# Patient Record
Sex: MALE | Race: WHITE | NOT HISPANIC OR LATINO | Employment: OTHER | URBAN - METROPOLITAN AREA
[De-identification: names, ages, dates, MRNs, and addresses within clinical notes are randomized per-mention and may not be internally consistent; named-entity substitution may affect disease eponyms.]

---

## 2018-02-15 ENCOUNTER — OFFICE VISIT CONVERTED (OUTPATIENT)
Dept: INTERNAL MEDICINE | Facility: CLINIC | Age: 73
End: 2018-02-15
Attending: NURSE PRACTITIONER

## 2018-03-15 ENCOUNTER — OFFICE VISIT CONVERTED (OUTPATIENT)
Dept: INTERNAL MEDICINE | Facility: CLINIC | Age: 73
End: 2018-03-15
Attending: NURSE PRACTITIONER

## 2018-03-29 ENCOUNTER — OFFICE VISIT CONVERTED (OUTPATIENT)
Dept: INTERNAL MEDICINE | Facility: CLINIC | Age: 73
End: 2018-03-29
Attending: INTERNAL MEDICINE

## 2018-05-01 ENCOUNTER — OFFICE VISIT CONVERTED (OUTPATIENT)
Dept: INTERNAL MEDICINE | Facility: CLINIC | Age: 73
End: 2018-05-01
Attending: INTERNAL MEDICINE

## 2018-05-16 ENCOUNTER — OFFICE VISIT CONVERTED (OUTPATIENT)
Dept: INTERNAL MEDICINE | Facility: CLINIC | Age: 73
End: 2018-05-16
Attending: PHYSICIAN ASSISTANT

## 2018-05-16 ENCOUNTER — CONVERSION ENCOUNTER (OUTPATIENT)
Dept: INTERNAL MEDICINE | Facility: CLINIC | Age: 73
End: 2018-05-16

## 2018-05-25 ENCOUNTER — OFFICE VISIT CONVERTED (OUTPATIENT)
Dept: INTERNAL MEDICINE | Facility: CLINIC | Age: 73
End: 2018-05-25
Attending: NURSE PRACTITIONER

## 2018-05-25 ENCOUNTER — CONVERSION ENCOUNTER (OUTPATIENT)
Dept: INTERNAL MEDICINE | Facility: CLINIC | Age: 73
End: 2018-05-25

## 2018-07-02 ENCOUNTER — OFFICE VISIT CONVERTED (OUTPATIENT)
Dept: NEUROSURGERY | Facility: CLINIC | Age: 73
End: 2018-07-02
Attending: PHYSICIAN ASSISTANT

## 2018-07-02 ENCOUNTER — CONVERSION ENCOUNTER (OUTPATIENT)
Dept: NEUROLOGY | Facility: CLINIC | Age: 73
End: 2018-07-02

## 2018-07-31 ENCOUNTER — OFFICE VISIT CONVERTED (OUTPATIENT)
Dept: INTERNAL MEDICINE | Facility: CLINIC | Age: 73
End: 2018-07-31
Attending: INTERNAL MEDICINE

## 2018-08-03 ENCOUNTER — OFFICE VISIT CONVERTED (OUTPATIENT)
Dept: INTERNAL MEDICINE | Facility: CLINIC | Age: 73
End: 2018-08-03
Attending: INTERNAL MEDICINE

## 2018-08-29 ENCOUNTER — OFFICE VISIT CONVERTED (OUTPATIENT)
Dept: NEUROSURGERY | Facility: CLINIC | Age: 73
End: 2018-08-29
Attending: PHYSICIAN ASSISTANT

## 2018-09-04 ENCOUNTER — OFFICE VISIT CONVERTED (OUTPATIENT)
Dept: INTERNAL MEDICINE | Facility: CLINIC | Age: 73
End: 2018-09-04
Attending: INTERNAL MEDICINE

## 2018-10-01 ENCOUNTER — CONVERSION ENCOUNTER (OUTPATIENT)
Dept: INTERNAL MEDICINE | Facility: CLINIC | Age: 73
End: 2018-10-01

## 2018-10-01 ENCOUNTER — OFFICE VISIT CONVERTED (OUTPATIENT)
Dept: INTERNAL MEDICINE | Facility: CLINIC | Age: 73
End: 2018-10-01
Attending: INTERNAL MEDICINE

## 2018-10-08 ENCOUNTER — OFFICE VISIT CONVERTED (OUTPATIENT)
Dept: INTERNAL MEDICINE | Facility: CLINIC | Age: 73
End: 2018-10-08
Attending: INTERNAL MEDICINE

## 2018-10-08 ENCOUNTER — CONVERSION ENCOUNTER (OUTPATIENT)
Dept: INTERNAL MEDICINE | Facility: CLINIC | Age: 73
End: 2018-10-08

## 2018-11-12 ENCOUNTER — OFFICE VISIT CONVERTED (OUTPATIENT)
Dept: INTERNAL MEDICINE | Facility: CLINIC | Age: 73
End: 2018-11-12
Attending: INTERNAL MEDICINE

## 2019-01-16 ENCOUNTER — OFFICE VISIT CONVERTED (OUTPATIENT)
Dept: INTERNAL MEDICINE | Facility: CLINIC | Age: 74
End: 2019-01-16
Attending: INTERNAL MEDICINE

## 2019-02-12 ENCOUNTER — OFFICE VISIT CONVERTED (OUTPATIENT)
Dept: INTERNAL MEDICINE | Facility: CLINIC | Age: 74
End: 2019-02-12
Attending: PHYSICIAN ASSISTANT

## 2019-02-12 ENCOUNTER — HOSPITAL ENCOUNTER (OUTPATIENT)
Dept: OTHER | Facility: HOSPITAL | Age: 74
Discharge: HOME | End: 2019-02-12
Attending: PHYSICIAN ASSISTANT

## 2019-02-12 ENCOUNTER — CONVERSION ENCOUNTER (OUTPATIENT)
Dept: INTERNAL MEDICINE | Facility: CLINIC | Age: 74
End: 2019-02-12

## 2019-02-14 LAB — BACTERIA UR CULT: NORMAL

## 2019-02-18 ENCOUNTER — HOSPITAL ENCOUNTER (OUTPATIENT)
Dept: OTHER | Facility: HOSPITAL | Age: 74
Discharge: HOME | End: 2019-02-18
Attending: INTERNAL MEDICINE

## 2019-02-18 ENCOUNTER — OFFICE VISIT CONVERTED (OUTPATIENT)
Dept: INTERNAL MEDICINE | Facility: CLINIC | Age: 74
End: 2019-02-18
Attending: INTERNAL MEDICINE

## 2019-02-18 LAB
ALBUMIN SERPL-MCNC: 4.3 G/DL (ref 3.5–5)
ALBUMIN/GLOB SERPL: 1.3 {RATIO} (ref 1.4–2.6)
ALP SERPL-CCNC: 63 U/L (ref 56–155)
ALT SERPL-CCNC: 36 U/L (ref 10–40)
ANION GAP SERPL CALC-SCNC: 22 MMOL/L (ref 8–19)
AST SERPL-CCNC: 39 U/L (ref 15–50)
BASOPHILS # BLD AUTO: 0.07 10*3/UL (ref 0–0.2)
BASOPHILS NFR BLD AUTO: 0.9 % (ref 0–3)
BILIRUB SERPL-MCNC: 0.66 MG/DL (ref 0.2–1.3)
BUN SERPL-MCNC: 13 MG/DL (ref 5–25)
BUN/CREAT SERPL: 10 {RATIO} (ref 6–20)
CALCIUM SERPL-MCNC: 9.8 MG/DL (ref 8.7–10.4)
CHLORIDE SERPL-SCNC: 102 MMOL/L (ref 99–111)
CHOLEST SERPL-MCNC: 207 MG/DL (ref 107–200)
CHOLEST/HDLC SERPL: 5.2 {RATIO} (ref 3–6)
CONV ABS IMM GRAN: 0.07 10*3/UL (ref 0–0.2)
CONV CO2: 20 MMOL/L (ref 22–32)
CONV IMMATURE GRAN: 0.9 % (ref 0–1.8)
CONV TOTAL PROTEIN: 7.7 G/DL (ref 6.3–8.2)
CREAT UR-MCNC: 1.26 MG/DL (ref 0.7–1.2)
DEPRECATED RDW RBC AUTO: 47.8 FL (ref 35.1–43.9)
EOSINOPHIL # BLD AUTO: 0.29 10*3/UL (ref 0–0.7)
EOSINOPHIL # BLD AUTO: 3.6 % (ref 0–7)
ERYTHROCYTE [DISTWIDTH] IN BLOOD BY AUTOMATED COUNT: 14.6 % (ref 11.6–14.4)
EST. AVERAGE GLUCOSE BLD GHB EST-MCNC: 137 MG/DL
GFR SERPLBLD BASED ON 1.73 SQ M-ARVRAT: 55 ML/MIN/{1.73_M2}
GLOBULIN UR ELPH-MCNC: 3.4 G/DL (ref 2–3.5)
GLUCOSE SERPL-MCNC: 68 MG/DL (ref 70–99)
HBA1C MFR BLD: 17.6 G/DL (ref 14–18)
HBA1C MFR BLD: 6.4 % (ref 3.5–5.7)
HCT VFR BLD AUTO: 51.4 % (ref 42–52)
HDLC SERPL-MCNC: 40 MG/DL (ref 40–60)
LDLC SERPL CALC-MCNC: 123 MG/DL (ref 70–100)
LYMPHOCYTES # BLD AUTO: 2.77 10*3/UL (ref 1–5)
MCH RBC QN AUTO: 30.8 PG (ref 27–31)
MCHC RBC AUTO-ENTMCNC: 34.2 G/DL (ref 33–37)
MCV RBC AUTO: 89.9 FL (ref 80–96)
MONOCYTES # BLD AUTO: 0.77 10*3/UL (ref 0.2–1.2)
MONOCYTES NFR BLD AUTO: 9.5 % (ref 3–10)
NEUTROPHILS # BLD AUTO: 4.12 10*3/UL (ref 2–8)
NEUTROPHILS NFR BLD AUTO: 50.9 % (ref 30–85)
NRBC CBCN: 0 % (ref 0–0.7)
OSMOLALITY SERPL CALC.SUM OF ELEC: 286 MOSM/KG (ref 273–304)
PLATELET # BLD AUTO: 217 10*3/UL (ref 130–400)
PMV BLD AUTO: 11.7 FL (ref 9.4–12.4)
POTASSIUM SERPL-SCNC: 5.4 MMOL/L (ref 3.5–5.3)
RBC # BLD AUTO: 5.72 10*6/UL (ref 4.7–6.1)
SODIUM SERPL-SCNC: 139 MMOL/L (ref 135–147)
TRIGL SERPL-MCNC: 453 MG/DL (ref 40–150)
VARIANT LYMPHS NFR BLD MANUAL: 34.2 % (ref 20–45)
WBC # BLD AUTO: 8.09 10*3/UL (ref 4.8–10.8)

## 2019-02-21 ENCOUNTER — OFFICE VISIT CONVERTED (OUTPATIENT)
Dept: SURGERY | Facility: CLINIC | Age: 74
End: 2019-02-21
Attending: PHYSICIAN ASSISTANT

## 2019-02-21 ENCOUNTER — HOSPITAL ENCOUNTER (OUTPATIENT)
Dept: SURGERY | Facility: CLINIC | Age: 74
Discharge: HOME | End: 2019-02-21
Attending: PHYSICIAN ASSISTANT

## 2019-02-24 LAB
AMOXICILLIN+CLAV SUSC ISLT: 4
AMPICILLIN SUSC ISLT: >=32
AMPICILLIN+SULBAC SUSC ISLT: 16
BACTERIA UR CULT: ABNORMAL
CEFAZOLIN SUSC ISLT: <=4
CEFEPIME SUSC ISLT: <=1
CEFTAZIDIME SUSC ISLT: <=1
CEFTRIAXONE SUSC ISLT: <=1
CEFUROXIME ORAL SUSC ISLT: 4
CEFUROXIME PARENTER SUSC ISLT: 4
CIPROFLOXACIN SUSC ISLT: >=4
ERTAPENEM SUSC ISLT: <=0.5
GENTAMICIN SUSC ISLT: <=1
LEVOFLOXACIN SUSC ISLT: >=8
NITROFURANTOIN SUSC ISLT: <=16
TETRACYCLINE SUSC ISLT: 2
TMP SMX SUSC ISLT: >=320
TOBRAMYCIN SUSC ISLT: <=1

## 2019-03-06 ENCOUNTER — OFFICE VISIT CONVERTED (OUTPATIENT)
Dept: NEUROSURGERY | Facility: CLINIC | Age: 74
End: 2019-03-06
Attending: PHYSICIAN ASSISTANT

## 2019-03-15 ENCOUNTER — HOSPITAL ENCOUNTER (OUTPATIENT)
Dept: MRI IMAGING | Facility: HOSPITAL | Age: 74
Discharge: HOME | End: 2019-03-15
Attending: PHYSICIAN ASSISTANT

## 2019-03-18 ENCOUNTER — CONVERSION ENCOUNTER (OUTPATIENT)
Dept: SURGERY | Facility: CLINIC | Age: 74
End: 2019-03-18

## 2019-03-18 ENCOUNTER — HOSPITAL ENCOUNTER (OUTPATIENT)
Dept: SURGERY | Facility: CLINIC | Age: 74
Discharge: HOME | End: 2019-03-18
Attending: PHYSICIAN ASSISTANT

## 2019-03-18 ENCOUNTER — OFFICE VISIT CONVERTED (OUTPATIENT)
Dept: SURGERY | Facility: CLINIC | Age: 74
End: 2019-03-18
Attending: PHYSICIAN ASSISTANT

## 2019-03-19 ENCOUNTER — HOSPITAL ENCOUNTER (OUTPATIENT)
Dept: PERIOP | Facility: HOSPITAL | Age: 74
Setting detail: HOSPITAL OUTPATIENT SURGERY
Discharge: HOME | End: 2019-03-19
Attending: UROLOGY

## 2019-03-19 LAB
GLUCOSE BLD-MCNC: 108 MG/DL (ref 70–99)
GLUCOSE BLD-MCNC: 128 MG/DL (ref 70–99)

## 2019-03-20 LAB
AMPICILLIN SUSC ISLT: <=2
BACTERIA UR CULT: ABNORMAL
CIPROFLOXACIN SUSC ISLT: 1
CONV GENTAMICIN HIGH LEVEL SYNERGY: ABNORMAL
CONV STREPTOMYCIN HIGH LEVEL SYNERGY: ABNORMAL
DAPTOMYCIN SUSC ISLT: 4
ERYTHROMYCIN SUSC ISLT: 0.5
LEVOFLOXACIN SUSC ISLT: 2
LINEZOLID SUSC ISLT: 2
NITROFURANTOIN SUSC ISLT: <=16
TETRACYCLINE SUSC ISLT: <=1
TIGECYCLINE SUSC ISLT: <=0.12
VANCOMYCIN SUSC ISLT: 1

## 2019-03-25 ENCOUNTER — CONVERSION ENCOUNTER (OUTPATIENT)
Dept: NEUROLOGY | Facility: CLINIC | Age: 74
End: 2019-03-25

## 2019-03-25 ENCOUNTER — OFFICE VISIT CONVERTED (OUTPATIENT)
Dept: NEUROSURGERY | Facility: CLINIC | Age: 74
End: 2019-03-25
Attending: PHYSICIAN ASSISTANT

## 2019-03-26 LAB
COLOR STONE: NORMAL
COMPN STONE: NORMAL
CONV CA OXALATE DIHYDRATE: 10 %
CONV CA OXALATE MONOHYDRATE: 88 %
CONV CALCIUM PHOSPHATE: 2 %
CONV CALCULI COMMENT: NORMAL
CONV CALCULI DISCLAIMER: NORMAL
CONV CALCULI NOTE: NORMAL
NIDUS STONE QL: NORMAL
SIZE STONE: NORMAL MM
SURFACE CRYSTALS: NORMAL
WT STONE: 8 MG

## 2019-03-27 ENCOUNTER — HOSPITAL ENCOUNTER (OUTPATIENT)
Dept: OTHER | Facility: HOSPITAL | Age: 74
Discharge: HOME | End: 2019-03-27
Attending: PHYSICIAN ASSISTANT

## 2019-03-28 ENCOUNTER — OFFICE VISIT CONVERTED (OUTPATIENT)
Dept: SURGERY | Facility: CLINIC | Age: 74
End: 2019-03-28
Attending: PHYSICIAN ASSISTANT

## 2019-05-06 ENCOUNTER — OFFICE VISIT CONVERTED (OUTPATIENT)
Dept: NEUROSURGERY | Facility: CLINIC | Age: 74
End: 2019-05-06
Attending: PHYSICIAN ASSISTANT

## 2019-05-06 ENCOUNTER — CONVERSION ENCOUNTER (OUTPATIENT)
Dept: NEUROLOGY | Facility: CLINIC | Age: 74
End: 2019-05-06

## 2019-05-22 ENCOUNTER — OFFICE VISIT CONVERTED (OUTPATIENT)
Dept: INTERNAL MEDICINE | Facility: CLINIC | Age: 74
End: 2019-05-22
Attending: INTERNAL MEDICINE

## 2019-05-22 ENCOUNTER — HOSPITAL ENCOUNTER (OUTPATIENT)
Dept: OTHER | Facility: HOSPITAL | Age: 74
Discharge: HOME | End: 2019-05-22
Attending: INTERNAL MEDICINE

## 2019-05-22 LAB
ALBUMIN SERPL-MCNC: 4.3 G/DL (ref 3.5–5)
ALBUMIN/GLOB SERPL: 1.4 {RATIO} (ref 1.4–2.6)
ALP SERPL-CCNC: 74 U/L (ref 56–155)
ALT SERPL-CCNC: 23 U/L (ref 10–40)
ANION GAP SERPL CALC-SCNC: 18 MMOL/L (ref 8–19)
AST SERPL-CCNC: 29 U/L (ref 15–50)
BILIRUB SERPL-MCNC: 0.53 MG/DL (ref 0.2–1.3)
BUN SERPL-MCNC: 15 MG/DL (ref 5–25)
BUN/CREAT SERPL: 15 {RATIO} (ref 6–20)
CALCIUM SERPL-MCNC: 10 MG/DL (ref 8.7–10.4)
CHLORIDE SERPL-SCNC: 101 MMOL/L (ref 99–111)
CHOLEST SERPL-MCNC: 196 MG/DL (ref 107–200)
CHOLEST/HDLC SERPL: 4.8 {RATIO} (ref 3–6)
CONV CO2: 25 MMOL/L (ref 22–32)
CONV TOTAL PROTEIN: 7.3 G/DL (ref 6.3–8.2)
CREAT UR-MCNC: 0.98 MG/DL (ref 0.7–1.2)
EST. AVERAGE GLUCOSE BLD GHB EST-MCNC: 163 MG/DL
GFR SERPLBLD BASED ON 1.73 SQ M-ARVRAT: >60 ML/MIN/{1.73_M2}
GLOBULIN UR ELPH-MCNC: 3 G/DL (ref 2–3.5)
GLUCOSE SERPL-MCNC: 243 MG/DL (ref 70–99)
HBA1C MFR BLD: 7.3 % (ref 3.5–5.7)
HDLC SERPL-MCNC: 41 MG/DL (ref 40–60)
LDLC SERPL CALC-MCNC: 97 MG/DL (ref 70–100)
OSMOLALITY SERPL CALC.SUM OF ELEC: 297 MOSM/KG (ref 273–304)
POTASSIUM SERPL-SCNC: 4.8 MMOL/L (ref 3.5–5.3)
SODIUM SERPL-SCNC: 139 MMOL/L (ref 135–147)
TRIGL SERPL-MCNC: 454 MG/DL (ref 40–150)

## 2019-07-03 ENCOUNTER — OFFICE VISIT CONVERTED (OUTPATIENT)
Dept: INTERNAL MEDICINE | Facility: CLINIC | Age: 74
End: 2019-07-03
Attending: PHYSICIAN ASSISTANT

## 2019-07-03 ENCOUNTER — HOSPITAL ENCOUNTER (OUTPATIENT)
Dept: OTHER | Facility: HOSPITAL | Age: 74
Discharge: HOME | End: 2019-07-03
Attending: PHYSICIAN ASSISTANT

## 2019-07-03 LAB
APPEARANCE UR: ABNORMAL
BILIRUB UR QL: ABNORMAL
COLOR UR: ABNORMAL
CONV BACTERIA: ABNORMAL
CONV COLLECTION SOURCE (UA): ABNORMAL
CONV HYALINE CASTS IN URINE MICRO: ABNORMAL /[LPF]
CONV UROBILINOGEN IN URINE BY AUTOMATED TEST STRIP: 0.2 {EHRLICHU}/DL (ref 0.1–1)
CONV YEAST, UA: PRESENT
GLUCOSE UR QL: >=1000 MG/DL
HGB UR QL STRIP: NEGATIVE
KETONES UR QL STRIP: ABNORMAL MG/DL
LEUKOCYTE ESTERASE UR QL STRIP: ABNORMAL
NITRITE UR QL STRIP: POSITIVE
PH UR STRIP.AUTO: 5 [PH] (ref 5–8)
PROT UR QL: ABNORMAL MG/DL
RBC #/AREA URNS HPF: ABNORMAL /[HPF]
SP GR UR: 1.03 (ref 1–1.03)
SQUAMOUS SPT QL MICRO: ABNORMAL /[HPF]
WBC #/AREA URNS HPF: ABNORMAL /[HPF]

## 2019-07-05 LAB
AMOXICILLIN+CLAV SUSC ISLT: 4
AMPICILLIN SUSC ISLT: >=32
AMPICILLIN+SULBAC SUSC ISLT: 16
BACTERIA UR CULT: ABNORMAL
CEFAZOLIN SUSC ISLT: <=4
CEFEPIME SUSC ISLT: <=1
CEFTAZIDIME SUSC ISLT: <=1
CEFTRIAXONE SUSC ISLT: <=1
CEFUROXIME ORAL SUSC ISLT: 16
CEFUROXIME PARENTER SUSC ISLT: 16
CIPROFLOXACIN SUSC ISLT: >=4
ERTAPENEM SUSC ISLT: <=0.5
GENTAMICIN SUSC ISLT: <=1
LEVOFLOXACIN SUSC ISLT: >=8
NITROFURANTOIN SUSC ISLT: <=16
TETRACYCLINE SUSC ISLT: 4
TMP SMX SUSC ISLT: >=320
TOBRAMYCIN SUSC ISLT: <=1

## 2019-07-09 ENCOUNTER — CONVERSION ENCOUNTER (OUTPATIENT)
Dept: INTERNAL MEDICINE | Facility: CLINIC | Age: 74
End: 2019-07-09

## 2019-07-09 ENCOUNTER — OFFICE VISIT CONVERTED (OUTPATIENT)
Dept: INTERNAL MEDICINE | Facility: CLINIC | Age: 74
End: 2019-07-09
Attending: PHYSICIAN ASSISTANT

## 2019-07-09 ENCOUNTER — HOSPITAL ENCOUNTER (OUTPATIENT)
Dept: OTHER | Facility: HOSPITAL | Age: 74
Discharge: HOME | End: 2019-07-09
Attending: PHYSICIAN ASSISTANT

## 2019-07-09 LAB
APPEARANCE UR: ABNORMAL
BILIRUB UR QL: ABNORMAL
COLOR UR: ABNORMAL
CONV BACTERIA: NEGATIVE
CONV COLLECTION SOURCE (UA): ABNORMAL
CONV CRYSTALS: ABNORMAL /[HPF]
CONV HYALINE CASTS IN URINE MICRO: ABNORMAL /[LPF]
CONV UROBILINOGEN IN URINE BY AUTOMATED TEST STRIP: 1 {EHRLICHU}/DL (ref 0.1–1)
GLUCOSE UR QL: 250 MG/DL
HGB UR QL STRIP: ABNORMAL
KETONES UR QL STRIP: 15 MG/DL
LEUKOCYTE ESTERASE UR QL STRIP: ABNORMAL
NITRITE UR QL STRIP: NEGATIVE
PH UR STRIP.AUTO: 5 [PH] (ref 5–8)
PROT UR QL: 30 MG/DL
RBC #/AREA URNS HPF: ABNORMAL /[HPF]
SP GR UR: 1.04 (ref 1–1.03)
SQUAMOUS SPT QL MICRO: ABNORMAL /[HPF]
WBC #/AREA URNS HPF: ABNORMAL /[HPF]

## 2019-07-11 LAB — BACTERIA UR CULT: NORMAL

## 2019-07-19 ENCOUNTER — HOSPITAL ENCOUNTER (OUTPATIENT)
Dept: OTHER | Facility: HOSPITAL | Age: 74
Discharge: HOME | End: 2019-07-19
Attending: INTERNAL MEDICINE

## 2019-07-19 ENCOUNTER — OFFICE VISIT CONVERTED (OUTPATIENT)
Dept: INTERNAL MEDICINE | Facility: CLINIC | Age: 74
End: 2019-07-19
Attending: INTERNAL MEDICINE

## 2019-07-19 LAB
APPEARANCE UR: ABNORMAL
BILIRUB UR QL: ABNORMAL
COLOR UR: ABNORMAL
CONV BACTERIA: ABNORMAL
CONV COLLECTION SOURCE (UA): ABNORMAL
CONV CRYSTALS: ABNORMAL /[HPF]
CONV UROBILINOGEN IN URINE BY AUTOMATED TEST STRIP: 0.2 {EHRLICHU}/DL (ref 0.1–1)
GLUCOSE UR QL: 500 MG/DL
HGB UR QL STRIP: ABNORMAL
KETONES UR QL STRIP: NEGATIVE MG/DL
LEUKOCYTE ESTERASE UR QL STRIP: NEGATIVE
NITRITE UR QL STRIP: NEGATIVE
PH UR STRIP.AUTO: 5 [PH] (ref 5–8)
PROT UR QL: NEGATIVE MG/DL
RBC #/AREA URNS HPF: ABNORMAL /[HPF]
SP GR UR: 1.02 (ref 1–1.03)
WBC #/AREA URNS HPF: ABNORMAL /[HPF]

## 2019-07-22 LAB
AMOXICILLIN+CLAV SUSC ISLT: <=2
AMOXICILLIN+CLAV SUSC ISLT: <=2
AMPICILLIN SUSC ISLT: <=2
AMPICILLIN SUSC ISLT: >=32
AMPICILLIN+SULBAC SUSC ISLT: 8
AMPICILLIN+SULBAC SUSC ISLT: <=2
BACTERIA UR CULT: ABNORMAL
CEFAZOLIN SUSC ISLT: <=4
CEFAZOLIN SUSC ISLT: <=4
CEFEPIME SUSC ISLT: <=1
CEFEPIME SUSC ISLT: <=1
CEFTAZIDIME SUSC ISLT: <=1
CEFTAZIDIME SUSC ISLT: <=1
CEFTRIAXONE SUSC ISLT: <=1
CEFTRIAXONE SUSC ISLT: <=1
CEFUROXIME ORAL SUSC ISLT: 4
CEFUROXIME ORAL SUSC ISLT: <=1
CEFUROXIME PARENTER SUSC ISLT: 4
CEFUROXIME PARENTER SUSC ISLT: <=1
CIPROFLOXACIN SUSC ISLT: <=0.25
CIPROFLOXACIN SUSC ISLT: <=0.25
ERTAPENEM SUSC ISLT: <=0.5
ERTAPENEM SUSC ISLT: <=0.5
GENTAMICIN SUSC ISLT: <=1
GENTAMICIN SUSC ISLT: <=1
LEVOFLOXACIN SUSC ISLT: <=0.12
LEVOFLOXACIN SUSC ISLT: <=0.12
NITROFURANTOIN SUSC ISLT: 64
NITROFURANTOIN SUSC ISLT: <=16
TETRACYCLINE SUSC ISLT: <=1
TETRACYCLINE SUSC ISLT: <=1
TMP SMX SUSC ISLT: <=20
TMP SMX SUSC ISLT: <=20
TOBRAMYCIN SUSC ISLT: <=1
TOBRAMYCIN SUSC ISLT: <=1

## 2019-08-27 ENCOUNTER — HOSPITAL ENCOUNTER (OUTPATIENT)
Dept: OTHER | Facility: HOSPITAL | Age: 74
Discharge: HOME | End: 2019-08-27
Attending: INTERNAL MEDICINE

## 2019-08-27 ENCOUNTER — OFFICE VISIT CONVERTED (OUTPATIENT)
Dept: INTERNAL MEDICINE | Facility: CLINIC | Age: 74
End: 2019-08-27
Attending: INTERNAL MEDICINE

## 2019-08-27 LAB
ALBUMIN SERPL-MCNC: 4.4 G/DL (ref 3.5–5)
ALBUMIN/GLOB SERPL: 1.5 {RATIO} (ref 1.4–2.6)
ALP SERPL-CCNC: 78 U/L (ref 56–155)
ALT SERPL-CCNC: 31 U/L (ref 10–40)
ANION GAP SERPL CALC-SCNC: 20 MMOL/L (ref 8–19)
AST SERPL-CCNC: 41 U/L (ref 15–50)
BILIRUB SERPL-MCNC: 0.88 MG/DL (ref 0.2–1.3)
BUN SERPL-MCNC: 14 MG/DL (ref 5–25)
BUN/CREAT SERPL: 13 {RATIO} (ref 6–20)
CALCIUM SERPL-MCNC: 9.8 MG/DL (ref 8.7–10.4)
CHLORIDE SERPL-SCNC: 99 MMOL/L (ref 99–111)
CHOLEST SERPL-MCNC: 184 MG/DL (ref 107–200)
CHOLEST/HDLC SERPL: 4.1 {RATIO} (ref 3–6)
CONV CO2: 23 MMOL/L (ref 22–32)
CONV TOTAL PROTEIN: 7.3 G/DL (ref 6.3–8.2)
CREAT UR-MCNC: 1.06 MG/DL (ref 0.7–1.2)
EST. AVERAGE GLUCOSE BLD GHB EST-MCNC: 197 MG/DL
GFR SERPLBLD BASED ON 1.73 SQ M-ARVRAT: >60 ML/MIN/{1.73_M2}
GLOBULIN UR ELPH-MCNC: 2.9 G/DL (ref 2–3.5)
GLUCOSE SERPL-MCNC: 324 MG/DL (ref 70–99)
HBA1C MFR BLD: 8.5 % (ref 3.5–5.7)
HDLC SERPL-MCNC: 45 MG/DL (ref 40–60)
LDLC SERPL CALC-MCNC: 87 MG/DL (ref 70–100)
OSMOLALITY SERPL CALC.SUM OF ELEC: 297 MOSM/KG (ref 273–304)
POTASSIUM SERPL-SCNC: 4.6 MMOL/L (ref 3.5–5.3)
SODIUM SERPL-SCNC: 137 MMOL/L (ref 135–147)
TRIGL SERPL-MCNC: 262 MG/DL (ref 40–150)
VLDLC SERPL-MCNC: 52 MG/DL (ref 5–37)

## 2019-09-03 ENCOUNTER — OFFICE VISIT CONVERTED (OUTPATIENT)
Dept: SURGERY | Facility: CLINIC | Age: 74
End: 2019-09-03
Attending: PHYSICIAN ASSISTANT

## 2019-09-03 ENCOUNTER — HOSPITAL ENCOUNTER (OUTPATIENT)
Dept: SURGERY | Facility: CLINIC | Age: 74
Discharge: HOME | End: 2019-09-03
Attending: PHYSICIAN ASSISTANT

## 2019-09-06 LAB
AMOXICILLIN+CLAV SUSC ISLT: 8
AMPICILLIN SUSC ISLT: >=32
AMPICILLIN+SULBAC SUSC ISLT: 16
BACTERIA UR CULT: ABNORMAL
CEFAZOLIN SUSC ISLT: <=4
CEFEPIME SUSC ISLT: <=1
CEFTAZIDIME SUSC ISLT: <=1
CEFTRIAXONE SUSC ISLT: <=1
CEFUROXIME ORAL SUSC ISLT: 16
CEFUROXIME PARENTER SUSC ISLT: 16
CIPROFLOXACIN SUSC ISLT: >=4
ERTAPENEM SUSC ISLT: <=0.5
GENTAMICIN SUSC ISLT: <=1
LEVOFLOXACIN SUSC ISLT: >=8
NITROFURANTOIN SUSC ISLT: <=16
TETRACYCLINE SUSC ISLT: >=16
TMP SMX SUSC ISLT: >=320
TOBRAMYCIN SUSC ISLT: <=1

## 2019-09-19 ENCOUNTER — HOSPITAL ENCOUNTER (OUTPATIENT)
Dept: GENERAL RADIOLOGY | Facility: HOSPITAL | Age: 74
Discharge: HOME | End: 2019-09-19
Attending: PHYSICIAN ASSISTANT

## 2019-09-20 ENCOUNTER — OFFICE VISIT CONVERTED (OUTPATIENT)
Dept: SURGERY | Facility: CLINIC | Age: 74
End: 2019-09-20
Attending: PHYSICIAN ASSISTANT

## 2019-09-24 ENCOUNTER — HOSPITAL ENCOUNTER (OUTPATIENT)
Dept: SURGERY | Facility: CLINIC | Age: 74
Discharge: HOME | End: 2019-09-24
Attending: PHYSICIAN ASSISTANT

## 2019-09-26 LAB — BACTERIA UR CULT: NORMAL

## 2019-09-30 ENCOUNTER — HOSPITAL ENCOUNTER (OUTPATIENT)
Dept: SURGERY | Facility: CLINIC | Age: 74
Discharge: HOME | End: 2019-09-30
Attending: PHYSICIAN ASSISTANT

## 2019-10-02 LAB — BACTERIA UR CULT: NORMAL

## 2019-12-02 ENCOUNTER — HOSPITAL ENCOUNTER (OUTPATIENT)
Dept: OTHER | Facility: HOSPITAL | Age: 74
Discharge: HOME | End: 2019-12-02
Attending: INTERNAL MEDICINE

## 2019-12-02 ENCOUNTER — OFFICE VISIT CONVERTED (OUTPATIENT)
Dept: INTERNAL MEDICINE | Facility: CLINIC | Age: 74
End: 2019-12-02
Attending: INTERNAL MEDICINE

## 2019-12-02 ENCOUNTER — CONVERSION ENCOUNTER (OUTPATIENT)
Dept: INTERNAL MEDICINE | Facility: CLINIC | Age: 74
End: 2019-12-02

## 2019-12-02 LAB
ALBUMIN SERPL-MCNC: 4.6 G/DL (ref 3.5–5)
ALBUMIN/GLOB SERPL: 1.5 {RATIO} (ref 1.4–2.6)
ALP SERPL-CCNC: 74 U/L (ref 56–155)
ALT SERPL-CCNC: 33 U/L (ref 10–40)
ANION GAP SERPL CALC-SCNC: 25 MMOL/L (ref 8–19)
AST SERPL-CCNC: 40 U/L (ref 15–50)
BILIRUB SERPL-MCNC: 0.81 MG/DL (ref 0.2–1.3)
BUN SERPL-MCNC: 17 MG/DL (ref 5–25)
BUN/CREAT SERPL: 17 {RATIO} (ref 6–20)
CALCIUM SERPL-MCNC: 10.1 MG/DL (ref 8.7–10.4)
CHLORIDE SERPL-SCNC: 99 MMOL/L (ref 99–111)
CHOLEST SERPL-MCNC: 234 MG/DL (ref 107–200)
CHOLEST/HDLC SERPL: 5.6 {RATIO} (ref 3–6)
CONV CO2: 20 MMOL/L (ref 22–32)
CONV TOTAL PROTEIN: 7.7 G/DL (ref 6.3–8.2)
CREAT UR-MCNC: 1 MG/DL (ref 0.7–1.2)
EST. AVERAGE GLUCOSE BLD GHB EST-MCNC: 220 MG/DL
GFR SERPLBLD BASED ON 1.73 SQ M-ARVRAT: >60 ML/MIN/{1.73_M2}
GLOBULIN UR ELPH-MCNC: 3.1 G/DL (ref 2–3.5)
GLUCOSE SERPL-MCNC: 294 MG/DL (ref 70–99)
HBA1C MFR BLD: 9.3 % (ref 3.5–5.7)
HDLC SERPL-MCNC: 42 MG/DL (ref 40–60)
LDLC SERPL CALC-MCNC: 143 MG/DL (ref 70–100)
OSMOLALITY SERPL CALC.SUM OF ELEC: 300 MOSM/KG (ref 273–304)
POTASSIUM SERPL-SCNC: 4.5 MMOL/L (ref 3.5–5.3)
SODIUM SERPL-SCNC: 139 MMOL/L (ref 135–147)
TRIGL SERPL-MCNC: 405 MG/DL (ref 40–150)

## 2020-03-25 ENCOUNTER — TELEMEDICINE CONVERTED (OUTPATIENT)
Dept: INTERNAL MEDICINE | Facility: CLINIC | Age: 75
End: 2020-03-25
Attending: INTERNAL MEDICINE

## 2020-05-08 ENCOUNTER — HOSPITAL ENCOUNTER (OUTPATIENT)
Dept: OTHER | Facility: HOSPITAL | Age: 75
Discharge: HOME | End: 2020-05-08

## 2020-05-08 LAB
25(OH)D3 SERPL-MCNC: 15.1 NG/ML (ref 30–100)
ALBUMIN SERPL-MCNC: 3.6 G/DL (ref 3.5–5)
ALBUMIN/GLOB SERPL: 1.2 {RATIO} (ref 1.4–2.6)
ALP SERPL-CCNC: 70 U/L (ref 56–155)
ALT SERPL-CCNC: 13 U/L (ref 10–40)
ANION GAP SERPL CALC-SCNC: 18 MMOL/L (ref 8–19)
AST SERPL-CCNC: 18 U/L (ref 15–50)
BASOPHILS # BLD AUTO: 0.04 10*3/UL (ref 0–0.2)
BASOPHILS NFR BLD AUTO: 0.4 % (ref 0–3)
BILIRUB SERPL-MCNC: 0.61 MG/DL (ref 0.2–1.3)
BUN SERPL-MCNC: 21 MG/DL (ref 5–25)
BUN/CREAT SERPL: 24 {RATIO} (ref 6–20)
CALCIUM SERPL-MCNC: 9.7 MG/DL (ref 8.7–10.4)
CHLORIDE SERPL-SCNC: 100 MMOL/L (ref 99–111)
CONV ABS IMM GRAN: 0.11 10*3/UL (ref 0–0.2)
CONV CO2: 23 MMOL/L (ref 22–32)
CONV IMMATURE GRAN: 1.2 % (ref 0–1.8)
CONV TOTAL PROTEIN: 6.6 G/DL (ref 6.3–8.2)
CREAT UR-MCNC: 0.87 MG/DL (ref 0.7–1.2)
DEPRECATED RDW RBC AUTO: 42 FL (ref 35.1–43.9)
EOSINOPHIL # BLD AUTO: 0.13 10*3/UL (ref 0–0.7)
EOSINOPHIL # BLD AUTO: 1.4 % (ref 0–7)
ERYTHROCYTE [DISTWIDTH] IN BLOOD BY AUTOMATED COUNT: 13 % (ref 11.6–14.4)
GFR SERPLBLD BASED ON 1.73 SQ M-ARVRAT: >60 ML/MIN/{1.73_M2}
GLOBULIN UR ELPH-MCNC: 3 G/DL (ref 2–3.5)
GLUCOSE SERPL-MCNC: 240 MG/DL (ref 70–99)
HCT VFR BLD AUTO: 47.6 % (ref 42–52)
HGB BLD-MCNC: 16.3 G/DL (ref 14–18)
LYMPHOCYTES # BLD AUTO: 2.32 10*3/UL (ref 1–5)
LYMPHOCYTES NFR BLD AUTO: 25.7 % (ref 20–45)
MCH RBC QN AUTO: 30.2 PG (ref 27–31)
MCHC RBC AUTO-ENTMCNC: 34.2 G/DL (ref 33–37)
MCV RBC AUTO: 88.3 FL (ref 80–96)
MONOCYTES # BLD AUTO: 0.88 10*3/UL (ref 0.2–1.2)
MONOCYTES NFR BLD AUTO: 9.7 % (ref 3–10)
NEUTROPHILS # BLD AUTO: 5.56 10*3/UL (ref 2–8)
NEUTROPHILS NFR BLD AUTO: 61.6 % (ref 30–85)
NRBC CBCN: 0 % (ref 0–0.7)
OSMOLALITY SERPL CALC.SUM OF ELEC: 295 MOSM/KG (ref 273–304)
PLATELET # BLD AUTO: 223 10*3/UL (ref 130–400)
PMV BLD AUTO: 12.2 FL (ref 9.4–12.4)
POTASSIUM SERPL-SCNC: 4.3 MMOL/L (ref 3.5–5.3)
RBC # BLD AUTO: 5.39 10*6/UL (ref 4.7–6.1)
SODIUM SERPL-SCNC: 137 MMOL/L (ref 135–147)
WBC # BLD AUTO: 9.04 10*3/UL (ref 4.8–10.8)

## 2020-05-26 ENCOUNTER — OFFICE VISIT CONVERTED (OUTPATIENT)
Dept: INTERNAL MEDICINE | Facility: CLINIC | Age: 75
End: 2020-05-26
Attending: INTERNAL MEDICINE

## 2020-06-04 ENCOUNTER — OFFICE VISIT CONVERTED (OUTPATIENT)
Dept: NEUROLOGY | Facility: CLINIC | Age: 75
End: 2020-06-04
Attending: NURSE PRACTITIONER

## 2020-06-04 ENCOUNTER — CONVERSION ENCOUNTER (OUTPATIENT)
Dept: NEUROLOGY | Facility: CLINIC | Age: 75
End: 2020-06-04

## 2020-06-12 ENCOUNTER — OFFICE VISIT CONVERTED (OUTPATIENT)
Dept: INTERNAL MEDICINE | Facility: CLINIC | Age: 75
End: 2020-06-12
Attending: INTERNAL MEDICINE

## 2020-06-15 ENCOUNTER — HOSPITAL ENCOUNTER (OUTPATIENT)
Dept: GENERAL RADIOLOGY | Facility: HOSPITAL | Age: 75
Discharge: HOME | End: 2020-06-15
Attending: INTERNAL MEDICINE

## 2020-06-18 ENCOUNTER — CONVERSION ENCOUNTER (OUTPATIENT)
Dept: ORTHOPEDIC SURGERY | Facility: CLINIC | Age: 75
End: 2020-06-18

## 2020-06-18 ENCOUNTER — OFFICE VISIT CONVERTED (OUTPATIENT)
Dept: ORTHOPEDIC SURGERY | Facility: CLINIC | Age: 75
End: 2020-06-18
Attending: ORTHOPAEDIC SURGERY

## 2020-06-26 ENCOUNTER — HOSPITAL ENCOUNTER (OUTPATIENT)
Dept: MRI IMAGING | Facility: HOSPITAL | Age: 75
Discharge: HOME | End: 2020-06-26
Attending: NURSE PRACTITIONER

## 2020-06-26 ENCOUNTER — CONVERSION ENCOUNTER (OUTPATIENT)
Dept: INTERNAL MEDICINE | Facility: CLINIC | Age: 75
End: 2020-06-26

## 2020-06-26 ENCOUNTER — OFFICE VISIT CONVERTED (OUTPATIENT)
Dept: INTERNAL MEDICINE | Facility: CLINIC | Age: 75
End: 2020-06-26
Attending: INTERNAL MEDICINE

## 2020-07-16 ENCOUNTER — HOSPITAL ENCOUNTER (OUTPATIENT)
Dept: PERIOP | Facility: HOSPITAL | Age: 75
Setting detail: HOSPITAL OUTPATIENT SURGERY
Discharge: HOME | End: 2020-07-17
Attending: SURGERY

## 2020-07-16 LAB
ALBUMIN SERPL-MCNC: 4.2 G/DL (ref 3.5–5)
ALBUMIN/GLOB SERPL: 1.5 {RATIO} (ref 1.4–2.6)
ALP SERPL-CCNC: 71 U/L (ref 56–155)
ALT SERPL-CCNC: 15 U/L (ref 10–40)
ANION GAP SERPL CALC-SCNC: 16 MMOL/L (ref 8–19)
APPEARANCE UR: CLEAR
AST SERPL-CCNC: 15 U/L (ref 15–50)
BASOPHILS # BLD AUTO: 0.07 10*3/UL (ref 0–0.2)
BASOPHILS NFR BLD AUTO: 0.8 % (ref 0–3)
BILIRUB SERPL-MCNC: 1.15 MG/DL (ref 0.2–1.3)
BILIRUB UR QL: NEGATIVE
BUN SERPL-MCNC: 13 MG/DL (ref 5–25)
BUN/CREAT SERPL: 15 {RATIO} (ref 6–20)
CALCIUM SERPL-MCNC: 9.7 MG/DL (ref 8.7–10.4)
CHLORIDE SERPL-SCNC: 105 MMOL/L (ref 99–111)
COLOR UR: YELLOW
CONV ABS IMM GRAN: 0.07 10*3/UL (ref 0–0.2)
CONV BACTERIA: NEGATIVE
CONV CO2: 25 MMOL/L (ref 22–32)
CONV COLLECTION SOURCE (UA): ABNORMAL
CONV IMMATURE GRAN: 0.8 % (ref 0–1.8)
CONV TOTAL PROTEIN: 7 G/DL (ref 6.3–8.2)
CONV UROBILINOGEN IN URINE BY AUTOMATED TEST STRIP: 1 {EHRLICHU}/DL (ref 0.1–1)
CREAT UR-MCNC: 0.88 MG/DL (ref 0.7–1.2)
DEPRECATED RDW RBC AUTO: 51.1 FL (ref 35.1–43.9)
EOSINOPHIL # BLD AUTO: 0.13 10*3/UL (ref 0–0.7)
EOSINOPHIL # BLD AUTO: 1.5 % (ref 0–7)
ERYTHROCYTE [DISTWIDTH] IN BLOOD BY AUTOMATED COUNT: 15.1 % (ref 11.6–14.4)
GFR SERPLBLD BASED ON 1.73 SQ M-ARVRAT: >60 ML/MIN/{1.73_M2}
GLOBULIN UR ELPH-MCNC: 2.8 G/DL (ref 2–3.5)
GLUCOSE BLD-MCNC: 127 MG/DL (ref 70–99)
GLUCOSE SERPL-MCNC: 117 MG/DL (ref 70–99)
GLUCOSE UR QL: 500 MG/DL
HCT VFR BLD AUTO: 49.3 % (ref 42–52)
HGB BLD-MCNC: 16.5 G/DL (ref 14–18)
HGB UR QL STRIP: NEGATIVE
KETONES UR QL STRIP: NEGATIVE MG/DL
LEUKOCYTE ESTERASE UR QL STRIP: ABNORMAL
LIPASE SERPL-CCNC: 43 U/L (ref 5–51)
LYMPHOCYTES # BLD AUTO: 2.55 10*3/UL (ref 1–5)
LYMPHOCYTES NFR BLD AUTO: 30.2 % (ref 20–45)
MCH RBC QN AUTO: 30.8 PG (ref 27–31)
MCHC RBC AUTO-ENTMCNC: 33.5 G/DL (ref 33–37)
MCV RBC AUTO: 92.1 FL (ref 80–96)
MONOCYTES # BLD AUTO: 0.9 10*3/UL (ref 0.2–1.2)
MONOCYTES NFR BLD AUTO: 10.7 % (ref 3–10)
NEUTROPHILS # BLD AUTO: 4.71 10*3/UL (ref 2–8)
NEUTROPHILS NFR BLD AUTO: 56 % (ref 30–85)
NITRITE UR QL STRIP: NEGATIVE
NRBC CBCN: 0 % (ref 0–0.7)
OSMOLALITY SERPL CALC.SUM OF ELEC: 293 MOSM/KG (ref 273–304)
PH UR STRIP.AUTO: >=9 [PH] (ref 5–8)
PLATELET # BLD AUTO: 229 10*3/UL (ref 130–400)
PMV BLD AUTO: 10.1 FL (ref 9.4–12.4)
POTASSIUM SERPL-SCNC: 4.6 MMOL/L (ref 3.5–5.3)
PROT UR QL: NEGATIVE MG/DL
RBC # BLD AUTO: 5.35 10*6/UL (ref 4.7–6.1)
RBC #/AREA URNS HPF: ABNORMAL /[HPF]
SARS-COV-2 RNA SPEC QL NAA+PROBE: NOT DETECTED
SODIUM SERPL-SCNC: 141 MMOL/L (ref 135–147)
SP GR UR: 1.03 (ref 1–1.03)
SQUAMOUS SPT QL MICRO: ABNORMAL /[HPF]
WBC # BLD AUTO: 8.43 10*3/UL (ref 4.8–10.8)
WBC #/AREA URNS HPF: ABNORMAL /[HPF]

## 2020-07-18 LAB — BACTERIA UR CULT: NORMAL

## 2020-07-21 ENCOUNTER — OFFICE VISIT CONVERTED (OUTPATIENT)
Dept: INTERNAL MEDICINE | Facility: CLINIC | Age: 75
End: 2020-07-21
Attending: INTERNAL MEDICINE

## 2020-07-28 ENCOUNTER — OFFICE VISIT CONVERTED (OUTPATIENT)
Dept: SURGERY | Facility: CLINIC | Age: 75
End: 2020-07-28
Attending: SURGERY

## 2020-09-04 ENCOUNTER — OFFICE VISIT CONVERTED (OUTPATIENT)
Dept: INTERNAL MEDICINE | Facility: CLINIC | Age: 75
End: 2020-09-04
Attending: INTERNAL MEDICINE

## 2020-09-04 ENCOUNTER — HOSPITAL ENCOUNTER (OUTPATIENT)
Dept: OTHER | Facility: HOSPITAL | Age: 75
Discharge: HOME | End: 2020-09-04
Attending: INTERNAL MEDICINE

## 2020-09-04 LAB
BASOPHILS # BLD AUTO: 0.05 10*3/UL (ref 0–0.2)
BASOPHILS NFR BLD AUTO: 0.6 % (ref 0–3)
CONV ABS IMM GRAN: 0.04 10*3/UL (ref 0–0.2)
CONV IMMATURE GRAN: 0.5 % (ref 0–1.8)
DEPRECATED RDW RBC AUTO: 49.8 FL (ref 35.1–43.9)
EOSINOPHIL # BLD AUTO: 0.2 10*3/UL (ref 0–0.7)
EOSINOPHIL # BLD AUTO: 2.6 % (ref 0–7)
ERYTHROCYTE [DISTWIDTH] IN BLOOD BY AUTOMATED COUNT: 14.7 % (ref 11.6–14.4)
HCT VFR BLD AUTO: 48.7 % (ref 42–52)
HGB BLD-MCNC: 16 G/DL (ref 14–18)
LYMPHOCYTES # BLD AUTO: 2.72 10*3/UL (ref 1–5)
LYMPHOCYTES NFR BLD AUTO: 35.3 % (ref 20–45)
MCH RBC QN AUTO: 30.1 PG (ref 27–31)
MCHC RBC AUTO-ENTMCNC: 32.9 G/DL (ref 33–37)
MCV RBC AUTO: 91.5 FL (ref 80–96)
MONOCYTES # BLD AUTO: 0.86 10*3/UL (ref 0.2–1.2)
MONOCYTES NFR BLD AUTO: 11.2 % (ref 3–10)
NEUTROPHILS # BLD AUTO: 3.84 10*3/UL (ref 2–8)
NEUTROPHILS NFR BLD AUTO: 49.8 % (ref 30–85)
NRBC CBCN: 0 % (ref 0–0.7)
PLATELET # BLD AUTO: 204 10*3/UL (ref 130–400)
PMV BLD AUTO: 10.8 FL (ref 9.4–12.4)
RBC # BLD AUTO: 5.32 10*6/UL (ref 4.7–6.1)
WBC # BLD AUTO: 7.71 10*3/UL (ref 4.8–10.8)

## 2020-09-05 LAB
ALBUMIN SERPL-MCNC: 4.3 G/DL (ref 3.5–5)
ALBUMIN/GLOB SERPL: 1.7 {RATIO} (ref 1.4–2.6)
ALP SERPL-CCNC: 64 U/L (ref 56–155)
ALT SERPL-CCNC: 23 U/L (ref 10–40)
ANION GAP SERPL CALC-SCNC: 18 MMOL/L (ref 8–19)
AST SERPL-CCNC: 20 U/L (ref 15–50)
BILIRUB SERPL-MCNC: 0.59 MG/DL (ref 0.2–1.3)
BUN SERPL-MCNC: 17 MG/DL (ref 5–25)
BUN/CREAT SERPL: 19 {RATIO} (ref 6–20)
CALCIUM SERPL-MCNC: 10.2 MG/DL (ref 8.7–10.4)
CHLORIDE SERPL-SCNC: 106 MMOL/L (ref 99–111)
CHOLEST SERPL-MCNC: 110 MG/DL (ref 107–200)
CHOLEST/HDLC SERPL: 2.6 {RATIO} (ref 3–6)
CONV CO2: 21 MMOL/L (ref 22–32)
CONV TOTAL PROTEIN: 6.8 G/DL (ref 6.3–8.2)
CREAT UR-MCNC: 0.9 MG/DL (ref 0.7–1.2)
EST. AVERAGE GLUCOSE BLD GHB EST-MCNC: 117 MG/DL
GFR SERPLBLD BASED ON 1.73 SQ M-ARVRAT: >60 ML/MIN/{1.73_M2}
GLOBULIN UR ELPH-MCNC: 2.5 G/DL (ref 2–3.5)
GLUCOSE SERPL-MCNC: 82 MG/DL (ref 70–99)
HBA1C MFR BLD: 5.7 % (ref 3.5–5.7)
HDLC SERPL-MCNC: 43 MG/DL (ref 40–60)
LDLC SERPL CALC-MCNC: 44 MG/DL (ref 70–100)
OSMOLALITY SERPL CALC.SUM OF ELEC: 293 MOSM/KG (ref 273–304)
POTASSIUM SERPL-SCNC: 4 MMOL/L (ref 3.5–5.3)
SODIUM SERPL-SCNC: 141 MMOL/L (ref 135–147)
TRIGL SERPL-MCNC: 113 MG/DL (ref 40–150)
VLDLC SERPL-MCNC: 23 MG/DL (ref 5–37)

## 2020-09-08 ENCOUNTER — OFFICE VISIT CONVERTED (OUTPATIENT)
Dept: SURGERY | Facility: CLINIC | Age: 75
End: 2020-09-08
Attending: SURGERY

## 2020-12-08 ENCOUNTER — TELEPHONE CONVERTED (OUTPATIENT)
Dept: INTERNAL MEDICINE | Facility: CLINIC | Age: 75
End: 2020-12-08
Attending: INTERNAL MEDICINE

## 2020-12-10 ENCOUNTER — HOSPITAL ENCOUNTER (OUTPATIENT)
Dept: OTHER | Facility: HOSPITAL | Age: 75
Discharge: HOME | End: 2020-12-10
Attending: INTERNAL MEDICINE

## 2020-12-10 LAB
BASOPHILS # BLD AUTO: 0.07 10*3/UL (ref 0–0.2)
BASOPHILS NFR BLD AUTO: 0.9 % (ref 0–3)
CONV ABS IMM GRAN: 0.12 10*3/UL (ref 0–0.2)
CONV IMMATURE GRAN: 1.6 % (ref 0–1.8)
DEPRECATED RDW RBC AUTO: 48.9 FL (ref 35.1–43.9)
EOSINOPHIL # BLD AUTO: 0.16 10*3/UL (ref 0–0.7)
EOSINOPHIL # BLD AUTO: 2.1 % (ref 0–7)
ERYTHROCYTE [DISTWIDTH] IN BLOOD BY AUTOMATED COUNT: 14.6 % (ref 11.6–14.4)
HCT VFR BLD AUTO: 50.2 % (ref 42–52)
HGB BLD-MCNC: 16.6 G/DL (ref 14–18)
LYMPHOCYTES # BLD AUTO: 2.16 10*3/UL (ref 1–5)
LYMPHOCYTES NFR BLD AUTO: 29 % (ref 20–45)
MCH RBC QN AUTO: 30.2 PG (ref 27–31)
MCHC RBC AUTO-ENTMCNC: 33.1 G/DL (ref 33–37)
MCV RBC AUTO: 91.3 FL (ref 80–96)
MONOCYTES # BLD AUTO: 0.63 10*3/UL (ref 0.2–1.2)
MONOCYTES NFR BLD AUTO: 8.4 % (ref 3–10)
NEUTROPHILS # BLD AUTO: 4.32 10*3/UL (ref 2–8)
NEUTROPHILS NFR BLD AUTO: 58 % (ref 30–85)
NRBC CBCN: 0 % (ref 0–0.7)
PLATELET # BLD AUTO: 204 10*3/UL (ref 130–400)
PMV BLD AUTO: 11.1 FL (ref 9.4–12.4)
RBC # BLD AUTO: 5.5 10*6/UL (ref 4.7–6.1)
WBC # BLD AUTO: 7.46 10*3/UL (ref 4.8–10.8)

## 2020-12-11 LAB
ALBUMIN SERPL-MCNC: 4.2 G/DL (ref 3.5–5)
ALBUMIN/GLOB SERPL: 1.8 {RATIO} (ref 1.4–2.6)
ALP SERPL-CCNC: 67 U/L (ref 56–155)
ALT SERPL-CCNC: 29 U/L (ref 10–40)
ANION GAP SERPL CALC-SCNC: 16 MMOL/L (ref 8–19)
AST SERPL-CCNC: 27 U/L (ref 15–50)
BILIRUB SERPL-MCNC: 0.57 MG/DL (ref 0.2–1.3)
BUN SERPL-MCNC: 18 MG/DL (ref 5–25)
BUN/CREAT SERPL: 16 {RATIO} (ref 6–20)
CALCIUM SERPL-MCNC: 9.5 MG/DL (ref 8.7–10.4)
CHLORIDE SERPL-SCNC: 106 MMOL/L (ref 99–111)
CHOLEST SERPL-MCNC: 108 MG/DL (ref 107–200)
CHOLEST/HDLC SERPL: 2.3 {RATIO} (ref 3–6)
CONV CO2: 24 MMOL/L (ref 22–32)
CONV CREATININE URINE, RANDOM: 122.1 MG/DL (ref 10–300)
CONV MICROALBUM.,U,RANDOM: <12 MG/L (ref 0–20)
CONV TOTAL PROTEIN: 6.6 G/DL (ref 6.3–8.2)
CREAT UR-MCNC: 1.14 MG/DL (ref 0.7–1.2)
EST. AVERAGE GLUCOSE BLD GHB EST-MCNC: 114 MG/DL
GFR SERPLBLD BASED ON 1.73 SQ M-ARVRAT: >60 ML/MIN/{1.73_M2}
GLOBULIN UR ELPH-MCNC: 2.4 G/DL (ref 2–3.5)
GLUCOSE SERPL-MCNC: 133 MG/DL (ref 70–99)
HBA1C MFR BLD: 5.6 % (ref 3.5–5.7)
HDLC SERPL-MCNC: 47 MG/DL (ref 40–60)
LDLC SERPL CALC-MCNC: 43 MG/DL (ref 70–100)
MICROALBUMIN/CREAT UR: 9.8 MG/G{CRE} (ref 0–25)
OSMOLALITY SERPL CALC.SUM OF ELEC: 296 MOSM/KG (ref 273–304)
POTASSIUM SERPL-SCNC: 4.7 MMOL/L (ref 3.5–5.3)
SODIUM SERPL-SCNC: 141 MMOL/L (ref 135–147)
TRIGL SERPL-MCNC: 91 MG/DL (ref 40–150)
VLDLC SERPL-MCNC: 18 MG/DL (ref 5–37)

## 2021-02-08 ENCOUNTER — CONVERSION ENCOUNTER (OUTPATIENT)
Dept: INTERNAL MEDICINE | Facility: CLINIC | Age: 76
End: 2021-02-08

## 2021-02-08 ENCOUNTER — HOSPITAL ENCOUNTER (OUTPATIENT)
Dept: OTHER | Facility: HOSPITAL | Age: 76
Discharge: HOME | End: 2021-02-08
Attending: NURSE PRACTITIONER

## 2021-02-08 LAB
B-HEM STREP SPEC QL CULT: NEGATIVE
FLUAV RNA SPEC QL NAA+PROBE: NEGATIVE
FLUBV RNA SPEC QL NAA+PROBE: NEGATIVE

## 2021-02-10 LAB
BACTERIA SPEC AEROBE CULT: NORMAL
SARS-COV-2 RNA SPEC QL NAA+PROBE: NOT DETECTED

## 2021-02-12 ENCOUNTER — CONVERSION ENCOUNTER (OUTPATIENT)
Dept: INTERNAL MEDICINE | Facility: CLINIC | Age: 76
End: 2021-02-12

## 2021-02-12 ENCOUNTER — OFFICE VISIT CONVERTED (OUTPATIENT)
Dept: INTERNAL MEDICINE | Facility: CLINIC | Age: 76
End: 2021-02-12
Attending: PHYSICIAN ASSISTANT

## 2021-02-12 ENCOUNTER — HOSPITAL ENCOUNTER (OUTPATIENT)
Dept: OTHER | Facility: HOSPITAL | Age: 76
Discharge: HOME | End: 2021-02-12
Attending: PHYSICIAN ASSISTANT

## 2021-02-12 LAB
BILIRUB UR QL STRIP: NEGATIVE
COLOR UR: YELLOW
CONV CLARITY OF URINE: CLEAR
CONV PROTEIN IN URINE BY AUTOMATED TEST STRIP: NEGATIVE
CONV UROBILINOGEN IN URINE BY AUTOMATED TEST STRIP: NORMAL
GLUCOSE UR QL: NEGATIVE
HGB UR QL STRIP: NEGATIVE
KETONES UR QL STRIP: NEGATIVE
LEUKOCYTE ESTERASE UR QL STRIP: NEGATIVE
NITRITE UR QL STRIP: NEGATIVE
PH UR STRIP.AUTO: 6 [PH]
SP GR UR: 1.02

## 2021-02-14 LAB — BACTERIA UR CULT: ABNORMAL

## 2021-02-23 ENCOUNTER — HOSPITAL ENCOUNTER (OUTPATIENT)
Dept: OTHER | Facility: HOSPITAL | Age: 76
Discharge: HOME | End: 2021-02-23
Attending: INTERNAL MEDICINE

## 2021-02-23 LAB
APPEARANCE UR: CLEAR
BILIRUB UR QL: NEGATIVE
COLOR UR: YELLOW
CONV COLLECTION SOURCE (UA): ABNORMAL
CONV UROBILINOGEN IN URINE BY AUTOMATED TEST STRIP: 0.2 {EHRLICHU}/DL (ref 0.1–1)
GLUCOSE UR QL: >=1000 MG/DL
HGB UR QL STRIP: NEGATIVE
KETONES UR QL STRIP: NEGATIVE MG/DL
LEUKOCYTE ESTERASE UR QL STRIP: NEGATIVE
NITRITE UR QL STRIP: NEGATIVE
PH UR STRIP.AUTO: 5 [PH] (ref 5–8)
PROT UR QL: NEGATIVE MG/DL
SP GR UR: 1.04 (ref 1–1.03)

## 2021-03-03 ENCOUNTER — HOSPITAL ENCOUNTER (OUTPATIENT)
Dept: INTERNAL MEDICINE | Facility: CLINIC | Age: 76
Discharge: HOME | End: 2021-03-03
Attending: STUDENT IN AN ORGANIZED HEALTH CARE EDUCATION/TRAINING PROGRAM

## 2021-03-03 ENCOUNTER — OFFICE VISIT CONVERTED (OUTPATIENT)
Dept: INTERNAL MEDICINE | Facility: CLINIC | Age: 76
End: 2021-03-03
Attending: STUDENT IN AN ORGANIZED HEALTH CARE EDUCATION/TRAINING PROGRAM

## 2021-03-03 ENCOUNTER — CONVERSION ENCOUNTER (OUTPATIENT)
Dept: INTERNAL MEDICINE | Facility: CLINIC | Age: 76
End: 2021-03-03

## 2021-03-03 LAB
ALBUMIN SERPL-MCNC: 4.4 G/DL (ref 3.5–5)
ALBUMIN/GLOB SERPL: 1.4 {RATIO} (ref 1.4–2.6)
ALP SERPL-CCNC: 72 U/L (ref 56–155)
ALT SERPL-CCNC: 25 U/L (ref 10–40)
ANION GAP SERPL CALC-SCNC: 23 MMOL/L (ref 8–19)
APPEARANCE UR: CLEAR
AST SERPL-CCNC: 25 U/L (ref 15–50)
BASOPHILS # BLD AUTO: 0.06 10*3/UL (ref 0–0.2)
BASOPHILS NFR BLD AUTO: 0.7 % (ref 0–3)
BILIRUB SERPL-MCNC: 0.55 MG/DL (ref 0.2–1.3)
BILIRUB UR QL: NEGATIVE
BUN SERPL-MCNC: 19 MG/DL (ref 5–25)
BUN/CREAT SERPL: 15 {RATIO} (ref 6–20)
CALCIUM SERPL-MCNC: 10.4 MG/DL (ref 8.7–10.4)
CHLORIDE SERPL-SCNC: 104 MMOL/L (ref 99–111)
CHOLEST SERPL-MCNC: 126 MG/DL (ref 107–200)
CHOLEST/HDLC SERPL: 2.9 {RATIO} (ref 3–6)
COLOR UR: YELLOW
CONV ABS IMM GRAN: 0.03 10*3/UL (ref 0–0.2)
CONV CO2: 21 MMOL/L (ref 22–32)
CONV COLLECTION SOURCE (UA): ABNORMAL
CONV HIV-1/ HIV-2: NONREACTIVE
CONV IMMATURE GRAN: 0.4 % (ref 0–1.8)
CONV TOTAL PROTEIN: 7.6 G/DL (ref 6.3–8.2)
CONV UROBILINOGEN IN URINE BY AUTOMATED TEST STRIP: 0.2 {EHRLICHU}/DL (ref 0.1–1)
CREAT UR-MCNC: 1.3 MG/DL (ref 0.7–1.2)
DEPRECATED RDW RBC AUTO: 47.8 FL (ref 35.1–43.9)
EOSINOPHIL # BLD AUTO: 0.19 10*3/UL (ref 0–0.7)
EOSINOPHIL # BLD AUTO: 2.3 % (ref 0–7)
ERYTHROCYTE [DISTWIDTH] IN BLOOD BY AUTOMATED COUNT: 14.4 % (ref 11.6–14.4)
EST. AVERAGE GLUCOSE BLD GHB EST-MCNC: 140 MG/DL
GFR SERPLBLD BASED ON 1.73 SQ M-ARVRAT: 52 ML/MIN/{1.73_M2}
GLOBULIN UR ELPH-MCNC: 3.2 G/DL (ref 2–3.5)
GLUCOSE SERPL-MCNC: 132 MG/DL (ref 70–99)
GLUCOSE UR QL: >=1000 MG/DL
HBA1C MFR BLD: 6.5 % (ref 3.5–5.7)
HCT VFR BLD AUTO: 53.4 % (ref 42–52)
HDLC SERPL-MCNC: 44 MG/DL (ref 40–60)
HGB BLD-MCNC: 17.8 G/DL (ref 14–18)
HGB UR QL STRIP: NEGATIVE
KETONES UR QL STRIP: NEGATIVE MG/DL
LDLC SERPL CALC-MCNC: 51 MG/DL (ref 70–100)
LEUKOCYTE ESTERASE UR QL STRIP: NEGATIVE
LYMPHOCYTES # BLD AUTO: 3.09 10*3/UL (ref 1–5)
LYMPHOCYTES NFR BLD AUTO: 38.1 % (ref 20–45)
MCH RBC QN AUTO: 30.3 PG (ref 27–31)
MCHC RBC AUTO-ENTMCNC: 33.3 G/DL (ref 33–37)
MCV RBC AUTO: 90.8 FL (ref 80–96)
MONOCYTES # BLD AUTO: 0.77 10*3/UL (ref 0.2–1.2)
MONOCYTES NFR BLD AUTO: 9.5 % (ref 3–10)
NEUTROPHILS # BLD AUTO: 3.97 10*3/UL (ref 2–8)
NEUTROPHILS NFR BLD AUTO: 49 % (ref 30–85)
NITRITE UR QL STRIP: NEGATIVE
NRBC CBCN: 0 % (ref 0–0.7)
OSMOLALITY SERPL CALC.SUM OF ELEC: 300 MOSM/KG (ref 273–304)
PH UR STRIP.AUTO: 5 [PH] (ref 5–8)
PLATELET # BLD AUTO: 219 10*3/UL (ref 130–400)
PMV BLD AUTO: 10.9 FL (ref 9.4–12.4)
POTASSIUM SERPL-SCNC: 4.8 MMOL/L (ref 3.5–5.3)
PROT UR QL: NEGATIVE MG/DL
PSA SERPL-MCNC: 2.88 NG/ML (ref 0–4)
RBC # BLD AUTO: 5.88 10*6/UL (ref 4.7–6.1)
SODIUM SERPL-SCNC: 143 MMOL/L (ref 135–147)
SP GR UR: 1.04 (ref 1–1.03)
TRIGL SERPL-MCNC: 155 MG/DL (ref 40–150)
TSH SERPL-ACNC: 1.23 M[IU]/L (ref 0.27–4.2)
VLDLC SERPL-MCNC: 31 MG/DL (ref 5–37)
WBC # BLD AUTO: 8.11 10*3/UL (ref 4.8–10.8)

## 2021-04-05 ENCOUNTER — HOSPITAL ENCOUNTER (OUTPATIENT)
Dept: INTERNAL MEDICINE | Facility: CLINIC | Age: 76
Discharge: HOME | End: 2021-04-05
Attending: STUDENT IN AN ORGANIZED HEALTH CARE EDUCATION/TRAINING PROGRAM

## 2021-04-05 ENCOUNTER — CONVERSION ENCOUNTER (OUTPATIENT)
Dept: INTERNAL MEDICINE | Facility: CLINIC | Age: 76
End: 2021-04-05

## 2021-04-05 ENCOUNTER — OFFICE VISIT CONVERTED (OUTPATIENT)
Dept: INTERNAL MEDICINE | Facility: CLINIC | Age: 76
End: 2021-04-05
Attending: STUDENT IN AN ORGANIZED HEALTH CARE EDUCATION/TRAINING PROGRAM

## 2021-04-05 LAB
APPEARANCE UR: CLEAR
BILIRUB UR QL: NEGATIVE
COLOR UR: YELLOW
CONV COLLECTION SOURCE (UA): ABNORMAL
CONV CREATININE URINE, RANDOM: 67 MG/DL (ref 10–300)
CONV MICROALBUM.,U,RANDOM: <12 MG/L (ref 0–20)
CONV UROBILINOGEN IN URINE BY AUTOMATED TEST STRIP: 0.2 {EHRLICHU}/DL (ref 0.1–1)
GLUCOSE UR QL: >=1000 MG/DL
HGB UR QL STRIP: NEGATIVE
KETONES UR QL STRIP: NEGATIVE MG/DL
LEUKOCYTE ESTERASE UR QL STRIP: NEGATIVE
MICROALBUMIN/CREAT UR: 17.9 MG/G{CRE} (ref 0–25)
NITRITE UR QL STRIP: NEGATIVE
PH UR STRIP.AUTO: 5 [PH] (ref 5–8)
PROT UR QL: NEGATIVE MG/DL
SP GR UR: 1.03 (ref 1–1.03)

## 2021-05-10 NOTE — H&P
"   History and Physical      Patient Name: Nikhil Baldwin   Patient ID: [id number]   Sex: Male   YOB: 1945    Primary Care Provider: Betsy Marquez PA-C   Referring Provider: Felix Navarro MD    Visit Date: June 4, 2020    Provider: BASIL Ingram   Location: [facility]   Location Address: [address] [place] [address]  [id number]   Location Phone: [phone]          Chief Complaint     follow up       History Of Present Illness  Nikhil Baldwin is a 74 year old /White male who presents today to [facility] today referred from Felix Navarro MD. Was admitted to the hospital following syncopal episode and fall. MRI brain showed age indeterminant microhemorrhage in right cerebellum. Dr. Moses consulted in the hospital. Thorough stroke workup ordered. No pertinent findings noted. Was reporting right shoulder pain. C-Spine MRI obtained, no myelopathic findings noted. Continuing to experience dizzy spells. Continuing to report right shoulder pain. Seeing ortho on June 18. States he will go to bend over to pick something up and gets dizzy and feels \"wobbly\". States if he closes his eyes he will fall backwards. Has longstanding history of uncontrolled diabetes. Is trying to manage DM with PCP now. Experiencing right leg weakness and foot drop. Ambulating with walker.      Record Review from Inpatient Admission May 2020:     C-Spine MRI:   1. New left paracentral disc protrusion at C6-C7 causes mild to moderate left neural foraminal   narrowing and narrowing of the left side of the spinal canal.  2. Additional multilevel degenerative changes have not significantly progressed compared to 2019,   worst at C3-C4 where there is spinal canal and neural foraminal narrowing, as detailed above.  3. Stable slight retrolisthesis of C3 on C4.    MRI Brain:  1. No acute infarction  2. Age indeterminate micro hemorrhage in the inferior right cerebellar hemisphere, new " since   4/13/2019  3. Moderate small vessel ischemic changes in the white matter     CTA Head/Neck:   1. No emergent large vessel occlusion is seen.  2. No hemodynamically significant stenoses.  3. No NASCET criteria stenosis is seen in the proximal internal carotid arteries bilaterally.  4. Please see above comments for further detail.    Lab Review:   Vitamin D: 15.1  Trigs: 213  Total Cholesterol: 166  LDL: 78  HDL: 45  B12: 238  HgbA1C: 12.8           Past Medical History  Aortic aneurysm; Arthritis; Bladder Disorder; Bone Spur(s); BPH (benign prostatic hyperplasia); Cervical radiculopathy; Cervicalgia; DDD (degenerative disc disease), lumbar; Diabetes mellitus, type 2; Facet arthropathy; Fatty metamorphosis of liver; Foraminal stenosis of lumbar region; GERD (gastroesophageal reflux disease); Hematuria; Hiatal hernia; Hypercalcemia; Hyperlipemia; Hypertension; Kidney Disease; Lumbago/low back pain; MI (myocardial infarction); Mid back pain; Nocturia; Parathyroid abnormality; Prostate Disorder; Renal calculi; Sciatica; Thoracic disc degeneration; Ureterolithiasis; Urinary Incontinence; Urine stream spraying; Vitamin D deficiency; Voiding difficulty         Past Surgical History  Cardiac Catherization; Cholecystectomies, laparoscopic; Gallbladder; Kidney Stone Surgery, Unspecified; Thyroid surgery; TURP         Medication List  atorvastatin 80 mg oral tablet; Januvia 100 mg oral tablet; Jardiance 25 mg oral tablet; Levemir U-100 Insulin 100 unit/mL subcutaneous solution; lisinopril 20 mg oral tablet; melatonin 5 mg oral capsule; metformin 1,000 mg oral tablet; Miralax 17 gram/dose oral powder; nystatin 100,000 unit/gram topical powder; tamsulosin 0.4 mg oral capsule; vitamin B12-folic acid 500-400 mcg oral tablet; Vitamin D3 25 mcg (1,000 unit) oral capsule         Allergy List  Celebrex; Coumadin; ibuprofen; Motrin; Phenergan         Family Medical History  Stroke; Cancer, Unspecified; Diabetes mellitus, type II  "        Social History  Alcohol Use; Recreational Drug Use (Never); Tobacco (Never)         Review of Systems  · Constitutional  o Admits  o : weight loss  o Denies  o : chills, excessive sweating, fatigue, fever, sycope/passing out, weight gain  · Eyes  o Denies  o : changes in vision, blurry vision, double vision  · HENT  o Denies  o : loss of hearing, ringing in the ears, ear aches, sore throat, nasal congestion, sinus pain, nose bleeds, seasonal allergies  · Cardiovascular  o Denies  o : blood clots, swollen legs, anemia, easy burising or bleeding, transfusions  · Respiratory  o Denies  o : shortness of breath, dry cough, productive cough, pneumonia, COPD  · Gastrointestinal  o Denies  o : difficulty swallowing, reflux  · Genitourinary  o Denies  o : incontinence  · Neurologic  o Admits  o : headache, stroke, loss of balance, falls, dizziness/vertigo  o Denies  o : seizure, tremor, difficulty with sleep, numbness/tingling/paresthesia , difficulty with coordination, difficulty with dexterity, weakness  · Musculoskeletal  o Admits  o : weakness, pain radiating in arm  o Denies  o : neck stiffness/pain, swollen lymph nodes, muscle aches, joint pain, spasms, sciatica, pain radiating in leg, low back pain  · Endocrine  o Denies  o : diabetes, thyroid disorder  · Psychiatric  o Denies  o : anxiety, depression      Vitals  Date Time BP Position Site L\R Cuff Size HR RR TEMP (F) WT  HT  BMI kg/m2 BSA m2 O2 Sat        06/04/2020 08:26 /77 Sitting    88 - R  97.9 169lbs 8oz 5'  9\" 25.03 1.93           Physical Examination  · Constitutional  o Appearance  o : well-nourished, well groomed, in no apparent distress  · Eyes  o Pupils and Irises  o : Pupils equal, round, and reactive to light and accommodation bilaterally  · Respiratory  o Auscultation of Lungs  o : Lungs were clear to ascultation bilaterally. No wheezes, rhonchi or rales were appreciated.  · Cardiovascular  o Heart  o :   § Auscultation of Heart  § : " Regular rate and rhythm, no murmurs, gallops or rubs were appreciated.  o Peripheral Vascular System  o :   § Extremities  § : No peripheral edema was appreciated  · Musculoskeletal  o General  o : Normal bulk and normal tone throughout.   · Neurologic  o Mental Status Examination  o :   § Orientation  § : Alert and oriented to person, place, and time,  § Speech/Language  § : Intact naming, comprehension, and repetition. No dysarthria.  § Memory  § : Immediate recall is 3/3. Recall at 5 minutes is 3/3.   § Attention  § : Attention span is intact to serial 7 examination and finger tapping.   § Fund of Knowledge  § : Adequate fund of knowledge  o Cranial Nerves  o : Pupils are equal, round and reactive to light. Extraocular movements are intact. Visual fields are full. Fundoscopic examination reveals sharp disc bilaterally. Sensation in the V1-V3 distribution is intact and symmetric. Muscles of mastication are strong and symmetric. Muscles of facial expression are strong and symmetric. Hearing is intact. Palatal raise is intact and symmetric. Uvula is midline. Shoulder shrug is strong. Tongue protrudes in the midline.  o Motor Examination  o :   § RUE Strength  § : strength normal  § LUE Strength  § : strength normal  § RLE Strength  § : strength 4/5   § LLE Strength  § : strength normal  o Reflexes  o : 2+ reflexes throughout and symmetric. Negative Ch. Negative Babinski.   o Sensation  o : Absent PP to BLE.   o Gait and Station  o :   § Gait Screening  § : Antalgic gait, ambulates with walker  o Coordination  o : Intact finger to nose and heel to shin. Rapid alternating movements are intact in the upper and lower extremities.     +Romberg           Assessment  · Right leg weakness     729.89/R29.898  Will order L-Spine MRI for further evaluation of right leg weakness and foot drop. Could be secondary to severe diabetic polyneuropathy.   · Right foot  drop     736.79/M21.371  · Falls     E888.9/W19.XXXA  · Hemoglobin A1C greater than 9%, indicating poor diabetic control     790.29/R73.09  A1c extremely elevated. Could be contributing to a significant diabetic polyneuropathy and sensory ataxia.   · Sensory ataxia     781.3/R27.8  · Vitamin D deficiency     268.9/E55.9  Continue Vit D supplementation.   · Vitamin B12 deficiency     266.2/E53.8  Continue B12 supplementation.   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 This is not causing right leg weakness. Will look for other causes of right leg weakness. Discussed the need for tight management of BP and glucoses.     Problems Reconciled  Plan  · Orders  o MRI lumbar spine w/o contrast (55963) - 729.89/R29.898, 736.79/M21.371, E888.9/W19.XXXA - 06/04/2020  · Medications  o mecobalamin (vitamin B12) 1,000 mcg sublingual tablet,disintegrating   SIG: take 1 tablet,disintegrating by sublingual route daily for 30 days   DISP: (30) Tablet,disintegratings with 5 refills  Prescribed on 06/04/2020     o Medications have been Reconciled  o Transition of Care or Provider Policy  · Instructions  o Encouraged to follow-up with Primary Care Provider for preventative care.  o Call or Return if symptoms worsen or persist.   o Follow Up in 6 weeks.             Electronically Signed by: BASIL Ingram -Author on June 5, 2020 09:02:26 AM

## 2021-05-13 ENCOUNTER — CONVERSION ENCOUNTER (OUTPATIENT)
Dept: INTERNAL MEDICINE | Facility: CLINIC | Age: 76
End: 2021-05-13

## 2021-05-13 ENCOUNTER — OFFICE VISIT CONVERTED (OUTPATIENT)
Dept: INTERNAL MEDICINE | Facility: CLINIC | Age: 76
End: 2021-05-13
Attending: INTERNAL MEDICINE

## 2021-05-13 ENCOUNTER — HOSPITAL ENCOUNTER (OUTPATIENT)
Dept: INTERNAL MEDICINE | Facility: CLINIC | Age: 76
Discharge: HOME | End: 2021-05-13
Attending: INTERNAL MEDICINE

## 2021-05-13 LAB
APPEARANCE UR: CLEAR
BILIRUB UR QL STRIP: NORMAL
BILIRUB UR QL: NEGATIVE
COLOR UR: YELLOW
COLOR UR: YELLOW
CONV CLARITY OF URINE: CLEAR
CONV COLLECTION SOURCE (UA): ABNORMAL
CONV PROTEIN IN URINE BY AUTOMATED TEST STRIP: NEGATIVE
CONV UROBILINOGEN IN URINE BY AUTOMATED TEST STRIP: 0.2 {EHRLICHU}/DL (ref 0.1–1)
CONV UROBILINOGEN IN URINE BY AUTOMATED TEST STRIP: NORMAL
GLUCOSE UR QL: >=1000 MG/DL
GLUCOSE UR QL: NORMAL
HGB UR QL STRIP: NEGATIVE
HGB UR QL STRIP: NEGATIVE
KETONES UR QL STRIP: NEGATIVE
KETONES UR QL STRIP: NEGATIVE MG/DL
LEUKOCYTE ESTERASE UR QL STRIP: NEGATIVE
LEUKOCYTE ESTERASE UR QL STRIP: NEGATIVE
NITRITE UR QL STRIP: NEGATIVE
NITRITE UR QL STRIP: NEGATIVE
PH UR STRIP.AUTO: 5 [PH] (ref 5–8)
PH UR STRIP.AUTO: 5.5 [PH]
PROT UR QL: NEGATIVE MG/DL
SP GR UR: 1.02
SP GR UR: 1.03 (ref 1–1.03)

## 2021-05-13 NOTE — PROGRESS NOTES
"   Progress Note      Patient Name: Nikhil Baldwin   Patient ID: [id number]   Sex: Male   YOB: 1945    Primary Care Provider: Betsy Marquez PA-C   Referring Provider: Felix Navarro MD    Visit Date: September 4, 2020    Provider: Felix Navarro MD   Location: [facility]   Location Address: [address] [place] [address], [address]  [place], [place]  [id number]   Location Phone: [phone]          Chief Complaint  · Follow Up  · \" any cream for back and shoulders pain \"  · \" wants to know if he should be taking 2 of the tamsulosin \"      History Of Present Illness  Nikhil Baldwin is a 75 year old /White male who presents for evaluation and treatment of:      Patient reports back and right shoulder pain that has been cramping for two years. He states [facility] and got some shots in his shoulder, which helped, but states he does not want to go get another one unless he has to. He states it wakes him up at night and makes it hard for him to sleep. He states he takes OTC Tylenol Extra Strength when it's bothering him, which does seem to help. He states his right shoulder is cold to touch. He states he puts a heating pad on it, which seems to help. He states he has had x-rays in the past and the doctor recommended surgery aside from shots. He states he has weakness in his right arm, but has a history of CVA. Denies recent falls. Denies headaches and dizziness.     Patient said he has been taking one/day of Flomax, but the prescription most recently sent in said for him to take 2/day of the Flomax. They were not sure which was right, so he's been taking one/day. He states he has urinary urgency. He states when he goes he has to go right then. He states if he attempts to hold his urine he experiences some discomfort. He states he experiences dribbling occasionally. Denies incontinence, dysuria, and hematuria.    Patient has a history of DM II. His BG has been running " 102-128 at home. Patient has no complaints or concerns at this time. Denies changes in vision, numbness and tingling. Denies changes in BM.       Past Medical History  Disease Name Date Onset Notes   Aortic aneurysm --  --    Arthritis --  --    Bladder Disorder --  --    Bone Spur(s) --  --    BPH (benign prostatic hyperplasia) 02/21/2019 --    Cervical radiculopathy 03/25/2019 --    Cervicalgia 03/25/2019 --    DDD (degenerative disc disease), lumbar --  --    Diabetes --  --    Diabetes mellitus, type 2 --  --    Facet arthropathy 03/25/2019 T10, T11, T12 on left.   Fatty metamorphosis of liver --  --    Foraminal stenosis of lumbar region --  --    GERD (gastroesophageal reflux disease) --  --    Hematuria 03/18/2019 --    Hiatal hernia --  --    Hypercalcemia --  --    Hyperlipemia --  --    Hypertension --  --    Kidney Disease --  --    Lumbago/low back pain 03/25/2019 --    MI (myocardial infarction) --  --    Mid back pain 03/25/2019 --    Nocturia 02/21/2019 --    Parathyroid abnormality --  --    Prostate Disorder --  --    Renal calculi --  --    Sciatica 03/25/2019 --    Stroke --  --    Thoracic disc degeneration --  --    Thyroid disorder --  --    Ureterolithiasis 03/18/2019 --    Urinary Incontinence 03/18/2019 --    Urine stream spraying 02/21/2019 --    Vitamin D deficiency --  --    Voiding difficulty 02/21/2019 --          Past Surgical History  Procedure Name Date Notes   Cardiac Catherization --  --    Cholecystectomies, laparoscopic --  --    Gallbladder --  --    Kidney --  --    Kidney Stone Surgery, Unspecified --  --    Thyroid surgery 2017 --    TURP --  --          Medication List  Name Date Started Instructions   atorvastatin 80 mg oral tablet 12/02/2019 take 1 tablet (80 mg) by oral route once daily for 90 days   Januvia 100 mg oral tablet 05/26/2020 take 1 tablet (100 mg) by oral route once daily for 90 days   Jardiance 25 mg oral tablet 05/26/2020 take 1 tablet (25 mg) by oral route  once daily in the morning for 90 days   lisinopril 20 mg oral tablet 05/26/2020 take 1 tablet (20 mg) by oral route once daily for 90 days   mecobalamin (vitamin B12) 1,000 mcg sublingual tablet,disintegrating 06/04/2020 take 1 tablet,disintegrating by sublingual route daily for 30 days   melatonin 5 mg oral capsule  take 1 capsule by oral route daily   metformin 1,000 mg oral tablet 12/02/2019 take 1 tablet (1,000 mg) by oral route 2 times per day with morning and evening meals for 90 days   tamsulosin 0.4 mg oral capsule 06/18/2020 take 2 capsules (0.8 mg) by oral route once daily 1/2 hour following the same meal each day for 90 days   Vitamin D3 25 mcg (1,000 unit) oral capsule  take 1 capsule by oral route daily         Allergy List  Allergen Name Date Reaction Notes   Celebrex --  --  --    Coumadin --  --  --    ibuprofen --  --  --    Motrin --  --  --    Phenergan --  --  --        Allergies Reconciled  Family Medical History  Disease Name Relative/Age Notes   Stroke Father/  Mother/   Mother; Father  Mother   Cancer, Unspecified Brother/  Sister/   Sister; Brother  Father; Sister; Brother   Diabetes, unspecified type Brother/  Mother/   Mother; Brother   Diabetes Mellitus, Type II Brother/  Father/  Mother/  Sister/  Son/   --    Family history of Arthritis Father/   Father         Social History  Finding Status Start/Stop Quantity Notes   Alcohol Use Never --/-- --  does not drink   . --  --/-- --  --    lives alone --  --/-- --  --    lives with children --  --/-- --  --    Recreational Drug Use Never --/-- --  no   Retired. --  --/-- --  --    Tobacco Never --/-- --  never smoker         Review of Systems  · Constitutional  o Denies  o : fever, fatigue, weight loss, weight gain  · Eyes  o Denies  o : blurred vision, changes in vision  · HENT  o Denies  o : headaches, nasal congestion, sore throat  · Cardiovascular  o Denies  o : chest pain, palpitations  · Respiratory  o Denies  o : shortness  "of breath, frequent cough  · Gastrointestinal  o Denies  o : nausea, vomiting, diarrhea, constipation, abdominal pain  · Genitourinary  o Admits  o : urinary urgency  o Denies  o : urinary frequency, dysuria, hematuria  · Neurologic  o Denies  o : tingling or numbness, slurred speech , dizziness  · Musculoskeletal  o Admits  o : joint pain, limited range of motion, muscle weakness, back pain, shoulder pain      Vitals  Date Time BP Position Site L\R Cuff Size HR RR TEMP (F) WT  HT  BMI kg/m2 BSA m2 O2 Sat HC       07/21/2020 11:48 /76 Sitting    84 - R 16 98.3 163lbs 6oz 5'  9\" 24.13 1.9 97 %    07/28/2020 01:42 PM       16  166lbs 16oz 5'  9\" 24.66 1.92     09/04/2020 01:44 /70 Sitting    70 - R  97.2 172lbs 0oz 5'  9\" 25.4 1.95 97 %          Physical Examination  · Constitutional  o Appearance  o : no acute distress, well-nourished  · Head and Face  o Head  o :   § Inspection  § : atraumatic, normocephalic  · Eyes  o Eyes  o : extraocular movements intact, no scleral icterus, no conjunctival injection  · Ears, Nose, Mouth and Throat  o Ears  o :   § External Ears  § : normal  o Nose  o :   § Intranasal Exam  § : nares patent  o Oral Cavity  o :   § Oral Mucosa  § : moist mucous membranes  · Respiratory  o Respiratory Effort  o : breathing comfortably, symmetric chest rise  o Auscultation of Lungs  o : clear to asculatation bilaterally, no wheezes, rales, or rhonchii  · Cardiovascular  o Heart  o :   § Auscultation of Heart  § : regular rate and rhythm, no murmurs, rubs, or gallops  o Peripheral Vascular System  o :   § Extremities  § : no edema  · Neurologic  o Mental Status Examination  o :   § Orientation  § : grossly oriented to person, place and time  o Gait and Station  o :   § Gait Screening  § : normal gait  · Psychiatric  o General  o : normal mood and affect     MSK: pain with motion in right shoulder, tenderness to palpation over right scapula behind shoulder, limited ROM in right shoulder, " weakness in right arm           Assessment  · BPH (benign prostatic hyperplasia)     600.00/N40.0  Discussed symptoms with patient. Encouraged patient to take Flomax 2 tabs qhs. Updated labs drawn in clinic today. Pt will follow-up in 3-4mos. Pt understood and agreed with plan.  · Hyperlipemia     272.4/E78.5  Updated labs drawn in clinic today. Will determine next steps in treatment after reviewing labs. Continue current medication regimen at this time. Pt will follow-up in 3-4mos.  · Hypertension     401.9/I10  Continue current medication regimen. Will continue monitoring BP. Pt will follow-up in 3-4mos.   · Diabetes mellitus, type 2     250.00/E11.9  Discussed symptoms with patient. Continue monitoring BG at home. Continue current medication regimen. Pt will follow-up in 3-4mos for updated labs. Pt understood and agreed with plan.  · Right shoulder pain     719.41/M25.511  Discussed symptoms with patient. Discussed treatment options for his discomfort. Encouraged patient to use Voltaren gel as needed over the affected area. Patient will call if symptoms worsen. Pt understood and agreed with plan.    Problems Reconciled  Plan  · Orders  o Diabetes 2 Panel (Urine Microalbumin, CMP, Lipid, A1c, ) [facility] (07754, 48175, 75404, [address]95) - 250.00/E11.9 - 09/04/2020  o CBC with Auto Diff [facility] (79944) - 250.00/E11.9 - 09/04/2020  o Diabetic Dilated Eye Exam Consult with Ophthalmology/Optometry (DMEYE) - 250.00/E11.9 - 09/04/2020  o ACO-41: Dilated Diabetic eye exam completed this year and results in chart/reviewed (2022F) - 250.00/E11.9 - 09/04/2020  o ACO-39: Current medications updated and reviewed () - - 09/04/2020  o ACO-15: Pneumococcal Vaccine Administered or Previously Received (4040F) - - 09/04/2020  o ACO-19: Colorectal cancer screening results documented and reviewed (3017F) - - 09/04/2020  · Medications  o Voltaren 1 % topical gel   SIG: apply 2 grams to the affected area(s) by topical route 4 times per day    DISP: (1) 100 gm tube with 1 refills  Prescribed on 09/04/2020     o Medications have been Reconciled  o Transition of Care or Provider Policy  · Instructions  o Take all medications as prescribed/directed.  o Patient was educated/instructed on their diagnosis, treatment and medications prior to discharge from the clinic today.  · Disposition  o Call or Return if symptoms worsen or persist.  o f/u in 3 months  o labs done in clinic            Electronically Signed by: Felix Navarro MD -Author on September 4, 2020 03:50:48 PM

## 2021-05-13 NOTE — PROGRESS NOTES
Progress Note      Patient Name: Nikhil Baldwin   Patient ID: [id number]   Sex: Male   YOB: 1945    Primary Care Provider: Betsy Marquez PA-C   Referring Provider: Felix Navarro MD    Visit Date: June 26, 2020    Provider: Felix Navarro MD   Location: [facility]   Location Address: [address] [place] [address], [address]  [place], [place]  [id number]   Location Phone: [phone]          Chief Complaint  · HTN  · DM2      History Of Present Illness  Nikhil Baldwin is a 75 year old /White male who presents for evaluation and treatment of:      HTN- pt denies HAs. pt does report some intermittent dizziness with raising up too quickly. pt reports taking Lisinopril in AM.   DM2- home sugars  consistently. pt is monitoring carbs in his diet.       Past Medical History  Disease Name Date Onset Notes   Aortic aneurysm --  --    Arthritis --  --    Bladder Disorder --  --    Bone Spur(s) --  --    BPH (benign prostatic hyperplasia) 02/21/2019 --    Cervical radiculopathy 03/25/2019 --    Cervicalgia 03/25/2019 --    DDD (degenerative disc disease), lumbar --  --    Diabetes --  --    Diabetes mellitus, type 2 --  --    Facet arthropathy 03/25/2019 T10, T11, T12 on left.   Fatty metamorphosis of liver --  --    Foraminal stenosis of lumbar region --  --    GERD (gastroesophageal reflux disease) --  --    Hematuria 03/18/2019 --    Hiatal hernia --  --    Hypercalcemia --  --    Hyperlipemia --  --    Hypertension --  --    Kidney Disease --  --    Lumbago/low back pain 03/25/2019 --    MI (myocardial infarction) --  --    Mid back pain 03/25/2019 --    Nocturia 02/21/2019 --    Parathyroid abnormality --  --    Prostate Disorder --  --    Renal calculi --  --    Sciatica 03/25/2019 --    Stroke --  --    Thoracic disc degeneration --  --    Thyroid disorder --  --    Ureterolithiasis 03/18/2019 --    Urinary Incontinence 03/18/2019 --    Urine stream spraying 02/21/2019 --     Vitamin D deficiency --  --    Voiding difficulty 02/21/2019 --          Past Surgical History  Procedure Name Date Notes   Cardiac Catherization --  --    Cholecystectomies, laparoscopic --  --    Gallbladder --  --    Kidney --  --    Kidney Stone Surgery, Unspecified --  --    Thyroid surgery 2017 --    TURP --  --          Medication List  Name Date Started Instructions   atorvastatin 80 mg oral tablet 12/02/2019 take 1 tablet (80 mg) by oral route once daily for 90 days   cyclobenzaprine 5 mg oral tablet 06/12/2020 take 1 tablet by oral route 3 times a day prn pain   Januvia 100 mg oral tablet 05/26/2020 take 1 tablet (100 mg) by oral route once daily for 90 days   Jardiance 25 mg oral tablet 05/26/2020 take 1 tablet (25 mg) by oral route once daily in the morning for 90 days   lisinopril 20 mg oral tablet 05/26/2020 take 1 tablet (20 mg) by oral route once daily for 90 days   mecobalamin (vitamin B12) 1,000 mcg sublingual tablet,disintegrating 06/04/2020 take 1 tablet,disintegrating by sublingual route daily for 30 days   melatonin 5 mg oral capsule  take 1 capsule by oral route daily   metformin 1,000 mg oral tablet 12/02/2019 take 1 tablet (1,000 mg) by oral route 2 times per day with morning and evening meals for 90 days   Miralax 17 gram/dose oral powder 03/15/2018 1 cap po BID. mix with 8oz water, juice.   nystatin 100,000 unit/gram topical powder 06/04/2020 apply to the affected area(s) by topical route 2 times per day for 14 days   tamsulosin 0.4 mg oral capsule 06/18/2020 take 2 capsules (0.8 mg) by oral route once daily 1/2 hour following the same meal each day for 90 days   Vitamin D3 25 mcg (1,000 unit) oral capsule  take 1 capsule by oral route daily         Allergy List  Allergen Name Date Reaction Notes   Celebrex --  --  --    Coumadin --  --  --    ibuprofen --  --  --    Motrin --  --  --    Phenergan --  --  --        Allergies Reconciled  Family Medical History  Disease Name Relative/Age  "Notes   Stroke Father/  Mother/   Mother; Father  Mother   Cancer, Unspecified Brother/  Sister/   Sister; Brother  Father; Sister; Brother   Diabetes, unspecified type Brother/  Mother/   Mother; Brother   Diabetes Mellitus, Type II Brother/  Father/  Mother/  Sister/  Son/   --    Family history of Arthritis Father/   Father         Social History  Finding Status Start/Stop Quantity Notes   Alcohol Use Never --/-- --  does not drink   . --  --/-- --  --    lives alone --  --/-- --  --    lives with children --  --/-- --  --    Recreational Drug Use Never --/-- --  no   Retired. --  --/-- --  --    Tobacco Never --/-- --  never smoker         Review of Systems  · Constitutional  o Denies  o : fever, fatigue, weight loss, weight gain  · Cardiovascular  o Denies  o : lower extremity edema, chest pressure, palpitations  · Respiratory  o Denies  o : shortness of breath, wheezing, frequent cough, dyspnea on exertion  · Gastrointestinal  o Denies  o : nausea, vomiting, diarrhea, constipation, abdominal pain      Vitals  Date Time BP Position Site L\R Cuff Size HR RR TEMP (F) WT  HT  BMI kg/m2 BSA m2 O2 Sat HC       06/12/2020 10:35 /70 Sitting    78 - R  98 167lbs 2oz 5'  9\" 24.68 1.92 98 %    06/18/2020 11:17 AM      89 - R   166lbs 16oz 5'  9\" 24.66 1.92 97 %    06/26/2020 02:05 /80 Sitting    85 - R  98.5 172lbs 0oz 5'  9\" 25.4 1.95 97 %          Physical Examination  · Constitutional  o Appearance  o : no acute distress, well-nourished  · Head and Face  o Head  o :   § Inspection  § : atraumatic, normocephalic  · Eyes  o Eyes  o : extraocular movements intact, no scleral icterus, no conjunctival injection  · Ears, Nose, Mouth and Throat  o Ears  o :   § External Ears  § : normal  o Nose  o :   § Intranasal Exam  § : nares patent  o Oral Cavity  o :   § Oral Mucosa  § : moist mucous membranes  · Respiratory  o Respiratory Effort  o : breathing comfortably, symmetric chest " rise  · Cardiovascular  o Heart  o :   § Auscultation of Heart  § : regular rate  o Peripheral Vascular System  o :   § Extremities  § : no edema  · Neurologic  o Mental Status Examination  o :   § Orientation  § : grossly oriented to person, place and time  o Gait and Station  o :   § Gait Screening  § : normal gait  · Psychiatric  o General  o : normal mood and affect          Assessment  · Hypertension     401.9/I10  well controlled based on home readings. cont current regimen.   · Diabetes mellitus, type 2     250.00/E11.9  well controlled based on home BG. cont regimen off insulin.     Problems Reconciled  Plan  · Orders  o ACO-39: Current medications updated and reviewed () - - 06/26/2020  · Medications  o Medications have been Reconciled  o Transition of Care or Provider Policy  · Instructions  o Patient was educated/instructed on their diagnosis, treatment and medications prior to discharge from the clinic today.  · Disposition  o f/u in 3 months            Electronically Signed by: Felix Navarro MD -Author on June 26, 2020 05:39:27 PM

## 2021-05-13 NOTE — PROGRESS NOTES
Progress Note      Patient Name: Nikhil Baldwin   Patient ID: [id number]   Sex: Male   YOB: 1945    Primary Care Provider: Betsy Marquez PA-C   Referring Provider: Felix Navarro MD    Visit Date: June 18, 2020    Provider: Garcia Reynaga MD   Location: [facility]   Location Address: [address] [place] [address]  [id number]   Location Phone: [phone]          Chief Complaint  · right shoulder pain      History Of Present Illness  Nikhil Baldwin is a 75 year old /White male who presents today to [facility].      The patient presents today for follow up with right shoulder pain. He had a sugar spike and then fell on his right shoulder. He received an injection while in the ER.                 Past Medical History  Aortic aneurysm; Arthritis; Bladder Disorder; Bone Spur(s); BPH (benign prostatic hyperplasia); Cervical radiculopathy; Cervicalgia; DDD (degenerative disc disease), lumbar; Diabetes; Diabetes mellitus, type 2; Facet arthropathy; Fatty metamorphosis of liver; Foraminal stenosis of lumbar region; GERD (gastroesophageal reflux disease); Hematuria; Hiatal hernia; Hypercalcemia; Hyperlipemia; Hypertension; Kidney Disease; Lumbago/low back pain; MI (myocardial infarction); Mid back pain; Nocturia; Parathyroid abnormality; Prostate Disorder; Renal calculi; Sciatica; Stroke; Thoracic disc degeneration; Thyroid disorder; Ureterolithiasis; Urinary incontinence; Urine stream spraying; Vitamin D deficiency; Voiding difficulty         Past Surgical History  Cardiac Catherization; Cholecystectomies, laparoscopic; Gallbladder; Kidney; Kidney Stone Surgery, Unspecified; Thyroid surgery; TURP         Medication List  atorvastatin 80 mg oral tablet; cyclobenzaprine 5 mg oral tablet; Januvia 100 mg oral tablet; Jardiance 25 mg oral tablet; lisinopril 20 mg oral tablet; mecobalamin (vitamin B12) 1,000 mcg sublingual tablet,disintegrating; melatonin 5 mg oral capsule;  "metformin 1,000 mg oral tablet; Miralax 17 gram/dose oral powder; nystatin 100,000 unit/gram topical powder; tamsulosin 0.4 mg oral capsule; vitamin B12-folic acid 500-400 mcg oral tablet; Vitamin D3 25 mcg (1,000 unit) oral capsule         Allergy List  Celebrex; Coumadin; ibuprofen; Motrin; Phenergan         Family Medical History  Stroke; Cancer, Unspecified; Diabetes, unspecified type; Diabetes Mellitus, Type II; Family history of Arthritis         Social History  Alcohol Use (Never); .; lives alone; lives with children; Recreational Drug Use (Never); Retired.; Tobacco (Never)         Review of Systems  · Constitutional  o Denies  o : fever, chills, weight loss  · Cardiovascular  o Denies  o : chest pain, shortness of breath  · Gastrointestinal  o Denies  o : liver disease, heartburn, nausea, blood in stools  · Genitourinary  o Denies  o : painful urination, blood in urine  · Integument  o Denies  o : rash, itching  · Neurologic  o Denies  o : headache, weakness, loss of consciousness  · Musculoskeletal  o Denies  o : painful, swollen joints  · Psychiatric  o Denies  o : drug/alcohol addiction, anxiety, depression      Vitals  Date Time BP Position Site L\R Cuff Size HR RR TEMP (F) WT  HT  BMI kg/m2 BSA m2 O2 Sat        06/18/2020 11:17 AM      89 - R   166lbs 16oz 5'  9\" 24.66 1.92 97 %          Physical Examination  · Constitutional  o Appearance  o : well developed, well-nourished, no obvious deformities present  · Head and Face  o Head  o :   § Inspection  § : normocephalic  o Face  o :   § Inspection  § : no facial lesions  · Eyes  o Conjunctivae  o : conjunctivae normal  o Sclerae  o : sclerae white  · Ears, Nose, Mouth and Throat  o Ears  o :   § External Ears  § : appearance within normal limits  § Hearing  § : intact  o Nose  o :   § External Nose  § : appearance normal  · Neck  o Inspection/Palpation  o : normal appearance  o Range of Motion  o : full range of " motion  · Respiratory  o Respiratory Effort  o : breathing unlabored  o Inspection of Chest  o : normal appearance  o Auscultation of Lungs  o : no audible wheezing or rales  · Cardiovascular  o Heart  o : regular rate  · Gastrointestinal  o Abdominal Examination  o : soft and non-tender  · Skin and Subcutaneous Tissue  o General Inspection  o : intact, no rashes  · Psychiatric  o General  o : Alert and oriented x3  o Judgement and Insight  o : judgment and insight intact  o Mood and Affect  o : mood normal, affect appropriate  · Right Shoulder  o Inspection  o : He presents today with a walker. PE: 160 AE: 20 AB: 90 ER: 45 IR: to the side Strength is 3+. 4- infraspinatus 5 out of 5 subscap.           Assessment  · Right shoulder pain, unspecified chronicity     719.41/M25.511  · Rotator cuff arthropathy     716.81/M12.819      Plan  · Medications  o Medications have been Reconciled  o Transition of Care or Provider Policy  · Instructions  o Dr. Reynaga saw and examined the patient and agrees with plan.   o Reviewed the patient's Past Medical, Social, and Family history as well as the ROS at today's visit, no changes.  o Call or return if worsening symptoms.  o Follow up as needed.  o The above service was scribed by Mckinley Sylvester on my behalf and I attest to the accuracy of the note. patricia  o We discussed treatment options and the plan with the patent. We discussed that he only way to fix the rotator cuff tear is with a shoulder replacement, but with his elaina problems this would be too risky. The patient has decided to call us when he is needing another injection. Follow up as needed. Patient is not a good surgical candidate.             Electronically Signed by: Benedicto Sylvester - , Other -Author on June 22, 2020 10:03:19 AM  Electronically Co-signed by: Garcia Reynaga MD -Reviewer on June 23, 2020 06:46:24 AM

## 2021-05-13 NOTE — PROGRESS NOTES
Progress Note      Patient Name: Nikhil Baldwin   Patient ID: [id number]   Sex: Male   YOB: 1945    Primary Care Provider: Betsy Marquez PA-C   Referring Provider: Felix Navarro MD    Visit Date: July 21, 2020    Provider: Feilx Navarro MD   Location: [facility]   Location Address: [address] [place] [address]  [id number]   Location Phone: [phone]          Chief Complaint  · Follow up office visit within 7 calendar days of discharge from inpatient status (high complexity).      History Of Present Illness  FOLLOW UP OFFICE VISIT WITHIN 7 CALENDAR DAYS OF INPATIENT STATUS (SEVERE COMPLEXITY)  Nikhil Balwdin presents to office for follow up post discharge from inpatient status within 7 calendar days. Patient was contacted within 2 business days via phone conversation. Documentation of that phone contact is present in the patient's electronic chart. Patient was admitted to an inpatient faciliity on 07/16/2020 and discharged on 07/17/2020 due to: Inguinal hernia repair   Admitting MD: Cm Flanagan Md   His discharge summary has been reviewed and placed in the patient's electronic chart.   Patient's problem list is: Arthritis, Bladder Disorder, BPH (benign prostatic hyperplasia), Cervical radiculopathy, Cervicalgia, DDD (degenerative disc disease), lumbar, Diabetes mellitus, type 2, Facet arthropathy, Foraminal stenosis of lumbar region, GERD (gastroesophageal reflux disease), Hematuria, Hyperlipemia, Hypertension, Lumbago/low back pain, Mid back pain, Nocturia, Sciatica, Thoracic disc degeneration, Ureterolithiasis, Urinary Incontinence, Urine stream spraying, Vitamin D deficiency, and Voiding difficulty   Patient's outpatient medication list has been reconciled with the medication list from the discharge summary and has been reviewed with the patient. Current medication list is: atorvastatin 80 mg oral tablet, cyclobenzaprine 5 mg oral tablet, Januvia 100 mg  oral tablet, Jardiance 25 mg oral tablet, lisinopril 20 mg oral tablet, mecobalamin (vitamin B12) 1,000 mcg sublingual tablet,disintegrating, melatonin 5 mg oral capsule, metformin 1,000 mg oral tablet, Miralax 17 gram/dose oral powder, nystatin 100,000 unit/gram topical powder, tamsulosin 0.4 mg oral capsule, and Vitamin D3 25 mcg (1,000 unit) oral capsule   Nikhil Baldwin is a 75 year old /White male who presents for evaluation and treatment of:      s/p surgical repair oh hernia. pt denies erythema and exudate. pt reports appears to be healing well. pt does report some pain. pt has not taken any Norco as Rx'd  DM@- AM BG typically range 110-120. pt reports highest reading approx. 170. pt without hypoglycemic events.       Past Medical History  Disease Name Date Onset Notes   Aortic aneurysm --  --    Arthritis --  --    Bladder Disorder --  --    Bone Spur(s) --  --    BPH (benign prostatic hyperplasia) 02/21/2019 --    Cervical radiculopathy 03/25/2019 --    Cervicalgia 03/25/2019 --    DDD (degenerative disc disease), lumbar --  --    Diabetes --  --    Diabetes mellitus, type 2 --  --    Facet arthropathy 03/25/2019 T10, T11, T12 on left.   Fatty metamorphosis of liver --  --    Foraminal stenosis of lumbar region --  --    GERD (gastroesophageal reflux disease) --  --    Hematuria 03/18/2019 --    Hiatal hernia --  --    Hypercalcemia --  --    Hyperlipemia --  --    Hypertension --  --    Kidney Disease --  --    Lumbago/low back pain 03/25/2019 --    MI (myocardial infarction) --  --    Mid back pain 03/25/2019 --    Nocturia 02/21/2019 --    Parathyroid abnormality --  --    Prostate Disorder --  --    Renal calculi --  --    Sciatica 03/25/2019 --    Stroke --  --    Thoracic disc degeneration --  --    Thyroid disorder --  --    Ureterolithiasis 03/18/2019 --    Urinary Incontinence 03/18/2019 --    Urine stream spraying 02/21/2019 --    Vitamin D deficiency --  --    Voiding difficulty  02/21/2019 --          Past Surgical History  Procedure Name Date Notes   Cardiac Catherization --  --    Cholecystectomies, laparoscopic --  --    Gallbladder --  --    Kidney --  --    Kidney Stone Surgery, Unspecified --  --    Thyroid surgery 2017 --    TURP --  --          Medication List  Name Date Started Instructions   atorvastatin 80 mg oral tablet 12/02/2019 take 1 tablet (80 mg) by oral route once daily for 90 days   cyclobenzaprine 5 mg oral tablet 06/12/2020 take 1 tablet by oral route 3 times a day prn pain   Januvia 100 mg oral tablet 05/26/2020 take 1 tablet (100 mg) by oral route once daily for 90 days   Jardiance 25 mg oral tablet 05/26/2020 take 1 tablet (25 mg) by oral route once daily in the morning for 90 days   lisinopril 20 mg oral tablet 05/26/2020 take 1 tablet (20 mg) by oral route once daily for 90 days   mecobalamin (vitamin B12) 1,000 mcg sublingual tablet,disintegrating 06/04/2020 take 1 tablet,disintegrating by sublingual route daily for 30 days   melatonin 5 mg oral capsule  take 1 capsule by oral route daily   metformin 1,000 mg oral tablet 12/02/2019 take 1 tablet (1,000 mg) by oral route 2 times per day with morning and evening meals for 90 days   Miralax 17 gram/dose oral powder 03/15/2018 1 cap po BID. mix with 8oz water, juice.   nystatin 100,000 unit/gram topical powder 06/04/2020 apply to the affected area(s) by topical route 2 times per day for 14 days   tamsulosin 0.4 mg oral capsule 06/18/2020 take 2 capsules (0.8 mg) by oral route once daily 1/2 hour following the same meal each day for 90 days   Vitamin D3 25 mcg (1,000 unit) oral capsule  take 1 capsule by oral route daily         Allergy List  Allergen Name Date Reaction Notes   Celebrex --  --  --    Coumadin --  --  --    ibuprofen --  --  --    Motrin --  --  --    Phenergan --  --  --        Allergies Reconciled  Family Medical History  Disease Name Relative/Age Notes   Stroke Father/  Mother/   Mother; Father   "Mother   Cancer, Unspecified Brother/  Sister/   Sister; Brother  Father; Sister; Brother   Diabetes, unspecified type Brother/  Mother/   Mother; Brother   Diabetes Mellitus, Type II Brother/  Father/  Mother/  Sister/  Son/   --    Family history of Arthritis Father/   Father         Social History  Finding Status Start/Stop Quantity Notes   Alcohol Use Never --/-- --  does not drink   . --  --/-- --  --    lives alone --  --/-- --  --    lives with children --  --/-- --  --    Recreational Drug Use Never --/-- --  no   Retired. --  --/-- --  --    Tobacco Never --/-- --  never smoker         Review of Systems  · Constitutional  o Denies  o : fever, weight gain, weight loss, fatigue  · Cardiovascular  o Denies  o : palpitation, chest pain, lower extremity edema  · Respiratory  o Denies  o : shortness of breath, wheezing, dry cough, productive cough  · Gastrointestinal  o Denies  o : nausea, vomiting, diarrhea, constipation, abdominal pain  · Neurologic  o Denies  o : unsteady gait, weakness, dizziness, H/A      Vitals  Date Time BP Position Site L\R Cuff Size HR RR TEMP (F) WT  HT  BMI kg/m2 BSA m2 O2 Sat HC       06/18/2020 11:17 AM      89 - R   166lbs 16oz 5'  9\" 24.66 1.92 97 %    06/26/2020 02:05 /80 Sitting    85 - R  98.5 172lbs 0oz 5'  9\" 25.4 1.95 97 %    07/21/2020 11:48 /76 Sitting    84 - R 16 98.3 163lbs 6oz 5'  9\" 24.13 1.9 97 %          Physical Examination  · Constitutional  o Appearance  o : no acute distress, well-nourished  · Head and Face  o Head  o :   § Inspection  § : atraumatic, normocephalic  · Eyes  o Eyes  o : extraocular movements intact, no scleral icterus, no conjunctival injection  · Ears, Nose, Mouth and Throat  o Ears  o :   § External Ears  § : normal  o Nose  o :   § Intranasal Exam  § : nares patent  o Oral Cavity  o :   § Oral Mucosa  § : moist mucous membranes  · Respiratory  o Respiratory Effort  o : breathing comfortably, symmetric chest " rise  o Auscultation of Lungs  o : clear to asculatation bilaterally, no wheezes, rales, or rhonchii  · Cardiovascular  o Heart  o :   § Auscultation of Heart  § : regular rate and rhythm, no murmurs, rubs, or gallops  o Peripheral Vascular System  o :   § Extremities  § : no edema  · Neurologic  o Mental Status Examination  o :   § Orientation  § : grossly oriented to person, place and time  · Psychiatric  o General  o : normal mood and affect          Assessment  · Diabetes mellitus, type 2     250.00/E11.9  well controlled based on home BG measurements. f/u in approx. 2 months for repeat eval  · Hernia     553.9/K46.9  doing well post-op. encouraged use of medication to help control pain. f/u with gen surg.     Problems Reconciled  Plan  · Orders  o Discharge medications reconciled with the current medication list (1111F) - - 07/21/2020  o ACO-39: Current medications updated and reviewed () - - 07/21/2020  · Medications  o Medications have been Reconciled  o Transition of Care or Provider Policy  · Instructions  o Patient discharge summary has been reviewed and placed in patient's electronic medical record.  o Patient received a phone call from my office within 2 business days of discharge from inpatient status, and documented within the patient's chart.  o Also patient was seen (face to face) for follow up evaluation within 7 calendar days of discharge from inpatient status for a high complexity issue.  o Patient was educated on their diagnosis, treatment and any medication changes while being evaluated today.  o Patient was educated/instructed on their diagnosis, treatment and medications prior to discharge from the clinic today.  · Disposition  o f/u in 3 months            Electronically Signed by: Felix Navarro MD -Author on July 21, 2020 12:55:43 PM

## 2021-05-13 NOTE — PROGRESS NOTES
"   Progress Note      Patient Name: Nikhil Baldwin   Patient ID: [id number]   Sex: Male   YOB: 1945    Primary Care Provider: Betsy Marquez PA-C   Referring Provider: Felix Navarro MD    Visit Date: December 8, 2020    Provider: Felix Navarro MD   Location: [facility]   Location Address: [address] [place] [address], [address]  [place], [place]  [id number]   Location Phone: [phone]          Chief Complaint  · Follow Up DM2  · \" wants to talk about reducing metformin \"      History Of Present Illness  TELEHEALTH TELEPHONE VISIT  Nikhil Baldwin is a 75 year old /White male who is presenting for evaluation via telehealth telephone visit. Verbal consent obtained before beginning visit.   Provider spent 13 minutes with patient during telehealth visit.   The following staff were present during this visit: Dr. Navarro, pt, and pt's son   Past Medical History/Overview of Patient Symptoms     DM2- pt reports sugars have been . pt denies hypoglycemic events. pt has been skipping metformin some days.  HTN- pt denies dizziness, HAs, CP.  HLD- doing well on statin  lumbago- pt reports continued to bother him. pt takes Tylenol intermittently. pt reports using lidocaine patches previously.    pt has had flu and pna vaccines       Past Medical History  Disease Name Date Onset Notes   Aortic aneurysm --  --    Arthritis --  --    Bladder Disorder --  --    Bone Spur(s) --  --    BPH (benign prostatic hyperplasia) 02/21/2019 --    Cervical radiculopathy 03/25/2019 --    Cervicalgia 03/25/2019 --    DDD (degenerative disc disease), lumbar --  --    Diabetes --  --    Diabetes mellitus, type 2 --  --    Facet arthropathy 03/25/2019 T10, T11, T12 on left.   Fatty metamorphosis of liver --  --    Foraminal stenosis of lumbar region --  --    GERD (gastroesophageal reflux disease) --  --    Hematuria 03/18/2019 --    Hiatal hernia --  --    Hypercalcemia --  --    Hyperlipemia --  --  "   Hypertension --  --    Kidney Disease --  --    Lumbago/low back pain 03/25/2019 --    MI (myocardial infarction) --  --    Mid back pain 03/25/2019 --    Nocturia 02/21/2019 --    Parathyroid abnormality --  --    Prostate Disorder --  --    Renal calculi --  --    Sciatica 03/25/2019 --    Stroke --  --    Thoracic disc degeneration --  --    Thyroid disorder --  --    Ureterolithiasis 03/18/2019 --    Urinary Incontinence 03/18/2019 --    Urine stream spraying 02/21/2019 --    Vitamin D deficiency --  --    Voiding difficulty 02/21/2019 --          Past Surgical History  Procedure Name Date Notes   Cardiac Catherization --  --    Cholecystectomies, laparoscopic --  --    Gallbladder --  --    Kidney --  --    Kidney Stone Surgery, Unspecified --  --    Thyroid surgery 2017 --    TURP --  --          Medication List  Name Date Started Instructions   atorvastatin 80 mg oral tablet 12/02/2019 take 1 tablet (80 mg) by oral route once daily for 90 days   Januvia 100 mg oral tablet 05/26/2020 take 1 tablet (100 mg) by oral route once daily for 90 days   Jardiance 25 mg oral tablet 05/26/2020 take 1 tablet (25 mg) by oral route once daily in the morning for 90 days   lisinopril 20 mg oral tablet 05/26/2020 take 1 tablet (20 mg) by oral route once daily for 90 days   mecobalamin (vitamin B12) 1,000 mcg sublingual tablet,disintegrating 06/04/2020 take 1 tablet,disintegrating by sublingual route daily for 30 days   melatonin 5 mg oral capsule  take 1 capsule by oral route daily   metformin 500 mg oral tablet 10/01/2020 take 1 tablet (500 mg) by oral route 2 times per day with morning and evening meals for 90 days   tamsulosin 0.4 mg oral capsule 06/18/2020 take 2 capsules (0.8 mg) by oral route once daily 1/2 hour following the same meal each day for 90 days   Vitamin D3 25 mcg (1,000 unit) oral capsule  take 1 capsule by oral route daily   Voltaren 1 % topical gel 09/04/2020 apply 2 grams to the affected area(s) by  topical route 4 times per day         Allergy List  Allergen Name Date Reaction Notes   Celebrex --  --  --    Coumadin --  --  --    ibuprofen --  --  --    Motrin --  --  --    Phenergan --  --  --          Family Medical History  Disease Name Relative/Age Notes   Stroke Father/  Mother/   Mother; Father  Mother   Cancer, Unspecified Brother/  Sister/   Sister; Brother  Father; Sister; Brother   Diabetes, unspecified type Brother/  Mother/   Mother; Brother   Diabetes Mellitus, Type II Brother/  Father/  Mother/  Sister/  Son/   --    Family history of Arthritis Father/   Father         Social History  Finding Status Start/Stop Quantity Notes   Alcohol Use --  --/-- --  09/08/2020 - does not drink   . --  --/-- --  --    lives alone --  --/-- --  --    lives with children --  --/-- --  --    Recreational Drug Use Never --/-- --  no   Retired. --  --/-- --  --    Tobacco Never --/-- --  never smoker         Physical Examination                Assessment  · Lumbago/low back pain     724.3/M54.40  will Rx lidocaine patches to help. cont Tylenol as needed.   · Hyperlipemia     272.4/E78.5  cont statin. check labs  · Hypertension     401.9/I10  well controlled on current regimen including ACEi with concurrent DM  · Diabetes mellitus, type 2     250.00/E11.9  well controlled. may DC Januvia and metformin. cont jardiance for now. check labs.     Problems Reconciled  Plan  · Orders  o Diabetes 2 Panel (Urine Microalbumin, CMP, Lipid, A1c, ) [facility] (76726, 93830, 05430, 17868) - 250.00/E11.9 - 12/08/2020  o CBC with Auto Diff [facility] (56020) - 250.00/E11.9 - 12/08/2020  o Diabetic Dilated Eye Exam Consult with Ophthalmology/Optometry (DMEYE) - 250.00/E11.9 - 12/08/2020  o ACO-41: Dilated Diabetic eye exam completed this year and results in chart/reviewed (2022F) - 250.00/E11.9 - 12/08/2020  o ACO-39: Current medications updated and reviewed (, 4019F) - - 12/08/2020  o ACO-15: Pneumococcal Vaccine Administered or  Previously Received [facility] (32060) - - 12/08/2020  o ACO-14: Influenza immunization administered or previously received [facility] (55030) - - 12/08/2020  o ACO-19: Colorectal cancer screening results documented and reviewed (3017F) - - 12/08/2020  o Physician Telephone Evaluation, 11-20 minutes (04262) - - 12/08/2020  · Medications  o lidocaine 5 % topical adhesive patch,medicated   SIG: apply 1 patch by transdermal route once daily (May wear up to 12hours.)   DISP: (60) Patch with 1 refills  Prescribed on 12/08/2020     o Medications have been Reconciled  o Transition of Care or Provider Policy  · Disposition  o f/u in 3 months            Electronically Signed by: Felix Navarro MD -Author on December 8, 2020 01:36:54 PM

## 2021-05-13 NOTE — PROGRESS NOTES
Progress Note      Patient Name: Nikhil Baldwin   Patient ID: [id number]   Sex: Male   YOB: 1945    Primary Care Provider: Betsy Marquez PA-C   Referring Provider: Felix Navarro MD    Visit Date: July 28, 2020    Provider: Cm Flanagan MD   Location: [facility]   Location Address: [address] [place] [address]  [id number]   Location Phone: [phone]          Chief Complaint  · Status Post Surgery      History Of Present Illness     Mr. Baldwin is seen in follow-up status post left inguinal hernia repair.       Past Medical History  Aortic aneurysm; Arthritis; Bladder Disorder; Bone Spur(s); BPH (benign prostatic hyperplasia); Cervical radiculopathy; Cervicalgia; DDD (degenerative disc disease), lumbar; Diabetes; Diabetes mellitus, type 2; Facet arthropathy; Fatty metamorphosis of liver; Foraminal stenosis of lumbar region; GERD (gastroesophageal reflux disease); Hematuria; Hiatal hernia; Hypercalcemia; Hyperlipemia; Hypertension; Kidney Disease; Lumbago/low back pain; MI (myocardial infarction); Mid back pain; Nocturia; Parathyroid abnormality; Prostate Disorder; Renal calculi; Sciatica; Stroke; Thoracic disc degeneration; Thyroid disorder; Ureterolithiasis; Urinary Incontinence; Urine stream spraying; Vitamin D deficiency; Voiding difficulty         Past Surgical History  Cardiac Catherization; Cholecystectomies, laparoscopic; Gallbladder; Kidney; Kidney Stone Surgery, Unspecified; Thyroid surgery; TURP         Medication List  atorvastatin 80 mg oral tablet; Januvia 100 mg oral tablet; Jardiance 25 mg oral tablet; lisinopril 20 mg oral tablet; mecobalamin (vitamin B12) 1,000 mcg sublingual tablet,disintegrating; melatonin 5 mg oral capsule; metformin 1,000 mg oral tablet; nystatin 100,000 unit/gram topical powder; tamsulosin 0.4 mg oral capsule; Vitamin D3 25 mcg (1,000 unit) oral capsule         Allergy List  Celebrex; Coumadin; ibuprofen; Motrin; Phenergan       Allergies  "Reconciled  Family Medical History  Stroke; Cancer, Unspecified; Diabetes, unspecified type; Diabetes Mellitus, Type II; Family history of Arthritis         Social History  Alcohol Use (Never); .; lives alone; lives with children; Recreational Drug Use (Never); Retired.; Tobacco (Never)         Review of Systems  · Cardiovascular  o Denies  o : chest pain on exertion, shortness of breath, lower extremity swelling  · Respiratory  o Denies  o : wheezing, chronic cough, coughing up blood  · Gastrointestinal  o Denies  o : diarrhea, chronic abdominal pain, reflux symptoms      Vitals  Date Time BP Position Site L\R Cuff Size HR RR TEMP (F) WT  HT  BMI kg/m2 BSA m2 O2 Sat HC       07/28/2020 01:42 PM       16  166lbs 16oz 5'  9\" 24.66 1.92           Physical Examination     His incision is healing. His clips were removed. There was postoperative change noted. No evidence of recurrence is noted.           Assessment  · Encounter for examination following surgery     V67.00/Z09    Problems Reconciled  Plan  · Medications  o Medications have been Reconciled     He was told not to lift anything over 5-10 pounds. He will follow-up in one month.             Electronically Signed by: Shannan Sevilla-, -Author on July 29, 2020 01:53:08 PM  Electronically Co-signed by: Cm Flanagan MD -Reviewer on August 3, 2020 04:15:41 PM  "

## 2021-05-13 NOTE — PROGRESS NOTES
Progress Note      Patient Name: Nikhil Baldwin   Patient ID: [id number]   Sex: Male   YOB: 1945    Primary Care Provider: Betsy Marquez PA-C   Referring Provider: Felix Navarro MD    Visit Date: June 12, 2020    Provider: Felix Navarro MD   Location: [facility]   Location Address: [address] [place] [address], [address]  [place], [place]  [id number]   Location Phone: [phone]          Chief Complaint  · Neck pain since yesterday mornign  · Hernia left side of groin      History Of Present Illness  Nikhil Baldwin is a 75 year old /White male who presents for evaluation and treatment of:      pt here with his son.   CVA- neuro recommended cardiology consult. pt is following with PT and OT to help with strengthening and balance.   HTN- home readings 120s/70s. pt denies HAs, CP.   DM2- pt home BG . pt on januvia, jardiance, and metformin.   HLD- doing well on statin.   pt reports dizziness has improved, but cont to have some imbalance.  pt does report some neck stiffness and htinks he slept on it wrong. pt denies numbness/tingling, speech difficulty, AMS       Past Medical History  Disease Name Date Onset Notes   Aortic aneurysm --  --    Arthritis --  --    Bladder Disorder --  --    Bone Spur(s) --  --    BPH (benign prostatic hyperplasia) 02/21/2019 --    Cervical radiculopathy 03/25/2019 --    Cervicalgia 03/25/2019 --    DDD (degenerative disc disease), lumbar --  --    Diabetes mellitus, type 2 --  --    Facet arthropathy 03/25/2019 T10, T11, T12 on left.   Fatty metamorphosis of liver --  --    Foraminal stenosis of lumbar region --  --    GERD (gastroesophageal reflux disease) --  --    Hematuria 03/18/2019 --    Hiatal hernia --  --    Hypercalcemia --  --    Hyperlipemia --  --    Hypertension --  --    Kidney Disease --  --    Lumbago/low back pain 03/25/2019 --    MI (myocardial infarction) --  --    Mid back pain 03/25/2019 --    Nocturia 02/21/2019 --     Parathyroid abnormality --  --    Prostate Disorder --  --    Renal calculi --  --    Sciatica 03/25/2019 --    Thoracic disc degeneration --  --    Ureterolithiasis 03/18/2019 --    Urinary incontinence 03/18/2019 --    Urine stream spraying 02/21/2019 --    Vitamin D deficiency --  --    Voiding difficulty 02/21/2019 --          Past Surgical History  Procedure Name Date Notes   Cardiac Catherization --  --    Cholecystectomies, laparoscopic --  --    Gallbladder --  --    Kidney Stone Surgery, Unspecified --  --    Thyroid surgery 2017 --    TURP --  --          Medication List  Name Date Started Instructions   atorvastatin 80 mg oral tablet 12/02/2019 take 1 tablet (80 mg) by oral route once daily for 90 days   Januvia 100 mg oral tablet 05/26/2020 take 1 tablet (100 mg) by oral route once daily for 90 days   Jardiance 25 mg oral tablet 05/26/2020 take 1 tablet (25 mg) by oral route once daily in the morning for 90 days   lisinopril 20 mg oral tablet 05/26/2020 take 1 tablet (20 mg) by oral route once daily for 90 days   mecobalamin (vitamin B12) 1,000 mcg sublingual tablet,disintegrating 06/04/2020 take 1 tablet,disintegrating by sublingual route daily for 30 days   melatonin 5 mg oral capsule  take 1 capsule by oral route daily   metformin 1,000 mg oral tablet 12/02/2019 take 1 tablet (1,000 mg) by oral route 2 times per day with morning and evening meals for 90 days   Miralax 17 gram/dose oral powder 03/15/2018 1 cap po BID. mix with 8oz water, juice.   nystatin 100,000 unit/gram topical powder 06/04/2020 apply to the affected area(s) by topical route 2 times per day for 14 days   tamsulosin 0.4 mg oral capsule 02/21/2019 take 2 capsules (0.8 mg) by oral route once daily 1/2 hour following the same meal each day for 90 days   vitamin B12-folic acid 500-400 mcg oral tablet  take 1 tablet by oral route daily   Vitamin D3 25 mcg (1,000 unit) oral capsule  take 1 capsule by oral route daily         Allergy  "List  Allergen Name Date Reaction Notes   Celebrex --  --  --    Coumadin --  --  --    ibuprofen --  --  --    Motrin --  --  --    Phenergan --  --  --          Family Medical History  Disease Name Relative/Age Notes   Stroke Mother/   Mother   Cancer, Unspecified Brother/  Father/  Sister/   Father; Sister; Brother   Diabetes mellitus, type II Brother/  Father/  Mother/  Sister/  Son/   --          Social History  Finding Status Start/Stop Quantity Notes   Alcohol Use --  --/-- --  03/25/2019 - rarely drinks   Recreational Drug Use Never --/-- --  no   Tobacco Never --/-- --  never smoker         Review of Systems  · Constitutional  o Denies  o : fever, fatigue, weight loss, weight gain  · Cardiovascular  o Denies  o : lower extremity edema, chest pressure, palpitations  · Respiratory  o Denies  o : shortness of breath, wheezing, cough, dyspnea on exertion  · Gastrointestinal  o Denies  o : nausea, vomiting, diarrhea, constipation, abdominal pain      Vitals  Date Time BP Position Site L\R Cuff Size HR RR TEMP (F) WT  HT  BMI kg/m2 BSA m2 O2 Sat HC       05/26/2020 08:47 /78 Sitting    54 - R  98.7 170lbs 2oz 5'  9\" 25.12 1.94 95 %    06/04/2020 08:26 /77 Sitting    88 - R  97.9 169lbs 8oz 5'  9\" 25.03 1.93     06/12/2020 10:35 /70 Sitting    78 - R  98 167lbs 2oz 5'  9\" 24.68 1.92 98 %          Physical Examination  · Constitutional  o Appearance  o : no acute distress, well-nourished  · Head and Face  o Head  o :   § Inspection  § : atraumatic, normocephalic  · Eyes  o Eyes  o : extraocular movements intact, no scleral icterus, no conjunctival injection  · Ears, Nose, Mouth and Throat  o Ears  o :   § External Ears  § : normal  o Nose  o :   § Intranasal Exam  § : nares patent  o Oral Cavity  o :   § Oral Mucosa  § : moist mucous membranes  · Respiratory  o Respiratory Effort  o : breathing comfortably, symmetric chest rise  o Auscultation of Lungs  o : clear to asculatation bilaterally, no " wheezes, rales, or rhonchii  · Cardiovascular  o Heart  o :   § Auscultation of Heart  § : regular rate and rhythm, no murmurs, rubs, or gallops  o Peripheral Vascular System  o :   § Extremities  § : no edema  · Neurologic  o Mental Status Examination  o :   § Orientation  § : grossly oriented to person, place and time  o Gait and Station  o :   § Gait Screening  § : normal gait  · Psychiatric  o General  o : normal mood and affect          Assessment  · Hypertension     401.9/I10  well controlled based on home readings. cont current regimen.   · Diabetes mellitus, type 2     250.00/E11.9  well controlled based on home BG readings. may DC insulin. cont remainder of regimen. If additional control needed, may switch to GLP-1.   · Hyperlipidemia     272.4/E78.5  cont statin  · Femoral hernia     553.00/K41.90  will obtain CT Ab/Pelvis and refer to gen surg to discuss options.   · CVA (cerebral vascular accident)     434.91/I63.9  cont to f/u with neuro and will refer to cardiology per request from neuro. cont statin.   · Neck stiffness     723.5/M43.6  neuro exam at baseline and no concerning symptoms for CVA progression at this time. will Rx flexeril and monitor for effectiveness.     Problems Reconciled  Plan  · Orders  o ACO-39: Current medications updated and reviewed () - - 06/12/2020  o CT Abdomen without IV Contrast [facility]; suggest Oral Prep (27484) - 553.00/K41.90 - 06/12/2020   Decision Support Number: [id number] NPI: [id number] Ordering Provider Name: Ghanshyam Patient Age: 75 Patient Gender: Male Selected Service: CT ABDOMEN-PELVIS WO CONTRAST Selected Indication(s): Abdominal pain, hernia suspected Appropriateness Score: 6 Consultation Results: Adheres HCPCS Code:  HCPCS Modifier: ME  o GENERAL SURGERY (GNSUR) - 553.00/K41.90 - 06/12/2020  · Medications  o cyclobenzaprine 5 mg oral tablet   SIG: take 1 tablet by oral route 3 times a day prn pain   DISP: (60) tablets with 0 refills  Prescribed on  06/12/2020     o Levemir U-100 Insulin 100 unit/mL subcutaneous solution   SIG: inject 10 units by subcutaneous route daily   DISP: (1) 10 ml vial with 0 refills  Discontinued on 06/12/2020     o Medications have been Reconciled  o Transition of Care or Provider Policy  · Instructions  o Patient was educated/instructed on their diagnosis, treatment and medications prior to discharge from the clinic today.  · Disposition  o f/u in 1 month  o Care Transition  o MEGA Sent            Electronically Signed by: Felix Navarro MD -Author on June 12, 2020 12:15:22 PM

## 2021-05-13 NOTE — PROGRESS NOTES
Progress Note      Patient Name: Nikhil Baldwin   Patient ID: [id number]   Sex: Male   YOB: 1945    Primary Care Provider: Betsy Marquez PA-C   Referring Provider: Felix Navarro MD    Visit Date: July 21, 2020    Provider: Felix Navarro MD   Location: [facility]   Location Address: [address] [place] [address]  [id number]   Location Phone: [phone]          Chief Complaint  · Annual Wellness Exam      History Of Present Illness  The patient is a 75 year old /White male who has come to this office for his Annual Wellness Visit.   His Primary Care Provider is Felix Navarro MD. His comprehensive Care Team list, including suppliers, has been updated on the Facesheet. His medical/family history, height, weight, BMI, and blood pressure have been reviewed and are in the chart. The Health Risk Assessment has been completed and scanned in the chart.   Medications are listed in the medication list.   The active problem list includes: Arthritis, Bladder Disorder, BPH (benign prostatic hyperplasia), Cervical radiculopathy, Cervicalgia, DDD (degenerative disc disease), lumbar, Diabetes mellitus, type 2, Facet arthropathy, Foraminal stenosis of lumbar region, GERD (gastroesophageal reflux disease), Hematuria, Hyperlipemia, Hypertension, Lumbago/low back pain, Mid back pain, Nocturia, Sciatica, Thoracic disc degeneration, Ureterolithiasis, Urinary Incontinence, Urine stream spraying, Vitamin D deficiency, and Voiding difficulty   The patient does not have a history of substance use.   Patient reports his diet is adequate.   The Mini-Cog has been administered and is scanned in chart. The results are positive. His cognitive function is limited, including forgetfulness, with the severity being mild.   A hearing loss screen was completed today and the result is negative.   Patient does not have any risk factors for depression. Patient completed the PHQ-9 today  and it has been scanned in the chart. The total score is 1-4.   The Timed Up and Go screen was administered today and the result is positive: Difficulty transferring from sitting to standing and/or standing to sitting.   The Ignacio Index of [place] in ADLs indicated full function (score of 6).   A Falls Risk Assessment has been completed, including a review of home fall hazards and medication review.   Overall, the patient's functional ability is noted by this provider to be within normal limits. His level of safety is noted to be within normal limits. His balance/gait is within normal limits. There have been two or more falls in the past year. Patient-specific home safety recommendations have been reviewed and a copy has been given to patient.   He denies issues with leaking urine.   There are no additional risk factors identified.   Living Will/Advanced Directive has not previously been completed.   Personalized health advice was given to the patient and a written health screening schedule was established; see Plan for details.       Past Medical History  Disease Name Date Onset Notes   Aortic aneurysm --  --    Arthritis --  --    Bladder Disorder --  --    Bone Spur(s) --  --    BPH (benign prostatic hyperplasia) 02/21/2019 --    Cervical radiculopathy 03/25/2019 --    Cervicalgia 03/25/2019 --    DDD (degenerative disc disease), lumbar --  --    Diabetes --  --    Diabetes mellitus, type 2 --  --    Facet arthropathy 03/25/2019 T10, T11, T12 on left.   Fatty metamorphosis of liver --  --    Foraminal stenosis of lumbar region --  --    GERD (gastroesophageal reflux disease) --  --    Hematuria 03/18/2019 --    Hiatal hernia --  --    Hypercalcemia --  --    Hyperlipemia --  --    Hypertension --  --    Kidney Disease --  --    Lumbago/low back pain 03/25/2019 --    MI (myocardial infarction) --  --    Mid back pain 03/25/2019 --    Nocturia 02/21/2019 --    Parathyroid abnormality --  --    Prostate Disorder  --  --    Renal calculi --  --    Sciatica 03/25/2019 --    Stroke --  --    Thoracic disc degeneration --  --    Thyroid disorder --  --    Ureterolithiasis 03/18/2019 --    Urinary Incontinence 03/18/2019 --    Urine stream spraying 02/21/2019 --    Vitamin D deficiency --  --    Voiding difficulty 02/21/2019 --          Past Surgical History  Procedure Name Date Notes   Cardiac Catherization --  --    Cholecystectomies, laparoscopic --  --    Gallbladder --  --    Kidney --  --    Kidney Stone Surgery, Unspecified --  --    Thyroid surgery 2017 --    TURP --  --          Medication List  Name Date Started Instructions   atorvastatin 80 mg oral tablet 12/02/2019 take 1 tablet (80 mg) by oral route once daily for 90 days   cyclobenzaprine 5 mg oral tablet 06/12/2020 take 1 tablet by oral route 3 times a day prn pain   Januvia 100 mg oral tablet 05/26/2020 take 1 tablet (100 mg) by oral route once daily for 90 days   Jardiance 25 mg oral tablet 05/26/2020 take 1 tablet (25 mg) by oral route once daily in the morning for 90 days   lisinopril 20 mg oral tablet 05/26/2020 take 1 tablet (20 mg) by oral route once daily for 90 days   mecobalamin (vitamin B12) 1,000 mcg sublingual tablet,disintegrating 06/04/2020 take 1 tablet,disintegrating by sublingual route daily for 30 days   melatonin 5 mg oral capsule  take 1 capsule by oral route daily   metformin 1,000 mg oral tablet 12/02/2019 take 1 tablet (1,000 mg) by oral route 2 times per day with morning and evening meals for 90 days   Miralax 17 gram/dose oral powder 03/15/2018 1 cap po BID. mix with 8oz water, juice.   nystatin 100,000 unit/gram topical powder 06/04/2020 apply to the affected area(s) by topical route 2 times per day for 14 days   tamsulosin 0.4 mg oral capsule 06/18/2020 take 2 capsules (0.8 mg) by oral route once daily 1/2 hour following the same meal each day for 90 days   Vitamin D3 25 mcg (1,000 unit) oral capsule  take 1 capsule by oral route daily  "        Allergy List  Allergen Name Date Reaction Notes   Celebrex --  --  --    Coumadin --  --  --    ibuprofen --  --  --    Motrin --  --  --    Phenergan --  --  --          Family Medical History  Disease Name Relative/Age Notes   Stroke Father/  Mother/   Mother; Father  Mother   Cancer, Unspecified Brother/  Sister/   Sister; Brother  Father; Sister; Brother   Diabetes, unspecified type Brother/  Mother/   Mother; Brother   Diabetes Mellitus, Type II Brother/  Father/  Mother/  Sister/  Son/   --    Family history of Arthritis Father/   Father         Social History  Finding Status Start/Stop Quantity Notes   Alcohol Use Never --/-- --  does not drink   . --  --/-- --  --    lives alone --  --/-- --  --    lives with children --  --/-- --  --    Recreational Drug Use Never --/-- --  no   Retired. --  --/-- --  --    Tobacco Never --/-- --  never smoker         Vitals  Date Time BP Position Site L\R Cuff Size HR RR TEMP (F) WT  HT  BMI kg/m2 BSA m2 O2 Sat HC       07/21/2020 11:48 /76 Sitting    84 - R 16 98.3 163lbs 6oz 5'  9\" 24.13 1.9 97 %              Assessment  · BPH (benign prostatic hyperplasia)     600.00/N40.0  · Cervical radiculopathy     723.4/M54.12  · Cervicalgia     723.1/M54.2  · Facet arthropathy     721.90/M46.90  T10, T11, T12 on left.  · Hematuria     599.70/R31.9  · Lumbago/low back pain     724.3/M54.40  · Mid back pain     724.5/M54.9  · Nocturia     788.43/R35.1  · Sciatica     724.3/M54.30  · Ureterolithiasis     592.1/N20.1  · Urinary Incontinence     788.30/R32  · Urine stream spraying     788.69/R39.198  · Voiding difficulty     788.99/R39.198  · Arthritis     V13.4/V13.4  · Hyperlipemia     272.4/E78.5  · Hypertension     401.9/I10  · Diabetes mellitus, type 2     250.00/E11.9  · GERD (gastroesophageal reflux disease)     530.81/K21.9  · Vitamin D deficiency     268.9/E55.9  · Bladder Disorder     596.9/N32.9  · Foraminal stenosis of lumbar " region     724.02/M99.83  · DDD (degenerative disc disease), lumbar     722.52/M51.36  · Thoracic disc degeneration     722.51  · Encounter for Medicare annual wellness exam     V70.0/Z00.00  · Screening for depression     V79.0/Z13.89  · Screening for alcoholism     V79.1/Z13.39  · Advanced care planning/counseling discussion     V65.49/Z71.89    Problems Reconciled  Plan  · Orders  o Falls Risk Assessment Completed (3288F) - V70.0/Z00.00 - 07/21/2020  o Brief hearing screening (written) [facility] () - V70.0/Z00.00 - 07/21/2020  o Annual Wellness Visit-includes a Personalized Prevention Plan of Service (PPS), SUBSEQUENT VISIT (Medicare) () - V70.0/Z00.00 - 07/21/2020  o ACO-13: Fall Risk Screening with 2 or more falls in past year or any fall with injury in the past year (1100F) - V70.0/Z00.00 - 07/21/2020  o Presence or absence of urinary incontinence assessed (ROMAN) (1090F) - V70.0/Z00.00 - 07/21/2020  o ACO-18: Negative screen for clinical depression using a standardized tool () - V79.0/Z13.89 - 07/21/2020  o Negative alcohol screening () - V79.1/Z13.39 - 07/21/2020  o ACO - Pt declines to or was not able to provide an Advance Care Plan or name a Surrogate Decision Maker (1124F) - V65.49/Z71.89 - 07/24/2020  o ACO-39: Current medications updated and reviewed () - - 07/21/2020  o ACO-19: Colorectal cancer screening results documented and reviewed (3017F) - - 07/21/2020  o Influenza immunization was not ordered or administered for reasons documented by clinician () - - 07/21/2020  o ACO-17: Current tobacco non-user (1036F) - - 07/21/2020  o ACO-16: BMI within normal range and no follow-up plan is required () - - 07/21/2020  · Medications  o Medications have been Reconciled  o Transition of Care or Provider Policy  · Instructions  o Health Risk Assessment has been reviewed with the patient.  o Written health screening schedule for next 5-10 years was established with patient; information  scanned in chart and given/mailed to patient.  o Fall prevention methods discussed and a copy of recommendations given/mailed to patient.  o Today's PHQ-9 score is: _3__            Electronically Signed by: Felix Navarro MD -Author on July 24, 2020 01:48:25 PM

## 2021-05-13 NOTE — PROGRESS NOTES
Progress Note      Patient Name: Nikhil Baldwin   Patient ID: [id number]   Sex: Male   YOB: 1945    Primary Care Provider: Betsy Marquez PA-C   Referring Provider: Felix Navarro MD    Visit Date: September 8, 2020    Provider: Cm Flanagan MD   Location: [facility]   Location Address: [address] [place] [address]  [id number]   Location Phone: [phone]          Chief Complaint  · Follow Up Office Visit      History Of Present Illness     Mr. Baldwin is seen in follow-up status post left inguinal hernia.       Past Medical History  Aortic aneurysm; Arthritis; Bladder Disorder; Bone Spur(s); BPH (benign prostatic hyperplasia); Cervical radiculopathy; Cervicalgia; DDD (degenerative disc disease), lumbar; Diabetes; Diabetes mellitus, type 2; Facet arthropathy; Fatty metamorphosis of liver; Foraminal stenosis of lumbar region; GERD (gastroesophageal reflux disease); Hematuria; Hiatal hernia; Hypercalcemia; Hyperlipemia; Hypertension; Kidney Disease; Lumbago/low back pain; MI (myocardial infarction); Mid back pain; Nocturia; Parathyroid abnormality; Prostate Disorder; Renal calculi; Sciatica; Stroke; Thoracic disc degeneration; Thyroid disorder; Ureterolithiasis; Urinary Incontinence; Urine stream spraying; Vitamin D deficiency; Voiding difficulty         Past Surgical History  Cardiac Catherization; Cholecystectomies, laparoscopic; Gallbladder; Kidney; Kidney Stone Surgery, Unspecified; Thyroid surgery; TURP         Medication List  atorvastatin 80 mg oral tablet; Januvia 100 mg oral tablet; Jardiance 25 mg oral tablet; lisinopril 20 mg oral tablet; mecobalamin (vitamin B12) 1,000 mcg sublingual tablet,disintegrating; melatonin 5 mg oral capsule; metformin 1,000 mg oral tablet; tamsulosin 0.4 mg oral capsule; Vitamin D3 25 mcg (1,000 unit) oral capsule; Voltaren 1 % topical gel         Allergy List  Celebrex; Coumadin; ibuprofen; Motrin; Phenergan         [facility]  "History  Stroke; Cancer, Unspecified; Diabetes, unspecified type; Diabetes Mellitus, Type II; Family history of Arthritis         Social History  Alcohol Use; .; lives alone; lives with children; Recreational Drug Use (Never); Retired.; Tobacco (Never)         Review of Systems  · Cardiovascular  o Denies  o : chest pain on exertion, shortness of breath, lower extremity swelling  · Respiratory  o Denies  o : wheezing, chronic cough, coughing up blood  · Gastrointestinal  o Denies  o : diarrhea, chronic abdominal pain, reflux symptoms      Vitals  Date Time BP Position Site L\R Cuff Size HR RR TEMP (F) WT  HT  BMI kg/m2 BSA m2 O2 Sat HC       09/08/2020 11:09 AM         170lbs 2oz 5'  9\" 25.12 1.94           Physical Examination     His incision is well healed. No recurrence is noted.           Assessment  · Encounter for examination following surgery     V67.00/Z09      Plan  · Medications  o Medications have been Reconciled  o Transition of Care or Provider Policy     He is released to return to full activity. He will follow-up on a PRN basis.             Electronically Signed by: Shannan Sevilla-, -Author on September 8, 2020 03:07:48 PM  Electronically Co-signed by: Cm Flanagan MD -Reviewer on September 10, 2020 08:22:52 AM  "

## 2021-05-13 NOTE — PROGRESS NOTES
Progress Note      Patient Name: Nikhil Baldwin   Patient ID: [id number]   Sex: Male   YOB: 1945    Primary Care Provider: Betsy Marquez PA-C   Referring Provider: Felix Navarro MD    Visit Date: May 26, 2020    Provider: Felix Navarro MD   Location: [facility]   Location Address: [address] [place] [address], [address]  [place], [place]  [id number]   Location Phone: [phone]          Chief Complaint  · PF-Discharged 5/7/2020      History Of Present Illness  Nikhil Baldwin is a 74 year old /White male who presents for evaluation and treatment of:      pt's son Quinten present for appointment.   DM2- pt is on levemir 10U BID. pt reports home BG typically . pt's son reports better med compliance.   HTN- pt's son does report he feels lightheaded when standing. pt denies HAs, CP.    CVA- currently in PT. eats without coughing. pt cont to have balance issues. f/u with neuro in 2 weeks.   HLD- pt doing well on statin.       Past Medical History  Disease Name Date Onset Notes   Aortic aneurysm --  --    Arthritis --  --    Bladder Disorder --  --    Bone Spur(s) --  --    BPH (benign prostatic hyperplasia) 02/21/2019 --    Cervical radiculopathy 03/25/2019 --    Cervicalgia 03/25/2019 --    DDD (degenerative disc disease), lumbar --  --    Diabetes mellitus, type 2 --  --    Facet arthropathy 03/25/2019 T10, T11, T12 on left.   Fatty metamorphosis of liver --  --    Foraminal stenosis of lumbar region --  --    GERD (gastroesophageal reflux disease) --  --    Hematuria 03/18/2019 --    Hiatal hernia --  --    Hypercalcemia --  --    Hyperlipemia --  --    Hypertension --  --    Kidney Disease --  --    Lumbago/low back pain 03/25/2019 --    MI (myocardial infarction) --  --    Mid back pain 03/25/2019 --    Nocturia 02/21/2019 --    Parathyroid abnormality --  --    Prostate Disorder --  --    Renal calculi --  --    Sciatica 03/25/2019 --    Thoracic disc degeneration --  --     Ureterolithiasis 03/18/2019 --    Urinary Incontinence 03/18/2019 --    Urine stream spraying 02/21/2019 --    Vitamin D deficiency --  --    Voiding difficulty 02/21/2019 --          Past Surgical History  Procedure Name Date Notes   Cardiac Catherization --  --    Cholecystectomies, laparoscopic --  --    Gallbladder --  --    Kidney Stone Surgery, Unspecified --  --    Thyroid surgery 2017 --    TURP --  --          Medication List  Name Date Started Instructions   atorvastatin 80 mg oral tablet 12/02/2019 take 1 tablet (80 mg) by oral route once daily for 90 days   Levemir U-100 Insulin 100 unit/mL subcutaneous solution 05/26/2020 inject 10 units by subcutaneous route daily   melatonin 5 mg oral capsule  take 1 capsule by oral route daily   metformin 1,000 mg oral tablet 12/02/2019 take 1 tablet (1,000 mg) by oral route 2 times per day with morning and evening meals for 90 days   Miralax 17 gram/dose oral powder 03/15/2018 1 cap po BID. mix with 8oz water, juice.   tamsulosin 0.4 mg oral capsule 02/21/2019 take 2 capsules (0.8 mg) by oral route once daily 1/2 hour following the same meal each day for 90 days   vitamin B12-folic acid 500-400 mcg oral tablet  take 1 tablet by oral route daily   Vitamin D3 25 mcg (1,000 unit) oral capsule  take 1 capsule by oral route daily         Allergy List  Allergen Name Date Reaction Notes   Celebrex --  --  --    Coumadin --  --  --    ibuprofen --  --  --    Motrin --  --  --    Phenergan --  --  --        Allergies Reconciled  Family Medical History  Disease Name Relative/Age Notes   Stroke Mother/   Mother   Cancer, Unspecified Brother/  Father/  Sister/   Father; Sister; Brother   Diabetes mellitus, type II Brother/  Father/  Mother/  Sister/  Son/   --          Social History  Finding Status Start/Stop Quantity Notes   Alcohol Use --  --/-- --  03/25/2019 - rarely drinks   Recreational Drug Use Never --/-- --  no   Tobacco Never --/-- --  never smoker         Review  "of Systems  · Constitutional  o Denies  o : fever, fatigue, weight loss, weight gain  · HENT  o Admits  o : lightheadedness  o Denies  o : headaches  · Cardiovascular  o Denies  o : lower extremity edema, chest pressure, palpitations  · Respiratory  o Denies  o : shortness of breath, wheezing, frequent cough, dyspnea on exertion  · Gastrointestinal  o Denies  o : nausea, vomiting, diarrhea, constipation, abdominal pain      Vitals  Date Time BP Position Site L\R Cuff Size HR RR TEMP (F) WT  HT  BMI kg/m2 BSA m2 O2 Sat HC       09/20/2019 01:18 PM       15  188lbs 2oz 5'  9\" 27.78 2.04     12/02/2019 01:20 /108 Sitting    78 - R  99.1 189lbs 2oz    95 %    05/26/2020 08:47 /78 Sitting    54 - R  98.7 170lbs 2oz 5'  9\" 25.12 1.94 95 %          Physical Examination  · Constitutional  o Appearance  o : no acute distress, well-nourished  · Head and Face  o Head  o :   § Inspection  § : atraumatic, normocephalic  · Eyes  o Eyes  o : extraocular movements intact, no scleral icterus, no conjunctival injection  · Ears, Nose, Mouth and Throat  o Ears  o :   § External Ears  § : normal  o Nose  o :   § Intranasal Exam  § : nares patent  o Oral Cavity  o :   § Oral Mucosa  § : moist mucous membranes  · Respiratory  o Respiratory Effort  o : breathing comfortably, symmetric chest rise  · Cardiovascular  o Heart  o :   § Auscultation of Heart  § : regular rate  o Peripheral Vascular System  o :   § Extremities  § : no edema  · Neurologic  o Mental Status Examination  o :   § Orientation  § : grossly oriented to person, place and time  o Gait and Station  o :   § Gait Screening  § : normal gait  · Psychiatric  o General  o : normal mood and affect              Assessment  · Diabetes mellitus, type 2     250.00/E11.9  given low dose of insulin, hope to wean from injections. pt to start back Januvia and jardiance. cont metformin. cut back levemir to 10U daily. f/u in 2 weeks for repeat eval. "   · Hyperlipidemia     272.4/E78.5  cont statin.   · Hypertension     401.9/I10  given dizziness, will monitor BPs closely while switching from metoprolol to lisinopril. switching given no h/o CAD or CHF and ACEi provides renal protection in DM.   · CVA (cerebral vascular accident)     434.91/I63.9  hemorrhagic. cont statin and PT. cont to f/u with neruo. deferring antiplatelet agents at this time.     Problems Reconciled  Plan  · Orders  o ACO-39: Current medications updated and reviewed () - - 05/26/2020  · Medications  o lisinopril 20 mg oral tablet   SIG: take 1 tablet (20 mg) by oral route once daily for 90 days   DISP: (90) tablets with 3 refills  Prescribed on 05/26/2020     o Jardiance 25 mg oral tablet   SIG: take 1 tablet (25 mg) by oral route once daily in the morning for 90 days   DISP: (90) tablets with 3 refills  Prescribed on 05/26/2020     o Januvia 100 mg oral tablet   SIG: take 1 tablet (100 mg) by oral route once daily for 90 days   DISP: (90) tablets with 3 refills  Prescribed on 05/26/2020     o Levemir U-100 Insulin 100 unit/mL subcutaneous solution   SIG: inject 10 units by subcutaneous route daily   DISP: (1) 10 ml vial with 0 refills  Adjusted on 05/26/2020     o metoprolol tartrate 50 mg oral tablet   SIG: take 1 tablet (50 mg) by oral route 2 times per day with meals for 90 days   DISP: (180) tablets with 3 refills  Discontinued on 05/26/2020     o Medications have been Reconciled  o Transition of Care or Provider Policy  · Instructions  o Patient was educated/instructed on their diagnosis, treatment and medications prior to discharge from the clinic today.  · Disposition  o f/u 2 weeks            Electronically Signed by: Felix Navarro MD -Author on May 26, 2020 01:26:52 PM

## 2021-05-14 VITALS — HEIGHT: 69 IN | WEIGHT: 170.12 LBS | BODY MASS INDEX: 25.2 KG/M2

## 2021-05-14 VITALS
WEIGHT: 172 LBS | SYSTOLIC BLOOD PRESSURE: 119 MMHG | HEIGHT: 69 IN | HEART RATE: 70 BPM | DIASTOLIC BLOOD PRESSURE: 70 MMHG | TEMPERATURE: 97.2 F | BODY MASS INDEX: 25.48 KG/M2 | OXYGEN SATURATION: 97 %

## 2021-05-14 VITALS
TEMPERATURE: 97.7 F | SYSTOLIC BLOOD PRESSURE: 134 MMHG | BODY MASS INDEX: 27.55 KG/M2 | OXYGEN SATURATION: 94 % | DIASTOLIC BLOOD PRESSURE: 83 MMHG | HEIGHT: 69 IN | WEIGHT: 186 LBS | HEART RATE: 61 BPM

## 2021-05-14 VITALS
HEART RATE: 56 BPM | TEMPERATURE: 97.6 F | SYSTOLIC BLOOD PRESSURE: 112 MMHG | RESPIRATION RATE: 16 BRPM | BODY MASS INDEX: 25.48 KG/M2 | DIASTOLIC BLOOD PRESSURE: 80 MMHG | OXYGEN SATURATION: 96 % | WEIGHT: 172 LBS | HEIGHT: 69 IN

## 2021-05-14 VITALS
TEMPERATURE: 98.7 F | HEIGHT: 69 IN | OXYGEN SATURATION: 95 % | HEART RATE: 88 BPM | BODY MASS INDEX: 26.51 KG/M2 | DIASTOLIC BLOOD PRESSURE: 82 MMHG | WEIGHT: 179 LBS | RESPIRATION RATE: 16 BRPM | SYSTOLIC BLOOD PRESSURE: 133 MMHG
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
## 2021-05-15 VITALS — WEIGHT: 194 LBS | RESPIRATION RATE: 14 BRPM | BODY MASS INDEX: 28.73 KG/M2 | HEIGHT: 69 IN

## 2021-05-15 VITALS
BODY MASS INDEX: 24.2 KG/M2 | SYSTOLIC BLOOD PRESSURE: 124 MMHG | HEART RATE: 84 BPM | TEMPERATURE: 98.3 F | HEIGHT: 69 IN | DIASTOLIC BLOOD PRESSURE: 76 MMHG | WEIGHT: 163.37 LBS | RESPIRATION RATE: 16 BRPM | OXYGEN SATURATION: 97 %

## 2021-05-15 VITALS
TEMPERATURE: 99.1 F | WEIGHT: 189.12 LBS | HEART RATE: 78 BPM | DIASTOLIC BLOOD PRESSURE: 108 MMHG | SYSTOLIC BLOOD PRESSURE: 150 MMHG | OXYGEN SATURATION: 95 %

## 2021-05-15 VITALS
WEIGHT: 183 LBS | HEIGHT: 69 IN | RESPIRATION RATE: 14 BRPM | SYSTOLIC BLOOD PRESSURE: 128 MMHG | OXYGEN SATURATION: 94 % | DIASTOLIC BLOOD PRESSURE: 80 MMHG | HEART RATE: 76 BPM | TEMPERATURE: 98.5 F | BODY MASS INDEX: 27.11 KG/M2

## 2021-05-15 VITALS
HEART RATE: 78 BPM | SYSTOLIC BLOOD PRESSURE: 144 MMHG | WEIGHT: 189 LBS | TEMPERATURE: 97.8 F | OXYGEN SATURATION: 95 % | DIASTOLIC BLOOD PRESSURE: 88 MMHG

## 2021-05-15 VITALS — HEIGHT: 68 IN | BODY MASS INDEX: 28.36 KG/M2 | WEIGHT: 187.12 LBS | RESPIRATION RATE: 16 BRPM

## 2021-05-15 VITALS — RESPIRATION RATE: 16 BRPM | BODY MASS INDEX: 24.73 KG/M2 | WEIGHT: 167 LBS | HEIGHT: 69 IN

## 2021-05-15 VITALS
SYSTOLIC BLOOD PRESSURE: 122 MMHG | HEART RATE: 78 BPM | WEIGHT: 167.12 LBS | OXYGEN SATURATION: 98 % | DIASTOLIC BLOOD PRESSURE: 70 MMHG | HEIGHT: 69 IN | TEMPERATURE: 98 F | BODY MASS INDEX: 24.75 KG/M2

## 2021-05-15 VITALS
DIASTOLIC BLOOD PRESSURE: 80 MMHG | HEIGHT: 69 IN | TEMPERATURE: 98.5 F | HEART RATE: 85 BPM | BODY MASS INDEX: 25.48 KG/M2 | OXYGEN SATURATION: 97 % | SYSTOLIC BLOOD PRESSURE: 115 MMHG | WEIGHT: 172 LBS

## 2021-05-15 VITALS
DIASTOLIC BLOOD PRESSURE: 103 MMHG | HEART RATE: 80 BPM | WEIGHT: 193.44 LBS | BODY MASS INDEX: 28.65 KG/M2 | HEIGHT: 69 IN | SYSTOLIC BLOOD PRESSURE: 143 MMHG

## 2021-05-15 VITALS
DIASTOLIC BLOOD PRESSURE: 77 MMHG | WEIGHT: 169.5 LBS | SYSTOLIC BLOOD PRESSURE: 120 MMHG | BODY MASS INDEX: 25.1 KG/M2 | HEIGHT: 69 IN | HEART RATE: 88 BPM | TEMPERATURE: 97.9 F

## 2021-05-15 VITALS
HEIGHT: 69 IN | HEART RATE: 95 BPM | SYSTOLIC BLOOD PRESSURE: 146 MMHG | TEMPERATURE: 98.5 F | BODY MASS INDEX: 28.44 KG/M2 | OXYGEN SATURATION: 93 % | DIASTOLIC BLOOD PRESSURE: 88 MMHG | WEIGHT: 192 LBS

## 2021-05-15 VITALS — RESPIRATION RATE: 15 BRPM | BODY MASS INDEX: 27.86 KG/M2 | HEIGHT: 69 IN | WEIGHT: 188.12 LBS

## 2021-05-15 VITALS
WEIGHT: 170.12 LBS | HEART RATE: 54 BPM | TEMPERATURE: 98.7 F | BODY MASS INDEX: 25.2 KG/M2 | SYSTOLIC BLOOD PRESSURE: 122 MMHG | OXYGEN SATURATION: 95 % | DIASTOLIC BLOOD PRESSURE: 78 MMHG | HEIGHT: 69 IN

## 2021-05-15 VITALS
HEART RATE: 76 BPM | SYSTOLIC BLOOD PRESSURE: 168 MMHG | BODY MASS INDEX: 27.44 KG/M2 | TEMPERATURE: 98.2 F | DIASTOLIC BLOOD PRESSURE: 82 MMHG | WEIGHT: 185.25 LBS | HEIGHT: 69 IN | OXYGEN SATURATION: 96 %

## 2021-05-15 VITALS — OXYGEN SATURATION: 97 % | WEIGHT: 167 LBS | HEART RATE: 89 BPM | HEIGHT: 69 IN | BODY MASS INDEX: 24.73 KG/M2

## 2021-05-15 VITALS — BODY MASS INDEX: 28.44 KG/M2 | HEIGHT: 69 IN | WEIGHT: 192 LBS | HEART RATE: 77 BPM

## 2021-05-15 VITALS
TEMPERATURE: 98.2 F | HEIGHT: 69 IN | OXYGEN SATURATION: 95 % | BODY MASS INDEX: 27.85 KG/M2 | WEIGHT: 188 LBS | HEART RATE: 90 BPM

## 2021-05-15 LAB — BACTERIA UR CULT: NORMAL

## 2021-05-16 VITALS
BODY MASS INDEX: 29.27 KG/M2 | DIASTOLIC BLOOD PRESSURE: 80 MMHG | TEMPERATURE: 97.6 F | OXYGEN SATURATION: 97 % | HEART RATE: 80 BPM | WEIGHT: 193.12 LBS | HEIGHT: 68 IN | RESPIRATION RATE: 16 BRPM | SYSTOLIC BLOOD PRESSURE: 124 MMHG

## 2021-05-16 VITALS
DIASTOLIC BLOOD PRESSURE: 96 MMHG | HEIGHT: 68 IN | RESPIRATION RATE: 15 BRPM | WEIGHT: 192 LBS | SYSTOLIC BLOOD PRESSURE: 144 MMHG | TEMPERATURE: 98.6 F | BODY MASS INDEX: 29.1 KG/M2 | OXYGEN SATURATION: 96 % | HEART RATE: 66 BPM

## 2021-05-16 VITALS
TEMPERATURE: 99 F | DIASTOLIC BLOOD PRESSURE: 90 MMHG | HEART RATE: 87 BPM | OXYGEN SATURATION: 96 % | WEIGHT: 195 LBS | SYSTOLIC BLOOD PRESSURE: 146 MMHG | BODY MASS INDEX: 29.55 KG/M2 | HEIGHT: 68 IN

## 2021-05-16 VITALS
DIASTOLIC BLOOD PRESSURE: 88 MMHG | HEART RATE: 70 BPM | SYSTOLIC BLOOD PRESSURE: 126 MMHG | TEMPERATURE: 98.3 F | HEIGHT: 68 IN | OXYGEN SATURATION: 95 % | WEIGHT: 186.5 LBS | BODY MASS INDEX: 28.26 KG/M2

## 2021-05-16 VITALS
SYSTOLIC BLOOD PRESSURE: 134 MMHG | HEIGHT: 68 IN | WEIGHT: 195.5 LBS | BODY MASS INDEX: 29.63 KG/M2 | HEART RATE: 68 BPM | TEMPERATURE: 98 F | DIASTOLIC BLOOD PRESSURE: 82 MMHG | OXYGEN SATURATION: 95 %

## 2021-05-16 VITALS
SYSTOLIC BLOOD PRESSURE: 143 MMHG | BODY MASS INDEX: 28.51 KG/M2 | RESPIRATION RATE: 16 BRPM | DIASTOLIC BLOOD PRESSURE: 73 MMHG | HEIGHT: 68 IN | HEART RATE: 71 BPM | TEMPERATURE: 98 F | WEIGHT: 188.12 LBS | OXYGEN SATURATION: 97 %

## 2021-05-16 VITALS
BODY MASS INDEX: 28.49 KG/M2 | OXYGEN SATURATION: 95 % | SYSTOLIC BLOOD PRESSURE: 134 MMHG | WEIGHT: 188 LBS | HEART RATE: 84 BPM | DIASTOLIC BLOOD PRESSURE: 84 MMHG | HEIGHT: 68 IN | TEMPERATURE: 97.9 F | RESPIRATION RATE: 18 BRPM

## 2021-05-16 VITALS
HEART RATE: 81 BPM | SYSTOLIC BLOOD PRESSURE: 160 MMHG | WEIGHT: 195.25 LBS | HEIGHT: 68 IN | OXYGEN SATURATION: 95 % | DIASTOLIC BLOOD PRESSURE: 100 MMHG | TEMPERATURE: 96.3 F | RESPIRATION RATE: 16 BRPM | BODY MASS INDEX: 29.59 KG/M2

## 2021-05-16 VITALS
HEART RATE: 82 BPM | DIASTOLIC BLOOD PRESSURE: 84 MMHG | HEIGHT: 68 IN | SYSTOLIC BLOOD PRESSURE: 158 MMHG | TEMPERATURE: 97.8 F | OXYGEN SATURATION: 95 % | WEIGHT: 193.12 LBS | BODY MASS INDEX: 29.27 KG/M2

## 2021-05-16 VITALS
SYSTOLIC BLOOD PRESSURE: 130 MMHG | TEMPERATURE: 97.3 F | OXYGEN SATURATION: 95 % | BODY MASS INDEX: 28.64 KG/M2 | HEART RATE: 60 BPM | DIASTOLIC BLOOD PRESSURE: 88 MMHG | WEIGHT: 189 LBS | HEIGHT: 68 IN

## 2021-05-16 VITALS
OXYGEN SATURATION: 98 % | WEIGHT: 188 LBS | TEMPERATURE: 97.6 F | DIASTOLIC BLOOD PRESSURE: 88 MMHG | HEIGHT: 68 IN | SYSTOLIC BLOOD PRESSURE: 136 MMHG | BODY MASS INDEX: 28.49 KG/M2 | RESPIRATION RATE: 12 BRPM | HEART RATE: 66 BPM

## 2021-05-16 VITALS — WEIGHT: 197.5 LBS | RESPIRATION RATE: 14 BRPM | HEIGHT: 69 IN | BODY MASS INDEX: 29.25 KG/M2

## 2021-05-16 VITALS
BODY MASS INDEX: 29.76 KG/M2 | DIASTOLIC BLOOD PRESSURE: 88 MMHG | HEIGHT: 68 IN | HEART RATE: 76 BPM | SYSTOLIC BLOOD PRESSURE: 150 MMHG | WEIGHT: 196.37 LBS | TEMPERATURE: 98.1 F | OXYGEN SATURATION: 95 %

## 2021-05-16 VITALS
HEART RATE: 90 BPM | WEIGHT: 186 LBS | RESPIRATION RATE: 12 BRPM | BODY MASS INDEX: 28.19 KG/M2 | HEIGHT: 68 IN | SYSTOLIC BLOOD PRESSURE: 142 MMHG | DIASTOLIC BLOOD PRESSURE: 90 MMHG | TEMPERATURE: 97.6 F | OXYGEN SATURATION: 97 %

## 2021-05-16 VITALS
HEIGHT: 68 IN | WEIGHT: 186 LBS | OXYGEN SATURATION: 97 % | RESPIRATION RATE: 15 BRPM | SYSTOLIC BLOOD PRESSURE: 136 MMHG | HEART RATE: 88 BPM | DIASTOLIC BLOOD PRESSURE: 90 MMHG | BODY MASS INDEX: 28.19 KG/M2 | TEMPERATURE: 97.9 F

## 2021-05-16 VITALS
HEART RATE: 85 BPM | WEIGHT: 194 LBS | OXYGEN SATURATION: 96 % | DIASTOLIC BLOOD PRESSURE: 82 MMHG | SYSTOLIC BLOOD PRESSURE: 132 MMHG | TEMPERATURE: 98.4 F | HEIGHT: 68 IN | RESPIRATION RATE: 16 BRPM | BODY MASS INDEX: 29.4 KG/M2

## 2021-05-16 VITALS — BODY MASS INDEX: 28.34 KG/M2 | WEIGHT: 187 LBS | HEIGHT: 68 IN | HEART RATE: 78 BPM

## 2021-05-16 VITALS — HEART RATE: 60 BPM | HEIGHT: 68 IN | BODY MASS INDEX: 28.49 KG/M2 | WEIGHT: 188 LBS

## 2021-05-16 VITALS — WEIGHT: 193.56 LBS | BODY MASS INDEX: 28.67 KG/M2 | RESPIRATION RATE: 14 BRPM | HEIGHT: 69 IN

## 2021-05-16 VITALS — BODY MASS INDEX: 29.33 KG/M2 | HEART RATE: 78 BPM | HEIGHT: 69 IN | WEIGHT: 198 LBS

## 2021-06-05 NOTE — PROGRESS NOTES
"   Progress Note      Patient Name: Nikhil Baldwin   Patient ID: [id number]   Sex: Male   YOB: 1945    Primary Care Provider: Felix Navarro MD   Referring Provider: Felix Navarro MD    Visit Date: May 13, 2021    Provider: Felix Navarro MD   Location: [facility] [place]   Location Address: [address] [place] [address]; [address] [place] [address]  [address]   Location Phone: [phone]          Chief Complaint  · diarrhea, chills, stomachache      History Of Present Illness  Nikhil Baldwin is a 75 year old /White male who presents for evaluation and treatment of:      pt reports having abdominal discomfort, nonbloody, watery diarrhea. pt reports only having 1 episode of diarrhea. pt denies vomiting. pt reports lack of appetite. no known sick contacts. pt reports he has had this issue intermittently for several years. pt does not think he has an acute illness.       Vitals  Date Time BP Position Site L\R Cuff Size HR RR TEMP (F) WT  HT  BMI kg/m2 BSA m2 O2 Sat FR L/min FiO2 HC       03/03/2021 02:05 /82 Sitting    88 - R 16 98.7 179lbs 0oz 5'  9\" 26.43 1.99 95 %  21%    04/05/2021 01:14 /83 Sitting    61 - R  97.7 185lbs 16oz 5'  9\" 27.47 2.03 94 %  21%    05/13/2021 02:59 /80 Sitting    94 - R 14 98.7 183lbs 2oz 5'  9\" 27.04 2.01 96 %  21%          Physical Examination  · Constitutional  o Appearance  o : no acute distress, well-nourished  · Head and Face  o Head  o :   § Inspection  § : atraumatic, normocephalic  · Eyes  o Eyes  o : extraocular movements intact, no scleral icterus, no conjunctival injection  · Ears, Nose, Mouth and Throat  o Ears  o :   § External Ears  § : normal  o Nose  o :   § Intranasal Exam  § : nares patent  o Oral Cavity  o :   § Oral Mucosa  § : moist mucous membranes  · Respiratory  o Respiratory Effort  o : breathing comfortably, symmetric chest rise  o Auscultation of Lungs  o : clear to asculatation bilaterally, " no wheezes, rales, or rhonchii  · Cardiovascular  o Heart  o :   § Auscultation of Heart  § : regular rate and rhythm, no murmurs, rubs, or gallops  o Peripheral Vascular System  o :   § Extremities  § : no edema  · Gastrointestinal  o Abdominal Examination  o :   § Abdomen  § : bowel sounds present, non-distended, non-tender  · Neurologic  o Mental Status Examination  o :   § Orientation  § : grossly oriented to person, place and time  o Gait and Station  o :   § Gait Screening  § : normal gait  · Psychiatric  o General  o : normal mood and affect          Results  · In-Office Procedures  o Lab procedure  § IOP - Urinalysis without Microscopy (Clinitek) [facility] (00122)   § Color Ur: Yellow   § Clarity Ur: Clear   § Glucose Ur Ql Strip: >=1000 mg/dL   § Bilirub Ur Ql Strip: 1+   § Ketones Ur Ql Strip: Negative   § Sp Gr Ur Qn: 1.025   § Hgb Ur Ql Strip: Negative   § pH Ur-LsCnc: 5.5   § Prot Ur Ql Strip: Negative   § Urobilinogen Ur Strip-mCnc: 0.2 E.U./dL   § Nitrite Ur Ql Strip: Negative   § WBC Est Ur Ql Strip: Negative       Assessment  · Diarrhea     787.91/R19.7  seems to have resolved at this time. no further workup as of now. call or RTC with any concerns.   · Lumbago     724.2/M54.5  urine dip in clinic neg. may be related to MSK or inflammation from diarrhea episode? will send formal UA and urine Cx.       Plan  · Orders  o Urinalysis with Reflex Microscopy ([facility]) (54442) - 724.2/M54.5 - 05/13/2021  o Urine culture (87513, 65173) - 724.2/M54.5 - 05/13/2021  o ACO-39: Current medications updated and reviewed (1159F, ) - - 05/13/2021  · Medications  o Medications have been Reconciled  o Transition of Care or Provider Policy  · Instructions  o Patient was educated/instructed on their diagnosis, treatment and medications prior to discharge from the clinic today.  · Disposition  o Call or Return if symptoms worsen or persist.            Electronically Signed by: Felix Navarro MD -Author on May 17, 2021  09:23:17 AM

## 2021-06-10 NOTE — TELEPHONE ENCOUNTER
Caller: JHONATAN BENY    Relationship:     Best call back number: [phone]    Medication needed: TEST STRIPS FOR FREESTYLE LITE  GARDIANCE 25 MG ONCE DAILY PILL  Requested Prescriptions      No prescriptions requested or ordered in this encounter       When do you need the refill by: TODAY      Does the patient have less than a 3 day supply:  [x] Yes  [] No    What is the patient's preferred pharmacy: [facility] [address] [place] [address] [phone] [facility] [phone]

## 2021-06-14 ENCOUNTER — TELEPHONE (OUTPATIENT)
Dept: INTERNAL MEDICINE | Facility: CLINIC | Age: 76
End: 2021-06-14

## 2021-06-14 RX ORDER — EMPAGLIFLOZIN 25 MG/1
TABLET, FILM COATED ORAL
COMMUNITY
Start: 2021-06-11 | End: 2021-06-14 | Stop reason: SDUPTHER

## 2021-06-14 RX ORDER — EMPAGLIFLOZIN 25 MG/1
25 TABLET, FILM COATED ORAL DAILY
Qty: 90 TABLET | Refills: 2 | Status: SHIPPED | OUTPATIENT
Start: 2021-06-14 | End: 2021-07-20 | Stop reason: SDUPTHER

## 2021-06-14 NOTE — TELEPHONE ENCOUNTER
PATIENT'S SON HAS CALLED, PATIENT WAS ABLE TO  JARDIANCE BUT THERE WERE NO TEST STRIPS AVAILABLE.     PATIENT'S SON'S CALL BACK: [phone]

## 2021-06-21 ENCOUNTER — TELEPHONE (OUTPATIENT)
Dept: INTERNAL MEDICINE | Facility: CLINIC | Age: 76
End: 2021-06-21

## 2021-06-21 DIAGNOSIS — G31.09 FRONTAL LOBE DEMENTIA (HCC): Primary | ICD-10-CM

## 2021-06-21 DIAGNOSIS — F02.80 FRONTAL LOBE DEMENTIA (HCC): Primary | ICD-10-CM

## 2021-06-21 NOTE — TELEPHONE ENCOUNTER
Caller: JHONATAN SWAN    Relationship: Child    Best call back number: [phone]    What is the best time to reach you: ANYTIME    Who are you requesting to speak with (clinical staff, provider,  specific staff member): HI SALEH    Do you know the name of the person who called: JHONATAN SWAN    What was the call regarding: PATIENT HAS FRONTAL LOBE DEMENTIA AND NEEDS AN ASSESSMENT, BECAUSE HE IS GETTING AGGRESSIVE, IMPULSIVE ACTIONS AND WANTING TO DRIVE WITHOUT HAVING CLEAR VISION. PATIENT HAS MOOD SWINGS AND SON STATES THAT HE NEED HELPS, HE'S SYMPTOMS ARE PROGRESSIVE BUT IN IRRATIONAL ACTIONS.     Do you require a callback: YES

## 2021-06-22 RX ORDER — CITALOPRAM 10 MG/1
10 TABLET ORAL DAILY
Qty: 90 TABLET | Refills: 0 | Status: SHIPPED | OUTPATIENT
Start: 2021-06-22 | End: 2021-09-21
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
## 2021-07-15 VITALS
WEIGHT: 183.12 LBS | SYSTOLIC BLOOD PRESSURE: 119 MMHG | DIASTOLIC BLOOD PRESSURE: 80 MMHG | BODY MASS INDEX: 27.12 KG/M2 | OXYGEN SATURATION: 96 % | RESPIRATION RATE: 14 BRPM | HEIGHT: 69 IN | HEART RATE: 94 BPM | TEMPERATURE: 98.7 F

## 2021-07-20 RX ORDER — EMPAGLIFLOZIN 25 MG/1
25 TABLET, FILM COATED ORAL DAILY
Qty: 90 TABLET | Refills: 2 | Status: SHIPPED | OUTPATIENT
Start: 2021-07-20 | End: 2022-04-16

## 2021-09-20 ENCOUNTER — TELEPHONE (OUTPATIENT)
Dept: INTERNAL MEDICINE | Facility: CLINIC | Age: 76
End: 2021-09-20

## 2021-09-20 DIAGNOSIS — F02.80 FRONTAL LOBE DEMENTIA (HCC): ICD-10-CM

## 2021-09-20 DIAGNOSIS — G31.09 FRONTAL LOBE DEMENTIA (HCC): ICD-10-CM

## 2021-09-20 NOTE — TELEPHONE ENCOUNTER
Caller: JHONATAN    Relationship: Child    Best call back number: [phone]    Medication needed:   Requested Prescriptions     Pending Prescriptions Disp Refills   • citalopram (CeleXA) 10 MG tablet 90 tablet 0     Sig: Take 1 tablet by mouth Daily.       When do you need the refill by: ASAP    What additional details did the patient provide when requesting the medication: PATIENT IS OUT OF MEDICATION    Does the patient have less than a 3 day supply:  [x] Yes  [] No    What is the patient's preferred pharmacy: [address] [phone] [facility] [phone]

## 2021-09-21 DIAGNOSIS — G31.09 FRONTAL LOBE DEMENTIA (HCC): ICD-10-CM

## 2021-09-21 DIAGNOSIS — F02.80 FRONTAL LOBE DEMENTIA (HCC): ICD-10-CM

## 2021-09-21 RX ORDER — CITALOPRAM 10 MG/1
TABLET ORAL
Qty: 90 TABLET | Refills: 3 | Status: SHIPPED | OUTPATIENT
Start: 2021-09-21 | End: 2021-09-21

## 2021-09-21 RX ORDER — CITALOPRAM 10 MG/1
10 TABLET ORAL DAILY
Qty: 90 TABLET | Refills: 0 | Status: SHIPPED | OUTPATIENT
Start: 2021-09-21 | End: 2021-10-26 | Stop reason: SDUPTHER

## 2021-10-06 ENCOUNTER — APPOINTMENT (OUTPATIENT)
Dept: CT IMAGING | Facility: HOSPITAL | Age: 76
End: 2021-10-06

## 2021-10-06 ENCOUNTER — APPOINTMENT (OUTPATIENT)
Dept: GENERAL RADIOLOGY | Facility: HOSPITAL | Age: 76
End: 2021-10-06

## 2021-10-06 ENCOUNTER — HOSPITAL ENCOUNTER (EMERGENCY)
Facility: HOSPITAL | Age: 76
Discharge: HOME | End: 2021-10-07
Attending: EMERGENCY MEDICINE | Admitting: EMERGENCY MEDICINE

## 2021-10-06 DIAGNOSIS — S23.9XXA THORACIC BACK SPRAIN, INITIAL ENCOUNTER: ICD-10-CM
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
## 2021-10-06 PROCEDURE — 96375 TX/PRO/DX INJ NEW DRUG ADDON: CPT

## 2021-10-06 PROCEDURE — 74176 CT ABD & PELVIS W/O CONTRAST: CPT

## 2021-10-06 PROCEDURE — 72125 CT NECK SPINE W/O DYE: CPT

## 2021-10-06 PROCEDURE — 96374 THER/PROPH/DIAG INJ IV PUSH: CPT

## 2021-10-06 PROCEDURE — 25010000002 FENTANYL CITRATE (PF) 50 MCG/ML SOLUTION: Performed by: EMERGENCY MEDICINE

## 2021-10-06 PROCEDURE — 71250 CT THORAX DX C-: CPT

## 2021-10-06 PROCEDURE — 25010000002 ONDANSETRON PER 1 MG: Performed by: EMERGENCY MEDICINE

## 2021-10-06 PROCEDURE — 99283 EMERGENCY DEPT VISIT LOW MDM: CPT

## 2021-10-06 RX ORDER — CHOLECALCIFEROL (VITAMIN D3) 125 MCG
5 CAPSULE ORAL NIGHTLY
COMMUNITY

## 2021-10-06 RX ORDER — TAMSULOSIN HYDROCHLORIDE 0.4 MG/1
0.4 CAPSULE ORAL NIGHTLY
COMMUNITY
End: 2021-12-13

## 2021-10-06 RX ORDER — ATORVASTATIN CALCIUM 80 MG/1
80 TABLET, FILM COATED ORAL DAILY
COMMUNITY
Start: 2021-08-03 | End: 2022-05-12 | Stop reason: SDUPTHER

## 2021-10-06 RX ORDER — ONDANSETRON 2 MG/ML
4 INJECTION INTRAMUSCULAR; INTRAVENOUS ONCE
Status: COMPLETED | OUTPATIENT
Start: 2021-10-06 | End: 2021-10-06

## 2021-10-06 RX ORDER — LISINOPRIL 20 MG/1
20 TABLET ORAL DAILY
COMMUNITY
Start: 2021-08-20 | End: 2022-06-01 | Stop reason: SDUPTHER

## 2021-10-06 RX ORDER — FENTANYL CITRATE 50 UG/ML
50 INJECTION, SOLUTION INTRAMUSCULAR; INTRAVENOUS ONCE
Status: COMPLETED | OUTPATIENT
Start: 2021-10-06 | End: 2021-10-06

## 2021-10-06 RX ADMIN — FENTANYL CITRATE 50 MCG: 50 INJECTION, SOLUTION INTRAMUSCULAR; INTRAVENOUS at 21:56

## 2021-10-06 RX ADMIN — ONDANSETRON 4 MG: 2 INJECTION INTRAMUSCULAR; INTRAVENOUS at 21:54

## 2021-10-07 ENCOUNTER — APPOINTMENT (OUTPATIENT)
Dept: CT IMAGING | Facility: HOSPITAL | Age: 76
End: 2021-10-07

## 2021-10-07 VITALS
DIASTOLIC BLOOD PRESSURE: 85 MMHG | RESPIRATION RATE: 11 BRPM | SYSTOLIC BLOOD PRESSURE: 131 MMHG | TEMPERATURE: 98.6 F | HEART RATE: 51 BPM | OXYGEN SATURATION: 99 % | HEIGHT: 68 IN | BODY MASS INDEX: 27.84 KG/M2

## 2021-10-07 PROCEDURE — 70450 CT HEAD/BRAIN W/O DYE: CPT

## 2021-10-07 RX ORDER — CYCLOBENZAPRINE HCL 5 MG
5 TABLET ORAL 3 TIMES DAILY PRN
Qty: 10 TABLET | Refills: 0 | Status: SHIPPED | OUTPATIENT
Start: 2021-10-07 | End: 2021-10-14 | Stop reason: SINTOL

## 2021-10-07 RX ADMIN — SODIUM CHLORIDE 1000 ML: 9 INJECTION, SOLUTION INTRAVENOUS at 01:18
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
## 2021-10-11 ENCOUNTER — TELEPHONE (OUTPATIENT)
Dept: INTERNAL MEDICINE | Facility: CLINIC | Age: 76
End: 2021-10-11
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
## 2021-10-14 ENCOUNTER — OFFICE VISIT (OUTPATIENT)
Dept: INTERNAL MEDICINE | Facility: CLINIC | Age: 76
End: 2021-10-14

## 2021-10-14 VITALS
TEMPERATURE: 97.6 F | HEIGHT: 69 IN | DIASTOLIC BLOOD PRESSURE: 89 MMHG | BODY MASS INDEX: 27.61 KG/M2 | SYSTOLIC BLOOD PRESSURE: 138 MMHG | OXYGEN SATURATION: 96 % | HEART RATE: 82 BPM | WEIGHT: 186.4 LBS

## 2021-10-14 DIAGNOSIS — E78.5 HYPERLIPIDEMIA, UNSPECIFIED HYPERLIPIDEMIA TYPE: ICD-10-CM

## 2021-10-14 DIAGNOSIS — F60.3 LABILE PERSONALITY (HCC): ICD-10-CM

## 2021-10-14 DIAGNOSIS — I10 PRIMARY HYPERTENSION: ICD-10-CM

## 2021-10-14 DIAGNOSIS — I71.20 THORACIC AORTIC ANEURYSM WITHOUT RUPTURE (HCC): ICD-10-CM

## 2021-10-14 DIAGNOSIS — M54.50 CHRONIC BILATERAL LOW BACK PAIN WITHOUT SCIATICA: ICD-10-CM
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
## 2021-10-14 DIAGNOSIS — G89.29 CHRONIC BILATERAL LOW BACK PAIN WITHOUT SCIATICA: ICD-10-CM

## 2021-10-14 DIAGNOSIS — E11.65 TYPE 2 DIABETES MELLITUS WITH HYPERGLYCEMIA, WITHOUT LONG-TERM CURRENT USE OF INSULIN (HCC): ICD-10-CM

## 2021-10-14 PROBLEM — N20.0 RENAL CALCULI: Status: ACTIVE | Noted: 2021-10-14

## 2021-10-14 PROBLEM — M51.36 DDD (DEGENERATIVE DISC DISEASE), LUMBAR: Status: ACTIVE | Noted: 2021-10-14

## 2021-10-14 PROBLEM — R39.198 VOIDING DIFFICULTY: Status: ACTIVE | Noted: 2019-02-21

## 2021-10-14 PROBLEM — E83.52 HYPERCALCEMIA: Status: ACTIVE | Noted: 2021-10-14

## 2021-10-14 PROBLEM — M54.30 SCIATICA: Status: ACTIVE | Noted: 2019-03-25

## 2021-10-14 PROBLEM — M54.2 NECK PAIN: Status: ACTIVE | Noted: 2019-03-25

## 2021-10-14 PROBLEM — I21.9 MI (MYOCARDIAL INFARCTION): Status: ACTIVE | Noted: 2021-10-14

## 2021-10-14 PROBLEM — E21.5 PARATHYROID ABNORMALITY: Status: ACTIVE | Noted: 2021-10-14

## 2021-10-14 PROBLEM — K21.9 GERD (GASTROESOPHAGEAL REFLUX DISEASE): Status: ACTIVE | Noted: 2021-10-14

## 2021-10-14 PROBLEM — K76.0 FATTY METAMORPHOSIS OF LIVER: Status: ACTIVE | Noted: 2021-10-14

## 2021-10-14 PROBLEM — R39.198 URINE STREAM SPRAYING: Status: ACTIVE | Noted: 2019-02-21

## 2021-10-14 PROBLEM — E11.9 DIABETES: Status: ACTIVE | Noted: 2021-10-14

## 2021-10-14 PROBLEM — K44.9 HIATAL HERNIA: Status: ACTIVE | Noted: 2021-10-14

## 2021-10-14 PROBLEM — E55.9 VITAMIN D DEFICIENCY: Status: ACTIVE | Noted: 2021-10-14

## 2021-10-14 PROBLEM — N20.1 URETEROLITHIASIS: Status: ACTIVE | Noted: 2019-03-18

## 2021-10-14 PROBLEM — M54.12 CERVICAL RADICULOPATHY: Status: ACTIVE | Noted: 2019-03-25

## 2021-10-14 PROBLEM — R31.9 HEMATURIA: Status: ACTIVE | Noted: 2019-03-18

## 2021-10-14 PROBLEM — E07.9 THYROID DISORDER: Status: ACTIVE | Noted: 2021-10-14

## 2021-10-14 PROBLEM — M51.369 DDD (DEGENERATIVE DISC DISEASE), LUMBAR: Status: ACTIVE | Noted: 2021-10-14

## 2021-10-14 PROBLEM — R35.1 NOCTURIA: Status: ACTIVE | Noted: 2019-02-21

## 2021-10-14 PROBLEM — R32 URINARY INCONTINENCE: Status: ACTIVE | Noted: 2019-03-18

## 2021-10-14 PROBLEM — N28.9 KIDNEY DISEASE: Status: ACTIVE | Noted: 2021-10-14

## 2021-10-14 PROBLEM — I63.9 STROKE: Status: ACTIVE | Noted: 2021-10-14

## 2021-10-14 PROBLEM — M79.18 MYOFASCIAL PAIN: Status: ACTIVE | Noted: 2021-10-14

## 2021-10-14 PROBLEM — M48.061 SPINAL STENOSIS OF LUMBAR REGION: Status: ACTIVE | Noted: 2021-10-14

## 2021-10-14 PROBLEM — IMO0002 DEGENERATION OF THORACIC OR THORACOLUMBAR INTERVERTEBRAL DISC: Status: ACTIVE | Noted: 2021-10-14

## 2021-10-14 PROBLEM — N40.0 BPH (BENIGN PROSTATIC HYPERPLASIA): Status: ACTIVE | Noted: 2019-02-21

## 2021-10-14 PROBLEM — M47.819 FACET ARTHROPATHY: Status: ACTIVE | Noted: 2019-03-25

## 2021-10-14 PROBLEM — N42.9 PROSTATE DISORDER: Status: ACTIVE | Noted: 2021-10-14

## 2021-10-14 PROBLEM — I71.9 AORTIC ANEURYSM: Status: ACTIVE | Noted: 2021-10-14

## 2021-10-14 PROBLEM — M54.6 THORACIC BACK PAIN: Status: ACTIVE | Noted: 2019-03-25

## 2021-10-14 PROBLEM — M19.90 ARTHRITIS: Status: ACTIVE | Noted: 2021-10-14

## 2021-10-14 PROBLEM — N32.9 BLADDER DISORDER: Status: ACTIVE | Noted: 2021-10-14

## 2021-10-14 LAB
ALBUMIN SERPL-MCNC: 4.4 G/DL (ref 3.5–5.2)
ALBUMIN UR-MCNC: 1.3 MG/DL
ALBUMIN/GLOB SERPL: 1.7 G/DL
ALP SERPL-CCNC: 62 U/L (ref 39–117)
ALT SERPL W P-5'-P-CCNC: 13 U/L (ref 1–41)
ANION GAP SERPL CALCULATED.3IONS-SCNC: 11.6 MMOL/L (ref 5–15)
AST SERPL-CCNC: 14 U/L (ref 1–40)
BASOPHILS # BLD AUTO: 0.07 10*3/MM3 (ref 0–0.2)
BASOPHILS NFR BLD AUTO: 0.9 % (ref 0–1.5)
BILIRUB SERPL-MCNC: 0.8 MG/DL (ref 0–1.2)
BUN SERPL-MCNC: 17 MG/DL (ref 8–23)
BUN/CREAT SERPL: 16.7 (ref 7–25)
CALCIUM SPEC-SCNC: 9.6 MG/DL (ref 8.6–10.5)
CHLORIDE SERPL-SCNC: 105 MMOL/L (ref 98–107)
CHOLEST SERPL-MCNC: 173 MG/DL (ref 0–200)
CO2 SERPL-SCNC: 22.4 MMOL/L (ref 22–29)
CREAT SERPL-MCNC: 1.02 MG/DL (ref 0.76–1.27)
DEPRECATED RDW RBC AUTO: 46.3 FL (ref 37–54)
EOSINOPHIL # BLD AUTO: 0.14 10*3/MM3 (ref 0–0.4)
EOSINOPHIL NFR BLD AUTO: 1.9 % (ref 0.3–6.2)
ERYTHROCYTE [DISTWIDTH] IN BLOOD BY AUTOMATED COUNT: 14 % (ref 12.3–15.4)
GFR SERPL CREATININE-BSD FRML MDRD: 71 ML/MIN/1.73
GLOBULIN UR ELPH-MCNC: 2.6 GM/DL
GLUCOSE SERPL-MCNC: 179 MG/DL (ref 65–99)
HBA1C MFR BLD: 6.48 % (ref 4.8–5.6)
HCT VFR BLD AUTO: 48.8 % (ref 37.5–51)
HDLC SERPL-MCNC: 40 MG/DL (ref 40–60)
HGB BLD-MCNC: 16.6 G/DL (ref 13–17.7)
IMM GRANULOCYTES # BLD AUTO: 0.05 10*3/MM3 (ref 0–0.05)
IMM GRANULOCYTES NFR BLD AUTO: 0.7 % (ref 0–0.5)
LDLC SERPL CALC-MCNC: 86 MG/DL (ref 0–100)
LDLC/HDLC SERPL: 1.91 {RATIO}
LYMPHOCYTES # BLD AUTO: 2.39 10*3/MM3 (ref 0.7–3.1)
LYMPHOCYTES NFR BLD AUTO: 31.6 % (ref 19.6–45.3)
MCH RBC QN AUTO: 31 PG (ref 26.6–33)
MCHC RBC AUTO-ENTMCNC: 34 G/DL (ref 31.5–35.7)
MCV RBC AUTO: 91 FL (ref 79–97)
MONOCYTES # BLD AUTO: 0.72 10*3/MM3 (ref 0.1–0.9)
MONOCYTES NFR BLD AUTO: 9.5 % (ref 5–12)
NEUTROPHILS NFR BLD AUTO: 4.19 10*3/MM3 (ref 1.7–7)
NEUTROPHILS NFR BLD AUTO: 55.4 % (ref 42.7–76)
NRBC BLD AUTO-RTO: 0 /100 WBC (ref 0–0.2)
PLATELET # BLD AUTO: 193 10*3/MM3 (ref 140–450)
PMV BLD AUTO: 11.2 FL (ref 6–12)
POTASSIUM SERPL-SCNC: 4 MMOL/L (ref 3.5–5.2)
PROT SERPL-MCNC: 7 G/DL (ref 6–8.5)
RBC # BLD AUTO: 5.36 10*6/MM3 (ref 4.14–5.8)
SODIUM SERPL-SCNC: 139 MMOL/L (ref 136–145)
TRIGL SERPL-MCNC: 284 MG/DL (ref 0–150)
VLDLC SERPL-MCNC: 47 MG/DL (ref 5–40)
WBC # BLD AUTO: 7.56 10*3/MM3 (ref 3.4–10.8)

## 2021-10-14 PROCEDURE — 96372 THER/PROPH/DIAG INJ SC/IM: CPT | Performed by: INTERNAL MEDICINE

## 2021-10-14 PROCEDURE — 80061 LIPID PANEL: CPT | Performed by: INTERNAL MEDICINE

## 2021-10-14 PROCEDURE — 82043 UR ALBUMIN QUANTITATIVE: CPT | Performed by: INTERNAL MEDICINE

## 2021-10-14 PROCEDURE — 85025 COMPLETE CBC W/AUTO DIFF WBC: CPT | Performed by: INTERNAL MEDICINE

## 2021-10-14 PROCEDURE — 83036 HEMOGLOBIN GLYCOSYLATED A1C: CPT | Performed by: INTERNAL MEDICINE

## 2021-10-14 PROCEDURE — 99214 OFFICE O/P EST MOD 30 MIN: CPT | Performed by: INTERNAL MEDICINE

## 2021-10-14 PROCEDURE — 80053 COMPREHEN METABOLIC PANEL: CPT | Performed by: INTERNAL MEDICINE

## 2021-10-14 RX ORDER — AMLODIPINE BESYLATE 5 MG/1
TABLET ORAL
Status: ON HOLD | COMMUNITY
End: 2022-12-02

## 2021-10-14 RX ORDER — AMLODIPINE BESYLATE 10 MG/1
TABLET ORAL
COMMUNITY
End: 2022-09-16

## 2021-10-14 RX ORDER — SULFAMETHOXAZOLE AND TRIMETHOPRIM 800; 160 MG/1; MG/1
TABLET ORAL
COMMUNITY
End: 2021-10-14

## 2021-10-14 RX ORDER — CIPROFLOXACIN 500 MG/1
TABLET, FILM COATED ORAL
COMMUNITY
End: 2021-10-26

## 2021-10-14 RX ORDER — MECLIZINE HYDROCHLORIDE 25 MG/1
TABLET ORAL
Status: ON HOLD | COMMUNITY
End: 2022-12-02

## 2021-10-14 RX ORDER — CARVEDILOL 12.5 MG/1
TABLET ORAL
Status: ON HOLD | COMMUNITY
End: 2022-12-02

## 2021-10-14 RX ORDER — DIAZEPAM 2 MG/1
TABLET ORAL
COMMUNITY
End: 2021-10-26

## 2021-10-14 RX ORDER — KETOROLAC TROMETHAMINE 30 MG/ML
60 INJECTION, SOLUTION INTRAMUSCULAR; INTRAVENOUS ONCE
Status: COMPLETED | OUTPATIENT
Start: 2021-10-14 | End: 2021-10-14

## 2021-10-14 RX ORDER — LIDOCAINE 4 G/G
PATCH TOPICAL
Status: ON HOLD | COMMUNITY
Start: 2020-12-10 | End: 2022-12-02

## 2021-10-14 RX ORDER — ONDANSETRON 4 MG/1
TABLET, FILM COATED ORAL
COMMUNITY
End: 2021-10-14

## 2021-10-14 RX ORDER — OMEPRAZOLE 40 MG/1
CAPSULE, DELAYED RELEASE ORAL
Status: ON HOLD | COMMUNITY
End: 2022-12-02

## 2021-10-14 RX ORDER — HYDROCODONE BITARTRATE AND ACETAMINOPHEN 5; 325 MG/1; MG/1
TABLET ORAL
COMMUNITY
End: 2021-10-26

## 2021-10-14 RX ORDER — GABAPENTIN 300 MG/1
CAPSULE ORAL
COMMUNITY
End: 2021-10-26

## 2021-10-14 RX ORDER — SERTRALINE HYDROCHLORIDE 25 MG/1
TABLET, FILM COATED ORAL
COMMUNITY
Start: 2021-05-13 | End: 2021-10-26 | Stop reason: ALTCHOICE

## 2021-10-14 RX ORDER — INSULIN ASPART 100 [IU]/ML
INJECTION, SUSPENSION SUBCUTANEOUS
COMMUNITY
End: 2021-10-14

## 2021-10-14 RX ORDER — OXYCODONE HYDROCHLORIDE AND ACETAMINOPHEN 5; 325 MG/1; MG/1
TABLET ORAL
COMMUNITY
End: 2021-10-14

## 2021-10-14 RX ORDER — NAPROXEN 500 MG/1
500 TABLET ORAL 2 TIMES DAILY WITH MEALS
Qty: 90 TABLET | Refills: 1 | Status: SHIPPED | OUTPATIENT
Start: 2021-10-14 | End: 2022-01-31

## 2021-10-14 RX ORDER — ONDANSETRON 4 MG/1
TABLET, ORALLY DISINTEGRATING ORAL
COMMUNITY
End: 2021-10-14

## 2021-10-14 RX ORDER — DOCUSATE SODIUM 100 MG/1
CAPSULE, LIQUID FILLED ORAL
Status: ON HOLD | COMMUNITY
End: 2022-12-02

## 2021-10-14 RX ADMIN — KETOROLAC TROMETHAMINE 60 MG: 30 INJECTION, SOLUTION INTRAMUSCULAR; INTRAVENOUS at 14:16

## 2021-10-18 ENCOUNTER — TELEPHONE (OUTPATIENT)
Dept: INTERNAL MEDICINE | Facility: CLINIC | Age: 76
End: 2021-10-18

## 2021-10-18 DIAGNOSIS — F02.80 FRONTAL LOBE DEMENTIA (HCC): ICD-10-CM

## 2021-10-18 DIAGNOSIS — G31.09 FRONTAL LOBE DEMENTIA (HCC): ICD-10-CM

## 2021-10-18 NOTE — TELEPHONE ENCOUNTER
----- Message from Felix Navarro Jr., MD sent at 10/15/2021  9:51 PM EDT -----  Elevated triglycerides, which are the storage form of sugar - recommend lower carbohydrate/sugar intake.     Good control of DM.
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
## 2021-10-26 RX ORDER — CITALOPRAM 20 MG/1
20 TABLET ORAL DAILY
Qty: 90 TABLET | Refills: 1 | Status: ON HOLD | OUTPATIENT
Start: 2021-10-26 | End: 2022-12-07 | Stop reason: SDUPTHER
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
## 2021-11-01 NOTE — TELEPHONE ENCOUNTER
Caller: JHONATAN SWAN    Relationship: Emergency Contact    Requested Prescriptions:   Requested Prescriptions     Pending Prescriptions Disp Refills   • metFORMIN (GLUCOPHAGE) 500 MG tablet       Sig: Take 1 tablet by mouth.        Pharmacy where request should be sent: [facility] [address] [place] [address] [address] [facility] [phone] [facility] [phone]      Best call back number: [phone]    Does the patient have less than a 3 day supply:  [x] Yes  [] No    Ricardo Akins Rep   11/01/21 13:59 EDT

## 2021-11-12 ENCOUNTER — OFFICE VISIT (OUTPATIENT)
Dept: INTERNAL MEDICINE | Facility: CLINIC | Age: 76
End: 2021-11-12

## 2021-11-12 VITALS
TEMPERATURE: 98.8 F | SYSTOLIC BLOOD PRESSURE: 139 MMHG | DIASTOLIC BLOOD PRESSURE: 90 MMHG | WEIGHT: 180.9 LBS | HEIGHT: 69 IN | BODY MASS INDEX: 26.79 KG/M2 | OXYGEN SATURATION: 95 % | HEART RATE: 89 BPM

## 2021-11-12 DIAGNOSIS — N20.0 RENAL CALCULI: ICD-10-CM

## 2021-11-12 DIAGNOSIS — R31.9 HEMATURIA, UNSPECIFIED TYPE: ICD-10-CM

## 2021-11-12 DIAGNOSIS — Z87.442 HISTORY OF RENAL CALCULI: Primary | ICD-10-CM

## 2021-11-12 DIAGNOSIS — R81 GLUCOSURIA: ICD-10-CM

## 2021-11-12 DIAGNOSIS — R10.9 LEFT FLANK PAIN: ICD-10-CM

## 2021-11-12 LAB
BILIRUB BLD-MCNC: ABNORMAL MG/DL
CLARITY, POC: ABNORMAL
COLOR UR: ABNORMAL
GLUCOSE UR STRIP-MCNC: ABNORMAL MG/DL
KETONES UR QL: NEGATIVE
LEUKOCYTE EST, POC: NEGATIVE
NITRITE UR-MCNC: NEGATIVE MG/ML
PH UR: 5 [PH] (ref 5–8)
PROT UR STRIP-MCNC: ABNORMAL MG/DL
RBC # UR STRIP: NEGATIVE /UL
SP GR UR: 1.02 (ref 1–1.03)
UROBILINOGEN UR QL: NORMAL

## 2021-11-12 PROCEDURE — 87086 URINE CULTURE/COLONY COUNT: CPT | Performed by: NURSE PRACTITIONER

## 2021-11-12 PROCEDURE — 81002 URINALYSIS NONAUTO W/O SCOPE: CPT | Performed by: NURSE PRACTITIONER

## 2021-11-12 PROCEDURE — 99213 OFFICE O/P EST LOW 20 MIN: CPT | Performed by: NURSE PRACTITIONER

## 2021-11-12 NOTE — PATIENT INSTRUCTIONS
Renal Colic    Renal colic is pain that is caused by a kidney stone. The pain can be sharp and very bad. It may be felt in the back, belly, side (flank), or groin. It can cause nausea. Renal colic can come and go.  Follow these instructions at home:  Medicines  · Take over-the-counter and prescription medicines only as told by your doctor.  · Do not drive or use heavy machinery while taking prescription pain medicine.  Eating and drinking    · Drink enough fluid to keep your pee (urine) pale yellow. You may be told to drink at least 8-10 glasses of water each day. Follow instructions from your doctor.  · If told, change your diet. This may include eating:  ? Less salt (sodium). Eat less than 2 grams (2,000 mg) of salt per day.  ? Less meat, poultry, fish, and eggs.  ? More fruits and vegetables.  ? Try not to eat spinach, rhubarb, sweet potatoes, or nuts.  · Follow instructions from your doctor about what foods and drinks to avoid.    General instructions  · Keep all follow-up visits as told by your doctor. This is important.  · Collect pee samples as told by your doctor.  · Strain your pee every time you pee, as told by your doctor. Use the strainer that your doctor recommends.  · Do not throw out the kidney stone after passing it. Keep the stone so it can be tested by your doctor.  Contact a doctor if:  · You have a fever or chills.  · Your pee smells bad or looks cloudy.  · You have pain or burning when you pee.  Get help right away if:  · The pain in your side (flank) or your groin suddenly gets worse.  · You get confused.  · You pass out.  Summary  · Renal colic is pain that is caused by a kidney stone.  · Take over-the-counter and prescription medicines only as told by your doctor.  · Drink enough fluid to keep your pee pale yellow. You may be told to drink at least 8-10 glasses of water each day. Follow instructions from your doctor.  · Strain your pee every time you pee, as told by your doctor. Use the  strainer that your doctor recommends.  · Do not throw out the kidney stone after passing it. Keep the stone so it can be tested by your doctor.  This information is not intended to replace advice given to you by your health care provider. Make sure you discuss any questions you have with your health care provider.  Document Revised: 01/15/2019 Document Reviewed: 01/15/2019  [employer] Patient Education © 2021 [employer]

## 2021-11-12 NOTE — PROGRESS NOTES
"Chief Complaint  Left Flank Pain     Subjective          Nikhil Baldwin presents to [facility]  Urinary Tract Infection   Associated symptoms include flank pain and hematuria. Pertinent negatives include no chills, discharge, frequency, hesitancy, nausea, sweats, urgency or vomiting. His past medical history is significant for kidney stones.       76-year-old male patient presents to the clinic today for left flank pain and hematuria x3 days.   Patient reports history of renal colic.   Denies abdominal pain, fever, nausea, vomiting.     Will order renal ultrasound today as well as check UA.     CT scan from a few weeks ago in the ED revealed, \"There are nonobstructing renal calyceal stones   bilaterally, which measure about 8 mm in greatest diameter\".    Objective   Vital Signs:   /90 (BP Location: Left arm, Patient Position: Sitting, Cuff Size: Adult)   Pulse 89   Temp 98.8 °F (37.1 °C) (Temporal)   Ht 175.3 cm (69\")   Wt 82.1 kg (180 lb 14.4 oz)   SpO2 95%   BMI 26.71 kg/m²     Wt Readings from Last 3 Encounters:   11/12/21 82.1 kg (180 lb 14.4 oz)   10/14/21 84.6 kg (186 lb 6.4 oz)   05/13/21 83.1 kg (183 lb 2 oz)     BP Readings from Last 3 Encounters:   11/12/21 139/90   10/14/21 138/89   10/07/21 131/85     Physical Exam  Vitals and nursing note reviewed.   Constitutional:       General: He is not in acute distress.     Appearance: Normal appearance. He is not ill-appearing, toxic-appearing or diaphoretic.   HENT:      Head: Normocephalic.      Nose: Nose normal. No congestion or rhinorrhea.      Mouth/Throat:      Pharynx: Oropharynx is clear. No oropharyngeal exudate or posterior oropharyngeal erythema.   Eyes:      Conjunctiva/sclera: Conjunctivae normal.   Cardiovascular:      Rate and Rhythm: Normal rate.      Pulses: Normal pulses.   Pulmonary:      Effort: Pulmonary effort is normal. No respiratory distress.      Breath sounds: No stridor. No " wheezing, rhonchi or rales.   Abdominal:      General: Bowel sounds are normal. There is no distension.      Palpations: Abdomen is soft. There is no mass.      Tenderness: There is no abdominal tenderness. There is left CVA tenderness.   Skin:     General: Skin is warm and dry.      Capillary Refill: Capillary refill takes less than 2 seconds.   Neurological:      Mental Status: He is alert and oriented to person, place, and time.   Psychiatric:         Mood and Affect: Mood normal.         Behavior: Behavior normal.         Thought Content: Thought content normal.         Judgment: Judgment normal.          Result Review :   The following data was reviewed by: BASIL Ramsey on 11/12/2021:  Common labs    Common Labsle 3/3/21 3/3/21 5/18/21 5/18/21 10/14/21 10/14/21 10/14/21 10/14/21 10/14/21    1519 1519 1117 1117 1410 1410 1410 1410 1410   Glucose  132 (A)  110 (A)    179 (A)    BUN  19  17    17    Creatinine  1.30 (A)  1.11    1.02    eGFR Non African Am        71    Sodium  143  140    139    Potassium  4.8  4.4    4.0    Chloride  104  107    105    Calcium  10.4  9.2    9.6    Albumin  4.4  4.2    4.40    Total Bilirubin  0.55  0.68    0.8    Alkaline Phosphatase  72  56    62    AST (SGOT)  25  18    14    ALT (SGPT)  25  21    13    WBC  8.11 7.95  7.56       Hemoglobin  17.8 16.5  16.6       Hematocrit  53.4 (A) 50.5  48.8       Platelets  219 177  193       Total Cholesterol       173     Total Cholesterol  126          Triglycerides  155 (A)     284 (A)     HDL Cholesterol  44     40     LDL Cholesterol   51 (A)     86     Hemoglobin A1C 6.5 (A)     6.48 (A)      Microalbumin, Urine         1.3   PSA  2.88          (A) Abnormal value       Comments are available for some flowsheets but are not being displayed.               Lab Results   Component Value Date    COVID19 NOT DETECTED 02/08/2021    INR 0.91 (L) 05/03/2020       Procedures        Assessment and Plan    Diagnoses and all orders  for this visit:    1. History of renal calculi (Primary)  -     Cancel: US Renal Bilateral; Future    2. Hematuria, unspecified type  -     Cancel: US Renal Bilateral; Future  -     Ambulatory Referral to Urology  -     POCT urinalysis dipstick, manual  -     Urine Culture - Urine, Urine, Clean Catch; Future  -     Urine Culture - Urine, Urine, Clean Catch    3. Left flank pain  -     Cancel: US Renal Bilateral; Future  -     Ambulatory Referral to Urology    4. Renal calculi  Comments:  8mm stone a few weeks ago on CT scan - requested referral to [facility] urology.   Orders:  -     Ambulatory Referral to Urology    5. Glucosuria  Comments:  Medication related               Follow Up   Return if symptoms worsen or fail to improve.  Patient was given instructions and counseling regarding his condition or for health maintenance advice. Please see specific information pulled into the AVS if appropriate.

## 2021-11-13 LAB — BACTERIA SPEC AEROBE CULT: NORMAL

## 2021-11-16 ENCOUNTER — TELEPHONE (OUTPATIENT)
Dept: INTERNAL MEDICINE | Facility: CLINIC | Age: 76
End: 2021-11-16

## 2021-11-16 NOTE — TELEPHONE ENCOUNTER
----- Message from BASIL Ramsey sent at 11/16/2021  8:30 AM EST -----  Urine culture shows contamination, which means we are unable to determine which bacteria is causing your symptoms.     If you were prescribed an antibiotic, please complete this in full. If symptoms improve, there is no need to repeat urine test.   If symptoms do not resolve after completing antibiotic, please follow-up for a new urine test.    If you were not prescribed an antibiotic, and you are still experiencing symptoms, it is recommended that you have a repeat urine test.

## 2021-11-16 NOTE — TELEPHONE ENCOUNTER
PT VERIFIED     CONTACTED PT PER PROVIDER'S INSTRUCTIONS    PT SON STATES PT WAS TAKEN BY HIS SISTER TO [place] FOR SURGERY CONSULT ON A KIDNEY STONE REMOVAL    PT SON STATES PT HAD A 6MM AND 8MM STONE

## 2021-11-17 PROBLEM — R39.198 URINE STREAM SPRAYING: Status: RESOLVED | Noted: 2019-02-21 | Resolved: 2021-11-17

## 2021-11-17 PROBLEM — R39.198 VOIDING DIFFICULTY: Status: RESOLVED | Noted: 2019-02-21 | Resolved: 2021-11-17

## 2021-11-17 PROBLEM — M54.30 SCIATICA: Status: RESOLVED | Noted: 2019-03-25 | Resolved: 2021-11-17

## 2021-11-17 PROBLEM — N32.9 BLADDER DISORDER: Status: RESOLVED | Noted: 2021-10-14 | Resolved: 2021-11-17

## 2021-11-17 PROBLEM — N42.9 PROSTATE DISORDER: Status: RESOLVED | Noted: 2021-10-14 | Resolved: 2021-11-17

## 2021-11-17 PROBLEM — F60.3 LABILE PERSONALITY (HCC): Status: ACTIVE | Noted: 2021-11-17

## 2021-11-18 ENCOUNTER — OFFICE VISIT (OUTPATIENT)
Dept: INTERNAL MEDICINE | Facility: CLINIC | Age: 76
End: 2021-11-18

## 2021-11-18 VITALS
BODY MASS INDEX: 27.52 KG/M2 | DIASTOLIC BLOOD PRESSURE: 94 MMHG | TEMPERATURE: 98 F | WEIGHT: 185.8 LBS | OXYGEN SATURATION: 93 % | SYSTOLIC BLOOD PRESSURE: 143 MMHG | HEART RATE: 89 BPM | HEIGHT: 69 IN

## 2021-11-18 DIAGNOSIS — Z23 ENCOUNTER FOR IMMUNIZATION: ICD-10-CM

## 2021-11-18 DIAGNOSIS — R10.9 LEFT FLANK PAIN: Primary | ICD-10-CM

## 2021-11-18 PROCEDURE — 90662 IIV NO PRSV INCREASED AG IM: CPT | Performed by: NURSE PRACTITIONER

## 2021-11-18 PROCEDURE — 99213 OFFICE O/P EST LOW 20 MIN: CPT | Performed by: NURSE PRACTITIONER

## 2021-11-18 PROCEDURE — 87086 URINE CULTURE/COLONY COUNT: CPT | Performed by: NURSE PRACTITIONER

## 2021-11-18 PROCEDURE — G0008 ADMIN INFLUENZA VIRUS VAC: HCPCS | Performed by: NURSE PRACTITIONER

## 2021-11-18 NOTE — PROGRESS NOTES
"Chief Complaint  Follow-up, Flank Pain (kindey stone ), and Immunizations (flu shot )    Subjective          Nikhil Baldwin presents to [facility]  History of Present Illness    76-year-old male patient presents to the clinic with his daughter.   Patient was seen last week for left flank pain and it was noted on imaging that he had completed in the ED that he had renal colic.   Patient was referred to [facility] urology by preference and had his follow-up appointment earlier this week. Patient reports that his left flank pain has subsided.   Denies abdominal pain, dysuria, fever, nausea, vomiting.   Patient was contacted by MA because his urine culture was contaminated from last week.   Patient presents to provide another UA as well as receive his annual flu vaccine.     Objective   Vital Signs:   /94 (BP Location: Left arm, Patient Position: Sitting, Cuff Size: Adult)   Pulse 89   Temp 98 °F (36.7 °C) (Temporal)   Ht 175.3 cm (69\")   Wt 84.3 kg (185 lb 12.8 oz)   SpO2 93%   BMI 27.44 kg/m²     Wt Readings from Last 3 Encounters:   11/18/21 84.3 kg (185 lb 12.8 oz)   11/12/21 82.1 kg (180 lb 14.4 oz)   10/14/21 84.6 kg (186 lb 6.4 oz)     BP Readings from Last 3 Encounters:   11/18/21 143/94   11/12/21 139/90   10/14/21 138/89     Physical Exam   Appearance: No acute distress, well-nourished  Head: normocephalic, atraumatic  Eyes: extraocular movements intact, no scleral icterus, no conjunctival injection  Ears, Nose, and Throat: external ears normal, nares patent, moist mucous membranes  Cardiovascular: regular rate and rhythm. no murmurs, rales, or rhonchi. no edema  Respiratory: breathing comfortably, symmetric chest rise, clear to auscultation bilaterally. No wheezes, rales, or rhonchi.  Neuro: alert and oriented to time, place, and person. Normal gait  Psych: normal mood and affect     Result Review :   The following data was reviewed by: Lissett Ugalde, " APRN on 11/18/2021:  Common labs    Common Labsle 3/3/21 3/3/21 5/18/21 5/18/21 10/14/21 10/14/21 10/14/21 10/14/21 10/14/21    1519 1519 1117 1117 1410 1410 1410 1410 1410   Glucose  132 (A)  110 (A)    179 (A)    BUN  19  17    17    Creatinine  1.30 (A)  1.11    1.02    eGFR Non African Am        71    Sodium  143  140    139    Potassium  4.8  4.4    4.0    Chloride  104  107    105    Calcium  10.4  9.2    9.6    Albumin  4.4  4.2    4.40    Total Bilirubin  0.55  0.68    0.8    Alkaline Phosphatase  72  56    62    AST (SGOT)  25  18    14    ALT (SGPT)  25  21    13    WBC  8.11 7.95  7.56       Hemoglobin  17.8 16.5  16.6       Hematocrit  53.4 (A) 50.5  48.8       Platelets  219 177  193       Total Cholesterol       173     Total Cholesterol  126          Triglycerides  155 (A)     284 (A)     HDL Cholesterol  44     40     LDL Cholesterol   51 (A)     86     Hemoglobin A1C 6.5 (A)     6.48 (A)      Microalbumin, Urine         1.3   PSA  2.88          (A) Abnormal value       Comments are available for some flowsheets but are not being displayed.             Data reviewed: Last visit - urine culture   Lab Results   Component Value Date    COVID19 NOT DETECTED 02/08/2021    INR 0.91 (L) 05/03/2020       Procedures        Assessment and Plan    Diagnoses and all orders for this visit:    1. Left flank pain (Primary)  Comments:  Resolved; will repeat urine culture today;   Orders:  -     Urine Culture - Urine, Urine, Clean Catch    2. Encounter for immunization  -     Cancel: FluLaval/Fluarix/Fluzone >6 Months ([id number])  -     Fluzone High-Dose 65+yrs ([id number])            Follow Up   Return if symptoms worsen or fail to improve.  Patient was given instructions and counseling regarding his condition or for health maintenance advice. Please see specific information pulled into the AVS if appropriate.

## 2021-11-19 LAB — BACTERIA SPEC AEROBE CULT: NO GROWTH

## 2021-11-22 ENCOUNTER — TELEPHONE (OUTPATIENT)
Dept: INTERNAL MEDICINE | Facility: CLINIC | Age: 76
End: 2021-11-22

## 2021-11-23 ENCOUNTER — IMMUNIZATION (OUTPATIENT)
Dept: INTERNAL MEDICINE | Facility: CLINIC | Age: 76
End: 2021-11-23

## 2021-11-23 DIAGNOSIS — Z23 NEED FOR COVID-19 VACCINE: Primary | ICD-10-CM

## 2021-11-23 PROCEDURE — 91300 COVID-19 (PFIZER): CPT | Performed by: INTERNAL MEDICINE

## 2021-11-23 PROCEDURE — 0001A COVID-19 (PFIZER): CPT | Performed by: INTERNAL MEDICINE

## 2021-12-01 ENCOUNTER — TELEPHONE (OUTPATIENT)
Dept: INTERNAL MEDICINE | Facility: CLINIC | Age: 76
End: 2021-12-01

## 2021-12-07 ENCOUNTER — OFFICE VISIT (OUTPATIENT)
Dept: INTERNAL MEDICINE | Facility: CLINIC | Age: 76
End: 2021-12-07

## 2021-12-07 VITALS
HEIGHT: 69 IN | BODY MASS INDEX: 27.46 KG/M2 | DIASTOLIC BLOOD PRESSURE: 86 MMHG | SYSTOLIC BLOOD PRESSURE: 133 MMHG | TEMPERATURE: 97.7 F | OXYGEN SATURATION: 94 % | WEIGHT: 185.38 LBS | HEART RATE: 59 BPM

## 2021-12-07 DIAGNOSIS — Z00.00 ENCOUNTER FOR ANNUAL WELLNESS EXAM IN MEDICARE PATIENT: Primary | ICD-10-CM

## 2021-12-07 DIAGNOSIS — Z23 ENCOUNTER FOR IMMUNIZATION: ICD-10-CM

## 2021-12-07 PROCEDURE — 90471 IMMUNIZATION ADMIN: CPT | Performed by: NURSE PRACTITIONER

## 2021-12-07 PROCEDURE — 1170F FXNL STATUS ASSESSED: CPT | Performed by: NURSE PRACTITIONER

## 2021-12-07 PROCEDURE — 1159F MED LIST DOCD IN RCRD: CPT | Performed by: NURSE PRACTITIONER

## 2021-12-07 PROCEDURE — 90715 TDAP VACCINE 7 YRS/> IM: CPT | Performed by: NURSE PRACTITIONER

## 2021-12-07 PROCEDURE — G0438 PPPS, INITIAL VISIT: HCPCS | Performed by: NURSE PRACTITIONER

## 2021-12-07 NOTE — PROGRESS NOTES
The ABCs of the Annual Wellness Visit  [place] to Medicare Visit    Chief Complaint   Patient presents with   • Medicare Wellness-Initial Visit     declines shingles vaccine     Subjective {   History of Present Illness:  Nikhil Baldwin is a 76 y.o. male who presents for a  Welcome to Medicare Visit.    The following portions of the patient's history were reviewed and   updated as appropriate: allergies, current medications, past family history, past medical history, past social history, past surgical history and problem list.     Compared to one year ago, the patient feels his physical   health is worse.    Compared to one year ago, the patient feels his mental   health is the same.    Recent Hospitalizations:  He was not admitted to the hospital during the last year.       Current Medical Providers:  Patient Care Team:  Felix Navarro Jr., MD as PCP - General (Internal Medicine)    Outpatient Medications Prior to Visit   Medication Sig Dispense Refill   • amLODIPine (NORVASC) 10 MG tablet amlodipine 10 mg tablet     • amLODIPine (NORVASC) 5 MG tablet amlodipine 5 mg tablet     • atorvastatin (LIPITOR) 80 MG tablet Take 80 mg by mouth Daily.     • carvedilol (COREG) 12.5 MG tablet carvedilol 12.5 mg tablet     • Cholecalciferol 125 MCG (5000 UT) tablet Vitamin D3 5,000 unit oral tablet take 1 tablet by oral route daily   Suspended     • citalopram (CeleXA) 20 MG tablet Take 1 tablet by mouth Daily. 90 tablet 1   • docusate sodium (DOK) 100 MG capsule  mg oral capsule take 1 capsule (100 mg) by oral route 2 times per day as needed   Suspended     • glucose blood test strip 1 each by Other route 4 (Four) Times a Day Before Meals & at Bedtime. USE 1 STRIP TO CHECK GLUCOSE BEFORE MEALS AND AT BEDTIME 100 each 2   • Jardiance 25 MG tablet tablet Take 1 tablet by mouth Daily for 270 days. 90 tablet 2   • Lidocaine 4 % patch lidocaine 4 % topical adhesive patch,medicated apply as directed 1 patch by  transdermal route daily 12/10/2020  Active     • lisinopril (PRINIVIL,ZESTRIL) 20 MG tablet Take 20 mg by mouth Daily.     • meclizine (ANTIVERT) 25 MG tablet meclizine 25 mg oral tablet take 1 tablet (25 mg) by oral route 3 times per day as needed   Suspended     • melatonin 5 MG tablet tablet Take 5 mg by mouth Every Night.     • metFORMIN (GLUCOPHAGE) 500 MG tablet Take 1 tablet by mouth 2 (Two) Times a Day With Meals. 180 tablet 3   • naproxen (Naprosyn) 500 MG tablet Take 1 tablet by mouth 2 (Two) Times a Day With Meals. 90 tablet 1   • omeprazole (priLOSEC) 40 MG capsule omeprazole 40 mg oral capsule,delayed release(DR/EC) take 1 capsule (40 mg) by oral route once daily before a meal   Suspended     • SITagliptin (Januvia) 50 MG tablet Januvia 50 mg tablet     • tamsulosin (FLOMAX) 0.4 MG capsule 24 hr capsule Take 0.4 mg by mouth Every Night.       No facility-administered medications prior to visit.       No opioid medication identified on active medication list. I have reviewed chart for other potential  high risk medication/s and harmful drug interactions in the elderly.              Patient Active Problem List   Diagnosis   • Aortic aneurysm (HCC)   • Arthritis   • BPH (benign prostatic hyperplasia)   • Cervical radiculopathy   • Diabetes (HCC)   • Parathyroid abnormality (HCC)   • Thyroid disorder   • Facet arthropathy   • Fatty metamorphosis of liver   • GERD (gastroesophageal reflux disease)   • Hypertension   • Hematuria   • Hiatal hernia   • Urinary incontinence   • Hypercalcemia   • Hyperlipemia   • Kidney disease   • Renal calculi   • Low back pain   • Thoracic back pain   • DDD (degenerative disc disease), lumbar   • Spinal stenosis of lumbar region   • Degeneration of thoracic or thoracolumbar intervertebral disc   • MI (myocardial infarction) (HCC)   • Myofascial pain   • Neck pain   • Nocturia   • Sciatica   • Stroke (HCC)   • Ureterolithiasis   • Vitamin D deficiency   • Labile personality  "([facility])     Advance Care Planning  Advance Directive is not on file.  ACP discussion was held with the patient during this visit. Patient has an advance directive (not in EMR), copy requested.          Objective      Vitals:    12/07/21 1332   BP: 133/86   Pulse: 59   Temp: 97.7 °F (36.5 °C)   TempSrc: Temporal   SpO2: 94%   Weight: 84.1 kg (185 lb 6 oz)   Height: 175.3 cm (69\")     BMI Readings from Last 1 Encounters:   12/07/21 27.38 kg/m²   BMI is above normal parameters. Recommendations include: exercise counseling and nutrition counseling    Does the patient have evidence of cognitive impairment? No    Physical Exam    Lab Results   Component Value Date    TRIG 284 (H) 10/14/2021    HDL 40 10/14/2021    LDL 86 10/14/2021    VLDL 47 (H) 10/14/2021    HGBA1C 6.48 (H) 10/14/2021       Procedures       HEALTH RISK ASSESSMENT    Smoking Status:  Social History     Tobacco Use   Smoking Status Never Smoker   Smokeless Tobacco Never Used     Alcohol Consumption:  Social History     Substance and Sexual Activity   Alcohol Use Never       Fall Risk Screen:    STEADI Fall Risk Assessment was completed, and patient is at MODERATE risk for falls. Assessment completed on:12/7/2021    Depression Screen:   PHQ-2/PHQ-9 Depression Screening 12/7/2021   Little interest or pleasure in doing things 0   Feeling down, depressed, or hopeless 0   Total Score 0       Health Habits and Functional and Cognitive Screening:  Functional & Cognitive Status 12/7/2021   Do you have difficulty preparing food and eating? No   Do you have difficulty bathing yourself, getting dressed or grooming yourself? No   Do you have difficulty using the toilet? No   Do you have difficulty moving around from place to place? Yes   Do you have trouble with steps or getting out of a bed or a chair? Yes   Current Diet Unhealthy Diet   Dental Exam Not up to date   Eye Exam Not up to date   Exercise (times per week) 0 times per week   Current Exercises Include No " Regular Exercise   Do you need help using the phone?  No   Are you deaf or do you have serious difficulty hearing?  No   Do you need help with transportation? Yes   Do you need help shopping? No   Do you need help preparing meals?  No   Do you need help with housework?  No   Do you need help with laundry? No   Do you need help taking your medications? Yes   Do you need help managing money? No   Do you ever drive or ride in a car without wearing a seat belt? No   Have you felt unusual stress, anger or loneliness in the last month? No   Who do you live with? Alone   If you need help, do you have trouble finding someone available to you? No   Have you been bothered in the last four weeks by sexual problems? No   Do you have difficulty concentrating, remembering or making decisions? No       Visual Acuity:    No exam data present    Age-appropriate Screening Schedule:  Refer to the list below for future screening recommendations based on patient's age, sex and/or medical conditions. Orders for these recommended tests are listed in the plan section. The patient has been provided with a written plan.    Health Maintenance   Topic Date Due   • TDAP/TD VACCINES (1 - Tdap) Never done   • DIABETIC EYE EXAM  06/10/2021   • ZOSTER VACCINE (1 of 2) 12/07/2021 (Originally [birth year]/1993)   • HEMOGLOBIN A1C  04/14/2022   • LIPID PANEL  10/14/2022   • URINE MICROALBUMIN  10/14/2022   • INFLUENZA VACCINE  Completed          Assessment/Plan   CMS Preventative Services Quick Reference  Risk Factors Identified During Encounter  Obesity/Overweight   The above risks/problems have been discussed with the patient.  Pertinent information has been shared with the patient in the After Visit Summary.  Follow up plans and orders are seen below in the Assessment/Plan Section.    Diagnoses and all orders for this visit:    1. Encounter for immunization (Primary)  -     Tdap Vaccine Greater Than or Equal To 6yo IM      Declines Shingles vaccine.    Diabetic eye exam earlier this year.   Follow Up:   Return in about 3 months (around 3/7/2022), or with [facility] - doesnt have follow up scheduled.     An After Visit Summary and PPPS were made available to the patient.

## 2021-12-10 ENCOUNTER — HOSPITAL ENCOUNTER (OUTPATIENT)
Dept: CARDIOLOGY | Facility: HOSPITAL | Age: 76
Discharge: HOME | End: 2021-12-10
Admitting: INTERNAL MEDICINE

## 2021-12-10 DIAGNOSIS — I71.20 THORACIC AORTIC ANEURYSM WITHOUT RUPTURE (HCC): ICD-10-CM

## 2021-12-10 LAB
BH CV ECHO MEAS - AO ROOT DIAM: 3.6 CM
BH CV ECHO MEAS - EDV(MOD-SP2): 58 ML
BH CV ECHO MEAS - EDV(MOD-SP4): 58 ML
BH CV ECHO MEAS - EF(MOD-BP): 57 %
BH CV ECHO MEAS - ESV(MOD-SP2): 24 ML
BH CV ECHO MEAS - ESV(MOD-SP4): 25 ML
BH CV ECHO MEAS - IVSD: 1.1 CM
BH CV ECHO MEAS - LA DIMENSION(2D): 3.5 CM
BH CV ECHO MEAS - LAT PEAK E' VEL: 6.3 CM/SEC
BH CV ECHO MEAS - LVIDD: 4.2 CM
BH CV ECHO MEAS - LVIDS: 2.8 CM
BH CV ECHO MEAS - LVOT DIAM: 2 CM
BH CV ECHO MEAS - LVPWD: 1.1 CM
BH CV ECHO MEAS - MED PEAK E' VEL: 5.44 CM/SEC
BH CV ECHO MEAS - MV A MAX VEL: 82 CM/SEC
BH CV ECHO MEAS - MV DEC TIME: 138 MSEC
BH CV ECHO MEAS - MV E MAX VEL: 41 CM/SEC
BH CV ECHO MEAS - MV E/A: 0.5
BH CV ECHO MEAS - RVDD: 2.5 CM
BH CV ECHO MEAS - TAPSE (>1.6): 2.08 CM
BH CV ECHO MEASUREMENTS AVERAGE E/E' RATIO: 6.98
IVRT: 61 MSEC
LEFT ATRIUM VOLUME INDEX: 11.9 ML/M2
MAXIMAL PREDICTED HEART RATE: 142 BPM
STRESS TARGET HR: 121 BPM

## 2021-12-10 PROCEDURE — 93306 TTE W/DOPPLER COMPLETE: CPT | Performed by: INTERNAL MEDICINE

## 2021-12-10 PROCEDURE — 93306 TTE W/DOPPLER COMPLETE: CPT

## 2021-12-13 ENCOUNTER — TELEPHONE (OUTPATIENT)
Dept: INTERNAL MEDICINE | Facility: CLINIC | Age: 76
End: 2021-12-13

## 2021-12-13 RX ORDER — TAMSULOSIN HYDROCHLORIDE 0.4 MG/1
CAPSULE ORAL
Qty: 180 CAPSULE | Refills: 1 | Status: SHIPPED | OUTPATIENT
Start: 2021-12-13 | End: 2021-12-14 | Stop reason: SDUPTHER

## 2021-12-13 NOTE — TELEPHONE ENCOUNTER
ATTEMPTED TO CONTACT PT PER PROVIDER'S INSTRUCTIONS     NO ANSWER     LEFT VOICEMAIL WITH INSTRUCTIONS TO RETURN CALL TO OFFICE AT [phone]    OK FOR HUB TO ADVISE/READ    Felix Navarro Jr., MD   12/12/2021  9:29 PM EST Back to Top        Reassuring heart echo

## 2021-12-13 NOTE — TELEPHONE ENCOUNTER
----- Message from Felix Navarro Jr., MD sent at 12/12/2021  9:29 PM EST -----  Reassuring heart echo

## 2021-12-14 ENCOUNTER — IMMUNIZATION (OUTPATIENT)
Dept: INTERNAL MEDICINE | Facility: CLINIC | Age: 76
End: 2021-12-14

## 2021-12-14 DIAGNOSIS — Z23 NEED FOR COVID-19 VACCINE: Primary | ICD-10-CM

## 2021-12-14 PROCEDURE — 0002A COVID-19 (PFIZER): CPT | Performed by: INTERNAL MEDICINE

## 2021-12-14 PROCEDURE — 91300 COVID-19 (PFIZER): CPT | Performed by: INTERNAL MEDICINE

## 2021-12-14 RX ORDER — TAMSULOSIN HYDROCHLORIDE 0.4 MG/1
1 CAPSULE ORAL DAILY
Qty: 90 CAPSULE | Refills: 3 | Status: SHIPPED | OUTPATIENT
Start: 2021-12-14 | End: 2023-03-03 | Stop reason: SDUPTHER

## 2021-12-14 NOTE — TELEPHONE ENCOUNTER
ATTEMPTED TO CONTACT PT PER PROVIDER'S INSTRUCTIONS      NO ANSWER      LEFT VOICEMAIL WITH INSTRUCTIONS TO RETURN CALL TO OFFICE AT [phone]     OK FOR HUB TO ADVISE/READ    Felix Navarro Jr., MD   12/12/2021  9:29 PM EST Back to Top         Reassuring heart echo

## 2021-12-17 DIAGNOSIS — N28.9 KIDNEY DISEASE: Primary | ICD-10-CM

## 2021-12-21 ENCOUNTER — CLINICAL SUPPORT (OUTPATIENT)
Dept: INTERNAL MEDICINE | Facility: CLINIC | Age: 76
End: 2021-12-21

## 2021-12-21 DIAGNOSIS — N28.9 KIDNEY DISEASE: ICD-10-CM

## 2021-12-21 LAB
ANION GAP SERPL CALCULATED.3IONS-SCNC: 12.6 MMOL/L (ref 5–15)
BUN SERPL-MCNC: 19 MG/DL (ref 8–23)
BUN/CREAT SERPL: 19.4 (ref 7–25)
CALCIUM SPEC-SCNC: 9.4 MG/DL (ref 8.6–10.5)
CHLORIDE SERPL-SCNC: 107 MMOL/L (ref 98–107)
CO2 SERPL-SCNC: 21.4 MMOL/L (ref 22–29)
CREAT SERPL-MCNC: 0.98 MG/DL (ref 0.76–1.27)
GFR SERPL CREATININE-BSD FRML MDRD: 74 ML/MIN/1.73
GLUCOSE SERPL-MCNC: 104 MG/DL (ref 65–99)
POTASSIUM SERPL-SCNC: 4.4 MMOL/L (ref 3.5–5.2)
SODIUM SERPL-SCNC: 141 MMOL/L (ref 136–145)

## 2021-12-21 PROCEDURE — 80048 BASIC METABOLIC PNL TOTAL CA: CPT | Performed by: NURSE PRACTITIONER

## 2022-01-18 ENCOUNTER — TELEPHONE (OUTPATIENT)
Dept: INTERNAL MEDICINE | Facility: CLINIC | Age: 77
End: 2022-01-18

## 2022-01-18 ENCOUNTER — HOSPITAL ENCOUNTER (EMERGENCY)
Facility: HOSPITAL | Age: 77
Discharge: HOME | End: 2022-01-18
Attending: EMERGENCY MEDICINE | Admitting: EMERGENCY MEDICINE

## 2022-01-18 ENCOUNTER — APPOINTMENT (OUTPATIENT)
Dept: GENERAL RADIOLOGY | Facility: HOSPITAL | Age: 77
End: 2022-01-18

## 2022-01-18 VITALS
TEMPERATURE: 98.3 F | SYSTOLIC BLOOD PRESSURE: 130 MMHG | RESPIRATION RATE: 18 BRPM | HEART RATE: 95 BPM | OXYGEN SATURATION: 93 % | DIASTOLIC BLOOD PRESSURE: 74 MMHG | BODY MASS INDEX: 27.43 KG/M2 | WEIGHT: 185.19 LBS | HEIGHT: 69 IN

## 2022-01-18 DIAGNOSIS — R11.2 NON-INTRACTABLE VOMITING WITH NAUSEA, UNSPECIFIED VOMITING TYPE: ICD-10-CM

## 2022-01-18 DIAGNOSIS — Z20.822 SUSPECTED COVID-19 VIRUS INFECTION: Primary | ICD-10-CM

## 2022-01-18 LAB
ALBUMIN SERPL-MCNC: 4.5 G/DL (ref 3.5–5.2)
ALBUMIN/GLOB SERPL: 1.6 G/DL
ALP SERPL-CCNC: 88 U/L (ref 39–117)
ALT SERPL W P-5'-P-CCNC: 20 U/L (ref 1–41)
ANION GAP SERPL CALCULATED.3IONS-SCNC: 11.7 MMOL/L (ref 5–15)
AST SERPL-CCNC: 17 U/L (ref 1–40)
BASOPHILS # BLD AUTO: 0.04 10*3/MM3 (ref 0–0.2)
BASOPHILS NFR BLD AUTO: 0.4 % (ref 0–1.5)
BILIRUB SERPL-MCNC: 1.1 MG/DL (ref 0–1.2)
BILIRUB UR QL STRIP: NEGATIVE
BUN SERPL-MCNC: 24 MG/DL (ref 8–23)
BUN/CREAT SERPL: 20.2 (ref 7–25)
CALCIUM SPEC-SCNC: 9.7 MG/DL (ref 8.6–10.5)
CHLORIDE SERPL-SCNC: 103 MMOL/L (ref 98–107)
CLARITY UR: CLEAR
CO2 SERPL-SCNC: 25.3 MMOL/L (ref 22–29)
COLOR UR: YELLOW
CREAT SERPL-MCNC: 1.19 MG/DL (ref 0.76–1.27)
D-LACTATE SERPL-SCNC: 1.2 MMOL/L (ref 0.5–2)
DEPRECATED RDW RBC AUTO: 47.8 FL (ref 37–54)
EOSINOPHIL # BLD AUTO: 0.1 10*3/MM3 (ref 0–0.4)
EOSINOPHIL NFR BLD AUTO: 0.9 % (ref 0.3–6.2)
ERYTHROCYTE [DISTWIDTH] IN BLOOD BY AUTOMATED COUNT: 14.2 % (ref 12.3–15.4)
GFR SERPL CREATININE-BSD FRML MDRD: 59 ML/MIN/1.73
GLOBULIN UR ELPH-MCNC: 2.8 GM/DL
GLUCOSE SERPL-MCNC: 139 MG/DL (ref 65–99)
GLUCOSE UR STRIP-MCNC: ABNORMAL MG/DL
HCT VFR BLD AUTO: 51.4 % (ref 37.5–51)
HGB BLD-MCNC: 17.7 G/DL (ref 13–17.7)
HGB UR QL STRIP.AUTO: NEGATIVE
HOLD SPECIMEN: NORMAL
HOLD SPECIMEN: NORMAL
IMM GRANULOCYTES # BLD AUTO: 0.06 10*3/MM3 (ref 0–0.05)
IMM GRANULOCYTES NFR BLD AUTO: 0.5 % (ref 0–0.5)
KETONES UR QL STRIP: ABNORMAL
LEUKOCYTE ESTERASE UR QL STRIP.AUTO: NEGATIVE
LIPASE SERPL-CCNC: 33 U/L (ref 13–60)
LYMPHOCYTES # BLD AUTO: 1.05 10*3/MM3 (ref 0.7–3.1)
LYMPHOCYTES NFR BLD AUTO: 9.5 % (ref 19.6–45.3)
MCH RBC QN AUTO: 31.7 PG (ref 26.6–33)
MCHC RBC AUTO-ENTMCNC: 34.4 G/DL (ref 31.5–35.7)
MCV RBC AUTO: 91.9 FL (ref 79–97)
MONOCYTES # BLD AUTO: 1.2 10*3/MM3 (ref 0.1–0.9)
MONOCYTES NFR BLD AUTO: 10.9 % (ref 5–12)
NEUTROPHILS NFR BLD AUTO: 77.8 % (ref 42.7–76)
NEUTROPHILS NFR BLD AUTO: 8.59 10*3/MM3 (ref 1.7–7)
NITRITE UR QL STRIP: NEGATIVE
NRBC BLD AUTO-RTO: 0 /100 WBC (ref 0–0.2)
PH UR STRIP.AUTO: <=5 [PH] (ref 5–8)
PLATELET # BLD AUTO: 167 10*3/MM3 (ref 140–450)
PMV BLD AUTO: 10.5 FL (ref 6–12)
POTASSIUM SERPL-SCNC: 4.4 MMOL/L (ref 3.5–5.2)
PROT SERPL-MCNC: 7.3 G/DL (ref 6–8.5)
PROT UR QL STRIP: NEGATIVE
RBC # BLD AUTO: 5.59 10*6/MM3 (ref 4.14–5.8)
SODIUM SERPL-SCNC: 140 MMOL/L (ref 136–145)
SP GR UR STRIP: >1.03 (ref 1–1.03)
UROBILINOGEN UR QL STRIP: ABNORMAL
WBC NRBC COR # BLD: 11.04 10*3/MM3 (ref 3.4–10.8)
WHOLE BLOOD HOLD SPECIMEN: NORMAL
WHOLE BLOOD HOLD SPECIMEN: NORMAL

## 2022-01-18 PROCEDURE — 71045 X-RAY EXAM CHEST 1 VIEW: CPT

## 2022-01-18 PROCEDURE — U0004 COV-19 TEST NON-CDC HGH THRU: HCPCS | Performed by: EMERGENCY MEDICINE

## 2022-01-18 PROCEDURE — 85025 COMPLETE CBC W/AUTO DIFF WBC: CPT | Performed by: EMERGENCY MEDICINE

## 2022-01-18 PROCEDURE — 99283 EMERGENCY DEPT VISIT LOW MDM: CPT

## 2022-01-18 PROCEDURE — 36415 COLL VENOUS BLD VENIPUNCTURE: CPT | Performed by: EMERGENCY MEDICINE

## 2022-01-18 PROCEDURE — 25010000002 ONDANSETRON PER 1 MG: Performed by: EMERGENCY MEDICINE

## 2022-01-18 PROCEDURE — 83605 ASSAY OF LACTIC ACID: CPT | Performed by: EMERGENCY MEDICINE

## 2022-01-18 PROCEDURE — 96374 THER/PROPH/DIAG INJ IV PUSH: CPT

## 2022-01-18 PROCEDURE — 81003 URINALYSIS AUTO W/O SCOPE: CPT

## 2022-01-18 PROCEDURE — 80053 COMPREHEN METABOLIC PANEL: CPT | Performed by: EMERGENCY MEDICINE

## 2022-01-18 PROCEDURE — 83690 ASSAY OF LIPASE: CPT | Performed by: EMERGENCY MEDICINE

## 2022-01-18 RX ORDER — ONDANSETRON 4 MG/1
4 TABLET, ORALLY DISINTEGRATING ORAL EVERY 6 HOURS PRN
Qty: 15 TABLET | Refills: 0 | Status: SHIPPED | OUTPATIENT
Start: 2022-01-18 | End: 2022-01-18

## 2022-01-18 RX ORDER — AZITHROMYCIN 250 MG/1
TABLET, FILM COATED ORAL
Qty: 6 TABLET | Refills: 0 | Status: SHIPPED | OUTPATIENT
Start: 2022-01-18 | End: 2022-01-18

## 2022-01-18 RX ORDER — ONDANSETRON 2 MG/ML
4 INJECTION INTRAMUSCULAR; INTRAVENOUS ONCE
Status: COMPLETED | OUTPATIENT
Start: 2022-01-18 | End: 2022-01-18

## 2022-01-18 RX ORDER — ONDANSETRON 4 MG/1
4 TABLET, ORALLY DISINTEGRATING ORAL EVERY 6 HOURS PRN
Qty: 12 TABLET | Refills: 0 | Status: ON HOLD | OUTPATIENT
Start: 2022-01-18 | End: 2022-12-02

## 2022-01-18 RX ORDER — SODIUM CHLORIDE 0.9 % (FLUSH) 0.9 %
10 SYRINGE (ML) INJECTION AS NEEDED
Status: DISCONTINUED | OUTPATIENT
Start: 2022-01-18 | End: 2022-01-19 | Stop reason: HOSPADM

## 2022-01-18 RX ORDER — AZITHROMYCIN 250 MG/1
TABLET, FILM COATED ORAL
Qty: 6 TABLET | Refills: 0 | Status: SHIPPED | OUTPATIENT
Start: 2022-01-18 | End: 2022-06-24

## 2022-01-18 RX ADMIN — ONDANSETRON 4 MG: 2 INJECTION INTRAMUSCULAR; INTRAVENOUS at 18:57

## 2022-01-18 RX ADMIN — SODIUM CHLORIDE 1000 ML: 9 INJECTION, SOLUTION INTRAVENOUS at 18:56

## 2022-01-18 NOTE — ED NOTES
"Pt. And visitor were brought to [facility] to wait for room placement. Pt. And visitor were approached by ED [facility] [facility] Tech to fill out patient demographic form for registration. Patient was asked to fill this out and said \"I can't I'm dizzy, and I can't see it I don't have my glasses.\"   [facility] Tech then asked patient's visitor to assist the patient in filling out the appropriate paperwork. Patient's visitor took the clipboard from [facility] [facility] and said \" in my opinion you are just lazy since you won't fill it out for him.\"   [employer] [facility] Tech then proceeded to inform the visitor that if the patient is unable to provide appropriate information, it then falls to the visitor to help the patient and staff retreive the information.   After explaining this, the visitor then proceeded to say \" well what if I just get up and leave and don't fill this out.\" which visitor was then informed that staff would attempt to reach out to other family/ friends of the patient to retreive the correct information.   Patient's visitor then said \"well you may just have to\"  Visitor was asked why he was being disrespectful to the staff and then said \"well you are just gonna dump this paper in his lap and walk away like it isn't your responsibility.\"   Visitor was then re-informed that if the patient is unable to provide appropriate information, it then falls to the visitor to help.  Visitor was asked his relation to the patient and refused to tell what his relation was, stating \"it doesn't really matter who I am to him if you guys aren't gonna do anything\".    [facility] Tech then called charge nurse to intervene as being the visitor was getting verbally aggressive and disrespectful.      Mei Miranda  01/18/22 1807    "

## 2022-01-18 NOTE — TELEPHONE ENCOUNTER
PATIENT'S SON CALLED INTO THE OFFICE AND STATED THAT THE PATIENT WAS QUICKLY WORSENING AND NOW VOMITING AND ASKED IF HE SHOULD TAKE HIM TO THE ER. I SPOKE WITH THE STAFF IN THE BACK AND THEY SAID THEY WOULD GO AHEAD AND SAY GO TO THE ER. THE SON AGREED AND SAID HE WOULD TAKE HIM THERE.

## 2022-01-18 NOTE — ED PROVIDER NOTES
Time: 6:32 PM EST  Arrived by: private car  Chief Complaint: Nausea, vomiting, diarrhea with dizziness  History provided by: patient  History is limited by: N/A     History of Present Illness:  Patient is a 76 y.o. year old male that presents to the emergency department with complaint of nausea with vomiting and diarrhea.  Symptoms started last night.  Patient states he feels lightheaded.  Patient denies any chest pain, abdominal pain or shortness of breath.    HPI    Similar Symptoms Previously: no  Recently seen: no      Patient Care Team  Primary Care Provider: Feilx Navarro Jr., MD    Past Medical History:     Allergies   Allergen Reactions   • Celecoxib Rash   • Ibuprofen Rash   • Promethazine Hcl Rash   • Warfarin Rash     Past Medical History:   Diagnosis Date   • Aorta disorder (HCC)    • Arthritis    • Degenerative joint disease    • Dementia (HCC)     FRONTAL LOBE   • Depression    • Diabetes mellitus (HCC)    • Hyperlipidemia    • Hypertension    • Kidney stone    • Stroke (HCC)     MILD RIGHT SIDE DEFICITS     Past Surgical History:   Procedure Laterality Date   • CYSTOSCOPY BLADDER STONE LITHOTRIPSY     • KIDNEY STONE SURGERY       History reviewed. No pertinent family history.    Home Medications:  Prior to Admission medications    Medication Sig Start Date End Date Taking? Authorizing Provider   amLODIPine (NORVASC) 10 MG tablet amlodipine 10 mg tablet    Mark Martinez MD   amLODIPine (NORVASC) 5 MG tablet amlodipine 5 mg tablet    Mark Martinez MD   atorvastatin (LIPITOR) 80 MG tablet Take 80 mg by mouth Daily. 8/3/21   Mark Martinez MD   carvedilol (COREG) 12.5 MG tablet carvedilol 12.5 mg tablet    Mark Martinez MD   Cholecalciferol 125 MCG (5000 UT) tablet Vitamin D3 5,000 unit oral tablet take 1 tablet by oral route daily   Suspended    Mark Martinez MD   citalopram (CeleXA) 20 MG tablet Take 1 tablet by mouth Daily. 10/26/21   Felix Navarro  Yefri Franco MD   docusate sodium (DOK) 100 MG capsule  mg oral capsule take 1 capsule (100 mg) by oral route 2 times per day as needed   Suspended    Mark Martinez MD   glucose blood test strip 1 each by Other route 4 (Four) Times a Day Before Meals & at Bedtime. USE 1 STRIP TO CHECK GLUCOSE BEFORE MEALS AND AT BEDTIME 6/15/21   Felix Navarro Jr., MD   Jardiance 25 MG tablet tablet Take 1 tablet by mouth Daily for 270 days. 7/20/21 4/16/22  Felix Navarro Jr., MD   Lidocaine 4 % patch lidocaine 4 % topical adhesive patch,medicated apply as directed 1 patch by transdermal route daily 12/10/2020  Active 12/10/20   Mark Martinez MD   lisinopril (PRINIVIL,ZESTRIL) 20 MG tablet Take 20 mg by mouth Daily. 8/20/21   Mark Martinez MD   meclizine (ANTIVERT) 25 MG tablet meclizine 25 mg oral tablet take 1 tablet (25 mg) by oral route 3 times per day as needed   Suspended    Mark Martinez MD   melatonin 5 MG tablet tablet Take 5 mg by mouth Every Night.    Mark Martinez MD   metFORMIN (GLUCOPHAGE) 500 MG tablet Take 1 tablet by mouth 2 (Two) Times a Day With Meals. 11/2/21   Felix Navarro Jr., MD   naproxen (Naprosyn) 500 MG tablet Take 1 tablet by mouth 2 (Two) Times a Day With Meals. 10/14/21   Felix Navarro Jr., MD   omeprazole (priLOSEC) 40 MG capsule omeprazole 40 mg oral capsule,delayed release(DR/EC) take 1 capsule (40 mg) by oral route once daily before a meal   Suspended    Mark Martinez MD   SITagliptin (Januvia) 50 MG tablet Januvia 50 mg tablet    Mark Martinez MD   tamsulosin (FLOMAX) 0.4 MG capsule 24 hr capsule Take 1 capsule by mouth Daily. 12/14/21   Felix Navarro Jr., MD        Social History:   Social History     Tobacco Use   • Smoking status: Never Smoker   • Smokeless tobacco: Never Used   Vaping Use   • Vaping Use: Never used   Substance Use Topics   • Alcohol use: Never   • Drug use: Never  "      Review of Systems:  Review of Systems   Constitutional: Negative.    HENT: Negative.    Eyes: Negative.    Respiratory: Positive for cough.    Cardiovascular: Negative.    Gastrointestinal: Positive for diarrhea, nausea and vomiting.   Endocrine: Negative.    Genitourinary: Negative.    Musculoskeletal: Negative.    Skin: Negative.    Allergic/Immunologic: Negative.    Neurological: Positive for dizziness.   Hematological: Negative.    Psychiatric/Behavioral: Negative.         Physical Exam:  /74   Pulse 95   Temp 98.3 °F (36.8 °C) (Oral)   Resp 18   Ht 175.3 cm (69\")   Wt 84 kg (185 lb 3 oz)   SpO2 93%   BMI 27.35 kg/m²     Physical Exam  Vitals and nursing note reviewed.   Constitutional:       Appearance: He is well-developed.   HENT:      Head: Normocephalic and atraumatic.   Cardiovascular:      Heart sounds: Normal heart sounds.   Pulmonary:      Effort: Pulmonary effort is normal.      Breath sounds: Normal breath sounds.   Abdominal:      General: Abdomen is flat. Bowel sounds are increased.   Skin:     General: Skin is warm and dry.   Neurological:      Mental Status: He is alert.   Psychiatric:         Mood and Affect: Mood normal.                Medications in the Emergency Department:  Medications   sodium chloride 0.9 % bolus 1,000 mL (0 mL Intravenous Stopped 1/18/22 2226)   ondansetron (ZOFRAN) injection 4 mg (4 mg Intravenous Given 1/18/22 1857)        Labs  Lab Results (last 24 hours)     ** No results found for the last 24 hours. **           Imaging:  No Radiology Exams Resulted Within Past 24 Hours    Procedures:  Procedures    Progress                            Medical Decision Making:  [facility]     Final diagnoses:   Suspected COVID-19 virus infection   Non-intractable vomiting with nausea, unspecified vomiting type        Disposition:  ED Disposition     ED Disposition Condition Comment    Discharge Stable           Documentation assistance provided by Luther Grace DO acting as " scribe for Luther Grace DO. Information recorded by the scribe was done at my direction and has been verified and validated by me.        Luther Grace DO  01/21/22 4408

## 2022-01-19 ENCOUNTER — TELEPHONE (OUTPATIENT)
Dept: INTERNAL MEDICINE | Facility: CLINIC | Age: 77
End: 2022-01-19

## 2022-01-19 LAB — SARS-COV-2 RNA PNL SPEC NAA+PROBE: DETECTED

## 2022-01-19 NOTE — TELEPHONE ENCOUNTER
----- Message from Felix Navarro Jr., MD sent at 1/19/2022  8:19 AM EST -----  Regarding: COVID-19 Lab Results (Informational Only - No Action Required)  Per CDC recommendations: regardless of vaccination status, all persons should stay home for a minimum of 5 days if they test positive for COVID.  If you are without symptoms or your symptoms are improving after 5 days, you are able to leave your home.  For the last 24 hours of this five day period, you must be fever free without fever masking medicines.  For the next 5 days (day 6 through 10), you will need to wear a mask around others and in all public venues.

## 2022-01-29 DIAGNOSIS — G89.29 CHRONIC BILATERAL LOW BACK PAIN WITHOUT SCIATICA: ICD-10-CM

## 2022-01-29 DIAGNOSIS — M54.50 CHRONIC BILATERAL LOW BACK PAIN WITHOUT SCIATICA: ICD-10-CM
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
## 2022-01-31 RX ORDER — NAPROXEN 500 MG/1
500 TABLET ORAL 2 TIMES DAILY PRN
Qty: 90 TABLET | Refills: 0 | Status: SHIPPED | OUTPATIENT
Start: 2022-01-31 | End: 2022-03-28

## 2022-02-01 ENCOUNTER — OFFICE VISIT (OUTPATIENT)
Dept: INTERNAL MEDICINE | Facility: CLINIC | Age: 77
End: 2022-02-01

## 2022-02-01 ENCOUNTER — HOSPITAL ENCOUNTER (OUTPATIENT)
Dept: GENERAL RADIOLOGY | Facility: HOSPITAL | Age: 77
Discharge: HOME | End: 2022-02-01

## 2022-02-01 VITALS
OXYGEN SATURATION: 94 % | HEIGHT: 69 IN | TEMPERATURE: 97.8 F | HEART RATE: 95 BPM | DIASTOLIC BLOOD PRESSURE: 77 MMHG | SYSTOLIC BLOOD PRESSURE: 110 MMHG | BODY MASS INDEX: 27.16 KG/M2 | WEIGHT: 183.4 LBS

## 2022-02-01 DIAGNOSIS — N20.0 RENAL CALCULI: Primary | Chronic | ICD-10-CM

## 2022-02-01 DIAGNOSIS — U07.1 COVID-19: ICD-10-CM

## 2022-02-01 DIAGNOSIS — R05.9 COUGH: Primary | ICD-10-CM

## 2022-02-01 DIAGNOSIS — N20.0 RENAL CALCULI: Chronic | ICD-10-CM

## 2022-02-01 DIAGNOSIS — R05.9 COUGH: ICD-10-CM

## 2022-02-01 PROCEDURE — 99214 OFFICE O/P EST MOD 30 MIN: CPT | Performed by: NURSE PRACTITIONER

## 2022-02-01 PROCEDURE — 71046 X-RAY EXAM CHEST 2 VIEWS: CPT

## 2022-02-01 PROCEDURE — 74018 RADEX ABDOMEN 1 VIEW: CPT

## 2022-02-01 RX ORDER — BUDESONIDE 0.5 MG/2ML
0.5 INHALANT ORAL
Qty: 60 EACH | Refills: 1 | Status: ON HOLD | OUTPATIENT
Start: 2022-02-01 | End: 2022-12-02

## 2022-02-01 RX ORDER — METHYLPREDNISOLONE 4 MG/1
TABLET ORAL
Qty: 21 EACH | Refills: 0 | Status: SHIPPED | OUTPATIENT
Start: 2022-02-01 | End: 2022-06-24

## 2022-02-01 NOTE — PROGRESS NOTES
KUB shows 5mm stone- this was for his urologist - just let him know that it is complete and they can get the results from us or [facility].

## 2022-02-01 NOTE — PROGRESS NOTES
Chief Complaint  Cough (COVID positive 1/18/22)    Subjective          Nikhil Baldwin presents to [facility]  COVID positive on the 18th.   Cough and fever     Cough  Chronicity: 1/18/2022. The problem has been unchanged. The problem occurs constantly. The cough is non-productive. Associated symptoms include a fever. Pertinent negatives include no chest pain, chills, ear congestion, ear pain, headaches, heartburn, hemoptysis, myalgias, nasal congestion, postnasal drip, rash, rhinorrhea, sore throat, shortness of breath, sweats, weight loss or wheezing. He has tried a beta-agonist inhaler and OTC cough suppressant for the symptoms. The treatment provided no relief.   URI   Associated symptoms include coughing. Pertinent negatives include no chest pain, ear pain, headaches, rash, rhinorrhea, sore throat or wheezing.         Current Outpatient Medications   Medication Instructions   • amLODIPine (NORVASC) 10 MG tablet amlodipine 10 mg tablet   • amLODIPine (NORVASC) 5 MG tablet amlodipine 5 mg tablet   • atorvastatin (LIPITOR) 80 mg, Oral, Daily   • azithromycin (Zithromax Z-David) 250 MG tablet Take 2 tablets by mouth on day 1, then 1 tablet daily on days 2-5   • budesonide (PULMICORT) 0.5 mg, Nebulization, Daily - RT   • carvedilol (COREG) 12.5 MG tablet carvedilol 12.5 mg tablet   • Cholecalciferol 125 MCG (5000 UT) tablet Vitamin D3 5,000 unit oral tablet take 1 tablet by oral route daily   Suspended   • citalopram (CELEXA) 20 mg, Oral, Daily   • docusate sodium (DOK) 100 MG capsule  mg oral capsule take 1 capsule (100 mg) by oral route 2 times per day as needed   Suspended   • glucose blood test strip 1 each, Other, 4 Times Daily Before Meals & Nightly, USE 1 STRIP TO CHECK GLUCOSE BEFORE MEALS AND AT BEDTIME   • Jardiance 25 mg, Oral, Daily   • Lidocaine 4 % patch lidocaine 4 % topical adhesive patch,medicated apply as directed 1 patch by transdermal route daily  "12/10/2020  Active   • lisinopril (PRINIVIL,ZESTRIL) 20 mg, Oral, Daily   • meclizine (ANTIVERT) 25 MG tablet meclizine 25 mg oral tablet take 1 tablet (25 mg) by oral route 3 times per day as needed   Suspended   • melatonin 5 mg, Oral, Nightly   • metFORMIN (GLUCOPHAGE) 500 mg, Oral, 2 Times Daily With Meals   • naproxen (NAPROSYN) 500 mg, Oral, 2 Times Daily PRN   • omeprazole (priLOSEC) 40 MG capsule omeprazole 40 mg oral capsule,delayed release(DR/EC) take 1 capsule (40 mg) by oral route once daily before a meal   Suspended   • ondansetron ODT (ZOFRAN-ODT) 4 mg, Translingual, Every 6 Hours PRN   • SITagliptin (Januvia) 50 MG tablet Januvia 50 mg tablet   • tamsulosin (FLOMAX) 0.4 mg, Oral, Daily       The following portions of the patient's history were reviewed and updated as appropriate: allergies, current medications, past family history, past medical history, past social history, past surgical history, and problem list.    Objective   Vital Signs:   /77   Pulse 95   Temp 97.8 °F (36.6 °C) (Temporal)   Ht 175.3 cm (69\")   Wt 83.2 kg (183 lb 6.4 oz)   SpO2 94%   BMI 27.08 kg/m²     Wt Readings from Last 3 Encounters:   02/01/22 83.2 kg (183 lb 6.4 oz)   01/18/22 84 kg (185 lb 3 oz)   12/07/21 84.1 kg (185 lb 6 oz)     BP Readings from Last 3 Encounters:   02/01/22 110/77   01/18/22 130/74   12/07/21 133/86     Physical Exam  Vitals and nursing note reviewed.   Constitutional:       General: He is not in acute distress.     Appearance: Normal appearance. He is not ill-appearing.   HENT:      Right Ear: Tympanic membrane, ear canal and external ear normal.      Left Ear: Tympanic membrane, ear canal and external ear normal.      Nose: Nose normal.      Mouth/Throat:      Mouth: Mucous membranes are moist.   Eyes:      Extraocular Movements: Extraocular movements intact.      Conjunctiva/sclera: Conjunctivae normal.   Cardiovascular:      Rate and Rhythm: Normal rate.   Pulmonary:      Effort: " Pulmonary effort is normal. No respiratory distress.      Comments: Diminished breath sounds bilaterally   Skin:     General: Skin is warm and dry.      Capillary Refill: Capillary refill takes less than 2 seconds.   Neurological:      General: No focal deficit present.      Mental Status: He is alert and oriented to person, place, and time. Mental status is at baseline.   Psychiatric:         Mood and Affect: Mood normal.         Behavior: Behavior normal.         Thought Content: Thought content normal.         Judgment: Judgment normal.            Result Review :   The following data was reviewed by: BASIL Ramsey on 02/01/2022:  Common labs    Common Labsle 10/14/21 10/14/21 10/14/21 10/14/21 10/14/21 12/21/21 1/18/22 1/18/22    1410 1410 1410 1410 1410  1712 1712   Glucose    179 (A)  104 (A)  139 (A)   BUN    17  19  24 (A)   Creatinine    1.02  0.98  1.19   eGFR Non  Am    71  74  59 (A)   Sodium    139  141  140   Potassium    4.0  4.4  4.4   Chloride    105  107  103   Calcium    9.6  9.4  9.7   Albumin    4.40    4.50   Total Bilirubin    0.8    1.1   Alkaline Phosphatase    62    88   AST (SGOT)    14    17   ALT (SGPT)    13    20   WBC 7.56      11.04 (A)    Hemoglobin 16.6      17.7    Hematocrit 48.8      51.4 (A)    Platelets 193      167    Total Cholesterol   173        Triglycerides   284 (A)        HDL Cholesterol   40        LDL Cholesterol    86        Hemoglobin A1C  6.48 (A)         Microalbumin, Urine     1.3      (A) Abnormal value                   Lab Results   Component Value Date    COVID19 Detected (C) 01/18/2022    INR 0.91 (L) 05/03/2020    BILIRUBINUR Negative 01/18/2022       Procedures        Assessment and Plan    Diagnoses and all orders for this visit:    1. Renal calculi (Primary)  -     XR Abdomen KUB; Future    2. COVID-19  -     XR Chest PA & Lateral; Future  -     budesonide (Pulmicort) 0.5 MG/2ML nebulizer solution; Take 2 mL by nebulization Daily.   Dispense: 60 each; Refill: 1    3. Cough  -     budesonide (Pulmicort) 0.5 MG/2ML nebulizer solution; Take 2 mL by nebulization Daily.  Dispense: 60 each; Refill: 1      - continue OTC cough suppressant PRN   - tylenol/motrin PRN for fever control   - sudden onset or increased SOA - ED   - concern for post-covid PNA - pt going to [facility] to get CXR now - will reach out with results and plan     There are no discontinued medications.       Follow Up   No follow-ups on file.  Patient was given instructions and counseling regarding his condition or for health maintenance advice. Please see specific information pulled into the AVS if appropriate.

## 2022-02-03 ENCOUNTER — TELEPHONE (OUTPATIENT)
Dept: INTERNAL MEDICINE | Facility: CLINIC | Age: 77
End: 2022-02-03

## 2022-03-26 DIAGNOSIS — M54.50 CHRONIC BILATERAL LOW BACK PAIN WITHOUT SCIATICA: ICD-10-CM
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
## 2022-03-26 DIAGNOSIS — G89.29 CHRONIC BILATERAL LOW BACK PAIN WITHOUT SCIATICA: ICD-10-CM

## 2022-03-28 RX ORDER — NAPROXEN 500 MG/1
TABLET ORAL
Qty: 90 TABLET | Refills: 0 | Status: SHIPPED | OUTPATIENT
Start: 2022-03-28 | End: 2022-09-16

## 2022-04-01 ENCOUNTER — HOSPITAL ENCOUNTER (EMERGENCY)
Facility: HOSPITAL | Age: 77
Discharge: HOME | End: 2022-04-02
Attending: EMERGENCY MEDICINE | Admitting: EMERGENCY MEDICINE

## 2022-04-01 ENCOUNTER — APPOINTMENT (OUTPATIENT)
Dept: GENERAL RADIOLOGY | Facility: HOSPITAL | Age: 77
End: 2022-04-01

## 2022-04-01 DIAGNOSIS — M54.50 LUMBAR BACK PAIN: ICD-10-CM
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
## 2022-04-01 DIAGNOSIS — S70.02XA CONTUSION OF LEFT HIP, INITIAL ENCOUNTER: ICD-10-CM

## 2022-04-01 PROCEDURE — 72100 X-RAY EXAM L-S SPINE 2/3 VWS: CPT

## 2022-04-01 PROCEDURE — 25010000002 KETOROLAC TROMETHAMINE PER 15 MG: Performed by: NURSE PRACTITIONER

## 2022-04-01 PROCEDURE — 96374 THER/PROPH/DIAG INJ IV PUSH: CPT

## 2022-04-01 PROCEDURE — 73502 X-RAY EXAM HIP UNI 2-3 VIEWS: CPT

## 2022-04-01 PROCEDURE — 99283 EMERGENCY DEPT VISIT LOW MDM: CPT

## 2022-04-01 RX ORDER — KETOROLAC TROMETHAMINE 15 MG/ML
15 INJECTION, SOLUTION INTRAMUSCULAR; INTRAVENOUS ONCE
Status: COMPLETED | OUTPATIENT
Start: 2022-04-01 | End: 2022-04-01

## 2022-04-01 RX ADMIN — KETOROLAC TROMETHAMINE 15 MG: 15 INJECTION, SOLUTION INTRAMUSCULAR; INTRAVENOUS at 23:02

## 2022-04-02 VITALS
HEART RATE: 56 BPM | DIASTOLIC BLOOD PRESSURE: 94 MMHG | HEIGHT: 69 IN | RESPIRATION RATE: 16 BRPM | TEMPERATURE: 98.4 F | SYSTOLIC BLOOD PRESSURE: 129 MMHG | OXYGEN SATURATION: 95 % | WEIGHT: 188.05 LBS | BODY MASS INDEX: 27.85 KG/M2

## 2022-04-02 RX ORDER — METHOCARBAMOL 750 MG/1
750 TABLET, FILM COATED ORAL 3 TIMES DAILY PRN
Qty: 15 TABLET | Refills: 0 | Status: SHIPPED | OUTPATIENT
Start: 2022-04-02 | End: 2022-09-16

## 2022-04-02 RX ORDER — NAPROXEN 375 MG/1
375 TABLET ORAL 2 TIMES DAILY PRN
Qty: 20 TABLET | Refills: 0 | Status: ON HOLD | OUTPATIENT
Start: 2022-04-02 | End: 2022-12-02
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
## 2022-05-09 ENCOUNTER — APPOINTMENT (OUTPATIENT)
Dept: CT IMAGING | Facility: HOSPITAL | Age: 77
End: 2022-05-09

## 2022-05-09 ENCOUNTER — HOSPITAL ENCOUNTER (EMERGENCY)
Facility: HOSPITAL | Age: 77
Discharge: HOME | End: 2022-05-10
Attending: EMERGENCY MEDICINE | Admitting: EMERGENCY MEDICINE

## 2022-05-09 DIAGNOSIS — N13.5 URETEROPELVIC JUNCTION (UPJ) OBSTRUCTION, LEFT: Primary | ICD-10-CM

## 2022-05-09 DIAGNOSIS — N20.0 BILATERAL NEPHROLITHIASIS: ICD-10-CM

## 2022-05-09 LAB
ALBUMIN SERPL-MCNC: 4.3 G/DL (ref 3.5–5.2)
ALBUMIN/GLOB SERPL: 1.9 G/DL
ALP SERPL-CCNC: 70 U/L (ref 39–117)
ALT SERPL W P-5'-P-CCNC: 34 U/L (ref 1–41)
ANION GAP SERPL CALCULATED.3IONS-SCNC: 12.2 MMOL/L (ref 5–15)
AST SERPL-CCNC: 37 U/L (ref 1–40)
BASOPHILS # BLD AUTO: 0.05 10*3/MM3 (ref 0–0.2)
BASOPHILS NFR BLD AUTO: 0.7 % (ref 0–1.5)
BILIRUB SERPL-MCNC: 0.8 MG/DL (ref 0–1.2)
BILIRUB UR QL STRIP: NEGATIVE
BUN SERPL-MCNC: 20 MG/DL (ref 8–23)
BUN/CREAT SERPL: 18.9 (ref 7–25)
CALCIUM SPEC-SCNC: 8.9 MG/DL (ref 8.6–10.5)
CHLORIDE SERPL-SCNC: 109 MMOL/L (ref 98–107)
CLARITY UR: CLEAR
CO2 SERPL-SCNC: 20.8 MMOL/L (ref 22–29)
COLOR UR: YELLOW
CREAT SERPL-MCNC: 1.06 MG/DL (ref 0.76–1.27)
DEPRECATED RDW RBC AUTO: 48.2 FL (ref 37–54)
EGFRCR SERPLBLD CKD-EPI 2021: 71.8 ML/MIN/1.73
EOSINOPHIL # BLD AUTO: 0.15 10*3/MM3 (ref 0–0.4)
EOSINOPHIL NFR BLD AUTO: 2.1 % (ref 0.3–6.2)
ERYTHROCYTE [DISTWIDTH] IN BLOOD BY AUTOMATED COUNT: 15 % (ref 12.3–15.4)
GLOBULIN UR ELPH-MCNC: 2.3 GM/DL
GLUCOSE SERPL-MCNC: 162 MG/DL (ref 65–99)
GLUCOSE UR STRIP-MCNC: ABNORMAL MG/DL
HCT VFR BLD AUTO: 49.3 % (ref 37.5–51)
HGB BLD-MCNC: 17.2 G/DL (ref 13–17.7)
HGB UR QL STRIP.AUTO: NEGATIVE
HOLD SPECIMEN: NORMAL
HOLD SPECIMEN: NORMAL
IMM GRANULOCYTES # BLD AUTO: 0.05 10*3/MM3 (ref 0–0.05)
IMM GRANULOCYTES NFR BLD AUTO: 0.7 % (ref 0–0.5)
KETONES UR QL STRIP: NEGATIVE
LEUKOCYTE ESTERASE UR QL STRIP.AUTO: NEGATIVE
LIPASE SERPL-CCNC: 27 U/L (ref 13–60)
LYMPHOCYTES # BLD AUTO: 2.51 10*3/MM3 (ref 0.7–3.1)
LYMPHOCYTES NFR BLD AUTO: 35.1 % (ref 19.6–45.3)
MCH RBC QN AUTO: 30.8 PG (ref 26.6–33)
MCHC RBC AUTO-ENTMCNC: 34.9 G/DL (ref 31.5–35.7)
MCV RBC AUTO: 88.4 FL (ref 79–97)
MONOCYTES # BLD AUTO: 0.69 10*3/MM3 (ref 0.1–0.9)
MONOCYTES NFR BLD AUTO: 9.7 % (ref 5–12)
NEUTROPHILS NFR BLD AUTO: 3.7 10*3/MM3 (ref 1.7–7)
NEUTROPHILS NFR BLD AUTO: 51.7 % (ref 42.7–76)
NITRITE UR QL STRIP: NEGATIVE
NRBC BLD AUTO-RTO: 0 /100 WBC (ref 0–0.2)
PH UR STRIP.AUTO: <=5 [PH] (ref 5–8)
PLATELET # BLD AUTO: 199 10*3/MM3 (ref 140–450)
PMV BLD AUTO: 10.9 FL (ref 6–12)
POTASSIUM SERPL-SCNC: 3.9 MMOL/L (ref 3.5–5.2)
PROT SERPL-MCNC: 6.6 G/DL (ref 6–8.5)
PROT UR QL STRIP: NEGATIVE
RBC # BLD AUTO: 5.58 10*6/MM3 (ref 4.14–5.8)
SODIUM SERPL-SCNC: 142 MMOL/L (ref 136–145)
SP GR UR STRIP: >=1.03 (ref 1–1.03)
UROBILINOGEN UR QL STRIP: ABNORMAL
WBC NRBC COR # BLD: 7.15 10*3/MM3 (ref 3.4–10.8)
WHOLE BLOOD HOLD SPECIMEN: NORMAL

## 2022-05-09 PROCEDURE — 74177 CT ABD & PELVIS W/CONTRAST: CPT

## 2022-05-09 PROCEDURE — 99283 EMERGENCY DEPT VISIT LOW MDM: CPT

## 2022-05-09 PROCEDURE — 80053 COMPREHEN METABOLIC PANEL: CPT

## 2022-05-09 PROCEDURE — 83690 ASSAY OF LIPASE: CPT

## 2022-05-09 PROCEDURE — 96374 THER/PROPH/DIAG INJ IV PUSH: CPT

## 2022-05-09 PROCEDURE — 25010000002 MORPHINE PER 10 MG: Performed by: EMERGENCY MEDICINE

## 2022-05-09 PROCEDURE — 85025 COMPLETE CBC W/AUTO DIFF WBC: CPT

## 2022-05-09 PROCEDURE — 81003 URINALYSIS AUTO W/O SCOPE: CPT

## 2022-05-09 PROCEDURE — 0 IOPAMIDOL PER 1 ML: Performed by: EMERGENCY MEDICINE

## 2022-05-09 PROCEDURE — 36415 COLL VENOUS BLD VENIPUNCTURE: CPT

## 2022-05-09 RX ORDER — MORPHINE SULFATE 2 MG/ML
2 INJECTION, SOLUTION INTRAMUSCULAR; INTRAVENOUS ONCE
Status: COMPLETED | OUTPATIENT
Start: 2022-05-09 | End: 2022-05-09

## 2022-05-09 RX ORDER — SODIUM CHLORIDE 0.9 % (FLUSH) 0.9 %
10 SYRINGE (ML) INJECTION AS NEEDED
Status: DISCONTINUED | OUTPATIENT
Start: 2022-05-09 | End: 2022-05-10 | Stop reason: HOSPADM

## 2022-05-09 RX ADMIN — MORPHINE SULFATE 2 MG: 2 INJECTION, SOLUTION INTRAMUSCULAR; INTRAVENOUS at 23:15

## 2022-05-09 RX ADMIN — IOPAMIDOL 100 ML: 755 INJECTION, SOLUTION INTRAVENOUS at 23:37

## 2022-05-10 VITALS
OXYGEN SATURATION: 94 % | BODY MASS INDEX: 28.41 KG/M2 | HEART RATE: 48 BPM | RESPIRATION RATE: 20 BRPM | HEIGHT: 69 IN | TEMPERATURE: 98.4 F | WEIGHT: 191.8 LBS | SYSTOLIC BLOOD PRESSURE: 142 MMHG | DIASTOLIC BLOOD PRESSURE: 86 MMHG

## 2022-05-10 LAB — WHOLE BLOOD HOLD COAG: NORMAL

## 2022-05-10 NOTE — DISCHARGE INSTRUCTIONS
Call Dr. Corona's office first thing this morning to schedule follow-up for partial obstructing UPJ stone.  Use urine strainers when urinating to determine if you passed the stone.  Take Tylenol or ibuprofen as needed for pain.  Return to the emergency department if you are unable to urinate or experience severe pain.
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
## 2022-05-12 RX ORDER — ATORVASTATIN CALCIUM 80 MG/1
80 TABLET, FILM COATED ORAL DAILY
Qty: 90 TABLET | Refills: 3 | Status: SHIPPED | OUTPATIENT
Start: 2022-05-12 | End: 2022-05-17

## 2022-05-12 NOTE — TELEPHONE ENCOUNTER
HMG-CoA Reductase Inhibitors (Statins) Protocol Passed 05/12/2022 08:10 AM   Protocol Details  Lipid panel in past 12 months    ALK Phos in past 12 months    ALT in past 12 months    AST in past 12 months    Recent or future visit with authorizing provider

## 2022-05-12 NOTE — TELEPHONE ENCOUNTER
Caller: JHONATAN SWAN    Relationship: Emergency Contact    Best call back number: [phone]    Requested Prescriptions:   Requested Prescriptions     Pending Prescriptions Disp Refills   • atorvastatin (LIPITOR) 80 MG tablet 90 tablet      Sig: Take 1 tablet by mouth Daily.        Pharmacy where request should be sent: [id number] [place] - [address] AT [facility] [phone] [facility] [phone] FX     Additional details provided by patient:     Does the patient have less than a 3 day supply:  [x] Yes  [] No    Ricardo Villafana Rep   05/12/22 08:09 EDT

## 2022-05-17 RX ORDER — ATORVASTATIN CALCIUM 80 MG/1
TABLET, FILM COATED ORAL
Qty: 90 TABLET | Refills: 0 | Status: SHIPPED | OUTPATIENT
Start: 2022-05-17 | End: 2022-06-01 | Stop reason: SDUPTHER

## 2022-05-17 NOTE — TELEPHONE ENCOUNTER
HMG-CoA Reductase Inhibitors (Statins) Protocol Passed 05/17/2022 12:44 PM   Protocol Details  Lipid panel in past 12 months    ALK Phos in past 12 months    ALT in past 12 months    AST in past 12 months    Recent or future visit with authorizing provider

## 2022-06-01 DIAGNOSIS — G31.09 FRONTAL LOBE DEMENTIA: ICD-10-CM

## 2022-06-01 DIAGNOSIS — F02.80 FRONTAL LOBE DEMENTIA: ICD-10-CM

## 2022-06-01 RX ORDER — ATORVASTATIN CALCIUM 80 MG/1
80 TABLET, FILM COATED ORAL DAILY
Qty: 90 TABLET | Refills: 3 | Status: SHIPPED | OUTPATIENT
Start: 2022-06-01 | End: 2023-02-13 | Stop reason: ALTCHOICE

## 2022-06-01 RX ORDER — LISINOPRIL 20 MG/1
20 TABLET ORAL DAILY
Qty: 90 TABLET | Refills: 3 | Status: SHIPPED | OUTPATIENT
Start: 2022-06-01

## 2022-06-01 NOTE — TELEPHONE ENCOUNTER
ACE Inhibitors Protocol Passed 06/01/2022 11:44 AM   Protocol Details  Normal serum potassium in past 12 months    Blood pressure on record    Patient is not on Entresto    Patient is not on an ARB    GFR greater than 30 mL/min in past 12 months    Recent or future visit with authorizing provider

## 2022-06-01 NOTE — TELEPHONE ENCOUNTER
HMG-CoA Reductase Inhibitors (Statins) Protocol Passed 06/01/2022 11:44 AM   Protocol Details  Lipid panel in past 12 months    ALK Phos in past 12 months    ALT in past 12 months    AST in past 12 months    Recent or future visit with authorizing provider

## 2022-06-01 NOTE — TELEPHONE ENCOUNTER
Caller: JHONATAN SWAN    Relationship: Emergency Contact    Best call back number: [phone]    Requested Prescriptions:   Requested Prescriptions     Pending Prescriptions Disp Refills   • atorvastatin (LIPITOR) 80 MG tablet 90 tablet 0     Sig: Take 1 tablet by mouth Daily.   • lisinopril (PRINIVIL,ZESTRIL) 20 MG tablet       Sig: Take 1 tablet by mouth Daily.      Pharmacy where request should be sent: [address] [phone] [facility] [phone] FX     Does the patient have less than a 3 day supply:  [x] Yes  [] No    Ricardo Sandoval Rep   06/01/22 11:43 EDT

## 2022-06-15 ENCOUNTER — TELEPHONE (OUTPATIENT)
Dept: INTERNAL MEDICINE | Facility: CLINIC | Age: 77
End: 2022-06-15

## 2022-06-15 NOTE — TELEPHONE ENCOUNTER
PT(PATIENT) VERIFIED     CONTACTED PT(PATIENT) PER PROVIDER'S INSTRUCTIONS    SPOKE WITH JATIN ABOUT SON'S CONCERNS. SHE WILL ADDRESS IT AT APPOINTMENT.

## 2022-06-15 NOTE — TELEPHONE ENCOUNTER
Caller: JHONATAN SWAN    Relationship: Emergency Contact    Best call back number: [phone]    What is the best time to reach you: ANY    Who are you requesting to speak with (clinical staff, provider,  specific staff member): CLINICAL STAFF    What was the call regarding: PATIENTS SON WOULD LIKE TO SPEAK TO A NURSE ABOUT HIS FATHERS PLAN OF CARE.    Do you require a callback: YES

## 2022-06-16 ENCOUNTER — OFFICE VISIT (OUTPATIENT)
Dept: INTERNAL MEDICINE | Facility: CLINIC | Age: 77
End: 2022-06-16

## 2022-06-16 VITALS
SYSTOLIC BLOOD PRESSURE: 118 MMHG | HEIGHT: 69 IN | WEIGHT: 189 LBS | BODY MASS INDEX: 27.99 KG/M2 | DIASTOLIC BLOOD PRESSURE: 80 MMHG | OXYGEN SATURATION: 95 % | HEART RATE: 92 BPM | TEMPERATURE: 98.1 F

## 2022-06-16 DIAGNOSIS — Z11.59 NEED FOR HEPATITIS C SCREENING TEST: ICD-10-CM

## 2022-06-16 DIAGNOSIS — E78.5 HYPERLIPIDEMIA, UNSPECIFIED HYPERLIPIDEMIA TYPE: Chronic | ICD-10-CM

## 2022-06-16 DIAGNOSIS — G89.29 OTHER CHRONIC PAIN: ICD-10-CM

## 2022-06-16 DIAGNOSIS — E11.9 TYPE 2 DIABETES MELLITUS WITHOUT COMPLICATION, WITHOUT LONG-TERM CURRENT USE OF INSULIN: Chronic | ICD-10-CM

## 2022-06-16 DIAGNOSIS — M54.50 DORSALGIA OF LUMBAR REGION: Primary | Chronic | ICD-10-CM

## 2022-06-16 DIAGNOSIS — M48.061 SPINAL STENOSIS OF LUMBAR REGION, UNSPECIFIED WHETHER NEUROGENIC CLAUDICATION PRESENT: ICD-10-CM

## 2022-06-16 DIAGNOSIS — R35.0 URINARY FREQUENCY: ICD-10-CM

## 2022-06-16 DIAGNOSIS — M51.36 DDD (DEGENERATIVE DISC DISEASE), LUMBAR: ICD-10-CM

## 2022-06-16 LAB
ALBUMIN SERPL-MCNC: 4.5 G/DL (ref 3.5–5.2)
ALBUMIN/GLOB SERPL: 1.9 G/DL
ALP SERPL-CCNC: 71 U/L (ref 39–117)
ALT SERPL W P-5'-P-CCNC: 14 U/L (ref 1–41)
ANION GAP SERPL CALCULATED.3IONS-SCNC: 15.8 MMOL/L (ref 5–15)
AST SERPL-CCNC: 19 U/L (ref 1–40)
BASOPHILS # BLD AUTO: 0.05 10*3/MM3 (ref 0–0.2)
BASOPHILS NFR BLD AUTO: 0.6 % (ref 0–1.5)
BILIRUB BLD-MCNC: NEGATIVE MG/DL
BILIRUB SERPL-MCNC: 0.7 MG/DL (ref 0–1.2)
BUN SERPL-MCNC: 16 MG/DL (ref 8–23)
BUN/CREAT SERPL: 15 (ref 7–25)
CALCIUM SPEC-SCNC: 9.7 MG/DL (ref 8.6–10.5)
CHLORIDE SERPL-SCNC: 105 MMOL/L (ref 98–107)
CHOLEST SERPL-MCNC: 189 MG/DL (ref 0–200)
CLARITY, POC: CLEAR
CO2 SERPL-SCNC: 21.2 MMOL/L (ref 22–29)
COLOR UR: ABNORMAL
CREAT SERPL-MCNC: 1.07 MG/DL (ref 0.76–1.27)
DEPRECATED RDW RBC AUTO: 46.1 FL (ref 37–54)
EGFRCR SERPLBLD CKD-EPI 2021: 70.6 ML/MIN/1.73
EOSINOPHIL # BLD AUTO: 0.16 10*3/MM3 (ref 0–0.4)
EOSINOPHIL NFR BLD AUTO: 2.1 % (ref 0.3–6.2)
ERYTHROCYTE [DISTWIDTH] IN BLOOD BY AUTOMATED COUNT: 14.3 % (ref 12.3–15.4)
GLOBULIN UR ELPH-MCNC: 2.4 GM/DL
GLUCOSE SERPL-MCNC: 124 MG/DL (ref 65–99)
GLUCOSE UR STRIP-MCNC: ABNORMAL MG/DL
HBA1C MFR BLD: 7.6 % (ref 4.8–5.6)
HCT VFR BLD AUTO: 51.5 % (ref 37.5–51)
HCV AB SER DONR QL: NORMAL
HDLC SERPL-MCNC: 40 MG/DL (ref 40–60)
HGB BLD-MCNC: 17.3 G/DL (ref 13–17.7)
IMM GRANULOCYTES # BLD AUTO: 0.07 10*3/MM3 (ref 0–0.05)
IMM GRANULOCYTES NFR BLD AUTO: 0.9 % (ref 0–0.5)
KETONES UR QL: NEGATIVE
LDLC SERPL CALC-MCNC: 108 MG/DL (ref 0–100)
LDLC/HDLC SERPL: 2.54 {RATIO}
LEUKOCYTE EST, POC: NEGATIVE
LYMPHOCYTES # BLD AUTO: 2.75 10*3/MM3 (ref 0.7–3.1)
LYMPHOCYTES NFR BLD AUTO: 35.3 % (ref 19.6–45.3)
MCH RBC QN AUTO: 30.3 PG (ref 26.6–33)
MCHC RBC AUTO-ENTMCNC: 33.6 G/DL (ref 31.5–35.7)
MCV RBC AUTO: 90.2 FL (ref 79–97)
MONOCYTES # BLD AUTO: 0.87 10*3/MM3 (ref 0.1–0.9)
MONOCYTES NFR BLD AUTO: 11.2 % (ref 5–12)
NEUTROPHILS NFR BLD AUTO: 3.88 10*3/MM3 (ref 1.7–7)
NEUTROPHILS NFR BLD AUTO: 49.9 % (ref 42.7–76)
NITRITE UR-MCNC: NEGATIVE MG/ML
NRBC BLD AUTO-RTO: 0 /100 WBC (ref 0–0.2)
PH UR: 5 [PH] (ref 5–8)
PLATELET # BLD AUTO: 210 10*3/MM3 (ref 140–450)
PMV BLD AUTO: 10.7 FL (ref 6–12)
POTASSIUM SERPL-SCNC: 4.3 MMOL/L (ref 3.5–5.2)
PROT SERPL-MCNC: 6.9 G/DL (ref 6–8.5)
PROT UR STRIP-MCNC: NEGATIVE MG/DL
RBC # BLD AUTO: 5.71 10*6/MM3 (ref 4.14–5.8)
RBC # UR STRIP: NEGATIVE /UL
SODIUM SERPL-SCNC: 142 MMOL/L (ref 136–145)
SP GR UR: 1.02 (ref 1–1.03)
TRIGL SERPL-MCNC: 238 MG/DL (ref 0–150)
TSH SERPL DL<=0.05 MIU/L-ACNC: 1.81 UIU/ML (ref 0.27–4.2)
UROBILINOGEN UR QL: NORMAL
VLDLC SERPL-MCNC: 41 MG/DL (ref 5–40)
WBC NRBC COR # BLD: 7.78 10*3/MM3 (ref 3.4–10.8)

## 2022-06-16 PROCEDURE — 84443 ASSAY THYROID STIM HORMONE: CPT | Performed by: NURSE PRACTITIONER

## 2022-06-16 PROCEDURE — 80053 COMPREHEN METABOLIC PANEL: CPT | Performed by: NURSE PRACTITIONER

## 2022-06-16 PROCEDURE — 86803 HEPATITIS C AB TEST: CPT | Performed by: NURSE PRACTITIONER

## 2022-06-16 PROCEDURE — 85025 COMPLETE CBC W/AUTO DIFF WBC: CPT | Performed by: NURSE PRACTITIONER

## 2022-06-16 PROCEDURE — 83036 HEMOGLOBIN GLYCOSYLATED A1C: CPT | Performed by: NURSE PRACTITIONER

## 2022-06-16 PROCEDURE — 80061 LIPID PANEL: CPT | Performed by: NURSE PRACTITIONER

## 2022-06-16 PROCEDURE — 99215 OFFICE O/P EST HI 40 MIN: CPT | Performed by: NURSE PRACTITIONER

## 2022-06-16 PROCEDURE — 81002 URINALYSIS NONAUTO W/O SCOPE: CPT | Performed by: NURSE PRACTITIONER

## 2022-06-16 RX ORDER — EMPAGLIFLOZIN 25 MG/1
25 TABLET, FILM COATED ORAL DAILY
COMMUNITY
Start: 2022-05-29 | End: 2022-09-27 | Stop reason: SDUPTHER
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
## 2022-06-17 LAB
BILIRUB UR QL STRIP: NEGATIVE
CLARITY UR: CLEAR
COLOR UR: YELLOW
GLUCOSE UR STRIP-MCNC: ABNORMAL MG/DL
HGB UR QL STRIP.AUTO: NEGATIVE
KETONES UR QL STRIP: NEGATIVE
LEUKOCYTE ESTERASE UR QL STRIP.AUTO: NEGATIVE
NITRITE UR QL STRIP: NEGATIVE
PH UR STRIP.AUTO: 5.5 [PH] (ref 5–8)
PROT UR QL STRIP: NEGATIVE
SP GR UR STRIP: >=1.03 (ref 1–1.03)
UROBILINOGEN UR QL STRIP: ABNORMAL

## 2022-06-17 PROCEDURE — 81003 URINALYSIS AUTO W/O SCOPE: CPT | Performed by: NURSE PRACTITIONER

## 2022-06-17 PROCEDURE — 87086 URINE CULTURE/COLONY COUNT: CPT | Performed by: NURSE PRACTITIONER

## 2022-06-18 LAB — BACTERIA SPEC AEROBE CULT: NORMAL

## 2022-06-20 ENCOUNTER — TELEPHONE (OUTPATIENT)
Dept: INTERNAL MEDICINE | Facility: CLINIC | Age: 77
End: 2022-06-20

## 2022-06-20 DIAGNOSIS — G89.29 OTHER CHRONIC PAIN: ICD-10-CM

## 2022-06-20 DIAGNOSIS — M48.061 SPINAL STENOSIS OF LUMBAR REGION, UNSPECIFIED WHETHER NEUROGENIC CLAUDICATION PRESENT: ICD-10-CM

## 2022-06-20 DIAGNOSIS — M51.36 DDD (DEGENERATIVE DISC DISEASE), LUMBAR: Primary | ICD-10-CM

## 2022-06-20 NOTE — TELEPHONE ENCOUNTER
Caller: JHONATAN SWAN    Relationship: Emergency Contact    Best call back number: [phone]    What is the best time to reach you:ANYTIME    Who are you requesting to speak with (clinical staff, provider,  specific staff member): JATIN CURIEL      Do you know the name of the person who called: JHONATAN    What was the call regarding: PATIENT'S SON STATED HIS FATHER NEEDED TO HAVE AN MRI DONE THERE IN [facility] TO BE SEEN BY NEUROSURGEON.    DR WOO IN THAT OFFICE OR OTHER PROVIDERS IN HIS OFFICE. HE WOULD LIKE TO DISCUSS ALSO LAB RESULTS HIS FATHER HAD DONE. HE ALSO HAD URINALYSIS. HE WOULD LIKE TO DISCUSS THESE WITH CLINICAL STAFF. ALSO, IF YOU NEED TO MAIL ANYTHING TO HIS FATHER HE WOULD LIKE THOSE MAILINGS SENT TO HIS ADDRESS HIS FATHER STAYS WITH HIM NOW. PLEASE CALL PATIENT BACK AND ADVISE.    Do you require a callback:YES

## 2022-06-20 NOTE — TELEPHONE ENCOUNTER
I BELIEVE PT(PATIENT) IS REFERRING TO THE FOLLOWING     Referral to Neurosurgery for DDD (degenerative disc disease), lumbar; Spinal stenosis of lumbar region, unspecified whether neurogenic claudication present (06/16/2022)    PROVIDER PLEASE PLACE MRI ORDER    PT(PATIENT) DID COMPLETE A CT Abdomen Pelvis With Contrast (05/09/2022 23:36) BUT NEEDS MRI TO COMPLETE REFERRAL    THANK YOU

## 2022-06-22 DIAGNOSIS — E11.65 TYPE 2 DIABETES MELLITUS WITH HYPERGLYCEMIA, WITHOUT LONG-TERM CURRENT USE OF INSULIN: Primary | ICD-10-CM

## 2022-06-23 ENCOUNTER — TELEPHONE (OUTPATIENT)
Dept: INTERNAL MEDICINE | Facility: CLINIC | Age: 77
End: 2022-06-23

## 2022-06-23 ENCOUNTER — CLINICAL SUPPORT (OUTPATIENT)
Dept: INTERNAL MEDICINE | Facility: CLINIC | Age: 77
End: 2022-06-23

## 2022-06-23 DIAGNOSIS — R35.0 URINARY FREQUENCY: Primary | ICD-10-CM

## 2022-06-23 PROCEDURE — 87086 URINE CULTURE/COLONY COUNT: CPT | Performed by: INTERNAL MEDICINE

## 2022-06-23 PROCEDURE — 81003 URINALYSIS AUTO W/O SCOPE: CPT | Performed by: INTERNAL MEDICINE

## 2022-06-23 NOTE — TELEPHONE ENCOUNTER
----- Message from BASIL Ramsey sent at 6/22/2022  6:54 PM EDT -----  Urine culture contaminated.   Will need to be recollected.     A1C went from 6.48 to 7.6. I would like patient to have a tighter control on diabetes. I am going to increase his Januvia to 100 mg.   Also limit carbs and sugars and increase physical activity.     Other labs reassuring.

## 2022-06-23 NOTE — TELEPHONE ENCOUNTER
PT(PATIENT) EC VERIFIED     CONTACTED PT(PATIENT) EC PER PROVIDER'S INSTRUCTIONS    PT(PATIENT) SON STATES PT(PATIENT) IS NOT TAKING IS MEDICATION    PT(PATIENT) SON STATES PT(PATIENT) WILL THROW AWAY MEDICATIONS HE DOESN'T WANT TO TAKE     PT(PATIENT) SON STATES IF PT(PATIENT) TAKES HIS MEDICATION, HIS BG(BLOOD GLUCOSE) LEVELS ARE CLOSE TO NORMAL    PT(PATIENT) STATES THE LAST SEVERAL DAYS ITS BEEN ANYWHERE FROM 220  BECAUSE HE WONT TAKE HIS MEDICATION    PT(PATIENT) STATES BATHING IS ALSO A ISSUE     PT(PATIENT) HAS NOT HAD A BATH SINCE HIS MOST RECENT VISIT ON 2022    PT(PATIENT) EC STATES IT IS EXTREMELY DIFFICULT TO WORK WITH THE PATIENT     PROVIDER PLEASE ADVISE

## 2022-06-24 LAB — BACTERIA SPEC AEROBE CULT: NORMAL

## 2022-06-27 NOTE — TELEPHONE ENCOUNTER
ATTEMPTED TO CONTACT PT PER PROVIDER'S INSTRUCTIONS     NO ANSWER     UNABLE TO LEAVE A VOICEMAIL WITH INSTRUCTIONS TO RETURN CALL TO OFFICE AT [phone]    OK FOR HUB TO ADVISE/READ   Felix Navarro Jr., MD 19 minutes ago (11:23 AM)        Urine Culture again showed mixed oscar, which is considered possible contamination and not typically useful. His Urinalysis did not suggest infection however.

## 2022-06-27 NOTE — TELEPHONE ENCOUNTER
Urine Culture again showed mixed oscar, which is considered possible contamination and not typically useful. His Urinalysis did not suggest infection however.

## 2022-06-27 NOTE — TELEPHONE ENCOUNTER
PT(PATIENT) VERIFIED     CONTACTED PT(PATIENT) PER PROVIDER'S INSTRUCTIONS    citalopram (CeleXA) 20 MG tablet (10/26/2021)    PT(PATIENT) SON STATES HE HAS BEEN GIVING PT(PATIENT) HALF OF THE TABLET BECAUSE IT MAKES HIM DROWSY    PT(PATIENT) SON STATES HE GAVE HIM HALF OF THE TABLETS BECAUSE HE DRIVES OCCASIONALLY AND IT WORRIES HIM    PT(PATIENT) SON ADVISED TO GIVE PT(PATIENT) THE WHOLE TABLET AS SCRIPTED FROM PROVIDER    PT(PATIENT) SON STATES HE WILL GIVE THE PT(PATIENT) THE FULL TABLET FROM NOW     PT(PATIENT) SON REQUESTED AN UPDATE ON LAB RESULTS    Urine Culture - Urine, Urine, Clean Catch (2022 12:19)    PROVIDER PLEASE ADVISE

## 2022-07-01 ENCOUNTER — HOSPITAL ENCOUNTER (EMERGENCY)
Facility: HOSPITAL | Age: 77
Discharge: HOME | End: 2022-07-01
Attending: EMERGENCY MEDICINE | Admitting: EMERGENCY MEDICINE

## 2022-07-01 VITALS
DIASTOLIC BLOOD PRESSURE: 67 MMHG | WEIGHT: 185.41 LBS | HEIGHT: 68 IN | RESPIRATION RATE: 18 BRPM | SYSTOLIC BLOOD PRESSURE: 123 MMHG | TEMPERATURE: 98.2 F | BODY MASS INDEX: 28.1 KG/M2 | OXYGEN SATURATION: 98 % | HEART RATE: 90 BPM
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
## 2022-07-01 DIAGNOSIS — R07.89 CHEST WALL TENDERNESS: Primary | ICD-10-CM

## 2022-07-01 DIAGNOSIS — S20.162A TICK BITE OF LEFT BREAST, INITIAL ENCOUNTER: ICD-10-CM

## 2022-07-01 PROCEDURE — 99283 EMERGENCY DEPT VISIT LOW MDM: CPT

## 2022-07-01 RX ORDER — DOXYCYCLINE 100 MG/1
200 CAPSULE ORAL ONCE
Status: COMPLETED | OUTPATIENT
Start: 2022-07-01 | End: 2022-07-01

## 2022-07-01 RX ORDER — LIDOCAINE AND PRILOCAINE 25; 25 MG/G; MG/G
1 CREAM TOPICAL ONCE
Status: COMPLETED | OUTPATIENT
Start: 2022-07-01 | End: 2022-07-01

## 2022-07-01 RX ADMIN — DOXYCYCLINE 200 MG: 100 CAPSULE ORAL at 09:25

## 2022-07-01 RX ADMIN — LIDOCAINE AND PRILOCAINE 1 APPLICATION: 25; 25 CREAM TOPICAL at 08:20

## 2022-07-01 NOTE — ED PROVIDER NOTES
Subjective   Patient is a 77-year-old male that presents to the emergency department today, via POV, for an embedded tick to left breast area. Patient states that he believes he got bit by a tick 1 to 2 days ago.  His son attempted to remove it however was unable to remove the head.  Patient denies any direct pain related to the bite however states that the tissue around his breast is very sensitive and always has been.  Any nausea, vomiting, diarrhea, fever, or chills since receiving the bite.      History provided by:  Patient      Review of Systems   Constitutional: Negative for chills and fever.   HENT: Negative for congestion, ear pain and sore throat.    Eyes: Negative for pain.   Respiratory: Negative for cough, chest tightness and shortness of breath.    Cardiovascular: Negative for chest pain.   Gastrointestinal: Negative for abdominal pain, diarrhea, nausea and vomiting.   Genitourinary: Negative for flank pain and hematuria.   Musculoskeletal: Negative for joint swelling.   Skin: Negative for pallor.        Tick bite to left breast   Neurological: Negative for seizures and headaches.   All other systems reviewed and are negative.      Past Medical History:   Diagnosis Date   • Aorta disorder (HCC)    • Arthritis    • Degenerative joint disease    • Dementia (HCC)     FRONTAL LOBE   • Depression    • Diabetes mellitus (HCC)    • Hyperlipidemia    • Hypertension    • Kidney stone    • Stroke (HCC)     MILD RIGHT SIDE DEFICITS       Allergies   Allergen Reactions   • Celecoxib Rash   • Ibuprofen Rash   • Promethazine Hcl Rash   • Warfarin Rash       Past Surgical History:   Procedure Laterality Date   • CYSTOSCOPY BLADDER STONE LITHOTRIPSY     • KIDNEY STONE SURGERY         History reviewed. No pertinent family history.    Social History     Socioeconomic History   • Marital status:    Tobacco Use   • Smoking status: Never Smoker   • Smokeless tobacco: Never Used   Vaping Use   • Vaping Use: Never  used   Substance and Sexual Activity   • Alcohol use: Never   • Drug use: Never   • Sexual activity: Defer           Objective   Physical Exam  Vitals and nursing note reviewed.   Constitutional:       General: He is not in acute distress.     Appearance: Normal appearance. He is not toxic-appearing.   HENT:      Head: Normocephalic and atraumatic.      Mouth/Throat:      Mouth: Mucous membranes are moist.   Eyes:      General: No scleral icterus.  Cardiovascular:      Rate and Rhythm: Normal rate and regular rhythm.      Pulses: Normal pulses.      Heart sounds: Normal heart sounds.   Pulmonary:      Effort: Pulmonary effort is normal. No respiratory distress.      Breath sounds: Normal breath sounds.   Chest:       Abdominal:      General: Abdomen is flat.      Palpations: Abdomen is soft.      Tenderness: There is no abdominal tenderness.   Musculoskeletal:         General: Tenderness present. No swelling, deformity or signs of injury. Normal range of motion.      Cervical back: Normal range of motion and neck supple.      Right lower leg: No edema.      Left lower leg: No edema.   Skin:     General: Skin is warm and dry.      Comments: Embedded tick to the left breast area.  Skin surrounding insect is erythematous and slightly swollen in nature.  No bull's-eye rash appreciated.  There is no warmth to the area.   Neurological:      Mental Status: He is alert and oriented to person, place, and time. Mental status is at baseline.         Procedures           ED Course  ED Course as of 07/01/22 0918   Fri Jul 01, 2022   0830 Obtained Emla cream from primary RN and applied to left breast area due to patient stating that his skin around his breast are very tender [MS]   0907 Tick successfully removed.  [MS]      ED Course User Index  [MS] Gena Perez, BASIL                                         Medications   doxycycline (MONODOX) capsule 200 mg (has no administration in time range)   lidocaine-prilocaine  (EMLA) 2.5-2.5 % cream 1 application (1 application Topical Given by Other 7/1/22 0820)       MDM  Number of Diagnoses or Management Options  Chest wall tenderness  Tick bite of left breast, initial encounter  Diagnosis management comments: No apparent bull's eye rash.  Prophylactic Doxy dose administered in the emergency department.  Tick was successfully removed.    I have spoke with the patient and I have explained the patient´s condition, diagnoses and treatment plan based on the information available to me at this time. I have answered all questions and addressed any concerns. The patient has a good understanding of the patient´s diagnosis, condition, and treatment plan as can be expected at this point. The vital signs have been stable. The patient´s condition is stable and appropriate for discharge from the emergency department.      The patient will pursue further outpatient evaluation with the primary care physician or other designated or consulting physician as outlined in the discharge instructions. They are agreeable to this plan of care and follow-up instructions have been explained in detail. The patient has received these instructions in written format and have expressed an understanding of the discharge instructions. The patient is aware that any significant change in condition or worsening of symptoms should prompt an immediate return to this or the closest emergency department or call to 911.         Amount and/or Complexity of Data Reviewed  Review and summarize past medical records: yes (I have personally reviewed patient's previous medical encounters.  )    Risk of Complications, Morbidity, and/or Mortality  Presenting problems: low  Diagnostic procedures: low  Management options: low    Patient Progress  Patient progress: stable      Final diagnoses:   Chest wall tenderness   Tick bite of left breast, initial encounter       ED Disposition  ED Disposition     ED Disposition   Discharge     Condition   Stable    Comment   --             Felix Navarro Jr., MD  [address] [facility] [address]  [place] [address]  [phone]    In 1 week  If symptoms worsen, As needed         Medication List      No changes were made to your prescriptions during this visit.          Gena Perez, BASIL  07/01/22 0915       Gena Perez, BASIL  07/01/22 0918

## 2022-07-01 NOTE — DISCHARGE INSTRUCTIONS
Please wear bug spray and insect repellent when working outdoors.  Follow-up with your primary care provider if you develop any rash, severe nausea, vomiting, diarrhea, or a fever that cannot be controlled with Tylenol or Motrin.  Return to the ER if you have any other concern surrounding today's ER visit.

## 2022-07-02 ENCOUNTER — HOSPITAL ENCOUNTER (EMERGENCY)
Facility: HOSPITAL | Age: 77
Discharge: HOME | End: 2022-07-02
Attending: EMERGENCY MEDICINE | Admitting: EMERGENCY MEDICINE

## 2022-07-02 VITALS
BODY MASS INDEX: 27.56 KG/M2 | OXYGEN SATURATION: 98 % | TEMPERATURE: 98.2 F | SYSTOLIC BLOOD PRESSURE: 123 MMHG | RESPIRATION RATE: 22 BRPM | DIASTOLIC BLOOD PRESSURE: 87 MMHG | HEIGHT: 69 IN | HEART RATE: 92 BPM | WEIGHT: 186.07 LBS
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
## 2022-07-02 DIAGNOSIS — S30.861A TICK BITE OF ABDOMEN, INITIAL ENCOUNTER: Primary | ICD-10-CM
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
## 2022-07-02 PROCEDURE — 99282 EMERGENCY DEPT VISIT SF MDM: CPT

## 2022-07-02 NOTE — ED PROVIDER NOTES
Subjective     History provided by:  Patient  Tick Removal  Location:  Abdomen  Quality:  Unable to specify  Severity:  Moderate  Onset quality:  Sudden  Duration:  1 day  Timing:  Constant  Progression:  Unchanged  Chronicity:  New  Context:  Pt reports tick to abdomen that he wants removed. He wanted to have it removed in the ER to ensure the head was completely removed. 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   HENT: Negative for congestion, ear pain, rhinorrhea and sore throat.    Eyes: Negative for pain.   Respiratory: Negative for cough, chest tightness, shortness of breath and wheezing.    Cardiovascular: Negative for chest pain.   Gastrointestinal: Negative for abdominal pain, diarrhea, nausea and vomiting.   Genitourinary: Negative for flank pain and hematuria.   Musculoskeletal: Negative for joint swelling and myalgias.   Skin: Negative for pallor and rash.   Neurological: Negative for seizures, loss of consciousness and headaches.   All other systems reviewed and are negative.      Past Medical History:   Diagnosis Date   • Aorta disorder (HCC)    • Arthritis    • Degenerative joint disease    • Dementia (HCC)     FRONTAL LOBE   • Depression    • Diabetes mellitus (HCC)    • Hyperlipidemia    • Hypertension    • Kidney stone    • Stroke (HCC)     MILD RIGHT SIDE DEFICITS       Allergies   Allergen Reactions   • Celecoxib Rash   • Ibuprofen Rash   • Promethazine Hcl Rash   •  Warfarin Rash       Past Surgical History:   Procedure Laterality Date   • CHOLECYSTECTOMY     • CYSTOSCOPY BLADDER STONE LITHOTRIPSY     • KIDNEY STONE SURGERY         History reviewed. No pertinent family history.    Social History     Socioeconomic History   • Marital status:    Tobacco Use   • Smoking status: Never Smoker   • Smokeless tobacco: Never Used   Vaping Use   • Vaping Use: Never used   Substance and Sexual Activity   • Alcohol use: Never   • Drug use: Never   • Sexual activity: Defer           Objective   Physical Exam  Vitals and nursing note reviewed.   Constitutional:       General: He is not in acute distress.     Appearance: Normal appearance. He is not toxic-appearing.   HENT:      Head: Normocephalic and atraumatic.      Mouth/Throat:      Mouth: Mucous membranes are moist.   Eyes:      General: No scleral icterus.  Cardiovascular:      Rate and Rhythm: Normal rate and regular rhythm.      Pulses: Normal pulses.      Heart sounds: Normal heart sounds.   Pulmonary:      Effort: Pulmonary effort is normal. No respiratory distress.      Breath sounds: Normal breath sounds.   Abdominal:      General: Abdomen is flat.      Palpations: Abdomen is soft.      Tenderness: There is no abdominal tenderness.       Musculoskeletal:         General: Normal range of motion.      Cervical back: Normal range of motion and neck supple.   Skin:     General: Skin is warm and dry.   Neurological:      Mental Status: He is alert and oriented to person, place, and time. Mental status is at baseline.         Procedures           ED Course                                           MDM  Number of Diagnoses or Management Options  Tick bite of abdomen, initial encounter: minor  Tick bite with subsequent removal of tick: minor  Diagnosis management comments: I have spoken with the patient. I have explained the patient´s condition, diagnoses and treatment plan based on the information available to me at this time. I  have answered the patient's questions and addressed any concerns. The patient has a good  understanding of the patient´s diagnosis, condition, and treatment plan as can be expected at this point. The vital signs have been stable. The patient´s condition is stable and appropriate for discharge from the emergency department.      The patient will pursue further outpatient evaluation with the primary care physician or other designated or consulting physician as outlined in the discharge instructions. They are agreeable to this plan of care and follow-up instructions have been explained in detail. The patient has received these instructions in written format and have expressed an understanding of the discharge instructions. The patient is aware that any significant change in condition or worsening of symptoms should prompt an immediate return to this or the closest emergency department or call to 911.    Risk of Complications, Morbidity, and/or Mortality  Presenting problems: minimal  Diagnostic procedures: minimal  Management options: minimal    Patient Progress  Patient progress: improved      Final diagnoses:   Tick bite of abdomen, initial encounter   Tick bite with subsequent removal of tick       ED Disposition  ED Disposition     ED Disposition   Discharge    Condition   Stable    Comment   --             Felix Navarro Jr., MD  [address] [address]  [phone]    In 3 days  As needed         Medication List      No changes were made to your prescriptions during this visit.          Quinten Gutiérrez, APRN  07/02/22 8047

## 2022-07-14 ENCOUNTER — TELEPHONE (OUTPATIENT)
Dept: INTERNAL MEDICINE | Facility: CLINIC | Age: 77
End: 2022-07-14

## 2022-07-14 LAB
BILIRUB BLD-MCNC: NEGATIVE MG/DL
CLARITY, POC: CLEAR
COLOR UR: YELLOW
EXPIRATION DATE: ABNORMAL
GLUCOSE UR STRIP-MCNC: ABNORMAL MG/DL
KETONES UR QL: NEGATIVE
LEUKOCYTE EST, POC: NEGATIVE
Lab: ABNORMAL
NITRITE UR-MCNC: NEGATIVE MG/ML
PH UR: 5 [PH] (ref 5–8)
PROT UR STRIP-MCNC: NEGATIVE MG/DL
RBC # UR STRIP: NEGATIVE /UL
SP GR UR: 1.01 (ref 1–1.03)
UROBILINOGEN UR QL: NORMAL

## 2022-07-14 NOTE — TELEPHONE ENCOUNTER
----- Message from Felix Navarro Jr., MD sent at 7/14/2022  8:35 AM EDT -----  All labs reassuring

## 2022-07-20 ENCOUNTER — TELEPHONE (OUTPATIENT)
Dept: INTERNAL MEDICINE | Facility: CLINIC | Age: 77
End: 2022-07-20

## 2022-07-20 NOTE — TELEPHONE ENCOUNTER
PA HAS BEEN SUBMITTED TO INSURANCE     Referral for DDD (degenerative disc disease), lumbar; Spinal stenosis of lumbar region, unspecified whether neurogenic claudication present; Other chronic pain (06/21/2022)     PT(PATIENT) HAS NOT HAVE PT(PATIENT) FOR 6 WEEKS     INSURANCE MAY DENY     WOULD PROVIDER LIKE TO SCHEDULE A PEER TO PEER     AIM PEER TO PEER NUMBER  PHONE #[phone]   OPT 2 FOR PEER TO PEER     PT(PATIENT) IS SCHEDULED FOR Friday   Appointment (07/22/2022)    TIME SENSITIVE ORDER/NEED RESPONSE BY END OF DAY TOMORROW     IF THE MRI NEEDS TO BE RESCHEDULED TO ALLOW MORE TIME FOR AUTH, PLEASE LET CENTRAL SCHEDULING KNOW     JILLIAN ROWLEY #[phone]    PLEASE ADVISE

## 2022-07-21 NOTE — TELEPHONE ENCOUNTER
PT(PATIENT) SON VERIFIED     CONTACTED PT(PATIENT) SON PER PROVIDER'S INSTRUCTIONS    Lissett Ugalde APRN Ansert, Douglas Michael Jr., MD; [facility] 3 hours ago (7:30 AM)     Please call pt and see if he is willing to participate in PT for the next 6-ilda weeks in order to receive imaging.       PT(PATIENT) SON STATES HE WILL CALL OFFICE BACK AFTER HE SPEAKS TO HIS DAD

## 2022-07-22 ENCOUNTER — APPOINTMENT (OUTPATIENT)
Dept: MRI IMAGING | Facility: HOSPITAL | Age: 77
End: 2022-07-22

## 2022-07-28 ENCOUNTER — TELEPHONE (OUTPATIENT)
Dept: INTERNAL MEDICINE | Facility: CLINIC | Age: 77
End: 2022-07-28

## 2022-09-16 ENCOUNTER — OFFICE VISIT (OUTPATIENT)
Dept: INTERNAL MEDICINE | Facility: CLINIC | Age: 77
End: 2022-09-16

## 2022-09-16 VITALS
WEIGHT: 182.8 LBS | DIASTOLIC BLOOD PRESSURE: 78 MMHG | HEIGHT: 69 IN | TEMPERATURE: 97.4 F | OXYGEN SATURATION: 95 % | BODY MASS INDEX: 27.08 KG/M2 | SYSTOLIC BLOOD PRESSURE: 148 MMHG | HEART RATE: 63 BPM

## 2022-09-16 DIAGNOSIS — I10 PRIMARY HYPERTENSION: Primary | ICD-10-CM

## 2022-09-16 DIAGNOSIS — E55.9 VITAMIN D DEFICIENCY: ICD-10-CM

## 2022-09-16 DIAGNOSIS — Z23 NEED FOR PNEUMOCOCCAL VACCINATION: ICD-10-CM

## 2022-09-16 DIAGNOSIS — E07.9 THYROID DISORDER: ICD-10-CM

## 2022-09-16 DIAGNOSIS — E11.65 TYPE 2 DIABETES MELLITUS WITH HYPERGLYCEMIA, WITHOUT LONG-TERM CURRENT USE OF INSULIN: ICD-10-CM

## 2022-09-16 DIAGNOSIS — E78.5 HYPERLIPIDEMIA, UNSPECIFIED HYPERLIPIDEMIA TYPE: ICD-10-CM

## 2022-09-16 DIAGNOSIS — M54.2 NECK PAIN: ICD-10-CM

## 2022-09-16 PROCEDURE — 85007 BL SMEAR W/DIFF WBC COUNT: CPT | Performed by: INTERNAL MEDICINE

## 2022-09-16 PROCEDURE — 82043 UR ALBUMIN QUANTITATIVE: CPT | Performed by: INTERNAL MEDICINE

## 2022-09-16 PROCEDURE — G0009 ADMIN PNEUMOCOCCAL VACCINE: HCPCS | Performed by: INTERNAL MEDICINE

## 2022-09-16 PROCEDURE — 82570 ASSAY OF URINE CREATININE: CPT | Performed by: INTERNAL MEDICINE

## 2022-09-16 PROCEDURE — 84443 ASSAY THYROID STIM HORMONE: CPT | Performed by: INTERNAL MEDICINE

## 2022-09-16 PROCEDURE — 80053 COMPREHEN METABOLIC PANEL: CPT | Performed by: INTERNAL MEDICINE

## 2022-09-16 PROCEDURE — 90677 PCV20 VACCINE IM: CPT | Performed by: INTERNAL MEDICINE

## 2022-09-16 PROCEDURE — 80061 LIPID PANEL: CPT | Performed by: INTERNAL MEDICINE

## 2022-09-16 PROCEDURE — 99214 OFFICE O/P EST MOD 30 MIN: CPT | Performed by: INTERNAL MEDICINE

## 2022-09-16 PROCEDURE — 85025 COMPLETE CBC W/AUTO DIFF WBC: CPT | Performed by: INTERNAL MEDICINE

## 2022-09-16 PROCEDURE — 82306 VITAMIN D 25 HYDROXY: CPT | Performed by: INTERNAL MEDICINE

## 2022-09-16 PROCEDURE — 83036 HEMOGLOBIN GLYCOSYLATED A1C: CPT | Performed by: INTERNAL MEDICINE
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
## 2022-09-17 LAB
25(OH)D3 SERPL-MCNC: 28.7 NG/ML (ref 30–100)
ALBUMIN SERPL-MCNC: 4.1 G/DL (ref 3.5–5.2)
ALBUMIN UR-MCNC: 1.7 MG/DL
ALBUMIN/GLOB SERPL: 1.7 G/DL
ALP SERPL-CCNC: 58 U/L (ref 39–117)
ALT SERPL W P-5'-P-CCNC: 18 U/L (ref 1–41)
ANION GAP SERPL CALCULATED.3IONS-SCNC: 10 MMOL/L (ref 5–15)
AST SERPL-CCNC: 23 U/L (ref 1–40)
BILIRUB SERPL-MCNC: 0.4 MG/DL (ref 0–1.2)
BUN SERPL-MCNC: 14 MG/DL (ref 8–23)
BUN/CREAT SERPL: 13.7 (ref 7–25)
CALCIUM SPEC-SCNC: 9.4 MG/DL (ref 8.6–10.5)
CHLORIDE SERPL-SCNC: 106 MMOL/L (ref 98–107)
CHOLEST SERPL-MCNC: 146 MG/DL (ref 0–200)
CO2 SERPL-SCNC: 25 MMOL/L (ref 22–29)
CREAT SERPL-MCNC: 1.02 MG/DL (ref 0.76–1.27)
CREAT UR-MCNC: 332.8 MG/DL
DEPRECATED RDW RBC AUTO: 44.3 FL (ref 37–54)
EGFRCR SERPLBLD CKD-EPI 2021: 74.8 ML/MIN/1.73
EOSINOPHIL # BLD MANUAL: 0.11 10*3/MM3 (ref 0–0.4)
EOSINOPHIL NFR BLD MANUAL: 1 % (ref 0.3–6.2)
ERYTHROCYTE [DISTWIDTH] IN BLOOD BY AUTOMATED COUNT: 13.4 % (ref 12.3–15.4)
GLOBULIN UR ELPH-MCNC: 2.4 GM/DL
GLUCOSE SERPL-MCNC: 115 MG/DL (ref 65–99)
HBA1C MFR BLD: 7.2 % (ref 4.8–5.6)
HCT VFR BLD AUTO: 45.8 % (ref 37.5–51)
HDLC SERPL-MCNC: 36 MG/DL (ref 40–60)
HGB BLD-MCNC: 15.8 G/DL (ref 13–17.7)
LDLC SERPL CALC-MCNC: 73 MG/DL (ref 0–100)
LDLC/HDLC SERPL: 1.82 {RATIO}
LYMPHOCYTES # BLD MANUAL: 4.92 10*3/MM3 (ref 0.7–3.1)
LYMPHOCYTES NFR BLD MANUAL: 2 % (ref 5–12)
MCH RBC QN AUTO: 31.1 PG (ref 26.6–33)
MCHC RBC AUTO-ENTMCNC: 34.5 G/DL (ref 31.5–35.7)
MCV RBC AUTO: 90.2 FL (ref 79–97)
MICROALBUMIN/CREAT UR: 5.1 MG/G
MONOCYTES # BLD: 0.23 10*3/MM3 (ref 0.1–0.9)
MYELOCYTES NFR BLD MANUAL: 1 % (ref 0–0)
NEUTROPHILS # BLD AUTO: 6.06 10*3/MM3 (ref 1.7–7)
NEUTROPHILS NFR BLD MANUAL: 53 % (ref 42.7–76)
NRBC BLD AUTO-RTO: 0 /100 WBC (ref 0–0.2)
PLAT MORPH BLD: NORMAL
PLATELET # BLD AUTO: 254 10*3/MM3 (ref 140–450)
PMV BLD AUTO: 10.6 FL (ref 6–12)
POTASSIUM SERPL-SCNC: 4.5 MMOL/L (ref 3.5–5.2)
PROT SERPL-MCNC: 6.5 G/DL (ref 6–8.5)
RBC # BLD AUTO: 5.08 10*6/MM3 (ref 4.14–5.8)
RBC MORPH BLD: NORMAL
SODIUM SERPL-SCNC: 141 MMOL/L (ref 136–145)
TRIGL SERPL-MCNC: 222 MG/DL (ref 0–150)
TSH SERPL DL<=0.05 MIU/L-ACNC: 1.23 UIU/ML (ref 0.27–4.2)
VARIANT LYMPHS NFR BLD MANUAL: 39 % (ref 19.6–45.3)
VARIANT LYMPHS NFR BLD MANUAL: 4 % (ref 0–5)
VLDLC SERPL-MCNC: 37 MG/DL (ref 5–40)
WBC MORPH BLD: NORMAL
WBC NRBC COR # BLD: 11.44 10*3/MM3 (ref 3.4–10.8)

## 2022-09-19 ENCOUNTER — TELEPHONE (OUTPATIENT)
Dept: INTERNAL MEDICINE | Facility: CLINIC | Age: 77
End: 2022-09-19

## 2022-09-19 NOTE — TELEPHONE ENCOUNTER
----- Message from Felix Navarro Jr., MD sent at 9/18/2022  9:46 AM EDT -----  Labs overall look ok.     HgbA1c stable    Elevated triglycerides, which are the storage form of sugar - recommend lower carbohydrate/sugar intake.     HDL low - this is protective cholesterol and generally like the number to be >50. encourage exercise to increase level.     Continue vitamin D supplementation

## 2022-12-01 ENCOUNTER — APPOINTMENT (OUTPATIENT)
Dept: GENERAL RADIOLOGY | Facility: HOSPITAL | Age: 77
DRG: 189 | End: 2022-12-01
Payer: MEDICARE

## 2022-12-01 ENCOUNTER — HOSPITAL ENCOUNTER (INPATIENT)
Facility: HOSPITAL | Age: 77
LOS: 3 days | Discharge: NURSING, REHAB OR LONG-TERM CARE | DRG: 189 | End: 2022-12-07
Attending: EMERGENCY MEDICINE | Admitting: INTERNAL MEDICINE
Payer: MEDICARE

## 2022-12-01 DIAGNOSIS — F02.80 FRONTAL LOBE DEMENTIA: ICD-10-CM

## 2022-12-01 DIAGNOSIS — B34.9 ACUTE VIRAL SYNDROME: Primary | ICD-10-CM

## 2022-12-01 DIAGNOSIS — Z78.9 DECREASED ACTIVITIES OF DAILY LIVING (ADL): ICD-10-CM

## 2022-12-01 DIAGNOSIS — J96.01 ACUTE RESPIRATORY FAILURE WITH HYPOXIA: ICD-10-CM

## 2022-12-01 DIAGNOSIS — G31.09 FRONTAL LOBE DEMENTIA: ICD-10-CM

## 2022-12-01 DIAGNOSIS — R26.2 DIFFICULTY WALKING: ICD-10-CM

## 2022-12-01 DIAGNOSIS — E11.65 TYPE 2 DIABETES MELLITUS WITH HYPERGLYCEMIA, WITHOUT LONG-TERM CURRENT USE OF INSULIN: ICD-10-CM

## 2022-12-01 LAB
ALBUMIN SERPL-MCNC: 4.7 G/DL (ref 3.5–5.2)
ALBUMIN/GLOB SERPL: 1.6 G/DL
ALP SERPL-CCNC: 86 U/L (ref 39–117)
ALT SERPL W P-5'-P-CCNC: 16 U/L (ref 1–41)
ANION GAP SERPL CALCULATED.3IONS-SCNC: 13 MMOL/L (ref 5–15)
AST SERPL-CCNC: 16 U/L (ref 1–40)
BASOPHILS # BLD AUTO: 0.04 10*3/MM3 (ref 0–0.2)
BASOPHILS NFR BLD AUTO: 0.4 % (ref 0–1.5)
BILIRUB SERPL-MCNC: 1.1 MG/DL (ref 0–1.2)
BUN SERPL-MCNC: 15 MG/DL (ref 8–23)
BUN/CREAT SERPL: 16.1 (ref 7–25)
CALCIUM SPEC-SCNC: 9.9 MG/DL (ref 8.6–10.5)
CHLORIDE SERPL-SCNC: 101 MMOL/L (ref 98–107)
CO2 SERPL-SCNC: 23 MMOL/L (ref 22–29)
CREAT SERPL-MCNC: 0.93 MG/DL (ref 0.76–1.27)
DEPRECATED RDW RBC AUTO: 43.7 FL (ref 37–54)
EGFRCR SERPLBLD CKD-EPI 2021: 83.5 ML/MIN/1.73
EOSINOPHIL # BLD AUTO: 0 10*3/MM3 (ref 0–0.4)
EOSINOPHIL NFR BLD AUTO: 0 % (ref 0.3–6.2)
ERYTHROCYTE [DISTWIDTH] IN BLOOD BY AUTOMATED COUNT: 13.7 % (ref 12.3–15.4)
FLUAV AG NPH QL: NEGATIVE
FLUBV AG NPH QL IA: NEGATIVE
GLOBULIN UR ELPH-MCNC: 3 GM/DL
GLUCOSE SERPL-MCNC: 224 MG/DL (ref 65–99)
HCT VFR BLD AUTO: 50.2 % (ref 37.5–51)
HGB BLD-MCNC: 17.6 G/DL (ref 13–17.7)
HOLD SPECIMEN: NORMAL
HOLD SPECIMEN: NORMAL
IMM GRANULOCYTES # BLD AUTO: 0.05 10*3/MM3 (ref 0–0.05)
IMM GRANULOCYTES NFR BLD AUTO: 0.5 % (ref 0–0.5)
LYMPHOCYTES # BLD AUTO: 0.38 10*3/MM3 (ref 0.7–3.1)
LYMPHOCYTES NFR BLD AUTO: 3.6 % (ref 19.6–45.3)
MAGNESIUM SERPL-MCNC: 1.7 MG/DL (ref 1.6–2.4)
MCH RBC QN AUTO: 30.7 PG (ref 26.6–33)
MCHC RBC AUTO-ENTMCNC: 35.1 G/DL (ref 31.5–35.7)
MCV RBC AUTO: 87.6 FL (ref 79–97)
MONOCYTES # BLD AUTO: 0.49 10*3/MM3 (ref 0.1–0.9)
MONOCYTES NFR BLD AUTO: 4.6 % (ref 5–12)
NEUTROPHILS NFR BLD AUTO: 9.58 10*3/MM3 (ref 1.7–7)
NEUTROPHILS NFR BLD AUTO: 90.9 % (ref 42.7–76)
NRBC BLD AUTO-RTO: 0 /100 WBC (ref 0–0.2)
PLATELET # BLD AUTO: 213 10*3/MM3 (ref 140–450)
PMV BLD AUTO: 10.3 FL (ref 6–12)
POTASSIUM SERPL-SCNC: 4.1 MMOL/L (ref 3.5–5.2)
PROT SERPL-MCNC: 7.7 G/DL (ref 6–8.5)
RBC # BLD AUTO: 5.73 10*6/MM3 (ref 4.14–5.8)
SODIUM SERPL-SCNC: 137 MMOL/L (ref 136–145)
TROPONIN T SERPL-MCNC: <0.01 NG/ML (ref 0–0.03)
WBC NRBC COR # BLD: 10.54 10*3/MM3 (ref 3.4–10.8)
WHOLE BLOOD HOLD COAG: NORMAL
WHOLE BLOOD HOLD SPECIMEN: NORMAL

## 2022-12-01 PROCEDURE — U0004 COV-19 TEST NON-CDC HGH THRU: HCPCS

## 2022-12-01 PROCEDURE — 93005 ELECTROCARDIOGRAM TRACING: CPT

## 2022-12-01 PROCEDURE — 87804 INFLUENZA ASSAY W/OPTIC: CPT

## 2022-12-01 PROCEDURE — 85025 COMPLETE CBC W/AUTO DIFF WBC: CPT

## 2022-12-01 PROCEDURE — 83735 ASSAY OF MAGNESIUM: CPT

## 2022-12-01 PROCEDURE — 80053 COMPREHEN METABOLIC PANEL: CPT

## 2022-12-01 PROCEDURE — 93010 ELECTROCARDIOGRAM REPORT: CPT | Performed by: INTERNAL MEDICINE

## 2022-12-01 PROCEDURE — 84484 ASSAY OF TROPONIN QUANT: CPT

## 2022-12-01 PROCEDURE — 99285 EMERGENCY DEPT VISIT HI MDM: CPT

## 2022-12-01 PROCEDURE — 36415 COLL VENOUS BLD VENIPUNCTURE: CPT

## 2022-12-01 PROCEDURE — 93005 ELECTROCARDIOGRAM TRACING: CPT | Performed by: EMERGENCY MEDICINE

## 2022-12-01 PROCEDURE — 71045 X-RAY EXAM CHEST 1 VIEW: CPT

## 2022-12-01 RX ORDER — SODIUM CHLORIDE 0.9 % (FLUSH) 0.9 %
10 SYRINGE (ML) INJECTION AS NEEDED
Status: DISCONTINUED | OUTPATIENT
Start: 2022-12-01 | End: 2022-12-07 | Stop reason: HOSPADM

## 2022-12-01 NOTE — ED PROVIDER NOTES
Time: 11:27 PM EST  Chief Complaint:   Chief Complaint   Patient presents with   • Chills   • Weakness - Generalized           History of Present Illness:            Patient is a 77 y.o. year old male who presents to the emergency department with generalized weakness, chills, cough and body aches since last night.     Pt reports he has been sick since yesterday with fever, a non productive cough, nausea and chills. Family at bedside report they had similar symptoms that has resolved. Family report he is more confused than baseline. Pt is not oriented to time. Pt denies diarrhea or vomiting.  Family reports that pt doesn't want to take his medication and hasn't in the last several days. Family reports that pt's oxygen is in mid to upper 90's at baseline, but today dropped to 80's. Pt denies a history of heart or lung issues.            History provided by:  Patient   used: No            Patient Care Team  Primary Care Provider: Felix Navarro Jr., MD    Past Medical History:     Allergies   Allergen Reactions   • Celecoxib Rash   • Ibuprofen Rash   • Promethazine Hcl Rash   • Warfarin Rash     Past Medical History:   Diagnosis Date   • Aorta disorder (HCC)    • Arthritis    • Degenerative joint disease    • Dementia (HCC)     FRONTAL LOBE   • Depression    • Diabetes mellitus (HCC)    • Hyperlipidemia    • Hypertension    • Kidney stone    • Stroke (HCC)     MILD RIGHT SIDE DEFICITS     Past Surgical History:   Procedure Laterality Date   • CHOLECYSTECTOMY     • CYSTOSCOPY BLADDER STONE LITHOTRIPSY     • KIDNEY STONE SURGERY       History reviewed. No pertinent family history.    Home Medications:  Prior to Admission medications    Medication Sig Start Date End Date Taking? Authorizing Provider   amLODIPine (NORVASC) 5 MG tablet amlodipine 5 mg tablet    Provider, MD Mark   atorvastatin (LIPITOR) 80 MG tablet Take 1 tablet by mouth Daily. 6/1/22   Felix Navarro Jr., MD    budesonide (Pulmicort) 0.5 MG/2ML nebulizer solution Take 2 mL by nebulization Daily. 2/1/22   Lissett Ugalde APRN   carvedilol (COREG) 12.5 MG tablet carvedilol 12.5 mg tablet    Mark Martinez MD   Cholecalciferol 125 MCG (5000 UT) tablet Vitamin D3 5,000 unit oral tablet take 1 tablet by oral route daily   Suspended    Mark Martinez MD   citalopram (CeleXA) 20 MG tablet Take 1 tablet by mouth Daily. 10/26/21   Felix Navarro Jr., MD   docusate sodium (COLACE) 100 MG capsule  mg oral capsule take 1 capsule (100 mg) by oral route 2 times per day as needed   Suspended    Mark Martinez MD   empagliflozin (Jardiance) 25 MG tablet tablet Take 1 tablet by mouth Daily. 9/27/22   Felix Navarro Jr., MD   glucose blood test strip 1 each by Other route 4 (Four) Times a Day Before Meals & at Bedtime. USE 1 STRIP TO CHECK GLUCOSE BEFORE MEALS AND AT BEDTIME 6/15/21   Felix Navarro Jr., MD   Lidocaine 4 % patch lidocaine 4 % topical adhesive patch,medicated apply as directed 1 patch by transdermal route daily 12/10/2020  Active 12/10/20   Mark Martinez MD   lisinopril (PRINIVIL,ZESTRIL) 20 MG tablet Take 1 tablet by mouth Daily. 6/1/22   Felix Navarro Jr., MD   meclizine (ANTIVERT) 25 MG tablet meclizine 25 mg oral tablet take 1 tablet (25 mg) by oral route 3 times per day as needed   Suspended    Mark Martinez MD   melatonin 5 MG tablet tablet Take 5 mg by mouth Every Night.    Mark Martinez MD   metFORMIN (GLUCOPHAGE) 500 MG tablet Take 1 tablet by mouth 2 (Two) Times a Day With Meals. 11/2/21   Felix Navarro Jr., MD   naproxen (NAPROSYN) 375 MG tablet Take 1 tablet by mouth 2 (Two) Times a Day As Needed for Mild Pain . 4/2/22   Quinten Gutiérrez APRN   omeprazole (priLOSEC) 40 MG capsule omeprazole 40 mg oral capsule,delayed release(DR/EC) take 1 capsule (40 mg) by oral route once daily before a meal    "Suspended    Provider, MD Mark   ondansetron ODT (ZOFRAN-ODT) 4 MG disintegrating tablet Place 1 tablet on the tongue Every 6 (Six) Hours As Needed for Nausea or Vomiting. 1/18/22   Luther Grace DO   SITagliptin (Januvia) 100 MG tablet Take 1 tablet by mouth Daily. 6/22/22   Lissett Ugalde APRN   tamsulosin (FLOMAX) 0.4 MG capsule 24 hr capsule Take 1 capsule by mouth Daily. 12/14/21   Felix Navarro Jr., MD        Social History:   Social History     Tobacco Use   • Smoking status: Never   • Smokeless tobacco: Never   Vaping Use   • Vaping Use: Never used   Substance Use Topics   • Alcohol use: Never   • Drug use: Never         Review of Systems:  Review of Systems   Constitutional: Positive for chills, fatigue and fever.   HENT: Negative for congestion, ear pain and sore throat.    Eyes: Negative for pain.   Respiratory: Positive for cough. Negative for chest tightness and shortness of breath.    Cardiovascular: Negative for chest pain.   Gastrointestinal: Negative for abdominal pain, diarrhea, nausea and vomiting.   Genitourinary: Negative for flank pain and hematuria.   Musculoskeletal: Positive for myalgias. Negative for joint swelling.   Skin: Negative for pallor.   Neurological: Positive for weakness. Negative for seizures and headaches.        Physical Exam:  /75   Pulse 80   Temp 98.9 °F (37.2 °C) (Oral)   Resp 23   Ht 175.3 cm (69\")   Wt 80.9 kg (178 lb 5.6 oz)   SpO2 94%   BMI 26.34 kg/m²     Physical Exam  Vitals and nursing note reviewed.   Constitutional:       General: He is not in acute distress.     Appearance: Normal appearance. He is not toxic-appearing.   HENT:      Head: Normocephalic and atraumatic.      Mouth/Throat:      Mouth: Mucous membranes are moist.   Eyes:      General: No scleral icterus.     Extraocular Movements: Extraocular movements intact.      Conjunctiva/sclera: Conjunctivae normal.   Cardiovascular:      Rate and Rhythm: Normal rate and " regular rhythm.      Pulses: Normal pulses.      Heart sounds: Normal heart sounds.   Pulmonary:      Effort: Pulmonary effort is normal. No respiratory distress.      Breath sounds: Decreased breath sounds and rhonchi present.   Abdominal:      General: Abdomen is flat. There is no distension.      Palpations: Abdomen is soft.      Tenderness: There is no abdominal tenderness.   Musculoskeletal:         General: Normal range of motion.      Cervical back: Normal range of motion and neck supple.   Skin:     General: Skin is warm and dry.      Coloration: Skin is not cyanotic.   Neurological:      General: No focal deficit present.      Mental Status: He is alert. He is disoriented.      Comments: Oriented to self and place not time   Psychiatric:         Mood and Affect: Mood normal.      Comments: Oriented to self                Medications in the Emergency Department:  Medications   sodium chloride 0.9 % flush 10 mL (has no administration in time range)   amLODIPine (NORVASC) tablet 5 mg (has no administration in time range)   atorvastatin (LIPITOR) tablet 80 mg (has no administration in time range)   budesonide (PULMICORT) nebulizer solution 0.5 mg (has no administration in time range)   carvedilol (COREG) tablet 12.5 mg (has no administration in time range)   lisinopril (PRINIVIL,ZESTRIL) tablet 20 mg (has no administration in time range)   melatonin tablet 5 mg (has no administration in time range)   pantoprazole (PROTONIX) EC tablet 40 mg (has no administration in time range)   tamsulosin (FLOMAX) 24 hr capsule 0.4 mg (has no administration in time range)   ipratropium-albuterol (DUO-NEB) nebulizer solution 3 mL (has no administration in time range)   nitroglycerin (NITROSTAT) SL tablet 0.4 mg (has no administration in time range)   sodium chloride 0.9 % flush 10 mL (has no administration in time range)   sodium chloride 0.9 % flush 10 mL (has no administration in time range)   sodium chloride 0.9 % infusion  40 mL (has no administration in time range)   Enoxaparin Sodium (LOVENOX) syringe 40 mg (has no administration in time range)   insulin detemir (LEVEMIR) injection 1-200 Units (has no administration in time range)   insulin lispro (humaLOG) injection 1-200 Units (has no administration in time range)   insulin lispro (humaLOG) injection 1-200 Units (has no administration in time range)   dextrose (GLUTOSE) oral gel 15 g (has no administration in time range)   dextrose (D50W) (25 g/50 mL) IV injection 10-50 mL (has no administration in time range)   glucagon (human recombinant) (GLUCAGEN DIAGNOSTIC) injection 1 mg (has no administration in time range)   sodium chloride 0.9 % bolus 1,000 mL (1,000 mL Intravenous New Bag 12/2/22 0013)   ipratropium-albuterol (DUO-NEB) nebulizer solution 3 mL (3 mL Nebulization Given 12/2/22 0127)   acetaminophen (TYLENOL) tablet 975 mg (975 mg Oral Given 12/2/22 0013)   iopamidol (ISOVUE-370) 76 % injection 100 mL (100 mL Intravenous Given 12/2/22 0202)        Labs  Lab Results (last 24 hours)     Procedure Component Value Units Date/Time    CBC & Differential [[id number]]  (Abnormal) Collected: 12/01/22 1911    Specimen: Blood Updated: 12/01/22 1926    Narrative:      The following orders were created for panel order CBC & Differential.  Procedure                               Abnormality         Status                     ---------                               -----------         ------                     CBC Auto Differential[[id number]]        Abnormal            Final result                 Please view results for these tests on the individual orders.    Comprehensive Metabolic Panel [[id number]]  (Abnormal) Collected: 12/01/22 1911    Specimen: Blood Updated: 12/01/22 1950     Glucose 224 mg/dL      BUN 15 mg/dL      Creatinine 0.93 mg/dL      Sodium 137 mmol/L      Potassium 4.1 mmol/L      Chloride 101 mmol/L      CO2 23.0 mmol/L      Calcium 9.9 mg/dL      Total Protein 7.7 g/dL       Albumin 4.70 g/dL      ALT (SGPT) 16 U/L      AST (SGOT) 16 U/L      Alkaline Phosphatase 86 U/L      Total Bilirubin 1.1 mg/dL      Globulin 3.0 gm/dL      A/G Ratio 1.6 g/dL      BUN/Creatinine Ratio 16.1     Anion Gap 13.0 mmol/L      eGFR 83.5 mL/min/1.73      Comment: National Kidney Foundation and American Society of Nephrology (ASN) Task Force recommended calculation based on the Chronic Kidney Disease Epidemiology Collaboration (CKD-EPI) equation refit without adjustment for race.       Narrative:      GFR Normal >60  Chronic Kidney Disease <60  Kidney Failure <15    The GFR formula is only valid for adults with stable renal function between ages 18 and 70.    Troponin [[id number]]  (Normal) Collected: 12/01/22 1911    Specimen: Blood Updated: 12/01/22 1950     Troponin T <0.010 ng/mL     Narrative:      Troponin T Reference Range:  <= 0.03 ng/mL-   Negative for AMI  >0.03 ng/mL-     Abnormal for myocardial necrosis.  Clinicians would have to utilize clinical acumen, EKG, Troponin and serial changes to determine if it is an Acute Myocardial Infarction or myocardial injury due to an underlying chronic condition.       Results may be falsely decreased if patient taking Biotin.      Magnesium [[id number]]  (Normal) Collected: 12/01/22 1911    Specimen: Blood Updated: 12/01/22 1950     Magnesium 1.7 mg/dL     CBC Auto Differential [[id number]]  (Abnormal) Collected: 12/01/22 1911    Specimen: Blood Updated: 12/01/22 1926     WBC 10.54 10*3/mm3      RBC 5.73 10*6/mm3      Hemoglobin 17.6 g/dL      Hematocrit 50.2 %      MCV 87.6 fL      MCH 30.7 pg      MCHC 35.1 g/dL      RDW 13.7 %      RDW-SD 43.7 fl      MPV 10.3 fL      Platelets 213 10*3/mm3      Neutrophil % 90.9 %      Lymphocyte % 3.6 %      Monocyte % 4.6 %      Eosinophil % 0.0 %      Basophil % 0.4 %      Immature Grans % 0.5 %      Neutrophils, Absolute 9.58 10*3/mm3      Lymphocytes, Absolute 0.38 10*3/mm3      Monocytes, Absolute 0.49 10*3/mm3       Eosinophils, Absolute 0.00 10*3/mm3      Basophils, Absolute 0.04 10*3/mm3      Immature Grans, Absolute 0.05 10*3/mm3      nRBC 0.0 /100 WBC     Influenza Antigen, Rapid - Swab, Nasopharynx [[id number]]  (Normal) Collected: 12/01/22 1913    Specimen: Swab from Nasopharynx Updated: 12/01/22 2005     Influenza A Ag, EIA Negative     Influenza B Ag, EIA Negative    COVID-19,APTIMA PANTHER([facility]),[facility]/ [facility], NP/OP SWAB IN UTM/VTM/SALINE TRANSPORT MEDIA,24 HR TAT - Swab, Nasopharynx [[id number]]  (Normal) Collected: 12/01/22 1913    Specimen: Swab from Nasopharynx Updated: 12/02/22 0205     COVID19 Not Detected    Narrative:      Fact sheet for providers: https://www.fda.gov/media/345170/download     Fact sheet for patients: https://www.fda.gov/media/371333/download    Test performed by RT PCR.    Blood Culture - Blood, Arm, Left [[id number]] Collected: 12/02/22 0021    Specimen: Blood from Arm, Left Updated: 12/02/22 0033    Blood Culture - Blood, Arm, Left [[id number]] Collected: 12/02/22 0021    Specimen: Blood from Arm, Left Updated: 12/02/22 0033    Urinalysis With Culture If Indicated - Urine, Clean Catch [[id number]]  (Abnormal) Collected: 12/02/22 0112    Specimen: Urine, Clean Catch Updated: 12/02/22 0141     Color, UA Dark Yellow     Appearance, UA Clear     pH, UA <=5.0     Specific Gravity, UA 1.028     Glucose, UA >=1000 mg/dL (3+)     Ketones, UA Trace     Bilirubin, UA Negative     Blood, UA Trace     Protein, UA 30 mg/dL (1+)     Leuk Esterase, UA Negative     Nitrite, UA Negative     Urobilinogen, UA 0.2 E.U./dL    Narrative:      In absence of clinical symptoms, the presence of pyuria, bacteria, and/or nitrites on the urinalysis result does not correlate with infection.    Urinalysis, Microscopic Only - Urine, Clean Catch [[id number]]  (Abnormal) Collected: 12/02/22 0112    Specimen: Urine, Clean Catch Updated: 12/02/22 0141     RBC, UA 6-12 /HPF      WBC, UA 0-2 /HPF      Comment: Urine culture not  indicated.        Bacteria, UA None Seen /HPF      Squamous Epithelial Cells, UA 0-2 /HPF      Hyaline Casts, UA 3-6 /LPF      Methodology Automated Microscopy           Imaging:  CT Chest With Contrast Diagnostic    Result Date: 12/2/2022  PROCEDURE: CT CHEST W CONTRAST DIAGNOSTIC  COMPARISON:  [facility], CT, CT CHEST WO CONTRAST DIAGNOSTIC, 10/06/2021, 22:27.  [facility], CR, XR CHEST 1 VW, 12/01/2022, 20:41.  [facility], CT, CT ABDOMEN PELVIS W CONTRAST, 5/09/2022, 23:30. INDICATIONS: DYSPNEA, COUGH X 2 DAYS  TECHNIQUE: After obtaining the patient's consent, CT images were obtained with non-ionic intravenous contrast material.   PROTOCOL:   Pulmonary embolism imaging protocol performed    RADIATION:   DLP: 367.9mGy*cm   Automated exposure control was utilized to minimize radiation dose. CONTRAST: 70cc Isovue 370 I.V.  FINDINGS:  The central tracheobronchial tree is clear.  The lungs are clear.  There is no pleural effusion.  The heart size appears normal.  The great vessels are normal in caliber.  There is no evidence of pulmonary embolus.  No abnormally enlarged lymph nodes are identified.  Partial evaluation of the upper abdomen demonstrates changes of cholecystectomy.  No aggressive osseous lesions are identified.        1. Negative for pulmonary embolus. 2. No acute cardiopulmonary process.     JENNIFFER MAYERS MD       Electronically Signed and Approved By: JENNIFFER MAYERS MD on 12/02/2022 at 2:13             XR Chest 1 View    Result Date: 12/1/2022  PROCEDURE: XR CHEST 1 VW  COMPARISON: [facility], CR, XR CHEST PA AND LATERAL, 2/01/2022, 14:03.  INDICATIONS: WEAKNESS, CHILLS, BODY ACHES  FINDINGS:  LUNGS: Normal.  No significant pulmonary parenchymal abnormalities.  VASCULATURE: Normal.  Unremarkable pulmonary vasculature.  CARDIAC: Normal.  No cardiac silhouette abnormality or cardiomegaly.  MEDIASTINUM: Normal.  No visible mass or  adenopathy.  PLEURA: Normal.  No effusion or pleural thickening.  BONES: Normal.  No fracture or visible bony lesion.  OTHER: Negative.        No acute cardiopulmonary process identified.       JENNIFFER MAYERS MD       Electronically Signed and Approved By: JENNIFFER MAYERS MD on 12/01/2022 at 21:56               Procedures:  Procedures    Progress  ED Course as of 12/02/22 0407   Fri Dec 02, 2022   0403 ECG 12 Lead ED Triage Standing Order; Weak / Dizzy / AMS  Sinus tachycardia with rate of 108. LVH. . MO interval normal. QTc interval is normal. No ST elevation or depression. No T wave abnormalities. No previous available for comparison. This EKG was interpreted by me.  [LD]      ED Course User Index  [LD] Wilfredo Ramires MD                            The patient was initially evaluated in the triage area where orders were placed. The patient was later dispositioned by Wilfredo Ramires MD.      The patient was advised to stay for completion of workup which includes but is not limited to communication of labs and radiological results, reassessment and plan. The patient was advised that leaving prior to disposition by a provider could result in critical findings that are not communicated to the patient.     Medical Decision Making:  MDM  Number of Diagnoses or Management Options  Diagnosis management comments: Patient presented to the emergency department with cough, generalized weakness, hypoxia.  At baseline patient is not on any supplemental oxygen.  Patient's O2 sat dropped to the mid 80s on room air he was placed on supplemental oxygen.  Labs were obtained and showed no significant abnormality other than elevated glucose.  According to family he has not been taking his medication as prescribed.  Flu as well as COVID were both negative.  Chest x-ray showed no acute finding.  CT was subsequently obtained that showed no acute findings.  With hypoxia and generalized weakness discussed patient with  hospitalist and he will be admitted for further care.       Amount and/or Complexity of Data Reviewed  Clinical lab tests: ordered and reviewed  Tests in the radiology section of CPT®: ordered and reviewed  Review and summarize past medical records: yes  Discuss the patient with other providers: yes  Independent visualization of images, tracings, or specimens: yes    Risk of Complications, Morbidity, and/or Mortality  Presenting problems: moderate  Management options: moderate             The following orders were placed after triage and evaluation:  Orders Placed This Encounter   Procedures   • Influenza Antigen, Rapid - Swab, Nasopharynx   • COVID-19,APTIMA PANTHER([facility]),[facility]/ [facility], NP/OP SWAB IN UTM/VTM/SALINE TRANSPORT MEDIA,24 HR TAT - Swab, Nasopharynx   • Blood Culture - Blood,   • Blood Culture - Blood,   • XR Chest 1 View   • CT Chest With Contrast Diagnostic   • [place] Draw   • Comprehensive Metabolic Panel   • Troponin   • Magnesium   • Urinalysis With Culture If Indicated -   • CBC Auto Differential   • Urinalysis, Microscopic Only - Urine, Clean Catch   • Potassium   • Magnesium   • Troponin   • Blood Gas, Arterial -With Co-Ox Panel: Yes   • CBC Auto Differential   • Comprehensive Metabolic Panel   • Hemoglobin A1c   • Magnesium   • Phosphorus   • Diet: Liquid Diets; Full Liquid; Texture: Regular Texture (IDDSI 7); Fluid Consistency: Thin (IDDSI 0)   • Undress & Gown   • Continuous Pulse Oximetry   • Vital Signs   • Orthostatic Blood Pressure   • Vital Signs   • Continuous Cardiac Monitoring   • Telemetry - Pulse Oximetry   • Notify Provider if ACLS Protocol Activated   • Intake & Output   • Weigh Patient   • Oral Care   • Saline Lock & Maintain IV Access   • Initiate Glucommander™ SQ   • RN to Order STAT Glucose (Lab Performed) for POC Glucose <10 or >600   • Code Status and Medical Interventions:   • Inpatient Hospitalist Consult   • Oxygen Therapy- Nasal Cannula; Titrate for SPO2: 90% - 95%   •  POC Glucose Once   • POC Glucose PRN   • POC Glucose 4x Daily AC & at Bedtime   • ECG 12 Lead ED Triage Standing Order; Weak / Dizzy / AMS   • ECG 12 Lead Chest Pain   • Insert Peripheral IV   • Insert Peripheral IV   • Initiate Observation Status   • Fall Precautions   • CBC & Differential   • Green Top (Gel)   • Lavender Top   • Gold Top - SST   • Light Blue Top       Final diagnoses:   Acute viral syndrome   Acute respiratory failure with hypoxia (HCC)          Disposition:  ED Disposition     ED Disposition   Decision to Admit    Condition   --    Comment   Level of Care: Remote Telemetry [26]   Diagnosis: Acute viral syndrome [3541500]               This medical record created using voice recognition software.    Documentation assistance provided by Eduardo Mora acting as scribe for Wilfredo Ramires MD. Information recorded by the scribe was done at my direction and has been verified and validated by me.          Eduardo Mora  12/02/22 0018       Wilfredo Ramires MD  12/02/22 0408

## 2022-12-02 ENCOUNTER — APPOINTMENT (OUTPATIENT)
Dept: CT IMAGING | Facility: HOSPITAL | Age: 77
DRG: 189 | End: 2022-12-02
Payer: MEDICARE

## 2022-12-02 PROBLEM — B34.9 ACUTE VIRAL SYNDROME: Status: ACTIVE | Noted: 2022-12-02

## 2022-12-02 LAB
ALBUMIN SERPL-MCNC: 3.9 G/DL (ref 3.5–5.2)
ALBUMIN/GLOB SERPL: 1.3 G/DL
ALP SERPL-CCNC: 73 U/L (ref 39–117)
ALT SERPL W P-5'-P-CCNC: 15 U/L (ref 1–41)
ANION GAP SERPL CALCULATED.3IONS-SCNC: 13.2 MMOL/L (ref 5–15)
AST SERPL-CCNC: 20 U/L (ref 1–40)
BACTERIA UR QL AUTO: ABNORMAL /HPF
BASOPHILS # BLD AUTO: 0.04 10*3/MM3 (ref 0–0.2)
BASOPHILS NFR BLD AUTO: 0.5 % (ref 0–1.5)
BILIRUB SERPL-MCNC: 0.8 MG/DL (ref 0–1.2)
BILIRUB UR QL STRIP: NEGATIVE
BUN SERPL-MCNC: 17 MG/DL (ref 8–23)
BUN/CREAT SERPL: 15.9 (ref 7–25)
CALCIUM SPEC-SCNC: 9.1 MG/DL (ref 8.6–10.5)
CHLORIDE SERPL-SCNC: 102 MMOL/L (ref 98–107)
CLARITY UR: CLEAR
CO2 SERPL-SCNC: 21.8 MMOL/L (ref 22–29)
COLOR UR: ABNORMAL
CREAT SERPL-MCNC: 1.07 MG/DL (ref 0.76–1.27)
DEPRECATED RDW RBC AUTO: 47 FL (ref 37–54)
EGFRCR SERPLBLD CKD-EPI 2021: 70.6 ML/MIN/1.73
EOSINOPHIL # BLD AUTO: 0.01 10*3/MM3 (ref 0–0.4)
EOSINOPHIL NFR BLD AUTO: 0.1 % (ref 0.3–6.2)
ERYTHROCYTE [DISTWIDTH] IN BLOOD BY AUTOMATED COUNT: 13.9 % (ref 12.3–15.4)
GLOBULIN UR ELPH-MCNC: 2.9 GM/DL
GLUCOSE BLDC GLUCOMTR-MCNC: 174 MG/DL (ref 70–99)
GLUCOSE BLDC GLUCOMTR-MCNC: 178 MG/DL (ref 70–99)
GLUCOSE BLDC GLUCOMTR-MCNC: 197 MG/DL (ref 70–99)
GLUCOSE BLDC GLUCOMTR-MCNC: 197 MG/DL (ref 70–99)
GLUCOSE BLDC GLUCOMTR-MCNC: 250 MG/DL (ref 70–99)
GLUCOSE SERPL-MCNC: 196 MG/DL (ref 65–99)
GLUCOSE UR STRIP-MCNC: ABNORMAL MG/DL
HCT VFR BLD AUTO: 48.5 % (ref 37.5–51)
HGB BLD-MCNC: 16.6 G/DL (ref 13–17.7)
HGB UR QL STRIP.AUTO: ABNORMAL
HYALINE CASTS UR QL AUTO: ABNORMAL /LPF
IMM GRANULOCYTES # BLD AUTO: 0.03 10*3/MM3 (ref 0–0.05)
IMM GRANULOCYTES NFR BLD AUTO: 0.4 % (ref 0–0.5)
KETONES UR QL STRIP: ABNORMAL
LEUKOCYTE ESTERASE UR QL STRIP.AUTO: NEGATIVE
LYMPHOCYTES # BLD AUTO: 1.11 10*3/MM3 (ref 0.7–3.1)
LYMPHOCYTES NFR BLD AUTO: 13.1 % (ref 19.6–45.3)
MAGNESIUM SERPL-MCNC: 1.7 MG/DL (ref 1.6–2.4)
MCH RBC QN AUTO: 31.5 PG (ref 26.6–33)
MCHC RBC AUTO-ENTMCNC: 34.2 G/DL (ref 31.5–35.7)
MCV RBC AUTO: 92 FL (ref 79–97)
MONOCYTES # BLD AUTO: 1.01 10*3/MM3 (ref 0.1–0.9)
MONOCYTES NFR BLD AUTO: 11.9 % (ref 5–12)
NEUTROPHILS NFR BLD AUTO: 6.28 10*3/MM3 (ref 1.7–7)
NEUTROPHILS NFR BLD AUTO: 74 % (ref 42.7–76)
NITRITE UR QL STRIP: NEGATIVE
NRBC BLD AUTO-RTO: 0 /100 WBC (ref 0–0.2)
PH UR STRIP.AUTO: <=5 [PH] (ref 5–8)
PHOSPHATE SERPL-MCNC: 2.8 MG/DL (ref 2.5–4.5)
PLATELET # BLD AUTO: 189 10*3/MM3 (ref 140–450)
PMV BLD AUTO: 10.4 FL (ref 6–12)
POTASSIUM SERPL-SCNC: 4.1 MMOL/L (ref 3.5–5.2)
PROCALCITONIN SERPL-MCNC: 0.07 NG/ML (ref 0–0.25)
PROT SERPL-MCNC: 6.8 G/DL (ref 6–8.5)
PROT UR QL STRIP: ABNORMAL
RBC # BLD AUTO: 5.27 10*6/MM3 (ref 4.14–5.8)
RBC # UR STRIP: ABNORMAL /HPF
REF LAB TEST METHOD: ABNORMAL
SARS-COV-2 RNA PNL SPEC NAA+PROBE: NOT DETECTED
SODIUM SERPL-SCNC: 137 MMOL/L (ref 136–145)
SP GR UR STRIP: 1.03 (ref 1–1.03)
SQUAMOUS #/AREA URNS HPF: ABNORMAL /HPF
UROBILINOGEN UR QL STRIP: ABNORMAL
WBC # UR STRIP: ABNORMAL /HPF
WBC NRBC COR # BLD: 8.48 10*3/MM3 (ref 3.4–10.8)

## 2022-12-02 PROCEDURE — 36415 COLL VENOUS BLD VENIPUNCTURE: CPT

## 2022-12-02 PROCEDURE — 81001 URINALYSIS AUTO W/SCOPE: CPT | Performed by: EMERGENCY MEDICINE

## 2022-12-02 PROCEDURE — 25010000002 ENOXAPARIN PER 10 MG: Performed by: STUDENT IN AN ORGANIZED HEALTH CARE EDUCATION/TRAINING PROGRAM

## 2022-12-02 PROCEDURE — G0378 HOSPITAL OBSERVATION PER HR: HCPCS

## 2022-12-02 PROCEDURE — 63710000001 INSULIN LISPRO (HUMAN) PER 5 UNITS: Performed by: STUDENT IN AN ORGANIZED HEALTH CARE EDUCATION/TRAINING PROGRAM

## 2022-12-02 PROCEDURE — 82962 GLUCOSE BLOOD TEST: CPT

## 2022-12-02 PROCEDURE — 94799 UNLISTED PULMONARY SVC/PX: CPT

## 2022-12-02 PROCEDURE — 84145 PROCALCITONIN (PCT): CPT | Performed by: INTERNAL MEDICINE

## 2022-12-02 PROCEDURE — 80053 COMPREHEN METABOLIC PANEL: CPT | Performed by: STUDENT IN AN ORGANIZED HEALTH CARE EDUCATION/TRAINING PROGRAM

## 2022-12-02 PROCEDURE — 71260 CT THORAX DX C+: CPT

## 2022-12-02 PROCEDURE — 99222 1ST HOSP IP/OBS MODERATE 55: CPT | Performed by: STUDENT IN AN ORGANIZED HEALTH CARE EDUCATION/TRAINING PROGRAM

## 2022-12-02 PROCEDURE — 94760 N-INVAS EAR/PLS OXIMETRY 1: CPT

## 2022-12-02 PROCEDURE — 63710000001 PREDNISONE PER 1 MG: Performed by: INTERNAL MEDICINE

## 2022-12-02 PROCEDURE — 0 IOPAMIDOL PER 1 ML: Performed by: EMERGENCY MEDICINE

## 2022-12-02 PROCEDURE — 87040 BLOOD CULTURE FOR BACTERIA: CPT | Performed by: EMERGENCY MEDICINE

## 2022-12-02 PROCEDURE — 85025 COMPLETE CBC W/AUTO DIFF WBC: CPT | Performed by: STUDENT IN AN ORGANIZED HEALTH CARE EDUCATION/TRAINING PROGRAM

## 2022-12-02 PROCEDURE — 63710000001 INSULIN DETEMIR PER 5 UNITS: Performed by: INTERNAL MEDICINE

## 2022-12-02 PROCEDURE — 83735 ASSAY OF MAGNESIUM: CPT | Performed by: STUDENT IN AN ORGANIZED HEALTH CARE EDUCATION/TRAINING PROGRAM

## 2022-12-02 PROCEDURE — 63710000001 INSULIN LISPRO (HUMAN) PER 5 UNITS: Performed by: PHYSICIAN ASSISTANT

## 2022-12-02 PROCEDURE — 83036 HEMOGLOBIN GLYCOSYLATED A1C: CPT | Performed by: STUDENT IN AN ORGANIZED HEALTH CARE EDUCATION/TRAINING PROGRAM

## 2022-12-02 PROCEDURE — 94640 AIRWAY INHALATION TREATMENT: CPT

## 2022-12-02 PROCEDURE — 84100 ASSAY OF PHOSPHORUS: CPT | Performed by: STUDENT IN AN ORGANIZED HEALTH CARE EDUCATION/TRAINING PROGRAM

## 2022-12-02 RX ORDER — AZITHROMYCIN 250 MG/1
250 TABLET, FILM COATED ORAL DAILY
Status: COMPLETED | OUTPATIENT
Start: 2022-12-03 | End: 2022-12-06

## 2022-12-02 RX ORDER — DEXTROSE MONOHYDRATE 25 G/50ML
10-50 INJECTION, SOLUTION INTRAVENOUS
Status: DISCONTINUED | OUTPATIENT
Start: 2022-12-02 | End: 2022-12-02

## 2022-12-02 RX ORDER — INSULIN LISPRO 100 [IU]/ML
2-7 INJECTION, SOLUTION INTRAVENOUS; SUBCUTANEOUS
Status: DISCONTINUED | OUTPATIENT
Start: 2022-12-02 | End: 2022-12-07 | Stop reason: HOSPADM

## 2022-12-02 RX ORDER — PANTOPRAZOLE SODIUM 40 MG/1
40 TABLET, DELAYED RELEASE ORAL EVERY MORNING
Status: DISCONTINUED | OUTPATIENT
Start: 2022-12-02 | End: 2022-12-07 | Stop reason: HOSPADM

## 2022-12-02 RX ORDER — ATORVASTATIN CALCIUM 40 MG/1
80 TABLET, FILM COATED ORAL NIGHTLY
Status: DISCONTINUED | OUTPATIENT
Start: 2022-12-02 | End: 2022-12-07 | Stop reason: HOSPADM

## 2022-12-02 RX ORDER — NICOTINE POLACRILEX 4 MG
15 LOZENGE BUCCAL
Status: DISCONTINUED | OUTPATIENT
Start: 2022-12-02 | End: 2022-12-02

## 2022-12-02 RX ORDER — BUDESONIDE 0.5 MG/2ML
0.5 INHALANT ORAL
Status: DISCONTINUED | OUTPATIENT
Start: 2022-12-02 | End: 2022-12-07 | Stop reason: HOSPADM

## 2022-12-02 RX ORDER — INSULIN LISPRO 100 [IU]/ML
1-200 INJECTION, SOLUTION INTRAVENOUS; SUBCUTANEOUS
Status: DISCONTINUED | OUTPATIENT
Start: 2022-12-02 | End: 2022-12-02

## 2022-12-02 RX ORDER — DEXTROSE MONOHYDRATE 25 G/50ML
25 INJECTION, SOLUTION INTRAVENOUS
Status: DISCONTINUED | OUTPATIENT
Start: 2022-12-02 | End: 2022-12-07 | Stop reason: HOSPADM

## 2022-12-02 RX ORDER — AMLODIPINE BESYLATE 5 MG/1
5 TABLET ORAL
Status: DISCONTINUED | OUTPATIENT
Start: 2022-12-02 | End: 2022-12-07

## 2022-12-02 RX ORDER — CHOLECALCIFEROL (VITAMIN D3) 125 MCG
5 CAPSULE ORAL NIGHTLY
Status: DISCONTINUED | OUTPATIENT
Start: 2022-12-02 | End: 2022-12-07 | Stop reason: HOSPADM

## 2022-12-02 RX ORDER — SODIUM CHLORIDE 0.9 % (FLUSH) 0.9 %
10 SYRINGE (ML) INJECTION AS NEEDED
Status: DISCONTINUED | OUTPATIENT
Start: 2022-12-02 | End: 2022-12-07 | Stop reason: HOSPADM

## 2022-12-02 RX ORDER — IPRATROPIUM BROMIDE AND ALBUTEROL SULFATE 2.5; .5 MG/3ML; MG/3ML
3 SOLUTION RESPIRATORY (INHALATION)
Status: DISCONTINUED | OUTPATIENT
Start: 2022-12-02 | End: 2022-12-04

## 2022-12-02 RX ORDER — AZITHROMYCIN 250 MG/1
500 TABLET, FILM COATED ORAL DAILY
Status: COMPLETED | OUTPATIENT
Start: 2022-12-02 | End: 2022-12-02

## 2022-12-02 RX ORDER — NITROGLYCERIN 0.4 MG/1
0.4 TABLET SUBLINGUAL
Status: DISCONTINUED | OUTPATIENT
Start: 2022-12-02 | End: 2022-12-07 | Stop reason: HOSPADM

## 2022-12-02 RX ORDER — SODIUM CHLORIDE 0.9 % (FLUSH) 0.9 %
10 SYRINGE (ML) INJECTION EVERY 12 HOURS SCHEDULED
Status: DISCONTINUED | OUTPATIENT
Start: 2022-12-02 | End: 2022-12-07 | Stop reason: HOSPADM

## 2022-12-02 RX ORDER — ENOXAPARIN SODIUM 100 MG/ML
40 INJECTION SUBCUTANEOUS DAILY
Status: DISCONTINUED | OUTPATIENT
Start: 2022-12-02 | End: 2022-12-07 | Stop reason: HOSPADM

## 2022-12-02 RX ORDER — SODIUM CHLORIDE 9 MG/ML
40 INJECTION, SOLUTION INTRAVENOUS AS NEEDED
Status: DISCONTINUED | OUTPATIENT
Start: 2022-12-02 | End: 2022-12-07 | Stop reason: HOSPADM

## 2022-12-02 RX ORDER — TAMSULOSIN HYDROCHLORIDE 0.4 MG/1
0.4 CAPSULE ORAL DAILY
Status: DISCONTINUED | OUTPATIENT
Start: 2022-12-02 | End: 2022-12-07 | Stop reason: HOSPADM

## 2022-12-02 RX ORDER — CARVEDILOL 12.5 MG/1
12.5 TABLET ORAL 2 TIMES DAILY WITH MEALS
Status: DISCONTINUED | OUTPATIENT
Start: 2022-12-02 | End: 2022-12-05

## 2022-12-02 RX ORDER — SODIUM CHLORIDE 9 MG/ML
100 INJECTION, SOLUTION INTRAVENOUS CONTINUOUS
Status: DISCONTINUED | OUTPATIENT
Start: 2022-12-02 | End: 2022-12-07 | Stop reason: HOSPADM

## 2022-12-02 RX ORDER — IPRATROPIUM BROMIDE AND ALBUTEROL SULFATE 2.5; .5 MG/3ML; MG/3ML
3 SOLUTION RESPIRATORY (INHALATION)
Status: DISCONTINUED | OUTPATIENT
Start: 2022-12-02 | End: 2022-12-02

## 2022-12-02 RX ORDER — ACETAMINOPHEN 325 MG/1
650 TABLET ORAL EVERY 6 HOURS PRN
Status: DISCONTINUED | OUTPATIENT
Start: 2022-12-02 | End: 2022-12-07 | Stop reason: HOSPADM

## 2022-12-02 RX ORDER — IPRATROPIUM BROMIDE AND ALBUTEROL SULFATE 2.5; .5 MG/3ML; MG/3ML
3 SOLUTION RESPIRATORY (INHALATION) ONCE
Status: COMPLETED | OUTPATIENT
Start: 2022-12-02 | End: 2022-12-02

## 2022-12-02 RX ORDER — INSULIN LISPRO 100 [IU]/ML
1-200 INJECTION, SOLUTION INTRAVENOUS; SUBCUTANEOUS AS NEEDED
Status: DISCONTINUED | OUTPATIENT
Start: 2022-12-02 | End: 2022-12-07 | Stop reason: HOSPADM

## 2022-12-02 RX ORDER — NICOTINE POLACRILEX 4 MG
15 LOZENGE BUCCAL
Status: DISCONTINUED | OUTPATIENT
Start: 2022-12-02 | End: 2022-12-07 | Stop reason: HOSPADM

## 2022-12-02 RX ORDER — LISINOPRIL 20 MG/1
20 TABLET ORAL DAILY
Status: DISCONTINUED | OUTPATIENT
Start: 2022-12-02 | End: 2022-12-07 | Stop reason: HOSPADM

## 2022-12-02 RX ORDER — ACETAMINOPHEN 325 MG/1
975 TABLET ORAL ONCE
Status: COMPLETED | OUTPATIENT
Start: 2022-12-02 | End: 2022-12-02

## 2022-12-02 RX ORDER — PREDNISONE 20 MG/1
40 TABLET ORAL
Status: COMPLETED | OUTPATIENT
Start: 2022-12-02 | End: 2022-12-06

## 2022-12-02 RX ADMIN — INSULIN DETEMIR 5 UNITS: 100 INJECTION, SOLUTION SUBCUTANEOUS at 14:14

## 2022-12-02 RX ADMIN — ACETAMINOPHEN 650 MG: 325 TABLET ORAL at 21:14

## 2022-12-02 RX ADMIN — IPRATROPIUM BROMIDE AND ALBUTEROL SULFATE 3 ML: .5; 3 SOLUTION RESPIRATORY (INHALATION) at 01:27

## 2022-12-02 RX ADMIN — INSULIN LISPRO 1 UNITS: 100 INJECTION, SOLUTION INTRAVENOUS; SUBCUTANEOUS at 04:26

## 2022-12-02 RX ADMIN — AMLODIPINE BESYLATE 5 MG: 5 TABLET ORAL at 09:35

## 2022-12-02 RX ADMIN — ACETAMINOPHEN 650 MG: 325 TABLET ORAL at 10:33

## 2022-12-02 RX ADMIN — SODIUM CHLORIDE 1000 ML: 9 INJECTION, SOLUTION INTRAVENOUS at 00:13

## 2022-12-02 RX ADMIN — TAMSULOSIN HYDROCHLORIDE 0.4 MG: 0.4 CAPSULE ORAL at 09:35

## 2022-12-02 RX ADMIN — BUDESONIDE 0.5 MG: 0.5 INHALANT ORAL at 08:05

## 2022-12-02 RX ADMIN — ACETAMINOPHEN 975 MG: 325 TABLET ORAL at 00:13

## 2022-12-02 RX ADMIN — ATORVASTATIN CALCIUM 80 MG: 40 TABLET, FILM COATED ORAL at 21:08

## 2022-12-02 RX ADMIN — IPRATROPIUM BROMIDE AND ALBUTEROL SULFATE 3 ML: .5; 3 SOLUTION RESPIRATORY (INHALATION) at 08:05

## 2022-12-02 RX ADMIN — CARVEDILOL 12.5 MG: 12.5 TABLET, FILM COATED ORAL at 09:35

## 2022-12-02 RX ADMIN — LISINOPRIL 20 MG: 20 TABLET ORAL at 09:35

## 2022-12-02 RX ADMIN — PREDNISONE 40 MG: 20 TABLET ORAL at 14:14

## 2022-12-02 RX ADMIN — ENOXAPARIN SODIUM 40 MG: 100 INJECTION SUBCUTANEOUS at 09:36

## 2022-12-02 RX ADMIN — INSULIN LISPRO 4 UNITS: 100 INJECTION, SOLUTION INTRAVENOUS; SUBCUTANEOUS at 22:12

## 2022-12-02 RX ADMIN — IPRATROPIUM BROMIDE AND ALBUTEROL SULFATE 3 ML: 2.5; .5 SOLUTION RESPIRATORY (INHALATION) at 20:11

## 2022-12-02 RX ADMIN — IPRATROPIUM BROMIDE AND ALBUTEROL SULFATE 3 ML: 2.5; .5 SOLUTION RESPIRATORY (INHALATION) at 15:19

## 2022-12-02 RX ADMIN — Medication 10 ML: at 09:36

## 2022-12-02 RX ADMIN — PANTOPRAZOLE SODIUM 40 MG: 40 TABLET, DELAYED RELEASE ORAL at 06:29

## 2022-12-02 RX ADMIN — Medication 5 MG: at 21:08

## 2022-12-02 RX ADMIN — CARVEDILOL 12.5 MG: 12.5 TABLET, FILM COATED ORAL at 17:56

## 2022-12-02 RX ADMIN — AZITHROMYCIN MONOHYDRATE 500 MG: 250 TABLET ORAL at 14:14

## 2022-12-02 RX ADMIN — IOPAMIDOL 100 ML: 755 INJECTION, SOLUTION INTRAVENOUS at 02:02

## 2022-12-02 RX ADMIN — SODIUM CHLORIDE 100 ML/HR: 9 INJECTION, SOLUTION INTRAVENOUS at 14:12

## 2022-12-02 NOTE — ED NOTES
Pt pulse ox reading 88% on room air. This RN sat pt up and repositioned him d/t slouching and O2 stayed 88% room air. This RN placed pt on 2L NC and pt is now 91% on 2L O2 NC.

## 2022-12-02 NOTE — PROGRESS NOTES
Admitted earlier in the day for AHRF. [place] to be 2/2 viral process (covid/flu neg in ED). He has some wheezing on exam. Will inc duoneb frequency and add prednisone and azithromycin. Start some IVF as he has not been eating much. Will start levemir preemptively given he will be going on prednisone -- not on insulin at home. Check procal

## 2022-12-02 NOTE — ED NOTES
PER SON PT DOES NOT TAKE HIS MEDICATIONS DAILY, HE WILL ONLY TAKE MEDICATION IF HE FEELS REALLY BAD

## 2022-12-03 ENCOUNTER — APPOINTMENT (OUTPATIENT)
Dept: CARDIOLOGY | Facility: HOSPITAL | Age: 77
DRG: 189 | End: 2022-12-03
Payer: MEDICARE

## 2022-12-03 LAB
BH CV ECHO MEAS - AO ROOT DIAM: 3 CM
BH CV ECHO MEAS - EDV(MOD-SP2): 71 ML
BH CV ECHO MEAS - EDV(MOD-SP4): 75 ML
BH CV ECHO MEAS - EF(MOD-BP): 57.3 %
BH CV ECHO MEAS - ESV(MOD-SP2): 25 ML
BH CV ECHO MEAS - ESV(MOD-SP4): 32 ML
BH CV ECHO MEAS - IVSD: 1.1 CM
BH CV ECHO MEAS - LA DIMENSION: 3.2 CM
BH CV ECHO MEAS - LAT PEAK E' VEL: 8.1 CM/SEC
BH CV ECHO MEAS - LVIDD: 4.9 CM
BH CV ECHO MEAS - LVIDS: 3.3 CM
BH CV ECHO MEAS - LVOT DIAM: 2 CM
BH CV ECHO MEAS - LVPWD: 1.1 CM
BH CV ECHO MEAS - MED PEAK E' VEL: 7.18 CM/SEC
BH CV ECHO MEAS - MV A MAX VEL: 90 CM/SEC
BH CV ECHO MEAS - MV DEC TIME: 173 MSEC
BH CV ECHO MEAS - MV E MAX VEL: 66 CM/SEC
BH CV ECHO MEAS - MV E/A: 0.7
BH CV ECHO MEAS - TAPSE (>1.6): 1.7 CM
BH CV ECHO MEAS - TR MAX PG: 47 MMHG
BH CV ECHO MEAS - TR MAX VEL: 261 CM/SEC
BH CV ECHO MEASUREMENTS AVERAGE E/E' RATIO: 8.64
GLUCOSE BLDC GLUCOMTR-MCNC: 146 MG/DL (ref 70–99)
GLUCOSE BLDC GLUCOMTR-MCNC: 272 MG/DL (ref 70–99)
IVRT: 58 MSEC
LEFT ATRIUM VOLUME INDEX: 10.3 ML/M2
MAXIMAL PREDICTED HEART RATE: 141 BPM
STRESS TARGET HR: 120 BPM

## 2022-12-03 PROCEDURE — G0378 HOSPITAL OBSERVATION PER HR: HCPCS

## 2022-12-03 PROCEDURE — 94799 UNLISTED PULMONARY SVC/PX: CPT

## 2022-12-03 PROCEDURE — 93306 TTE W/DOPPLER COMPLETE: CPT

## 2022-12-03 PROCEDURE — 82962 GLUCOSE BLOOD TEST: CPT

## 2022-12-03 PROCEDURE — 94760 N-INVAS EAR/PLS OXIMETRY 1: CPT

## 2022-12-03 PROCEDURE — 25010000002 ENOXAPARIN PER 10 MG: Performed by: STUDENT IN AN ORGANIZED HEALTH CARE EDUCATION/TRAINING PROGRAM

## 2022-12-03 PROCEDURE — 97165 OT EVAL LOW COMPLEX 30 MIN: CPT

## 2022-12-03 PROCEDURE — 63710000001 PREDNISONE PER 1 MG: Performed by: INTERNAL MEDICINE

## 2022-12-03 PROCEDURE — 99233 SBSQ HOSP IP/OBS HIGH 50: CPT | Performed by: FAMILY MEDICINE

## 2022-12-03 PROCEDURE — 25010000002 SULFUR HEXAFLUORIDE MICROSPH 60.7-25 MG RECONSTITUTED SUSPENSION: Performed by: FAMILY MEDICINE

## 2022-12-03 PROCEDURE — 63710000001 INSULIN LISPRO (HUMAN) PER 5 UNITS: Performed by: PHYSICIAN ASSISTANT

## 2022-12-03 PROCEDURE — 63710000001 INSULIN DETEMIR PER 5 UNITS: Performed by: FAMILY MEDICINE

## 2022-12-03 RX ADMIN — SODIUM CHLORIDE 100 ML/HR: 9 INJECTION, SOLUTION INTRAVENOUS at 01:13

## 2022-12-03 RX ADMIN — CARVEDILOL 12.5 MG: 12.5 TABLET, FILM COATED ORAL at 17:35

## 2022-12-03 RX ADMIN — CARVEDILOL 12.5 MG: 12.5 TABLET, FILM COATED ORAL at 08:44

## 2022-12-03 RX ADMIN — TAMSULOSIN HYDROCHLORIDE 0.4 MG: 0.4 CAPSULE ORAL at 08:44

## 2022-12-03 RX ADMIN — SULFUR HEXAFLUORIDE 3 ML: KIT at 14:25

## 2022-12-03 RX ADMIN — IPRATROPIUM BROMIDE AND ALBUTEROL SULFATE 3 ML: 2.5; .5 SOLUTION RESPIRATORY (INHALATION) at 22:31

## 2022-12-03 RX ADMIN — AMLODIPINE BESYLATE 5 MG: 5 TABLET ORAL at 08:45

## 2022-12-03 RX ADMIN — IPRATROPIUM BROMIDE AND ALBUTEROL SULFATE 3 ML: 2.5; .5 SOLUTION RESPIRATORY (INHALATION) at 15:30

## 2022-12-03 RX ADMIN — ENOXAPARIN SODIUM 40 MG: 100 INJECTION SUBCUTANEOUS at 08:45

## 2022-12-03 RX ADMIN — IPRATROPIUM BROMIDE AND ALBUTEROL SULFATE 3 ML: 2.5; .5 SOLUTION RESPIRATORY (INHALATION) at 06:56

## 2022-12-03 RX ADMIN — ATORVASTATIN CALCIUM 80 MG: 40 TABLET, FILM COATED ORAL at 20:04

## 2022-12-03 RX ADMIN — INSULIN LISPRO 3 UNITS: 100 INJECTION, SOLUTION INTRAVENOUS; SUBCUTANEOUS at 17:35

## 2022-12-03 RX ADMIN — LISINOPRIL 20 MG: 20 TABLET ORAL at 08:45

## 2022-12-03 RX ADMIN — IPRATROPIUM BROMIDE AND ALBUTEROL SULFATE 3 ML: 2.5; .5 SOLUTION RESPIRATORY (INHALATION) at 19:54

## 2022-12-03 RX ADMIN — PREDNISONE 40 MG: 20 TABLET ORAL at 08:45

## 2022-12-03 RX ADMIN — INSULIN LISPRO 3 UNITS: 100 INJECTION, SOLUTION INTRAVENOUS; SUBCUTANEOUS at 20:04

## 2022-12-03 RX ADMIN — Medication 5 MG: at 20:04

## 2022-12-03 RX ADMIN — BUDESONIDE 0.5 MG: 0.5 INHALANT ORAL at 06:57

## 2022-12-03 RX ADMIN — INSULIN DETEMIR 10 UNITS: 100 INJECTION, SOLUTION SUBCUTANEOUS at 08:45

## 2022-12-03 RX ADMIN — PANTOPRAZOLE SODIUM 40 MG: 40 TABLET, DELAYED RELEASE ORAL at 06:47

## 2022-12-03 RX ADMIN — SODIUM CHLORIDE 100 ML/HR: 9 INJECTION, SOLUTION INTRAVENOUS at 12:33

## 2022-12-03 RX ADMIN — IPRATROPIUM BROMIDE AND ALBUTEROL SULFATE 3 ML: 2.5; .5 SOLUTION RESPIRATORY (INHALATION) at 11:42

## 2022-12-03 RX ADMIN — INSULIN LISPRO 4 UNITS: 100 INJECTION, SOLUTION INTRAVENOUS; SUBCUTANEOUS at 12:43

## 2022-12-03 RX ADMIN — Medication 10 ML: at 08:45

## 2022-12-03 RX ADMIN — AZITHROMYCIN MONOHYDRATE 250 MG: 250 TABLET ORAL at 08:45

## 2022-12-03 NOTE — PLAN OF CARE
Problem: Adult Inpatient Plan of Care  Goal: Plan of Care Review  Outcome: Ongoing, Progressing  Flowsheets (Taken 12/3/2022 0206)  Progress: improving  Plan of Care Reviewed With: patient   Goal Outcome Evaluation:  Plan of Care Reviewed With: patient        Progress: improving Continue to monitor pt.

## 2022-12-03 NOTE — NURSING NOTE
Spoke with patient's son, Julius over the phone at patient's bedside. Julius informed me that his father had seen a cardiologist in [place] who had said that the patient has a AAA but that it couldn't be treated. I told Julius I would let him know when his father's echocardiogram resulted and that I would let the MD know about the patient's AAA. Dr Cohn was notified.

## 2022-12-03 NOTE — PROGRESS NOTES
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  He is on 2 L with sats in the 90s, his blood pressures are soft but stable, he denies chills or sweats he denied any chest pain or palpitations.  He reports he still has cough and shortness of breath symptoms with exertions, COVID-19 and flu were ruled out.  Telemetry reviewed, bradycardic rate, few PVCs, sinus in the 60s, short bursts of V. tach, denied palpitations.      Review of systems: All systems reviewed and negative except for cough, shortness of breath, generalized fatigue, generalized weakness    Objective   Objective     Vitals:   Temp:  [97.9 °F (36.6 °C)-99.4 °F (37.4 °C)] 97.9 °F (36.6 °C)  Heart Rate:  [53-73] 57  Resp:  [16-20] 20  BP: (100-140)/(60-81) 100/68  Flow (L/min):  [2] 2  Physical Exam                  Constitutional: Awake, alert, oriented to place and person sitting in the bedside chair, wearing nasal cannula oxygen              Eyes: Pupils equal, sclerae anicteric, no conjunctival injection              HENT: NCAT, mucous membranes moist              Neck: Supple, no thyromegaly, no lymphadenopathy, trachea midline              Respiratory: Bilateral air entry present, coarse wheezing, nonlabored respirations                Cardiovascular: RRR, no murmurs, rubs, or gallops, palpable pedal pulses bilaterally              Gastrointestinal: Positive bowel sounds, soft, nontender, nondistended              Musculoskeletal: No bilateral ankle edema, no clubbing or cyanosis to extremities              Psychiatric: Appropriate affect, cooperative              Neurologic: Oriented x 2, able to move all extremities, cranial Nerves grossly intact to confrontation, speech clear  Result Review    Result Review:  I have personally reviewed the pertinent results from the past 24 hours to 12/3/2022 13:08 EST and agree with these findings:  [x]  Laboratory   CBC    CBC 9/16/22 12/1/22 12/2/22   WBC 11.44 (A) 10.54 8.48   RBC 5.08 5.73 5.27   Hemoglobin 15.8 17.6 16.6   Hematocrit 45.8 50.2 48.5   MCV 90.2 87.6 92.0   MCH 31.1 30.7 31.5   MCHC 34.5 35.1 34.2   RDW 13.4 13.7 13.9   Platelets 254 213 189   (A) Abnormal value            BMP    BMP 9/16/22 12/1/22 12/2/22   BUN 14 15 17   Creatinine 1.02 0.93 1.07   Sodium 141 137 137   Potassium 4.5 4.1 4.1   Chloride 106 101 102   CO2 25.0 23.0 21.8 (A)   Calcium 9.4 9.9 9.1   (A) Abnormal value            LIVER FUNCTION TESTS:      Lab 12/02/22  0606 12/01/22  1911   TOTAL PROTEIN 6.8 7.7   ALBUMIN 3.90 4.70   GLOBULIN 2.9 3.0   ALT (SGPT) 15 16   AST (SGOT) 20 16   BILIRUBIN 0.8 1.1   ALK PHOS 73 86       [x]  Microbiology   Blood Culture   Date Value Ref Range Status   12/02/2022 No growth at 24 hours  Preliminary   12/02/2022 No growth at 24 hours  Preliminary     No results found for: BCIDPCR, CXREFLEX, CSFCX, CULTURETIS  No results found for: CULTURES, HSVCX, URCX  No results found for: EYECULTURE, GCCX, HSVCULTURE, LABHSV  No results found for: LEGIONELLA, MRSACX, MUMPSCX, MYCOPLASCX  No results found for: NOCARDIACX, STOOLCX  No results found for: THROATCX, UNSTIMCULT, URINECX, CULTURE, VZVCULTUR  No results found for: VIRALCULTU, WOUNDCX    [x]  Radiology CT Chest With Contrast  Diagnostic    Result Date: 12/2/2022  PROCEDURE: CT CHEST W CONTRAST DIAGNOSTIC  COMPARISON:  [facility], CT, CT CHEST WO CONTRAST DIAGNOSTIC, 10/06/2021, 22:27.  [facility], CR, XR CHEST 1 VW, 12/01/2022, 20:41.  [facility], CT, CT ABDOMEN PELVIS W CONTRAST, 5/09/2022, 23:30. INDICATIONS: DYSPNEA, COUGH X 2 DAYS  TECHNIQUE: After obtaining the patient's consent, CT images were obtained with non-ionic intravenous contrast material.   PROTOCOL:   Pulmonary embolism imaging protocol performed    RADIATION:   DLP: 367.9mGy*cm   Automated exposure control was utilized to minimize radiation dose. CONTRAST: 70cc Isovue 370 I.V.  FINDINGS:  The central tracheobronchial tree is clear.  The lungs are clear.  There is no pleural effusion.  The heart size appears normal.  The great vessels are normal in caliber.  There is no evidence of pulmonary embolus.  No abnormally enlarged lymph nodes are identified.  Partial evaluation of the upper abdomen demonstrates changes of cholecystectomy.  No aggressive osseous lesions are identified.        1. Negative for pulmonary embolus. 2. No acute cardiopulmonary process.     JENNIFFER MAYERS MD       Electronically Signed and Approved By: JENNIFFER MAYERS MD on 12/02/2022 at 2:13             XR Chest 1 View    Result Date: 12/1/2022  PROCEDURE: XR CHEST 1 VW  COMPARISON: [facility], CR, XR CHEST PA AND LATERAL, 2/01/2022, 14:03.  INDICATIONS: WEAKNESS, CHILLS, BODY ACHES  FINDINGS:  LUNGS: Normal.  No significant pulmonary parenchymal abnormalities.  VASCULATURE: Normal.  Unremarkable pulmonary vasculature.  CARDIAC: Normal.  No cardiac silhouette abnormality or cardiomegaly.  MEDIASTINUM: Normal.  No visible mass or adenopathy.  PLEURA: Normal.  No effusion or pleural thickening.  BONES: Normal.  No fracture or visible bony lesion.  OTHER: Negative.        No acute cardiopulmonary process identified.       JENNIFFER MAYERS  MD       Electronically Signed and Approved By: JENNIFFER MAYERS MD on 12/01/2022 at 21:56               [x]  EKG/Telemetry   []  Cardiology/Vascular   []  Pathology  [x]  Old records  []  Other:    Assessment & Plan   Assessment / Plan     Assessment/Plan:  Assessment:  Acute viral syndrome  Shortness of breath  Mild hypoxia requiring supplemental oxygen  Hypertension  Type 2 diabetes mellitus  Hyperlipidemia  Dementia  NSVT     Plan:  Labs and imaging reviewed  Continue to make attempts to wean supplemental oxygen  Continue telemetry monitoring  Check BNP  Follow-up echo  Wean oxygen as tolerated with his saturation goal of greater than 92%  Pulmicort, Dhara continued twice daily  Insulin sign scale coverage  Diabetic diet  Continue prednisone 40 mg daily  Continue NS IV fluids at 100 cc/h  Continue azithromycin  PT/OT  Consistent carb diet, basal and correctional insulin  Continue home amlodipine, lisinopril, carvedilol, atorvastatin, melatonin, pantoprazole  A.m. labs  Full code  DVT prophylaxis with Lovenox  Clinical course to dictate further management  Discussed with nurse at the bedside    DVT prophylaxis:  Medical DVT prophylaxis orders are present.    CODE STATUS:   Level Of Support Discussed With: Patient  Code Status (Patient has no pulse and is not breathing): CPR (Attempt to Resuscitate)  Medical Interventions (Patient has pulse or is breathing): Full Support        Electronically signed by Racheal Cohn MD, 12/03/22, 1:08 PM EST.    Portions of this documentation were transcribed electronically from a voice recognition software.  I confirm all data accurately represents the service(s) I performed at today's visit.

## 2022-12-03 NOTE — THERAPY EVALUATION
Patient Name: Nikhil Baldwin  : 1945    MRN: [id number]                              Today's Date: 12/3/2022       Admit Date: 2022    Visit Dx:     ICD-10-CM ICD-9-CM   1. Acute viral syndrome  B34.9 079.99   2. Acute respiratory failure with hypoxia (HCC)  J96.01 518.81   3. Decreased activities of daily living (ADL)  Z78.9 V49.89     Patient Active Problem List   Diagnosis   • Aortic aneurysm (HCC)   • Arthritis   • BPH (benign prostatic hyperplasia)   • Cervical radiculopathy   • Diabetes (HCC)   • Parathyroid abnormality (HCC)   • Thyroid disorder   • Facet arthropathy   • Fatty metamorphosis of liver   • GERD (gastroesophageal reflux disease)   • Hypertension   • Hematuria   • Hiatal hernia   • Urinary incontinence   • Hypercalcemia   • Hyperlipemia   • Kidney disease   • Renal calculi   • Low back pain   • Thoracic back pain   • DDD (degenerative disc disease), lumbar   • Spinal stenosis of lumbar region   • Degeneration of thoracic or thoracolumbar intervertebral disc   • MI (myocardial infarction) (HCC)   • Myofascial pain   • Neck pain   • Nocturia   • Sciatica   • Stroke (HCC)   • Ureterolithiasis   • Vitamin D deficiency   • Labile personality (HCC)   • Acute viral syndrome     Past Medical History:   Diagnosis Date   • Aorta disorder (HCC)    • Arthritis    • Degenerative joint disease    • Dementia (HCC)     FRONTAL LOBE   • Depression    • Diabetes mellitus (HCC)    • Hyperlipidemia    • Hypertension    • Kidney stone    • Stroke (HCC)     MILD RIGHT SIDE DEFICITS     Past Surgical History:   Procedure Laterality Date   • CHOLECYSTECTOMY     • CYSTOSCOPY BLADDER STONE LITHOTRIPSY     • KIDNEY STONE SURGERY        General Information     Row Name 22          OT Time and Intention    Document Type evaluation  -AC     Mode of Treatment individual therapy;occupational therapy  -AC     Row Name 22          General Information    Patient Profile Reviewed yes  -AC     Prior  Level of Function independent:  patient states he only started using a rolling walker recently when he got sick. 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  -     Existing Precautions/Restrictions fall;oxygen therapy device and L/min  -     Barriers to Rehab none identified  -     Row Name 12/03/22 0922          Occupational Profile    Reason for Services/Referral (Occupational Profile) Pt. is a 77year old male admitted for the above diagnosis. Pt. referred to OT services to assess independence with ADLs and adl transfers/fx'l mobility. No previous OT services for current condition.  -     Row Name 12/03/22 0922          Living Environment    People in Home child(janice), adult  -     Row Name 12/03/22 0922          Cognition    Orientation Status (Cognition) oriented x 3  -     Row Name 12/03/22 0922          Safety Issues, Functional Mobility    Safety Issues Affecting Function (Mobility) insight into deficits/self-awareness  -     Impairments Affecting Function (Mobility) balance;endurance/activity tolerance;shortness of breath  -           User Key  (r) = Recorded By, (t) = Taken By, (c) = Cosigned By    Initials Name Provider Type     Latasha Hughes, OT Occupational Therapist                 Mobility/ADL's     Row Name 12/03/22 0925          Bed Mobility    Bed Mobility bed mobility (all) activities  -     All Activities, [place] (Bed Mobility) verbal cues;contact guard;1 person assist  -     Assistive Device (Bed Mobility) head of bed elevated  -     Row Name 12/03/22 0925          Transfers    Transfers sit-stand transfer;stand-sit transfer;bed-chair transfer  -     Row Name 12/03/22 0925          Bed-Chair Transfer    Bed-Chair [place] (Transfers) verbal cues;contact guard;1 person assist  -AC     Comment, (Bed-Chair Transfer) patient was CGA with hand held assist for EOB To recliner stand pivot transfer.  -     Row Name 12/03/22  0925          Sit-Stand Transfer    Sit-Stand [place] (Transfers) contact guard;1 person assist;verbal cues  -     Row Name 12/03/22 0925          Stand-Sit Transfer    Stand-Sit [place] (Transfers) verbal cues;1 person assist;contact guard  -     Row Name 12/03/22 0925          Activities of Daily Living    BADL Assessment/Intervention --  Patient is set up for self-feeding, set up for grooming, set up for upper body bathing/dressing, contact-guard assist for lower body dressing/bathing, contact-guard assist for toileting.  -           User Key  (r) = Recorded By, (t) = Taken By, (c) = Cosigned By    Initials Name Provider Type    AC Latasha Hughes OT Occupational Therapist               Obj/Interventions     Row Name 12/03/22 0927          Sensory Assessment (Somatosensory)    Sensory Assessment (Somatosensory) sensation intact  -[facility] Name 12/03/22 0927          Vision Assessment/Intervention    Visual Impairment/Limitations WFL  -[facility] Name 12/03/22 0927          Range of Motion Comprehensive    General Range of Motion bilateral upper extremity ROM WNL  -[facility] Name 12/03/22 0927          Strength Comprehensive (MMT)    General Manual Muscle Testing (MMT) Assessment no strength deficits identified  -     Row Name 12/03/22 0927          Motor Skills    Motor Skills coordination;functional endurance  -     Coordination WFL  -     Functional Endurance fair minus  -[facility] Name 12/03/22 0927          Balance    Balance Assessment standing dynamic balance  -     Dynamic Standing Balance contact guard;verbal cues;1-person assist  -     Position/Device Used, Standing Balance supported  -     Balance Interventions standing;sit to stand;supported;dynamic;minimal challenge;occupation based/functional task  -           User Key  (r) = Recorded By, (t) = Taken By, (c) = Cosigned By    Initials Name Provider Type    Latasha Aponte OT Occupational Therapist                Goals/Plan     Row Name 12/03/22 0929          Bed Mobility Goal 1 (OT)    Activity/Assistive Device (Bed Mobility Goal 1, OT) bed mobility activities, all  -AC     [place] Level/Cues Needed (Bed Mobility Goal 1, OT) modified independence  -AC     Time Frame (Bed Mobility Goal 1, OT) 10 days  -     Row Name 12/03/22 0929          Transfer Goal 1 (OT)    Activity/Assistive Device (Transfer Goal 1, OT) transfers, all  -AC     [place] Level/Cues Needed (Transfer Goal 1, OT) modified independence  -AC     Time Frame (Transfer Goal 1, OT) long term goal (LTG);10 days  -AC     Row Name 12/03/22 0929          Bathing Goal 1 (OT)    Activity/Device (Bathing Goal 1, OT) bathing skills, all  -AC     [place] Level/Cues Needed (Bathing Goal 1, OT) modified independence  -AC     Time Frame (Bathing Goal 1, OT) long term goal (LTG);10 days  -     Row Name 12/03/22 0929          Dressing Goal 1 (OT)    Activity/Device (Dressing Goal 1, OT) dressing skills, all  -AC     [place]/Cues Needed (Dressing Goal 1, OT) modified independence  -AC     Time Frame (Dressing Goal 1, OT) long term goal (LTG);10 days  -     Row Name 12/03/22 0929          Toileting Goal 1 (OT)    Activity/Device (Toileting Goal 1, OT) toileting skills, all  -AC     [place] Level/Cues Needed (Toileting Goal 1, OT) modified independence  -AC     Time Frame (Toileting Goal 1, OT) long term goal (LTG);10 days  -AC     Row Name 12/03/22 0929          Grooming Goal 1 (OT)    Activity/Device (Grooming Goal 1, OT) grooming skills, all  -AC     [place] (Grooming Goal 1, OT) modified independence  -AC     Time Frame (Grooming Goal 1, OT) long term goal (LTG);10 days  -AC     Row Name 12/03/22 0929          Problem Specific Goal 1 (OT)    Problem Specific Goal 1 (OT) patient will improve endurance to good for adls.  -AC     Time Frame (Problem Specific Goal 1, OT) long term goal (LTG);10 days  -AC     Row Name 12/03/22 0929           Therapy Assessment/Plan (OT)    Planned Therapy Interventions (OT) activity tolerance training;BADL retraining;functional balance retraining;occupation/activity based interventions;patient/caregiver education/training;ROM/therapeutic exercise;transfer/mobility retraining  -           User Key  (r) = Recorded By, (t) = Taken By, (c) = Cosigned By    Initials Name Provider Type     Latasha Hughes OT Occupational Therapist               Clinical Impression     Row Name 12/03/22 0927          Pain Assessment    Pretreatment Pain Rating 0/10 - no pain  -     Posttreatment Pain Rating 0/10 - no pain  -     Row Name 12/03/22 0927          Plan of Care Review    Plan of Care Reviewed With patient  -     Progress no change  -     Outcome Evaluation Patient presents with limitations that impede his/her ability to perform ADLS. The skills of a therapist are necessary to maximize independence with ADLs.  Recommend skilled nursing facility following hospital discharge.  -     Row Name 12/03/22 0927          Therapy Assessment/Plan (OT)    Patient/Family Therapy Goal Statement (OT) Patient would like to maximize independence with adls.  -     Rehab Potential (OT) good, to achieve stated therapy goals  -     Criteria for Skilled Therapeutic Interventions Met (OT) meets criteria;skilled treatment is necessary;yes  -     Therapy Frequency (OT) 5 times/wk  -     Row Name 12/03/22 0927          Therapy Plan Review/Discharge Plan (OT)    Equipment Needs Upon Discharge (OT) walker, rolling;commode chair  -     Anticipated Discharge Disposition (OT) skilled nursing facility  -     Row Name 12/03/22 0927          Positioning and Restraints    Pre-Treatment Position in bed  -     Post Treatment Position chair  -AC     In Chair call light within reach;encouraged to call for assist;reclined;legs elevated  -           User Key  (r) = Recorded By, (t) = Taken By, (c) = Cosigned By    Initials Name Provider Type     Latasha Aponte OT Occupational Therapist               Outcome Measures     Row Name 12/03/22 0930          How much help from another is currently needed...    Putting on and taking off regular lower body clothing? 3  -AC     Bathing (including washing, rinsing, and drying) 3  -AC     Toileting (which includes using toilet bed pan or urinal) 3  -AC     Putting on and taking off regular upper body clothing 4  -AC     Taking care of personal grooming (such as brushing teeth) 4  -AC     Eating meals 4  -AC     AM-PAC 6 Clicks Score (OT) 21  -AC     Row Name 12/02/22 2336          How much help from another person do you currently need...    Turning from your back to your side while in flat bed without using bedrails? 3  -JERRY     Moving from lying on back to sitting on the side of a flat bed without bedrails? 3  -JERRY     Moving to and from a bed to a chair (including a wheelchair)? 3  -JERRY     Standing up from a chair using your arms (e.g., wheelchair, bedside chair)? 3  -JERRY     Climbing 3-5 steps with a railing? 3  -JERRY     To walk in hospital room? 3  -JERRY     AM-PAC 6 Clicks Score (PT) 18  -JERRY     Row Name 12/03/22 0930          Functional Assessment    Outcome Measure Options AM-PAC 6 Clicks Daily Activity (OT);Optimal Instrument  -AC     Row Name 12/03/22 0930          Optimal Instrument    Optimal Instrument Optimal - 3  -AC     Bending/Stooping 2  -AC     Standing 2  -AC     Reaching 1  -AC     From the list, choose the 3 activities you would most like to be able to do without any difficulty Standing;Bending/stooping;Reaching  -AC     Total Score Optimal - 3 5  -AC           User Key  (r) = Recorded By, (t) = Taken By, (c) = Cosigned By    Initials Name Provider Type    Magaly Zhong, RN Registered Nurse    Latasha Aponte OT Occupational Therapist                Occupational Therapy Education     Title: PT OT SLP Therapies (Done)     Topic: Occupational Therapy (Done)     Point: ADL training (Done)      Description:   Instruct learner(s) on proper safety adaptation and remediation techniques during self care or transfers.   Instruct in proper use of assistive devices.              Learning Progress Summary           Patient Acceptance, E, VU by  at 12/3/2022 0931                   Point: Home exercise program (Done)     Description:   Instruct learner(s) on appropriate technique for monitoring, assisting and/or progressing therapeutic exercises/activities.              Learning Progress Summary           Patient Acceptance, E, VU by  at 12/3/2022 0931                   Point: Precautions (Done)     Description:   Instruct learner(s) on prescribed precautions during self-care and functional transfers.              Learning Progress Summary           Patient Acceptance, E, VU by  at 12/3/2022 0931                   Point: Body mechanics (Done)     Description:   Instruct learner(s) on proper positioning and spine alignment during self-care, functional mobility activities and/or exercises.              Learning Progress Summary           Patient Acceptance, E, VU by  at 12/3/2022 0931                               User Key     Initials Effective Dates Name Provider Type Discipline     06/16/21 -  Latasha Hughes OT Occupational Therapist OT              OT Recommendation and Plan  Planned Therapy Interventions (OT): activity tolerance training, BADL retraining, functional balance retraining, occupation/activity based interventions, patient/caregiver education/training, ROM/therapeutic exercise, transfer/mobility retraining  Therapy Frequency (OT): 5 times/wk  Plan of Care Review  Plan of Care Reviewed With: patient  Progress: no change  Outcome Evaluation: Patient presents with limitations that impede his/her ability to perform ADLS. The skills of a therapist are necessary to maximize independence with ADLs.  Recommend skilled nursing facility following hospital discharge.     Time Calculation:    Time  Calculation- OT     Row Name 12/03/22 0932             Time Calculation- OT    OT Received On 12/03/22  -AC      OT Goal Re-Cert Due Date 12/12/22  -AC         Untimed Charges    OT Eval/Re-eval Minutes 32  -AC         Total Minutes    Untimed Charges Total Minutes 32  -AC       Total Minutes 32  -AC            User Key  (r) = Recorded By, (t) = Taken By, (c) = Cosigned By    Initials Name Provider Type    AC Latasha Hughes OT Occupational Therapist              Therapy Charges for Today     Code Description Service Date Service Provider Modifiers Qty    [id number] HC OT EVAL LOW COMPLEXITY 3 12/3/2022 Latasha Hughes OT GO 1               Latasha Hughes OT  12/3/2022

## 2022-12-03 NOTE — PLAN OF CARE
Goal Outcome Evaluation:  Plan of Care Reviewed With: patient        Progress: no change  Outcome Evaluation: Patient presents with limitations that impede his/her ability to perform ADLS. The skills of a therapist are necessary to maximize independence with ADLs.  Recommend skilled nursing facility following hospital discharge.

## 2022-12-04 LAB
ALBUMIN SERPL-MCNC: 3.4 G/DL (ref 3.5–5.2)
ALP SERPL-CCNC: 51 U/L (ref 39–117)
ALT SERPL W P-5'-P-CCNC: 15 U/L (ref 1–41)
ANION GAP SERPL CALCULATED.3IONS-SCNC: 11.2 MMOL/L (ref 5–15)
AST SERPL-CCNC: 22 U/L (ref 1–40)
BASOPHILS # BLD AUTO: 0.03 10*3/MM3 (ref 0–0.2)
BASOPHILS NFR BLD AUTO: 0.5 % (ref 0–1.5)
BILIRUB CONJ SERPL-MCNC: <0.2 MG/DL (ref 0–0.3)
BILIRUB INDIRECT SERPL-MCNC: ABNORMAL MG/DL
BILIRUB SERPL-MCNC: 0.4 MG/DL (ref 0–1.2)
BUN SERPL-MCNC: 18 MG/DL (ref 8–23)
BUN/CREAT SERPL: 18.9 (ref 7–25)
CALCIUM SPEC-SCNC: 8.4 MG/DL (ref 8.6–10.5)
CHLORIDE SERPL-SCNC: 107 MMOL/L (ref 98–107)
CO2 SERPL-SCNC: 21.8 MMOL/L (ref 22–29)
CREAT SERPL-MCNC: 0.95 MG/DL (ref 0.76–1.27)
DEPRECATED RDW RBC AUTO: 46.2 FL (ref 37–54)
EGFRCR SERPLBLD CKD-EPI 2021: 81.4 ML/MIN/1.73
EOSINOPHIL # BLD AUTO: 0.04 10*3/MM3 (ref 0–0.4)
EOSINOPHIL NFR BLD AUTO: 0.6 % (ref 0.3–6.2)
ERYTHROCYTE [DISTWIDTH] IN BLOOD BY AUTOMATED COUNT: 14 % (ref 12.3–15.4)
GLUCOSE BLDC GLUCOMTR-MCNC: 119 MG/DL (ref 70–99)
GLUCOSE BLDC GLUCOMTR-MCNC: 220 MG/DL (ref 70–99)
GLUCOSE BLDC GLUCOMTR-MCNC: 222 MG/DL (ref 70–99)
GLUCOSE BLDC GLUCOMTR-MCNC: 241 MG/DL (ref 70–99)
GLUCOSE BLDC GLUCOMTR-MCNC: 271 MG/DL (ref 70–99)
GLUCOSE SERPL-MCNC: 170 MG/DL (ref 65–99)
HCT VFR BLD AUTO: 41.3 % (ref 37.5–51)
HGB BLD-MCNC: 14.3 G/DL (ref 13–17.7)
IMM GRANULOCYTES # BLD AUTO: 0.04 10*3/MM3 (ref 0–0.05)
IMM GRANULOCYTES NFR BLD AUTO: 0.6 % (ref 0–0.5)
LYMPHOCYTES # BLD AUTO: 2.44 10*3/MM3 (ref 0.7–3.1)
LYMPHOCYTES NFR BLD AUTO: 36.7 % (ref 19.6–45.3)
MAGNESIUM SERPL-MCNC: 1.6 MG/DL (ref 1.6–2.4)
MCH RBC QN AUTO: 31.2 PG (ref 26.6–33)
MCHC RBC AUTO-ENTMCNC: 34.6 G/DL (ref 31.5–35.7)
MCV RBC AUTO: 90 FL (ref 79–97)
MONOCYTES # BLD AUTO: 0.69 10*3/MM3 (ref 0.1–0.9)
MONOCYTES NFR BLD AUTO: 10.4 % (ref 5–12)
NEUTROPHILS NFR BLD AUTO: 3.4 10*3/MM3 (ref 1.7–7)
NEUTROPHILS NFR BLD AUTO: 51.2 % (ref 42.7–76)
NRBC BLD AUTO-RTO: 0 /100 WBC (ref 0–0.2)
NT-PROBNP SERPL-MCNC: 211.1 PG/ML (ref 0–1800)
PHOSPHATE SERPL-MCNC: 2 MG/DL (ref 2.5–4.5)
PLATELET # BLD AUTO: 156 10*3/MM3 (ref 140–450)
PMV BLD AUTO: 10.6 FL (ref 6–12)
POTASSIUM SERPL-SCNC: 3.7 MMOL/L (ref 3.5–5.2)
PROT SERPL-MCNC: 5.5 G/DL (ref 6–8.5)
QT INTERVAL: 348 MS
RBC # BLD AUTO: 4.59 10*6/MM3 (ref 4.14–5.8)
SODIUM SERPL-SCNC: 140 MMOL/L (ref 136–145)
WBC NRBC COR # BLD: 6.64 10*3/MM3 (ref 3.4–10.8)

## 2022-12-04 PROCEDURE — 80048 BASIC METABOLIC PNL TOTAL CA: CPT | Performed by: FAMILY MEDICINE

## 2022-12-04 PROCEDURE — 94760 N-INVAS EAR/PLS OXIMETRY 1: CPT

## 2022-12-04 PROCEDURE — 83880 ASSAY OF NATRIURETIC PEPTIDE: CPT | Performed by: FAMILY MEDICINE

## 2022-12-04 PROCEDURE — 82962 GLUCOSE BLOOD TEST: CPT

## 2022-12-04 PROCEDURE — 63710000001 INSULIN LISPRO (HUMAN) PER 5 UNITS: Performed by: PHYSICIAN ASSISTANT

## 2022-12-04 PROCEDURE — 63710000001 INSULIN DETEMIR PER 5 UNITS: Performed by: FAMILY MEDICINE

## 2022-12-04 PROCEDURE — 94799 UNLISTED PULMONARY SVC/PX: CPT

## 2022-12-04 PROCEDURE — 99232 SBSQ HOSP IP/OBS MODERATE 35: CPT | Performed by: FAMILY MEDICINE

## 2022-12-04 PROCEDURE — 63710000001 PREDNISONE PER 1 MG: Performed by: INTERNAL MEDICINE

## 2022-12-04 PROCEDURE — 85025 COMPLETE CBC W/AUTO DIFF WBC: CPT | Performed by: FAMILY MEDICINE

## 2022-12-04 PROCEDURE — 84100 ASSAY OF PHOSPHORUS: CPT | Performed by: FAMILY MEDICINE

## 2022-12-04 PROCEDURE — 80076 HEPATIC FUNCTION PANEL: CPT | Performed by: FAMILY MEDICINE

## 2022-12-04 PROCEDURE — 97161 PT EVAL LOW COMPLEX 20 MIN: CPT

## 2022-12-04 PROCEDURE — 25010000002 ENOXAPARIN PER 10 MG: Performed by: STUDENT IN AN ORGANIZED HEALTH CARE EDUCATION/TRAINING PROGRAM

## 2022-12-04 PROCEDURE — 83735 ASSAY OF MAGNESIUM: CPT | Performed by: FAMILY MEDICINE

## 2022-12-04 RX ORDER — IPRATROPIUM BROMIDE AND ALBUTEROL SULFATE 2.5; .5 MG/3ML; MG/3ML
3 SOLUTION RESPIRATORY (INHALATION)
Status: DISCONTINUED | OUTPATIENT
Start: 2022-12-04 | End: 2022-12-07

## 2022-12-04 RX ADMIN — CARVEDILOL 12.5 MG: 12.5 TABLET, FILM COATED ORAL at 17:47

## 2022-12-04 RX ADMIN — Medication 10 ML: at 21:07

## 2022-12-04 RX ADMIN — BUDESONIDE 0.5 MG: 0.5 INHALANT ORAL at 06:29

## 2022-12-04 RX ADMIN — IPRATROPIUM BROMIDE AND ALBUTEROL SULFATE 3 ML: .5; 3 SOLUTION RESPIRATORY (INHALATION) at 19:57

## 2022-12-04 RX ADMIN — IPRATROPIUM BROMIDE AND ALBUTEROL SULFATE 3 ML: .5; 3 SOLUTION RESPIRATORY (INHALATION) at 12:07

## 2022-12-04 RX ADMIN — LISINOPRIL 20 MG: 20 TABLET ORAL at 10:34

## 2022-12-04 RX ADMIN — INSULIN LISPRO 3 UNITS: 100 INJECTION, SOLUTION INTRAVENOUS; SUBCUTANEOUS at 21:07

## 2022-12-04 RX ADMIN — INSULIN DETEMIR 10 UNITS: 100 INJECTION, SOLUTION SUBCUTANEOUS at 10:35

## 2022-12-04 RX ADMIN — AZITHROMYCIN MONOHYDRATE 250 MG: 250 TABLET ORAL at 11:51

## 2022-12-04 RX ADMIN — AMLODIPINE BESYLATE 5 MG: 5 TABLET ORAL at 10:34

## 2022-12-04 RX ADMIN — PANTOPRAZOLE SODIUM 40 MG: 40 TABLET, DELAYED RELEASE ORAL at 06:34

## 2022-12-04 RX ADMIN — TAMSULOSIN HYDROCHLORIDE 0.4 MG: 0.4 CAPSULE ORAL at 10:33

## 2022-12-04 RX ADMIN — Medication 10 ML: at 10:35

## 2022-12-04 RX ADMIN — CARVEDILOL 12.5 MG: 12.5 TABLET, FILM COATED ORAL at 10:33

## 2022-12-04 RX ADMIN — IPRATROPIUM BROMIDE AND ALBUTEROL SULFATE 3 ML: 2.5; .5 SOLUTION RESPIRATORY (INHALATION) at 06:28

## 2022-12-04 RX ADMIN — SODIUM CHLORIDE 100 ML/HR: 9 INJECTION, SOLUTION INTRAVENOUS at 10:33

## 2022-12-04 RX ADMIN — ATORVASTATIN CALCIUM 80 MG: 40 TABLET, FILM COATED ORAL at 21:07

## 2022-12-04 RX ADMIN — ENOXAPARIN SODIUM 40 MG: 100 INJECTION SUBCUTANEOUS at 10:34

## 2022-12-04 RX ADMIN — Medication 5 MG: at 21:07

## 2022-12-04 RX ADMIN — PREDNISONE 40 MG: 20 TABLET ORAL at 10:34

## 2022-12-04 RX ADMIN — INSULIN LISPRO 2 UNITS: 100 INJECTION, SOLUTION INTRAVENOUS; SUBCUTANEOUS at 13:16

## 2022-12-04 RX ADMIN — SODIUM CHLORIDE 100 ML/HR: 9 INJECTION, SOLUTION INTRAVENOUS at 00:13

## 2022-12-04 RX ADMIN — INSULIN LISPRO 4 UNITS: 100 INJECTION, SOLUTION INTRAVENOUS; SUBCUTANEOUS at 17:47

## 2022-12-04 NOTE — PLAN OF CARE
Goal Outcome Evaluation:  Plan of Care Reviewed With: patient           Outcome Evaluation: Pt presents with deficits in strenth, balance, and functional mobility. Pt would benefit from skilled PT services to address the mentioned impairments and allow pt to achieve optimal functional mobility. Recommend home health PT services upon discharge from hospital.

## 2022-12-04 NOTE — THERAPY EVALUATION
Acute Care - Physical Therapy Initial Evaluation   [place]     Patient Name: Nikhil Baldwin  : 1945  MRN: [id number]  Today's Date: 2022      Visit Dx:     ICD-10-CM ICD-9-CM   1. Acute viral syndrome  B34.9 079.99   2. Acute respiratory failure with hypoxia (HCC)  J96.01 518.81   3. Decreased activities of daily living (ADL)  Z78.9 V49.89   4. Difficulty walking  R26.2 719.7     Patient Active Problem List   Diagnosis   • Aortic aneurysm (HCC)   • Arthritis   • BPH (benign prostatic hyperplasia)   • Cervical radiculopathy   • Diabetes (HCC)   • Parathyroid abnormality (HCC)   • Thyroid disorder   • Facet arthropathy   • Fatty metamorphosis of liver   • GERD (gastroesophageal reflux disease)   • Hypertension   • Hematuria   • Hiatal hernia   • Urinary incontinence   • Hypercalcemia   • Hyperlipemia   • Kidney disease   • Renal calculi   • Low back pain   • Thoracic back pain   • DDD (degenerative disc disease), lumbar   • Spinal stenosis of lumbar region   • Degeneration of thoracic or thoracolumbar intervertebral disc   • MI (myocardial infarction) (HCC)   • Myofascial pain   • Neck pain   • Nocturia   • Sciatica   • Stroke (HCC)   • Ureterolithiasis   • Vitamin D deficiency   • Labile personality (HCC)   • Acute viral syndrome     Past Medical History:   Diagnosis Date   • Aorta disorder (HCC)    • Arthritis    • Degenerative joint disease    • Dementia (HCC)     FRONTAL LOBE   • Depression    • Diabetes mellitus (HCC)    • Hyperlipidemia    • Hypertension    • Kidney stone    • Stroke (HCC)     MILD RIGHT SIDE DEFICITS     Past Surgical History:   Procedure Laterality Date   • CHOLECYSTECTOMY     • CYSTOSCOPY BLADDER STONE LITHOTRIPSY     • KIDNEY STONE SURGERY       PT Assessment (last 12 hours)     PT Evaluation and Treatment     Row Name 22 1525          Physical Therapy Time and Intention    Subjective Information complains of;weakness;fatigue  -CW     Document Type evaluation  -CW     Mode  of Treatment individual therapy;physical therapy  -CW     Patient Effort good  -CW     Symptoms Noted During/After Treatment fatigue  -CW     Row Name 12/04/22 1525          General Information    Patient Profile Reviewed yes  -CW     Patient Observations alert;cooperative;agree to therapy  -CW     Prior Level of Function independent:;gait;transfer;ADL's  -CW     Equipment Currently Used at Home walker, rolling  pt states he recently started using RW, prior no AD  -CW     Row Name 12/04/22 1525          Living Environment    Current Living Arrangements home  motor home on [facility]  -CW     Home Accessibility stairs to enter home  -CW     People in Home alone;other (see comments)  pt lives on [facility]  -CW     Row Name 12/04/22 1525          Home Main Entrance    Number of Stairs, Main Entrance three  -CW     Stair Railings, Main Entrance railing on right side (ascending)  -CW     Row Name 12/04/22 1525          Range of Motion (ROM)    Range of Motion bilateral lower extremities;ROM is WFL  -CW     Row Name 12/04/22 1525          Strength Comprehensive (MMT)    General Manual Muscle Testing (MMT) Assessment lower extremity strength deficits identified  BLE strength grossly assessed to be 4/5.  -CW     Row Name 12/04/22 1525          Bed Mobility    Bed Mobility sit-supine  -CW     Sit-Supine [place] (Bed Mobility) standby assist;verbal cues  -CW     Row Name 12/04/22 1525          Transfers    Transfers sit-stand transfer;stand-sit transfer;chair-bed transfer  -CW     Row Name 12/04/22 1525          Chair-Bed Transfer    Chair-Bed [place] (Transfers) contact guard;verbal cues  -CW     Assistive Device (Chair-Bed Transfers) walker, front-wheeled  -CW     Row Name 12/04/22 1525          Sit-Stand Transfer    Sit-Stand [place] (Transfers) contact guard;1 person assist;verbal cues  -CW     Assistive Device (Sit-Stand Transfers) walker, front-wheeled  -CW     Row Name 12/04/22 1525           Stand-Sit Transfer    Stand-Sit [place] (Transfers) verbal cues;1 person assist;contact guard  -CW     Row Name 12/04/22 1525          Gait/Stairs (Locomotion)    Gait/Stairs Locomotion gait/ambulation independence;gait/ambulation assistive device;distance ambulated  -CW     [place] Level (Gait) contact guard  -CW     Assistive Device (Gait) walker, front-wheeled  -CW     Distance in Feet (Gait) 130'  one standing rest break half way  -CW     Deviations/Abnormal Patterns (Gait) base of support, narrow;gait speed decreased;stride length decreased  -CW     Bilateral Gait Deviations forward flexed posture  -CW     Row Name 12/04/22 1525          Balance    Balance Assessment standing dynamic balance  -CW     Dynamic Standing Balance contact guard  -CW     Position/Device Used, Standing Balance supported;walker, front-wheeled  -CW     Row Name 12/04/22 1525          Plan of Care Review    Plan of Care Reviewed With patient  -CW     Outcome Evaluation Pt presents with deficits in strenth, balance, and functional mobility. Pt would benefit from skilled PT services to address the mentioned impairments and allow pt to achieve optimal functional mobility. Recommend home health PT services upon discharge from hospital.  -CW     Row Name 12/04/22 1525          Positioning and Restraints    Pre-Treatment Position sitting in chair/recliner  -CW     Post Treatment Position bed  -CW     In Bed fowlers;call light within reach;encouraged to call for assist;exit alarm on  -CW     Row Name 12/04/22 1525          Therapy Assessment/Plan (PT)    Rehab Potential (PT) good, to achieve stated therapy goals  -CW     Criteria for Skilled Interventions Met (PT) yes;skilled treatment is necessary  -CW     Therapy Frequency (PT) daily  -CW     Predicted Duration of Therapy Intervention (PT) 10 days  -CW     Problem List (PT) problems related to;balance;mobility;strength  -CW     Activity Limitations Related to Problem List (PT) unable  to ambulate safely;unable to transfer safely  -CW     Row Name 12/04/22 1525          PT Evaluation Complexity    History, PT Evaluation Complexity 1-2 personal factors and/or comorbidities  -CW     Examination of Body Systems (PT Eval Complexity) total of 4 or more elements  -CW     Clinical Presentation (PT Evaluation Complexity) stable  -CW     Clinical Decision Making (PT Evaluation Complexity) low complexity  -CW     Overall Complexity (PT Evaluation Complexity) low complexity  -CW     Row Name 12/04/22 1525          Therapy Plan Review/Discharge Plan (PT)    Therapy Plan Review (PT) evaluation/treatment results reviewed;patient  -CW     Row Name 12/04/22 1525          Physical Therapy Goals    Bed Mobility Goal Selection (PT) bed mobility, PT goal 1  -CW     Transfer Goal Selection (PT) transfer, PT goal 1  -CW     Gait Training Goal Selection (PT) gait training, PT goal 1  -CW     Row Name 12/04/22 1525          Bed Mobility Goal 1 (PT)    Activity/Assistive Device (Bed Mobility Goal 1, PT) bed mobility activities, all  -CW     [place] Level/Cues Needed (Bed Mobility Goal 1, PT) independent  -CW     Time Frame (Bed Mobility Goal 1, PT) 10 days  -CW     Row Name 12/04/22 1525          Transfer Goal 1 (PT)    Activity/Assistive Device (Transfer Goal 1, PT) sit-to-stand/stand-to-sit;bed-to-chair/chair-to-bed;walker, rolling  -CW     [place] Level/Cues Needed (Transfer Goal 1, PT) modified independence  -CW     Time Frame (Transfer Goal 1, PT) 10 days  -CW     Row Name 12/04/22 1525          Gait Training Goal 1 (PT)    Activity/Assistive Device (Gait Training Goal 1, PT) gait (walking locomotion);assistive device use;walker, rolling  -CW     [place] Level (Gait Training Goal 1, PT) modified independence  -CW     Distance (Gait Training Goal 1, PT) 200'  -CW     Time Frame (Gait Training Goal 1, PT) 10 days  -CW           User Key  (r) = Recorded By, (t) = Taken By, (c) = Cosigned By    Initials  Name Provider Type    CW Fernando Osorio PT Physical Therapist                Physical Therapy Education     Title: PT OT SLP Therapies (Done)     Topic: Physical Therapy (Done)     Point: Mobility training (Done)     Learning Progress Summary           Patient Acceptance, E,TB, VU by CW at 12/4/2022 1533                   Point: Body mechanics (Done)     Learning Progress Summary           Patient Acceptance, E,TB, VU by CW at 12/4/2022 1533                   Point: Precautions (Done)     Learning Progress Summary           Patient Acceptance, E,TB, VU by CW at 12/4/2022 1533                               User Key     Initials Effective Dates Name Provider Type Discipline     09/23/22 -  Fernando Osorio, PT Physical Therapist PT              PT Recommendation and Plan  Anticipated Discharge Disposition (PT): home with home health  Planned Therapy Interventions (PT): balance training, bed mobility training, gait training, home exercise program, neuromuscular re-education, ROM (range of motion), stair training, stretching, transfer training  Therapy Frequency (PT): daily  Plan of Care Reviewed With: patient  Outcome Evaluation: Pt presents with deficits in strenth, balance, and functional mobility. Pt would benefit from skilled PT services to address the mentioned impairments and allow pt to achieve optimal functional mobility. Recommend home health PT services upon discharge from hospital.   Outcome Measures     Row Name 12/04/22 1533             How much help from another person do you currently need...    Turning from your back to your side while in flat bed without using bedrails? 4  -CW      Moving from lying on back to sitting on the side of a flat bed without bedrails? 4  -CW      Moving to and from a bed to a chair (including a wheelchair)? 3  -CW      Standing up from a chair using your arms (e.g., wheelchair, bedside chair)? 3  -CW      Climbing 3-5 steps with a railing? 3  -CW      To walk in hospital  room? 3  -CW      AM-PAC 6 Clicks Score (PT) 20  -CW         Functional Assessment    Outcome Measure Options AM-PAC 6 Clicks Basic Mobility (PT)  -CW            User Key  (r) = Recorded By, (t) = Taken By, (c) = Cosigned By    Initials Name Provider Type    Fernando Calixto PT Physical Therapist                 Time Calculation:    PT Charges     Row Name 12/04/22 1534             Time Calculation    PT Received On 12/04/22  -CW      PT Goal Re-Cert Due Date 12/13/22  -CW         Untimed Charges    PT Eval/Re-eval Minutes 50  -CW         Total Minutes    Untimed Charges Total Minutes 50  -CW       Total Minutes 50  -CW            User Key  (r) = Recorded By, (t) = Taken By, (c) = Cosigned By    Initials Name Provider Type    Fernando Calixto PT Physical Therapist              Therapy Charges for Today     Code Description Service Date Service Provider Modifiers Qty    31003171721 HC PT EVAL LOW COMPLEXITY 4 12/4/2022 Fernando Osorio, NATHANAEL GP 1          PT G-Codes  Outcome Measure Options: AM-PAC 6 Clicks Basic Mobility (PT)  AM-PAC 6 Clicks Score (PT): 20  AM-PAC 6 Clicks Score (OT): 21    Fernando Osorio PT  12/4/2022

## 2022-12-04 NOTE — PLAN OF CARE
Goal Outcome Evaluation:           Progress: improving  Outcome Evaluation: Very pleasant patient up in chair throughout the day. No complaints of pain or discomfort. Patient states breathing much better today compared to yesterday. Oxygen decreased to 1 liter. Patient decided to transition from here to rehab instead of home due to weakness. Medicated per MAR.

## 2022-12-04 NOTE — PROGRESS NOTES
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 history of dementia, diabetes, dyslipidemia, essential hypertension, stroke with right-sided deficits, hospitalized on 2022 with shortness of breath, cough, found to have acute viral illness, mild hypoxemia requiring supplemental oxygen, decreased oral intake, placed on IV fluids, weakness and fatigue improved, shortness of breath slowly improving, still requiring oxygen, beyond the period of observation, still with symptoms of shortness of breath and weakness, change status to inpatient, patient wishing to pursue rehab given his continued symptoms, weakness, difficulty ambulating, difficulty performing transfers, and lack of competent help in outpatient setting     Interval follow-up: Patient seen and examined this morning, no acute distress, still having cough and shortness of breath, he is able to wean down his oxygen but states that he becomes dyspneic and needs it.  He remains weak and debilitated, asking about rehab placement, he is alone and has no help at home, he feels like he is going to fall because he becomes short of air when he ambulates.  He lacks competent help in the outpatient setting.    Review of systems: All systems reviewed and negative except for, anxiety, cough, shortness of breath, generalized fatigue, generalized weakness    Objective   Objective     Vitals:   Temp:  [97.7 °F (36.5 °C)-98.6 °F (37 °C)] 97.7 °F (36.5 °C)  Heart Rate:  [49-64] 50  Resp:  [18] 18  BP: (126-144)/(76-87) 132/76  Flow (L/min):  [2] 2  Physical Exam    Constitutional: Awake, alert, oriented to place and person lying in bed, wearing nasal cannula oxygen              Eyes: Pupils equal, sclerae anicteric, no conjunctival injection               HENT: NCAT, mucous membranes moist              Neck: Supple, full range of motion              Respiratory: Bilateral air entry present, coarse wheezing, nonlabored respirations               Cardiovascular: RRR, no murmurs, rubs, or gallops, palpable pedal pulses bilaterally              Gastrointestinal: Positive bowel sounds, soft, nontender, nondistended              Musculoskeletal: No bilateral ankle edema, no clubbing or cyanosis to extremities              Psychiatric: Appropriate affect, cooperative              Neurologic: Oriented x 2, able to move all extremities, cranial Nerves grossly intact to confrontation, speech clear    Result Review    Result Review:  I have personally reviewed the pertinent results from the past 24 hours to 12/4/2022 14:37 EST and agree with these findings:  [x]  Laboratory   CBC    CBC 12/1/22 12/2/22 12/4/22   WBC 10.54 8.48 6.64   RBC 5.73 5.27 4.59   Hemoglobin 17.6 16.6 14.3   Hematocrit 50.2 48.5 41.3   MCV 87.6 92.0 90.0   MCH 30.7 31.5 31.2   MCHC 35.1 34.2 34.6   RDW 13.7 13.9 14.0   Platelets 213 189 156           BMP    BMP 12/1/22 12/2/22 12/4/22   BUN 15 17 18   Creatinine 0.93 1.07 0.95   Sodium 137 137 140   Potassium 4.1 4.1 3.7   Chloride 101 102 107   CO2 23.0 21.8 (A) 21.8 (A)   Calcium 9.9 9.1 8.4 (A)   (A) Abnormal value            LIVER FUNCTION TESTS:      Lab 12/04/22  1123 12/02/22  0606 12/01/22  1911   TOTAL PROTEIN 5.5* 6.8 7.7   ALBUMIN 3.40* 3.90 4.70   GLOBULIN  --  2.9 3.0   ALT (SGPT) 15 15 16   AST (SGOT) 22 20 16   BILIRUBIN 0.4 0.8 1.1   BILIRUBIN DIRECT <0.2  --   --    ALK PHOS 51 73 86       [x]  Microbiology   Blood Culture   Date Value Ref Range Status   12/02/2022 No growth at 2 days  Preliminary   12/02/2022 No growth at 2 days  Preliminary     No results found for: BCIDPCR, CXREFLEX, CSFCX, CULTURETIS  No results found for: CULTURES, HSVCX, URCX  No results found for: EYECULTURE, GCCX, HSVCULTURE, LABHSV  No results found for:  LEGIONELLA, MRSACX, MUMPSCX, MYCOPLASCX  No results found for: NOCARDIACX, STOOLCX  No results found for: THROATCX, UNSTIMCULT, URINECX, CULTURE, VZVCULTUR  No results found for: VIRALCULTU, WOUNDCX    [x]  Radiology Adult Transthoracic Echo Complete W/ Cont if Necessary Per Protocol    Result Date: 12/3/2022  •  Left ventricular systolic function is normal. Left ventricular ejection fraction appears to be 56 - 60%. •  Left ventricular wall thickness is consistent with mild concentric hypertrophy. •  Left ventricular diastolic function is consistent with (grade Ia w/high LAP) impaired relaxation. •  Moderate tricuspid valve regurgitation is present. •  Estimated right ventricular systolic pressure from tricuspid regurgitation is moderately elevated (45-55 mmHg). •  Mild to moderate pulmonary hypertension is present.     CT Chest With Contrast Diagnostic    Result Date: 12/2/2022  PROCEDURE: CT CHEST W CONTRAST DIAGNOSTIC  COMPARISON:  [facility], CT, CT CHEST WO CONTRAST DIAGNOSTIC, 10/06/2021, 22:27.  [facility], CR, XR CHEST 1 VW, 12/01/2022, 20:41.  [facility], CT, CT ABDOMEN PELVIS W CONTRAST, 5/09/2022, 23:30. INDICATIONS: DYSPNEA, COUGH X 2 DAYS  TECHNIQUE: After obtaining the patient's consent, CT images were obtained with non-ionic intravenous contrast material.   PROTOCOL:   Pulmonary embolism imaging protocol performed    RADIATION:   DLP: 367.9mGy*cm   Automated exposure control was utilized to minimize radiation dose. CONTRAST: 70cc Isovue 370 I.V.  FINDINGS:  The central tracheobronchial tree is clear.  The lungs are clear.  There is no pleural effusion.  The heart size appears normal.  The great vessels are normal in caliber.  There is no evidence of pulmonary embolus.  No abnormally enlarged lymph nodes are identified.  Partial evaluation of the upper abdomen demonstrates changes of cholecystectomy.  No aggressive osseous lesions are identified.        1.  Negative for pulmonary embolus. 2. No acute cardiopulmonary process.     JENNIFFER MAYERS MD       Electronically Signed and Approved By: JENNIFFER MAYERS MD on 12/02/2022 at 2:13             XR Chest 1 View    Result Date: 12/1/2022  PROCEDURE: XR CHEST 1 VW  COMPARISON: [facility], CR, XR CHEST PA AND LATERAL, 2/01/2022, 14:03.  INDICATIONS: WEAKNESS, CHILLS, BODY ACHES  FINDINGS:  LUNGS: Normal.  No significant pulmonary parenchymal abnormalities.  VASCULATURE: Normal.  Unremarkable pulmonary vasculature.  CARDIAC: Normal.  No cardiac silhouette abnormality or cardiomegaly.  MEDIASTINUM: Normal.  No visible mass or adenopathy.  PLEURA: Normal.  No effusion or pleural thickening.  BONES: Normal.  No fracture or visible bony lesion.  OTHER: Negative.        No acute cardiopulmonary process identified.       JENNIFFER MAYERS MD       Electronically Signed and Approved By: JENNIFFER MAYERS MD on 12/01/2022 at 21:56               [x]  EKG/Telemetry   []  Cardiology/Vascular   []  Pathology  [x]  Old records  []  Other:    Assessment & Plan   Assessment / Plan     Assessment/Plan:  Assessment:  Acute viral syndrome  Shortness of breath  Mild hypoxia requiring supplemental oxygen  Hypertension  Type 2 diabetes mellitus  Hyperlipidemia  Dementia  NSVT     Plan:  Labs and imaging reviewed  Change status to inpatient  Continue oxygen use and determine true need, 6-minute walk test  Echo reviewed  Wean oxygen as tolerated with his saturation goal of greater than 92%  Pulmicort, DuoNebs continued twice daily  Insulin sign scale coverage  Diabetic diet  Continue prednisone 40 mg daily  Stopped IV fluids  Continue azithromycin  PT/OT  Consistent carb diet, basal and correctional insulin  Looking for rehab placement  Continue home amlodipine, lisinopril, carvedilol, atorvastatin, melatonin, pantoprazole  A.m. labs  Full code  DVT prophylaxis with Lovenox  Clinical course to dictate further  management  Discussed with nurse at the bedside    DVT prophylaxis:  Medical DVT prophylaxis orders are present.    CODE STATUS:   Level Of Support Discussed With: Patient  Code Status (Patient has no pulse and is not breathing): CPR (Attempt to Resuscitate)  Medical Interventions (Patient has pulse or is breathing): Full Support        Electronically signed by Racheal Cohn MD, 12/04/22, 2:37 PM EST.    Portions of this documentation were transcribed electronically from a voice recognition software.  I confirm all data accurately represents the service(s) I performed at today's visit.

## 2022-12-04 NOTE — PLAN OF CARE
Problem: Adult Inpatient Plan of Care  Goal: Absence of Hospital-Acquired Illness or Injury  Outcome: Ongoing, Progressing   Goal Outcome Evaluation:  Plan of Care Reviewed With: patient        Progress: improving pt alert and oriented. Shortness of air at times despite oxygen level mid to high 90's. Pt does state improvement this am. Pt with productive cough of clear sputum. Pt pleasant and cooperative. Frequent cough. Pt HOB elevated and has oxygen 2 liters per nasal cannula on and off. Pt resting in bed at this time.

## 2022-12-04 NOTE — PLAN OF CARE
Goal Outcome Evaluation:           Progress: improving  Outcome Evaluation: Very pleasant patient rested in bed and up in chair throughout the day. Frequent complaints of SOA. 2L of oxygen applied as needed for comfort. No complaints of pain. Medicated per MAR. Patient went down for echocardiogram. Continuous IV fluids continued. Inspiratory and expiratory wheezes in the patient's lungs throughout.

## 2022-12-05 LAB
ALBUMIN SERPL-MCNC: 3.4 G/DL (ref 3.5–5.2)
ALP SERPL-CCNC: 50 U/L (ref 39–117)
ALT SERPL W P-5'-P-CCNC: 15 U/L (ref 1–41)
ANION GAP SERPL CALCULATED.3IONS-SCNC: 11 MMOL/L (ref 5–15)
AST SERPL-CCNC: 22 U/L (ref 1–40)
BASOPHILS # BLD AUTO: 0.02 10*3/MM3 (ref 0–0.2)
BASOPHILS NFR BLD AUTO: 0.3 % (ref 0–1.5)
BILIRUB CONJ SERPL-MCNC: 0.2 MG/DL (ref 0–0.3)
BILIRUB INDIRECT SERPL-MCNC: 0.3 MG/DL
BILIRUB SERPL-MCNC: 0.5 MG/DL (ref 0–1.2)
BUN SERPL-MCNC: 15 MG/DL (ref 8–23)
BUN/CREAT SERPL: 17.2 (ref 7–25)
CALCIUM SPEC-SCNC: 8.5 MG/DL (ref 8.6–10.5)
CHLORIDE SERPL-SCNC: 105 MMOL/L (ref 98–107)
CO2 SERPL-SCNC: 22 MMOL/L (ref 22–29)
CREAT SERPL-MCNC: 0.87 MG/DL (ref 0.76–1.27)
DEPRECATED RDW RBC AUTO: 44.2 FL (ref 37–54)
EGFRCR SERPLBLD CKD-EPI 2021: 87.8 ML/MIN/1.73
EOSINOPHIL # BLD AUTO: 0.01 10*3/MM3 (ref 0–0.4)
EOSINOPHIL NFR BLD AUTO: 0.1 % (ref 0.3–6.2)
ERYTHROCYTE [DISTWIDTH] IN BLOOD BY AUTOMATED COUNT: 13.6 % (ref 12.3–15.4)
GLUCOSE BLDC GLUCOMTR-MCNC: 111 MG/DL (ref 70–99)
GLUCOSE BLDC GLUCOMTR-MCNC: 157 MG/DL (ref 70–99)
GLUCOSE BLDC GLUCOMTR-MCNC: 189 MG/DL (ref 70–99)
GLUCOSE BLDC GLUCOMTR-MCNC: 246 MG/DL (ref 70–99)
GLUCOSE SERPL-MCNC: 164 MG/DL (ref 65–99)
HBA1C MFR BLD: 7.5 % (ref 4.8–5.6)
HCT VFR BLD AUTO: 40.7 % (ref 37.5–51)
HGB BLD-MCNC: 14.5 G/DL (ref 13–17.7)
IMM GRANULOCYTES # BLD AUTO: 0.04 10*3/MM3 (ref 0–0.05)
IMM GRANULOCYTES NFR BLD AUTO: 0.5 % (ref 0–0.5)
LYMPHOCYTES # BLD AUTO: 3.07 10*3/MM3 (ref 0.7–3.1)
LYMPHOCYTES NFR BLD AUTO: 40.2 % (ref 19.6–45.3)
MAGNESIUM SERPL-MCNC: 1.5 MG/DL (ref 1.6–2.4)
MCH RBC QN AUTO: 31.3 PG (ref 26.6–33)
MCHC RBC AUTO-ENTMCNC: 35.6 G/DL (ref 31.5–35.7)
MCV RBC AUTO: 87.9 FL (ref 79–97)
MONOCYTES # BLD AUTO: 0.68 10*3/MM3 (ref 0.1–0.9)
MONOCYTES NFR BLD AUTO: 8.9 % (ref 5–12)
NEUTROPHILS NFR BLD AUTO: 3.82 10*3/MM3 (ref 1.7–7)
NEUTROPHILS NFR BLD AUTO: 50 % (ref 42.7–76)
NRBC BLD AUTO-RTO: 0 /100 WBC (ref 0–0.2)
PHOSPHATE SERPL-MCNC: 2.1 MG/DL (ref 2.5–4.5)
PLATELET # BLD AUTO: 169 10*3/MM3 (ref 140–450)
PMV BLD AUTO: 10.9 FL (ref 6–12)
POTASSIUM SERPL-SCNC: 3.3 MMOL/L (ref 3.5–5.2)
PROT SERPL-MCNC: 5.5 G/DL (ref 6–8.5)
RBC # BLD AUTO: 4.63 10*6/MM3 (ref 4.14–5.8)
SODIUM SERPL-SCNC: 138 MMOL/L (ref 136–145)
WBC NRBC COR # BLD: 7.64 10*3/MM3 (ref 3.4–10.8)

## 2022-12-05 PROCEDURE — 63710000001 PREDNISONE PER 1 MG: Performed by: INTERNAL MEDICINE

## 2022-12-05 PROCEDURE — 84100 ASSAY OF PHOSPHORUS: CPT | Performed by: FAMILY MEDICINE

## 2022-12-05 PROCEDURE — 94799 UNLISTED PULMONARY SVC/PX: CPT

## 2022-12-05 PROCEDURE — 97110 THERAPEUTIC EXERCISES: CPT

## 2022-12-05 PROCEDURE — 63710000001 INSULIN DETEMIR PER 5 UNITS: Performed by: FAMILY MEDICINE

## 2022-12-05 PROCEDURE — 83735 ASSAY OF MAGNESIUM: CPT | Performed by: FAMILY MEDICINE

## 2022-12-05 PROCEDURE — 82962 GLUCOSE BLOOD TEST: CPT

## 2022-12-05 PROCEDURE — 94664 DEMO&/EVAL PT USE INHALER: CPT

## 2022-12-05 PROCEDURE — 85025 COMPLETE CBC W/AUTO DIFF WBC: CPT | Performed by: FAMILY MEDICINE

## 2022-12-05 PROCEDURE — 25010000002 ENOXAPARIN PER 10 MG: Performed by: STUDENT IN AN ORGANIZED HEALTH CARE EDUCATION/TRAINING PROGRAM

## 2022-12-05 PROCEDURE — 63710000001 INSULIN LISPRO (HUMAN) PER 5 UNITS: Performed by: PHYSICIAN ASSISTANT

## 2022-12-05 PROCEDURE — 80076 HEPATIC FUNCTION PANEL: CPT | Performed by: FAMILY MEDICINE

## 2022-12-05 PROCEDURE — 80048 BASIC METABOLIC PNL TOTAL CA: CPT | Performed by: FAMILY MEDICINE

## 2022-12-05 PROCEDURE — 99232 SBSQ HOSP IP/OBS MODERATE 35: CPT | Performed by: FAMILY MEDICINE

## 2022-12-05 RX ORDER — CARVEDILOL 6.25 MG/1
6.25 TABLET ORAL 2 TIMES DAILY WITH MEALS
Status: DISCONTINUED | OUTPATIENT
Start: 2022-12-05 | End: 2022-12-07

## 2022-12-05 RX ADMIN — INSULIN LISPRO 2 UNITS: 100 INJECTION, SOLUTION INTRAVENOUS; SUBCUTANEOUS at 21:11

## 2022-12-05 RX ADMIN — BUDESONIDE 0.5 MG: 0.5 INHALANT ORAL at 08:17

## 2022-12-05 RX ADMIN — ATORVASTATIN CALCIUM 80 MG: 40 TABLET, FILM COATED ORAL at 21:11

## 2022-12-05 RX ADMIN — PANTOPRAZOLE SODIUM 40 MG: 40 TABLET, DELAYED RELEASE ORAL at 06:25

## 2022-12-05 RX ADMIN — TAMSULOSIN HYDROCHLORIDE 0.4 MG: 0.4 CAPSULE ORAL at 08:57

## 2022-12-05 RX ADMIN — SODIUM CHLORIDE 100 ML/HR: 9 INJECTION, SOLUTION INTRAVENOUS at 07:58

## 2022-12-05 RX ADMIN — AMLODIPINE BESYLATE 5 MG: 5 TABLET ORAL at 08:57

## 2022-12-05 RX ADMIN — IPRATROPIUM BROMIDE AND ALBUTEROL SULFATE 3 ML: .5; 3 SOLUTION RESPIRATORY (INHALATION) at 20:21

## 2022-12-05 RX ADMIN — Medication 5 MG: at 21:11

## 2022-12-05 RX ADMIN — IPRATROPIUM BROMIDE AND ALBUTEROL SULFATE 3 ML: .5; 3 SOLUTION RESPIRATORY (INHALATION) at 08:17

## 2022-12-05 RX ADMIN — PREDNISONE 40 MG: 20 TABLET ORAL at 08:57

## 2022-12-05 RX ADMIN — INSULIN LISPRO 3 UNITS: 100 INJECTION, SOLUTION INTRAVENOUS; SUBCUTANEOUS at 17:55

## 2022-12-05 RX ADMIN — AZITHROMYCIN MONOHYDRATE 250 MG: 250 TABLET ORAL at 10:08

## 2022-12-05 RX ADMIN — SODIUM CHLORIDE 100 ML/HR: 9 INJECTION, SOLUTION INTRAVENOUS at 18:24

## 2022-12-05 RX ADMIN — INSULIN DETEMIR 10 UNITS: 100 INJECTION, SOLUTION SUBCUTANEOUS at 21:10

## 2022-12-05 RX ADMIN — LISINOPRIL 20 MG: 20 TABLET ORAL at 08:57

## 2022-12-05 RX ADMIN — CARVEDILOL 12.5 MG: 12.5 TABLET, FILM COATED ORAL at 08:57

## 2022-12-05 RX ADMIN — CARVEDILOL 6.25 MG: 6.25 TABLET, FILM COATED ORAL at 17:55

## 2022-12-05 RX ADMIN — IPRATROPIUM BROMIDE AND ALBUTEROL SULFATE 3 ML: .5; 3 SOLUTION RESPIRATORY (INHALATION) at 13:51

## 2022-12-05 RX ADMIN — ENOXAPARIN SODIUM 40 MG: 100 INJECTION SUBCUTANEOUS at 08:57

## 2022-12-05 RX ADMIN — INSULIN LISPRO 2 UNITS: 100 INJECTION, SOLUTION INTRAVENOUS; SUBCUTANEOUS at 12:35

## 2022-12-05 RX ADMIN — Medication 10 ML: at 21:11

## 2022-12-05 RX ADMIN — INSULIN DETEMIR 10 UNITS: 100 INJECTION, SOLUTION SUBCUTANEOUS at 08:58

## 2022-12-05 NOTE — THERAPY TREATMENT NOTE
Patient Name: Nikhil Baldwin  : 1945    MRN: [id number]                              Today's Date: 2022       Admit Date: 2022    Visit Dx:     ICD-10-CM ICD-9-CM   1. Acute viral syndrome  B34.9 079.99   2. Acute respiratory failure with hypoxia (HCC)  J96.01 518.81   3. Decreased activities of daily living (ADL)  Z78.9 V49.89   4. Difficulty walking  R26.2 719.7     Patient Active Problem List   Diagnosis   • Aortic aneurysm (HCC)   • Arthritis   • BPH (benign prostatic hyperplasia)   • Cervical radiculopathy   • Diabetes (HCC)   • Parathyroid abnormality (HCC)   • Thyroid disorder   • Facet arthropathy   • Fatty metamorphosis of liver   • GERD (gastroesophageal reflux disease)   • Hypertension   • Hematuria   • Hiatal hernia   • Urinary incontinence   • Hypercalcemia   • Hyperlipemia   • Kidney disease   • Renal calculi   • Low back pain   • Thoracic back pain   • DDD (degenerative disc disease), lumbar   • Spinal stenosis of lumbar region   • Degeneration of thoracic or thoracolumbar intervertebral disc   • MI (myocardial infarction) (HCC)   • Myofascial pain   • Neck pain   • Nocturia   • Sciatica   • Stroke (HCC)   • Ureterolithiasis   • Vitamin D deficiency   • Labile personality (HCC)   • Acute viral syndrome     Past Medical History:   Diagnosis Date   • Aorta disorder (HCC)    • Arthritis    • Degenerative joint disease    • Dementia (HCC)     FRONTAL LOBE   • Depression    • Diabetes mellitus (HCC)    • Hyperlipidemia    • Hypertension    • Kidney stone    • Stroke (HCC)     MILD RIGHT SIDE DEFICITS     Past Surgical History:   Procedure Laterality Date   • CHOLECYSTECTOMY     • CYSTOSCOPY BLADDER STONE LITHOTRIPSY     • KIDNEY STONE SURGERY        General Information    No documentation.                  Mobility/ADL's    No documentation.                Obj/Interventions    No documentation.                Goals/Plan    No documentation.                Clinical Impression    No  documentation.                Outcome Measures    No documentation.                 Occupational Therapy Education     Title: PT OT SLP Therapies (Done)     Topic: Occupational Therapy (Done)     Point: ADL training (Done)     Description:   Instruct learner(s) on proper safety adaptation and remediation techniques during self care or transfers.   Instruct in proper use of assistive devices.              Learning Progress Summary           Patient Acceptance, E, VU by  at 12/3/2022 0931                   Point: Home exercise program (Done)     Description:   Instruct learner(s) on appropriate technique for monitoring, assisting and/or progressing therapeutic exercises/activities.              Learning Progress Summary           Patient Acceptance, E, VU by  at 12/3/2022 0931                   Point: Precautions (Done)     Description:   Instruct learner(s) on prescribed precautions during self-care and functional transfers.              Learning Progress Summary           Patient Acceptance, E, VU by  at 12/3/2022 0931                   Point: Body mechanics (Done)     Description:   Instruct learner(s) on proper positioning and spine alignment during self-care, functional mobility activities and/or exercises.              Learning Progress Summary           Patient Acceptance, E, VU by  at 12/3/2022 0931                               User Key     Initials Effective Dates Name Provider Type Discipline     06/16/21 -  Latasha Hughes OT Occupational Therapist OT              OT Recommendation and Plan  Therapy Frequency (OT): 5 times/wk  Plan of Care Review  Plan of Care Reviewed With: patient  Progress: improving  Outcome Evaluation: Patient agreeable to exercises but reports increased fatigue and refuses mobilty and or transfers at this time; Patient could benefit from continued services due to level of deconditioning with impaired strength, balance, safety and task tolerance limiting ability to perform  ADL's at this time. OT and patient collaborated this date and he is aware of need for continued services post acute care and open to learning about his options for continued therapy services.     Time Calculation:     Therapy Charges for Today     Code Description Service Date Service Provider Modifiers Qty    [id number] HC OT THER PROC EA 15 MIN 12/5/2022 Celine Colon OT GO 1               Celine Colon OT  12/6/2022

## 2022-12-05 NOTE — PLAN OF CARE
"Goal Outcome Evaluation:           Progress: no change  Outcome Evaluation: Pt had c/o SOA at beginning of shift, pt placed on 1LNC and sat up in bed; pt reported relief from SOA with interventions. Pt received breathing tx and PO steroids as well. Pt refused AM labs at first, nurse educated pt on need for labs and pt allowed labs to be drawn but requested no PM labs if ordered, unable to round with provider when provider was on floor; provider notified through messages of pt request on lab times and c/o SOA at beginning of shift. Pt able to sit on side of bed with no assistance. Pt currently on RA and has no c/o SOA at this time. Walk test ordered, pt currently refusing walk test d/t being \"tired\" and requests for walk test to be performed in AM after sleeping. Pt had c/o no sleep last night. Pt had no c/o pain during shift. Pt remains on continuous fluids per orders. Pt is in no apparent distress at this time, denies any needs currently, call light in reach.  "

## 2022-12-05 NOTE — PROGRESS NOTES
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 history of dementia, diabetes, dyslipidemia, essential hypertension, stroke with right-sided deficits, hospitalized on 2022 with shortness of breath, cough, found to have acute viral illness, mild hypoxemia requiring supplemental oxygen, decreased oral intake, placed on IV fluids, weakness and fatigue improved, shortness of breath slowly improving, still requiring oxygen, beyond the period of observation, still with symptoms of shortness of breath and weakness, change status to inpatient, patient wishing to pursue rehab given his continued symptoms, weakness, difficulty ambulating, difficulty performing transfers, and lack of competent help in outpatient setting     Interval follow-up: Patient seen and examined this morning, no acute distress, still having cough and shortness of breath, feels like he cannot sleep at night due to cough, very anxious, asking about going to rehab, denied fevers, chills, sweats.  Intermittently on nasal cannula oxygen at 1 to 2 L, at some points he does well on room air.  Potassium low 3.3, replacement potassium ordered.  No chills or sweats, telemetry reviewed, intermittently bradycardic down to the 30s, relatively asymptomatic, baseline heart rate in the 50s.  Reviewing his medications, his Coreg is at 12.5, I have reduced the dose to 6.25 to see the effect if this helps improve bradycardia.  Still asking about rehab placement, working on referrals.  [facility] and [facility] in [place] have both excepted referrals, await further process.    Review of systems: All systems reviewed and negative except for, anxiety about sleeping in disposition plan, cough, shortness of breath,  generalized fatigue, generalized weakness    Objective   Objective     Vitals:   Temp:  [97.2 °F (36.2 °C)-97.9 °F (36.6 °C)] 97.3 °F (36.3 °C)  Heart Rate:  [42-69] 55  Resp:  [16-22] 18  BP: (142-158)/(72-83) 147/79  Flow (L/min):  [1] 1  Physical Exam    Constitutional: Awake, alert, oriented to place and person lying in bed working with therapist              Eyes: Pupils equal, sclerae anicteric, no conjunctival injection              HENT: NCAT, mucous membranes moist              Neck: Supple, full range of motion              Respiratory: Bilateral air entry present, coarse wheezing, nonlabored respirations               Cardiovascular: RRR, no murmurs, rubs, or gallops, palpable pedal pulses bilaterally              Gastrointestinal: Positive bowel sounds, soft, nontender, nondistended              Musculoskeletal: No bilateral ankle edema, no clubbing or cyanosis to extremities              Psychiatric: Appropriate affect, cooperative              Neurologic: Oriented x 3, able to move all extremities, cranial Nerves grossly intact to confrontation, speech clear    Result Review    Result Review:  I have personally reviewed the pertinent results from the past 24 hours to 12/5/2022 17:07 EST and agree with these findings:  [x]  Laboratory   CBC    CBC 12/2/22 12/4/22 12/5/22   WBC 8.48 6.64 7.64   RBC 5.27 4.59 4.63   Hemoglobin 16.6 14.3 14.5   Hematocrit 48.5 41.3 40.7   MCV 92.0 90.0 87.9   MCH 31.5 31.2 31.3   MCHC 34.2 34.6 35.6   RDW 13.9 14.0 13.6   Platelets 189 156 169           BMP    BMP 12/2/22 12/4/22 12/5/22   BUN 17 18 15   Creatinine 1.07 0.95 0.87   Sodium 137 140 138   Potassium 4.1 3.7 3.3 (A)   Chloride 102 107 105   CO2 21.8 (A) 21.8 (A) 22.0   Calcium 9.1 8.4 (A) 8.5 (A)   (A) Abnormal value            LIVER FUNCTION TESTS:      Lab 12/05/22  1028 12/04/22  1123 12/02/22  0606 12/01/22  1911   TOTAL PROTEIN 5.5* 5.5* 6.8 7.7   ALBUMIN 3.40* 3.40* 3.90 4.70   GLOBULIN  --   --  2.9 3.0    ALT (SGPT) 15 15 15 16   AST (SGOT) 22 22 20 16   BILIRUBIN 0.5 0.4 0.8 1.1   INDIRECT BILIRUBIN 0.3  --   --   --    BILIRUBIN DIRECT 0.2 <0.2  --   --    ALK PHOS 50 51 73 86       [x]  Microbiology   Blood Culture   Date Value Ref Range Status   12/02/2022 No growth at 3 days  Preliminary   12/02/2022 No growth at 3 days  Preliminary     No results found for: BCIDPCR, CXREFLEX, CSFCX, CULTURETIS  No results found for: CULTURES, HSVCX, URCX  No results found for: EYECULTURE, GCCX, HSVCULTURE, LABHSV  No results found for: LEGIONELLA, MRSACX, MUMPSCX, MYCOPLASCX  No results found for: NOCARDIACX, STOOLCX  No results found for: THROATCX, UNSTIMCULT, URINECX, CULTURE, VZVCULTUR  No results found for: VIRALCULTU, WOUNDCX    [x]  Radiology Adult Transthoracic Echo Complete W/ Cont if Necessary Per Protocol    Result Date: 12/3/2022  •  Left ventricular systolic function is normal. Left ventricular ejection fraction appears to be 56 - 60%. •  Left ventricular wall thickness is consistent with mild concentric hypertrophy. •  Left ventricular diastolic function is consistent with (grade Ia w/high LAP) impaired relaxation. •  Moderate tricuspid valve regurgitation is present. •  Estimated right ventricular systolic pressure from tricuspid regurgitation is moderately elevated (45-55 mmHg). •  Mild to moderate pulmonary hypertension is present.     CT Chest With Contrast Diagnostic    Result Date: 12/2/2022  PROCEDURE: CT CHEST W CONTRAST DIAGNOSTIC  COMPARISON:  [facility], CT, CT CHEST WO CONTRAST DIAGNOSTIC, 10/06/2021, 22:27.  [facility], CR, XR CHEST 1 VW, 12/01/2022, 20:41.  [facility], CT, CT ABDOMEN PELVIS W CONTRAST, 5/09/2022, 23:30. INDICATIONS: DYSPNEA, COUGH X 2 DAYS  TECHNIQUE: After obtaining the patient's consent, CT images were obtained with non-ionic intravenous contrast material.   PROTOCOL:   Pulmonary embolism imaging protocol performed    RADIATION:   DLP:  367.9mGy*cm   Automated exposure control was utilized to minimize radiation dose. CONTRAST: 70cc Isovue 370 I.V.  FINDINGS:  The central tracheobronchial tree is clear.  The lungs are clear.  There is no pleural effusion.  The heart size appears normal.  The great vessels are normal in caliber.  There is no evidence of pulmonary embolus.  No abnormally enlarged lymph nodes are identified.  Partial evaluation of the upper abdomen demonstrates changes of cholecystectomy.  No aggressive osseous lesions are identified.        1. Negative for pulmonary embolus. 2. No acute cardiopulmonary process.     JENNIFFER MAYERS MD       Electronically Signed and Approved By: JENNIFFER MAYERS MD on 12/02/2022 at 2:13             XR Chest 1 View    Result Date: 12/1/2022  PROCEDURE: XR CHEST 1 VW  COMPARISON: [facility], CR, XR CHEST PA AND LATERAL, 2/01/2022, 14:03.  INDICATIONS: WEAKNESS, CHILLS, BODY ACHES  FINDINGS:  LUNGS: Normal.  No significant pulmonary parenchymal abnormalities.  VASCULATURE: Normal.  Unremarkable pulmonary vasculature.  CARDIAC: Normal.  No cardiac silhouette abnormality or cardiomegaly.  MEDIASTINUM: Normal.  No visible mass or adenopathy.  PLEURA: Normal.  No effusion or pleural thickening.  BONES: Normal.  No fracture or visible bony lesion.  OTHER: Negative.        No acute cardiopulmonary process identified.       JENNIFFER MAYERS MD       Electronically Signed and Approved By: JENNIFFER MAYERS MD on 12/01/2022 at 21:56               [x]  EKG/Telemetry   []  Cardiology/Vascular   []  Pathology  [x]  Old records  []  Other:    Assessment & Plan   Assessment / Plan     Assessment/Plan:  Assessment:  Acute viral syndrome  Shortness of breath  Mild hypoxia requiring supplemental oxygen  Hypertension  Type 2 diabetes mellitus  Hyperlipidemia  Dementia  NSVT     Plan:  Labs and imaging reviewed  Continue working with therapy  Await walk test  Reduce Coreg to 6.25 mg p.o. twice  daily  Follow effect on telemetry  Wean oxygen as tolerated with his saturation goal of greater than 92%  Pulmicort, DuoNebs continued twice daily  Insulin sign scale coverage  Diabetic diet  Continue prednisone 40 mg daily to complete 5 days  Continue azithromycin to complete 5 days total  PT/OT  Consistent carb diet, basal and correctional insulin  Looking for rehab placement  Continue home amlodipine, lisinopril, atorvastatin, melatonin, pantoprazole  A.m. labs  Full code  DVT prophylaxis with Lovenox  Clinical course to dictate further management  Discussed with nurse at the bedside    DVT prophylaxis:  Medical DVT prophylaxis orders are present.    CODE STATUS:   Level Of Support Discussed With: Patient  Code Status (Patient has no pulse and is not breathing): CPR (Attempt to Resuscitate)  Medical Interventions (Patient has pulse or is breathing): Full Support        Electronically signed by Racheal Cohn MD, 12/05/22, 5:07 PM EST.    Portions of this documentation were transcribed electronically from a voice recognition software.  I confirm all data accurately represents the service(s) I performed at today's visit.

## 2022-12-05 NOTE — THERAPY TREATMENT NOTE
Acute Care - Physical Therapy Treatment Note  TONY Guillory     Patient Name: Nikhil Baldwin  : 1945  MRN: [id number]  Today's Date: 2022      Visit Dx:     ICD-10-CM ICD-9-CM   1. Acute viral syndrome  B34.9 079.99   2. Acute respiratory failure with hypoxia (HCC)  J96.01 518.81   3. Decreased activities of daily living (ADL)  Z78.9 V49.89   4. Difficulty walking  R26.2 719.7     Patient Active Problem List   Diagnosis   • Aortic aneurysm (HCC)   • Arthritis   • BPH (benign prostatic hyperplasia)   • Cervical radiculopathy   • Diabetes (HCC)   • Parathyroid abnormality (HCC)   • Thyroid disorder   • Facet arthropathy   • Fatty metamorphosis of liver   • GERD (gastroesophageal reflux disease)   • Hypertension   • Hematuria   • Hiatal hernia   • Urinary incontinence   • Hypercalcemia   • Hyperlipemia   • Kidney disease   • Renal calculi   • Low back pain   • Thoracic back pain   • DDD (degenerative disc disease), lumbar   • Spinal stenosis of lumbar region   • Degeneration of thoracic or thoracolumbar intervertebral disc   • MI (myocardial infarction) (HCC)   • Myofascial pain   • Neck pain   • Nocturia   • Sciatica   • Stroke (HCC)   • Ureterolithiasis   • Vitamin D deficiency   • Labile personality (HCC)   • Acute viral syndrome     Past Medical History:   Diagnosis Date   • Aorta disorder (HCC)    • Arthritis    • Degenerative joint disease    • Dementia (HCC)     FRONTAL LOBE   • Depression    • Diabetes mellitus (HCC)    • Hyperlipidemia    • Hypertension    • Kidney stone    • Stroke (HCC)     MILD RIGHT SIDE DEFICITS     Past Surgical History:   Procedure Laterality Date   • CHOLECYSTECTOMY     • CYSTOSCOPY BLADDER STONE LITHOTRIPSY     • KIDNEY STONE SURGERY       PT Assessment (last 12 hours)     PT Evaluation and Treatment     Row Name 22 1207          Physical Therapy Time and Intention    Subjective Information complains of;weakness;fatigue  -DK     Document Type therapy note (daily note)   -DK     Mode of Treatment individual therapy;physical therapy  -DK     Patient Effort good  -DK     Symptoms Noted During/After Treatment fatigue;nausea  -DK     Comment Pt reports having had a bad night, not wanting to do much today.  -     Row Name 12/05/22 1207          Pain    Pretreatment Pain Rating 0/10 - no pain  -DK     Posttreatment Pain Rating 0/10 - no pain  -DK     Row Name 12/05/22 1207          Cognition    Affect/Mental Status (Cognition) confused;low arousal/lethargic  -DK     Orientation Status (Cognition) oriented to;person  -DK     Follows Commands (Cognition) WFL  -DK     Cognitive Function WFL  -DK     Personal Safety Interventions supervised activity;nonskid shoes/slippers when out of bed;gait belt  -DK     Row Name 12/05/22 1207          Motor Skills    Motor Skills --  therapeutic exercises  -DK     Coordination WFL  -DK     Therapeutic Exercise hip;knee;ankle  -DK     Additional Documentation --  Pt declined transfers this session.  -     Row Name 12/05/22 1207          Hip (Therapeutic Exercise)    Hip (Therapeutic Exercise) AAROM (active assistive range of motion)  -DK     Hip AAROM (Therapeutic Exercise) bilateral;flexion;extension;aBduction;aDduction;supine;10 repetitions;2 sets  -     Row Name 12/05/22 1207          Knee (Therapeutic Exercise)    Knee (Therapeutic Exercise) AAROM (active assistive range of motion)  -DK     Knee AAROM (Therapeutic Exercise) bilateral;flexion;extension;supine;10 repetitions;2 sets  -     Row Name 12/05/22 1207          Ankle (Therapeutic Exercise)    Ankle (Therapeutic Exercise) AAROM (active assistive range of motion)  -DK     Ankle AAROM (Therapeutic Exercise) bilateral;dorsiflexion;plantarflexion;supine;10 repetitions;2 sets  -     Row Name 12/05/22 1207          Plan of Care Review    Plan of Care Reviewed With patient  -DK     Progress no change  -     Row Name 12/05/22 1207          Positioning and Restraints    Pre-Treatment Position  in bed  -DK     Post Treatment Position bed  -DK     In Bed supine;call light within reach;encouraged to call for assist;exit alarm on;side rails up x2;legs elevated;heels elevated  -DK     Row Name 12/05/22 1207          Therapy Assessment/Plan (PT)    Rehab Potential (PT) good, to achieve stated therapy goals  -     Criteria for Skilled Interventions Met (PT) skilled treatment is necessary  -DK     Therapy Frequency (PT) daily  -DK     Problem List (PT) problems related to;balance;mobility;strength;hearing  -DK     Activity Limitations Related to Problem List (PT) unable to ambulate safely;unable to transfer safely  -DK     Row Name 12/05/22 1207          Progress Summary (PT)    Progress Toward Functional Goals (PT) progress toward functional goals is good  -           User Key  (r) = Recorded By, (t) = Taken By, (c) = Cosigned By    Initials Name Provider Type    Angela Rubio PTA Physical Therapist Assistant                Physical Therapy Education     Title: PT OT SLP Therapies (Done)     Topic: Physical Therapy (Done)     Point: Mobility training (Done)     Learning Progress Summary           Patient Acceptance, E,TB, VU by  at 12/4/2022 1533                   Point: Body mechanics (Done)     Learning Progress Summary           Patient Acceptance, E,TB, VU by  at 12/4/2022 1533                   Point: Precautions (Done)     Learning Progress Summary           Patient Acceptance, E,TB, VU by  at 12/4/2022 1533                               User Key     Initials Effective Dates Name Provider Type Discipline     09/23/22 -  Fernando Osorio, PT Physical Therapist PT              PT Recommendation and Plan  Planned Therapy Interventions (PT): balance training, bed mobility training, gait training, strengthening, transfer training  Therapy Frequency (PT): daily  Progress Summary (PT)  Progress Toward Functional Goals (PT): progress toward functional goals is good  Plan of Care Reviewed With:  patient  Progress: no change   Outcome Measures     Row Name 12/05/22 1207 12/04/22 1533          How much help from another person do you currently need...    Turning from your back to your side while in flat bed without using bedrails? 4  -DK 4  -CW     Moving from lying on back to sitting on the side of a flat bed without bedrails? 4  -DK 4  -CW     Moving to and from a bed to a chair (including a wheelchair)? 3  -DK 3  -CW     Standing up from a chair using your arms (e.g., wheelchair, bedside chair)? 3  -DK 3  -CW     Climbing 3-5 steps with a railing? 2  -DK 3  -CW     To walk in hospital room? 3  -DK 3  -CW     AM-PAC 6 Clicks Score (PT) 19  -DK 20  -CW        Functional Assessment    Outcome Measure Options AM-PAC 6 Clicks Basic Mobility (PT)  -DK AM-PAC 6 Clicks Basic Mobility (PT)  -CW           User Key  (r) = Recorded By, (t) = Taken By, (c) = Cosigned By    Initials Name Provider Type    Angela Rubio PTA Physical Therapist Assistant    CW Fernando Osorio, PT Physical Therapist                 Time Calculation:    PT Charges     Row Name 12/05/22 1210             Time Calculation    PT Received On 12/05/22  -DK      PT Goal Re-Cert Due Date 12/13/22  -DK         Timed Charges    71661 - PT Therapeutic Exercise Minutes 14  -DK      33836 - PT Therapeutic Activity Minutes 4  -DK         Total Minutes    Timed Charges Total Minutes 18  -DK       Total Minutes 18  -DK            User Key  (r) = Recorded By, (t) = Taken By, (c) = Cosigned By    Initials Name Provider Type    Angela Rubio PTA Physical Therapist Assistant              Therapy Charges for Today     Code Description Service Date Service Provider Modifiers Qty    [id number] HC PT THER PROC EA 15 MIN 12/5/2022 Angela Bowman PTA GP 1          PT G-Codes  Outcome Measure Options: AM-PAC 6 Clicks Basic Mobility (PT)  AM-PAC 6 Clicks Score (PT): 19  AM-PAC 6 Clicks Score (OT): 21    Angela Bowman PTA  12/5/2022

## 2022-12-06 LAB
ALBUMIN SERPL-MCNC: 3.3 G/DL (ref 3.5–5.2)
ALP SERPL-CCNC: 50 U/L (ref 39–117)
ALT SERPL W P-5'-P-CCNC: 16 U/L (ref 1–41)
ANION GAP SERPL CALCULATED.3IONS-SCNC: 10.5 MMOL/L (ref 5–15)
AST SERPL-CCNC: 20 U/L (ref 1–40)
BASOPHILS # BLD AUTO: 0.02 10*3/MM3 (ref 0–0.2)
BASOPHILS NFR BLD AUTO: 0.3 % (ref 0–1.5)
BILIRUB CONJ SERPL-MCNC: 0.2 MG/DL (ref 0–0.3)
BILIRUB INDIRECT SERPL-MCNC: 0.3 MG/DL
BILIRUB SERPL-MCNC: 0.5 MG/DL (ref 0–1.2)
BILIRUB UR QL STRIP: NEGATIVE
BUN SERPL-MCNC: 14 MG/DL (ref 8–23)
BUN/CREAT SERPL: 17.7 (ref 7–25)
CALCIUM SPEC-SCNC: 8.4 MG/DL (ref 8.6–10.5)
CHLORIDE SERPL-SCNC: 107 MMOL/L (ref 98–107)
CLARITY UR: CLEAR
CO2 SERPL-SCNC: 22.5 MMOL/L (ref 22–29)
COLOR UR: YELLOW
CREAT SERPL-MCNC: 0.79 MG/DL (ref 0.76–1.27)
DEPRECATED RDW RBC AUTO: 42.3 FL (ref 37–54)
EGFRCR SERPLBLD CKD-EPI 2021: 90.4 ML/MIN/1.73
EOSINOPHIL # BLD AUTO: 0.01 10*3/MM3 (ref 0–0.4)
EOSINOPHIL NFR BLD AUTO: 0.1 % (ref 0.3–6.2)
ERYTHROCYTE [DISTWIDTH] IN BLOOD BY AUTOMATED COUNT: 13.2 % (ref 12.3–15.4)
GLUCOSE BLDC GLUCOMTR-MCNC: 137 MG/DL (ref 70–99)
GLUCOSE BLDC GLUCOMTR-MCNC: 196 MG/DL (ref 70–99)
GLUCOSE BLDC GLUCOMTR-MCNC: 263 MG/DL (ref 70–99)
GLUCOSE BLDC GLUCOMTR-MCNC: 281 MG/DL (ref 70–99)
GLUCOSE BLDC GLUCOMTR-MCNC: 88 MG/DL (ref 70–99)
GLUCOSE SERPL-MCNC: 136 MG/DL (ref 65–99)
GLUCOSE UR STRIP-MCNC: NEGATIVE MG/DL
HCT VFR BLD AUTO: 41 % (ref 37.5–51)
HGB BLD-MCNC: 14.5 G/DL (ref 13–17.7)
HGB UR QL STRIP.AUTO: NEGATIVE
IMM GRANULOCYTES # BLD AUTO: 0.05 10*3/MM3 (ref 0–0.05)
IMM GRANULOCYTES NFR BLD AUTO: 0.7 % (ref 0–0.5)
KETONES UR QL STRIP: NEGATIVE
LEUKOCYTE ESTERASE UR QL STRIP.AUTO: NEGATIVE
LYMPHOCYTES # BLD AUTO: 2.77 10*3/MM3 (ref 0.7–3.1)
LYMPHOCYTES NFR BLD AUTO: 36.2 % (ref 19.6–45.3)
MAGNESIUM SERPL-MCNC: 1.6 MG/DL (ref 1.6–2.4)
MCH RBC QN AUTO: 30.9 PG (ref 26.6–33)
MCHC RBC AUTO-ENTMCNC: 35.4 G/DL (ref 31.5–35.7)
MCV RBC AUTO: 87.2 FL (ref 79–97)
MONOCYTES # BLD AUTO: 0.89 10*3/MM3 (ref 0.1–0.9)
MONOCYTES NFR BLD AUTO: 11.6 % (ref 5–12)
NEUTROPHILS NFR BLD AUTO: 3.91 10*3/MM3 (ref 1.7–7)
NEUTROPHILS NFR BLD AUTO: 51.1 % (ref 42.7–76)
NITRITE UR QL STRIP: NEGATIVE
NRBC BLD AUTO-RTO: 0 /100 WBC (ref 0–0.2)
PH UR STRIP.AUTO: 6 [PH] (ref 5–8)
PHOSPHATE SERPL-MCNC: 2.5 MG/DL (ref 2.5–4.5)
PLATELET # BLD AUTO: 171 10*3/MM3 (ref 140–450)
PMV BLD AUTO: 11.4 FL (ref 6–12)
POTASSIUM SERPL-SCNC: 3.5 MMOL/L (ref 3.5–5.2)
PROT SERPL-MCNC: 5.5 G/DL (ref 6–8.5)
PROT UR QL STRIP: NEGATIVE
RBC # BLD AUTO: 4.7 10*6/MM3 (ref 4.14–5.8)
SODIUM SERPL-SCNC: 140 MMOL/L (ref 136–145)
SP GR UR STRIP: 1.01 (ref 1–1.03)
UROBILINOGEN UR QL STRIP: NORMAL
WBC NRBC COR # BLD: 7.65 10*3/MM3 (ref 3.4–10.8)

## 2022-12-06 PROCEDURE — 81003 URINALYSIS AUTO W/O SCOPE: CPT | Performed by: PHYSICIAN ASSISTANT

## 2022-12-06 PROCEDURE — 97116 GAIT TRAINING THERAPY: CPT

## 2022-12-06 PROCEDURE — 80076 HEPATIC FUNCTION PANEL: CPT | Performed by: FAMILY MEDICINE

## 2022-12-06 PROCEDURE — 63710000001 PREDNISONE PER 1 MG: Performed by: INTERNAL MEDICINE

## 2022-12-06 PROCEDURE — 63710000001 INSULIN LISPRO (HUMAN) PER 5 UNITS: Performed by: PHYSICIAN ASSISTANT

## 2022-12-06 PROCEDURE — 94760 N-INVAS EAR/PLS OXIMETRY 1: CPT

## 2022-12-06 PROCEDURE — 94799 UNLISTED PULMONARY SVC/PX: CPT

## 2022-12-06 PROCEDURE — 80048 BASIC METABOLIC PNL TOTAL CA: CPT | Performed by: FAMILY MEDICINE

## 2022-12-06 PROCEDURE — 63710000001 INSULIN DETEMIR PER 5 UNITS: Performed by: FAMILY MEDICINE

## 2022-12-06 PROCEDURE — 85025 COMPLETE CBC W/AUTO DIFF WBC: CPT | Performed by: FAMILY MEDICINE

## 2022-12-06 PROCEDURE — 83735 ASSAY OF MAGNESIUM: CPT | Performed by: FAMILY MEDICINE

## 2022-12-06 PROCEDURE — 97530 THERAPEUTIC ACTIVITIES: CPT

## 2022-12-06 PROCEDURE — 99232 SBSQ HOSP IP/OBS MODERATE 35: CPT | Performed by: INTERNAL MEDICINE

## 2022-12-06 PROCEDURE — 94664 DEMO&/EVAL PT USE INHALER: CPT

## 2022-12-06 PROCEDURE — 25010000002 ENOXAPARIN PER 10 MG: Performed by: STUDENT IN AN ORGANIZED HEALTH CARE EDUCATION/TRAINING PROGRAM

## 2022-12-06 PROCEDURE — 82962 GLUCOSE BLOOD TEST: CPT

## 2022-12-06 PROCEDURE — 97110 THERAPEUTIC EXERCISES: CPT

## 2022-12-06 PROCEDURE — 84100 ASSAY OF PHOSPHORUS: CPT | Performed by: FAMILY MEDICINE

## 2022-12-06 RX ORDER — HYDRALAZINE HYDROCHLORIDE 25 MG/1
25 TABLET, FILM COATED ORAL EVERY 8 HOURS SCHEDULED
Status: DISCONTINUED | OUTPATIENT
Start: 2022-12-06 | End: 2022-12-07 | Stop reason: HOSPADM

## 2022-12-06 RX ORDER — HYDROCODONE BITARTRATE AND ACETAMINOPHEN 5; 325 MG/1; MG/1
1 TABLET ORAL ONCE AS NEEDED
Status: COMPLETED | OUTPATIENT
Start: 2022-12-06 | End: 2022-12-06

## 2022-12-06 RX ADMIN — SODIUM CHLORIDE 100 ML/HR: 9 INJECTION, SOLUTION INTRAVENOUS at 06:21

## 2022-12-06 RX ADMIN — INSULIN DETEMIR 10 UNITS: 100 INJECTION, SOLUTION SUBCUTANEOUS at 10:49

## 2022-12-06 RX ADMIN — AMLODIPINE BESYLATE 5 MG: 5 TABLET ORAL at 10:47

## 2022-12-06 RX ADMIN — Medication 10 ML: at 10:48

## 2022-12-06 RX ADMIN — HYDRALAZINE HYDROCHLORIDE 25 MG: 25 TABLET, FILM COATED ORAL at 21:56

## 2022-12-06 RX ADMIN — ENOXAPARIN SODIUM 40 MG: 100 INJECTION SUBCUTANEOUS at 10:48

## 2022-12-06 RX ADMIN — LISINOPRIL 20 MG: 20 TABLET ORAL at 10:48

## 2022-12-06 RX ADMIN — INSULIN DETEMIR 10 UNITS: 100 INJECTION, SOLUTION SUBCUTANEOUS at 20:54

## 2022-12-06 RX ADMIN — INSULIN LISPRO 2 UNITS: 100 INJECTION, SOLUTION INTRAVENOUS; SUBCUTANEOUS at 20:54

## 2022-12-06 RX ADMIN — SODIUM CHLORIDE 100 ML/HR: 9 INJECTION, SOLUTION INTRAVENOUS at 18:03

## 2022-12-06 RX ADMIN — HYDROCODONE BITARTRATE AND ACETAMINOPHEN 1 TABLET: 5; 325 TABLET ORAL at 06:21

## 2022-12-06 RX ADMIN — BUDESONIDE 0.5 MG: 0.5 INHALANT ORAL at 08:14

## 2022-12-06 RX ADMIN — HYDRALAZINE HYDROCHLORIDE 25 MG: 25 TABLET, FILM COATED ORAL at 10:47

## 2022-12-06 RX ADMIN — Medication 10 ML: at 20:55

## 2022-12-06 RX ADMIN — PANTOPRAZOLE SODIUM 40 MG: 40 TABLET, DELAYED RELEASE ORAL at 06:56

## 2022-12-06 RX ADMIN — Medication 5 MG: at 20:55

## 2022-12-06 RX ADMIN — PREDNISONE 40 MG: 20 TABLET ORAL at 10:47

## 2022-12-06 RX ADMIN — INSULIN LISPRO 4 UNITS: 100 INJECTION, SOLUTION INTRAVENOUS; SUBCUTANEOUS at 18:12

## 2022-12-06 RX ADMIN — IPRATROPIUM BROMIDE AND ALBUTEROL SULFATE 3 ML: .5; 3 SOLUTION RESPIRATORY (INHALATION) at 08:14

## 2022-12-06 RX ADMIN — TAMSULOSIN HYDROCHLORIDE 0.4 MG: 0.4 CAPSULE ORAL at 10:47

## 2022-12-06 RX ADMIN — IPRATROPIUM BROMIDE AND ALBUTEROL SULFATE 3 ML: .5; 3 SOLUTION RESPIRATORY (INHALATION) at 19:54

## 2022-12-06 RX ADMIN — CARVEDILOL 6.25 MG: 6.25 TABLET, FILM COATED ORAL at 18:03

## 2022-12-06 RX ADMIN — CARVEDILOL 6.25 MG: 6.25 TABLET, FILM COATED ORAL at 10:47

## 2022-12-06 RX ADMIN — AZITHROMYCIN MONOHYDRATE 250 MG: 250 TABLET ORAL at 10:47

## 2022-12-06 RX ADMIN — ATORVASTATIN CALCIUM 80 MG: 40 TABLET, FILM COATED ORAL at 20:54

## 2022-12-06 NOTE — THERAPY TREATMENT NOTE
Acute Care - Physical Therapy Treatment Note  TONY Guillory     Patient Name: Nikhil Baldwin  : 1945  MRN: [id number]  Today's Date: 2022      Visit Dx:     ICD-10-CM ICD-9-CM   1. Acute viral syndrome  B34.9 079.99   2. Acute respiratory failure with hypoxia (HCC)  J96.01 518.81   3. Decreased activities of daily living (ADL)  Z78.9 V49.89   4. Difficulty walking  R26.2 719.7     Patient Active Problem List   Diagnosis   • Aortic aneurysm (HCC)   • Arthritis   • BPH (benign prostatic hyperplasia)   • Cervical radiculopathy   • Diabetes (HCC)   • Parathyroid abnormality (HCC)   • Thyroid disorder   • Facet arthropathy   • Fatty metamorphosis of liver   • GERD (gastroesophageal reflux disease)   • Hypertension   • Hematuria   • Hiatal hernia   • Urinary incontinence   • Hypercalcemia   • Hyperlipemia   • Kidney disease   • Renal calculi   • Low back pain   • Thoracic back pain   • DDD (degenerative disc disease), lumbar   • Spinal stenosis of lumbar region   • Degeneration of thoracic or thoracolumbar intervertebral disc   • MI (myocardial infarction) (HCC)   • Myofascial pain   • Neck pain   • Nocturia   • Sciatica   • Stroke (HCC)   • Ureterolithiasis   • Vitamin D deficiency   • Labile personality (HCC)   • Acute viral syndrome     Past Medical History:   Diagnosis Date   • Aorta disorder (HCC)    • Arthritis    • Degenerative joint disease    • Dementia (HCC)     FRONTAL LOBE   • Depression    • Diabetes mellitus (HCC)    • Hyperlipidemia    • Hypertension    • Kidney stone    • Stroke (HCC)     MILD RIGHT SIDE DEFICITS     Past Surgical History:   Procedure Laterality Date   • CHOLECYSTECTOMY     • CYSTOSCOPY BLADDER STONE LITHOTRIPSY     • KIDNEY STONE SURGERY       PT Assessment (last 12 hours)     PT Evaluation and Treatment     Row Name 22 1223 22 1147       Physical Therapy Time and Intention    Subjective Information complains of;weakness;fatigue  -DK --    Document Type therapy note  (daily note)  -DK --    Mode of Treatment individual therapy;physical therapy  -DK --    Session Not Performed -- patient unavailable for treatment  -DK    Comment, Session Not Performed -- 2 attempts to see pt without success: Using urinal, on phone/house keeping mopping in room.  -DK    Patient Effort good  -DK --    Symptoms Noted During/After Treatment fatigue  -DK --    Comment Pt talks a lot, easily distracted, difficult to keep on task.  He was able to participate in exercises, transfers and gait for a longer distance.  -DK --    Row Name 12/06/22 1223          Pain    Pretreatment Pain Rating 0/10 - no pain  -DK     Posttreatment Pain Rating 0/10 - no pain  -DK     Row Name 12/06/22 1223          Cognition    Affect/Mental Status (Cognition) confused;confabulatory  -DK     Orientation Status (Cognition) oriented to;person  -DK     Follows Commands (Cognition) WFL  -DK     Cognitive Function WFL  -DK     Personal Safety Interventions gait belt;nonskid shoes/slippers when out of bed;supervised activity  -DK     Row Name 12/06/22 1223          Bed Mobility    Bed Mobility supine-sit-supine  -DK     All Activities, Hale (Bed Mobility) standby assist  -DK     Supine-Sit Hale (Bed Mobility) standby assist  -DK     Sit-Supine Hale (Bed Mobility) standby assist  -DK     Supine-Sit-Supine Hale (Bed Mobility) standby assist  -DK     Assistive Device (Bed Mobility) bed rails  -DK     Row Name 12/06/22 1223          Transfers    Transfers sit-stand transfer;stand-sit transfer  -     Row Name 12/06/22 1223          Sit-Stand Transfer    Sit-Stand Hale (Transfers) standby assist;contact guard;1 person assist  -DK     Assistive Device (Sit-Stand Transfers) walker, front-wheeled  -DK     Row Name 12/06/22 1223          Stand-Sit Transfer    Stand-Sit Hale (Transfers) standby assist;contact guard;1 person assist  -DK     Assistive Device (Stand-Sit Transfers) walker,  front-wheeled  -DK     Row Name 12/06/22 1223          Gait/Stairs (Locomotion)    Gait/Stairs Locomotion gait/ambulation independence;gait/ambulation assistive device;distance ambulated;gait pattern  -DK     Swisher Level (Gait) standby assist;contact guard;1 person assist  -DK     Assistive Device (Gait) walker, front-wheeled  -DK     Distance in Feet (Gait) 140  -DK     Pattern (Gait) step-through  -DK     Deviations/Abnormal Patterns (Gait) festinating/shuffling;stride length decreased  -DK     Bilateral Gait Deviations forward flexed posture  -DK     Comment, (Gait/Stairs) Pt ambulated on room air with a rolling walker.  He is easily distracted, very talkative.  Pt returned to bed on alert post treatment.  -DK     Row Name 12/06/22 1223          Safety Issues, Functional Mobility    Safety Issues Affecting Function (Mobility) awareness of need for assistance;impulsivity;judgment;safety precaution awareness  -DK     Impairments Affecting Function (Mobility) cognition;balance;endurance/activity tolerance;strength  -DK     Cognitive Impairments, Mobility Safety/Performance attention;awareness, need for assistance;impulsivity;judgment;safety precaution awareness  -DK     Row Name 12/06/22 1223          Motor Skills    Motor Skills --  therapeutic exericses  -DK     Coordination WFL  -DK     Therapeutic Exercise hip;knee;ankle  -DK     Additional Documentation --  Pt requires many cues to stay focused on tasks.  -     Row Name 12/06/22 1223          Hip (Therapeutic Exercise)    Hip (Therapeutic Exercise) AROM (active range of motion)  -DK     Hip AROM (Therapeutic Exercise) bilateral;flexion;extension;aBduction;aDduction;sitting;20 repititions  -     Row Name 12/06/22 1223          Knee (Therapeutic Exercise)    Knee (Therapeutic Exercise) AROM (active range of motion)  -DK     Knee AROM (Therapeutic Exercise) bilateral;flexion;extension;LAQ (long arc quad);sitting;20 repititions  -     Row Name 12/06/22  1223          Ankle (Therapeutic Exercise)    Ankle (Therapeutic Exercise) AROM (active range of motion)  -     Ankle AROM (Therapeutic Exercise) bilateral;dorsiflexion;plantarflexion;sitting;20 repititions  -     Row Name 12/06/22 1223          Plan of Care Review    Plan of Care Reviewed With patient  -DK     Progress improving  -     Row Name 12/06/22 1223          Positioning and Restraints    Pre-Treatment Position in bed  -DK     Post Treatment Position bed  -DK     In Bed supine;call light within reach;encouraged to call for assist;exit alarm on;side rails up x2;legs elevated  -DK     Row Name 12/06/22 1223          Therapy Assessment/Plan (PT)    Rehab Potential (PT) good, to achieve stated therapy goals  -     Criteria for Skilled Interventions Met (PT) skilled treatment is necessary  -     Therapy Frequency (PT) daily  -     Problem List (PT) problems related to;balance;mobility;strength;hearing  -     Activity Limitations Related to Problem List (PT) unable to ambulate safely;unable to transfer safely  -     Row Name 12/06/22 1223          Progress Summary (PT)    Progress Toward Functional Goals (PT) progress toward functional goals is good  -           User Key  (r) = Recorded By, (t) = Taken By, (c) = Cosigned By    Initials Name Provider Type    Angela Rubio PTA Physical Therapist Assistant                Physical Therapy Education     Title: PT OT SLP Therapies (Done)     Topic: Physical Therapy (Done)     Point: Mobility training (Done)     Learning Progress Summary           Patient Acceptance, E,TB, VU by CW at 12/4/2022 1533                   Point: Body mechanics (Done)     Learning Progress Summary           Patient Acceptance, E,TB, VU by CW at 12/4/2022 1533                   Point: Precautions (Done)     Learning Progress Summary           Patient Acceptance, E,TB, VU by CW at 12/4/2022 1533                               User Key     Initials Effective Dates Name  Provider Type Discipline     09/23/22 -  Fernando Osorio, PT Physical Therapist PT              PT Recommendation and Plan  Planned Therapy Interventions (PT): balance training, bed mobility training, gait training, strengthening, transfer training  Therapy Frequency (PT): daily  Progress Summary (PT)  Progress Toward Functional Goals (PT): progress toward functional goals is good  Plan of Care Reviewed With: patient  Progress: improving   Outcome Measures     Row Name 12/06/22 1223 12/05/22 1207 12/04/22 1533       How much help from another person do you currently need...    Turning from your back to your side while in flat bed without using bedrails? 4  -DK 4  -DK 4  -CW    Moving from lying on back to sitting on the side of a flat bed without bedrails? 4  -DK 4  -DK 4  -CW    Moving to and from a bed to a chair (including a wheelchair)? 3  -DK 3  -DK 3  -CW    Standing up from a chair using your arms (e.g., wheelchair, bedside chair)? 3  -DK 3  -DK 3  -CW    Climbing 3-5 steps with a railing? 2  -DK 2  -DK 3  -CW    To walk in hospital room? 3  -DK 3  -DK 3  -CW    AM-PAC 6 Clicks Score (PT) 19  -DK 19  -DK 20  -CW       Functional Assessment    Outcome Measure Options AM-PAC 6 Clicks Basic Mobility (PT)  -DK AM-PAC 6 Clicks Basic Mobility (PT)  -DK AM-PAC 6 Clicks Basic Mobility (PT)  -CW          User Key  (r) = Recorded By, (t) = Taken By, (c) = Cosigned By    Initials Name Provider Type    Angela Rubio PTA Physical Therapist Assistant     Fernando Osorio, PT Physical Therapist                 Time Calculation:    PT Charges     Row Name 12/06/22 1229 12/06/22 1148          Time Calculation    PT Received On 12/06/22  -DK 12/06/22  -DK     PT Goal Re-Cert Due Date 12/13/22  -DK 12/13/22  -DK        Timed Charges    41481 - PT Therapeutic Exercise Minutes 17  -DK --     75884 - Gait Training Minutes  9  -DK --     02099 - PT Therapeutic Activity Minutes 12  -DK --        Total Minutes    Timed Charges  Total Minutes 38  -DK --      Total Minutes 38  -DK --           User Key  (r) = Recorded By, (t) = Taken By, (c) = Cosigned By    Initials Name Provider Type    Angela Rubio PTA Physical Therapist Assistant              Therapy Charges for Today     Code Description Service Date Service Provider Modifiers Qty    06854416367 HC PT THER PROC EA 15 MIN 12/5/2022 Angela Bowman, EJ GP 1    23789549421 HC PT THER PROC EA 15 MIN 12/6/2022 Angela Bowman, EJ GP 1    26382652324 HC GAIT TRAINING EA 15 MIN 12/6/2022 Angela Bowman, EJ GP 1    70060631781 HC PT THERAPEUTIC ACT EA 15 MIN 12/6/2022 Angela Bowman, EJ GP 1          PT G-Codes  Outcome Measure Options: AM-PAC 6 Clicks Basic Mobility (PT)  AM-PAC 6 Clicks Score (PT): 19  AM-PAC 6 Clicks Score (OT): 21    Angela Bowman PTA  12/6/2022

## 2022-12-06 NOTE — PLAN OF CARE
Goal Outcome Evaluation:  Plan of Care Reviewed With: patient           Outcome Evaluation: AOx4. Sinus ester on monitor. C/o left flank pain 8/10 X1 this shift. One time pain med ordered and given per MAR. Blood glucose monitored. VS WDL. No needs at this time.

## 2022-12-06 NOTE — PROGRESS NOTES
[facility]   Hospitalist Progress Note    Date of admission: 12/1/2022  Patient Name: Nikhil Baldwin  1945  Date: 12/6/2022      Subjective     Chief Complaint   Patient presents with   • Chills   • Weakness - Generalized       Summary: 77 y.o. male with dementia, history of CVA and residual right-sided deficits who presented with shortness of air cough and acute viral illness from unspecified organism.  Weekend patient interested in rehab placement    Interval Followup: Less shortness of air, weak and low interested in rehab.  Normally is independent and lives with his son.  Denies fevers    Review of Systems  No chest pain or palpitations  No fevers or chills    Objective     Vitals:   Temp:  [97.3 °F (36.3 °C)-97.9 °F (36.6 °C)] 97.9 °F (36.6 °C)  Heart Rate:  [39-55] 41  Resp:  [18] 18  BP: (147-171)/(70-81) 171/81    Physical Exam  Gen: awake, resting in bed, conversant  HENT: NCAT, mmm  Resp: Mild rhonchi, no acute wheezing, no acute respiratory distress on room air  CV: RRR, no LE pitting edema  GI: Abdomen soft, NT, ND, no guarding, +BS  Psych: appropriate mood and affect, aox3  Skin: warm, dry  Musculoskeletal: Generalized weakness, worse on the right in setting of prior CVA    Result Review:  Vital signs, labs and recent relevant imaging reviewed.      amLODIPine, 5 mg, Oral, Q24H  atorvastatin, 80 mg, Oral, Nightly  azithromycin, 250 mg, Oral, Daily  budesonide, 0.5 mg, Nebulization, Daily - RT  carvedilol, 6.25 mg, Oral, BID With Meals  enoxaparin, 40 mg, Subcutaneous, Daily  hydrALAZINE, 25 mg, Oral, Q8H  insulin detemir, 10 Units, Subcutaneous, Q12H  insulin lispro, 2-7 Units, Subcutaneous, 4x Daily With Meals & Nightly  ipratropium-albuterol, 3 mL, Nebulization, Q6H While Awake - RT  lisinopril, 20 mg, Oral, Daily  melatonin, 5 mg, Oral, Nightly  pantoprazole, 40 mg, Oral, QAM  predniSONE, 40 mg, Oral, Daily With Breakfast  sodium chloride, 10 mL, Intravenous, Q12H  tamsulosin, 0.4 mg, Oral,  Daily        •  acetaminophen  •  dextrose  •  dextrose  •  glucagon (human recombinant)  •  insulin lispro  •  nitroglycerin  •  sodium chloride  •  sodium chloride  •  sodium chloride      Assessment / Plan     Assessment/Plan:  Acute hypoxic respiratory failure requiring supplemental oxygen  Acute viral illness from unspecified organism  History of NSVT  Dementia, mild to moderate  Hypertension  Diabetes mellitus type 2  Hyperlipidemia    Telemetry: Sinus bradycardia near the 40s to 50    - Completing prednisone 5-day course today, and azithromycin course.  Flu and COVID-negative and has been treating for viral illness from unspecified etiology.  - Appears to be largely asymptomatic bradycardia and blood pressure actually elevated and not symptomatic from this, rate near 40s on telemetry, continue telemetry monitoring, history of NSVT.  Coreg was reduced to 6 yesterday evening and will monitor on current dose reduction.  We will add hydralazine for blood pressure support and may help some with bradycardia as well - further adjustment based on continued trend -given NSVT history will try to keep him highest tolerable dose.  - Continue amlodipine and lisinopril as ordered  - Continue Flomax  - Continue PPI  - Continue inhalers  - Continue Levemir, SSI monitor glucose  - Continue statin  - PT and OT    I discussed with social work, precert is pending.  Continue treatment monitoring as above    DVT prophylaxis:  Medical DVT prophylaxis orders are present.    Level Of Support Discussed With: Patient  Code Status (Patient has no pulse and is not breathing): CPR (Attempt to Resuscitate)  Medical Interventions (Patient has pulse or is breathing): Full Support        CBC    CBC 12/4/22 12/5/22 12/6/22   WBC 6.64 7.64 7.65   RBC 4.59 4.63 4.70   Hemoglobin 14.3 14.5 14.5   Hematocrit 41.3 40.7 41.0   MCV 90.0 87.9 87.2   MCH 31.2 31.3 30.9   MCHC 34.6 35.6 35.4   RDW 14.0 13.6 13.2   Platelets 156 169 171             CMP     CMP 12/4/22 12/4/22 12/5/22 12/5/22 12/6/22 12/6/22    1123 1123 1028 1028 0501 0501   Glucose  170 (A)  164 (A)  136 (A)   BUN  18  15  14   Creatinine  0.95  0.87  0.79   Sodium  140  138  140   Potassium  3.7  3.3 (A)  3.5   Chloride  107  105  107   Calcium  8.4 (A)  8.5 (A)  8.4 (A)   Albumin 3.40 (A)  3.40 (A)  3.30 (A)    Total Bilirubin 0.4  0.5  0.5    Alkaline Phosphatase 51  50  50    AST (SGOT) 22  22  20    ALT (SGPT) 15  15  16    (A) Abnormal value              Dispo: pending stability in clinical condition and appropriate discharge location.

## 2022-12-07 VITALS
HEART RATE: 55 BPM | SYSTOLIC BLOOD PRESSURE: 159 MMHG | HEIGHT: 69 IN | WEIGHT: 181.88 LBS | BODY MASS INDEX: 26.94 KG/M2 | OXYGEN SATURATION: 99 % | RESPIRATION RATE: 18 BRPM | DIASTOLIC BLOOD PRESSURE: 81 MMHG | TEMPERATURE: 98.1 F

## 2022-12-07 LAB
BACTERIA SPEC AEROBE CULT: NORMAL
BACTERIA SPEC AEROBE CULT: NORMAL
GLUCOSE BLDC GLUCOMTR-MCNC: 88 MG/DL (ref 70–99)

## 2022-12-07 PROCEDURE — 99239 HOSP IP/OBS DSCHRG MGMT >30: CPT | Performed by: INTERNAL MEDICINE

## 2022-12-07 PROCEDURE — 94664 DEMO&/EVAL PT USE INHALER: CPT

## 2022-12-07 PROCEDURE — 63710000001 INSULIN DETEMIR PER 5 UNITS: Performed by: FAMILY MEDICINE

## 2022-12-07 PROCEDURE — 94618 PULMONARY STRESS TESTING: CPT

## 2022-12-07 PROCEDURE — 82962 GLUCOSE BLOOD TEST: CPT

## 2022-12-07 PROCEDURE — 25010000002 ENOXAPARIN PER 10 MG: Performed by: STUDENT IN AN ORGANIZED HEALTH CARE EDUCATION/TRAINING PROGRAM

## 2022-12-07 PROCEDURE — 94799 UNLISTED PULMONARY SVC/PX: CPT

## 2022-12-07 RX ORDER — AMLODIPINE BESYLATE 2.5 MG/1
7.5 TABLET ORAL
Start: 2022-12-08 | End: 2023-01-05

## 2022-12-07 RX ORDER — CARVEDILOL 3.12 MG/1
3.12 TABLET ORAL 2 TIMES DAILY WITH MEALS
Status: DISCONTINUED | OUTPATIENT
Start: 2022-12-07 | End: 2022-12-07 | Stop reason: HOSPADM

## 2022-12-07 RX ORDER — DOCUSATE SODIUM 100 MG/1
100 CAPSULE, LIQUID FILLED ORAL 2 TIMES DAILY PRN
Status: DISCONTINUED | OUTPATIENT
Start: 2022-12-07 | End: 2022-12-07 | Stop reason: HOSPADM

## 2022-12-07 RX ORDER — CARVEDILOL 3.12 MG/1
3.12 TABLET ORAL 2 TIMES DAILY WITH MEALS
Start: 2022-12-07 | End: 2023-01-05 | Stop reason: SDUPTHER

## 2022-12-07 RX ORDER — ALBUTEROL SULFATE 90 UG/1
2 AEROSOL, METERED RESPIRATORY (INHALATION) EVERY 4 HOURS PRN
Start: 2022-12-07

## 2022-12-07 RX ORDER — IPRATROPIUM BROMIDE AND ALBUTEROL SULFATE 2.5; .5 MG/3ML; MG/3ML
3 SOLUTION RESPIRATORY (INHALATION) EVERY 6 HOURS PRN
Status: DISCONTINUED | OUTPATIENT
Start: 2022-12-07 | End: 2022-12-07 | Stop reason: HOSPADM

## 2022-12-07 RX ORDER — HYDRALAZINE HYDROCHLORIDE 25 MG/1
25 TABLET, FILM COATED ORAL EVERY 8 HOURS SCHEDULED
Start: 2022-12-07 | End: 2023-01-05

## 2022-12-07 RX ORDER — ACETAMINOPHEN 325 MG/1
650 TABLET ORAL EVERY 6 HOURS PRN
Start: 2022-12-07

## 2022-12-07 RX ORDER — FAMOTIDINE 20 MG/1
20 TABLET, FILM COATED ORAL 2 TIMES DAILY
Start: 2022-12-07

## 2022-12-07 RX ORDER — CITALOPRAM 20 MG/1
5 TABLET ORAL DAILY
Start: 2022-12-07 | End: 2023-01-05

## 2022-12-07 RX ORDER — MAGNESIUM OXIDE 400 MG/1
400 TABLET ORAL DAILY
Start: 2022-12-07 | End: 2023-01-17 | Stop reason: SDUPTHER

## 2022-12-07 RX ADMIN — PANTOPRAZOLE SODIUM 40 MG: 40 TABLET, DELAYED RELEASE ORAL at 06:07

## 2022-12-07 RX ADMIN — ENOXAPARIN SODIUM 40 MG: 100 INJECTION SUBCUTANEOUS at 08:31

## 2022-12-07 RX ADMIN — CARVEDILOL 6.25 MG: 6.25 TABLET, FILM COATED ORAL at 08:31

## 2022-12-07 RX ADMIN — IPRATROPIUM BROMIDE AND ALBUTEROL SULFATE 3 ML: .5; 3 SOLUTION RESPIRATORY (INHALATION) at 08:08

## 2022-12-07 RX ADMIN — BUDESONIDE 0.5 MG: 0.5 INHALANT ORAL at 08:08

## 2022-12-07 RX ADMIN — SODIUM CHLORIDE 100 ML/HR: 9 INJECTION, SOLUTION INTRAVENOUS at 06:08

## 2022-12-07 RX ADMIN — Medication 10 ML: at 08:31

## 2022-12-07 RX ADMIN — LISINOPRIL 20 MG: 20 TABLET ORAL at 08:31

## 2022-12-07 RX ADMIN — TAMSULOSIN HYDROCHLORIDE 0.4 MG: 0.4 CAPSULE ORAL at 08:31

## 2022-12-07 RX ADMIN — HYDRALAZINE HYDROCHLORIDE 25 MG: 25 TABLET, FILM COATED ORAL at 06:07

## 2022-12-07 RX ADMIN — AMLODIPINE BESYLATE 5 MG: 5 TABLET ORAL at 08:31

## 2022-12-07 RX ADMIN — INSULIN DETEMIR 10 UNITS: 100 INJECTION, SOLUTION SUBCUTANEOUS at 08:31

## 2022-12-07 NOTE — PLAN OF CARE
Goal Outcome Evaluation:  Plan of Care Reviewed With: patient        Progress: improving  Outcome Evaluation: 6 minute done. Patient tolerated well. c/o constipation. MD ordered stool softener BID. Denies pain or nausea. No needs observed & verbalized at this time.

## 2022-12-07 NOTE — NURSING NOTE
Exercise Oximetry    Patient Name:Nikhil Baldwin   MRN: [id number]   Date: 12/06/22             ROOM AIR BASELINE   SpO2% 94   Heart Rate 55   Blood Pressure 151/74     EXERCISE ON ROOM AIR SpO2% EXERCISE ON O2 @  LPM SpO2%   1 MINUTE 96 1 MINUTE    2 MINUTES 97 2 MINUTES    3 MINUTES 97 3 MINUTES    4 MINUTES 98 4 MINUTES    5 MINUTES 95 5 MINUTES    6 MINUTES 96 6 MINUTES               Distance Walked  350 ft Distance Walked   Dyspnea (Norman Scale)  Dyspnea (Norman Scale)   Fatigue (Norman Scale)  Fatigue (Norman Scale)   SpO2% Post Exercise   SpO2% Post Exercise   HR Post Exercise   HR Post Exercise   Time to Recovery   Time to Recovery     Comments: Patient tolerated well. Steady gait. No dyspnea or distress noted during walk. Pt c/o mild fatigue after walk.

## 2022-12-07 NOTE — PLAN OF CARE
Goal Outcome Evaluation:           Progress: no change  Outcome Evaluation: vitals stable throughout shift. alert and oriented x 4. pt sat on the side of the bed most of the shift. blood glucose monitored. no complaints at this time.

## 2022-12-07 NOTE — DISCHARGE SUMMARY
[facility]         HOSPITALIST  DISCHARGE SUMMARY    Patient Name: Nikhil Baldwin    : 1945    MRN: [id number]    Date of Admission: 2022  Date of Discharge:  22  Primary Care Physician: Felix Navarro Jr., MD    Consults     Date and Time Order Name Status Description    2022 12:53 AM Inpatient Hospitalist Consult            Final Diagnosis:  Acute hypoxic respiratory failure requiring supplemental oxygen  Acute viral illness from unspecified organism  Bradycardia, suspect medication related from Coreg, improving with dose reduction  History of NSVT  Dementia, mild to moderate  Hypertension  Diabetes mellitus type 2  Hyperlipidemia    Hospital Course     Hospital Course:77 y.o. male with dementia, history of CVA and residual right-sided deficits who presented with shortness of air cough and acute viral illness from unspecified organism.  Patient with debility and felt too weak to go home, rehab sought and established at [facility].  Patient discharged in stable condition on room air.    Of note patient had issues with bradycardia in setting of his Coreg, this was adjusted but needs to be followed up outpatient.  He was largely symptomatic during the bradycardia events.      DISCHARGE Follow Up Recommendations for labs and diagnostics:   -Follow-up response to blood pressure medication changes, Coreg had to be decreased due to bradycardia related issues but does have history of NSVT.  - Follow-up blood glucose control    CODE STATUS:  Code Status and Medical Interventions:   Ordered at: 22 0346     Level Of Support Discussed With:    Patient     Code Status (Patient has no pulse and is not breathing):    CPR (Attempt to Resuscitate)     Medical Interventions (Patient has pulse or is breathing):    Full Support           Day of Discharge     Vital Signs:  Temp:  [97.4 °F (36.3 °C)-98.2 °F (36.8 °C)] 97.4 °F (36.3 °C)  Heart Rate:  [46-84] 52  Resp:  [18]  18  BP: (132-174)/(73-84) 150/84    Physical Exam  Gen: awake, resting in bed, conversant  HENT: NCAT, mmm  Resp: CTAB, normal respiratory effort  CV: RRR, no LE pitting edema  GI: Abdomen soft, NT, ND, no guarding, +BS  Psych: appropriate mood and affect, aox3  Skin: warm, dry        Discharge Details        Discharge Medications      New Medications      Instructions Start Date   acetaminophen 325 MG tablet  Commonly known as: TYLENOL   650 mg, Oral, Every 6 Hours PRN      albuterol sulfate  (90 Base) MCG/ACT inhaler  Commonly known as: PROVENTIL HFA;VENTOLIN HFA;PROAIR HFA   2 puffs, Inhalation, Every 4 Hours PRN      famotidine 20 MG tablet  Commonly known as: Pepcid   20 mg, Oral, 2 Times Daily      hydrALAZINE 25 MG tablet  Commonly known as: APRESOLINE   25 mg, Oral, Every 8 Hours Scheduled      magnesium oxide 400 MG tablet  Commonly known as: MAG-OX   400 mg, Oral, Daily         Changes to Medications      Instructions Start Date   amLODIPine 2.5 MG tablet  Commonly known as: NORVASC  What changed:   medication strength  See the new instructions.   7.5 mg, Oral, Every 24 Hours Scheduled   Start Date: December 8, 2022     carvedilol 3.125 MG tablet  Commonly known as: COREG  What changed:   medication strength  See the new instructions.   3.125 mg, Oral, 2 Times Daily With Meals      SITagliptin 100 MG tablet  Commonly known as: Januvia  What changed: how much to take   50 mg, Oral, Daily         Continue These Medications      Instructions Start Date   atorvastatin 80 MG tablet  Commonly known as: LIPITOR   80 mg, Oral, Daily      Cholecalciferol 125 MCG (5000 UT) tablet   5,000 Units, Oral, Daily      citalopram 20 MG tablet  Commonly known as: CeleXA   5 mg, Oral, Daily      empagliflozin 25 MG tablet tablet  Commonly known as: Jardiance   25 mg, Oral, Daily      lisinopril 20 MG tablet  Commonly known as: PRINIVIL,ZESTRIL   20 mg, Oral, Daily      melatonin 5 MG tablet tablet   5 mg, Oral,  Nightly      metFORMIN 500 MG tablet  Commonly known as: GLUCOPHAGE   500 mg, Oral, 2 Times Daily With Meals      tamsulosin 0.4 MG capsule 24 hr capsule  Commonly known as: FLOMAX   0.4 mg, Oral, Daily               Discharge Disposition:  Rehab Facility or Unit (DC - External)    Diet: advance as tolerated     Discharge Activity: advance as tolerated    Future Appointments   Date Time Provider Department Center   12/23/2022  4:00 PM Felix Navarro Jr., MD [facility]           Pertinent  and/or Most Recent Results       LAB RESULTS:      Lab 12/06/22  0615 12/05/22  1028 12/04/22  1122 12/02/22  0606 12/01/22  1911   WBC 7.65 7.64 6.64 8.48 10.54   HEMOGLOBIN 14.5 14.5 14.3 16.6 17.6   HEMATOCRIT 41.0 40.7 41.3 48.5 50.2   PLATELETS 171 169 156 189 213   NEUTROS ABS 3.91 3.82 3.40 6.28 9.58*   IMMATURE GRANS (ABS) 0.05 0.04 0.04 0.03 0.05   LYMPHS ABS 2.77 3.07 2.44 1.11 0.38*   MONOS ABS 0.89 0.68 0.69 1.01* 0.49   EOS ABS 0.01 0.01 0.04 0.01 0.00   MCV 87.2 87.9 90.0 92.0 87.6   PROCALCITONIN  --   --   --  0.07  --          Lab 12/06/22  0501 12/05/22  1028 12/04/22  1123 12/02/22  0606 12/01/22  1911   SODIUM 140 138 140 137 137   POTASSIUM 3.5 3.3* 3.7 4.1 4.1   CHLORIDE 107 105 107 102 101   CO2 22.5 22.0 21.8* 21.8* 23.0   ANION GAP 10.5 11.0 11.2 13.2 13.0   BUN 14 15 18 17 15   CREATININE 0.79 0.87 0.95 1.07 0.93   EGFR 90.4 87.8 81.4 70.6 83.5   GLUCOSE 136* 164* 170* 196* 224*   CALCIUM 8.4* 8.5* 8.4* 9.1 9.9   MAGNESIUM 1.6 1.5* 1.6 1.7 1.7   PHOSPHORUS 2.5 2.1* 2.0* 2.8  --    HEMOGLOBIN A1C  --   --   --  7.50*  --          Lab 12/06/22  0501 12/05/22  1028 12/04/22  1123 12/02/22  0606 12/01/22  1911   TOTAL PROTEIN 5.5* 5.5* 5.5* 6.8 7.7   ALBUMIN 3.30* 3.40* 3.40* 3.90 4.70   GLOBULIN  --   --   --  2.9 3.0   ALT (SGPT) 16 15 15 15 16   AST (SGOT) 20 22 22 20 16   BILIRUBIN 0.5 0.5 0.4 0.8 1.1   INDIRECT BILIRUBIN 0.3 0.3  --   --   --    BILIRUBIN DIRECT 0.2 0.2 <0.2  --   --    ALK  PHOS 50 50 51 73 86         Lab 12/04/22  1123 12/01/22 1911   PROBNP 211.1  --    TROPONIN T  --  <0.010                 Brief Urine Lab Results  (Last result in the past 365 days)      Color   Clarity   Blood   Leuk Est   Nitrite   Protein   CREAT   Urine HCG        12/06/22 0535 Yellow   Clear   Negative   Negative   Negative   Negative               Microbiology Results (last 10 days)     Procedure Component Value - Date/Time    Blood Culture - Blood, Arm, Left [[id number]]  (Normal) Collected: 12/02/22 0021    Lab Status: Final result Specimen: Blood from Arm, Left Updated: 12/07/22 0045     Blood Culture No growth at 5 days    Blood Culture - Blood, Arm, Left [[id number]]  (Normal) Collected: 12/02/22 0021    Lab Status: Final result Specimen: Blood from Arm, Left Updated: 12/07/22 0045     Blood Culture No growth at 5 days    Influenza Antigen, Rapid - Swab, Nasopharynx [[id number]]  (Normal) Collected: 12/01/22 1913    Lab Status: Final result Specimen: Swab from Nasopharynx Updated: 12/01/22 2005     Influenza A Ag, EIA Negative     Influenza B Ag, EIA Negative    COVID-19,APTIMA PANTHER([facility]),[facility], NP/OP SWAB IN UTM/VTM/SALINE TRANSPORT MEDIA,24 HR TAT - Swab, Nasopharynx [[id number]]  (Normal) Collected: 12/01/22 1913    Lab Status: Final result Specimen: Swab from Nasopharynx Updated: 12/02/22 0205     COVID19 Not Detected    Narrative:      Fact sheet for providers: https://www.fda.gov/media/884986/download     Fact sheet for patients: https://www.fda.gov/media/330173/download    Test performed by RT PCR.          RADIOLOGY:    CT Chest With Contrast Diagnostic    Result Date: 12/2/2022  Impression:   1. Negative for pulmonary embolus. 2. No acute cardiopulmonary process.     JENNIFFER MAYERS MD       Electronically Signed and Approved By: JENNIFFER MAYERS MD on 12/02/2022 at 2:13             XR Chest 1 View    Result Date: 12/1/2022  Impression:  No acute cardiopulmonary process identified.        JENNIFFER MAYERS MD       Electronically Signed and Approved By: JENNIFFER MAYERS MD on 12/01/2022 at 21:56                       Results for orders placed during the hospital encounter of 12/01/22    Adult Transthoracic Echo Complete W/ Cont if Necessary Per Protocol    Interpretation Summary  •  Left ventricular systolic function is normal. Left ventricular ejection fraction appears to be 56 - 60%.  •  Left ventricular wall thickness is consistent with mild concentric hypertrophy.  •  Left ventricular diastolic function is consistent with (grade Ia w/high LAP) impaired relaxation.  •  Moderate tricuspid valve regurgitation is present.  •  Estimated right ventricular systolic pressure from tricuspid regurgitation is moderately elevated (45-55 mmHg).  •  Mild to moderate pulmonary hypertension is present.      Labs Pending at Discharge:        Time spent on Discharge including face to face service:  31 minutes

## 2022-12-07 NOTE — PLAN OF CARE
Goal Outcome Evaluation:           Progress: no change  Outcome Evaluation: alert and oriented x 4. blood glucose monitored. no complaints at this time.

## 2022-12-08 NOTE — PAYOR COMM NOTE
"Franc Swan (77 y.o. Male)     Date of Birth   1945    Social Security Number       Address   [address] [place] [address] [facility] [address]    Home Phone   [phone]    MRN   [id number]       Orthodox   [employer]    Marital Status                               Admission Date   12/1/22    Admission Type   Emergency    Admitting Provider   Felton August MD    Attending Provider       Department, Room/Bed   [address], 4028/1       Discharge Date   12/7/2022    Discharge Disposition   Rehab Facility or Unit (DC - External)    Discharge Destination                               Attending Provider: (none)   Allergies: Codeine, Celecoxib, Ibuprofen, Promethazine Hcl, Warfarin    Isolation: None   Infection: None   Code Status: Prior    Ht: 175.3 cm (69\")   Wt: 82.5 kg (181 lb 14.1 oz)    Admission Cmt: None   Principal Problem: Acute viral syndrome [B34.9]                 Active Insurance as of 12/1/2022     Primary Coverage     Payor Plan Insurance Group Employer/Plan Group    ANTH MEDICARE REPLACEMENT ANTH MEDICARE ADVANTAGE [id number]     Payor Plan Address Payor Plan Phone Number Payor Plan Fax Number Effective Dates    [address] [phone]  1/1/2020 - None Entered    [facility] [id number]       Subscriber Name Subscriber Birth Date Member ID       FRANC SWAN 1945 [id number]           Secondary Coverage     Payor Plan Insurance Group Employer/Plan Group    [facility] COMMUNITY PLAN [facility] COMMUNITY PLAN [facility] [place]     Payor Plan Address Payor Plan Phone Number Payor Plan Fax Number Effective Dates    [address]   1/1/2022 - None Entered    [facility] [address]       Subscriber Name Subscriber Birth Date Member ID       FRANC SWAN 1945 [id number]                 Emergency Contacts      (Rel.) Home Phone Work Phone Mobile Phone    JHONATAN SWAN (Son) [phone] -- [phone]    DennyJulius (Son) -- -- [phone]        Dc 12/7 "   Case still pended # [id number]      CONTACT   LAXMI [facility]OURGEO[facility] UTILIZATION REVIEW    [facility]  [address] [address]  TAX ID [id number]  [facility]  [id number]       [phone]   [phone]     Discharge Summary      Felton August MD at 22 0934                         [facility]         HOSPITALIST  DISCHARGE SUMMARY    Patient Name: Nikhil Baldwin    : 1945    MRN: [id number]    Date of Admission: 2022  Date of Discharge:  22  Primary Care Physician: Felix Navarro Jr., MD    Consults     Date and Time Order Name Status Description    2022 12:53 AM Inpatient Hospitalist Consult            Final Diagnosis:  Acute hypoxic respiratory failure requiring supplemental oxygen  Acute viral illness from unspecified organism  Bradycardia, suspect medication related from Coreg, improving with dose reduction  History of NSVT  Dementia, mild to moderate  Hypertension  Diabetes mellitus type 2  Hyperlipidemia    Hospital Course     Hospital Course:77 y.o. male with dementia, history of CVA and residual right-sided deficits who presented with shortness of air cough and acute viral illness from unspecified organism.  Patient with debility and felt too weak to go home, rehab sought and established at [facility].  Patient discharged in stable condition on room air.    Of note patient had issues with bradycardia in setting of his Coreg, this was adjusted but needs to be followed up outpatient.  He was largely symptomatic during the bradycardia events.      DISCHARGE Follow Up Recommendations for labs and diagnostics:   -Follow-up response to blood pressure medication changes, Coreg had to be decreased due to bradycardia related issues but does have history of NSVT.  - Follow-up blood glucose control    CODE STATUS:  Code Status and Medical Interventions:   Ordered at: 22 0346     Level Of Support Discussed With:    Patient     Code Status (Patient has no  pulse and is not breathing):    CPR (Attempt to Resuscitate)     Medical Interventions (Patient has pulse or is breathing):    Full Support           Day of Discharge     Vital Signs:  Temp:  [97.4 °F (36.3 °C)-98.2 °F (36.8 °C)] 97.4 °F (36.3 °C)  Heart Rate:  [46-84] 52  Resp:  [18] 18  BP: (132-174)/(73-84) 150/84    Physical Exam  Gen: awake, resting in bed, conversant  HENT: NCAT, mmm  Resp: CTAB, normal respiratory effort  CV: RRR, no LE pitting edema  GI: Abdomen soft, NT, ND, no guarding, +BS  Psych: appropriate mood and affect, aox3  Skin: warm, dry        Discharge Details        Discharge Medications      New Medications      Instructions Start Date   acetaminophen 325 MG tablet  Commonly known as: TYLENOL   650 mg, Oral, Every 6 Hours PRN      albuterol sulfate  (90 Base) MCG/ACT inhaler  Commonly known as: PROVENTIL HFA;VENTOLIN HFA;PROAIR HFA   2 puffs, Inhalation, Every 4 Hours PRN      famotidine 20 MG tablet  Commonly known as: Pepcid   20 mg, Oral, 2 Times Daily      hydrALAZINE 25 MG tablet  Commonly known as: APRESOLINE   25 mg, Oral, Every 8 Hours Scheduled      magnesium oxide 400 MG tablet  Commonly known as: MAG-OX   400 mg, Oral, Daily         Changes to Medications      Instructions Start Date   amLODIPine 2.5 MG tablet  Commonly known as: NORVASC  What changed:   · medication strength  · See the new instructions.   7.5 mg, Oral, Every 24 Hours Scheduled   Start Date: December 8, 2022     carvedilol 3.125 MG tablet  Commonly known as: COREG  What changed:   · medication strength  · See the new instructions.   3.125 mg, Oral, 2 Times Daily With Meals      SITagliptin 100 MG tablet  Commonly known as: Januvia  What changed: how much to take   50 mg, Oral, Daily         Continue These Medications      Instructions Start Date   atorvastatin 80 MG tablet  Commonly known as: LIPITOR   80 mg, Oral, Daily      Cholecalciferol 125 MCG (5000 UT) tablet   5,000 Units, Oral, Daily       citalopram 20 MG tablet  Commonly known as: CeleXA   5 mg, Oral, Daily      empagliflozin 25 MG tablet tablet  Commonly known as: Jardiance   25 mg, Oral, Daily      lisinopril 20 MG tablet  Commonly known as: PRINIVIL,ZESTRIL   20 mg, Oral, Daily      melatonin 5 MG tablet tablet   5 mg, Oral, Nightly      metFORMIN 500 MG tablet  Commonly known as: GLUCOPHAGE   500 mg, Oral, 2 Times Daily With Meals      tamsulosin 0.4 MG capsule 24 hr capsule  Commonly known as: FLOMAX   0.4 mg, Oral, Daily               Discharge Disposition:  Rehab Facility or Unit (DC - External)    Diet: advance as tolerated     Discharge Activity: advance as tolerated    Future Appointments   Date Time Provider Department Center   12/23/2022  4:00 PM Felix Navarro Jr., MD [facility]           Pertinent  and/or Most Recent Results       LAB RESULTS:      Lab 12/06/22  0615 12/05/22  1028 12/04/22  1122 12/02/22  0606 12/01/22  1911   WBC 7.65 7.64 6.64 8.48 10.54   HEMOGLOBIN 14.5 14.5 14.3 16.6 17.6   HEMATOCRIT 41.0 40.7 41.3 48.5 50.2   PLATELETS 171 169 156 189 213   NEUTROS ABS 3.91 3.82 3.40 6.28 9.58*   IMMATURE GRANS (ABS) 0.05 0.04 0.04 0.03 0.05   LYMPHS ABS 2.77 3.07 2.44 1.11 0.38*   MONOS ABS 0.89 0.68 0.69 1.01* 0.49   EOS ABS 0.01 0.01 0.04 0.01 0.00   MCV 87.2 87.9 90.0 92.0 87.6   PROCALCITONIN  --   --   --  0.07  --          Lab 12/06/22  0501 12/05/22  1028 12/04/22  1123 12/02/22  0606 12/01/22  1911   SODIUM 140 138 140 137 137   POTASSIUM 3.5 3.3* 3.7 4.1 4.1   CHLORIDE 107 105 107 102 101   CO2 22.5 22.0 21.8* 21.8* 23.0   ANION GAP 10.5 11.0 11.2 13.2 13.0   BUN 14 15 18 17 15   CREATININE 0.79 0.87 0.95 1.07 0.93   EGFR 90.4 87.8 81.4 70.6 83.5   GLUCOSE 136* 164* 170* 196* 224*   CALCIUM 8.4* 8.5* 8.4* 9.1 9.9   MAGNESIUM 1.6 1.5* 1.6 1.7 1.7   PHOSPHORUS 2.5 2.1* 2.0* 2.8  --    HEMOGLOBIN A1C  --   --   --  7.50*  --          Lab 12/06/22  0501 12/05/22  1028 12/04/22  1123 12/02/22  0606  12/01/22 1911   TOTAL PROTEIN 5.5* 5.5* 5.5* 6.8 7.7   ALBUMIN 3.30* 3.40* 3.40* 3.90 4.70   GLOBULIN  --   --   --  2.9 3.0   ALT (SGPT) 16 15 15 15 16   AST (SGOT) 20 22 22 20 16   BILIRUBIN 0.5 0.5 0.4 0.8 1.1   INDIRECT BILIRUBIN 0.3 0.3  --   --   --    BILIRUBIN DIRECT 0.2 0.2 <0.2  --   --    ALK PHOS 50 50 51 73 86         Lab 12/04/22  1123 12/01/22 1911   PROBNP 211.1  --    TROPONIN T  --  <0.010                 Brief Urine Lab Results  (Last result in the past 365 days)      Color   Clarity   Blood   Leuk Est   Nitrite   Protein   CREAT   Urine HCG        12/06/22 0535 Yellow   Clear   Negative   Negative   Negative   Negative               Microbiology Results (last 10 days)     Procedure Component Value - Date/Time    Blood Culture - Blood, Arm, Left [[id number]]  (Normal) Collected: 12/02/22 0021    Lab Status: Final result Specimen: Blood from Arm, Left Updated: 12/07/22 0045     Blood Culture No growth at 5 days    Blood Culture - Blood, Arm, Left [[id number]]  (Normal) Collected: 12/02/22 0021    Lab Status: Final result Specimen: Blood from Arm, Left Updated: 12/07/22 0045     Blood Culture No growth at 5 days    Influenza Antigen, Rapid - Swab, Nasopharynx [[id number]]  (Normal) Collected: 12/01/22 1913    Lab Status: Final result Specimen: Swab from Nasopharynx Updated: 12/01/22 2005     Influenza A Ag, EIA Negative     Influenza B Ag, EIA Negative    COVID-19,APTIMA PANTHER([facility]),[facility], NP/OP SWAB IN UTM/VTM/SALINE TRANSPORT MEDIA,24 HR TAT - Swab, Nasopharynx [[id number]]  (Normal) Collected: 12/01/22 1913    Lab Status: Final result Specimen: Swab from Nasopharynx Updated: 12/02/22 0205     COVID19 Not Detected    Narrative:      Fact sheet for providers: https://www.fda.gov/media/883389/download     Fact sheet for patients: https://www.fda.gov/media/219848/download    Test performed by RT PCR.          RADIOLOGY:    CT Chest With Contrast Diagnostic    Result Date:  12/2/2022  Impression:   1. Negative for pulmonary embolus. 2. No acute cardiopulmonary process.     JENNIFFER MAYERS MD       Electronically Signed and Approved By: JENNIFFER MAYERS MD on 12/02/2022 at 2:13             XR Chest 1 View    Result Date: 12/1/2022  Impression:  No acute cardiopulmonary process identified.       JENNIFFER MAYERS MD       Electronically Signed and Approved By: JENNIFFER MAYERS MD on 12/01/2022 at 21:56                       Results for orders placed during the hospital encounter of 12/01/22    Adult Transthoracic Echo Complete W/ Cont if Necessary Per Protocol    Interpretation Summary  •  Left ventricular systolic function is normal. Left ventricular ejection fraction appears to be 56 - 60%.  •  Left ventricular wall thickness is consistent with mild concentric hypertrophy.  •  Left ventricular diastolic function is consistent with (grade Ia w/high LAP) impaired relaxation.  •  Moderate tricuspid valve regurgitation is present.  •  Estimated right ventricular systolic pressure from tricuspid regurgitation is moderately elevated (45-55 mmHg).  •  Mild to moderate pulmonary hypertension is present.      Labs Pending at Discharge:        Time spent on Discharge including face to face service:  31 minutes        Electronically signed by Felton August MD at 12/07/22 2917

## 2022-12-18 ENCOUNTER — READMISSION MANAGEMENT (OUTPATIENT)
Dept: CALL CENTER | Facility: HOSPITAL | Age: 77
End: 2022-12-18

## 2022-12-18 NOTE — OUTREACH NOTE
Prep Survey    Flowsheet Row Responses   [facility] facility patient discharged from? Non-BH   Is LACE score < 7 ? Non-BH Discharge   Eligibility [facility]    Date of Discharge 12/18/22   Discharge Disposition Home or Self Care   Discharge diagnosis Acute and chronic respiratory failure   Does the patient have one of the following disease processes/diagnoses(primary or secondary)? Other   Prep survey completed? Yes          NGOZI CORONEL - Registered Nurse

## 2022-12-19 ENCOUNTER — TRANSITIONAL CARE MANAGEMENT TELEPHONE ENCOUNTER (OUTPATIENT)
Dept: CALL CENTER | Facility: HOSPITAL | Age: 77
End: 2022-12-19

## 2022-12-19 NOTE — OUTREACH NOTE
Call Center TCM Note    Flowsheet Row Responses   [facility] [place] patient discharged from? Non-  [[facility]]   Does the patient have one of the following disease processes/diagnoses(primary or secondary)? Other   TCM attempt successful? Yes  [Verbal  but CM notes indicate son active with POC and lives with son]   Call start time 833   Call end time 838   Discharge diagnosis Acute and chronic respiratory failure   Person spoke with today (if not patient) and relationship cheri Shipley   Meds reviewed with patient/caregiver? Yes   Does the patient have all medications ordered at discharge? N/A   Is the patient taking all medications as directed (includes completed medication regime)? Yes   Medication comments Son reports no medication changes from discharge   Comments Appt on 22--will route a note   Does the patient have an appointment with their PCP within 7 days of discharge? Yes   Has home health visited the patient within 72 hours of discharge? N/A   Psychosocial issues? No   Nursing interventions --  [instructions from rehab]   What is the patient's perception of their health status since discharge? Improving  [Son sleepy during call and didn't have much insight--reports pt still sleeping also.  Denies any questions or concerns at time of call.  Reports NO O2 orders, encouraged to watch for worsening respiratory s/s.]   Is the patient/caregiver able to teach back signs and symptoms related to disease process for when to call PCP? Yes   TCM call completed? Yes   Call end time 838          Natalie Palomares RN    2022, 08:42 EST

## 2023-01-04 NOTE — PROGRESS NOTES
Chief Complaint  Hypertension, Hyperlipidemia, Diabetes, and Flank Pain (Pt states he has a kidney stone and would like to discuss options )    Subjective          Nikhil Baldwin presents to [facility]  History of Present Illness  Bradycardia- patient on lower dose of carvedilol.   hypertension- patient on older dose of coreg and amlodipine. Does not think it lowered dose. Patient was D/C'd on hydralazine TID as well for Blood Pressure control, but unsure if taking. Patient withotu home Blood Pressure readings.   DM2- patient reports blood glucose have been ok. Patient is unsure of blood glucose. Patient is on metformin and jardiance.   hyperlipidemia- doing well on statin  hypomagnesium- patient unsure if he started on magnesium supplement.       Current Outpatient Medications   Medication Instructions   • acetaminophen (TYLENOL) 650 mg, Oral, Every 6 Hours PRN   • albuterol sulfate  (90 Base) MCG/ACT inhaler 2 puffs, Inhalation, Every 4 Hours PRN   • atorvastatin (LIPITOR) 80 mg, Oral, Daily   • carvedilol (COREG) 3.125 mg, Oral, 2 Times Daily With Meals   • Cholecalciferol 5,000 Units, Oral, Daily   • citalopram (CELEXA) 10 mg, Oral, Daily   • empagliflozin (JARDIANCE) 25 mg, Oral, Daily   • famotidine (PEPCID) 20 mg, Oral, 2 Times Daily   • lisinopril (PRINIVIL,ZESTRIL) 20 mg, Oral, Daily   • magnesium oxide (MAG-OX) 400 mg, Oral, Daily   • melatonin 5 mg, Oral, Nightly   • metFORMIN (GLUCOPHAGE) 500 mg, Oral, 2 Times Daily With Meals   • SITagliptin (JANUVIA) 100 mg, Oral, Daily   • tamsulosin (FLOMAX) 0.4 mg, Oral, Daily       The following portions of the patient's history were reviewed and updated as appropriate: allergies, current medications, past family history, past medical history, past social history, past surgical history, and problem list.    Objective   Vital Signs:   /78 (BP Location: Left arm, Patient Position: Sitting, Cuff Size: Adult)    Pulse 66   Temp 98.2 °F (36.8 °C) (Temporal)   Ht 175.3 cm (69\")   Wt 84.6 kg (186 lb 9.6 oz)   SpO2 96%   BMI 27.56 kg/m²     Wt Readings from Last 3 Encounters:   01/05/23 84.6 kg (186 lb 9.6 oz)   12/02/22 82.5 kg (181 lb 14.1 oz)   09/16/22 82.9 kg (182 lb 12.8 oz)     BP Readings from Last 3 Encounters:   01/05/23 176/78   12/07/22 159/81   09/16/22 148/78     Physical Exam   Appearance: No acute distress, well-nourished  Head: normocephalic, atraumatic  Eyes: extraocular movements intact, no scleral icterus, no conjunctival injection  Ears, Nose, and Throat: external ears normal, nares patent, moist mucous membranes  Cardiovascular: regular rate and rhythm. no murmurs, rubs, or gallops. no edema  Respiratory: breathing comfortably, symmetric chest rise, clear to auscultation bilaterally. No wheezes, rales, or rhonchi.  Neuro: alert and oriented to time, place, and person. Normal gait  Psych: normal mood and affect     Result Review :   The following data was reviewed by: Felix Navarro Jr, MD on 01/05/2023:  Common labs    Common Labs 12/4/22 12/4/22 12/4/22 12/5/22 12/5/22 12/5/22 12/6/22 12/6/22 12/6/22    1122 1123 1123 1028 1028 1028 0501 0501 0615   Glucose   170 (A)   164 (A)  136 (A)    BUN   18   15  14    Creatinine   0.95   0.87  0.79    Sodium   140   138  140    Potassium   3.7   3.3 (A)  3.5    Chloride   107   105  107    Calcium   8.4 (A)   8.5 (A)  8.4 (A)    Albumin  3.40 (A)   3.40 (A)  3.30 (A)     Total Bilirubin  0.4   0.5  0.5     Alkaline Phosphatase  51   50  50     AST (SGOT)  22   22  20     ALT (SGPT)  15   15  16     WBC 6.64   7.64     7.65   Hemoglobin 14.3   14.5     14.5   Hematocrit 41.3   40.7     41.0   Platelets 156   169     171   (A) Abnormal value              Lab Results   Component Value Date    COVID19 Not Detected 12/01/2022    FLU Negative 12/01/2022    FLU Negative 12/01/2022    INR 1.0 11/12/2021    BILIRUBINUR Negative 01/05/2023        Assessment  and Plan    Diagnoses and all orders for this visit:    1. Left flank pain (Primary)  Comments:  urine dip neg. cont flomax as may be related to nephrolithiasis  Orders:  -     POCT urinalysis dipstick, automated  -     Urine Culture - Urine, Urine, Clean Catch  -     Urinalysis With Microscopic - Urine, Clean Catch    2. Primary hypertension  Comments:  elevated today. encouraged med compliance. add coreg back to regimen.  Orders:  -     carvedilol (COREG) 3.125 MG tablet; Take 1 tablet by mouth 2 (Two) Times a Day With Meals.  Dispense: 180 tablet; Refill: 1    3. Hyperlipidemia, unspecified hyperlipidemia type  Comments:  cont statin    4. Type 2 diabetes mellitus with hyperglycemia, without long-term current use of insulin (HCC)  Comments:  cont current regimen. BG control seems good.   Orders:  -     SITagliptin (Januvia) 100 MG tablet; Take 1 tablet by mouth Daily.  Dispense: 90 tablet; Refill: 1    5. Frontal lobe dementia (HCC)  Comments:  doing better on sertraline  Orders:  -     citalopram (CeleXA) 20 MG tablet; Take 0.5 tablets by mouth Daily.  Dispense: 90 tablet; Refill: 1        Medications Discontinued During This Encounter   Medication Reason   • hydrALAZINE (APRESOLINE) 25 MG tablet *Error   • amLODIPine (NORVASC) 2.5 MG tablet *Therapy completed   • carvedilol (COREG) 3.125 MG tablet Reorder   • citalopram (CeleXA) 20 MG tablet    • SITagliptin (Januvia) 100 MG tablet         Follow Up   Return in about 2 weeks (around 1/19/2023) for HTN.  Patient was given instructions and counseling regarding his condition or for health maintenance advice. Please see specific information pulled into the AVS if appropriate.       Felix Navarro Jr, MD  01/06/23  08:16 EST

## 2023-01-05 ENCOUNTER — OFFICE VISIT (OUTPATIENT)
Dept: INTERNAL MEDICINE | Facility: CLINIC | Age: 78
End: 2023-01-05
Payer: MEDICARE

## 2023-01-05 VITALS
TEMPERATURE: 98.2 F | OXYGEN SATURATION: 96 % | SYSTOLIC BLOOD PRESSURE: 176 MMHG | BODY MASS INDEX: 27.64 KG/M2 | DIASTOLIC BLOOD PRESSURE: 78 MMHG | WEIGHT: 186.6 LBS | HEART RATE: 66 BPM | HEIGHT: 69 IN

## 2023-01-05 DIAGNOSIS — R10.9 LEFT FLANK PAIN: Primary | ICD-10-CM

## 2023-01-05 DIAGNOSIS — F02.80 FRONTAL LOBE DEMENTIA: ICD-10-CM

## 2023-01-05 DIAGNOSIS — I10 PRIMARY HYPERTENSION: ICD-10-CM

## 2023-01-05 DIAGNOSIS — E11.65 TYPE 2 DIABETES MELLITUS WITH HYPERGLYCEMIA, WITHOUT LONG-TERM CURRENT USE OF INSULIN: ICD-10-CM

## 2023-01-05 DIAGNOSIS — G31.09 FRONTAL LOBE DEMENTIA: ICD-10-CM

## 2023-01-05 DIAGNOSIS — E78.5 HYPERLIPIDEMIA, UNSPECIFIED HYPERLIPIDEMIA TYPE: ICD-10-CM

## 2023-01-05 LAB
BILIRUB BLD-MCNC: NEGATIVE MG/DL
CLARITY, POC: CLEAR
COLOR UR: YELLOW
EXPIRATION DATE: ABNORMAL
GLUCOSE UR STRIP-MCNC: ABNORMAL MG/DL
KETONES UR QL: NEGATIVE
LEUKOCYTE EST, POC: NEGATIVE
Lab: ABNORMAL
NITRITE UR-MCNC: NEGATIVE MG/ML
PH UR: 6 [PH] (ref 5–8)
PROT UR STRIP-MCNC: ABNORMAL MG/DL
RBC # UR STRIP: ABNORMAL /UL
SP GR UR: 1.03 (ref 1–1.03)
UROBILINOGEN UR QL: ABNORMAL

## 2023-01-05 PROCEDURE — 87086 URINE CULTURE/COLONY COUNT: CPT | Performed by: INTERNAL MEDICINE

## 2023-01-05 PROCEDURE — 81001 URINALYSIS AUTO W/SCOPE: CPT | Performed by: INTERNAL MEDICINE

## 2023-01-05 PROCEDURE — 81003 URINALYSIS AUTO W/O SCOPE: CPT | Performed by: INTERNAL MEDICINE

## 2023-01-05 PROCEDURE — 99214 OFFICE O/P EST MOD 30 MIN: CPT | Performed by: INTERNAL MEDICINE

## 2023-01-05 RX ORDER — CITALOPRAM 20 MG/1
10 TABLET ORAL DAILY
Qty: 90 TABLET | Refills: 1
Start: 2023-01-05 | End: 2023-01-23 | Stop reason: SDUPTHER

## 2023-01-05 RX ORDER — CARVEDILOL 3.12 MG/1
3.12 TABLET ORAL 2 TIMES DAILY WITH MEALS
Qty: 180 TABLET | Refills: 1 | Status: SHIPPED | OUTPATIENT
Start: 2023-01-05 | End: 2023-01-17 | Stop reason: ALTCHOICE

## 2023-01-06 LAB
BACTERIA UR QL AUTO: ABNORMAL /HPF
BILIRUB UR QL STRIP: NEGATIVE
CLARITY UR: CLEAR
COLOR UR: YELLOW
GLUCOSE UR STRIP-MCNC: ABNORMAL MG/DL
HGB UR QL STRIP.AUTO: ABNORMAL
HYALINE CASTS UR QL AUTO: ABNORMAL /LPF
KETONES UR QL STRIP: NEGATIVE
LEUKOCYTE ESTERASE UR QL STRIP.AUTO: NEGATIVE
NITRITE UR QL STRIP: NEGATIVE
PH UR STRIP.AUTO: 6 [PH] (ref 5–8)
PROT UR QL STRIP: NEGATIVE
RBC # UR STRIP: ABNORMAL /HPF
REF LAB TEST METHOD: ABNORMAL
SP GR UR STRIP: >=1.03 (ref 1–1.03)
SQUAMOUS #/AREA URNS HPF: ABNORMAL /HPF
UROBILINOGEN UR QL STRIP: ABNORMAL
WBC # UR STRIP: ABNORMAL /HPF

## 2023-01-07 LAB — BACTERIA SPEC AEROBE CULT: NO GROWTH

## 2023-01-09 ENCOUNTER — TELEPHONE (OUTPATIENT)
Dept: INTERNAL MEDICINE | Facility: CLINIC | Age: 78
End: 2023-01-09
Payer: MEDICARE

## 2023-01-09 NOTE — TELEPHONE ENCOUNTER
----- Message from Felix Navarro Jr., MD sent at 1/6/2023  5:47 PM EST -----  All labs reassuring

## 2023-01-17 ENCOUNTER — OFFICE VISIT (OUTPATIENT)
Dept: INTERNAL MEDICINE | Facility: CLINIC | Age: 78
End: 2023-01-17
Payer: MEDICARE

## 2023-01-17 VITALS
SYSTOLIC BLOOD PRESSURE: 165 MMHG | HEIGHT: 69 IN | BODY MASS INDEX: 26.07 KG/M2 | OXYGEN SATURATION: 96 % | WEIGHT: 176 LBS | TEMPERATURE: 98 F | HEART RATE: 96 BPM | DIASTOLIC BLOOD PRESSURE: 116 MMHG

## 2023-01-17 DIAGNOSIS — I10 PRIMARY HYPERTENSION: ICD-10-CM

## 2023-01-17 DIAGNOSIS — E11.65 TYPE 2 DIABETES MELLITUS WITH HYPERGLYCEMIA, WITHOUT LONG-TERM CURRENT USE OF INSULIN: ICD-10-CM

## 2023-01-17 DIAGNOSIS — Z00.00 ANNUAL PHYSICAL EXAM: Primary | ICD-10-CM

## 2023-01-17 DIAGNOSIS — E83.42 HYPOMAGNESEMIA: ICD-10-CM

## 2023-01-17 PROCEDURE — 99214 OFFICE O/P EST MOD 30 MIN: CPT | Performed by: INTERNAL MEDICINE

## 2023-01-17 PROCEDURE — 1160F RVW MEDS BY RX/DR IN RCRD: CPT | Performed by: INTERNAL MEDICINE

## 2023-01-17 PROCEDURE — G0439 PPPS, SUBSEQ VISIT: HCPCS | Performed by: INTERNAL MEDICINE

## 2023-01-17 PROCEDURE — 1170F FXNL STATUS ASSESSED: CPT | Performed by: INTERNAL MEDICINE

## 2023-01-17 RX ORDER — MAGNESIUM OXIDE 400 MG/1
400 TABLET ORAL DAILY
Start: 2023-01-17 | End: 2023-03-06 | Stop reason: SDUPTHER

## 2023-01-17 RX ORDER — METOPROLOL SUCCINATE 50 MG/1
50 TABLET, EXTENDED RELEASE ORAL DAILY
Qty: 90 TABLET | Refills: 0 | Status: SHIPPED | OUTPATIENT
Start: 2023-01-17 | End: 2023-02-13

## 2023-01-17 NOTE — PROGRESS NOTES
"Chief Complaint  Hypertension (Follow up ), Medicare Wellness-subsequent, Neck Pain, and Back Pain    Subjective          Nikhil Baldwin presents to [facility]  History of Present Illness  hypertension- patient reports having bad cramps in his arms and legs. Patient denies Has, dizziness, chest pain. Patient has not been taking magnesium since hospitalization. Patient's son reports patient is not always compliant with medication.      Current Outpatient Medications   Medication Instructions   • acetaminophen (TYLENOL) 650 mg, Oral, Every 6 Hours PRN   • albuterol sulfate  (90 Base) MCG/ACT inhaler 2 puffs, Inhalation, Every 4 Hours PRN   • atorvastatin (LIPITOR) 80 mg, Oral, Daily   • Cholecalciferol 5,000 Units, Oral, Daily   • citalopram (CELEXA) 10 mg, Oral, Daily   • empagliflozin (JARDIANCE) 25 mg, Oral, Daily   • famotidine (PEPCID) 20 mg, Oral, 2 Times Daily   • lisinopril (PRINIVIL,ZESTRIL) 20 mg, Oral, Daily   • magnesium oxide (MAG-OX) 400 mg, Oral, Daily   • melatonin 5 mg, Oral, Nightly   • metFORMIN (GLUCOPHAGE) 500 mg, Oral, 2 Times Daily With Meals   • metoprolol succinate XL (TOPROL XL) 50 mg, Oral, Daily   • SITagliptin (JANUVIA) 100 mg, Oral, Daily   • tamsulosin (FLOMAX) 0.4 mg, Oral, Daily       The following portions of the patient's history were reviewed and updated as appropriate: allergies, current medications, past family history, past medical history, past social history, past surgical history, and problem list.    Objective   Vital Signs:   BP (!) 165/116 (BP Location: Left arm)   Pulse 96   Temp 98 °F (36.7 °C) (Temporal)   Ht 175.3 cm (69\")   Wt 79.8 kg (176 lb)   SpO2 96%   BMI 25.99 kg/m²     Wt Readings from Last 3 Encounters:   01/17/23 79.8 kg (176 lb)   01/05/23 84.6 kg (186 lb 9.6 oz)   12/02/22 82.5 kg (181 lb 14.1 oz)     BP Readings from Last 3 Encounters:   01/17/23 (!) 165/116   01/05/23 176/78   12/07/22 159/81 "     Physical Exam   Appearance: No acute distress, well-nourished  Head: normocephalic, atraumatic  Eyes: extraocular movements intact, no scleral icterus, no conjunctival injection  Ears, Nose, and Throat: external ears normal, nares patent, moist mucous membranes  Cardiovascular: regular rate and rhythm. no murmurs, rubs, or gallops. no edema  Respiratory: breathing comfortably, symmetric chest rise, clear to auscultation bilaterally. No wheezes, rales, or rhonchi.  Neuro: alert and oriented to time, place, and person. Normal gait  Psych: normal mood and affect     Result Review :   The following data was reviewed by: Felix Navarro Jr, MD on 01/17/2023:  Common labs    Common Labs 12/4/22 12/4/22 12/4/22 12/5/22 12/5/22 12/5/22 12/6/22 12/6/22 12/6/22    1122 1123 1123 1028 1028 1028 0501 0501 0615   Glucose   170 (A)   164 (A)  136 (A)    BUN   18   15  14    Creatinine   0.95   0.87  0.79    Sodium   140   138  140    Potassium   3.7   3.3 (A)  3.5    Chloride   107   105  107    Calcium   8.4 (A)   8.5 (A)  8.4 (A)    Albumin  3.40 (A)   3.40 (A)  3.30 (A)     Total Bilirubin  0.4   0.5  0.5     Alkaline Phosphatase  51   50  50     AST (SGOT)  22   22  20     ALT (SGPT)  15   15  16     WBC 6.64   7.64     7.65   Hemoglobin 14.3   14.5     14.5   Hematocrit 41.3   40.7     41.0   Platelets 156   169     171   (A) Abnormal value              Lab Results   Component Value Date    COVID19 Not Detected 12/01/2022    FLU Negative 12/01/2022    FLU Negative 12/01/2022    INR 1.0 11/12/2021    BILIRUBINUR Negative 01/05/2023          Assessment and Plan    Diagnoses and all orders for this visit:    1. Annual physical exam (Primary)    2. Hypomagnesemia  Comments:  encouraged restart magnesium supplement for cramps. may also help with BP.   Orders:  -     magnesium oxide (MAG-OX) 400 MG tablet; Take 1 tablet by mouth Daily.    3. Primary hypertension  Comments:  still uncontrolled. switch from coreg to  toprol for compliance. f/u in 2-3 weeks.   Orders:  -     metoprolol succinate XL (Toprol XL) 50 MG 24 hr tablet; Take 1 tablet by mouth Daily.  Dispense: 90 tablet; Refill: 0    4. Need for influenza vaccination    5. Type 2 diabetes mellitus with hyperglycemia, without long-term current use of insulin (HCC)  -     Ambulatory Referral for Diabetic Eye Exam-Ophthalmology          Medications Discontinued During This Encounter   Medication Reason   • carvedilol (COREG) 3.125 MG tablet Alternate therapy   • magnesium oxide (MAG-OX) 400 MG tablet Reorder          Follow Up   No follow-ups on file.  Patient was given instructions and counseling regarding his condition or for health maintenance advice. Please see specific information pulled into the AVS if appropriate.       Felix Navarro Jr, MD  01/17/23  12:33 EST

## 2023-01-17 NOTE — PROGRESS NOTES
The ABCs of the Annual Wellness Visit  Subsequent Medicare Wellness Visit    Chief Complaint   Patient presents with   • Hypertension     Follow up    • Medicare Wellness-subsequent   • Neck Pain   • Back Pain      Subjective    History of Present Illness:  Nikhil Baldwin is a 77 y.o. male who presents for a Subsequent Medicare Wellness Visit.    The following portions of the patient's history were reviewed and   updated as appropriate: allergies, current medications, past family history, past medical history, past social history, past surgical history and problem list.    Compared to one year ago, the patient feels his physical   health is the same.    Compared to one year ago, the patient feels his mental   health is the same.    Recent Hospitalizations:  This patient has had a [facility] admission record on file within the last 365 days.    Current Medical Providers:  Patient Care Team:  Felix Navarro Jr., MD as PCP - General (Internal Medicine)  Marcia Palomares as Medical Assistant    Outpatient Medications Prior to Visit   Medication Sig Dispense Refill   • acetaminophen (TYLENOL) 325 MG tablet Take 2 tablets by mouth Every 6 (Six) Hours As Needed for Mild Pain.     • albuterol sulfate  (90 Base) MCG/ACT inhaler Inhale 2 puffs Every 4 (Four) Hours As Needed for Wheezing.     • atorvastatin (LIPITOR) 80 MG tablet Take 1 tablet by mouth Daily. 90 tablet 3   • Cholecalciferol 125 MCG (5000 UT) tablet Take 5,000 Units by mouth Daily.     • citalopram (CeleXA) 20 MG tablet Take 0.5 tablets by mouth Daily. 90 tablet 1   • empagliflozin (Jardiance) 25 MG tablet tablet Take 1 tablet by mouth Daily. 90 tablet 3   • lisinopril (PRINIVIL,ZESTRIL) 20 MG tablet Take 1 tablet by mouth Daily. 90 tablet 3   • melatonin 5 MG tablet tablet Take 5 mg by mouth Every Night.     • metFORMIN (GLUCOPHAGE) 500 MG tablet Take 1 tablet by mouth 2 (Two) Times a Day With Meals. 180 tablet 3   • SITagliptin (Januvia)  100 MG tablet Take 1 tablet by mouth Daily. 90 tablet 1   • tamsulosin (FLOMAX) 0.4 MG capsule 24 hr capsule Take 1 capsule by mouth Daily. 90 capsule 3   • carvedilol (COREG) 3.125 MG tablet Take 1 tablet by mouth 2 (Two) Times a Day With Meals. 180 tablet 1   • famotidine (Pepcid) 20 MG tablet Take 1 tablet by mouth 2 (Two) Times a Day.     • magnesium oxide (MAG-OX) 400 MG tablet Take 1 tablet by mouth Daily.       No facility-administered medications prior to visit.       No opioid medication identified on active medication list. I have reviewed chart for other potential  high risk medication/s and harmful drug interactions in the elderly.          Aspirin is not on active medication list.  Aspirin use is not indicated based on review of current medical condition/s. Risk of harm outweighs potential benefits.  .    Patient Active Problem List   Diagnosis   • Aortic aneurysm (HCC)   • Arthritis   • BPH (benign prostatic hyperplasia)   • Cervical radiculopathy   • Diabetes (HCC)   • Parathyroid abnormality (HCC)   • Thyroid disorder   • Facet arthropathy   • Fatty metamorphosis of liver   • GERD (gastroesophageal reflux disease)   • Hypertension   • Hematuria   • Hiatal hernia   • Urinary incontinence   • Hypercalcemia   • Hyperlipemia   • Kidney disease   • Renal calculi   • Low back pain   • Thoracic back pain   • DDD (degenerative disc disease), lumbar   • Spinal stenosis of lumbar region   • Degeneration of thoracic or thoracolumbar intervertebral disc   • MI (myocardial infarction) (HCC)   • Myofascial pain   • Neck pain   • Nocturia   • Sciatica   • Stroke (HCC)   • Ureterolithiasis   • Vitamin D deficiency   • Labile personality (HCC)   • Acute viral syndrome     Advance Care Planning  Advance Directive is not on file.  ACP discussion was held with the patient during this visit. Patient does not have an advance directive, information provided.          Objective    Vitals:    01/17/23 1114   BP: (!) 165/116  "  BP Location: Left arm   Pulse: 96   Temp: 98 °F (36.7 °C)   TempSrc: Temporal   SpO2: 96%   Weight: 79.8 kg (176 lb)   Height: 175.3 cm (69\")     Estimated body mass index is 25.99 kg/m² as calculated from the following:    Height as of this encounter: 175.3 cm (69\").    Weight as of this encounter: 79.8 kg (176 lb).      Does the patient have evidence of cognitive impairment? Yes    Lab Results   Component Value Date    HGBA1C 7.50 (H) 12/02/2022          HEALTH RISK ASSESSMENT    Smoking Status:  Social History     Tobacco Use   Smoking Status Never   Smokeless Tobacco Never     Alcohol Consumption:  Social History     Substance and Sexual Activity   Alcohol Use Never     Fall Risk Screen:    MORENO Fall Risk Assessment was completed, and patient is at LOW risk for falls.Assessment completed on:1/17/2023    Depression Screening:  PHQ-2/PHQ-9 Depression Screening 1/17/2023   Retired PHQ-9 Total Score -   Retired Total Score -   Little Interest or Pleasure in Doing Things 0-->not at all   Feeling Down, Depressed or Hopeless 0-->not at all   PHQ-9: Brief Depression Severity Measure Score 0       Health Habits and Functional and Cognitive Screening:  Functional & Cognitive Status 1/17/2023   Do you have difficulty preparing food and eating? No   Do you have difficulty bathing yourself, getting dressed or grooming yourself? No   Do you have difficulty using the toilet? No   Do you have difficulty moving around from place to place? Yes   Do you have trouble with steps or getting out of a bed or a chair? Yes   Current Diet Well Balanced Diet   Dental Exam Up to date   Eye Exam Up to date   Exercise (times per week) 4 times per week   Current Exercises Include Walking   Do you need help using the phone?  No   Are you deaf or do you have serious difficulty hearing?  No   Do you need help with transportation? Yes   Do you need help shopping? No   Do you need help preparing meals?  No   Do you need help with housework?  No "   Do you need help with laundry? No   Do you need help taking your medications? Yes   Do you need help managing money? No   Do you ever drive or ride in a car without wearing a seat belt? No   Have you felt unusual stress, anger or loneliness in the last month? No   Who do you live with? Child   If you need help, do you have trouble finding someone available to you? No   Have you been bothered in the last four weeks by sexual problems? No   Do you have difficulty concentrating, remembering or making decisions? No       Age-appropriate Screening Schedule:  Refer to the list below for future screening recommendations based on patient's age, sex and/or medical conditions. Orders for these recommended tests are listed in the plan section. The patient has been provided with a written plan.    Health Maintenance   Topic Date Due   • ZOSTER VACCINE (1 of 2) Never done   • INFLUENZA VACCINE  08/01/2022   • HEMOGLOBIN A1C  06/02/2023   • DIABETIC EYE EXAM  09/01/2023   • LIPID PANEL  09/16/2023   • URINE MICROALBUMIN  09/16/2023   • TDAP/TD VACCINES (2 - Td or Tdap) 12/07/2031              Assessment & Plan   CMS Preventative Services Quick Reference  Risk Factors Identified During Encounter  Immunizations Discussed/Encouraged: Influenza  The above risks/problems have been discussed with the patient.  Follow up actions/plans if indicated are seen below in the Assessment/Plan Section.  Pertinent information has been shared with the patient in the After Visit Summary.    Diagnoses and all orders for this visit:    1. Annual physical exam (Primary)    2. Hypomagnesemia  Comments:  encouraged restart magnesium supplement for cramps. may also help with BP.   Orders:  -     magnesium oxide (MAG-OX) 400 MG tablet; Take 1 tablet by mouth Daily.    3. Primary hypertension  Comments:  still uncontrolled. switch from coreg to toprol for compliance. f/u in 2-3 weeks.   Orders:  -     metoprolol succinate XL (Toprol XL) 50 MG 24 hr  tablet; Take 1 tablet by mouth Daily.  Dispense: 90 tablet; Refill: 0    4. Type 2 diabetes mellitus with hyperglycemia, without long-term current use of insulin (HCC)  -     Ambulatory Referral for Diabetic Eye Exam-Ophthalmology        Follow Up:   Return in about 2 weeks (around 1/31/2023) for Recheck, HTN.     An After Visit Summary and PPPS were made available to the patient.

## 2023-01-23 DIAGNOSIS — G31.09 FRONTAL LOBE DEMENTIA: ICD-10-CM

## 2023-01-23 DIAGNOSIS — F02.80 FRONTAL LOBE DEMENTIA: ICD-10-CM

## 2023-01-23 DIAGNOSIS — E11.65 TYPE 2 DIABETES MELLITUS WITH HYPERGLYCEMIA, WITHOUT LONG-TERM CURRENT USE OF INSULIN: ICD-10-CM

## 2023-01-23 RX ORDER — CITALOPRAM 20 MG/1
10 TABLET ORAL DAILY
Qty: 90 TABLET | Refills: 1 | Status: SHIPPED | OUTPATIENT
Start: 2023-01-23

## 2023-01-23 NOTE — TELEPHONE ENCOUNTER
Caller: JHONATAN SWAN    Relationship: Emergency Contact    Best call back number: [phone]    Requested Prescriptions:   Requested Prescriptions     Pending Prescriptions Disp Refills   • citalopram (CeleXA) 20 MG tablet 90 tablet 1     Sig: Take 0.5 tablets by mouth Daily.   • SITagliptin (Januvia) 100 MG tablet 90 tablet 1     Sig: Take 1 tablet by mouth Daily.        Pharmacy where request should be sent: [address] [phone] [facility] [phone] [facility]    Does the patient have less than a 3 day supply:  [] Yes  [x] No    Would you like a call back once the refill request has been completed: [x] Yes [] No    If the office needs to give you a call back, can they leave a voicemail: [x] Yes [] No    Ricardo Kruse Rep   01/23/23 13:28 EST

## 2023-02-13 ENCOUNTER — OFFICE VISIT (OUTPATIENT)
Dept: INTERNAL MEDICINE | Facility: CLINIC | Age: 78
End: 2023-02-13
Payer: MEDICARE

## 2023-02-13 VITALS
HEART RATE: 98 BPM | WEIGHT: 180.4 LBS | TEMPERATURE: 99.3 F | HEIGHT: 69 IN | SYSTOLIC BLOOD PRESSURE: 144 MMHG | OXYGEN SATURATION: 95 % | DIASTOLIC BLOOD PRESSURE: 92 MMHG | BODY MASS INDEX: 26.72 KG/M2

## 2023-02-13 DIAGNOSIS — J02.9 SORE THROAT: ICD-10-CM

## 2023-02-13 DIAGNOSIS — E78.49 OTHER HYPERLIPIDEMIA: ICD-10-CM

## 2023-02-13 DIAGNOSIS — I10 PRIMARY HYPERTENSION: Primary | ICD-10-CM

## 2023-02-13 LAB
EXPIRATION DATE: NORMAL
EXPIRATION DATE: NORMAL
INTERNAL CONTROL: NORMAL
INTERNAL CONTROL: NORMAL
Lab: NORMAL
Lab: NORMAL
S PYO AG THROAT QL: NEGATIVE
SARS-COV-2 AG UPPER RESP QL IA.RAPID: NOT DETECTED

## 2023-02-13 PROCEDURE — U0004 COV-19 TEST NON-CDC HGH THRU: HCPCS | Performed by: INTERNAL MEDICINE

## 2023-02-13 PROCEDURE — 87880 STREP A ASSAY W/OPTIC: CPT | Performed by: INTERNAL MEDICINE

## 2023-02-13 PROCEDURE — 87426 SARSCOV CORONAVIRUS AG IA: CPT | Performed by: INTERNAL MEDICINE

## 2023-02-13 PROCEDURE — 87081 CULTURE SCREEN ONLY: CPT | Performed by: INTERNAL MEDICINE

## 2023-02-13 PROCEDURE — 99214 OFFICE O/P EST MOD 30 MIN: CPT | Performed by: INTERNAL MEDICINE

## 2023-02-13 RX ORDER — ROSUVASTATIN CALCIUM 40 MG/1
40 TABLET, COATED ORAL DAILY
Qty: 90 TABLET | Refills: 3 | Status: SHIPPED | OUTPATIENT
Start: 2023-02-13

## 2023-02-13 RX ORDER — METOPROLOL SUCCINATE 100 MG/1
100 TABLET, EXTENDED RELEASE ORAL DAILY
Qty: 90 TABLET | Refills: 1 | Status: SHIPPED | OUTPATIENT
Start: 2023-02-13

## 2023-02-13 RX ORDER — FLUTICASONE PROPIONATE 50 MCG
2 SPRAY, SUSPENSION (ML) NASAL DAILY
Qty: 16 G | Refills: 1 | Status: SHIPPED | OUTPATIENT
Start: 2023-02-13

## 2023-02-13 NOTE — PROGRESS NOTES
Chief Complaint  Hypertension (Follow up ), Sore Throat (Woke up this morning with a sore throat ), and Nasal Congestion    Subjective          Nikhil Baldwin presents to [facility]  History of Present Illness  hypertension- patient reports having bad cramps in his arms and legs. Patient denies Has, dizziness, chest pain. Patient has not been taking magnesium since hospitalization. Patient's son reports patient is not always compliant with medication.    hyperlipidemia- patient has difficulty with atorvastatin and is amenable to switch.   Patient reports sore throat x1 day. Patient also with rhinorrhea. Patient denies HA, abdominal pain, earache. Patient has been in the hospital recently helping taking care of family member.     Current Outpatient Medications   Medication Instructions   • acetaminophen (TYLENOL) 650 mg, Oral, Every 6 Hours PRN   • albuterol sulfate  (90 Base) MCG/ACT inhaler 2 puffs, Inhalation, Every 4 Hours PRN   • Cholecalciferol 5,000 Units, Oral, Daily   • citalopram (CELEXA) 10 mg, Oral, Daily   • empagliflozin (JARDIANCE) 25 mg, Oral, Daily   • famotidine (PEPCID) 20 mg, Oral, 2 Times Daily   • fluticasone (FLONASE) 50 MCG/ACT nasal spray 2 sprays, Nasal, Daily   • lisinopril (PRINIVIL,ZESTRIL) 20 mg, Oral, Daily   • magnesium oxide (MAG-OX) 400 mg, Oral, Daily   • melatonin 5 mg, Oral, Nightly   • metFORMIN (GLUCOPHAGE) 500 MG tablet TAKE 1 TABLET BY MOUTH TWICE DAILY WITH MEALS   • metoprolol succinate XL (TOPROL XL) 100 mg, Oral, Daily   • rosuvastatin (CRESTOR) 40 mg, Oral, Daily   • SITagliptin (JANUVIA) 100 mg, Oral, Daily   • tamsulosin (FLOMAX) 0.4 mg, Oral, Daily       The following portions of the patient's history were reviewed and updated as appropriate: allergies, current medications, past family history, past medical history, past social history, past surgical history, and problem list.    Objective   Vital Signs:   /92  "(BP Location: Right arm)   Pulse 98   Temp 99.3 °F (37.4 °C) (Temporal)   Ht 175.3 cm (69\")   Wt 81.8 kg (180 lb 6.4 oz)   SpO2 95%   BMI 26.64 kg/m²     Wt Readings from Last 3 Encounters:   02/13/23 81.8 kg (180 lb 6.4 oz)   01/17/23 79.8 kg (176 lb)   01/05/23 84.6 kg (186 lb 9.6 oz)     BP Readings from Last 3 Encounters:   02/13/23 144/92   01/17/23 (!) 165/116   01/05/23 176/78     Pulse Readings from Last 3 Encounters:   02/13/23 98   01/17/23 96   01/05/23 66       Physical Exam   Appearance: No acute distress, well-nourished  Head: normocephalic, atraumatic  Eyes: extraocular movements intact, no scleral icterus, no conjunctival injection  Ears, Nose, and Throat: external ears normal, nares patent, moist mucous membranes  Cardiovascular: regular rate and rhythm. no murmurs, rubs, or gallops. no edema  Respiratory: breathing comfortably, symmetric chest rise, clear to auscultation bilaterally. No wheezes, rales, or rhonchi.  Neuro: alert and oriented to time, place, and person. Normal gait  Psych: normal mood and affect     Result Review :   The following data was reviewed by: Felix Navarro Jr, MD on 01/17/2023:  Common labs    Common Labs 12/4/22 12/4/22 12/4/22 12/5/22 12/5/22 12/5/22 12/6/22 12/6/22 12/6/22    1122 1123 1123 1028 1028 1028 0501 0501 0615   Glucose   170 (A)   164 (A)  136 (A)    BUN   18   15  14    Creatinine   0.95   0.87  0.79    Sodium   140   138  140    Potassium   3.7   3.3 (A)  3.5    Chloride   107   105  107    Calcium   8.4 (A)   8.5 (A)  8.4 (A)    Albumin  3.40 (A)   3.40 (A)  3.30 (A)     Total Bilirubin  0.4   0.5  0.5     Alkaline Phosphatase  51   50  50     AST (SGOT)  22   22  20     ALT (SGPT)  15   15  16     WBC 6.64   7.64     7.65   Hemoglobin 14.3   14.5     14.5   Hematocrit 41.3   40.7     41.0   Platelets 156   169     171   (A) Abnormal value              Lab Results   Component Value Date    SARSANTIGEN Not Detected 02/13/2023    COVID19 Not " Detected 12/01/2022    FLU Negative 12/01/2022    FLU Negative 12/01/2022    RAPSCRN Negative 02/13/2023    INR 1.0 11/12/2021    BILIRUBINUR Negative 01/05/2023          Assessment and Plan    Diagnoses and all orders for this visit:    1. Primary hypertension (Primary)  Comments:  still elevated above goal. inc toprol to 100mg daily.   Orders:  -     metoprolol succinate XL (Toprol XL) 100 MG 24 hr tablet; Take 1 tablet by mouth Daily.  Dispense: 90 tablet; Refill: 1    2. Sore throat  Comments:  thought related to postnasal drip. start flonase to help. rapid tests neg.   Orders:  -     POCT rapid strep A  -     POCT SARS-CoV-2 Antigen ZEN  -     Beta Strep Culture, Throat - Swab, Throat  -     COVID-19,APTIMA PANTHER([facility]),[facility]/ [facility], NP/OP SWAB IN UTM/VTM/SALINE TRANSPORT MEDIA,24 HR TAT - Swab, Nasopharynx  -     fluticasone (FLONASE) 50 MCG/ACT nasal spray; 2 sprays into the nostril(s) as directed by provider Daily.  Dispense: 16 g; Refill: 1    3. Other hyperlipidemia  Comments:  switch from atorvastatin to crestor to see if works for pt better. encouraged starting magnesium for crmaps  Orders:  -     rosuvastatin (CRESTOR) 40 MG tablet; Take 1 tablet by mouth Daily.  Dispense: 90 tablet; Refill: 3          Medications Discontinued During This Encounter   Medication Reason   • atorvastatin (LIPITOR) 80 MG tablet Alternate therapy   • metoprolol succinate XL (Toprol XL) 50 MG 24 hr tablet           Follow Up   Return in about 2 months (around 4/13/2023) for Recheck, HTN.  Patient was given instructions and counseling regarding his condition or for health maintenance advice. Please see specific information pulled into the AVS if appropriate.       Felix Navarro Jr, MD  02/13/23  12:34 EST

## 2023-02-14 ENCOUNTER — TELEPHONE (OUTPATIENT)
Dept: INTERNAL MEDICINE | Facility: CLINIC | Age: 78
End: 2023-02-14
Payer: MEDICARE

## 2023-02-14 LAB — SARS-COV-2 RNA RESP QL NAA+PROBE: NOT DETECTED

## 2023-02-14 NOTE — TELEPHONE ENCOUNTER
Attempted to contact patient regarding lab results. Unable to leave voicemail to call our office back.     HUB OK TO READ/ ADVISE:  ----- Message from Felix Navarro Jr., MD sent at 2/14/2023  8:00 AM EST -----  Covid negative

## 2023-02-15 LAB — BACTERIA SPEC AEROBE CULT: NORMAL

## 2023-03-03 RX ORDER — TAMSULOSIN HYDROCHLORIDE 0.4 MG/1
1 CAPSULE ORAL DAILY
Qty: 90 CAPSULE | Refills: 3 | Status: SHIPPED | OUTPATIENT
Start: 2023-03-03

## 2023-03-03 NOTE — TELEPHONE ENCOUNTER
Caller: JHONATAN SWAN    Relationship: Emergency Contact    Best call back number: [phone]     Requested Prescriptions:   Requested Prescriptions     Pending Prescriptions Disp Refills   • tamsulosin (FLOMAX) 0.4 MG capsule 24 hr capsule 90 capsule 3     Sig: Take 1 capsule by mouth Daily.        Pharmacy where request should be sent: [facility] [address] [place] [address] [phone] [facility] [phone] [facility]       Does the patient have less than a 3 day supply:  [x] Yes  [] No    Would you like a call back once the refill request has been completed: [x] Yes [] No    If the office needs to give you a call back, can they leave a voicemail: [x] Yes [] No    Ricardo Guy Rep   03/03/23 15:15 EST

## 2023-03-06 ENCOUNTER — OFFICE VISIT (OUTPATIENT)
Dept: INTERNAL MEDICINE | Facility: CLINIC | Age: 78
End: 2023-03-06
Payer: MEDICARE

## 2023-03-06 VITALS
BODY MASS INDEX: 26.19 KG/M2 | HEIGHT: 69 IN | HEART RATE: 53 BPM | OXYGEN SATURATION: 92 % | WEIGHT: 176.8 LBS | SYSTOLIC BLOOD PRESSURE: 152 MMHG | DIASTOLIC BLOOD PRESSURE: 84 MMHG | TEMPERATURE: 97.7 F

## 2023-03-06 DIAGNOSIS — E83.42 HYPOMAGNESEMIA: ICD-10-CM

## 2023-03-06 DIAGNOSIS — J18.9 COMMUNITY ACQUIRED PNEUMONIA, UNSPECIFIED LATERALITY: Primary | ICD-10-CM

## 2023-03-06 DIAGNOSIS — I10 PRIMARY HYPERTENSION: ICD-10-CM

## 2023-03-06 DIAGNOSIS — R07.89 CHEST TIGHTNESS: ICD-10-CM

## 2023-03-06 PROCEDURE — 93000 ELECTROCARDIOGRAM COMPLETE: CPT | Performed by: INTERNAL MEDICINE

## 2023-03-06 PROCEDURE — 99214 OFFICE O/P EST MOD 30 MIN: CPT | Performed by: INTERNAL MEDICINE

## 2023-03-06 RX ORDER — AMOXICILLIN AND CLAVULANATE POTASSIUM 875; 125 MG/1; MG/1
1 TABLET, FILM COATED ORAL EVERY 12 HOURS SCHEDULED
COMMUNITY
Start: 2023-02-25

## 2023-03-06 RX ORDER — BENZONATATE 100 MG/1
100 CAPSULE ORAL 3 TIMES DAILY PRN
COMMUNITY
Start: 2023-02-25

## 2023-03-06 RX ORDER — IPRATROPIUM BROMIDE AND ALBUTEROL SULFATE 2.5; .5 MG/3ML; MG/3ML
SOLUTION RESPIRATORY (INHALATION)
COMMUNITY
Start: 2023-03-03

## 2023-03-06 RX ORDER — AZITHROMYCIN 250 MG/1
TABLET, FILM COATED ORAL SEE ADMIN INSTRUCTIONS
COMMUNITY
Start: 2023-03-03

## 2023-03-06 RX ORDER — MAGNESIUM OXIDE 400 MG/1
400 TABLET ORAL DAILY
Qty: 90 TABLET | Refills: 1 | Status: SHIPPED | OUTPATIENT
Start: 2023-03-06

## 2023-03-06 RX ORDER — CARVEDILOL 3.12 MG/1
3.12 TABLET ORAL
COMMUNITY
Start: 2023-01-06 | End: 2023-03-06

## 2023-03-06 RX ORDER — PREDNISONE 20 MG/1
TABLET ORAL
COMMUNITY
Start: 2023-03-03 | End: 2023-03-13 | Stop reason: ALTCHOICE

## 2023-03-06 RX ORDER — CHOLECALCIFEROL (VITAMIN D3) 125 MCG
5 CAPSULE ORAL DAILY
COMMUNITY
End: 2023-03-06

## 2023-03-06 NOTE — PROGRESS NOTES
Chief Complaint  Pneumonia    Subjective          Nikhil LETICIA Baldwin presents to [facility]  History of Present Illness  CAP - patient reports he is improving. Patient went to [facility] where thought to have CAP. He was Rx'd augmentin and completed treatment. He is now on azithromycin and tessalon perles since he did not feel much better after taking augmentin. Patient was also given steroids and breathing treatments. Patient reports improvement with albuterol every 4-6 hours. His last treatment was last night. Patient reports his cough is improving and he is starting to cough up clear mucus.  Patient does report some chest tightness intermittently.   hypertension- Patient denies dizziness, chest pain. Patient has not been taking magnesium since hospitalization. Patient's son reports patient is not always compliant with medication.    hyperlipidemia- cramps have been improved with switch to crestor    Current Outpatient Medications   Medication Instructions   • acetaminophen (TYLENOL) 650 mg, Oral, Every 6 Hours PRN   • albuterol sulfate  (90 Base) MCG/ACT inhaler 2 puffs, Inhalation, Every 4 Hours PRN   • amoxicillin-clavulanate (AUGMENTIN) 875-125 MG per tablet 1 tablet, Oral, Every 12 Hours Scheduled   • azithromycin (ZITHROMAX) 250 MG tablet Oral, See Admin Instructions, Take 2 tablets by mouth on day 1 then take 1 tablet by mouth once daily on days 2-5    • benzonatate (TESSALON) 100 mg, Oral, 3 Times Daily PRN   • Cholecalciferol 5,000 Units, Oral, Daily   • citalopram (CELEXA) 10 mg, Oral, Daily   • empagliflozin (JARDIANCE) 25 mg, Oral, Daily   • famotidine (PEPCID) 20 mg, Oral, 2 Times Daily   • fluticasone (FLONASE) 50 MCG/ACT nasal spray 2 sprays, Nasal, Daily   • ipratropium-albuterol (DUO-NEB) 0.5-2.5 mg/3 ml nebulizer INHALE 3 MLS VIA NEBULIZER EVERY 6 HOURS AS NEEDED FOR WHEEZING OR SHORTNESS OF BREATH   • lisinopril (PRINIVIL,ZESTRIL) 20 mg, Oral, Daily   •  "magnesium oxide (MAG-OX) 400 mg, Oral, Daily   • melatonin 5 mg, Oral, Nightly   • metFORMIN (GLUCOPHAGE) 500 MG tablet TAKE 1 TABLET BY MOUTH TWICE DAILY WITH MEALS   • metoprolol succinate XL (TOPROL XL) 100 mg, Oral, Daily   • predniSONE (DELTASONE) 20 MG tablet TAKE 1/2 (ONE-HALF) TABLET BY MOUTH TWICE DAILY FOR 5 DAYS   • rosuvastatin (CRESTOR) 40 mg, Oral, Daily   • SITagliptin (JANUVIA) 100 mg, Oral, Daily   • tamsulosin (FLOMAX) 0.4 mg, Oral, Daily       The following portions of the patient's history were reviewed and updated as appropriate: allergies, current medications, past family history, past medical history, past social history, past surgical history, and problem list.    Objective   Vital Signs:   /84 (BP Location: Left arm, Patient Position: Sitting, Cuff Size: Adult)   Pulse 53   Temp 97.7 °F (36.5 °C) (Temporal)   Ht 175.3 cm (69\")   Wt 80.2 kg (176 lb 12.8 oz)   SpO2 92%   BMI 26.11 kg/m²     Wt Readings from Last 3 Encounters:   03/06/23 80.2 kg (176 lb 12.8 oz)   02/13/23 81.8 kg (180 lb 6.4 oz)   01/17/23 79.8 kg (176 lb)     BP Readings from Last 3 Encounters:   03/06/23 152/84   02/13/23 144/92   01/17/23 (!) 165/116     Pulse Readings from Last 3 Encounters:   03/06/23 53   02/13/23 98   01/17/23 96       Physical Exam   Appearance: No acute distress, well-nourished  Head: normocephalic, atraumatic  Eyes: extraocular movements intact, no scleral icterus, no conjunctival injection  Ears, Nose, and Throat: external ears normal, nares patent, moist mucous membranes  Cardiovascular: regular rate and rhythm. no murmurs, rubs, or gallops. no edema  Respiratory: breathing comfortably, symmetric chest rise, clear to auscultation bilaterally. No wheezes, rales, or rhonchi.  Neuro: alert and oriented to time, place, and person. Normal gait  Psych: normal mood and affect     Result Review :   The following data was reviewed by: Felix Navarro Jr, MD on 01/17/2023:  Common labs  "   Common Labs 12/4/22 12/4/22 12/4/22 12/5/22 12/5/22 12/5/22 12/6/22 12/6/22 12/6/22    1122 1123 1123 1028 1028 1028 0501 0501 0615   Glucose   170 (A)   164 (A)  136 (A)    BUN   18   15  14    Creatinine   0.95   0.87  0.79    Sodium   140   138  140    Potassium   3.7   3.3 (A)  3.5    Chloride   107   105  107    Calcium   8.4 (A)   8.5 (A)  8.4 (A)    Albumin  3.40 (A)   3.40 (A)  3.30 (A)     Total Bilirubin  0.4   0.5  0.5     Alkaline Phosphatase  51   50  50     AST (SGOT)  22   22  20     ALT (SGPT)  15   15  16     WBC 6.64   7.64     7.65   Hemoglobin 14.3   14.5     14.5   Hematocrit 41.3   40.7     41.0   Platelets 156   169     171   (A) Abnormal value              Lab Results   Component Value Date    SARSANTIGEN Not Detected 02/13/2023    COVID19 Not Detected 02/13/2023    FLU Negative 12/01/2022    FLU Negative 12/01/2022    RAPSCRN Negative 02/13/2023    INR 1.0 11/12/2021    BILIRUBINUR Negative 01/05/2023     chest x-ray reviewed and without infiltrate.       ECG 12 Lead    Date/Time: 3/6/2023 9:46 AM  Performed by: Felix Navarro Jr., MD  Authorized by: Felix Navarro Jr., MD   Comparison: compared with previous ECG from 12/1/2022  Similar to previous ECG  Rhythm: sinus rhythm  Rate: normal  Conduction: conduction normal  ST Segments: ST segments normal  T inversion: II and aVF  QRS axis: left    Clinical impression: normal ECG              Assessment and Plan    Diagnoses and all orders for this visit:    1. Community acquired pneumonia, unspecified laterality (Primary)  Comments:  exam reassuring today given last breathing treatment several horus ago. complete steroids and ABx. magnesium may help open lungs as well.     2. Hypomagnesemia  Comments:  encouraged restart magnesium supplement for cramps. may also help with BP.   Orders:  -     magnesium oxide (MAG-OX) 400 MG tablet; Take 1 tablet by mouth Daily.  Dispense: 90 tablet; Refill: 1    3. Primary  hypertension  Comments:  elevated today, but pt sick and on steroids. f/u in 3 weeks.     4. Chest tightness  Comments:  trending better. thought related to acute pulm condition. EKG stable from check in 12/2022.  Orders:  -     ECG 12 Lead          Medications Discontinued During This Encounter   Medication Reason   • melatonin 5 MG tablet tablet *Error   • carvedilol (COREG) 3.125 MG tablet *Error   • metFORMIN (GLUCOPHAGE) 500 MG tablet *Error   • magnesium oxide (MAG-OX) 400 MG tablet Reorder          Follow Up   Return in about 3 weeks (around 3/27/2023).  Patient was given instructions and counseling regarding his condition or for health maintenance advice. Please see specific information pulled into the AVS if appropriate.       Felix Navarro Jr, MD  03/06/23  09:50 EST

## 2023-03-06 NOTE — PROGRESS NOTES
"Chief Complaint  Pneumonia    Subjective    {Problem List  Visit Diagnosis   Encounters  Notes  Medications  Labs  Result Review Imaging  Media :23}    Nikhil Baldwin presents to [facility]  History of Present Illness    Objective   Vital Signs:  /84 (BP Location: Left arm, Patient Position: Sitting, Cuff Size: Adult)   Pulse 53   Temp 97.7 °F (36.5 °C) (Temporal)   Ht 175.3 cm (69\")   Wt 80.2 kg (176 lb 12.8 oz)   SpO2 92%   BMI 26.11 kg/m²   Estimated body mass index is 26.11 kg/m² as calculated from the following:    Height as of this encounter: 175.3 cm (69\").    Weight as of this encounter: 80.2 kg (176 lb 12.8 oz).       {BMI is >= 25 and <30. (Overweight) The following options were offered after discussion; (Optional):35644}      Physical Exam   Result Review :{Labs  Result Review  Imaging  Med Tab  Media  Procedures  :23}  {The following data was reviewed by (Optional):92535}  {Ambulatory Labs (Optional):33322}  {Data reviewed (Optional):01854:::1}             Assessment and Plan {CC Problem List  Visit Diagnosis   ROS  Review (Popup)  Health Maintenance  Quality  BestPractice  Medications  SmartSets  SnapShot Encounters  Media :23}  There are no diagnoses linked to this encounter.       {Time Spent (Optional):73925}  Follow Up {Instructions Charge Capture  Follow-up Communications :23}  No follow-ups on file.  Patient was given instructions and counseling regarding his condition or for health maintenance advice. Please see specific information pulled into the AVS if appropriate.       "

## 2023-03-13 ENCOUNTER — CLINICAL SUPPORT (OUTPATIENT)
Dept: INTERNAL MEDICINE | Facility: CLINIC | Age: 78
End: 2023-03-13
Payer: MEDICARE

## 2023-03-13 DIAGNOSIS — R30.9 PAINFUL URINATION: Primary | ICD-10-CM

## 2023-03-13 LAB
BACTERIA UR QL AUTO: NORMAL /HPF
BILIRUB BLD-MCNC: NEGATIVE MG/DL
BILIRUB UR QL STRIP: NEGATIVE
CLARITY UR: CLEAR
CLARITY, POC: CLEAR
COLOR UR: ABNORMAL
COLOR UR: YELLOW
EXPIRATION DATE: ABNORMAL
GLUCOSE UR STRIP-MCNC: ABNORMAL MG/DL
GLUCOSE UR STRIP-MCNC: ABNORMAL MG/DL
HGB UR QL STRIP.AUTO: NEGATIVE
HYALINE CASTS UR QL AUTO: NORMAL /LPF
KETONES UR QL STRIP: NEGATIVE
KETONES UR QL: NEGATIVE
LEUKOCYTE EST, POC: NEGATIVE
LEUKOCYTE ESTERASE UR QL STRIP.AUTO: NEGATIVE
Lab: ABNORMAL
NITRITE UR QL STRIP: NEGATIVE
NITRITE UR-MCNC: NEGATIVE MG/ML
PH UR STRIP.AUTO: 5.5 [PH] (ref 5–8)
PH UR: 6 [PH] (ref 5–8)
PROT UR QL STRIP: NEGATIVE
PROT UR STRIP-MCNC: NEGATIVE MG/DL
RBC # UR STRIP: NEGATIVE /UL
RBC # UR STRIP: NORMAL /HPF
REF LAB TEST METHOD: NORMAL
SP GR UR STRIP: >=1.03 (ref 1–1.03)
SP GR UR: 1.03 (ref 1–1.03)
SQUAMOUS #/AREA URNS HPF: NORMAL /HPF
UROBILINOGEN UR QL STRIP: ABNORMAL
UROBILINOGEN UR QL: NORMAL
WBC # UR STRIP: NORMAL /HPF

## 2023-03-13 PROCEDURE — 81001 URINALYSIS AUTO W/SCOPE: CPT | Performed by: INTERNAL MEDICINE

## 2023-03-13 PROCEDURE — 81003 URINALYSIS AUTO W/O SCOPE: CPT | Performed by: INTERNAL MEDICINE

## 2023-03-13 PROCEDURE — 87086 URINE CULTURE/COLONY COUNT: CPT | Performed by: INTERNAL MEDICINE

## 2023-03-13 RX ORDER — CIPROFLOXACIN 500 MG/1
500 TABLET, FILM COATED ORAL 2 TIMES DAILY
Qty: 10 TABLET | Refills: 0 | Status: SHIPPED | OUTPATIENT
Start: 2023-03-13 | End: 2023-03-18

## 2023-03-14 LAB — BACTERIA SPEC AEROBE CULT: NO GROWTH

## 2023-03-16 ENCOUNTER — TELEPHONE (OUTPATIENT)
Dept: INTERNAL MEDICINE | Facility: CLINIC | Age: 78
End: 2023-03-16
Payer: MEDICARE

## 2023-03-16 NOTE — TELEPHONE ENCOUNTER
----- Message from Felix Navarro Jr., MD sent at 3/15/2023  4:13 PM EDT -----  All labs reassuring

## 2023-04-17 ENCOUNTER — OFFICE VISIT (OUTPATIENT)
Dept: INTERNAL MEDICINE | Facility: CLINIC | Age: 78
End: 2023-04-17
Payer: MEDICARE

## 2023-04-17 VITALS
HEART RATE: 61 BPM | WEIGHT: 185.3 LBS | DIASTOLIC BLOOD PRESSURE: 98 MMHG | BODY MASS INDEX: 27.44 KG/M2 | SYSTOLIC BLOOD PRESSURE: 161 MMHG | TEMPERATURE: 97.4 F | HEIGHT: 69 IN | OXYGEN SATURATION: 95 %

## 2023-04-17 DIAGNOSIS — E07.9 THYROID DISORDER: ICD-10-CM

## 2023-04-17 DIAGNOSIS — E11.65 TYPE 2 DIABETES MELLITUS WITH HYPERGLYCEMIA, WITHOUT LONG-TERM CURRENT USE OF INSULIN: ICD-10-CM

## 2023-04-17 DIAGNOSIS — E21.5 PARATHYROID ABNORMALITY: ICD-10-CM

## 2023-04-17 DIAGNOSIS — E78.49 OTHER HYPERLIPIDEMIA: ICD-10-CM

## 2023-04-17 DIAGNOSIS — I10 PRIMARY HYPERTENSION: Primary | ICD-10-CM

## 2023-04-17 DIAGNOSIS — E55.9 VITAMIN D DEFICIENCY: ICD-10-CM

## 2023-04-17 LAB
ALBUMIN SERPL-MCNC: 4.4 G/DL (ref 3.5–5.2)
ALBUMIN/GLOB SERPL: 1.6 G/DL
ALP SERPL-CCNC: 72 U/L (ref 39–117)
ALT SERPL W P-5'-P-CCNC: 15 U/L (ref 1–41)
ANION GAP SERPL CALCULATED.3IONS-SCNC: 10 MMOL/L (ref 5–15)
AST SERPL-CCNC: 16 U/L (ref 1–40)
BASOPHILS # BLD AUTO: 0.06 10*3/MM3 (ref 0–0.2)
BASOPHILS NFR BLD AUTO: 0.7 % (ref 0–1.5)
BILIRUB SERPL-MCNC: 0.5 MG/DL (ref 0–1.2)
BUN SERPL-MCNC: 15 MG/DL (ref 8–23)
BUN/CREAT SERPL: 15.2 (ref 7–25)
CALCIUM SPEC-SCNC: 9.8 MG/DL (ref 8.6–10.5)
CHLORIDE SERPL-SCNC: 108 MMOL/L (ref 98–107)
CHOLEST SERPL-MCNC: 194 MG/DL (ref 0–200)
CO2 SERPL-SCNC: 24 MMOL/L (ref 22–29)
CREAT SERPL-MCNC: 0.99 MG/DL (ref 0.76–1.27)
DEPRECATED RDW RBC AUTO: 45.9 FL (ref 37–54)
EGFRCR SERPLBLD CKD-EPI 2021: 77.5 ML/MIN/1.73
EOSINOPHIL # BLD AUTO: 0.18 10*3/MM3 (ref 0–0.4)
EOSINOPHIL NFR BLD AUTO: 2.2 % (ref 0.3–6.2)
ERYTHROCYTE [DISTWIDTH] IN BLOOD BY AUTOMATED COUNT: 14.6 % (ref 12.3–15.4)
GLOBULIN UR ELPH-MCNC: 2.8 GM/DL
GLUCOSE SERPL-MCNC: 127 MG/DL (ref 65–99)
HCT VFR BLD AUTO: 49 % (ref 37.5–51)
HDLC SERPL-MCNC: 48 MG/DL (ref 40–60)
HGB BLD-MCNC: 17.3 G/DL (ref 13–17.7)
IMM GRANULOCYTES # BLD AUTO: 0.07 10*3/MM3 (ref 0–0.05)
IMM GRANULOCYTES NFR BLD AUTO: 0.8 % (ref 0–0.5)
LDLC SERPL CALC-MCNC: 104 MG/DL (ref 0–100)
LDLC/HDLC SERPL: 2.01 {RATIO}
LYMPHOCYTES # BLD AUTO: 2.49 10*3/MM3 (ref 0.7–3.1)
LYMPHOCYTES NFR BLD AUTO: 29.9 % (ref 19.6–45.3)
MCH RBC QN AUTO: 30.9 PG (ref 26.6–33)
MCHC RBC AUTO-ENTMCNC: 35.3 G/DL (ref 31.5–35.7)
MCV RBC AUTO: 87.5 FL (ref 79–97)
MONOCYTES # BLD AUTO: 0.75 10*3/MM3 (ref 0.1–0.9)
MONOCYTES NFR BLD AUTO: 9 % (ref 5–12)
NEUTROPHILS NFR BLD AUTO: 4.79 10*3/MM3 (ref 1.7–7)
NEUTROPHILS NFR BLD AUTO: 57.4 % (ref 42.7–76)
NRBC BLD AUTO-RTO: 0 /100 WBC (ref 0–0.2)
PLATELET # BLD AUTO: 219 10*3/MM3 (ref 140–450)
PMV BLD AUTO: 10.7 FL (ref 6–12)
POTASSIUM SERPL-SCNC: 4.4 MMOL/L (ref 3.5–5.2)
PROT SERPL-MCNC: 7.2 G/DL (ref 6–8.5)
RBC # BLD AUTO: 5.6 10*6/MM3 (ref 4.14–5.8)
SODIUM SERPL-SCNC: 142 MMOL/L (ref 136–145)
TRIGL SERPL-MCNC: 248 MG/DL (ref 0–150)
TSH SERPL DL<=0.05 MIU/L-ACNC: 1.65 UIU/ML (ref 0.27–4.2)
VLDLC SERPL-MCNC: 42 MG/DL (ref 5–40)
WBC NRBC COR # BLD: 8.34 10*3/MM3 (ref 3.4–10.8)

## 2023-04-17 PROCEDURE — 84443 ASSAY THYROID STIM HORMONE: CPT | Performed by: INTERNAL MEDICINE

## 2023-04-17 PROCEDURE — 83036 HEMOGLOBIN GLYCOSYLATED A1C: CPT | Performed by: INTERNAL MEDICINE

## 2023-04-17 PROCEDURE — 80061 LIPID PANEL: CPT | Performed by: INTERNAL MEDICINE

## 2023-04-17 PROCEDURE — 85025 COMPLETE CBC W/AUTO DIFF WBC: CPT | Performed by: INTERNAL MEDICINE

## 2023-04-17 PROCEDURE — 82306 VITAMIN D 25 HYDROXY: CPT | Performed by: INTERNAL MEDICINE

## 2023-04-17 PROCEDURE — 80053 COMPREHEN METABOLIC PANEL: CPT | Performed by: INTERNAL MEDICINE

## 2023-04-17 PROCEDURE — 83970 ASSAY OF PARATHORMONE: CPT | Performed by: INTERNAL MEDICINE

## 2023-04-17 RX ORDER — CARVEDILOL 3.12 MG/1
3.12 TABLET ORAL 2 TIMES DAILY WITH MEALS
COMMUNITY
Start: 2023-04-03

## 2023-04-17 NOTE — PROGRESS NOTES
Chief Complaint  Hypertension (Bp check/)    Subjective          Nikhil Baldwin presents to [facility]  History of Present Illness  hypertension- elevated today. Patient admittedly is noncompliant with his medications. Patient reports feeling ok.   DM2- due for recheck. Patient reports med noncompliance. Patient is eating well.   hyperlipidemia- tolerates statin when taking  Leg cramps have resolved.     Current Outpatient Medications   Medication Instructions   • acetaminophen (TYLENOL) 650 mg, Oral, Every 6 Hours PRN   • albuterol sulfate  (90 Base) MCG/ACT inhaler 2 puffs, Inhalation, Every 4 Hours PRN   • benzonatate (TESSALON) 100 mg, Oral, 3 Times Daily PRN   • carvedilol (COREG) 3.125 mg, Oral, 2 Times Daily With Meals   • Cholecalciferol 5,000 Units, Oral, Daily   • citalopram (CELEXA) 10 mg, Oral, Daily   • empagliflozin (JARDIANCE) 25 mg, Oral, Daily   • famotidine (PEPCID) 20 mg, Oral, 2 Times Daily   • fluticasone (FLONASE) 50 MCG/ACT nasal spray 2 sprays, Nasal, Daily   • ipratropium-albuterol (DUO-NEB) 0.5-2.5 mg/3 ml nebulizer INHALE 3 MLS VIA NEBULIZER EVERY 6 HOURS AS NEEDED FOR WHEEZING OR SHORTNESS OF BREATH   • lisinopril (PRINIVIL,ZESTRIL) 20 mg, Oral, Daily   • magnesium oxide (MAG-OX) 400 mg, Oral, Daily   • melatonin 5 mg, Oral, Nightly   • metFORMIN (GLUCOPHAGE) 500 MG tablet TAKE 1 TABLET BY MOUTH TWICE DAILY WITH MEALS   • metoprolol succinate XL (TOPROL XL) 100 mg, Oral, Daily   • rosuvastatin (CRESTOR) 40 mg, Oral, Daily   • SITagliptin (JANUVIA) 100 mg, Oral, Daily   • tamsulosin (FLOMAX) 0.4 mg, Oral, Daily       The following portions of the patient's history were reviewed and updated as appropriate: allergies, current medications, past family history, past medical history, past social history, past surgical history, and problem list.    Objective   Vital Signs:   /98 (BP Location: Right arm, Patient Position: Sitting, Cuff  "Size: Adult)   Pulse 61   Temp 97.4 °F (36.3 °C) (Temporal)   Ht 175.3 cm (69\")   Wt 84.1 kg (185 lb 4.8 oz)   SpO2 95%   BMI 27.36 kg/m²     Wt Readings from Last 3 Encounters:   04/17/23 84.1 kg (185 lb 4.8 oz)   03/06/23 80.2 kg (176 lb 12.8 oz)   02/13/23 81.8 kg (180 lb 6.4 oz)     BP Readings from Last 3 Encounters:   04/17/23 161/98   03/06/23 152/84   02/13/23 144/92     Physical Exam   Appearance: No acute distress, well-nourished  Head: normocephalic, atraumatic  Eyes: extraocular movements intact, no scleral icterus, no conjunctival injection  Ears, Nose, and Throat: external ears normal, nares patent, moist mucous membranes  Cardiovascular: regular rate and rhythm. no murmurs, rubs, or gallops. no edema  Respiratory: breathing comfortably, symmetric chest rise, clear to auscultation bilaterally. No wheezes, rales, or rhonchi.  Neuro: alert and oriented to time, place, and person. Normal gait  Psych: normal mood and affect     Result Review :   The following data was reviewed by: Felix Navarro Jr, MD on 04/17/2023:  Common labs        12/4/2022    11:22 12/4/2022    11:23 12/5/2022    10:28 12/6/2022    05:01 12/6/2022    06:15   Common Labs   Glucose  170   164   136      BUN  18   15   14      Creatinine  0.95   0.87   0.79      Sodium  140   138   140      Potassium  3.7   3.3   3.5      Chloride  107   105   107      Calcium  8.4   8.5   8.4      Albumin  3.40   3.40   3.30      Total Bilirubin  0.4   0.5   0.5      Alkaline Phosphatase  51   50   50      AST (SGOT)  22   22   20      ALT (SGPT)  15   15   16      WBC 6.64    7.64    7.65     Hemoglobin 14.3    14.5    14.5     Hematocrit 41.3    40.7    41.0     Platelets 156    169    171         Lab Results   Component Value Date    SARSANTIGEN Not Detected 02/13/2023    COVID19 Not Detected 02/13/2023    FLU Negative 12/01/2022    FLU Negative 12/01/2022    RAPSCRN Negative 02/13/2023    INR 1.0 11/12/2021    BILIRUBINUR Negative " 03/13/2023        Assessment and Plan    Diagnoses and all orders for this visit:    1. Primary hypertension (Primary)  Comments:  elevated today. discussed dangers of noncompliance. enouraged med compliance and f/u in 1 week.   Orders:  -     CBC & Differential  -     Comprehensive Metabolic Panel    2. Other hyperlipidemia  Comments:  check labs.   Orders:  -     Lipid Panel    3. Type 2 diabetes mellitus with hyperglycemia, without long-term current use of insulin  Comments:  check labs. encouraged med compliance with current regimen  Orders:  -     Hemoglobin A1c    4. Vitamin D deficiency  -     Vitamin D,25-Hydroxy    5. Thyroid disorder  -     TSH    6. Parathyroid abnormality  -     PTH, Intact        Medications Discontinued During This Encounter   Medication Reason   • amoxicillin-clavulanate (AUGMENTIN) 875-125 MG per tablet *Therapy completed   • azithromycin (ZITHROMAX) 250 MG tablet *Therapy completed          Follow Up   Return in about 1 week (around 4/24/2023).  Patient was given instructions and counseling regarding his condition or for health maintenance advice. Please see specific information pulled into the AVS if appropriate.       Felix Navarro Jr, MD  04/17/23  12:40 EDT

## 2023-04-18 LAB
25(OH)D3 SERPL-MCNC: 21.6 NG/ML (ref 30–100)
HBA1C MFR BLD: 7.4 % (ref 4.8–5.6)
PTH-INTACT SERPL-MCNC: 28.8 PG/ML (ref 15–65)

## 2023-04-20 ENCOUNTER — TELEPHONE (OUTPATIENT)
Dept: INTERNAL MEDICINE | Facility: CLINIC | Age: 78
End: 2023-04-20
Payer: MEDICARE

## 2023-04-20 NOTE — TELEPHONE ENCOUNTER
----- Message from Felix Navarro Jr., MD sent at 4/20/2023  3:13 PM EDT -----  Decent control of blood glucose. Continue vitamin D supplementation. Ensure taking rosuvastatin 40mg to reduce LDL and prevent further stroke or heart attack.

## 2023-04-20 NOTE — TELEPHONE ENCOUNTER
Called patient son answered the phone.   They are aware of these labs.   Son stated that he takes his medication half the time. either throws them away or flushes them down the toilet

## 2023-04-24 ENCOUNTER — OFFICE VISIT (OUTPATIENT)
Dept: INTERNAL MEDICINE | Facility: CLINIC | Age: 78
End: 2023-04-24
Payer: MEDICARE

## 2023-04-24 VITALS
HEIGHT: 69 IN | OXYGEN SATURATION: 94 % | SYSTOLIC BLOOD PRESSURE: 125 MMHG | DIASTOLIC BLOOD PRESSURE: 82 MMHG | TEMPERATURE: 98 F | WEIGHT: 188.2 LBS | HEART RATE: 74 BPM | BODY MASS INDEX: 27.88 KG/M2

## 2023-04-24 DIAGNOSIS — E78.49 OTHER HYPERLIPIDEMIA: ICD-10-CM

## 2023-04-24 DIAGNOSIS — I10 PRIMARY HYPERTENSION: Primary | ICD-10-CM

## 2023-04-24 NOTE — PROGRESS NOTES
"Chief Complaint  Hypertension    Subjective          Nikhil Baldwin presents to [facility]  History of Present Illness  hypertension- patient denies Has, dizziness, chest pain. Patient does not check Blood Pressure at home.   hyperlipidemia- patient reports statin noncompliance.       Current Outpatient Medications   Medication Instructions   • albuterol sulfate  (90 Base) MCG/ACT inhaler 2 puffs, Inhalation, Every 4 Hours PRN   • carvedilol (COREG) 3.125 mg, Oral, 2 Times Daily With Meals   • Cholecalciferol 5,000 Units, Oral, Daily   • citalopram (CELEXA) 10 mg, Oral, Daily   • empagliflozin (JARDIANCE) 25 mg, Oral, Daily   • lisinopril (PRINIVIL,ZESTRIL) 20 mg, Oral, Daily   • magnesium oxide (MAG-OX) 400 mg, Oral, Daily   • melatonin 5 mg, Oral, Nightly   • metFORMIN (GLUCOPHAGE) 500 MG tablet TAKE 1 TABLET BY MOUTH TWICE DAILY WITH MEALS   • metoprolol succinate XL (TOPROL XL) 100 mg, Oral, Daily   • rosuvastatin (CRESTOR) 40 mg, Oral, Daily   • SITagliptin (JANUVIA) 100 mg, Oral, Daily   • tamsulosin (FLOMAX) 0.4 mg, Oral, Daily       The following portions of the patient's history were reviewed and updated as appropriate: allergies, current medications, past family history, past medical history, past social history, past surgical history, and problem list.    Objective   Vital Signs:   /82 (BP Location: Left arm, Patient Position: Sitting, Cuff Size: Large Adult)   Pulse 74   Temp 98 °F (36.7 °C) (Temporal)   Ht 175.3 cm (69\")   Wt 85.4 kg (188 lb 3.2 oz)   SpO2 94%   BMI 27.79 kg/m²     Wt Readings from Last 3 Encounters:   04/24/23 85.4 kg (188 lb 3.2 oz)   04/17/23 84.1 kg (185 lb 4.8 oz)   03/06/23 80.2 kg (176 lb 12.8 oz)     BP Readings from Last 3 Encounters:   04/24/23 125/82   04/17/23 161/98   03/06/23 152/84     Physical Exam   Appearance: No acute distress, well-nourished  Head: normocephalic, atraumatic  Eyes: extraocular movements " intact, no scleral icterus, no conjunctival injection  Ears, Nose, and Throat: external ears normal, nares patent, moist mucous membranes  Cardiovascular: regular rate and rhythm. no murmurs, rubs, or gallops. no edema  Respiratory: breathing comfortably, symmetric chest rise, clear to auscultation bilaterally. No wheezes, rales, or rhonchi.  Neuro: alert and oriented to time, place, and person. Normal gait  Psych: normal mood and affect     Result Review :   The following data was reviewed by: Felix Navarro Jr, MD on 04/24/2023:  Common labs        12/5/2022    10:28 12/6/2022    05:01 12/6/2022    06:15 4/17/2023    12:34   Common Labs   Glucose 164   136    127     BUN 15   14    15     Creatinine 0.87   0.79    0.99     Sodium 138   140    142     Potassium 3.3   3.5    4.4     Chloride 105   107    108     Calcium 8.5   8.4    9.8     Albumin 3.40   3.30    4.4     Total Bilirubin 0.5   0.5    0.5     Alkaline Phosphatase 50   50    72     AST (SGOT) 22   20    16     ALT (SGPT) 15   16    15     WBC 7.64    7.65   8.34     Hemoglobin 14.5    14.5   17.3     Hematocrit 40.7    41.0   49.0     Platelets 169    171   219     Total Cholesterol    194     Triglycerides    248     HDL Cholesterol    48     LDL Cholesterol     104     Hemoglobin A1C    7.40         Lab Results   Component Value Date    SARSANTIGEN Not Detected 02/13/2023    COVID19 Not Detected 02/13/2023    FLU Negative 12/01/2022    FLU Negative 12/01/2022    RAPSCRN Negative 02/13/2023    INR 1.0 11/12/2021    BILIRUBINUR Negative 03/13/2023          Assessment and Plan    Diagnoses and all orders for this visit:    1. Primary hypertension (Primary)  Comments:  improved control with med compliance. encouraged cont regimen. f/u in 3 months.     2. Other hyperlipidemia  Comments:  labs reviewed with pt. encouraged statin.           Medications Discontinued During This Encounter   Medication Reason   • benzonatate (TESSALON) 100 MG capsule  *Therapy completed   • ipratropium-albuterol (DUO-NEB) 0.5-2.5 mg/3 ml nebulizer *Therapy completed   • fluticasone (FLONASE) 50 MCG/ACT nasal spray *Therapy completed   • famotidine (Pepcid) 20 MG tablet *Therapy completed   • acetaminophen (TYLENOL) 325 MG tablet *Therapy completed          Follow Up   Return in about 3 months (around 7/24/2023).  Patient was given instructions and counseling regarding his condition or for health maintenance advice. Please see specific information pulled into the AVS if appropriate.       Felix Navarro Jr, MD  04/24/23  12:43 EDT

## 2023-06-13 DIAGNOSIS — F02.80 FRONTAL LOBE DEMENTIA: ICD-10-CM

## 2023-06-13 DIAGNOSIS — I10 PRIMARY HYPERTENSION: Primary | ICD-10-CM

## 2023-06-13 DIAGNOSIS — G31.09 FRONTAL LOBE DEMENTIA: ICD-10-CM

## 2023-06-13 DIAGNOSIS — E11.65 TYPE 2 DIABETES MELLITUS WITH HYPERGLYCEMIA, WITHOUT LONG-TERM CURRENT USE OF INSULIN: ICD-10-CM

## 2023-06-13 RX ORDER — LISINOPRIL 20 MG/1
20 TABLET ORAL DAILY
Qty: 90 TABLET | Refills: 3 | Status: SHIPPED | OUTPATIENT
Start: 2023-06-13

## 2023-06-13 RX ORDER — CITALOPRAM 20 MG/1
10 TABLET ORAL DAILY
Qty: 90 TABLET | Refills: 1 | Status: SHIPPED | OUTPATIENT
Start: 2023-06-13

## 2023-07-16 ENCOUNTER — HOSPITAL ENCOUNTER (OUTPATIENT)
Facility: HOSPITAL | Age: 78
Setting detail: OBSERVATION
Discharge: NURSING, REHAB OR LONG-TERM CARE | End: 2023-07-22
Attending: EMERGENCY MEDICINE | Admitting: INTERNAL MEDICINE
Payer: MEDICARE

## 2023-07-16 ENCOUNTER — APPOINTMENT (OUTPATIENT)
Dept: MRI IMAGING | Facility: HOSPITAL | Age: 78
End: 2023-07-16
Payer: MEDICARE

## 2023-07-16 DIAGNOSIS — I10 PRIMARY HYPERTENSION: ICD-10-CM

## 2023-07-16 DIAGNOSIS — M79.18 MYOFASCIAL PAIN: ICD-10-CM

## 2023-07-16 DIAGNOSIS — R26.2 DIFFICULTY WALKING: ICD-10-CM

## 2023-07-16 DIAGNOSIS — Z78.9 DECREASED ACTIVITIES OF DAILY LIVING (ADL): ICD-10-CM

## 2023-07-16 DIAGNOSIS — M54.2 NECK PAIN: Primary | ICD-10-CM

## 2023-07-16 DIAGNOSIS — R53.1 WEAKNESS: ICD-10-CM

## 2023-07-16 PROBLEM — R53.81 DEBILITY: Status: ACTIVE | Noted: 2023-07-16

## 2023-07-16 LAB
ALBUMIN SERPL-MCNC: 4.3 G/DL (ref 3.5–5.2)
ALBUMIN/GLOB SERPL: 1.6 G/DL
ALP SERPL-CCNC: 70 U/L (ref 39–117)
ALT SERPL W P-5'-P-CCNC: 10 U/L (ref 1–41)
ANION GAP SERPL CALCULATED.3IONS-SCNC: 12 MMOL/L (ref 5–15)
AST SERPL-CCNC: 12 U/L (ref 1–40)
BASOPHILS # BLD AUTO: 0.06 10*3/MM3 (ref 0–0.2)
BASOPHILS NFR BLD AUTO: 0.5 % (ref 0–1.5)
BILIRUB SERPL-MCNC: 0.9 MG/DL (ref 0–1.2)
BILIRUB UR QL STRIP: NEGATIVE
BUN SERPL-MCNC: 19 MG/DL (ref 8–23)
BUN/CREAT SERPL: 17.1 (ref 7–25)
CALCIUM SPEC-SCNC: 9.9 MG/DL (ref 8.6–10.5)
CHLORIDE SERPL-SCNC: 105 MMOL/L (ref 98–107)
CK SERPL-CCNC: 29 U/L (ref 20–200)
CLARITY UR: CLEAR
CO2 SERPL-SCNC: 25 MMOL/L (ref 22–29)
COLOR UR: YELLOW
CREAT SERPL-MCNC: 1.11 MG/DL (ref 0.76–1.27)
CRP SERPL-MCNC: <0.3 MG/DL (ref 0–0.5)
D-LACTATE SERPL-SCNC: 1.4 MMOL/L (ref 0.5–2)
DEPRECATED RDW RBC AUTO: 45.8 FL (ref 37–54)
EGFRCR SERPLBLD CKD-EPI 2021: 67.1 ML/MIN/1.73
EOSINOPHIL # BLD AUTO: 0.09 10*3/MM3 (ref 0–0.4)
EOSINOPHIL NFR BLD AUTO: 0.8 % (ref 0.3–6.2)
ERYTHROCYTE [DISTWIDTH] IN BLOOD BY AUTOMATED COUNT: 14 % (ref 12.3–15.4)
ERYTHROCYTE [SEDIMENTATION RATE] IN BLOOD: 4 MM/HR (ref 0–20)
GLOBULIN UR ELPH-MCNC: 2.7 GM/DL
GLUCOSE BLDC GLUCOMTR-MCNC: 148 MG/DL (ref 70–99)
GLUCOSE BLDC GLUCOMTR-MCNC: 160 MG/DL (ref 70–99)
GLUCOSE SERPL-MCNC: 147 MG/DL (ref 65–99)
GLUCOSE UR STRIP-MCNC: ABNORMAL MG/DL
HBA1C MFR BLD: 7.3 % (ref 4.8–5.6)
HCT VFR BLD AUTO: 49 % (ref 37.5–51)
HGB BLD-MCNC: 16.9 G/DL (ref 13–17.7)
HGB UR QL STRIP.AUTO: NEGATIVE
IMM GRANULOCYTES # BLD AUTO: 0.06 10*3/MM3 (ref 0–0.05)
IMM GRANULOCYTES NFR BLD AUTO: 0.5 % (ref 0–0.5)
KETONES UR QL STRIP: NEGATIVE
LEUKOCYTE ESTERASE UR QL STRIP.AUTO: NEGATIVE
LIPASE SERPL-CCNC: 48 U/L (ref 13–60)
LYMPHOCYTES # BLD AUTO: 2.24 10*3/MM3 (ref 0.7–3.1)
LYMPHOCYTES NFR BLD AUTO: 19.7 % (ref 19.6–45.3)
MAGNESIUM SERPL-MCNC: 1.8 MG/DL (ref 1.6–2.4)
MCH RBC QN AUTO: 30.7 PG (ref 26.6–33)
MCHC RBC AUTO-ENTMCNC: 34.5 G/DL (ref 31.5–35.7)
MCV RBC AUTO: 89.1 FL (ref 79–97)
MONOCYTES # BLD AUTO: 1.05 10*3/MM3 (ref 0.1–0.9)
MONOCYTES NFR BLD AUTO: 9.2 % (ref 5–12)
NEUTROPHILS NFR BLD AUTO: 69.3 % (ref 42.7–76)
NEUTROPHILS NFR BLD AUTO: 7.89 10*3/MM3 (ref 1.7–7)
NITRITE UR QL STRIP: NEGATIVE
NRBC BLD AUTO-RTO: 0 /100 WBC (ref 0–0.2)
PH UR STRIP.AUTO: <=5 [PH] (ref 5–8)
PHOSPHATE SERPL-MCNC: 4 MG/DL (ref 2.5–4.5)
PLATELET # BLD AUTO: 197 10*3/MM3 (ref 140–450)
PMV BLD AUTO: 10.8 FL (ref 6–12)
POTASSIUM SERPL-SCNC: 4.7 MMOL/L (ref 3.5–5.2)
PROT SERPL-MCNC: 7 G/DL (ref 6–8.5)
PROT UR QL STRIP: NEGATIVE
QT INTERVAL: 481 MS
RBC # BLD AUTO: 5.5 10*6/MM3 (ref 4.14–5.8)
SODIUM SERPL-SCNC: 142 MMOL/L (ref 136–145)
SP GR UR STRIP: >1.03 (ref 1–1.03)
T4 FREE SERPL-MCNC: 1.16 NG/DL (ref 0.93–1.7)
TROPONIN T SERPL HS-MCNC: 12 NG/L
TSH SERPL DL<=0.05 MIU/L-ACNC: 2.27 UIU/ML (ref 0.27–4.2)
UROBILINOGEN UR QL STRIP: ABNORMAL
WBC NRBC COR # BLD: 11.39 10*3/MM3 (ref 3.4–10.8)

## 2023-07-16 PROCEDURE — 84443 ASSAY THYROID STIM HORMONE: CPT | Performed by: EMERGENCY MEDICINE

## 2023-07-16 PROCEDURE — 63710000001 INSULIN LISPRO (HUMAN) PER 5 UNITS: Performed by: HOSPITALIST

## 2023-07-16 PROCEDURE — 83690 ASSAY OF LIPASE: CPT | Performed by: EMERGENCY MEDICINE

## 2023-07-16 PROCEDURE — 84100 ASSAY OF PHOSPHORUS: CPT | Performed by: HOSPITALIST

## 2023-07-16 PROCEDURE — G0378 HOSPITAL OBSERVATION PER HR: HCPCS

## 2023-07-16 PROCEDURE — 96360 HYDRATION IV INFUSION INIT: CPT

## 2023-07-16 PROCEDURE — 84439 ASSAY OF FREE THYROXINE: CPT | Performed by: EMERGENCY MEDICINE

## 2023-07-16 PROCEDURE — 99222 1ST HOSP IP/OBS MODERATE 55: CPT | Performed by: HOSPITALIST

## 2023-07-16 PROCEDURE — 25010000002 ENOXAPARIN PER 10 MG: Performed by: HOSPITALIST

## 2023-07-16 PROCEDURE — 83036 HEMOGLOBIN GLYCOSYLATED A1C: CPT | Performed by: HOSPITALIST

## 2023-07-16 PROCEDURE — 85025 COMPLETE CBC W/AUTO DIFF WBC: CPT | Performed by: EMERGENCY MEDICINE

## 2023-07-16 PROCEDURE — 96372 THER/PROPH/DIAG INJ SC/IM: CPT

## 2023-07-16 PROCEDURE — 85652 RBC SED RATE AUTOMATED: CPT | Performed by: HOSPITALIST

## 2023-07-16 PROCEDURE — 82550 ASSAY OF CK (CPK): CPT | Performed by: EMERGENCY MEDICINE

## 2023-07-16 PROCEDURE — 93010 ELECTROCARDIOGRAM REPORT: CPT | Performed by: INTERNAL MEDICINE

## 2023-07-16 PROCEDURE — 86140 C-REACTIVE PROTEIN: CPT | Performed by: EMERGENCY MEDICINE

## 2023-07-16 PROCEDURE — 83735 ASSAY OF MAGNESIUM: CPT | Performed by: HOSPITALIST

## 2023-07-16 PROCEDURE — 99285 EMERGENCY DEPT VISIT HI MDM: CPT

## 2023-07-16 PROCEDURE — 84484 ASSAY OF TROPONIN QUANT: CPT | Performed by: EMERGENCY MEDICINE

## 2023-07-16 PROCEDURE — 82948 REAGENT STRIP/BLOOD GLUCOSE: CPT

## 2023-07-16 PROCEDURE — 83605 ASSAY OF LACTIC ACID: CPT | Performed by: EMERGENCY MEDICINE

## 2023-07-16 PROCEDURE — 80053 COMPREHEN METABOLIC PANEL: CPT | Performed by: EMERGENCY MEDICINE

## 2023-07-16 PROCEDURE — 96361 HYDRATE IV INFUSION ADD-ON: CPT

## 2023-07-16 PROCEDURE — 93005 ELECTROCARDIOGRAM TRACING: CPT | Performed by: EMERGENCY MEDICINE

## 2023-07-16 PROCEDURE — 81003 URINALYSIS AUTO W/O SCOPE: CPT | Performed by: EMERGENCY MEDICINE

## 2023-07-16 PROCEDURE — 70551 MRI BRAIN STEM W/O DYE: CPT

## 2023-07-16 RX ORDER — ONDANSETRON 2 MG/ML
4 INJECTION INTRAMUSCULAR; INTRAVENOUS EVERY 6 HOURS PRN
Status: DISCONTINUED | OUTPATIENT
Start: 2023-07-16 | End: 2023-07-22 | Stop reason: HOSPADM

## 2023-07-16 RX ORDER — INSULIN LISPRO 100 [IU]/ML
3-14 INJECTION, SOLUTION INTRAVENOUS; SUBCUTANEOUS
Status: DISCONTINUED | OUTPATIENT
Start: 2023-07-16 | End: 2023-07-22 | Stop reason: HOSPADM

## 2023-07-16 RX ORDER — AMOXICILLIN 250 MG
2 CAPSULE ORAL 2 TIMES DAILY
Status: DISCONTINUED | OUTPATIENT
Start: 2023-07-16 | End: 2023-07-16

## 2023-07-16 RX ORDER — NICOTINE POLACRILEX 4 MG
15 LOZENGE BUCCAL
Status: DISCONTINUED | OUTPATIENT
Start: 2023-07-16 | End: 2023-07-22 | Stop reason: HOSPADM

## 2023-07-16 RX ORDER — CHOLECALCIFEROL (VITAMIN D3) 125 MCG
5 CAPSULE ORAL NIGHTLY
Status: DISCONTINUED | OUTPATIENT
Start: 2023-07-16 | End: 2023-07-22 | Stop reason: HOSPADM

## 2023-07-16 RX ORDER — BISACODYL 5 MG/1
5 TABLET, DELAYED RELEASE ORAL DAILY PRN
Status: DISCONTINUED | OUTPATIENT
Start: 2023-07-16 | End: 2023-07-17

## 2023-07-16 RX ORDER — ENOXAPARIN SODIUM 100 MG/ML
40 INJECTION SUBCUTANEOUS DAILY
Status: DISCONTINUED | OUTPATIENT
Start: 2023-07-16 | End: 2023-07-22 | Stop reason: HOSPADM

## 2023-07-16 RX ORDER — MELATONIN
5000 DAILY
Status: DISCONTINUED | OUTPATIENT
Start: 2023-07-16 | End: 2023-07-22 | Stop reason: HOSPADM

## 2023-07-16 RX ORDER — ACETAMINOPHEN 650 MG/1
650 SUPPOSITORY RECTAL EVERY 4 HOURS PRN
Status: DISCONTINUED | OUTPATIENT
Start: 2023-07-16 | End: 2023-07-22 | Stop reason: HOSPADM

## 2023-07-16 RX ORDER — LISINOPRIL 20 MG/1
20 TABLET ORAL DAILY
Status: DISCONTINUED | OUTPATIENT
Start: 2023-07-16 | End: 2023-07-18

## 2023-07-16 RX ORDER — BISACODYL 10 MG
10 SUPPOSITORY, RECTAL RECTAL DAILY PRN
Status: DISCONTINUED | OUTPATIENT
Start: 2023-07-16 | End: 2023-07-17

## 2023-07-16 RX ORDER — BISACODYL 5 MG/1
5 TABLET, DELAYED RELEASE ORAL DAILY PRN
Status: DISCONTINUED | OUTPATIENT
Start: 2023-07-16 | End: 2023-07-16

## 2023-07-16 RX ORDER — ACETAMINOPHEN 325 MG/1
650 TABLET ORAL EVERY 4 HOURS PRN
Status: DISCONTINUED | OUTPATIENT
Start: 2023-07-16 | End: 2023-07-22 | Stop reason: HOSPADM

## 2023-07-16 RX ORDER — ROSUVASTATIN CALCIUM 20 MG/1
40 TABLET, COATED ORAL DAILY
Status: DISCONTINUED | OUTPATIENT
Start: 2023-07-16 | End: 2023-07-22 | Stop reason: HOSPADM

## 2023-07-16 RX ORDER — HYDRALAZINE HYDROCHLORIDE 20 MG/ML
10 INJECTION INTRAMUSCULAR; INTRAVENOUS EVERY 6 HOURS PRN
Status: DISCONTINUED | OUTPATIENT
Start: 2023-07-16 | End: 2023-07-17

## 2023-07-16 RX ORDER — HYDROCODONE BITARTRATE AND ACETAMINOPHEN 7.5; 325 MG/1; MG/1
2 TABLET ORAL EVERY 4 HOURS PRN
Status: DISCONTINUED | OUTPATIENT
Start: 2023-07-16 | End: 2023-07-17

## 2023-07-16 RX ORDER — POLYETHYLENE GLYCOL 3350 17 G/17G
17 POWDER, FOR SOLUTION ORAL DAILY PRN
Status: DISCONTINUED | OUTPATIENT
Start: 2023-07-16 | End: 2023-07-17

## 2023-07-16 RX ORDER — CHOLECALCIFEROL (VITAMIN D3) 125 MCG
5 CAPSULE ORAL NIGHTLY PRN
Status: DISCONTINUED | OUTPATIENT
Start: 2023-07-16 | End: 2023-07-22 | Stop reason: HOSPADM

## 2023-07-16 RX ORDER — ACETAMINOPHEN 160 MG/5ML
650 SOLUTION ORAL EVERY 4 HOURS PRN
Status: DISCONTINUED | OUTPATIENT
Start: 2023-07-16 | End: 2023-07-22 | Stop reason: HOSPADM

## 2023-07-16 RX ORDER — HYDROCODONE BITARTRATE AND ACETAMINOPHEN 5; 325 MG/1; MG/1
1 TABLET ORAL EVERY 4 HOURS PRN
Status: DISCONTINUED | OUTPATIENT
Start: 2023-07-16 | End: 2023-07-22 | Stop reason: HOSPADM

## 2023-07-16 RX ORDER — SODIUM CHLORIDE 9 MG/ML
40 INJECTION, SOLUTION INTRAVENOUS AS NEEDED
Status: DISCONTINUED | OUTPATIENT
Start: 2023-07-16 | End: 2023-07-22 | Stop reason: HOSPADM

## 2023-07-16 RX ORDER — AMOXICILLIN 250 MG
2 CAPSULE ORAL 2 TIMES DAILY
Status: DISCONTINUED | OUTPATIENT
Start: 2023-07-16 | End: 2023-07-17

## 2023-07-16 RX ORDER — TAMSULOSIN HYDROCHLORIDE 0.4 MG/1
0.4 CAPSULE ORAL DAILY
Status: DISCONTINUED | OUTPATIENT
Start: 2023-07-16 | End: 2023-07-22 | Stop reason: HOSPADM

## 2023-07-16 RX ORDER — POLYETHYLENE GLYCOL 3350 17 G/17G
17 POWDER, FOR SOLUTION ORAL DAILY PRN
Status: DISCONTINUED | OUTPATIENT
Start: 2023-07-16 | End: 2023-07-16

## 2023-07-16 RX ORDER — CITALOPRAM 20 MG/1
10 TABLET ORAL DAILY
Status: DISCONTINUED | OUTPATIENT
Start: 2023-07-16 | End: 2023-07-22 | Stop reason: HOSPADM

## 2023-07-16 RX ORDER — METOPROLOL SUCCINATE 100 MG/1
100 TABLET, EXTENDED RELEASE ORAL DAILY
Status: DISCONTINUED | OUTPATIENT
Start: 2023-07-16 | End: 2023-07-16

## 2023-07-16 RX ORDER — SODIUM CHLORIDE 0.9 % (FLUSH) 0.9 %
10 SYRINGE (ML) INJECTION EVERY 12 HOURS SCHEDULED
Status: DISCONTINUED | OUTPATIENT
Start: 2023-07-16 | End: 2023-07-22 | Stop reason: HOSPADM

## 2023-07-16 RX ORDER — SODIUM CHLORIDE 0.9 % (FLUSH) 0.9 %
10 SYRINGE (ML) INJECTION AS NEEDED
Status: DISCONTINUED | OUTPATIENT
Start: 2023-07-16 | End: 2023-07-22 | Stop reason: HOSPADM

## 2023-07-16 RX ORDER — SODIUM CHLORIDE 9 MG/ML
100 INJECTION, SOLUTION INTRAVENOUS CONTINUOUS
Status: DISCONTINUED | OUTPATIENT
Start: 2023-07-16 | End: 2023-07-17

## 2023-07-16 RX ORDER — DEXTROSE MONOHYDRATE 25 G/50ML
25 INJECTION, SOLUTION INTRAVENOUS
Status: DISCONTINUED | OUTPATIENT
Start: 2023-07-16 | End: 2023-07-22 | Stop reason: HOSPADM

## 2023-07-16 RX ORDER — NALOXONE HCL 0.4 MG/ML
0.4 VIAL (ML) INJECTION
Status: DISCONTINUED | OUTPATIENT
Start: 2023-07-16 | End: 2023-07-22 | Stop reason: HOSPADM

## 2023-07-16 RX ORDER — ONDANSETRON 4 MG/1
4 TABLET, FILM COATED ORAL EVERY 6 HOURS PRN
Status: DISCONTINUED | OUTPATIENT
Start: 2023-07-16 | End: 2023-07-22 | Stop reason: HOSPADM

## 2023-07-16 RX ADMIN — CITALOPRAM HYDROBROMIDE 10 MG: 20 TABLET ORAL at 18:41

## 2023-07-16 RX ADMIN — SODIUM CHLORIDE 1000 ML: 9 INJECTION, SOLUTION INTRAVENOUS at 10:26

## 2023-07-16 RX ADMIN — ENOXAPARIN SODIUM 40 MG: 100 INJECTION SUBCUTANEOUS at 18:41

## 2023-07-16 RX ADMIN — Medication 10 ML: at 18:26

## 2023-07-16 RX ADMIN — INSULIN LISPRO 3 UNITS: 100 INJECTION, SOLUTION INTRAVENOUS; SUBCUTANEOUS at 20:47

## 2023-07-16 RX ADMIN — ROSUVASTATIN 40 MG: 20 TABLET, FILM COATED ORAL at 18:41

## 2023-07-16 RX ADMIN — Medication 10 ML: at 20:49

## 2023-07-16 RX ADMIN — SODIUM CHLORIDE 100 ML/HR: 9 INJECTION, SOLUTION INTRAVENOUS at 18:26

## 2023-07-16 RX ADMIN — TAMSULOSIN HYDROCHLORIDE 0.4 MG: 0.4 CAPSULE ORAL at 18:25

## 2023-07-16 RX ADMIN — Medication 5 MG: at 20:46

## 2023-07-16 RX ADMIN — ACETAMINOPHEN 650 MG: 325 TABLET ORAL at 18:25

## 2023-07-16 RX ADMIN — LISINOPRIL 20 MG: 20 TABLET ORAL at 18:25

## 2023-07-16 RX ADMIN — Medication 5000 UNITS: at 18:24

## 2023-07-16 RX ADMIN — Medication 400 MG: at 18:25

## 2023-07-16 RX ADMIN — DOCUSATE SODIUM 50MG AND SENNOSIDES 8.6MG 2 TABLET: 8.6; 5 TABLET, FILM COATED ORAL at 20:46

## 2023-07-16 NOTE — H&P
[facility] HISTORY AND PHYSICAL  Date: 2023   Patient Name: Nikhil Baldwin  : 1945  MRN: [id number]  Primary Care Physician:  Felix Navarro Jr., MD  Date of admission: 2023    Subjective weakness, debility  Subjective     Chief Complaint: Weakness debility    HPI: Patient is an 78-year-old man who presents to the emergency room with weakness, dizziness and left-sided headache, back of the ear, neck pain for the past 3 days.  Patient does not have much of an appetite.  Son has noticed that his speech seems a little slurred.    On labs, patient's temperature is 98.4, pulse is 57, respiratory rate is 18, blood pressure is 177/94, and he saturating 94% on room air.  He has mild leukocytosis at 11.39.  Magnesium is 1.8, phosphorus is 4.  Patient does have glucose in his urine and his specific gravity of his urine is 1.030.  Patient's glucose is 147.  TSH is normal, T4 is normal.  Patient CRP is normal.    MRI-shows no acute changes.    Personal History     Past Medical History:  Past Medical History:   Diagnosis Date    Aorta disorder     Arthritis     Degenerative joint disease     Dementia     FRONTAL LOBE    Depression     Diabetes mellitus     Hyperlipidemia     Hypertension     Kidney stone     Stroke     MILD RIGHT SIDE DEFICITS         Past Surgical History:  Past Surgical History:   Procedure Laterality Date    CHOLECYSTECTOMY      CYSTOSCOPY BLADDER STONE LITHOTRIPSY      KIDNEY STONE SURGERY          Family History:   Breast Cancer-related family history is not on file.      Social History:   Social History     Socioeconomic History    Marital status:    Tobacco Use    Smoking status: Never    Smokeless tobacco: Never   Vaping Use    Vaping Use: Never used   Substance and Sexual Activity    Alcohol use: Never    Drug use: Never    Sexual activity: Defer         Home Medications:  Cholecalciferol, SITagliptin, citalopram, empagliflozin, lisinopril, magnesium  oxide, melatonin, metFORMIN, metoprolol succinate XL, rosuvastatin, and tamsulosin    Allergies:  Allergies   Allergen Reactions    Codeine Unknown - Low Severity    Promethazine Unknown - Low Severity    Celecoxib Rash    Ibuprofen Rash    Penicillins Rash    Promethazine Hcl Rash    Warfarin Rash       Review of Systems   All systems were reviewed and negative except for: severe head, behind ear and throat pain of the left side.     Objective   Objective     Vitals:   Temp:  [98.4 °F (36.9 °C)] 98.4 °F (36.9 °C)  Heart Rate:  [51-57] 54  Resp:  [16-18] 16  BP: (118-177)/(72-94) 155/85    Physical Exam    Constitutional: Awake, alert, no acute distress   Eyes: Pupils equal, sclerae anicteric, no conjunctival injection   HENT: NCAT, mucous membranes moist   Neck: Supple, no thyromegaly, no lymphadenopathy, trachea midline   Respiratory: Clear to auscultation bilaterally, nonlabored respirations    Cardiovascular: RRR, no murmurs, rubs, or gallops, palpable pedal pulses bilaterally   Gastrointestinal: Positive bowel sounds, soft, nontender, nondistended   Musculoskeletal: No bilateral ankle edema, no clubbing or cyanosis to extremities   Psychiatric: Appropriate affect, cooperative   Neurologic: Oriented x 3, strength symmetric in all extremities, Cranial Nerves grossly intact to confrontation, speech clear   Skin: No rashes     Result Review    Result Review:  I have personally reviewed the results from the time of this admission to 7/16/2023 14:31 EDT and agree with these findings:  [x]  Laboratory  []  Microbiology  [x]  Radiology  []  EKG/Telemetry   []  Cardiology/Vascular   []  Pathology  [x]  Old records  []  Other:      Assessment & Plan   Assessment / Plan   Assessment/Plan:   #1 left sided head, behind ear, and throat pain  -nothing seen on physical exam.    -Dr. Haq consulted    #2 Mild dehydration  -gentle hydration.    #3 Non insulin dependent diabetes  -hold jardiance and other oral meds.  Cover with  ISS.    #4 HTN-continue lisinopril; PRN hydralazine.      #5 bradycardia-holding metoprolol; will also check orthostatics if that is contributing to dizziness.      #6 Debility  -PT/OT/case management.  Family ok for patient to do some rehab.     #7 depression continue celexa.      #8 BPH continue flomax.       DVT prophylaxis:  Medical DVT prophylaxis orders are present.    CODE STATUS:    Level Of Support Discussed With: Patient  Code Status (Patient has no pulse and is not breathing): CPR (Attempt to Resuscitate)  Medical Interventions (Patient has pulse or is breathing): Full Support      Admission Status:  I believe this patient meets observation status.    Electronically signed by Estefany Ritchie DO, 07/16/23, 2:31 PM EDT.

## 2023-07-16 NOTE — ED PROVIDER NOTES
Time: 1:20 PM EDT  Date of encounter:  7/16/2023  Independent Historian/Clinical History and Information was obtained by:   Patient and Family    History is limited by: N/A    Chief Complaint: Weakness      History of Present Illness:  Patient is a 78 y.o. year old male who presents to the emergency department for evaluation of weakness, dizziness, headache that is gotten worse over the past 3 days.  According to son patient has also had decreased p.o. intake.  Patient denies dysuria and urinary frequency.  Patient has no cough or hemoptysis.  Patient denies vomiting but does report some nausea.  Patient has no abdominal pain.  Patient has no other subjective neurological deficit.  Patient has no visual changes.    HPI    Patient Care Team  Primary Care Provider: Felix Navarro Jr., MD    Past Medical History:     Allergies   Allergen Reactions    Codeine Unknown - Low Severity    Promethazine Unknown - Low Severity    Celecoxib Rash    Ibuprofen Rash    Penicillins Rash    Promethazine Hcl Rash    Warfarin Rash     Past Medical History:   Diagnosis Date    Aorta disorder     Arthritis     Degenerative joint disease     Dementia     FRONTAL LOBE    Depression     Diabetes mellitus     Hyperlipidemia     Hypertension     Kidney stone     Stroke     MILD RIGHT SIDE DEFICITS     Past Surgical History:   Procedure Laterality Date    CHOLECYSTECTOMY      CYSTOSCOPY BLADDER STONE LITHOTRIPSY      KIDNEY STONE SURGERY       History reviewed. No pertinent family history.    Home Medications:  Prior to Admission medications    Medication Sig Start Date End Date Taking? Authorizing Provider   Cholecalciferol 125 MCG (5000 UT) tablet Take 1 tablet by mouth Daily.   Yes Provider, MD Mark   citalopram (CeleXA) 20 MG tablet Take 0.5 tablets by mouth Daily. 6/13/23  Yes Felix Navarro Jr., MD   empagliflozin (Jardiance) 25 MG tablet tablet Take 1 tablet by mouth Daily. 9/27/22  Yes Felix Navarro  MD Salvador   lisinopril (PRINIVIL,ZESTRIL) 20 MG tablet Take 1 tablet by mouth Daily. 6/13/23  Yes Felix Navarro Jr., MD   magnesium oxide (MAG-OX) 400 MG tablet Take 1 tablet by mouth Daily. 3/6/23  Yes Felix Navarro Jr., MD   melatonin 5 MG tablet tablet Take 1 tablet by mouth Every Night.   Yes ProviderMark MD   metFORMIN (GLUCOPHAGE) 500 MG tablet Take 1 tablet by mouth 2 (Two) Times a Day With Meals. 6/13/23  Yes Felix Navarro Jr., MD   metoprolol succinate XL (Toprol XL) 100 MG 24 hr tablet Take 1 tablet by mouth Daily. 2/13/23  Yes Felix Navarro Jr., MD   rosuvastatin (CRESTOR) 40 MG tablet Take 1 tablet by mouth Daily. 2/13/23  Yes Feilx Navarro Jr., MD   SITagliptin (Januvia) 100 MG tablet Take 1 tablet by mouth Daily. 6/13/23  Yes Felix Navarro Jr., MD   tamsulosin (FLOMAX) 0.4 MG capsule 24 hr capsule Take 1 capsule by mouth Daily. 3/3/23  Yes Felix Navarro Jr., MD   albuterol sulfate  (90 Base) MCG/ACT inhaler Inhale 2 puffs Every 4 (Four) Hours As Needed for Wheezing. 12/7/22 7/16/23  Felton August MD   carvedilol (COREG) 3.125 MG tablet TAKE 1 TABLET BY MOUTH TWICE DAILY WITH MEALS 6/22/23 7/16/23  Felix Navarro Jr., MD        Social History:   Social History     Tobacco Use    Smoking status: Never    Smokeless tobacco: Never   Vaping Use    Vaping Use: Never used   Substance Use Topics    Alcohol use: Never    Drug use: Never         Review of Systems:  Review of Systems   Constitutional:  Negative for chills and fever.   HENT:  Negative for congestion, rhinorrhea and sore throat.    Eyes:  Negative for pain and visual disturbance.   Respiratory:  Negative for apnea, cough, chest tightness and shortness of breath.    Cardiovascular:  Negative for chest pain and palpitations.   Gastrointestinal:  Negative for abdominal pain, diarrhea, nausea and vomiting.   Genitourinary:  Negative for difficulty urinating  "and dysuria.   Musculoskeletal:  Negative for joint swelling and myalgias.   Skin:  Negative for color change.   Neurological:  Positive for weakness. Negative for seizures and headaches.   Psychiatric/Behavioral: Negative.     All other systems reviewed and are negative.     Physical Exam:  /85   Pulse 54   Temp 98.4 °F (36.9 °C) (Oral)   Resp 16   Ht 175.3 cm (69\")   Wt 83.2 kg (183 lb 6.8 oz)   SpO2 95%   BMI 27.09 kg/m²     Physical Exam  Vitals and nursing note reviewed.   Constitutional:       General: He is not in acute distress.     Appearance: Normal appearance. He is not toxic-appearing.   HENT:      Head: Normocephalic and atraumatic.      Jaw: There is normal jaw occlusion.   Eyes:      General: Lids are normal.      Extraocular Movements: Extraocular movements intact.      Conjunctiva/sclera: Conjunctivae normal.      Pupils: Pupils are equal, round, and reactive to light.   Cardiovascular:      Rate and Rhythm: Normal rate and regular rhythm.      Pulses: Normal pulses.      Heart sounds: Normal heart sounds.   Pulmonary:      Effort: Pulmonary effort is normal. No respiratory distress.      Breath sounds: Normal breath sounds. No wheezing or rhonchi.   Abdominal:      General: Abdomen is flat.      Palpations: Abdomen is soft.      Tenderness: There is no abdominal tenderness. There is no guarding or rebound.   Musculoskeletal:         General: Normal range of motion.      Cervical back: Normal range of motion and neck supple.      Right lower leg: No edema.      Left lower leg: No edema.   Skin:     General: Skin is warm and dry.   Neurological:      Mental Status: He is alert and oriented to person, place, and time. Mental status is at baseline.   Psychiatric:         Mood and Affect: Mood normal.                Procedures:  Procedures      Medical Decision Making:      Comorbidities that affect care:    Diabetes, Hypertension    External Notes reviewed:    Previous Clinic Note: Patient " was seen by his PCP for hypertension      The following orders were placed and all results were independently analyzed by me:  Orders Placed This Encounter   Procedures    MRI Brain Without Contrast    Comprehensive Metabolic Panel    Lipase    Single High Sensitivity Troponin T    T4, Free    TSH    Urinalysis With Culture If Indicated - Urine, Clean Catch    Lactic Acid, Plasma    C-reactive Protein    CK    CBC Auto Differential    Basic Metabolic Panel    Magnesium    Phosphorus    Hemoglobin A1c    Sedimentation Rate    Diet: Regular/House Diet; Texture: Regular Texture (IDDSI 7); Fluid Consistency: Thin (IDDSI 0)    Vital Signs    Intake & Output    Weigh Patient    Oral Care    Saline Lock & Maintain IV Access    Orthostatic Blood Pressure    Discontinue Insulin Infusion at Specified Time After Basal Dose Administered    Discontinue Glucommander IV Insulin Order Set After Transition Complete    Discontinue Glucommander After Transition Complete    Code Status and Medical Interventions:    Inpatient Case Management  Consult    Inpatient ENT Consult    OT Consult: Eval & Treat    PT Consult: Eval & Treat Discharge Placement Assessment    POC Glucose 4x Daily AC & at Bedtime    ECG 12 Lead Chest Pain    Insert Peripheral IV    Initiate Observation Status    CBC & Differential    CBC & Differential       Medications Given in the Emergency Department:  Medications   sodium chloride 0.9 % flush 10 mL (has no administration in time range)   sodium chloride 0.9 % flush 10 mL (has no administration in time range)   sodium chloride 0.9 % infusion 40 mL (has no administration in time range)   bisacodyl (DULCOLAX) suppository 10 mg (has no administration in time range)   Enoxaparin Sodium (LOVENOX) syringe 40 mg (has no administration in time range)   sodium chloride 0.9 % infusion (has no administration in time range)   Potassium Replacement - Follow Nurse / BPA Driven Protocol (has no administration in  time range)   Magnesium Standard Dose Replacement - Follow Nurse / BPA Driven Protocol (has no administration in time range)   Phosphorus Replacement - Follow Nurse / BPA Driven Protocol (has no administration in time range)   Calcium Replacement - Follow Nurse / BPA Driven Protocol (has no administration in time range)   ondansetron (ZOFRAN) tablet 4 mg (has no administration in time range)     Or   ondansetron (ZOFRAN) injection 4 mg (has no administration in time range)   melatonin tablet 5 mg (has no administration in time range)   sennosides-docusate (PERICOLACE) 8.6-50 MG per tablet 2 tablet (has no administration in time range)     And   polyethylene glycol (MIRALAX) packet 17 g (has no administration in time range)     And   bisacodyl (DULCOLAX) EC tablet 5 mg (has no administration in time range)     And   bisacodyl (DULCOLAX) suppository 10 mg (has no administration in time range)   HYDROmorphone (DILAUDID) injection 0.5 mg (has no administration in time range)     And   naloxone (NARCAN) injection 0.4 mg (has no administration in time range)   HYDROcodone-acetaminophen (NORCO) 7.5-325 MG per tablet 2 tablet (has no administration in time range)   HYDROcodone-acetaminophen (NORCO) 5-325 MG per tablet 1 tablet (has no administration in time range)   acetaminophen (TYLENOL) tablet 650 mg (has no administration in time range)     Or   acetaminophen (TYLENOL) 160 MG/5ML solution 650 mg (has no administration in time range)     Or   acetaminophen (TYLENOL) suppository 650 mg (has no administration in time range)   citalopram (CeleXA) tablet 10 mg (has no administration in time range)   lisinopril (PRINIVIL,ZESTRIL) tablet 20 mg (has no administration in time range)   magnesium oxide (MAG-OX) tablet 400 mg (has no administration in time range)   rosuvastatin (CRESTOR) tablet 40 mg (has no administration in time range)   tamsulosin (FLOMAX) 24 hr capsule 0.4 mg (has no administration in time range)   melatonin  tablet 5 mg (has no administration in time range)   cholecalciferol (VITAMIN D3) tablet 5,000 Units (has no administration in time range)   hydrALAZINE (APRESOLINE) injection 10 mg (has no administration in time range)   dextrose (GLUTOSE) oral gel 15 g (has no administration in time range)   dextrose (D50W) (25 g/50 mL) IV injection 25 g (has no administration in time range)   glucagon (GLUCAGEN) injection 1 mg (has no administration in time range)   Insulin Lispro (humaLOG) injection 3-14 Units (has no administration in time range)   sodium chloride 0.9 % bolus 1,000 mL (0 mL Intravenous Stopped 7/16/23 1228)        ED Course:         Labs:    Lab Results (last 24 hours)       Procedure Component Value Units Date/Time    CBC & Differential [[id number]]  (Abnormal) Collected: 07/16/23 1022    Specimen: Blood Updated: 07/16/23 1030    Narrative:      The following orders were created for panel order CBC & Differential.  Procedure                               Abnormality         Status                     ---------                               -----------         ------                     CBC Auto Differential[[id number]]        Abnormal            Final result                 Please view results for these tests on the individual orders.    Comprehensive Metabolic Panel [[id number]]  (Abnormal) Collected: 07/16/23 1022    Specimen: Blood Updated: 07/16/23 1059     Glucose 147 mg/dL      BUN 19 mg/dL      Creatinine 1.11 mg/dL      Sodium 142 mmol/L      Potassium 4.7 mmol/L      Chloride 105 mmol/L      CO2 25.0 mmol/L      Calcium 9.9 mg/dL      Total Protein 7.0 g/dL      Albumin 4.3 g/dL      ALT (SGPT) 10 U/L      AST (SGOT) 12 U/L      Alkaline Phosphatase 70 U/L      Total Bilirubin 0.9 mg/dL      Globulin 2.7 gm/dL      A/G Ratio 1.6 g/dL      BUN/Creatinine Ratio 17.1     Anion Gap 12.0 mmol/L      eGFR 67.1 mL/min/1.73     Narrative:      GFR Normal >60  Chronic Kidney Disease <60  Kidney Failure <15    The  GFR formula is only valid for adults with stable renal function between ages 18 and 70.    Lipase [[id number]]  (Normal) Collected: 07/16/23 1022    Specimen: Blood Updated: 07/16/23 1059     Lipase 48 U/L     Single High Sensitivity Troponin T [[id number]]  (Normal) Collected: 07/16/23 1022    Specimen: Blood Updated: 07/16/23 1102     HS Troponin T 12 ng/L     Narrative:      High Sensitive Troponin T Reference Range:  <10.0 ng/L- Negative Female for AMI  <15.0 ng/L- Negative Male for AMI  >=10 - Abnormal Female indicating possible myocardial injury.  >=15 - Abnormal Male indicating possible myocardial injury.   Clinicians would have to utilize clinical acumen, EKG, Troponin, and serial changes to determine if it is an Acute Myocardial Infarction or myocardial injury due to an underlying chronic condition.         T4, Free [[id number]]  (Normal) Collected: 07/16/23 1022    Specimen: Blood Updated: 07/16/23 1102     Free T4 1.16 ng/dL     Narrative:      Results may be falsely increased if patient taking Biotin.      TSH [[id number]]  (Normal) Collected: 07/16/23 1022    Specimen: Blood Updated: 07/16/23 1102     TSH 2.270 uIU/mL     Urinalysis With Culture If Indicated - Urine, Clean Catch [[id number]]  (Abnormal) Collected: 07/16/23 1022    Specimen: Urine, Clean Catch Updated: 07/16/23 1031     Color, UA Yellow     Appearance, UA Clear     pH, UA <=5.0     Specific Gravity, UA >1.030     Glucose, UA >=1000 mg/dL (3+)     Ketones, UA Negative     Bilirubin, UA Negative     Blood, UA Negative     Protein, UA Negative     Leuk Esterase, UA Negative     Nitrite, UA Negative     Urobilinogen, UA 1.0 E.U./dL    Narrative:      In absence of clinical symptoms, the presence of pyuria, bacteria, and/or nitrites on the urinalysis result does not correlate with infection.  Urine microscopic not indicated.    Lactic Acid, Plasma [[id number]]  (Normal) Collected: 07/16/23 1022    Specimen: Blood Updated: 07/16/23 1053     Lactate  1.4 mmol/L     C-reactive Protein [[id number]]  (Normal) Collected: 07/16/23 1022    Specimen: Blood Updated: 07/16/23 1059     C-Reactive Protein <0.30 mg/dL     CK [[id number]]  (Normal) Collected: 07/16/23 1022    Specimen: Blood Updated: 07/16/23 1059     Creatine Kinase 29 U/L     CBC Auto Differential [[id number]]  (Abnormal) Collected: 07/16/23 1022    Specimen: Blood Updated: 07/16/23 1030     WBC 11.39 10*3/mm3      RBC 5.50 10*6/mm3      Hemoglobin 16.9 g/dL      Hematocrit 49.0 %      MCV 89.1 fL      MCH 30.7 pg      MCHC 34.5 g/dL      RDW 14.0 %      RDW-SD 45.8 fl      MPV 10.8 fL      Platelets 197 10*3/mm3      Neutrophil % 69.3 %      Lymphocyte % 19.7 %      Monocyte % 9.2 %      Eosinophil % 0.8 %      Basophil % 0.5 %      Immature Grans % 0.5 %      Neutrophils, Absolute 7.89 10*3/mm3      Lymphocytes, Absolute 2.24 10*3/mm3      Monocytes, Absolute 1.05 10*3/mm3      Eosinophils, Absolute 0.09 10*3/mm3      Basophils, Absolute 0.06 10*3/mm3      Immature Grans, Absolute 0.06 10*3/mm3      nRBC 0.0 /100 WBC     Magnesium [[id number]]  (Normal) Collected: 07/16/23 1022    Specimen: Blood Updated: 07/16/23 1349     Magnesium 1.8 mg/dL     Phosphorus [[id number]]  (Normal) Collected: 07/16/23 1022    Specimen: Blood Updated: 07/16/23 1349     Phosphorus 4.0 mg/dL     Hemoglobin A1c [[id number]] Collected: 07/16/23 1022    Specimen: Blood Updated: 07/16/23 1453    Sedimentation Rate [[id number]]  (Normal) Collected: 07/16/23 1022    Specimen: Blood Updated: 07/16/23 1459     Sed Rate 4 mm/hr              Imaging:    MRI Brain Without Contrast    Result Date: 7/16/2023  PROCEDURE: MRI BRAIN WO CONTRAST  COMPARISON: [facility], MR, BRAIN W/O CONTRAST, 4/13/2019, 19:31.  [facility], MR, BRAIN W/O CONTRAST, 5/03/2020, 18:07.  INDICATIONS: slurred speech, unsteady gait, dizziness, left headache    TECHNIQUE: Multiplanar images of the brain were obtained in a high field strength  magnet.  FINDINGS:  The ventricles, sulci, and cerebellar folia are mildly and diffusely prominent consistent with atrophy.  Ill-defined abnormal T2 bright signal in cerebral white matter consistent with mild small vessel disease and/or scattered lacunar infarcts appears stable.  No abnormal bright signal is seen on diffusion weighted images.  No restricted diffusion is evident.  Scattered foci of magnetic susceptibility artifact consistent with blood products from old microhemorrhage appear stable.  No abnormality of the pituitary or orbits is evident.  The paranasal sinuses are well aerated.  No abnormal mastoid signal is seen.        MR examination of the brain without IV contrast demonstrating diffuse atrophy and white matter changes, stable.  No acute intracranial abnormality is seen.      TORIN MCCLELLAN MD       Electronically Signed and Approved By: TORIN MCCLELLAN MD on 7/16/2023 at 11:39                Differential Diagnosis and Discussion:    Weakness: Based on the patient's history, signs, and symptoms, the diffential diagnosis includes but is not limited to meningitis, stroke, sepsis, subarachnoid hemorrhage, intracranial bleeding, encephalitis, acute uti, dehydration, MS, myasthenia gravis, Guillan Patterson, migraine variant, neuromuscular disorders vertigo, electrolyte imbalance, and metabolic disorders.    All labs were reviewed and interpreted by me.  EKG was interpreted by me.  MRI impression was interpreted by me.     MDM     The patient´s CBC that was reviewed and interpreted by me shows no abnormalities of critical concern. Of note, there is no anemia requiring a blood transfusion and the platelet count is acceptable.  The patient´s CMP that was reviewed and interpretted by me shows no abnormalities of critical concern. Of note, the patient´s sodium and potassium are acceptable. The patient´s liver enzymes are unremarkable. The patient´s renal function (creatinine) is preserved. The patient has a  normal anion gap.  MRI of the brain is negative for acute intracranial abnormalities.    Decision making regarding admission:    This 78-year-old male patient should be admitted to the hospital due to generalized weakness, a symptom that can be indicative of a wide range of serious conditions. These could include cardiovascular diseases such as heart failure or arrhythmias, neurological conditions like stroke or Parkinson's disease, metabolic disorders such as diabetes or thyroid disease, or infections, among others.    Hospital admission is necessary to perform a comprehensive evaluation, which may involve a variety of diagnostic tests such as blood tests, imaging studies, and potentially consultations with specialists. This will help to identify the underlying cause of the weakness and initiate appropriate treatment.    In the hospital, the patient can be closely monitored for any changes in his condition and potential complications. The hospital setting also allows for the provision of supportive care, such as physical therapy or nutritional support, which can help improve the patient's strength and overall condition while the underlying cause of the weakness is being addressed.        Patient Care Considerations:    I considered starting the patient on antibiotic, however no bacterial focus of infection currently found.      Consultants/Shared Management Plan:    Hospitalist: I have discussed the case with Dr. Ritchie who agrees to accept the patient for admission.    Social Determinants of Health:    Patient is independent, reliable, and has access to care.       Disposition and Care Coordination:    Admit:   Through independent evaluation of the patient's history, physical, and imperical data, the patient meets criteria for observation/admission to the hospital.        Final diagnoses:   Weakness        ED Disposition       ED Disposition   Decision to Admit    Condition   --    Comment   Level of Care: Med/Surg  [1]   Diagnosis: Debility [226500]   Admitting Physician: BLANCA ALFARO [[id number]]   Attending Physician: BLANCA ALFARO [[id number]]                 This medical record created using voice recognition software.             Lynda Lala MD  07/16/23 2883

## 2023-07-17 ENCOUNTER — APPOINTMENT (OUTPATIENT)
Dept: CT IMAGING | Facility: HOSPITAL | Age: 78
End: 2023-07-17
Payer: MEDICARE

## 2023-07-17 LAB
ANION GAP SERPL CALCULATED.3IONS-SCNC: 9.7 MMOL/L (ref 5–15)
BASOPHILS # BLD AUTO: 0.07 10*3/MM3 (ref 0–0.2)
BASOPHILS NFR BLD AUTO: 0.8 % (ref 0–1.5)
BUN SERPL-MCNC: 16 MG/DL (ref 8–23)
BUN/CREAT SERPL: 16.8 (ref 7–25)
CALCIUM SPEC-SCNC: 9 MG/DL (ref 8.6–10.5)
CHLORIDE SERPL-SCNC: 108 MMOL/L (ref 98–107)
CO2 SERPL-SCNC: 22.3 MMOL/L (ref 22–29)
CREAT SERPL-MCNC: 0.95 MG/DL (ref 0.76–1.27)
DEPRECATED RDW RBC AUTO: 45 FL (ref 37–54)
EGFRCR SERPLBLD CKD-EPI 2021: 80.9 ML/MIN/1.73
EOSINOPHIL # BLD AUTO: 0.2 10*3/MM3 (ref 0–0.4)
EOSINOPHIL NFR BLD AUTO: 2.4 % (ref 0.3–6.2)
ERYTHROCYTE [DISTWIDTH] IN BLOOD BY AUTOMATED COUNT: 13.9 % (ref 12.3–15.4)
GLUCOSE BLDC GLUCOMTR-MCNC: 120 MG/DL (ref 70–99)
GLUCOSE BLDC GLUCOMTR-MCNC: 123 MG/DL (ref 70–99)
GLUCOSE BLDC GLUCOMTR-MCNC: 128 MG/DL (ref 70–99)
GLUCOSE BLDC GLUCOMTR-MCNC: 130 MG/DL (ref 70–99)
GLUCOSE SERPL-MCNC: 112 MG/DL (ref 65–99)
HCT VFR BLD AUTO: 44.8 % (ref 37.5–51)
HGB BLD-MCNC: 15.4 G/DL (ref 13–17.7)
IMM GRANULOCYTES # BLD AUTO: 0.06 10*3/MM3 (ref 0–0.05)
IMM GRANULOCYTES NFR BLD AUTO: 0.7 % (ref 0–0.5)
LYMPHOCYTES # BLD AUTO: 3.18 10*3/MM3 (ref 0.7–3.1)
LYMPHOCYTES NFR BLD AUTO: 37.4 % (ref 19.6–45.3)
MAGNESIUM SERPL-MCNC: 1.7 MG/DL (ref 1.6–2.4)
MCH RBC QN AUTO: 30.8 PG (ref 26.6–33)
MCHC RBC AUTO-ENTMCNC: 34.4 G/DL (ref 31.5–35.7)
MCV RBC AUTO: 89.6 FL (ref 79–97)
MONOCYTES # BLD AUTO: 1.08 10*3/MM3 (ref 0.1–0.9)
MONOCYTES NFR BLD AUTO: 12.7 % (ref 5–12)
NEUTROPHILS NFR BLD AUTO: 3.91 10*3/MM3 (ref 1.7–7)
NEUTROPHILS NFR BLD AUTO: 46 % (ref 42.7–76)
NRBC BLD AUTO-RTO: 0 /100 WBC (ref 0–0.2)
PHOSPHATE SERPL-MCNC: 3.4 MG/DL (ref 2.5–4.5)
PLATELET # BLD AUTO: 179 10*3/MM3 (ref 140–450)
PMV BLD AUTO: 11.2 FL (ref 6–12)
POTASSIUM SERPL-SCNC: 4.2 MMOL/L (ref 3.5–5.2)
RBC # BLD AUTO: 5 10*6/MM3 (ref 4.14–5.8)
SODIUM SERPL-SCNC: 140 MMOL/L (ref 136–145)
WBC NRBC COR # BLD: 8.5 10*3/MM3 (ref 3.4–10.8)

## 2023-07-17 PROCEDURE — 72125 CT NECK SPINE W/O DYE: CPT

## 2023-07-17 PROCEDURE — 99222 1ST HOSP IP/OBS MODERATE 55: CPT | Performed by: OTOLARYNGOLOGY

## 2023-07-17 PROCEDURE — G0378 HOSPITAL OBSERVATION PER HR: HCPCS

## 2023-07-17 PROCEDURE — 83735 ASSAY OF MAGNESIUM: CPT | Performed by: HOSPITALIST

## 2023-07-17 PROCEDURE — 80048 BASIC METABOLIC PNL TOTAL CA: CPT | Performed by: HOSPITALIST

## 2023-07-17 PROCEDURE — 85025 COMPLETE CBC W/AUTO DIFF WBC: CPT | Performed by: HOSPITALIST

## 2023-07-17 PROCEDURE — 96361 HYDRATE IV INFUSION ADD-ON: CPT

## 2023-07-17 PROCEDURE — 97166 OT EVAL MOD COMPLEX 45 MIN: CPT

## 2023-07-17 PROCEDURE — 84100 ASSAY OF PHOSPHORUS: CPT | Performed by: HOSPITALIST

## 2023-07-17 PROCEDURE — 82948 REAGENT STRIP/BLOOD GLUCOSE: CPT

## 2023-07-17 PROCEDURE — 25010000002 ENOXAPARIN PER 10 MG: Performed by: HOSPITALIST

## 2023-07-17 PROCEDURE — 99232 SBSQ HOSP IP/OBS MODERATE 35: CPT | Performed by: INTERNAL MEDICINE

## 2023-07-17 PROCEDURE — 97161 PT EVAL LOW COMPLEX 20 MIN: CPT

## 2023-07-17 PROCEDURE — 36415 COLL VENOUS BLD VENIPUNCTURE: CPT | Performed by: HOSPITALIST

## 2023-07-17 RX ORDER — AMOXICILLIN 250 MG
1 CAPSULE ORAL NIGHTLY PRN
Status: DISCONTINUED | OUTPATIENT
Start: 2023-07-17 | End: 2023-07-22 | Stop reason: HOSPADM

## 2023-07-17 RX ORDER — MENTHOL AND METHYL SALICYLATE 7.6; 29 G/100G; G/100G
1 OINTMENT TOPICAL 3 TIMES DAILY PRN
Status: DISCONTINUED | OUTPATIENT
Start: 2023-07-17 | End: 2023-07-22 | Stop reason: HOSPADM

## 2023-07-17 RX ORDER — BISACODYL 5 MG/1
5 TABLET, DELAYED RELEASE ORAL DAILY PRN
Status: DISCONTINUED | OUTPATIENT
Start: 2023-07-17 | End: 2023-07-22 | Stop reason: HOSPADM

## 2023-07-17 RX ORDER — BISACODYL 10 MG
10 SUPPOSITORY, RECTAL RECTAL DAILY PRN
Status: DISCONTINUED | OUTPATIENT
Start: 2023-07-17 | End: 2023-07-22 | Stop reason: HOSPADM

## 2023-07-17 RX ORDER — METHOCARBAMOL 750 MG/1
750 TABLET, FILM COATED ORAL ONCE
Status: COMPLETED | OUTPATIENT
Start: 2023-07-17 | End: 2023-07-17

## 2023-07-17 RX ORDER — POLYETHYLENE GLYCOL 3350 17 G/17G
17 POWDER, FOR SOLUTION ORAL DAILY PRN
Status: DISCONTINUED | OUTPATIENT
Start: 2023-07-17 | End: 2023-07-22 | Stop reason: HOSPADM

## 2023-07-17 RX ADMIN — METHOCARBAMOL TABLETS 750 MG: 750 TABLET, COATED ORAL at 21:18

## 2023-07-17 RX ADMIN — HYDROCODONE BITARTRATE AND ACETAMINOPHEN 1 TABLET: 5; 325 TABLET ORAL at 21:03

## 2023-07-17 RX ADMIN — DOCUSATE SODIUM 50MG AND SENNOSIDES 8.6MG 2 TABLET: 8.6; 5 TABLET, FILM COATED ORAL at 10:31

## 2023-07-17 RX ADMIN — Medication 400 MG: at 10:31

## 2023-07-17 RX ADMIN — SODIUM CHLORIDE 100 ML/HR: 9 INJECTION, SOLUTION INTRAVENOUS at 05:22

## 2023-07-17 RX ADMIN — Medication 10 ML: at 10:30

## 2023-07-17 RX ADMIN — Medication 10 ML: at 21:03

## 2023-07-17 RX ADMIN — CITALOPRAM HYDROBROMIDE 10 MG: 20 TABLET ORAL at 10:30

## 2023-07-17 RX ADMIN — LISINOPRIL 20 MG: 20 TABLET ORAL at 10:31

## 2023-07-17 RX ADMIN — ROSUVASTATIN 40 MG: 20 TABLET, FILM COATED ORAL at 10:30

## 2023-07-17 RX ADMIN — ACETAMINOPHEN 650 MG: 325 TABLET ORAL at 10:31

## 2023-07-17 RX ADMIN — Medication 5 MG: at 21:03

## 2023-07-17 RX ADMIN — TAMSULOSIN HYDROCHLORIDE 0.4 MG: 0.4 CAPSULE ORAL at 10:31

## 2023-07-17 RX ADMIN — Medication 5000 UNITS: at 10:31

## 2023-07-17 NOTE — PLAN OF CARE
Goal Outcome Evaluation:  Plan of Care Reviewed With: patient        Progress: no change (first session: evaluation)  Outcome Evaluation: Patient presents with limitations of balance, strength and endurance/activity tolerance needed to support ADLs. Skilled OT is indicated to remediate/compensate for deficits to maximize independence and safety with functional tasks.      Anticipated Discharge Disposition (OT): inpatient rehabilitation facility

## 2023-07-17 NOTE — PROGRESS NOTES
[facility]   Hospitalist Progress Note  Date: 2023  Patient Name: Nikhil Baldwin  : 1945  MRN: [id number]  Date of admission: 2023      Subjective   Subjective     Chief Complaint: Follow up for left ear pain and headache    Summary: 78-year-old male with HTN, HLD, type II DM, DJD/arthritis, history of CVA, dementia who presented with 3-day history of pain above left ear, headache, neck pain.  No recent trauma.  No recent URI.  Blood pressure elevated otherwise VSS.  Labs unremarkable    Interval Followup:   No overnight events.  Vital stable.  Up on side of the bed eating breakfast, states he feels terrible.  Primarily complaining of pain above the left ear that is sharp and severe and radiates into his skull.  No vertigo. no lightheadedness dizziness. Also complaining of neck pain, chronic issue.  No muscle spasm.  States left upper extremity feels more weak.    Review of Systems  HEENT: + Pain above left ear. + Change in hearing.  No temple pain.  No vision changes or photophobia No sore throat, nasal congestion, sinus pressure  Respiratory:  No Cough, No Dyspnea  Gastrointestinal:  No Nausea, No Vomiting  MSK: + Neck pain and low back pain, chronic      Objective   Objective     Vitals:   Temp:  [97.5 °F (36.4 °C)-98.4 °F (36.9 °C)] 97.5 °F (36.4 °C)  Heart Rate:  [51-58] 53  Resp:  [14-16] 14  BP: (118-173)/(67-91) 136/67  Physical Exam    Constitutional: Elderly male,  conversant, NAD   Eyes: Pupils equal and reactive, no conjunctival injections   HENT: NCAT, nares patent, MMM, no obvious abnormality involving ears externally, tender to palpation above the left ear.  There is very mild swelling.  No discharge.  No bruising   Respiratory: Clear to auscultation bilaterally, nonlabored respirations    Cardiovascular: RRR, no murmurs, no edema   Gastrointestinal: Positive bowel sounds, soft, nondistended   MSK: Tender with palpation of the cervical spinous processes   Neurologic: Alert,  Cranial Nerves grossly intact, speech clear, muscle strength symmetrical and intact   Skin: Extremities warm, no rashes or wounds    Result Review    Result Review:  I have personally reviewed the following over the last 24 hours (07:00 to 07:00) and agree with the following findings  [x]  Laboratory  CBC          4/17/2023    12:34 7/16/2023    10:22 7/17/2023    05:37   CBC   WBC 8.34  11.39  8.50    RBC 5.60  5.50  5.00    Hemoglobin 17.3  16.9  15.4    Hematocrit 49.0  49.0  44.8    MCV 87.5  89.1  89.6    MCH 30.9  30.7  30.8    MCHC 35.3  34.5  34.4    RDW 14.6  14.0  13.9    Platelets 219  197  179      BMP          4/17/2023    12:34 7/16/2023    10:22 7/17/2023    05:37   BMP   BUN 15  19  16    Creatinine 0.99  1.11  0.95    Sodium 142  142  140    Potassium 4.4  4.7  4.2    Chloride 108  105  108    CO2 24.0  25.0  22.3    Calcium 9.8  9.9  9.0      CK 29  HS trop T 12  TSH 2.270 / FT4 1.16    [x]  Microbiology  UA bland, non-infectious   [x]  Radiology MRI brain WO contrast no acute abnormality  []  EKG/Telemetry  []  Cardiology/Vascular   []  Pathology  []  Old records  [x]  Other:    Intake/Output Summary (Last 24 hours) at 7/17/2023 0740  Last data filed at 7/17/2023 0713  Gross per 24 hour   Intake --   Output 925 ml   Net -925 ml         Assessment & Plan   Assessment / Plan     Assessment/Plan:  Pain above left ear  Left sided headache  Cervical neck pain  Multilevel degenerative changes involving cervical spine  History of C6-C7 disc protrusion with mild/moderate left neural foraminal narrowing  Essential hypertension  Dyslipidemia  Non-insulin-dependent type 2 diabetes  History of BPH  History of depression  History of vitamin D insufficiency         ENT consulted, appreciate recommendations.  Does have significant degree of cervical arthritis based off of previous imaging.  Has been several years since repeated and given complaints will start with CT cervical spine without contrast.  Combination  of Tylenol and low-dose Norco for pain control. Avoid IV narcotics  Tolerating breakfast just fine.  Blood pressure stable along with kidney function.  Stop IV fluids  BP at goal <150/90.  Continue lisinopril 20 mg daily.   Blood sugars controlled.  Continue moderate/high-dose SSI per protocol  CK WNL. Continue Crestor  Continue Celexa  Continue Flomax  Continue vitamin D supplementation  PT/OT evaluation for discharge planning  BMP in a.m.    Discussed plan with RN.    DVT prophylaxis:  Medical DVT prophylaxis orders are present.    CODE STATUS:   Level Of Support Discussed With: Patient  Code Status (Patient has no pulse and is not breathing): CPR (Attempt to Resuscitate)  Medical Interventions (Patient has pulse or is breathing): Full Support      Electronically signed by Jaron Michele DO, 07/17/23, 7:39 AM EDT.

## 2023-07-17 NOTE — THERAPY EVALUATION
Patient Name: Nikhil Baldwin  : 1945    MRN: [id number]                              Today's Date: 2023       Admit Date: 2023    Visit Dx:     ICD-10-CM ICD-9-CM   1. Weakness  R53.1 780.79   2. Decreased activities of daily living (ADL)  Z78.9 V49.89   3. Difficulty walking  R26.2 719.7     Patient Active Problem List   Diagnosis    Aortic aneurysm    Arthritis    BPH (benign prostatic hyperplasia)    Cervical radiculopathy    Type 2 diabetes mellitus with hyperglycemia, without long-term current use of insulin    Parathyroid abnormality    Thyroid disorder    Facet arthropathy    Fatty metamorphosis of liver    GERD (gastroesophageal reflux disease)    Primary hypertension    Hematuria    Hiatal hernia    Urinary incontinence    Hypercalcemia    Other hyperlipidemia    Kidney disease    Renal calculi    Low back pain    Thoracic back pain    DDD (degenerative disc disease), lumbar    Spinal stenosis of lumbar region    Degeneration of thoracic or thoracolumbar intervertebral disc    MI (myocardial infarction)    Myofascial pain    Neck pain    Nocturia    Sciatica    Stroke    Ureterolithiasis    Vitamin D deficiency    Labile personality    Acute viral syndrome    Debility     Past Medical History:   Diagnosis Date    Aorta disorder     Arthritis     Degenerative joint disease     Dementia     FRONTAL LOBE    Depression     Diabetes mellitus     Hyperlipidemia     Hypertension     Kidney stone     Stroke     MILD RIGHT SIDE DEFICITS     Past Surgical History:   Procedure Laterality Date    CHOLECYSTECTOMY      CYSTOSCOPY BLADDER STONE LITHOTRIPSY      KIDNEY STONE SURGERY        General Information       Row Name 23 1304          OT Time and Intention    Document Type evaluation  -AV     Mode of Treatment individual therapy;occupational therapy  -AV       Row Name 23 1304          General Information    Patient Profile Reviewed yes  -AV     Prior Level of Function  independent:;ADL's;home management;all household mobility;transfer  Independent ADL/ light home management tasks. sits to bathe (tub with chair). stands at sink to groom (electric razor, no teeth). standard commode. ambulates with RW. no home O2.  -AV     Existing Precautions/Restrictions fall  -AV     Barriers to Rehab none identified  -AV       Row Name 07/17/23 1304          Occupational Profile    Reason for Services/Referral (Occupational Profile) Patient is an 78 year old male admitted to [facility] on 7/16/23 with weakness and dizziness. He is currently on 4NT/ room air. OT consulted due to recent decline in ADL/ transfer independence. No previous OT services for current condition.  -AV       Row Name 07/17/23 1304          Living Environment    People in Home alone  on son's property  -AV       Row Name 07/17/23 1304          Home Main Entrance    Number of Stairs, Main Entrance three  -AV       Row Name 07/17/23 1304          Cognition    Orientation Status (Cognition) --  alert, pleasant and cooperative. follows commands.  -AV       Row Name 07/17/23 1304          Safety Issues, Functional Mobility    Impairments Affecting Function (Mobility) balance;strength;endurance/activity tolerance  -AV               User Key  (r) = Recorded By, (t) = Taken By, (c) = Cosigned By      Initials Name Provider Type    Philipp Hernandez, AJITH Occupational Therapist                     Mobility/ADL's       Row Name 07/17/23 1310          Transfers    Transfers bed-chair transfer  -AV     Comment, (Transfers) declined due to pain. assist of 1 per nursing report  -AV       Row Name 07/17/23 1310          Activities of Daily Living    BADL Assessment/Intervention --  Independent feeding with setup. Min assist grooming with setup while seated. Mod-max assist bathing and dressing. CGA toilet hygiene using BSC.  -AV               User Key  (r) = Recorded By, (t) = Taken By, (c) = Cosigned By      Initials Name Provider  "Type    AV Philipp Holbrook OT Occupational Therapist                   Obj/Interventions       Row Name 07/17/23 1311          Sensory Assessment (Somatosensory)    Sensory Assessment (Somatosensory) UE sensation intact  -AV     Sensory Assessment Patient reports \"entire left side is numb\". sensory discrimination to light touch intact left hand.  -AV       Row Name 07/17/23 1311          Vision Assessment/Intervention    Visual Impairment/Limitations WFL;corrective lenses for reading  -AV     Vision Assessment Comment reports decreased vision left eye x 5days. able to see food on plate.  -AV       Row Name 07/17/23 1311          Range of Motion Comprehensive    General Range of Motion bilateral upper extremity ROM WFL  -AV     Comment, General Range of Motion AROM with slow movement and encouragement  -AV       Row Name 07/17/23 1311          Strength Comprehensive (MMT)    Comment, General Manual Muscle Testing (MMT) Assessment 3+/5 bilateral biceps, triceps and   -AV       Row Name 07/17/23 1311          Motor Skills    Motor Skills coordination;functional endurance  -AV     Coordination WFL  right dominant  -AV     Functional Endurance poor  -AV       Row Name 07/17/23 1311          Balance    Comment, Balance declined due to pain. assist of 1 per nursing.  -AV               User Key  (r) = Recorded By, (t) = Taken By, (c) = Cosigned By      Initials Name Provider Type    Philipp Hernandez OT Occupational Therapist                   Goals/Plan       Row Name 07/17/23 1315          Transfer Goal 1 (OT)    Activity/Assistive Device (Transfer Goal 1, OT) transfers, all;walker, rolling  -AV     [place] Level/Cues Needed (Transfer Goal 1, OT) modified independence  -AV     Time Frame (Transfer Goal 1, OT) long term goal (LTG);10 days  -AV       Row Name 07/17/23 1315          Bathing Goal 1 (OT)    Activity/Device (Bathing Goal 1, OT) bathing skills, all;tub bench  -AV     [place] Level/Cues Needed " (Bathing Goal 1, OT) modified independence  -AV     Time Frame (Bathing Goal 1, OT) long term goal (LTG);10 days  -AV       Row Name 07/17/23 1315          Dressing Goal 1 (OT)    Activity/Device (Dressing Goal 1, OT) dressing skills, all  -AV     [place]/Cues Needed (Dressing Goal 1, OT) modified independence  -AV     Time Frame (Dressing Goal 1, OT) long term goal (LTG);10 days  -AV       Row Name 07/17/23 1315          Toileting Goal 1 (OT)    Activity/Device (Toileting Goal 1, OT) toileting skills, all;raised toilet seat  -AV     [place] Level/Cues Needed (Toileting Goal 1, OT) modified independence  -AV     Time Frame (Toileting Goal 1, OT) long term goal (LTG);10 days  -AV       Row Name 07/17/23 1315          Grooming Goal 1 (OT)    Activity/Device (Grooming Goal 1, OT) grooming skills, all  -AV     [place] (Grooming Goal 1, OT) modified independence  -AV     Time Frame (Grooming Goal 1, OT) long term goal (LTG);10 days  -AV       Row Name 07/17/23 1315          Strength Goal 1 (OT)    Strength Goal 1 (OT) Patient will demonstrate 4/5 bilateral upper extremities to increase ADL/transfer independence.  -AV     Time Frame (Strength Goal 1, OT) long term goal (LTG);10 days  -AV       Row Name 07/17/23 1315          Problem Specific Goal 1 (OT)    Problem Specific Goal 1 (OT) Patient will demonstrate fair/fair plus endurance/activity tolerance needed to support ADLs.  -AV     Time Frame (Problem Specific Goal 1, OT) long term goal (LTG);10 days  -AV       Row Name 07/17/23 1315          Therapy Assessment/Plan (OT)    Planned Therapy Interventions (OT) activity tolerance training;BADL retraining;functional balance retraining;IADL retraining;occupation/activity based interventions;patient/caregiver education/training;strengthening exercise;transfer/mobility retraining  -AV               User Key  (r) = Recorded By, (t) = Taken By, (c) = Cosigned By      Initials Name Provider Type    AV Sheron,  AJITH Segal Occupational Therapist                   Clinical Impression       Row Name 07/17/23 1314          Pain Assessment    Additional Documentation Pain Scale: FACES Pre/Post-Treatment (Group)  -AV       Row Name 07/17/23 1314          Pain Scale: FACES Pre/Post-Treatment    Pain: FACES Scale, Pretreatment 2-->hurts little bit  -AV     Posttreatment Pain Rating 2-->hurts little bit  -AV     Pain Location - neck  -AV       Row Name 07/17/23 1314          Plan of Care Review    Plan of Care Reviewed With patient  -AV     Progress no change  first session: evaluation  -AV     Outcome Evaluation Patient presents with limitations of balance, strength and endurance/activity tolerance needed to support ADLs. Skilled OT is indicated to remediate/compensate for deficits to maximize independence and safety with functional tasks.  -AV       Row Name 07/17/23 1314          Therapy Assessment/Plan (OT)    Patient/Family Therapy Goal Statement (OT) regain independence  -AV     Rehab Potential (OT) good, to achieve stated therapy goals  -AV     Criteria for Skilled Therapeutic Interventions Met (OT) yes;meets criteria;skilled treatment is necessary  -AV     Therapy Frequency (OT) 5 times/wk  -AV       Row Name 07/17/23 1314          Therapy Plan Review/Discharge Plan (OT)    Equipment Needs Upon Discharge (OT) commode chair  -AV     Anticipated Discharge Disposition (OT) inpatient rehabilitation facility  -AV       Row Name 07/17/23 1317          Vital Signs    O2 Delivery Pre Treatment room air  -AV     O2 Delivery Intra Treatment room air  -AV     O2 Delivery Post Treatment room air  -AV               User Key  (r) = Recorded By, (t) = Taken By, (c) = Cosigned By      Initials Name Provider Type    AV Philipp Holbrook OT Occupational Therapist                   Outcome Measures       Row Name 07/17/23 3376          How much help from another is currently needed...    Putting on and taking off regular lower body clothing? 2   -AV     Bathing (including washing, rinsing, and drying) 2  -AV     Toileting (which includes using toilet bed pan or urinal) 3  -AV     Putting on and taking off regular upper body clothing 3  -AV     Taking care of personal grooming (such as brushing teeth) 3  -AV     Eating meals 4  -AV     AM-PAC 6 Clicks Score (OT) 17  -AV       Row Name 07/17/23 1317          Functional Assessment    Outcome Measure Options AM-PAC 6 Clicks Daily Activity (OT);Optimal Instrument  -AV       Row Name 07/17/23 1317          Optimal Instrument    Optimal Instrument Optimal - 3  -AV     Bending/Stooping 4  -AV     Standing 3  -AV     Reaching 1  -AV     From the list, choose the 3 activities you would most like to be able to do without any difficulty Bending/stooping;Standing;Reaching  -AV     Total Score Optimal - 3 8  -AV               User Key  (r) = Recorded By, (t) = Taken By, (c) = Cosigned By      Initials Name Provider Type    Philipp Hernandez OT Occupational Therapist                    Occupational Therapy Education       Title: PT OT SLP Therapies (In Progress)       Topic: Occupational Therapy (In Progress)       Point: ADL training (Done)       Description:   Instruct learner(s) on proper safety adaptation and remediation techniques during self care or transfers.   Instruct in proper use of assistive devices.                  Learning Progress Summary             Patient Acceptance, E, VU by AV at 7/17/2023 1318                         Point: Home exercise program (Not Started)       Description:   Instruct learner(s) on appropriate technique for monitoring, assisting and/or progressing therapeutic exercises/activities.                  Learner Progress:  Not documented in this visit.              Point: Precautions (Not Started)       Description:   Instruct learner(s) on prescribed precautions during self-care and functional transfers.                  Learner Progress:  Not documented in this visit.               Point: Body mechanics (Not Started)       Description:   Instruct learner(s) on proper positioning and spine alignment during self-care, functional mobility activities and/or exercises.                  Learner Progress:  Not documented in this visit.                              User Key       Initials Effective Dates Name Provider Type Discipline    AV 06/16/21 -  Philipp Holbrook OT Occupational Therapist OT                  OT Recommendation and Plan  Planned Therapy Interventions (OT): activity tolerance training, BADL retraining, functional balance retraining, IADL retraining, occupation/activity based interventions, patient/caregiver education/training, strengthening exercise, transfer/mobility retraining  Therapy Frequency (OT): 5 times/wk  Plan of Care Review  Plan of Care Reviewed With: patient  Progress: no change (first session: evaluation)  Outcome Evaluation: Patient presents with limitations of balance, strength and endurance/activity tolerance needed to support ADLs. Skilled OT is indicated to remediate/compensate for deficits to maximize independence and safety with functional tasks.     Time Calculation:    Time Calculation- OT       Row Name 07/17/23 1319             Time Calculation- OT    OT Received On 07/17/23  -AV      OT Goal Re-Cert Due Date 07/26/23  -AV         Untimed Charges    OT Eval/Re-eval Minutes 35  -AV         Total Minutes    Untimed Charges Total Minutes 35  -AV       Total Minutes 35  -AV                User Key  (r) = Recorded By, (t) = Taken By, (c) = Cosigned By      Initials Name Provider Type    AV Philipp Holbrook OT Occupational Therapist                  Therapy Charges for Today       Code Description Service Date Service Provider Modifiers Qty    [id number] HC OT EVAL MOD COMPLEXITY 3 7/17/2023 Philipp Holbrook OT GO 1                 Philipp Holbrook OT  7/17/2023

## 2023-07-17 NOTE — PLAN OF CARE
Goal Outcome Evaluation:  Plan of Care Reviewed With: patient        Progress: no change  Outcome Evaluation: No change in pt condition noted this shift. AOx4. VS WDL.

## 2023-07-17 NOTE — CONSULTS
Discharge Planning Assessment  [facility]     Patient Name: Nikhil Baldwin  MRN: [id number]  Today's Date: 7/17/2023    Admit Date: 7/16/2023  Plan: Pt admitted due to weakness and dizziness. JANI attempted to meet with pt at bedside, however he was sound asleep. JANI contacted pt's son, Quinten, to complete assessment.mike Quinten states that pt bounces back and forth from his and his brothers home. States pt has frontal lobe dementia and requires assistance with needs. Quinten reports that someone is usually with pt at all times. Pt was doing good up until this past week when he developed increased weakness. Pt was also not eating as much as normal. Quinten states that pt is normally still able to get out of the house into town and does good. States they provide transportation for pt. Pt did not use DME at baseline, but has been needing assistance with ambulation recently. Quinten reports that pt had been to [facility] in the past and felt it was beneficial. Would be open to rehab placement again if recommended and is agreeable to local referrals being sent. No other needs noted at this time.     Discharge Needs Assessment       Row Name 07/17/23 1549       Living Environment    People in Home child(janice), adult    Name(s) of People in Home Pt lives in [facility] and stays with his adult sons    Current Living Arrangements home    Primary Care Provided by self;child(janice)    Provides Primary Care For no one    Family Caregiver if Needed child(janice), adult    Family Caregiver Names Quinten and Julius Baldwin- Caitlin    Quality of Family Relationships supportive;involved;helpful    Able to Return to Prior Arrangements yes       Resource/Environmental Concerns    Resource/Environmental Concerns none       Transition Planning    Patient/Family Anticipates Transition to inpatient rehabilitation facility    Patient/Family Anticipated Services at Transition skilled nursing;rehabilitation services    Transportation Anticipated family or friend will  provide       Discharge Needs Assessment    Readmission Within the Last 30 Days no previous admission in last 30 days    Concerns to be Addressed basic needs;discharge planning    Anticipated Changes Related to Illness inability to care for self    Discharge Facility/Level of Care Needs nursing facility, skilled;rehabilitation facility              Discharge Plan       Row Name 07/17/23 0100       Plan    Plan Pt admitted due to weakness and dizziness. JANI attempted to meet with pt at bedside, however he was sound asleep. JANI contacted pt's son, Quinten, to complete assessment.l Quinten states that pt bounces back and forth from his and his brothers home. States pt has frontal lobe dementia and requires assistance with needs. Quinten reports that someone is usually with pt at all times. Pt was doing good up until this past week when he developed increased weakness. Pt was also not eating as much as normal. Quinten states that pt is normally still able to get out of the house into town and does good. States they provide transportation for pt. Pt did not use DME at baseline, but has been needing assistance with ambulation recently. Quinten reports that pt had been to [facility] in the past and felt it was beneficial. Would be open to rehab placement again if recommended and is agreeable to local referrals being sent. No other needs noted at this time.    Patient/Family in Agreement with Plan yes             Continued Care and Services - Admitted Since 7/16/2023       Destination       Service Provider Request Status Selected Services Address Phone Fax Patient Preferred    [facility] Pending - Request Sent N/A [address] [id number] [phone] --    [facility] - Request Sent N/A [address], [place] [address] [phone] --    [facility] Pending - Request Sent N/A [address] [facility] [place]  [place] [address] [phone] --    [facility] Pending - Request Sent N/A [address] [place] [address] [phone] --    [facility] Pending - Request Sent N/A [address] [place] [address] [phone] --    [facility] Pending - [facility] Sent N/A [address] [place] [address] [phone] --    [facility] Pending - Request Sent N/A [address] [place] [id number] [phone] --    [facility] Pending - Request Sent N/A [address] [place] [id number] [phone] --              Demographic Summary       Row Name 07/17/23 1547       General Information    Admission Type observation    Arrived From emergency department    Referral Source admission list    Reason for Consult discharge planning    Preferred Language English       Contact Information    Permission Granted to Share Info With family/designee              Functional Status       Row Name 07/17/23 1547       Functional Status    Usual Activity Tolerance good    Current Activity Tolerance moderate       Functional Status, IADL    Medications assistive equipment    Meal Preparation completely dependent    Housekeeping completely dependent    Laundry completely dependent    Shopping assistive person       Mental Status    General Appearance WDL WDL                   Psychosocial       Row Name 07/17/23 154       Coping/Stress    Sources of Support adult child(janice)    Techniques to [place] with Loss/Stress/Change not applicable    Reaction to Health Status unable to assess    Understanding of Condition and Treatment unable to assess       Developmental Stage (Sonyiksamanthaon's)    Developmental Stage Stage 8 (63 years-death/Late Adulthood) Integrity vs. Despair            Patsy Jo MSW

## 2023-07-17 NOTE — CONSULTS
" [facility]   CONSULT NOTE    Patient Name: Nikhil Baldwin  : 1945  MRN: [id number]  Primary Care Physician:  Felix Navarro Jr., MD  Date of admission: 2023    Subjective   Subjective     Chief Complaint: \"My neck is really hurting\"    HPI:    Nikhil Baldwin is a 78 y.o. male who is seen at the request of Dr. Ritchie for evaluation of left-sided neck pain.  He tells me that this began around 5 days ago.  He cannot recall any inciting illness or incident.  He tells me that the pain starts just superior to his left ear and radiates posteriorly and inferiorly into the area of his trapezius.  He does report significant neck stiffness which is longstanding.  He denies any pain or sensation changes involving his upper extremities.  He has not noticed any skin changes in the area of his pain.  He has tried applying heat at home with some improvement.  He reports bilateral hearing loss which has been noticeable for years.  He denies any left-sided otalgia, otorrhea, or vertigo.  He does mention significant difficulty with imbalance.  He has not experienced any difficulty with sore throat.  He does mention occasionally feeling as though he experiences difficulty swallowing.  He denies any hoarseness.  MRI of the brain without contrast revealed mild small vessel ischemic disease but clear sinuses, middle ears, and mastoids.  There is also evidence of atrophy.  CT scan of the cervical spine without contrast is pending.    Review of Systems   The following systems were reviewed and negative;  constitution, eyes, ENT, respiratory, cardiovascular, gastrointestinal, genitourinary, integument, hematologic / lymphatic, musculoskeletal, neurological, behavioral/psych, endocrine, and allergies / immunologic.    Personal History     Past Medical History:   Diagnosis Date    Aorta disorder     Arthritis     Degenerative joint disease     Dementia     FRONTAL LOBE    Depression     Diabetes mellitus     " Hyperlipidemia     Hypertension     Kidney stone     Stroke     MILD RIGHT SIDE DEFICITS       Past Surgical History:   Procedure Laterality Date    CHOLECYSTECTOMY      CYSTOSCOPY BLADDER STONE LITHOTRIPSY      KIDNEY STONE SURGERY         Family History: family history is not on file. Otherwise pertinent FHx was reviewed and not pertinent to current issue.    Social History:  reports that he has never smoked. He has never used smokeless tobacco. He reports that he does not drink alcohol and does not use drugs.    Home Medications:  Cholecalciferol, SITagliptin, citalopram, empagliflozin, lisinopril, magnesium oxide, melatonin, metFORMIN, metoprolol succinate XL, rosuvastatin, and tamsulosin      Allergies:  Allergies   Allergen Reactions    Codeine Unknown - Low Severity    Promethazine Unknown - Low Severity    Celecoxib Rash    Ibuprofen Rash    Penicillins Rash    Promethazine Hcl Rash    Warfarin Rash       Objective   Objective     Vitals:   Temp:  [97.5 °F (36.4 °C)-98.4 °F (36.9 °C)] 97.7 °F (36.5 °C)  Heart Rate:  [51-72] 55  Resp:  [14-16] 16  BP: (124-152)/(64-82) 131/81  Physical Exam   General: Well developed, well nourished patient of stated age in no acute distress. Voice is strong and clear.   Head: Normocephalic and atraumatic.   Face: No lesions. House-Brackmann I/VI bilaterally.   Eyes: PERRLA, sclerae anicteric, no conjunctival injection. Extra ocular movements are intact and full.   Ears: Auricles are normal in appearance. Bilateral external auditory canals are unremarkable. Bilateral tympanic membranes are clear and without effusion.   Nose: External nose is normal in appearance. Bilateral nares are patent with normal appearing mucosa. Septum slightly deviated to the left anteriorly. Turbinates are unremarkable. No lesions.   Oral Cavity: Lips are normal in appearance. Oral mucosa is unremarkable. Gingiva is unremarkable.  Edentulous. Tongue is unremarkable with good movement. Hard palate is  unremarkable.   Oropharynx: Soft palate is unremarkable with full movement. Uvula is unremarkable. Bilateral tonsils are unremarkable. Posterior oropharynx is unremarkable.    Neck: Reduced range of motion.  Left-sided paraspinal and trapezius muscles are quite tender to palpation., no thyromegaly, no lymphadenopathy, trachea midline   Respiratory: Clear to auscultation bilaterally, nonlabored respirations    Cardiovascular: RRR, no murmurs, rubs, or gallops, palpable pedal pulses bilaterally   Psychiatric: Appropriate affect, cooperative   Neurologic: strength symmetric in all extremities, Cranial Nerves II-XII are grossly intact to confrontation   Skin: No rashes     Result Review    Result Review:  I have personally reviewed the results from the time of this admission to 7/17/2023 19:52 EDT and agree with these findings:  [x]  Laboratory  []  Microbiology  [x]  Radiology  []  EKG/Telemetry   []  Cardiology/Vascular   []  Pathology  [x]  Old records  []  Other    Assessment & Plan   Assessment / Plan     Brief Patient Summary and plan:  Nikhil Baldwin is a 78 y.o. male who is seen for evaluation of left-sided neck pain which worsened approximately 5 days ago.  He also reports imbalance and hearing loss which are chronic problems.  Examination today reveals tenderness palpation in the left-sided paraspinal and trapezius muscles.  We reviewed and discussed the images from his MRI of the brain without contrast from yesterday which did not reveal any evidence of middle ear, mastoid, or sinus disease.  We discussed the differential including musculoskeletal neck pain versus radiculopathy amongst other possibilities.  We discussed that this is likely causing the pain superior to his left ear.  We did discuss the possibility of laryngoscopic examination to rule out a few potential referred causes of neck pain but he politely declined.  At this time, he may consider a trial of an anti-inflammatory and muscle relaxer as  well as applying heat to the affected area.  He may follow-up with me as an outpatient to discuss further work-up of his hearing loss and imbalance if he so desires.  I will follow-up with a CT scan of the spine without contrast.  He was given ample time to ask questions, all of which were answered to his satisfaction.    Active Hospital Problems:  Active Hospital Problems    Diagnosis     **Debility          CODE STATUS:    Level Of Support Discussed With: Patient  Code Status (Patient has no pulse and is not breathing): CPR (Attempt to Resuscitate)  Medical Interventions (Patient has pulse or is breathing): Full Support      Electronically signed by Teto Haq MD, 07/17/23, 7:52 PM EDT.

## 2023-07-17 NOTE — THERAPY EVALUATION
Acute Care - Physical Therapy Initial Evaluation   [facility]     Patient Name: Nikhil Baldwin  : 1945  MRN: [id number]  Today's Date: 2023      Visit Dx:     ICD-10-CM ICD-9-CM   1. Weakness  R53.1 780.79   2. Decreased activities of daily living (ADL)  Z78.9 V49.89   3. Difficulty walking  R26.2 719.7     Patient Active Problem List   Diagnosis    Aortic aneurysm    Arthritis    BPH (benign prostatic hyperplasia)    Cervical radiculopathy    Type 2 diabetes mellitus with hyperglycemia, without long-term current use of insulin    Parathyroid abnormality    Thyroid disorder    Facet arthropathy    Fatty metamorphosis of liver    GERD (gastroesophageal reflux disease)    Primary hypertension    Hematuria    Hiatal hernia    Urinary incontinence    Hypercalcemia    Other hyperlipidemia    Kidney disease    Renal calculi    Low back pain    Thoracic back pain    DDD (degenerative disc disease), lumbar    Spinal stenosis of lumbar region    Degeneration of thoracic or thoracolumbar intervertebral disc    MI (myocardial infarction)    Myofascial pain    Neck pain    Nocturia    Sciatica    Stroke    Ureterolithiasis    Vitamin D deficiency    Labile personality    Acute viral syndrome    Debility     Past Medical History:   Diagnosis Date    Aorta disorder     Arthritis     Degenerative joint disease     Dementia     FRONTAL LOBE    Depression     Diabetes mellitus     Hyperlipidemia     Hypertension     Kidney stone     Stroke     MILD RIGHT SIDE DEFICITS     Past Surgical History:   Procedure Laterality Date    CHOLECYSTECTOMY      CYSTOSCOPY BLADDER STONE LITHOTRIPSY      KIDNEY STONE SURGERY       PT Assessment (last 12 hours)       PT Evaluation and Treatment       Row Name 23 1314          Physical Therapy Time and Intention    Subjective Information complains of;pain  Above and behind left ear and neck  -AV     Document Type evaluation  -AV     Mode of Treatment individual therapy;physical therapy   -AV       Row Name 07/17/23 1314          General Information    Patient Profile Reviewed yes  -AV     Prior Level of Function independent:;all household mobility;gait;transfer;ADL's  Reports recently began ambulating with RW, typically ambulated without an assistive device. No home O2.  -AV     Existing Precautions/Restrictions fall  -AV       Row Name 07/17/23 1314          Living Environment    Current Living Arrangements home  -AV     People in Home alone  -AV       Row Name 07/17/23 1314          Pain    Pretreatment Pain Rating 10/10  -AV     Posttreatment Pain Rating 10/10  -AV     Pain Location - Side/Orientation Left  -AV     Pain Location - ear;neck  -AV     Pain Intervention(s) Nursing Notified  -AV       [facility] Name 07/17/23 1314          Cognition    Orientation Status (Cognition) oriented x 3  -AV       [facility] Name 07/17/23 1314          Range of Motion (ROM)    Range of Motion bilateral lower extremities;ROM is WFL  [facility] Name 07/17/23 1314          Strength (Manual Muscle Testing)    Strength (Manual Muscle Testing) bilateral lower extremities;strength is WFL  -AV       Row Name 07/17/23 1314          Bed Mobility    Bed Mobility supine-sit;sit-supine  -AV     Supine-Sit [place] (Bed Mobility) moderate assist (50% patient effort)  -AV     Sit-Supine [place] (Bed Mobility) contact guard  -AV     Bed Mobility, Safety Issues decreased use of legs for bridging/pushing;decreased use of arms for pushing/pulling  -AV       Row Name 07/17/23 1314          Transfers    Transfers sit-stand transfer;stand-sit transfer  -AV     Comment, (Transfers) Patient maintained standing with RW and minimal assist. Declined further mobility secondary to increased pain  -AV       [facility] Name 07/17/23 1314          Sit-Stand Transfer    Sit-Stand [place] (Transfers) minimum assist (75% patient effort)  -AV     Assistive Device (Sit-Stand Transfers) walker, front-wheeled  -AV       Row Name 07/17/23 1314           Stand-Sit Transfer    Stand-Sit [place] (Transfers) minimum assist (75% patient effort)  -AV     Assistive Device (Stand-Sit Transfers) walker, front-wheeled  -AV       Row Name 07/17/23 1314          Safety Issues, Functional Mobility    Impairments Affecting Function (Mobility) balance;endurance/activity tolerance;pain;strength  -AV       Row Name 07/17/23 1322          Balance    Balance Assessment standing static balance  -AV     Static Standing Balance minimal assist  -AV     Position/Device Used, Standing Balance supported;walker, front-wheeled  -AV       Row Name 07/17/23 1322          Plan of Care Review    Plan of Care Reviewed With patient  -AV     Progress no change  -AV     Outcome Evaluation Patient presents with deficits in balance, endurance, transfers, and ambulation. Patient will benefit from skilled PT services to address these mobility deficits and decrease risk of falls.  -AV       Row Name 07/17/23 1322          Positioning and Restraints    Pre-Treatment Position in bed  -AV     Post Treatment Position bed  -AV     In Bed side lying right;call light within reach;encouraged to call for assist;exit alarm on  -AV       Row Name 07/17/23 1322          Therapy Assessment/Plan (PT)    Rehab Potential (PT) good, to achieve stated therapy goals  -AV     Criteria for Skilled Interventions Met (PT) yes;meets criteria  -AV     Therapy Frequency (PT) daily  -AV     Predicted Duration of Therapy Intervention (PT) 10 days  -AV     Problem List (PT) problems related to;balance;mobility;strength;pain  -AV     Activity Limitations Related to Problem List (PT) unable to transfer safely;unable to ambulate safely  -AV       Row Name 07/17/23 1322          PT Evaluation Complexity    History, PT Evaluation Complexity 1-2 personal factors and/or comorbidities  -AV     Examination of Body Systems (PT Eval Complexity) total of 4 or more elements  -AV     Clinical Presentation (PT Evaluation Complexity) stable   -AV     Clinical Decision Making (PT Evaluation Complexity) low complexity  -AV     Overall Complexity (PT Evaluation Complexity) low complexity  -AV       Row Name 07/17/23 1322          Therapy Plan Review/Discharge Plan (PT)    Therapy Plan Review (PT) evaluation/treatment results reviewed;patient  -AV       Row Name 07/17/23 1322          Physical Therapy Goals    Bed Mobility Goal Selection (PT) bed mobility, PT goal 1  -AV     Transfer Goal Selection (PT) transfer, PT goal 1  -AV     Gait Training Goal Selection (PT) gait training, PT goal 1  -AV       Row Name 07/17/23 1322          Bed Mobility Goal 1 (PT)    Activity/Assistive Device (Bed Mobility Goal 1, PT) sit to supine/supine to sit  -AV     [place] Level/Cues Needed (Bed Mobility Goal 1, PT) standby assist  -AV     Time Frame (Bed Mobility Goal 1, PT) 10 days  -AV       Row Name 07/17/23 1322          Transfer Goal 1 (PT)    Activity/Assistive Device (Transfer Goal 1, PT) transfers, all;walker, rolling  -AV     [place] Level/Cues Needed (Transfer Goal 1, PT) standby assist  -AV     Time Frame (Transfer Goal 1, PT) 10 days  -AV       Row Name 07/17/23 1322          Gait Training Goal 1 (PT)    Activity/Assistive Device (Gait Training Goal 1, PT) gait (walking locomotion);assistive device use;walker, rolling  -AV     [place] Level (Gait Training Goal 1, PT) standby assist  -AV     Distance (Gait Training Goal 1, PT) 150  -AV     Time Frame (Gait Training Goal 1, PT) 10 days  -AV               User Key  (r) = Recorded By, (t) = Taken By, (c) = Cosigned By      Initials Name Provider Type    Dustin Hernandez, PT Physical Therapist                    Physical Therapy Education       Title: PT OT SLP Therapies (In Progress)       Topic: Physical Therapy (In Progress)       Point: Mobility training (Done)       Learning Progress Summary             Patient Acceptance, E,TB, VU by DAKSHA at 7/17/2023 1327                         Point: Home  exercise program (Not Started)       Learner Progress:  Not documented in this visit.              Point: Body mechanics (Done)       Learning Progress Summary             Patient Acceptance, E,TB, VU by AV at 7/17/2023 1327                         Point: Precautions (Done)       Learning Progress Summary             Patient Acceptance, E,TB, VU by AV at 7/17/2023 1327                                         User Key       Initials Effective Dates Name Provider Type Discipline    AV 06/11/21 -  Dustin Christopher, PT Physical Therapist PT                  PT Recommendation and Plan  Anticipated Discharge Disposition (PT): inpatient rehabilitation facility  Planned Therapy Interventions (PT): balance training, bed mobility training, gait training, home exercise program, neuromuscular re-education, strengthening, transfer training  Therapy Frequency (PT): daily  Plan of Care Reviewed With: patient  Progress: no change  Outcome Evaluation: Patient presents with deficits in balance, endurance, transfers, and ambulation. Patient will benefit from skilled PT services to address these mobility deficits and decrease risk of falls.   Outcome Measures       Row Name 07/17/23 1326             How much help from another person do you currently need...    Turning from your back to your side while in flat bed without using bedrails? 3  -AV      Moving from lying on back to sitting on the side of a flat bed without bedrails? 2  -AV      Moving to and from a bed to a chair (including a wheelchair)? 3  -AV      Standing up from a chair using your arms (e.g., wheelchair, bedside chair)? 3  -AV      Climbing 3-5 steps with a railing? 2  -AV      To walk in hospital room? 3  -AV      AM-PAC 6 Clicks Score (PT) 16  -AV         Functional Assessment    Outcome Measure Options AM-PAC 6 Clicks Basic Mobility (PT)  -AV                User Key  (r) = Recorded By, (t) = Taken By, (c) = Cosigned By      Initials Name Provider Type    AV Van  Dustin Faulkner, PT Physical Therapist                     Time Calculation:    PT Charges       Row Name 07/17/23 1326             Time Calculation    PT Received On 07/17/23  -AV      PT Goal Re-Cert Due Date 07/26/23  -AV         Untimed Charges    PT Eval/Re-eval Minutes 40  -AV         Total Minutes    Untimed Charges Total Minutes 40  -AV       Total Minutes 40  -AV                User Key  (r) = Recorded By, (t) = Taken By, (c) = Cosigned By      Initials Name Provider Type    AV Dustin Christopher, PT Physical Therapist                  Therapy Charges for Today       Code Description Service Date Service Provider Modifiers Qty    [id number] HC PT EVAL LOW COMPLEXITY 3 7/17/2023 Dustin Christopher, PT GP 1            PT G-Codes  Outcome Measure Options: AM-PAC 6 Clicks Basic Mobility (PT)  AM-PAC 6 Clicks Score (PT): 16  AM-PAC 6 Clicks Score (OT): 17    Dustin Christopher PT  7/17/2023

## 2023-07-18 ENCOUNTER — APPOINTMENT (OUTPATIENT)
Dept: MRI IMAGING | Facility: HOSPITAL | Age: 78
End: 2023-07-18
Payer: MEDICARE

## 2023-07-18 LAB
ANION GAP SERPL CALCULATED.3IONS-SCNC: 11.6 MMOL/L (ref 5–15)
BILIRUB UR QL STRIP: NEGATIVE
BUN SERPL-MCNC: 16 MG/DL (ref 8–23)
BUN/CREAT SERPL: 17 (ref 7–25)
CALCIUM SPEC-SCNC: 9.2 MG/DL (ref 8.6–10.5)
CHLORIDE SERPL-SCNC: 104 MMOL/L (ref 98–107)
CLARITY UR: CLEAR
CO2 SERPL-SCNC: 22.4 MMOL/L (ref 22–29)
COLOR UR: YELLOW
CREAT SERPL-MCNC: 0.94 MG/DL (ref 0.76–1.27)
EGFRCR SERPLBLD CKD-EPI 2021: 81.9 ML/MIN/1.73
GLUCOSE BLDC GLUCOMTR-MCNC: 111 MG/DL (ref 70–99)
GLUCOSE BLDC GLUCOMTR-MCNC: 151 MG/DL (ref 70–99)
GLUCOSE BLDC GLUCOMTR-MCNC: 154 MG/DL (ref 70–99)
GLUCOSE BLDC GLUCOMTR-MCNC: 155 MG/DL (ref 70–99)
GLUCOSE SERPL-MCNC: 184 MG/DL (ref 65–99)
GLUCOSE UR STRIP-MCNC: ABNORMAL MG/DL
HGB UR QL STRIP.AUTO: NEGATIVE
KETONES UR QL STRIP: ABNORMAL
LEUKOCYTE ESTERASE UR QL STRIP.AUTO: NEGATIVE
NITRITE UR QL STRIP: NEGATIVE
PH UR STRIP.AUTO: 5.5 [PH] (ref 5–8)
POTASSIUM SERPL-SCNC: 4.1 MMOL/L (ref 3.5–5.2)
PROT UR QL STRIP: ABNORMAL
SODIUM SERPL-SCNC: 138 MMOL/L (ref 136–145)
SP GR UR STRIP: >=1.03 (ref 1–1.03)
UROBILINOGEN UR QL STRIP: ABNORMAL
WHOLE BLOOD HOLD SPECIMEN: NORMAL

## 2023-07-18 PROCEDURE — G0378 HOSPITAL OBSERVATION PER HR: HCPCS

## 2023-07-18 PROCEDURE — 97530 THERAPEUTIC ACTIVITIES: CPT

## 2023-07-18 PROCEDURE — 97110 THERAPEUTIC EXERCISES: CPT

## 2023-07-18 PROCEDURE — 63710000001 INSULIN LISPRO (HUMAN) PER 5 UNITS: Performed by: HOSPITALIST

## 2023-07-18 PROCEDURE — 82948 REAGENT STRIP/BLOOD GLUCOSE: CPT

## 2023-07-18 PROCEDURE — 99233 SBSQ HOSP IP/OBS HIGH 50: CPT | Performed by: INTERNAL MEDICINE

## 2023-07-18 PROCEDURE — A9577 INJ MULTIHANCE: HCPCS | Performed by: INTERNAL MEDICINE

## 2023-07-18 PROCEDURE — 25010000002 ENOXAPARIN PER 10 MG: Performed by: HOSPITALIST

## 2023-07-18 PROCEDURE — 72156 MRI NECK SPINE W/O & W/DYE: CPT

## 2023-07-18 PROCEDURE — 0 GADOBENATE DIMEGLUMINE 529 MG/ML SOLUTION: Performed by: INTERNAL MEDICINE

## 2023-07-18 PROCEDURE — 36415 COLL VENOUS BLD VENIPUNCTURE: CPT | Performed by: HOSPITALIST

## 2023-07-18 PROCEDURE — 96372 THER/PROPH/DIAG INJ SC/IM: CPT

## 2023-07-18 PROCEDURE — 81003 URINALYSIS AUTO W/O SCOPE: CPT | Performed by: INTERNAL MEDICINE

## 2023-07-18 PROCEDURE — 80048 BASIC METABOLIC PNL TOTAL CA: CPT | Performed by: HOSPITALIST

## 2023-07-18 RX ORDER — TIZANIDINE 4 MG/1
2 TABLET ORAL EVERY 8 HOURS PRN
Status: DISCONTINUED | OUTPATIENT
Start: 2023-07-18 | End: 2023-07-22 | Stop reason: HOSPADM

## 2023-07-18 RX ORDER — GABAPENTIN 100 MG/1
100 CAPSULE ORAL 2 TIMES DAILY
Status: DISCONTINUED | OUTPATIENT
Start: 2023-07-18 | End: 2023-07-22 | Stop reason: HOSPADM

## 2023-07-18 RX ORDER — ASPIRIN 81 MG/1
81 TABLET ORAL DAILY
Status: DISCONTINUED | OUTPATIENT
Start: 2023-07-19 | End: 2023-07-22 | Stop reason: HOSPADM

## 2023-07-18 RX ORDER — LISINOPRIL 10 MG/1
10 TABLET ORAL DAILY
Status: DISCONTINUED | OUTPATIENT
Start: 2023-07-19 | End: 2023-07-19

## 2023-07-18 RX ADMIN — GABAPENTIN 100 MG: 100 CAPSULE ORAL at 11:57

## 2023-07-18 RX ADMIN — Medication 5000 UNITS: at 09:05

## 2023-07-18 RX ADMIN — Medication 10 ML: at 09:06

## 2023-07-18 RX ADMIN — Medication 10 ML: at 21:38

## 2023-07-18 RX ADMIN — GADOBENATE DIMEGLUMINE 15 ML: 529 INJECTION, SOLUTION INTRAVENOUS at 20:43

## 2023-07-18 RX ADMIN — INSULIN LISPRO 3 UNITS: 100 INJECTION, SOLUTION INTRAVENOUS; SUBCUTANEOUS at 11:51

## 2023-07-18 RX ADMIN — Medication 5 MG: at 21:38

## 2023-07-18 RX ADMIN — CITALOPRAM HYDROBROMIDE 10 MG: 20 TABLET ORAL at 09:05

## 2023-07-18 RX ADMIN — HYDROCODONE BITARTRATE AND ACETAMINOPHEN 1 TABLET: 5; 325 TABLET ORAL at 09:06

## 2023-07-18 RX ADMIN — HYDROCODONE BITARTRATE AND ACETAMINOPHEN 1 TABLET: 5; 325 TABLET ORAL at 04:54

## 2023-07-18 RX ADMIN — ENOXAPARIN SODIUM 40 MG: 100 INJECTION SUBCUTANEOUS at 09:06

## 2023-07-18 RX ADMIN — ACETAMINOPHEN 650 MG: 325 TABLET ORAL at 21:38

## 2023-07-18 RX ADMIN — Medication 400 MG: at 09:05

## 2023-07-18 RX ADMIN — ROSUVASTATIN 40 MG: 20 TABLET, FILM COATED ORAL at 09:05

## 2023-07-18 RX ADMIN — INSULIN LISPRO 3 UNITS: 100 INJECTION, SOLUTION INTRAVENOUS; SUBCUTANEOUS at 09:05

## 2023-07-18 RX ADMIN — GABAPENTIN 100 MG: 100 CAPSULE ORAL at 21:38

## 2023-07-18 RX ADMIN — LISINOPRIL 20 MG: 20 TABLET ORAL at 09:05

## 2023-07-18 RX ADMIN — INSULIN LISPRO 3 UNITS: 100 INJECTION, SOLUTION INTRAVENOUS; SUBCUTANEOUS at 21:46

## 2023-07-18 RX ADMIN — TAMSULOSIN HYDROCHLORIDE 0.4 MG: 0.4 CAPSULE ORAL at 09:05

## 2023-07-18 NOTE — THERAPY TREATMENT NOTE
Patient Name: Nikhil Baldwin  : 1945    MRN: [id number]                              Today's Date: 2023       Admit Date: 2023    Visit Dx:     ICD-10-CM ICD-9-CM   1. Weakness  R53.1 780.79   2. Decreased activities of daily living (ADL)  Z78.9 V49.89   3. Difficulty walking  R26.2 719.7     Patient Active Problem List   Diagnosis    Aortic aneurysm    Arthritis    BPH (benign prostatic hyperplasia)    Cervical radiculopathy    Type 2 diabetes mellitus with hyperglycemia, without long-term current use of insulin    Parathyroid abnormality    Thyroid disorder    Facet arthropathy    Fatty metamorphosis of liver    GERD (gastroesophageal reflux disease)    Primary hypertension    Hematuria    Hiatal hernia    Urinary incontinence    Hypercalcemia    Other hyperlipidemia    Kidney disease    Renal calculi    Low back pain    Thoracic back pain    DDD (degenerative disc disease), lumbar    Spinal stenosis of lumbar region    Degeneration of thoracic or thoracolumbar intervertebral disc    MI (myocardial infarction)    Myofascial pain    Neck pain    Nocturia    Sciatica    Stroke    Ureterolithiasis    Vitamin D deficiency    Labile personality    Acute viral syndrome    Debility     Past Medical History:   Diagnosis Date    Aorta disorder     Arthritis     Degenerative joint disease     Dementia     FRONTAL LOBE    Depression     Diabetes mellitus     Hyperlipidemia     Hypertension     Kidney stone     Stroke     MILD RIGHT SIDE DEFICITS     Past Surgical History:   Procedure Laterality Date    CHOLECYSTECTOMY      CYSTOSCOPY BLADDER STONE LITHOTRIPSY      KIDNEY STONE SURGERY        General Information       Row Name 23 1127          OT Time and Intention    Document Type therapy note (daily note)  -AV     Mode of Treatment individual therapy;occupational therapy  -AV       Row Name 23 1127          General Information    Existing Precautions/Restrictions fall  -AV       Row Name  "07/18/23 1127          Cognition    Orientation Status (Cognition) --  alert. required moderate verbal, tactile cues/ demonstration to complete upper extremity therapeutic exercises.  -AV       Row Name 07/18/23 1127          Safety Issues, Functional Mobility    Impairments Affecting Function (Mobility) balance;cognition;endurance/activity tolerance;pain;strength  -AV               User Key  (r) = Recorded By, (t) = Taken By, (c) = Cosigned By      Initials Name Provider Type    Philipp Hernandez OT Occupational Therapist                     Mobility/ADL's    No documentation.                  Obj/Interventions       Row Name 07/18/23 1129          Shoulder (Therapeutic Exercise)    Shoulder (Therapeutic Exercise) strengthening exercise  -AV     Shoulder Strengthening (Therapeutic Exercise) bilateral;flexion;1 lb free weight;15 repititions  -AV       Row Name 07/18/23 1129          Elbow/Forearm (Therapeutic Exercise)    Elbow/Forearm (Therapeutic Exercise) strengthening exercise  -AV     Elbow/Forearm Strengthening (Therapeutic Exercise) bilateral;flexion;extension;supination;pronation;1 lb free weight;15 repititions  -AV       Row Name 07/18/23 1129          Motor Skills    Therapeutic Exercise shoulder;elbow/forearm  performed in high-Delaney's/ on room air. slowed UE movements. had to modify to one side at time for last exercise.  -AV               User Key  (r) = Recorded By, (t) = Taken By, (c) = Cosigned By      Initials Name Provider Type    Philipp Hernandez OT Occupational Therapist                   Goals/Plan    No documentation.                  Clinical Impression       Row Name 07/18/23 1130          Pain Scale: FACES Pre/Post-Treatment    Pain: FACES Scale, Pretreatment 2-->hurts little bit  -AV     Posttreatment Pain Rating 2-->hurts little bit  -AV     Pain Location - neck  -AV     Pre/Posttreatment Pain Comment \"stiff\"  -AV       Row Name 07/18/23 1130          Plan of Care Review    Progress no " change  -AV     Outcome Evaluation Patient performed upper extremity therapeutic exercises with 1# resistance to increase strength and endurance needed to support ADLs.  -AV       Row Name 07/18/23 1130          Therapy Plan Review/Discharge Plan (OT)    Anticipated Discharge Disposition (OT) inpatient rehabilitation facility  -AV       Row Name 07/18/23 1130          Vital Signs    O2 Delivery Pre Treatment room air  -AV     O2 Delivery Intra Treatment room air  -AV     O2 Delivery Post Treatment room air  -AV               User Key  (r) = Recorded By, (t) = Taken By, (c) = Cosigned By      Initials Name Provider Type    Philipp Hernandez, AJITH Occupational Therapist                   Outcome Measures       Row Name 07/18/23 1131          How much help from another is currently needed...    Putting on and taking off regular lower body clothing? 2  -AV     Bathing (including washing, rinsing, and drying) 2  -AV     Toileting (which includes using toilet bed pan or urinal) 3  -AV     Putting on and taking off regular upper body clothing 3  -AV     Taking care of personal grooming (such as brushing teeth) 3  -AV     Eating meals 4  -AV     AM-PAC 6 Clicks Score (OT) 17  -AV       Row Name 07/18/23 0859          How much help from another person do you currently need...    Turning from your back to your side while in flat bed without using bedrails? 4  -DK     Moving from lying on back to sitting on the side of a flat bed without bedrails? 4  -DK     Moving to and from a bed to a chair (including a wheelchair)? 3  -DK     Standing up from a chair using your arms (e.g., wheelchair, bedside chair)? 3  -DK     Climbing 3-5 steps with a railing? 2  -DK     To walk in hospital room? 3  -DK     AM-PAC 6 Clicks Score (PT) 19  -DK     Highest level of mobility 6 --> Walked 10 steps or more  -DK       Row Name 07/18/23 0859          Functional Assessment    Outcome Measure Options AM-PAC 6 Clicks Basic Mobility (PT)  -DK        Row Name 07/18/23 1131          Optimal Instrument    Bending/Stooping 4  -AV     Standing 3  -AV     Reaching 1  -AV               User Key  (r) = Recorded By, (t) = Taken By, (c) = Cosigned By      Initials Name Provider Type    Angela Rubio, PTA Physical Therapist Assistant    Philipp Hernandez OT Occupational Therapist                    Occupational Therapy Education       Title: PT OT SLP Therapies (In Progress)       Topic: Occupational Therapy (In Progress)       Point: ADL training (Done)       Description:   Instruct learner(s) on proper safety adaptation and remediation techniques during self care or transfers.   Instruct in proper use of assistive devices.                  Learning Progress Summary             Patient Acceptance, E, VU by AV at 7/17/2023 1318                         Point: Home exercise program (Not Started)       Description:   Instruct learner(s) on appropriate technique for monitoring, assisting and/or progressing therapeutic exercises/activities.                  Learner Progress:  Not documented in this visit.              Point: Precautions (Not Started)       Description:   Instruct learner(s) on prescribed precautions during self-care and functional transfers.                  Learner Progress:  Not documented in this visit.              Point: Body mechanics (Not Started)       Description:   Instruct learner(s) on proper positioning and spine alignment during self-care, functional mobility activities and/or exercises.                  Learner Progress:  Not documented in this visit.                              User Key       Initials Effective Dates Name Provider Type Discipline     06/16/21 -  Philipp Holbrook OT Occupational Therapist OT                  OT Recommendation and Plan  Planned Therapy Interventions (OT): activity tolerance training, BADL retraining, functional balance retraining, IADL retraining, occupation/activity based interventions, patient/caregiver  education/training, strengthening exercise, transfer/mobility retraining  Therapy Frequency (OT): 5 times/wk  Plan of Care Review  Plan of Care Reviewed With: patient  Progress: no change  Outcome Evaluation: Patient performed upper extremity therapeutic exercises with 1# resistance to increase strength and endurance needed to support ADLs.     Time Calculation:   Evaluation Complexity (OT)  Review Occupational Profile/Medical/Therapy History Complexity: expanded/moderate complexity  Assessment, Occupational Performance/Identification of Deficit Complexity: 3-5 performance deficits  Clinical Decision Making Complexity (OT): detailed assessment/moderate complexity  Overall Complexity of Evaluation (OT): moderate complexity     Time Calculation- OT       Row Name 07/18/23 1132 07/18/23 0904          Time Calculation- OT    OT Received On 07/18/23  -AV --     OT Goal Re-Cert Due Date 07/26/23  -AV --        Timed Charges    20429 - OT Therapeutic Exercise Minutes 10  -AV --     67666 - Gait Training Minutes  -- 5  -DK        Total Minutes    Timed Charges Total Minutes 10  -AV 5  -DK      Total Minutes 10  -AV 5  -DK               User Key  (r) = Recorded By, (t) = Taken By, (c) = Cosigned By      Initials Name Provider Type    Angela Rubio PTA Physical Therapist Assistant    AV Philipp Holbrook OT Occupational Therapist                  Therapy Charges for Today       Code Description Service Date Service Provider Modifiers Qty    [id number]  OT EVAL MOD COMPLEXITY 3 7/17/2023 Philipp Holbrook OT GO 1    [id number]  OT THER PROC EA 15 MIN 7/18/2023 Philipp Holbrook OT GO 1                 Philipp Holbrook OT  7/18/2023

## 2023-07-18 NOTE — PLAN OF CARE
Goal Outcome Evaluation:           Progress: no change  Outcome Evaluation: alert and oriented to person, place, and situation. up with x 1 assistance/walker this shift. blood glucose monitored. medicated for pain per orders. no other complaints at this time.

## 2023-07-18 NOTE — PROGRESS NOTES
[facility]   Hospitalist Progress Note  Date: 2023  Patient Name: Nikhil Baldwin  : 1945  MRN: [id number]  Date of admission: 2023      Subjective   Subjective     Chief Complaint: Follow up for left ear pain and headache    Summary: 78-year-old male with HTN, HLD, type II DM, DJD/arthritis, history of CVA, dementia who presented with 3-day history of pain above left ear, headache, neck pain.  No recent trauma.  No recent URI.  Blood pressure elevated otherwise VSS.  Labs unremarkable  Patient evaluated by ENT.  Patient declined laryngoscope with examination.  MRI brain negative.  CT neck with DJD especially at C4 level bilaterally.    Interval Followup:   No overnight events.  Vital stable.  Awake and alert.  Primarily complaining of pain above the left ear that is sharp and severe and radiates into his skull.  No vertigo. no lightheadedness dizziness. Also complaining of neck pain, chronic issue.  No muscle spasm.  States left upper extremity feels more weak.  Still unsteady feel drunk when walking and tend to fall forward.  Denies any tremors or stiffness.  Does have some blurred vision for past few days.  Feels weak in left hand  ESR within normal range.    Review of Systems  HEENT: + Pain above left ear. + Change in hearing.  No temple pain.  No vision changes or photophobia No sore throat, nasal congestion, sinus pressure  Respiratory:  No Cough, No Dyspnea  Gastrointestinal:  No Nausea, No Vomiting  MSK: + Neck pain and low back pain, chronic  Neuro.  Nontender left temple and scalp.  Tender left para paraspinal cervical muscles with increased pain on neck flexion and rotation to left.    Objective   Objective     Vitals:   Temp:  [97.2 °F (36.2 °C)-98.6 °F (37 °C)] 98.6 °F (37 °C)  Heart Rate:  [53-96] 73  Resp:  [16-18] 16  BP: ()/(62-86) 132/81  Physical Exam    Constitutional: Elderly male,  conversant, NAD   Eyes: Pupils equal and reactive, no conjunctival injections   HENT:  NCAT, nares patent, MMM, no obvious abnormality involving ears externally, tender to palpation above the left ear.  There is very mild swelling.  No discharge.  No bruising   Respiratory: Clear to auscultation bilaterally, nonlabored respirations    Cardiovascular: RRR, no murmurs, no edema   Gastrointestinal: Positive bowel sounds, soft, nondistended   MSK: Tender with palpation of the cervical spinous processes   Neurologic: Alert, Cranial Nerves grossly intact, speech clear, muscle strength symmetrical and intact   Skin: Extremities warm, no rashes or wounds    Result Review    Result Review:  I have personally reviewed the following over the last 24 hours (07:00 to 07:00) and agree with the following findings  [x]  Laboratory  CBC          4/17/2023    12:34 7/16/2023    10:22 7/17/2023    05:37   CBC   WBC 8.34  11.39  8.50    RBC 5.60  5.50  5.00    Hemoglobin 17.3  16.9  15.4    Hematocrit 49.0  49.0  44.8    MCV 87.5  89.1  89.6    MCH 30.9  30.7  30.8    MCHC 35.3  34.5  34.4    RDW 14.6  14.0  13.9    Platelets 219  197  179      BMP          7/16/2023    10:22 7/17/2023    05:37 7/18/2023    05:31   BMP   BUN 19  16  16    Creatinine 1.11  0.95  0.94    Sodium 142  140  138    Potassium 4.7  4.2  4.1    Chloride 105  108  104    CO2 25.0  22.3  22.4    Calcium 9.9  9.0  9.2    CK 29  HS trop T 12  TSH 2.270 / FT4 1.16    [x]  Microbiology  UA bland, non-infectious   [x]  Radiology MRI brain WO contrast no acute abnormality  []  EKG/Telemetry  []  Cardiology/Vascular   []  Pathology  []  Old records  [x]  Other:    Intake/Output Summary (Last 24 hours) at 7/18/2023 1901  Last data filed at 7/18/2023 1427  Gross per 24 hour   Intake 240 ml   Output 850 ml   Net -610 ml           Assessment & Plan   Assessment / Plan     Assessment/Plan:  Pain above left ear  Left sided headache  Cervical neck pain  Multilevel degenerative changes involving cervical spine  History of C6-C7 disc protrusion with mild/moderate  left neural foraminal narrowing  Essential hypertension  Dyslipidemia  Non-insulin-dependent type 2 diabetes  History of BPH  History of depression  History of vitamin D insufficiency.         ENT consulted, appreciate recommendations.  Does have significant degree of cervical arthritis based off of previous imaging.  CT C-spine noted.  Will get MRI of the C-spine.  Neurology consulted for evaluation.  Doubt PMR/temporal arteritis as ESR is within normal range  Combination of Tylenol, Neurontin and low-dose Norco for pain control. Avoid IV narcotics  Tolerating breakfast just fine.  Blood pressure stable along with kidney function.  Stop IV fluids  BP at goal <150/90.  Continue lisinopril 20 mg daily.   Blood sugars controlled.  Continue moderate/high-dose SSI per protocol  CK WNL. Continue Crestor  Continue Celexa  Continue Flomax  Continue vitamin D supplementation  PT/OT evaluation for discharge planning  BMP in a.m.  Speech to evaluate patient  Check UA    Discussed plan with RN and .  Possible rehab placement    DVT prophylaxis:  Medical DVT prophylaxis orders are present.    CODE STATUS:   Level Of Support Discussed With: Patient  Code Status (Patient has no pulse and is not breathing): CPR (Attempt to Resuscitate)  Medical Interventions (Patient has pulse or is breathing): Full Support        Electronically signed by Richard Martinez MD, 07/18/23, 7:01 PM EDT.

## 2023-07-18 NOTE — PLAN OF CARE
Goal Outcome Evaluation:  Plan of Care Reviewed With: patient        Progress: no change  Outcome Evaluation: VS WNL, PT REQUESTED PRN NORCO TWICE DURING SHIFT FOR 9/10 NECK PAIN. NO NEW COMPLAINTS AT THIS TIME

## 2023-07-18 NOTE — THERAPY TREATMENT NOTE
Acute Care - Physical Therapy Treatment Note  TONY Guillory     Patient Name: Nikhil Baldwin  : 1945  MRN: [id number]  Today's Date: 2023      Visit Dx:     ICD-10-CM ICD-9-CM   1. Weakness  R53.1 780.79   2. Decreased activities of daily living (ADL)  Z78.9 V49.89   3. Difficulty walking  R26.2 719.7     Patient Active Problem List   Diagnosis    Aortic aneurysm    Arthritis    BPH (benign prostatic hyperplasia)    Cervical radiculopathy    Type 2 diabetes mellitus with hyperglycemia, without long-term current use of insulin    Parathyroid abnormality    Thyroid disorder    Facet arthropathy    Fatty metamorphosis of liver    GERD (gastroesophageal reflux disease)    Primary hypertension    Hematuria    Hiatal hernia    Urinary incontinence    Hypercalcemia    Other hyperlipidemia    Kidney disease    Renal calculi    Low back pain    Thoracic back pain    DDD (degenerative disc disease), lumbar    Spinal stenosis of lumbar region    Degeneration of thoracic or thoracolumbar intervertebral disc    MI (myocardial infarction)    Myofascial pain    Neck pain    Nocturia    Sciatica    Stroke    Ureterolithiasis    Vitamin D deficiency    Labile personality    Acute viral syndrome    Debility     Past Medical History:   Diagnosis Date    Aorta disorder     Arthritis     Degenerative joint disease     Dementia     FRONTAL LOBE    Depression     Diabetes mellitus     Hyperlipidemia     Hypertension     Kidney stone     Stroke     MILD RIGHT SIDE DEFICITS     Past Surgical History:   Procedure Laterality Date    CHOLECYSTECTOMY      CYSTOSCOPY BLADDER STONE LITHOTRIPSY      KIDNEY STONE SURGERY       PT Assessment (last 12 hours)       PT Evaluation and Treatment       Row Name 23 0859          Physical Therapy Time and Intention    Subjective Information complains of;weakness;fatigue;pain  -DK     Document Type therapy note (daily note)  -DK     Mode of Treatment individual therapy;physical therapy  -DK      Patient Effort good  -DK     Symptoms Noted During/After Treatment fatigue;dizziness;increased pain  -DK     Comment Pt reports left ear/head pain, dizziness with sitting. He appears somewhat slow with movements/processing requests/cues.  -       Row Name 07/18/23 0859          Pain    Pretreatment Pain Rating 6/10  -DK     Posttreatment Pain Rating 6/10  -DK     Pain Location - Side/Orientation Left  -DK     Pain Location generalized  -DK     Pain Location - ear;neck;shoulder  -DK     Pain Intervention(s) Repositioned;Ambulation/increased activity;Distraction;Therapeutic presence  -       Row Name 07/18/23 0859          Cognition    Affect/Mental Status (Cognition) confused;flat/blunted affect;low arousal/lethargic  -DK     Orientation Status (Cognition) oriented to;person;situation  -DK     Follows Commands (Cognition) WFL  -     Cognitive Function WFL  -DK     Personal Safety Interventions gait belt;nonskid shoes/slippers when out of bed;supervised activity  -       Row Name 07/18/23 0859          Bed Mobility    Bed Mobility supine-sit-supine  -DK     Supine-Sit [place] (Bed Mobility) standby assist  -DK     Sit-Supine [place] (Bed Mobility) standby assist  -DK     Supine-Sit-Supine [place] (Bed Mobility) standby assist  -DK     Bed Mobility, Safety Issues decreased use of arms for pushing/pulling  -DK     Assistive Device (Bed Mobility) bed rails  -       Row Name 07/18/23 0859          Transfers    Transfers sit-stand transfer;stand-sit transfer  -       Row Name 07/18/23 0859          Sit-Stand Transfer    Sit-Stand [place] (Transfers) standby assist;contact guard;1 person assist  -     Assistive Device (Sit-Stand Transfers) walker, front-wheeled  -       Row Name 07/18/23 0859          Stand-Sit Transfer    Stand-Sit [place] (Transfers) standby assist;contact guard;1 person assist  -     Assistive Device (Stand-Sit Transfers) walker, front-wheeled  -       Row  Name 07/18/23 0859          Gait/Stairs (Locomotion)    Gait/Stairs Locomotion gait/ambulation independence;gait/ambulation assistive device;distance ambulated;gait pattern  -DK     [place] Level (Gait) contact guard;1 person assist  -DK     Assistive Device (Gait) walker, front-wheeled  -DK     Distance in Feet (Gait) 12  -DK     Pattern (Gait) step-to  -DK     Deviations/Abnormal Patterns (Gait) festinating/shuffling;stride length decreased;gait speed decreased;mitchel decreased  -DK     Bilateral Gait Deviations forward flexed posture  -DK     Comment, (Gait/Stairs) Pt ambulated on room air with a rolling walker.  He returned to bed on alert post treatment.  -DK       Row Name 07/18/23 0859          Safety Issues, Functional Mobility    Safety Issues Affecting Function (Mobility) judgment;safety precaution awareness;awareness of need for assistance  -DK     Impairments Affecting Function (Mobility) balance;cognition;endurance/activity tolerance;pain;range of motion (ROM);strength  -DK     Cognitive Impairments, Mobility Safety/Performance attention;awareness, need for assistance;impulsivity;judgment;safety precaution awareness  -DK       Row Name 07/18/23 0859          Balance    Balance Assessment sitting static balance;sitting dynamic balance;standing static balance;standing dynamic balance  -DK     Static Sitting Balance standby assist  -DK     Dynamic Sitting Balance standby assist  -DK     Position, Sitting Balance unsupported;sitting edge of bed  -DK     Static Standing Balance standby assist;contact guard;1-person assist  -DK     Dynamic Standing Balance contact guard;1-person assist  -DK     Position/Device Used, Standing Balance walker, front-wheeled  -DK     Balance Interventions standing;dynamic;tandem gait  -DK       Row Name 07/18/23 0859          Motor Skills    Motor Skills --  therapeutic exercises  -DK     Coordination WFL  -DK     Therapeutic Exercise hip;knee;ankle  -DK       Row Name  07/18/23 0859          Hip (Therapeutic Exercise)    Hip (Therapeutic Exercise) AAROM (active assistive range of motion)  -DK     Hip AAROM (Therapeutic Exercise) bilateral;flexion;extension;aBduction;aDduction;supine;10 repetitions;2 sets  -DK       Row Name 07/18/23 0859          Knee (Therapeutic Exercise)    Knee (Therapeutic Exercise) AAROM (active assistive range of motion)  -DK     Knee AAROM (Therapeutic Exercise) bilateral;flexion;extension;supine;10 repetitions;2 sets  -DK       Row Name 07/18/23 0859          Ankle (Therapeutic Exercise)    Ankle (Therapeutic Exercise) AAROM (active assistive range of motion)  -DK     Ankle AAROM (Therapeutic Exercise) bilateral;dorsiflexion;plantarflexion;supine;10 repetitions;2 sets  -       Row Name 07/18/23 0859          Plan of Care Review    Plan of Care Reviewed With patient  -DK     Progress improving  -       Row Name 07/18/23 0859          Positioning and Restraints    Pre-Treatment Position in bed  -DK     Post Treatment Position bed  -DK     In Bed supine;call light within reach;encouraged to call for assist;exit alarm on;side rails up x3;legs elevated;heels elevated  -       Row Name 07/18/23 0859          Therapy Assessment/Plan (PT)    Rehab Potential (PT) good, to achieve stated therapy goals  -DK     Criteria for Skilled Interventions Met (PT) skilled treatment is necessary  -     Therapy Frequency (PT) daily  -     Problem List (PT) problems related to;balance;cognition;mobility;strength;range of motion (ROM);pain;hearing  -DK     Activity Limitations Related to Problem List (PT) unable to ambulate safely;unable to transfer safely  -       Row Name 07/18/23 0859          Progress Summary (PT)    Progress Toward Functional Goals (PT) progress toward functional goals is good  -               User Key  (r) = Recorded By, (t) = Taken By, (c) = Cosigned By      Initials Name Provider Type    Angela Rubio PTA Physical Therapist Assistant                     Physical Therapy Education       Title: PT OT SLP Therapies (In Progress)       Topic: Physical Therapy (In Progress)       Point: Mobility training (Done)       Learning Progress Summary             Patient Acceptance, E,TB, VU by AV at 7/17/2023 1327                         Point: Home exercise program (Not Started)       Learner Progress:  Not documented in this visit.              Point: Body mechanics (Done)       Learning Progress Summary             Patient Acceptance, E,TB, VU by AV at 7/17/2023 1327                         Point: Precautions (Done)       Learning Progress Summary             Patient Acceptance, E,TB, VU by AV at 7/17/2023 1327                                         User Key       Initials Effective Dates Name Provider Type Discipline    AV 06/11/21 -  Dustin Christopher, PT Physical Therapist PT                  PT Recommendation and Plan  Planned Therapy Interventions (PT): balance training, bed mobility training, gait training, strengthening, transfer training  Therapy Frequency (PT): daily  Progress Summary (PT)  Progress Toward Functional Goals (PT): progress toward functional goals is good  Plan of Care Reviewed With: patient  Progress: improving   Outcome Measures       Row Name 07/18/23 0859 07/17/23 1326          How much help from another person do you currently need...    Turning from your back to your side while in flat bed without using bedrails? 4  -DK 3  -AV     Moving from lying on back to sitting on the side of a flat bed without bedrails? 4  -DK 2  -AV     Moving to and from a bed to a chair (including a wheelchair)? 3  -DK 3  -AV     Standing up from a chair using your arms (e.g., wheelchair, bedside chair)? 3  -DK 3  -AV     Climbing 3-5 steps with a railing? 2  -DK 2  -AV     To walk in hospital room? 3  -DK 3  -AV     AM-PAC 6 Clicks Score (PT) 19  -DK 16  -AV        Functional Assessment    Outcome Measure Options AM-PAC 6 Clicks Basic Mobility (PT)   -DK AM-PAC 6 Clicks Basic Mobility (PT)  -AV               User Key  (r) = Recorded By, (t) = Taken By, (c) = Cosigned By      Initials Name Provider Type    Angela Rubio PTA Physical Therapist Assistant    Dustin Hernandez, PT Physical Therapist                     Time Calculation:    PT Charges       Row Name 07/18/23 0904             Time Calculation    PT Received On 07/18/23  -DK      PT Goal Re-Cert Due Date 07/26/23  -DK         Timed Charges    07903 - PT Therapeutic Exercise Minutes 14  -DK      04622 - Gait Training Minutes  5  -DK      82709 - PT Therapeutic Activity Minutes 10  -DK         Total Minutes    Timed Charges Total Minutes 29  -DK       Total Minutes 29  -DK                User Key  (r) = Recorded By, (t) = Taken By, (c) = Cosigned By      Initials Name Provider Type    Angela Rubio PTA Physical Therapist Assistant                  Therapy Charges for Today       Code Description Service Date Service Provider Modifiers Qty    25936240477 HC PT THER PROC EA 15 MIN 7/18/2023 Angela Bowman PTA GP 1    41511226694 HC PT THERAPEUTIC ACT EA 15 MIN 7/18/2023 Angela Bowman PTA GP 1            PT G-Codes  Outcome Measure Options: AM-PAC 6 Clicks Basic Mobility (PT)  AM-PAC 6 Clicks Score (PT): 19  AM-PAC 6 Clicks Score (OT): 17    Angela Bowman PTA  7/18/2023

## 2023-07-18 NOTE — PROGRESS NOTES
[facility]   Hospitalist Progress Note  Date: 2023  Patient Name: Nikhil Baldwin  : 1945  MRN: [id number]  Date of admission: 2023      Subjective   Subjective     Chief Complaint: Follow up for left ear pain and headache    Summary: 78-year-old male with HTN, HLD, type II DM, DJD/arthritis, history of CVA, dementia who presented with 3-day history of pain above left ear, headache, neck pain.  No recent trauma.  No recent URI.  Blood pressure elevated otherwise VSS.  Labs unremarkable  Patient evaluated by ENT.  Patient declined laryngoscope with examination.  MRI brain negative.  CT neck with DJD especially at C4 level bilaterally.    Interval Followup:   No overnight events.  Vital stable.  Awake and alert.  Primarily complaining of pain above the left ear that is sharp and severe and radiates into his skull.  No vertigo. no lightheadedness dizziness. Also complaining of neck pain, chronic issue.  No muscle spasm.  States left upper extremity feels more weak.  Still unsteady feel drunk when walking and tend to fall forward.  Denies any tremors or stiffness.  Does have some blurred vision for past few days.  Feels weak in left hand  ESR within normal range.    Review of Systems  HEENT: + Pain above left ear. + Change in hearing.  No temple pain.  No vision changes or photophobia No sore throat, nasal congestion, sinus pressure  Respiratory:  No Cough, No Dyspnea  Gastrointestinal:  No Nausea, No Vomiting  MSK: + Neck pain and low back pain, chronic  Neuro.  Nontender left temple and scalp.  Tender left para paraspinal cervical muscles with increased pain on neck flexion and rotation to left.    Objective   Objective     Vitals:   Temp:  [97.2 °F (36.2 °C)-98.6 °F (37 °C)] 98.6 °F (37 °C)  Heart Rate:  [53-96] 73  Resp:  [16-18] 16  BP: ()/(62-86) 132/81  Physical Exam    Constitutional: Elderly male,  conversant, NAD   Eyes: Pupils equal and reactive, no conjunctival injections   HENT:  NCAT, nares patent, MMM, no obvious abnormality involving ears externally, tender to palpation above the left ear.  There is very mild swelling.  No discharge.  No bruising   Respiratory: Clear to auscultation bilaterally, nonlabored respirations    Cardiovascular: RRR, no murmurs, no edema   Gastrointestinal: Positive bowel sounds, soft, nondistended   MSK: Tender with palpation of the cervical spinous processes   Neurologic: Alert, Cranial Nerves grossly intact, speech clear, muscle strength symmetrical and intact   Skin: Extremities warm, no rashes or wounds    Result Review    Result Review:  I have personally reviewed the following over the last 24 hours (07:00 to 07:00) and agree with the following findings  [x]  Laboratory  CBC          4/17/2023    12:34 7/16/2023    10:22 7/17/2023    05:37   CBC   WBC 8.34  11.39  8.50    RBC 5.60  5.50  5.00    Hemoglobin 17.3  16.9  15.4    Hematocrit 49.0  49.0  44.8    MCV 87.5  89.1  89.6    MCH 30.9  30.7  30.8    MCHC 35.3  34.5  34.4    RDW 14.6  14.0  13.9    Platelets 219  197  179      BMP          7/16/2023    10:22 7/17/2023    05:37 7/18/2023    05:31   BMP   BUN 19  16  16    Creatinine 1.11  0.95  0.94    Sodium 142  140  138    Potassium 4.7  4.2  4.1    Chloride 105  108  104    CO2 25.0  22.3  22.4    Calcium 9.9  9.0  9.2    CK 29  HS trop T 12  TSH 2.270 / FT4 1.16    [x]  Microbiology  UA bland, non-infectious   [x]  Radiology MRI brain WO contrast no acute abnormality  []  EKG/Telemetry  []  Cardiology/Vascular   []  Pathology  []  Old records  [x]  Other:    Intake/Output Summary (Last 24 hours) at 7/18/2023 1857  Last data filed at 7/18/2023 1427  Gross per 24 hour   Intake 240 ml   Output 850 ml   Net -610 ml           Assessment & Plan   Assessment / Plan     Assessment/Plan:  Pain above left ear  Left sided headache  Cervical neck pain  Multilevel degenerative changes involving cervical spine  History of C6-C7 disc protrusion with mild/moderate  left neural foraminal narrowing  Essential hypertension  Dyslipidemia  Non-insulin-dependent type 2 diabetes  History of BPH  History of depression  History of vitamin D insufficiency.         ENT consulted, appreciate recommendations.  Does have significant degree of cervical arthritis based off of previous imaging.  CT C-spine noted.  Will get MRI of the C-spine.  Neurology consulted for evaluation.  Doubt PMR/temporal arteritis as ESR is within normal range  Combination of Tylenol, Neurontin and low-dose Norco for pain control. Avoid IV narcotics  Tolerating breakfast just fine.  Blood pressure stable along with kidney function.  Stop IV fluids  BP at goal <150/90.  Continue lisinopril 20 mg daily.   Blood sugars controlled.  Continue moderate/high-dose SSI per protocol  CK WNL. Continue Crestor  Continue Celexa  Continue Flomax  Continue vitamin D supplementation  PT/OT evaluation for discharge planning  BMP in a.m.  Speech to evaluate patient  Check UA    Discussed plan with RN and .  Possible rehab placement    DVT prophylaxis:  Medical DVT prophylaxis orders are present.    CODE STATUS:   Level Of Support Discussed With: Patient  Code Status (Patient has no pulse and is not breathing): CPR (Attempt to Resuscitate)  Medical Interventions (Patient has pulse or is breathing): Full Support      Electronically signed by Jaron Michele DO, 07/17/23, 7:39 AM EDT.

## 2023-07-19 ENCOUNTER — APPOINTMENT (OUTPATIENT)
Dept: CARDIOLOGY | Facility: HOSPITAL | Age: 78
End: 2023-07-19
Payer: MEDICARE

## 2023-07-19 ENCOUNTER — APPOINTMENT (OUTPATIENT)
Dept: CT IMAGING | Facility: HOSPITAL | Age: 78
End: 2023-07-19
Payer: MEDICARE

## 2023-07-19 LAB
ASCENDING AORTA: 4.3 CM
BH CV ECHO MEAS - AO MAX PG: 15 MMHG
BH CV ECHO MEAS - AO MEAN PG: 7.6 MMHG
BH CV ECHO MEAS - AO ROOT DIAM: 3.9 CM
BH CV ECHO MEAS - AO V2 MAX: 192 CM/SEC
BH CV ECHO MEAS - AO V2 VTI: 36 CM
BH CV ECHO MEAS - AVA(I,D): 1.69 CM2
BH CV ECHO MEAS - EDV(CUBED): 53.9 ML
BH CV ECHO MEAS - EDV(MOD-SP2): 68.6 ML
BH CV ECHO MEAS - EDV(MOD-SP4): 60.9 ML
BH CV ECHO MEAS - EF(MOD-BP): 68.3 %
BH CV ECHO MEAS - EF(MOD-SP2): 62.1 %
BH CV ECHO MEAS - EF(MOD-SP4): 70 %
BH CV ECHO MEAS - ESV(CUBED): 15.5 ML
BH CV ECHO MEAS - ESV(MOD-SP2): 26 ML
BH CV ECHO MEAS - ESV(MOD-SP4): 18.3 ML
BH CV ECHO MEAS - FS: 34 %
BH CV ECHO MEAS - IVS/LVPW: 1.22 CM
BH CV ECHO MEAS - IVSD: 1.49 CM
BH CV ECHO MEAS - LA DIMENSION: 3.7 CM
BH CV ECHO MEAS - LAT PEAK E' VEL: 7.8 CM/SEC
BH CV ECHO MEAS - LV MASS(C)D: 183.3 GRAMS
BH CV ECHO MEAS - LV MAX PG: 5 MMHG
BH CV ECHO MEAS - LV MEAN PG: 3.2 MMHG
BH CV ECHO MEAS - LV V1 MAX: 111.6 CM/SEC
BH CV ECHO MEAS - LV V1 VTI: 19.8 CM
BH CV ECHO MEAS - LVIDD: 3.8 CM
BH CV ECHO MEAS - LVIDS: 2.49 CM
BH CV ECHO MEAS - LVOT AREA: 3.1 CM2
BH CV ECHO MEAS - LVOT DIAM: 1.98 CM
BH CV ECHO MEAS - LVPWD: 1.22 CM
BH CV ECHO MEAS - MED PEAK E' VEL: 5.7 CM/SEC
BH CV ECHO MEAS - MV A MAX VEL: 90.8 CM/SEC
BH CV ECHO MEAS - MV DEC SLOPE: 306.3 CM/SEC2
BH CV ECHO MEAS - MV DEC TIME: 0.2 MSEC
BH CV ECHO MEAS - MV E MAX VEL: 58.7 CM/SEC
BH CV ECHO MEAS - MV E/A: 0.65
BH CV ECHO MEAS - MV MAX PG: 3 MMHG
BH CV ECHO MEAS - MV MEAN PG: 1.28 MMHG
BH CV ECHO MEAS - MV P1/2T: 70.5 MSEC
BH CV ECHO MEAS - MV V2 VTI: 21.3 CM
BH CV ECHO MEAS - MVA(P1/2T): 3.1 CM2
BH CV ECHO MEAS - MVA(VTI): 2.9 CM2
BH CV ECHO MEAS - RAP SYSTOLE: 3 MMHG
BH CV ECHO MEAS - RVDD: 2.19 CM
BH CV ECHO MEAS - RVSP: 19.8 MMHG
BH CV ECHO MEAS - SV(LVOT): 61 ML
BH CV ECHO MEAS - SV(MOD-SP2): 42.6 ML
BH CV ECHO MEAS - SV(MOD-SP4): 42.6 ML
BH CV ECHO MEAS - TR MAX PG: 16.8 MMHG
BH CV ECHO MEAS - TR MAX VEL: 205.2 CM/SEC
BH CV ECHO MEASUREMENTS AVERAGE E/E' RATIO: 8.7
GLUCOSE BLDC GLUCOMTR-MCNC: 139 MG/DL (ref 70–99)
GLUCOSE BLDC GLUCOMTR-MCNC: 148 MG/DL (ref 70–99)
GLUCOSE BLDC GLUCOMTR-MCNC: 194 MG/DL (ref 70–99)
GLUCOSE BLDC GLUCOMTR-MCNC: 200 MG/DL (ref 70–99)
GLUCOSE BLDC GLUCOMTR-MCNC: 279 MG/DL (ref 70–99)
LEFT ATRIUM VOLUME INDEX: 20.7 ML/M2

## 2023-07-19 PROCEDURE — 93306 TTE W/DOPPLER COMPLETE: CPT | Performed by: INTERNAL MEDICINE

## 2023-07-19 PROCEDURE — 93306 TTE W/DOPPLER COMPLETE: CPT

## 2023-07-19 PROCEDURE — 63710000001 PREDNISONE PER 1 MG: Performed by: INTERNAL MEDICINE

## 2023-07-19 PROCEDURE — 82948 REAGENT STRIP/BLOOD GLUCOSE: CPT

## 2023-07-19 PROCEDURE — 63710000001 INSULIN LISPRO (HUMAN) PER 5 UNITS: Performed by: HOSPITALIST

## 2023-07-19 PROCEDURE — G0378 HOSPITAL OBSERVATION PER HR: HCPCS

## 2023-07-19 PROCEDURE — 70498 CT ANGIOGRAPHY NECK: CPT

## 2023-07-19 PROCEDURE — 99233 SBSQ HOSP IP/OBS HIGH 50: CPT | Performed by: INTERNAL MEDICINE

## 2023-07-19 PROCEDURE — 70496 CT ANGIOGRAPHY HEAD: CPT

## 2023-07-19 PROCEDURE — 92610 EVALUATE SWALLOWING FUNCTION: CPT

## 2023-07-19 PROCEDURE — 96372 THER/PROPH/DIAG INJ SC/IM: CPT

## 2023-07-19 PROCEDURE — 25510000001 IOPAMIDOL PER 1 ML: Performed by: INTERNAL MEDICINE

## 2023-07-19 PROCEDURE — 25010000002 ENOXAPARIN PER 10 MG: Performed by: HOSPITALIST

## 2023-07-19 RX ORDER — PREDNISONE 20 MG/1
40 TABLET ORAL
Status: COMPLETED | OUTPATIENT
Start: 2023-07-19 | End: 2023-07-21

## 2023-07-19 RX ADMIN — ROSUVASTATIN 40 MG: 20 TABLET, FILM COATED ORAL at 08:36

## 2023-07-19 RX ADMIN — ASPIRIN 81 MG: 81 TABLET, COATED ORAL at 08:36

## 2023-07-19 RX ADMIN — INSULIN LISPRO 3 UNITS: 100 INJECTION, SOLUTION INTRAVENOUS; SUBCUTANEOUS at 11:33

## 2023-07-19 RX ADMIN — Medication 5000 UNITS: at 08:36

## 2023-07-19 RX ADMIN — Medication 400 MG: at 08:36

## 2023-07-19 RX ADMIN — CITALOPRAM HYDROBROMIDE 10 MG: 20 TABLET ORAL at 08:36

## 2023-07-19 RX ADMIN — Medication 5 MG: at 21:35

## 2023-07-19 RX ADMIN — IOPAMIDOL 100 ML: 755 INJECTION, SOLUTION INTRAVENOUS at 18:35

## 2023-07-19 RX ADMIN — TAMSULOSIN HYDROCHLORIDE 0.4 MG: 0.4 CAPSULE ORAL at 08:36

## 2023-07-19 RX ADMIN — HYDROCODONE BITARTRATE AND ACETAMINOPHEN 1 TABLET: 5; 325 TABLET ORAL at 08:36

## 2023-07-19 RX ADMIN — PREDNISONE 40 MG: 20 TABLET ORAL at 13:07

## 2023-07-19 RX ADMIN — HYDROCODONE BITARTRATE AND ACETAMINOPHEN 1 TABLET: 5; 325 TABLET ORAL at 17:52

## 2023-07-19 RX ADMIN — ENOXAPARIN SODIUM 40 MG: 100 INJECTION SUBCUTANEOUS at 08:36

## 2023-07-19 RX ADMIN — Medication 10 ML: at 21:35

## 2023-07-19 RX ADMIN — INSULIN LISPRO 8 UNITS: 100 INJECTION, SOLUTION INTRAVENOUS; SUBCUTANEOUS at 21:35

## 2023-07-19 RX ADMIN — GABAPENTIN 100 MG: 100 CAPSULE ORAL at 21:35

## 2023-07-19 RX ADMIN — Medication 10 ML: at 08:37

## 2023-07-19 RX ADMIN — INSULIN LISPRO 5 UNITS: 100 INJECTION, SOLUTION INTRAVENOUS; SUBCUTANEOUS at 17:52

## 2023-07-19 RX ADMIN — LISINOPRIL 10 MG: 10 TABLET ORAL at 08:36

## 2023-07-19 RX ADMIN — GABAPENTIN 100 MG: 100 CAPSULE ORAL at 08:36

## 2023-07-19 NOTE — CONSULTS
[facility]   Consult Note      Patient Name: Nikhil Baldwin  : 1945  MRN: [id number]  Primary Care Physician:  Felix Navarro Jr., MD  Date of admission: 2023    Subjective   Subjective     This 78 years old gentleman was seen upon the request of Dr. Martinez for evaluation    Chief Complaint:   Dizziness    HPI:  His granddaughter is at the bedside.  His son is on the telephone.    He dated the onset of his illness sometime on Friday, 2023 when he started getting dizzy.  With him and that he gets off balance.  He tends to fall forward.  There is no associated headache nausea or vomiting.  There was no chest pains or abdominal pains.    Review of Systems  No difficulty in seeing as well as speaking and swallowing.  No chest pains or abdominal pains.  He was not incontinent of his urine.      Past Medical History:   Diagnosis Date    Aorta disorder     Arthritis     Degenerative joint disease     Dementia     FRONTAL LOBE    Depression     Diabetes mellitus     Hyperlipidemia     Hypertension     Kidney stone     Stroke     MILD RIGHT SIDE DEFICITS       Past Surgical History:   Procedure Laterality Date    CHOLECYSTECTOMY      CYSTOSCOPY BLADDER STONE LITHOTRIPSY      KIDNEY STONE SURGERY         Family History: family history is not on file. Otherwise pertinent FHx was reviewed and not pertinent to current issue.    Social History:  reports that he has never smoked. He has never used smokeless tobacco. He reports that he does not drink alcohol and does not use drugs.    Psychosocial History: No known psychiatric disturbance.    Home Medications:  Cholecalciferol, SITagliptin, citalopram, empagliflozin, lisinopril, magnesium oxide, melatonin, metFORMIN, metoprolol succinate XL, rosuvastatin, and tamsulosin      Allergies:  Allergies   Allergen Reactions    Codeine Unknown - Low Severity    Promethazine Unknown - Low Severity    Celecoxib Rash    Ibuprofen Rash    Penicillins Rash     Promethazine Hcl Rash    Warfarin Rash       Vitals:   Temp:  [97.2 °F (36.2 °C)-98.6 °F (37 °C)] 98.6 °F (37 °C)  Heart Rate:  [53-96] 73  Resp:  [16-18] 16  BP: ()/(62-86) 132/81  Physical Exam   He was awake and alert was not in any form of distress was well-developed and fairly nourished.    His heart was regular but heart rate was 70/min.  There were no murmurs.  The lungs were clear.    The carotid pulses were 1+ and equal.  There were no bruits on either side.    Neurological Examination:  His responses were coherent and relevant.  He was aware of what was going on around him.  He has an insight to his condition.  He was able to understand and follow verbal commands.    I did not look into the fundus on either side because of the COVID-19 pandemic social distancing.    The pupils were 3 to 4 mm. They were round and equal reactive to light directly and consensually.  There was no extraocular muscle weakness.    He has a mild left lower facial weakness.  Was able to hear normal conversational speech.    The strength of the sternomastoid and trapezius muscles was normal and symmetrical.  The uvula and the tongue were in the midline.    He has 5% left hemiparesis.    He felt pinprick less on the left forehead left face left lower jaw as well as left upper and lower extremities.    The deep tendon reflexes were 1-2+ and and more active on the left side.    He has dystaxia on finger-to-nose test on the right but not on the left.      Result Review    Result Review:  I have personally reviewed the results from the time of this admission to 7/18/2023 20:27 EDT and agree with these findings:  []  Laboratory  []  Microbiology  [x]  Radiology  []  EKG/Telemetry   []  Cardiology/Vascular   []  Pathology  []  Old records  []  Other:    Most notable findings include:   July 18, 2023  I reviewed the digital images of the MRI of his brain was done on July 16, 2023.  Study showed a recent lacunar infarct in the right  anterior tamar close to the midline.  There is moderate chronic subcortical and periventricular white matter changes consistent with old microvascular ischemia.  There is moderate cortical atrophy which is more marked in the frontal and temporal regions.    Impression:    Recent Right [place] Lacunar Infarct July 14, 2023  Hyperlipidemia  Hypertension  Diabetes Mellitus    Plan:   Restart aspirin 81 mg/day.  Continue the rest of the medicine that he is taking.  We will start physical occupational therapy.  He is a good candidate for inpatient rehabilitation.        Please note that portions of this note were completed with a voice recognition program.  Part of this note is an electric or electronic transcription/translation of spoken language to printed text using the dragon dictating system.    Electronically signed by Glen Salinas Jr., MD, 07/18/23, 8:27 PM EDT.

## 2023-07-19 NOTE — PROGRESS NOTES
[facility] Hospitalist Progress Note  Date: 2023  Patient Name: Nikhil Baldwin  : 1945  MRN: [id number]  Date of admission: 2023      Subjective   Subjective     Chief Complaint: Follow up for left ear pain and headache    Summary: 78-year-old male with HTN, HLD, type II DM, DJD/arthritis, history of CVA, dementia who presented with 3-day history of pain above left ear, headache, neck pain.  No recent trauma.  No recent URI.  Blood pressure elevated otherwise VSS.  Labs unremarkable  Patient evaluated by ENT.  Patient declined laryngoscope with examination.  MRI brain negative.  CT neck with DJD especially at C4 level bilaterally.    Interval Followup:   No overnight events.  Vital stable.  Awake and alert.  Primarily complaining of pain above the left ear that is sharp and severe and radiates into his skull.  No vertigo. no lightheadedness dizziness. Also complaining of neck pain, chronic issue.  No muscle spasm.  States left upper extremity feels more weak.  Still unsteady feel drunk when walking and tend to fall forward.  Denies any tremors or stiffness.  Does have some blurred vision for past few days.  Feels weak in left hand  ESR within normal range.  Patient evaluated by in-house neurology and diagnosed with right anterior tamar lacunar infarct.    Review of Systems  HEENT: + Pain above left ear. + Change in hearing.  No temple pain.  No vision changes or photophobia No sore throat, nasal congestion, sinus pressure  Respiratory:  No Cough, No Dyspnea  Gastrointestinal:  No Nausea, No Vomiting  MSK: + Neck pain and low back pain, chronic  Neuro.  Nontender left temple and scalp.  Tender left para paraspinal cervical muscles with increased pain on neck flexion and rotation to left.    Objective   Objective     Vitals:   Temp:  [97.7 °F (36.5 °C)-99.1 °F (37.3 °C)] 98.5 °F (36.9 °C)  Heart Rate:  [69-92] 92  Resp:  [16-18] 18  BP: ()/(55-89) 130/71  Physical Exam    Constitutional:  Elderly male,  conversant, NAD   Eyes: Pupils equal and reactive, no conjunctival injections   HENT: NCAT, nares patent, MMM, no obvious abnormality involving ears externally, tender to palpation above the left ear.  There is very mild swelling.  No discharge.  No bruising   Respiratory: Clear to auscultation bilaterally, nonlabored respirations    Cardiovascular: RRR, no murmurs, no edema   Gastrointestinal: Positive bowel sounds, soft, nondistended   MSK: Tender with palpation of the cervical spinous processes   Neurologic: Alert, Cranial Nerves grossly intact, speech clear, muscle strength symmetrical and intact   Skin: Extremities warm, no rashes or wounds    Result Review    Result Review:  I have personally reviewed the following over the last 24 hours (07:00 to 07:00) and agree with the following findings  [x]  Laboratory  CBC          4/17/2023    12:34 7/16/2023    10:22 7/17/2023    05:37   CBC   WBC 8.34  11.39  8.50    RBC 5.60  5.50  5.00    Hemoglobin 17.3  16.9  15.4    Hematocrit 49.0  49.0  44.8    MCV 87.5  89.1  89.6    MCH 30.9  30.7  30.8    MCHC 35.3  34.5  34.4    RDW 14.6  14.0  13.9    Platelets 219  197  179      BMP          7/16/2023    10:22 7/17/2023    05:37 7/18/2023    05:31   BMP   BUN 19  16  16    Creatinine 1.11  0.95  0.94    Sodium 142  140  138    Potassium 4.7  4.2  4.1    Chloride 105  108  104    CO2 25.0  22.3  22.4    Calcium 9.9  9.0  9.2    CK 29  HS trop T 12  TSH 2.270 / FT4 1.16    [x]  Microbiology  UA bland, non-infectious   [x]  Radiology MRI brain WO contrast no acute abnormality  []  EKG/Telemetry  []  Cardiology/Vascular   []  Pathology  []  Old records  [x]  Other:    Intake/Output Summary (Last 24 hours) at 7/19/2023 1843  Last data filed at 7/19/2023 1700  Gross per 24 hour   Intake 720 ml   Output 250 ml   Net 470 ml           Assessment & Plan   Assessment / Plan     Assessment/Plan:  Pain above left ear  Left sided headache  Cervical neck pain  Multilevel  degenerative changes involving cervical spine  History of C6-C7 disc protrusion with mild/moderate left neural foraminal narrowing  Essential hypertension  Dyslipidemia  Non-insulin-dependent type 2 diabetes.  Hemoglobin A1c of 7.3%  History of BPH  History of depression  History of vitamin D insufficiency.  Probably acute lacunar infarct right anterior tamar with left arm weakness and unsteady gait         ENT consulted, appreciate recommendations.  Does have significant degree of cervical arthritis based off of previous imaging.  CT C-spine noted.  MRI of the C-spine noted no significant change from 2020 MRI.  Check CTA of the head and neck  2D echo for stroke work-up.  Neurology consulted for evaluation.  Doubt PMR/temporal arteritis as ESR is within normal range.  Appreciate input  Trial of steroids for cervical DJD and impingement  Combination of Tylenol, Neurontin and low-dose Norco for pain control. Avoid IV narcotics  BP at goal <150/90.  Hold lisinopril 20 mg daily due to soft blood pressure.   Blood sugars controlled.  Continue moderate/high-dose SSI per protocol  CK WNL. Continue Crestor  Continue Celexa  Continue Flomax  Continue vitamin D supplementation  PT/OT evaluation for discharge planning  BMP in a.m.  Speech to evaluate patient.  Appreciate input   UA negative    Discussed plan with RN and .  [facility] with [facility]    DVT prophylaxis:  Medical DVT prophylaxis orders are present.    CODE STATUS:   Level Of Support Discussed With: Patient  Code Status (Patient has no pulse and is not breathing): CPR (Attempt to Resuscitate)  Medical Interventions (Patient has pulse or is breathing): Full Support        Electronically signed by Richard Martinez MD, 07/19/23, 6:45 PM EDT.

## 2023-07-19 NOTE — THERAPY EVALUATION
Acute Care - Speech Language Pathology   Swallow Initial Evaluation [facility]ill[facility]     Patient Name: Nikhil Baldwin  : 1945  MRN: [id number]  Today's Date: 2023               Admit Date: 2023    Visit Dx:     ICD-10-CM ICD-9-CM   1. Weakness  R53.1 780.79   2. Decreased activities of daily living (ADL)  Z78.9 V49.89   3. Difficulty walking  R26.2 719.7     Patient Active Problem List   Diagnosis    Aortic aneurysm    Arthritis    BPH (benign prostatic hyperplasia)    Cervical radiculopathy    Type 2 diabetes mellitus with hyperglycemia, without long-term current use of insulin    Parathyroid abnormality    Thyroid disorder    Facet arthropathy    Fatty metamorphosis of liver    GERD (gastroesophageal reflux disease)    Primary hypertension    Hematuria    Hiatal hernia    Urinary incontinence    Hypercalcemia    Other hyperlipidemia    Kidney disease    Renal calculi    Low back pain    Thoracic back pain    DDD (degenerative disc disease), lumbar    Spinal stenosis of lumbar region    Degeneration of thoracic or thoracolumbar intervertebral disc    MI (myocardial infarction)    Myofascial pain    Neck pain    Nocturia    Sciatica    Stroke    Ureterolithiasis    Vitamin D deficiency    Labile personality    Acute viral syndrome    Debility     Past Medical History:   Diagnosis Date    Aorta disorder     Arthritis     Degenerative joint disease     Dementia     FRONTAL LOBE    Depression     Diabetes mellitus     Hyperlipidemia     Hypertension     Kidney stone     Stroke     MILD RIGHT SIDE DEFICITS     Past Surgical History:   Procedure Laterality Date    CHOLECYSTECTOMY      CYSTOSCOPY BLADDER STONE LITHOTRIPSY      KIDNEY STONE SURGERY         PAIN SCALE: None noted reported left ear/neck pain    PRECAUTIONS/CONTRAINDICATIONS: None noted    SUSPECTED ABUSE/NEGLECT/EXPLOITATION: None noted    SOCIAL/PSYCHOLOGICAL NEEDS/BARRIERS: None noted    PAST SOCIAL HISTORY:      PRIOR FUNCTION:  Independent    PATIENT GOALS/EXPECTATIONS: Did not report    HISTORY: This patient is an 78-year-old male admitted with the above diagnosis.  Patient with history of dementia.    CURRENT DIET LEVEL: Regular diet    OBJECTIVE:    TEST ADMINISTERED: Clinical dysphagia evaluation    COGNITION/SAFETY AWARENESS: Alert    BEHAVIORAL OBSERVATIONS: Cooperative    ORAL MOTOR EXAM: Generalized oral motor weakness is noted.    VOICE QUALITY: Mild hoarse    REFLEX EXAM: Deferred    POSTURE: 90 degrees upright    FEEDING/SWALLOWING FUNCTION: Assessed with thin liquids from cup and straw, purée consistencies soft and hard solids    CLINICAL OBSERVATIONS: Oral stage is characterized by good bolus preparation control.  Appears to have timely oral transit.  Swallow completed with thin liquids without clinical sign or symptom of aspiration.  Swallow completed with purée and regular/soft solids without clinical sign or symptom of aspiration.  Patient does report globus sensation after completion of swallow with solids.  Liquid wash and double swallow are effective in clearing.    DYSPHAGIA CRITERIA: Risk of aspiration    FUNCTIONAL ASSESSMENT INSTRUMENT: Patient currently scored a level 6 of 7 on Functional Communication Measures for swallowing indicating a 1-19% limitation in function.    ASSESSMENT/ PLAN OF CARE:  Pt presents with limitations, noted below, that impede his ability to swallow safely. The skills of a therapist will be required to safely and effectively implement the following treatment plan to restore maximal level of function.    PROBLEMS:  1.  Dysphagia   LTG 1: (30 days) patient will increase ability to tolerate least restrictive diet and improve functional communication measure for swallowing to a 7 of 7   STG 1a: (14 days) patient will tolerate soft solids and thin liquids without clinical sign or symptom of aspiration with 8 of 10 trials.   STG 1b: (14 days) patient will utilize compensatory swallow strategies  independently   STG 1c: (14 days) patient/family teaching   TREATMENT: Dysphagia therapy to ensure diet tolerance, advance to least restrictive diet and analyze for aspiration risk.    FREQUENCY/DURATION:  qd 5 times per week     REHAB POTENTIAL:  Pt has good medical status rehab potential.  The following limitations may influence improvement/ length of tx  medical status    RECOMMENDATIONS:   1.   DIET: Mechanical soft diet-thin liquids, extra sauce/gravy with each tray    2.  POSITION: 90 degrees upright    3.  COMPENSATORY STRATEGIES: Slow rate, small bites/drinks, oral meds in applesauce, double swallow and alternate solids and liquids    Pt/responsible party agrees with plan of care and has been informed of all alternatives, risks and benefits.    EDUCATION  The patient has been educated in the following areas:   Dysphagia (Swallowing Impairment).        Sheila Humphries, SLP  7/19/2023

## 2023-07-19 NOTE — PLAN OF CARE
Goal Outcome Evaluation:  Plan of Care Reviewed With: patient        Progress: no change  Outcome Evaluation: alert and oriented x 4. up with x 1 assistance/walker and up in the chair some today. blood glucose monitored. medicated for pain per orders. no other complaints at this time.

## 2023-07-19 NOTE — PLAN OF CARE
Goal Outcome Evaluation:  Plan of Care Reviewed With: patient, family        Progress: no change  Outcome Evaluation: alert and oriented x 4. up with x 1 assistance/walker this shift and up in the chair some. blood glucose monitored. medicated for pain per orders. no other complaints at this time.

## 2023-07-20 LAB
ANION GAP SERPL CALCULATED.3IONS-SCNC: 9.1 MMOL/L (ref 5–15)
BUN SERPL-MCNC: 20 MG/DL (ref 8–23)
BUN/CREAT SERPL: 18.2 (ref 7–25)
CALCIUM SPEC-SCNC: 9.3 MG/DL (ref 8.6–10.5)
CHLORIDE SERPL-SCNC: 106 MMOL/L (ref 98–107)
CHOLEST SERPL-MCNC: 102 MG/DL (ref 0–200)
CO2 SERPL-SCNC: 23.9 MMOL/L (ref 22–29)
CREAT SERPL-MCNC: 1.1 MG/DL (ref 0.76–1.27)
EGFRCR SERPLBLD CKD-EPI 2021: 67.9 ML/MIN/1.73
GLUCOSE BLDC GLUCOMTR-MCNC: 159 MG/DL (ref 70–99)
GLUCOSE BLDC GLUCOMTR-MCNC: 178 MG/DL (ref 70–99)
GLUCOSE BLDC GLUCOMTR-MCNC: 252 MG/DL (ref 70–99)
GLUCOSE BLDC GLUCOMTR-MCNC: 278 MG/DL (ref 70–99)
GLUCOSE SERPL-MCNC: 184 MG/DL (ref 65–99)
HDLC SERPL-MCNC: 39 MG/DL (ref 40–60)
LDLC SERPL CALC-MCNC: 44 MG/DL (ref 0–100)
LDLC/HDLC SERPL: 1.1 {RATIO}
POTASSIUM SERPL-SCNC: 4.2 MMOL/L (ref 3.5–5.2)
SODIUM SERPL-SCNC: 139 MMOL/L (ref 136–145)
TRIGL SERPL-MCNC: 101 MG/DL (ref 0–150)
VLDLC SERPL-MCNC: 19 MG/DL (ref 5–40)

## 2023-07-20 PROCEDURE — 99233 SBSQ HOSP IP/OBS HIGH 50: CPT | Performed by: INTERNAL MEDICINE

## 2023-07-20 PROCEDURE — 97116 GAIT TRAINING THERAPY: CPT

## 2023-07-20 PROCEDURE — 96372 THER/PROPH/DIAG INJ SC/IM: CPT

## 2023-07-20 PROCEDURE — G0378 HOSPITAL OBSERVATION PER HR: HCPCS

## 2023-07-20 PROCEDURE — 80061 LIPID PANEL: CPT | Performed by: INTERNAL MEDICINE

## 2023-07-20 PROCEDURE — 80048 BASIC METABOLIC PNL TOTAL CA: CPT | Performed by: INTERNAL MEDICINE

## 2023-07-20 PROCEDURE — 82948 REAGENT STRIP/BLOOD GLUCOSE: CPT

## 2023-07-20 PROCEDURE — 25010000002 ENOXAPARIN PER 10 MG: Performed by: HOSPITALIST

## 2023-07-20 PROCEDURE — 63710000001 PREDNISONE PER 1 MG: Performed by: INTERNAL MEDICINE

## 2023-07-20 PROCEDURE — 63710000001 INSULIN LISPRO (HUMAN) PER 5 UNITS: Performed by: HOSPITALIST

## 2023-07-20 RX ADMIN — BISACODYL 5 MG: 5 TABLET, COATED ORAL at 21:36

## 2023-07-20 RX ADMIN — INSULIN LISPRO 8 UNITS: 100 INJECTION, SOLUTION INTRAVENOUS; SUBCUTANEOUS at 18:01

## 2023-07-20 RX ADMIN — Medication 400 MG: at 08:41

## 2023-07-20 RX ADMIN — GABAPENTIN 100 MG: 100 CAPSULE ORAL at 21:36

## 2023-07-20 RX ADMIN — INSULIN LISPRO 8 UNITS: 100 INJECTION, SOLUTION INTRAVENOUS; SUBCUTANEOUS at 21:46

## 2023-07-20 RX ADMIN — INSULIN LISPRO 3 UNITS: 100 INJECTION, SOLUTION INTRAVENOUS; SUBCUTANEOUS at 12:29

## 2023-07-20 RX ADMIN — TAMSULOSIN HYDROCHLORIDE 0.4 MG: 0.4 CAPSULE ORAL at 08:41

## 2023-07-20 RX ADMIN — INSULIN LISPRO 3 UNITS: 100 INJECTION, SOLUTION INTRAVENOUS; SUBCUTANEOUS at 08:40

## 2023-07-20 RX ADMIN — ASPIRIN 81 MG: 81 TABLET, COATED ORAL at 08:41

## 2023-07-20 RX ADMIN — Medication 5 MG: at 21:36

## 2023-07-20 RX ADMIN — GABAPENTIN 100 MG: 100 CAPSULE ORAL at 08:41

## 2023-07-20 RX ADMIN — Medication 5000 UNITS: at 08:40

## 2023-07-20 RX ADMIN — CITALOPRAM HYDROBROMIDE 10 MG: 20 TABLET ORAL at 08:41

## 2023-07-20 RX ADMIN — ROSUVASTATIN 40 MG: 20 TABLET, FILM COATED ORAL at 08:40

## 2023-07-20 RX ADMIN — Medication 10 ML: at 21:36

## 2023-07-20 RX ADMIN — Medication 10 ML: at 08:42

## 2023-07-20 RX ADMIN — HYDROCODONE BITARTRATE AND ACETAMINOPHEN 1 TABLET: 5; 325 TABLET ORAL at 08:40

## 2023-07-20 RX ADMIN — ENOXAPARIN SODIUM 40 MG: 100 INJECTION SUBCUTANEOUS at 08:39

## 2023-07-20 RX ADMIN — PREDNISONE 40 MG: 20 TABLET ORAL at 08:40

## 2023-07-20 NOTE — THERAPY EVALUATION
Acute Care - Physical Therapy Initial Evaluation  [facility]     Patient Name: Nikhil Baldwin  : 1945  MRN: [id number]  Today's Date: 2023    Admit date: 2023     Referring Physician: Richard Martinez MD     Surgery Date:* No surgery found *    l      Visit Dx:     ICD-10-CM ICD-9-CM   1. Weakness  R53.1 780.79   2. Decreased activities of daily living (ADL)  Z78.9 V49.89   3. Difficulty walking  R26.2 719.7     Patient Active Problem List   Diagnosis    Aortic aneurysm    Arthritis    BPH (benign prostatic hyperplasia)    Cervical radiculopathy    Type 2 diabetes mellitus with hyperglycemia, without long-term current use of insulin    Parathyroid abnormality    Thyroid disorder    Facet arthropathy    Fatty metamorphosis of liver    GERD (gastroesophageal reflux disease)    Primary hypertension    Hematuria    Hiatal hernia    Urinary incontinence    Hypercalcemia    Other hyperlipidemia    Kidney disease    Renal calculi    Low back pain    Thoracic back pain    DDD (degenerative disc disease), lumbar    Spinal stenosis of lumbar region    Degeneration of thoracic or thoracolumbar intervertebral disc    MI (myocardial infarction)    Myofascial pain    Neck pain    Nocturia    Sciatica    Stroke    Ureterolithiasis    Vitamin D deficiency    Labile personality    Acute viral syndrome    Debility     Past Medical History:   Diagnosis Date    Aorta disorder     Arthritis     Degenerative joint disease     Dementia     FRONTAL LOBE    Depression     Diabetes mellitus     Hyperlipidemia     Hypertension     Kidney stone     Stroke     MILD RIGHT SIDE DEFICITS     Past Surgical History:   Procedure Laterality Date    CHOLECYSTECTOMY      CYSTOSCOPY BLADDER STONE LITHOTRIPSY      KIDNEY STONE SURGERY       PT Assessment (last 12 hours)       PT Evaluation and Treatment       Row Name 23 1500          Physical Therapy Time and Intention    Subjective Information no complaints  -JOSE EDUARDO     Document Type  therapy note (daily note)  -JOSE EDUARDO     Mode of Treatment individual therapy;physical therapy  -JOSE EDUARDO     Patient Effort good  -JOSE EDUARDO       Row Name 07/20/23 1500          General Information    Patient Observations alert;cooperative;agree to therapy  -JOSE EDUARDO       Row Name 07/20/23 1500          Bed Mobility    Bed Mobility supine-sit-supine  -JOSE EDUARDO     Supine-Sit [place] (Bed Mobility) standby assist  -JOSE EDUARDO     Sit-Supine [place] (Bed Mobility) standby assist  -JOSE EDUARDO       Row Name 07/20/23 1500          Sit-Stand Transfer    Sit-Stand [place] (Transfers) contact guard  -JOSE EDUARDO     Assistive Device (Sit-Stand Transfers) walker, front-wheeled  -JOSE EDUARDO       Row Name 07/20/23 1500          Gait/Stairs (Locomotion)    Gait/Stairs Locomotion gait/ambulation assistive device  -JOSE EDUARDO     [place] Level (Gait) contact guard  -JOSE EDUARDO     Assistive Device (Gait) walker, front-wheeled  -JOSE EDUARDO     Distance in Feet (Gait) 100  -JOSE EDUARDO       Row Name 07/20/23 1500          Balance    Dynamic Standing Balance contact guard  -JOSE EDUARDO     Position/Device Used, Standing Balance walker, front-wheeled  -JOSE EDUARDO       Row Name 07/20/23 1500          Progress Summary (PT)    Progress Toward Functional Goals (PT) progress toward functional goals is good  -JOSE EDUARDO               User Key  (r) = Recorded By, (t) = Taken By, (c) = Cosigned By      Initials Name Provider Type    JOSE EDUARDOJaydon Mckoy, PT Physical Therapist                    Physical Therapy Education       Title: PT OT SLP Therapies (In Progress)       Topic: Physical Therapy (In Progress)       Point: Mobility training (Done)       Learning Progress Summary             Patient Acceptance, E,TB, VU by AV at 7/17/2023 1327                         Point: Home exercise program (Not Started)       Learner Progress:  Not documented in this visit.              Point: Body mechanics (Done)       Learning Progress Summary             Patient Acceptance, E,TB, VU by AV at 7/17/2023 1327                         Point:  Precautions (Done)       Learning Progress Summary             Patient Acceptance, E,TB, VU by AV at 7/17/2023 1327                                         User Key       Initials Effective Dates Name Provider Type Discipline    AV 06/11/21 -  Dustin Christopher, NATHANAEL Physical Therapist PT                  PT Recommendation and Plan     Progress Summary (PT)  Progress Toward Functional Goals (PT): progress toward functional goals is good   Outcome Measures       Row Name 07/18/23 0859             How much help from another person do you currently need...    Turning from your back to your side while in flat bed without using bedrails? 4  -DK      Moving from lying on back to sitting on the side of a flat bed without bedrails? 4  -DK      Moving to and from a bed to a chair (including a wheelchair)? 3  -DK      Standing up from a chair using your arms (e.g., wheelchair, bedside chair)? 3  -DK      Climbing 3-5 steps with a railing? 2  -DK      To walk in hospital room? 3  -DK      AM-PAC 6 Clicks Score (PT) 19  -DK         Functional Assessment    Outcome Measure Options AM-PAC 6 Clicks Basic Mobility (PT)  -DK                User Key  (r) = Recorded By, (t) = Taken By, (c) = Cosigned By      Initials Name Provider Type    DK Angela Bowman, EJ Physical Therapist Assistant                     Time Calculation:    PT Charges       Row Name 07/20/23 1513             Time Calculation    PT Received On 07/20/23  -JOSE EDUARDO         Timed Charges    90484 - Gait Training Minutes  10  -JOSE EDUARDO         Total Minutes    Timed Charges Total Minutes 10  -JOSE EDUARDO       Total Minutes 10  -JOSE EDUARDO                User Key  (r) = Recorded By, (t) = Taken By, (c) = Cosigned By      Initials Name Provider Type    Jaydon Alvarado PT Physical Therapist                  Therapy Charges for Today       Code Description Service Date Service Provider Modifiers Qty    [id number] HC GAIT TRAINING EA 15 MIN 7/20/2023 Jaydon Velasquez, PT GP 1            PT  G-Codes  Outcome Measure Options: AM-PAC 6 Clicks Basic Mobility (PT)  AM-PAC 6 Clicks Score (PT): 19  AM-PAC 6 Clicks Score (OT): 17    Jaydon Velasquez, PT  7/20/2023

## 2023-07-20 NOTE — PROGRESS NOTES
[facility]   Hospitalist Progress Note  Date: 2023  Patient Name: Nikhil Baldwin  : 1945  MRN: [id number]  Date of admission: 2023      Subjective   Subjective     Chief Complaint: Follow up for left ear pain and headache    Summary: 78-year-old male with HTN, HLD, type II DM, DJD/arthritis, history of CVA, dementia who presented with 3-day history of pain above left ear, headache, neck pain.  No recent trauma.  No recent URI.  Blood pressure elevated otherwise VSS.  Labs unremarkable  Patient evaluated by ENT.  Patient declined laryngoscope with examination.  MRI brain negative.  CT neck with DJD especially at C4 level bilaterally.    Interval Followup:   No overnight events.  Vital stable.  Awake and alert.    Headache and left neck pain improving 5/10.  Able to flex neck but still hurts to turn neck to the left side  Left arm weakness improving   Ambulated with therapy  Less unsteady  ESR within normal range.  Patient evaluated by in-house neurology and diagnosed with right anterior tamar lacunar infarct.    Review of Systems  HEENT: + Pain above left ear. + Change in hearing.  No temple pain.  No vision changes or photophobia No sore throat, nasal congestion, sinus pressure  Respiratory:  No Cough, No Dyspnea  Gastrointestinal:  No Nausea, No Vomiting  MSK: + Neck pain and low back pain, chronic  Neuro.  Nontender left temple and scalp.  Tender left para paraspinal cervical muscles with increased pain on neck flexion and rotation to left.    Objective   Objective     Vitals:   Temp:  [97.3 °F (36.3 °C)-98.6 °F (37 °C)] 98.6 °F (37 °C)  Heart Rate:  [52-84] 57  Resp:  [16-18] 18  BP: (122-143)/(65-85) 143/75  Physical Exam    Constitutional: Elderly male,  conversant, NAD   Eyes: Pupils equal and reactive, no conjunctival injections   HENT: NCAT, nares patent, MMM, no obvious abnormality involving ears externally, tender to palpation above the left ear.  There is very mild swelling.  No  discharge.  No bruising   Respiratory: Clear to auscultation bilaterally, nonlabored respirations    Cardiovascular: RRR, no murmurs, no edema   Gastrointestinal: Positive bowel sounds, soft, nondistended   MSK: Tender with palpation of the cervical spinous processes   Neurologic: Alert, Cranial Nerves grossly intact, speech clear, muscle strength symmetrical and intact   Skin: Extremities warm, no rashes or wounds    Result Review    Result Review:  I have personally reviewed the following over the last 24 hours (07:00 to 07:00) and agree with the following findings  [x]  Laboratory  CBC          4/17/2023    12:34 7/16/2023    10:22 7/17/2023    05:37   CBC   WBC 8.34  11.39  8.50    RBC 5.60  5.50  5.00    Hemoglobin 17.3  16.9  15.4    Hematocrit 49.0  49.0  44.8    MCV 87.5  89.1  89.6    MCH 30.9  30.7  30.8    MCHC 35.3  34.5  34.4    RDW 14.6  14.0  13.9    Platelets 219  197  179      BMP          7/17/2023    05:37 7/18/2023    05:31 7/20/2023    06:13   BMP   BUN 16  16  20    Creatinine 0.95  0.94  1.10    Sodium 140  138  139    Potassium 4.2  4.1  4.2    Chloride 108  104  106    CO2 22.3  22.4  23.9    Calcium 9.0  9.2  9.3    CK 29  HS trop T 12  TSH 2.270 / FT4 1.16    [x]  Microbiology  UA bland, non-infectious   [x]  Radiology MRI brain WO contrast no acute abnormality  []  EKG/Telemetry  []  Cardiology/Vascular   []  Pathology  []  Old records  [x]  Other:    Intake/Output Summary (Last 24 hours) at 7/20/2023 1752  Last data filed at 7/20/2023 1420  Gross per 24 hour   Intake 480 ml   Output 450 ml   Net 30 ml           Assessment & Plan   Assessment / Plan     Assessment/Plan:  Pain above left ear  Left sided headache  Cervical neck pain  Multilevel degenerative changes involving cervical spine  History of C6-C7 disc protrusion with mild/moderate left neural foraminal narrowing  Essential hypertension  Dyslipidemia  Non-insulin-dependent type 2 diabetes.  Hemoglobin A1c of 7.3%  History of  BPH  History of depression  History of vitamin D insufficiency.  Probably acute lacunar infarct right anterior tamar with left arm weakness and unsteady gait         ENT consulted, appreciate recommendations.  Does have significant degree of cervical arthritis based off of previous imaging.  CT C-spine noted.  MRI of the C-spine noted no significant change from 2020 MRI.  CTA of the head and neck negative for occlusion.  2D echo noted with EF of 56 to 60%.  Does have diastolic dysfunction  Neurology consulted for evaluation.  Doubt PMR/temporal arteritis as ESR is within normal range.  Appreciate input  Trial of steroids for cervical DJD and impingement is helping  Combination of Tylenol, Neurontin and low-dose Norco for pain control. Avoid IV narcotics  BP at goal <150/90.  Hold lisinopril 20 mg daily due to soft blood pressure.   Blood sugars controlled.  Continue moderate/high-dose SSI per protocol  CK WNL. Continue Crestor.  LDL of 44  Continue Celexa  Continue Flomax  Continue vitamin D supplementation  PT/OT evaluation for discharge planning  BMP in a.m.  Speech to evaluate patient.  Appreciate input   UA negative    Discussed plan with RN and .  Poss[facility] rehab placement with [facility].  Discharge when bed available.    DVT prophylaxis:  Medical DVT prophylaxis orders are present.    CODE STATUS:   Level Of Support Discussed With: Patient  Code Status (Patient has no pulse and is not breathing): CPR (Attempt to Resuscitate)  Medical Interventions (Patient has pulse or is breathing): Full Support        Electronically signed by Richard Martinez MD, 07/20/23, 5:55 PM EDT.

## 2023-07-20 NOTE — PLAN OF CARE
Goal Outcome Evaluation:              Outcome Evaluation: Patient complained of pain x1 and medicated per orders, vitals stable, got into recliner, no other complaints at this time.

## 2023-07-21 LAB
GLUCOSE BLDC GLUCOMTR-MCNC: 116 MG/DL (ref 70–99)
GLUCOSE BLDC GLUCOMTR-MCNC: 195 MG/DL (ref 70–99)
GLUCOSE BLDC GLUCOMTR-MCNC: 277 MG/DL (ref 70–99)
GLUCOSE BLDC GLUCOMTR-MCNC: 328 MG/DL (ref 70–99)

## 2023-07-21 PROCEDURE — 99232 SBSQ HOSP IP/OBS MODERATE 35: CPT | Performed by: INTERNAL MEDICINE

## 2023-07-21 PROCEDURE — G0378 HOSPITAL OBSERVATION PER HR: HCPCS

## 2023-07-21 PROCEDURE — 96372 THER/PROPH/DIAG INJ SC/IM: CPT

## 2023-07-21 PROCEDURE — 63710000001 PREDNISONE PER 1 MG: Performed by: INTERNAL MEDICINE

## 2023-07-21 PROCEDURE — 97110 THERAPEUTIC EXERCISES: CPT

## 2023-07-21 PROCEDURE — 82948 REAGENT STRIP/BLOOD GLUCOSE: CPT

## 2023-07-21 PROCEDURE — 25010000002 ENOXAPARIN PER 10 MG: Performed by: HOSPITALIST

## 2023-07-21 PROCEDURE — 63710000001 INSULIN LISPRO (HUMAN) PER 5 UNITS: Performed by: HOSPITALIST

## 2023-07-21 RX ADMIN — INSULIN LISPRO 10 UNITS: 100 INJECTION, SOLUTION INTRAVENOUS; SUBCUTANEOUS at 21:06

## 2023-07-21 RX ADMIN — Medication 10 ML: at 21:07

## 2023-07-21 RX ADMIN — ENOXAPARIN SODIUM 40 MG: 100 INJECTION SUBCUTANEOUS at 08:26

## 2023-07-21 RX ADMIN — INSULIN LISPRO 8 UNITS: 100 INJECTION, SOLUTION INTRAVENOUS; SUBCUTANEOUS at 17:33

## 2023-07-21 RX ADMIN — GABAPENTIN 100 MG: 100 CAPSULE ORAL at 21:06

## 2023-07-21 RX ADMIN — GABAPENTIN 100 MG: 100 CAPSULE ORAL at 08:27

## 2023-07-21 RX ADMIN — ASPIRIN 81 MG: 81 TABLET, COATED ORAL at 08:26

## 2023-07-21 RX ADMIN — TAMSULOSIN HYDROCHLORIDE 0.4 MG: 0.4 CAPSULE ORAL at 08:26

## 2023-07-21 RX ADMIN — INSULIN LISPRO 3 UNITS: 100 INJECTION, SOLUTION INTRAVENOUS; SUBCUTANEOUS at 12:16

## 2023-07-21 RX ADMIN — HYDROCODONE BITARTRATE AND ACETAMINOPHEN 1 TABLET: 5; 325 TABLET ORAL at 17:36

## 2023-07-21 RX ADMIN — Medication 5000 UNITS: at 08:27

## 2023-07-21 RX ADMIN — Medication 400 MG: at 08:27

## 2023-07-21 RX ADMIN — PREDNISONE 40 MG: 20 TABLET ORAL at 08:26

## 2023-07-21 RX ADMIN — Medication 10 ML: at 08:27

## 2023-07-21 RX ADMIN — CITALOPRAM HYDROBROMIDE 10 MG: 20 TABLET ORAL at 08:27

## 2023-07-21 RX ADMIN — Medication 5 MG: at 21:06

## 2023-07-21 RX ADMIN — ROSUVASTATIN 40 MG: 20 TABLET, FILM COATED ORAL at 08:26

## 2023-07-21 NOTE — THERAPY TREATMENT NOTE
Patient Name: Nikhil Baldwin  : 1945    MRN: [id number]                              Today's Date: 2023       Admit Date: 2023    Visit Dx:     ICD-10-CM ICD-9-CM   1. Weakness  R53.1 780.79   2. Decreased activities of daily living (ADL)  Z78.9 V49.89   3. Difficulty walking  R26.2 719.7     Patient Active Problem List   Diagnosis    Aortic aneurysm    Arthritis    BPH (benign prostatic hyperplasia)    Cervical radiculopathy    Type 2 diabetes mellitus with hyperglycemia, without long-term current use of insulin    Parathyroid abnormality    Thyroid disorder    Facet arthropathy    Fatty metamorphosis of liver    GERD (gastroesophageal reflux disease)    Primary hypertension    Hematuria    Hiatal hernia    Urinary incontinence    Hypercalcemia    Other hyperlipidemia    Kidney disease    Renal calculi    Low back pain    Thoracic back pain    DDD (degenerative disc disease), lumbar    Spinal stenosis of lumbar region    Degeneration of thoracic or thoracolumbar intervertebral disc    MI (myocardial infarction)    Myofascial pain    Neck pain    Nocturia    Sciatica    Stroke    Ureterolithiasis    Vitamin D deficiency    Labile personality    Acute viral syndrome    Debility     Past Medical History:   Diagnosis Date    Aorta disorder     Arthritis     Degenerative joint disease     Dementia     FRONTAL LOBE    Depression     Diabetes mellitus     Hyperlipidemia     Hypertension     Kidney stone     Stroke     MILD RIGHT SIDE DEFICITS     Past Surgical History:   Procedure Laterality Date    CHOLECYSTECTOMY      CYSTOSCOPY BLADDER STONE LITHOTRIPSY      KIDNEY STONE SURGERY        General Information       Row Name 23 1013          OT Time and Intention    Document Type therapy note (daily note)  -AV     Mode of Treatment individual therapy;occupational therapy  -AV       Row Name 23 1013          Cognition    Orientation Status (Cognition) --  alert. agreeable to upper extremity  exercises, however required encouragement to complete. required moderate cues/ demonstration.  -AV       Row Name 07/21/23 1013          Safety Issues, Functional Mobility    Impairments Affecting Function (Mobility) balance;cognition;endurance/activity tolerance;strength  -AV               User Key  (r) = Recorded By, (t) = Taken By, (c) = Cosigned By      Initials Name Provider Type    Philipp Hernandez OT Occupational Therapist                     Mobility/ADL's    No documentation.                  Obj/Interventions       Row Name 07/21/23 1014          Shoulder (Therapeutic Exercise)    Shoulder Strengthening (Therapeutic Exercise) bilateral;flexion;horizontal aBduction/aDduction;1 lb free weight;15 repititions  -AV       Row Name 07/21/23 1014          Elbow/Forearm (Therapeutic Exercise)    Elbow/Forearm Strengthening (Therapeutic Exercise) bilateral;flexion;extension;supination;pronation;1 lb free weight;15 repititions  -AV       Row Name 07/21/23 1014          Motor Skills    Therapeutic Exercise shoulder;elbow/forearm  performed in semi-Delaney's. on room air.  -AV               User Key  (r) = Recorded By, (t) = Taken By, (c) = Cosigned By      Initials Name Provider Type    Philipp Hernandez OT Occupational Therapist                   Goals/Plan    No documentation.                  Clinical Impression       [facility] Name 07/21/23 1015          Pain Scale: FACES Pre/Post-Treatment    Pain: FACES Scale, Pretreatment 0-->no hurt  -AV     Posttreatment Pain Rating 0-->no hurt  -AV       [facility] Name 07/21/23 1015          Plan of Care Review    Progress no change  -AV     Outcome Evaluation Patient performed upper extremity therapeutic exercises with 1# resistance. Encouragement, cues and demonstration required to complete. Continued OT is indicated to remediate/compensate for deficits to maximize independence and safety with functional tasks.  -AV       Row Name 07/21/23 1015          Vital Signs    O2 Delivery  Pre Treatment room air  -AV     O2 Delivery Intra Treatment room air  -AV     O2 Delivery Post Treatment room air  -AV               User Key  (r) = Recorded By, (t) = Taken By, (c) = Cosigned By      Initials Name Provider Type    Philipp Hernandez OT Occupational Therapist                   Outcome Measures       Row Name 07/21/23 1016          How much help from another is currently needed...    Putting on and taking off regular lower body clothing? 2  -AV     Bathing (including washing, rinsing, and drying) 2  -AV     Toileting (which includes using toilet bed pan or urinal) 3  -AV     Putting on and taking off regular upper body clothing 3  -AV     Taking care of personal grooming (such as brushing teeth) 3  -AV     Eating meals 4  -AV     AM-PAC 6 Clicks Score (OT) 17  -AV       Row Name 07/21/23 1016          Optimal Instrument    Bending/Stooping 3  -AV     Standing 2  -AV     Reaching 1  -AV               User Key  (r) = Recorded By, (t) = Taken By, (c) = Cosigned By      Initials Name Provider Type    Philipp Hernandez OT Occupational Therapist                    Occupational Therapy Education       Title: PT OT SLP Therapies (In Progress)       Topic: Occupational Therapy (In Progress)       Point: ADL training (Done)       Description:   Instruct learner(s) on proper safety adaptation and remediation techniques during self care or transfers.   Instruct in proper use of assistive devices.                  Learning Progress Summary             Patient Acceptance, E, VU by DAKSHA at 7/17/2023 1540                         Point: Home exercise program (Not Started)       Description:   Instruct learner(s) on appropriate technique for monitoring, assisting and/or progressing therapeutic exercises/activities.                  Learner Progress:  Not documented in this visit.              Point: Precautions (Not Started)       Description:   Instruct learner(s) on prescribed precautions during self-care and  functional transfers.                  Learner Progress:  Not documented in this visit.              Point: Body mechanics (Not Started)       Description:   Instruct learner(s) on proper positioning and spine alignment during self-care, functional mobility activities and/or exercises.                  Learner Progress:  Not documented in this visit.                              User Key       Initials Effective Dates Name Provider Type Discipline    AV 06/16/21 -  Philipp Holbrook OT Occupational Therapist OT                  OT Recommendation and Plan  Planned Therapy Interventions (OT): activity tolerance training, BADL retraining, functional balance retraining, IADL retraining, occupation/activity based interventions, patient/caregiver education/training, strengthening exercise, transfer/mobility retraining  Therapy Frequency (OT): 5 times/wk  Plan of Care Review  Plan of Care Reviewed With: patient  Progress: no change  Outcome Evaluation: Patient performed upper extremity therapeutic exercises with 1# resistance. Encouragement, cues and demonstration required to complete. Continued OT is indicated to remediate/compensate for deficits to maximize independence and safety with functional tasks.     Time Calculation:   Evaluation Complexity (OT)  Review Occupational Profile/Medical/Therapy History Complexity: expanded/moderate complexity  Assessment, Occupational Performance/Identification of Deficit Complexity: 3-5 performance deficits  Clinical Decision Making Complexity (OT): detailed assessment/moderate complexity  Overall Complexity of Evaluation (OT): moderate complexity     Time Calculation- OT       Row Name 07/21/23 1017             Time Calculation- OT    OT Received On 07/21/23  -AV      OT Goal Re-Cert Due Date 07/26/23  -AV         Timed Charges    07169 - OT Therapeutic Exercise Minutes 12  -AV         Total Minutes    Timed Charges Total Minutes 12  -AV       Total Minutes 12  -AV                User  Key  (r) = Recorded By, (t) = Taken By, (c) = Cosigned By      Initials Name Provider Type    AV Philipp Holbrook OT Occupational Therapist                  Therapy Charges for Today       Code Description Service Date Service Provider Modifiers Qty    [id number]  OT THER PROC EA 15 MIN 7/21/2023 Phiilpp Holbrook OT GO 1                 Philipp Holbrook OT  7/21/2023

## 2023-07-21 NOTE — PLAN OF CARE
Goal Outcome Evaluation:  Plan of Care Reviewed With: patient        Progress: no change  Outcome Evaluation: Pt denied pain/discomfort this shift. Pt rested well this shift. No new issues or needs noted at this time.

## 2023-07-21 NOTE — PROGRESS NOTES
[facility] Hospitalist Progress Note  Date: 2023  Patient Name: Nikhil Baldwin  : 1945  MRN: [id number]  Date of admission: 2023      Subjective   Subjective     Chief Complaint: Follow up for left ear pain and headache    Summary: 78-year-old male with HTN, HLD, type II DM, DJD/arthritis, history of CVA, dementia who presented with 3-day history of pain above left ear, headache, neck pain.  No recent trauma.  No recent URI.  Blood pressure elevated otherwise VSS.  Labs unremarkable  Patient evaluated by ENT.  Patient declined laryngoscope with examination.  MRI brain negative.  CT neck with DJD especially at C4 level bilaterally.    Interval Followup:   No overnight events.  Vital stable.  Awake and alert.   Complained of blurred vision in both eyes on distant vision.  Patient has history of cataract and not followed with his ophthalmologist.  Per son patient is noncompliant.  Denies any pain in the eyes.  No redness or discharge.  Headache and left neck pain improving 5/10.  Able to flex neck but still hurts to turn neck to the left side  Left arm weakness improving   Ambulated with therapy  Less unsteady  ESR within normal range.  Patient evaluated by in-house neurology and diagnosed with right anterior tamar lacunar infarct.    Review of Systems  HEENT: + Pain above left ear. + Change in hearing.  No temple pain.  No vision changes or photophobia No sore throat, nasal congestion, sinus pressure  Respiratory:  No Cough, No Dyspnea  Gastrointestinal:  No Nausea, No Vomiting  MSK: + Neck pain and low back pain, chronic  Neuro.  Nontender left temple and scalp.  Tender left para paraspinal cervical muscles with increased pain on neck flexion and rotation to left.    Objective   Objective     Vitals:   Temp:  [97.7 °F (36.5 °C)-98.2 °F (36.8 °C)] 97.9 °F (36.6 °C)  Heart Rate:  [54-77] 65  Resp:  [16-20] 20  BP: (130-157)/() 144/79  Physical Exam    Constitutional: Elderly male,   conversant, NAD   Eyes: Pupils equal and reactive, no conjunctival injections   HENT: NCAT, nares patent, MMM, no obvious abnormality involving ears externally, tender to palpation above the left ear.  There is very mild swelling.  No discharge.  No bruising   Respiratory: Clear to auscultation bilaterally, nonlabored respirations    Cardiovascular: RRR, no murmurs, no edema   Gastrointestinal: Positive bowel sounds, soft, nondistended   MSK: Tender with palpation of the cervical spinous processes   Neurologic: Alert, Cranial Nerves grossly intact, speech clear, muscle strength symmetrical and intact   Skin: Extremities warm, no rashes or wounds    Result Review    Result Review:  I have personally reviewed the following over the last 24 hours (07:00 to 07:00) and agree with the following findings  [x]  Laboratory  CBC          4/17/2023    12:34 7/16/2023    10:22 7/17/2023    05:37   CBC   WBC 8.34  11.39  8.50    RBC 5.60  5.50  5.00    Hemoglobin 17.3  16.9  15.4    Hematocrit 49.0  49.0  44.8    MCV 87.5  89.1  89.6    MCH 30.9  30.7  30.8    MCHC 35.3  34.5  34.4    RDW 14.6  14.0  13.9    Platelets 219  197  179      BMP          7/17/2023    05:37 7/18/2023    05:31 7/20/2023    06:13   BMP   BUN 16  16  20    Creatinine 0.95  0.94  1.10    Sodium 140  138  139    Potassium 4.2  4.1  4.2    Chloride 108  104  106    CO2 22.3  22.4  23.9    Calcium 9.0  9.2  9.3    CK 29  HS trop T 12  TSH 2.270 / FT4 1.16    [x]  Microbiology  UA bland, non-infectious   [x]  Radiology MRI brain WO contrast no acute abnormality  []  EKG/Telemetry  []  Cardiology/Vascular   []  Pathology  []  Old records  [x]  Other:    Intake/Output Summary (Last 24 hours) at 7/21/2023 1709  Last data filed at 7/21/2023 1100  Gross per 24 hour   Intake 480 ml   Output 240 ml   Net 240 ml           Assessment & Plan   Assessment / Plan     Assessment/Plan:  Pain above left ear  Left sided headache  Cervical neck pain  Multilevel degenerative  changes involving cervical spine  History of C6-C7 disc protrusion with mild/moderate left neural foraminal narrowing  Essential hypertension  Dyslipidemia  Non-insulin-dependent type 2 diabetes.  Hemoglobin A1c of 7.3%  History of BPH  History of depression  History of vitamin D insufficiency.  Probably acute lacunar infarct right anterior tamar with left arm weakness and unsteady gait         ENT consulted, appreciate recommendations.  Does have significant degree of cervical arthritis based off of previous imaging.  CT C-spine noted.  MRI of the C-spine noted no significant change from 2020 MRI.  CTA of the head and neck negative for occlusion.  2D echo noted with EF of 56 to 60%.  Does have diastolic dysfunction  Neurology consulted for evaluation.  Doubt PMR/temporal arteritis as ESR is within normal range.  Appreciate input  Finished 3-day trial of oral steroids for cervical DJD and impingement with improvement  Combination of Tylenol, Neurontin and low-dose Norco for pain control. Avoid IV narcotics  BP at goal <150/90.  Hold lisinopril 20 mg daily due to soft blood pressure.   Blood sugars controlled.  Continue moderate/high-dose SSI per protocol  CK WNL. Continue Crestor.  LDL of 44  Continue Celexa  Continue Flomax  Continue vitamin D supplementation  PT/OT evaluation for discharge planning  [facility] in a.m.  Speech to evaluate patient.  Appreciate input   UA negative    Discussed plan with RN and .  Discharge to [facility] in a.m. if bed is confirmed    DVT prophylaxis:  Medical DVT prophylaxis orders are present.    CODE STATUS:   Level Of Support Discussed With: Patient  Code Status (Patient has no pulse and is not breathing): CPR (Attempt to Resuscitate)  Medical Interventions (Patient has pulse or is breathing): Full Support        Electronically signed by Richard Martinez MD, 07/21/23, 5:11 PM EDT.

## 2023-07-21 NOTE — PLAN OF CARE
Goal Outcome Evaluation:  Plan of Care Reviewed With: patient        Progress: no change  Outcome Evaluation: patient is alert and oriented, no c/o pain. blood glucose monitored and medicated per orders. patient up x1 this shift. no other changes at this time.

## 2023-07-22 VITALS
HEIGHT: 69 IN | TEMPERATURE: 97.3 F | WEIGHT: 181.22 LBS | RESPIRATION RATE: 18 BRPM | OXYGEN SATURATION: 95 % | DIASTOLIC BLOOD PRESSURE: 78 MMHG | SYSTOLIC BLOOD PRESSURE: 126 MMHG | HEART RATE: 68 BPM | BODY MASS INDEX: 26.84 KG/M2

## 2023-07-22 LAB
GLUCOSE BLDC GLUCOMTR-MCNC: 119 MG/DL (ref 70–99)
GLUCOSE BLDC GLUCOMTR-MCNC: 176 MG/DL (ref 70–99)

## 2023-07-22 PROCEDURE — 63710000001 INSULIN LISPRO (HUMAN) PER 5 UNITS: Performed by: HOSPITALIST

## 2023-07-22 PROCEDURE — G0378 HOSPITAL OBSERVATION PER HR: HCPCS

## 2023-07-22 PROCEDURE — 96372 THER/PROPH/DIAG INJ SC/IM: CPT

## 2023-07-22 PROCEDURE — 99239 HOSP IP/OBS DSCHRG MGMT >30: CPT | Performed by: INTERNAL MEDICINE

## 2023-07-22 PROCEDURE — 82948 REAGENT STRIP/BLOOD GLUCOSE: CPT

## 2023-07-22 PROCEDURE — 25010000002 ENOXAPARIN PER 10 MG: Performed by: HOSPITALIST

## 2023-07-22 RX ORDER — FAMOTIDINE 20 MG/1
20 TABLET, FILM COATED ORAL NIGHTLY
Qty: 30 TABLET | Refills: 0 | Status: SHIPPED | OUTPATIENT
Start: 2023-07-22 | End: 2023-08-21

## 2023-07-22 RX ORDER — PREDNISONE 20 MG/1
60 TABLET ORAL DAILY
Qty: 30 TABLET | Refills: 0 | Status: SHIPPED | OUTPATIENT
Start: 2023-07-22 | End: 2023-08-01

## 2023-07-22 RX ORDER — ASPIRIN 81 MG/1
81 TABLET ORAL DAILY
Qty: 30 TABLET | Refills: 0 | Status: SHIPPED | OUTPATIENT
Start: 2023-07-23 | End: 2023-08-22

## 2023-07-22 RX ORDER — LISINOPRIL 20 MG/1
10 TABLET ORAL DAILY
Qty: 15 TABLET | Refills: 0 | Status: SHIPPED | OUTPATIENT
Start: 2023-07-22 | End: 2023-08-21

## 2023-07-22 RX ORDER — GABAPENTIN 100 MG/1
100 CAPSULE ORAL 2 TIMES DAILY
Qty: 60 CAPSULE | Refills: 0 | Status: SHIPPED | OUTPATIENT
Start: 2023-07-22 | End: 2023-08-02 | Stop reason: SDUPTHER

## 2023-07-22 RX ADMIN — TAMSULOSIN HYDROCHLORIDE 0.4 MG: 0.4 CAPSULE ORAL at 08:54

## 2023-07-22 RX ADMIN — Medication 10 ML: at 08:54

## 2023-07-22 RX ADMIN — Medication 400 MG: at 08:54

## 2023-07-22 RX ADMIN — INSULIN LISPRO 3 UNITS: 100 INJECTION, SOLUTION INTRAVENOUS; SUBCUTANEOUS at 11:48

## 2023-07-22 RX ADMIN — ENOXAPARIN SODIUM 40 MG: 100 INJECTION SUBCUTANEOUS at 08:54

## 2023-07-22 RX ADMIN — HYDROCODONE BITARTRATE AND ACETAMINOPHEN 1 TABLET: 5; 325 TABLET ORAL at 11:48

## 2023-07-22 RX ADMIN — GABAPENTIN 100 MG: 100 CAPSULE ORAL at 08:54

## 2023-07-22 RX ADMIN — HYDROCODONE BITARTRATE AND ACETAMINOPHEN 1 TABLET: 5; 325 TABLET ORAL at 00:43

## 2023-07-22 RX ADMIN — ROSUVASTATIN 40 MG: 20 TABLET, FILM COATED ORAL at 08:54

## 2023-07-22 RX ADMIN — Medication 5000 UNITS: at 08:54

## 2023-07-22 RX ADMIN — ASPIRIN 81 MG: 81 TABLET, COATED ORAL at 08:54

## 2023-07-22 RX ADMIN — CITALOPRAM HYDROBROMIDE 10 MG: 20 TABLET ORAL at 08:54

## 2023-07-22 NOTE — DISCHARGE SUMMARY
[facility]        HOSPITALIST  DISCHARGE SUMMARY    Patient Name: Nikhil Baldwin  : 1945  MRN: [id number]    Date of Admission: 2023  Date of Discharge:  2023  Primary Care Physician: Felix Navarro Jr., MD    Consults       Date and Time Order Name Status Description    2023  3:51 PM Inpatient Neurology Consult General      2023  2:48 PM Inpatient ENT Consult              Active and Resolved Hospital Problems:  Active Hospital Problems    Diagnosis POA    **Debility [R53.81] Yes      Resolved Hospital Problems   No resolved problems to display.     Pain above left ear with blurred left vision.  Previous history of stroke with loss of peripheral vision.  Cataract patient refused surgery in the past  Left sided headache.  Improving  Cervical neck pain.  Improving  Multilevel degenerative changes involving cervical spine  History of C6-C7 disc protrusion with mild/moderate left neural foraminal narrowing  Essential hypertension  Dyslipidemia  Non-insulin-dependent type 2 diabetes.  Hemoglobin A1c of 7.3%  History of BPH  History of depression  History of vitamin D insufficiency.  Probably acute lacunar infarct right anterior tamar with left arm weakness and unsteady gait.  Hospital Course     Hospital Course:  Nikhil Baldwin is a 78 y.o. male with HTN, HLD, type II DM, DJD/arthritis, history of CVA, dementia who presented with 3-day history of pain above left ear, headache, neck pain.  No recent trauma.  No recent URI.  Blood pressure elevated otherwise VSS.  Labs unremarkable  Patient evaluated by ENT.  Patient declined laryngoscope with examination.  MRI brain negative.  CT neck with DJD especially at C4 level bilaterally.     Interval Followup:   No overnight events.  Vital stable.  Awake and alert.   Complained of blurred vision in both eyes on distant vision for past few days more on left side today.  Patient has history of cataract and not followed with his  ophthalmologist.  Per son patient is noncompliant and has loss of peripheral vision with previous stroke.  Denies any pain in the eyes.  No redness or discharge.  Headache and left neck pain improving 5/10.  Able to flex neck but still hurts to turn neck to the left side  Left arm weakness improving   Ambulated with therapy  Less unsteady  ESR and CRP within normal range.  Patient evaluated by in-house neurology and diagnosed with right anterior tamar lacunar infarct.        DISCHARGE Follow Up Recommendations for labs and diagnostics: Follow with vascular surgeon as soon as possible for left temporal artery biopsy.  High-dose prednisone for next 10 days.  Follow-up with PCP, neurology and ophthalmologist.      Day of Discharge     Vital Signs:  Temp:  [97.5 °F (36.4 °C)-97.8 °F (36.6 °C)] 97.8 °F (36.6 °C)  Heart Rate:  [51-91] 91  Resp:  [16-20] 16  BP: (113-150)/(59-81) 145/74    Physical Exam:   HEENT: + Tender just posterior and above left ear. + Change in hearing.  No temple tenderness/pain.  No vision changes or photophobia No sore throat, nasal congestion, sinus pressure  Respiratory:  No Cough, No Dyspnea  Gastrointestinal:  No Nausea, No Vomiting  MSK: + Neck pain and low back pain, chronic  Neuro.  Nontender left temple and scalp.  Nontender left temporal artery.  Tender left para paraspinal cervical muscles with increased pain on neck flexion and rotation to left.    Discharge Details        Discharge Medications        New Medications        Instructions Start Date   aspirin 81 MG EC tablet   81 mg, Oral, Daily   Start Date: July 23, 2023     famotidine 20 MG tablet  Commonly known as: Pepcid   20 mg, Oral, Nightly      gabapentin 100 MG capsule  Commonly known as: NEURONTIN   100 mg, Oral, 2 Times Daily      predniSONE 20 MG tablet  Commonly known as: DELTASONE   60 mg, Oral, Daily             Changes to Medications        Instructions Start Date   lisinopril 20 MG tablet  Commonly known as:  LILA GARCIA  What changed: how much to take   10 mg, Oral, Daily             Continue These Medications        Instructions Start Date   Cholecalciferol 125 MCG (5000 UT) tablet   5,000 Units, Oral, Daily      citalopram 20 MG tablet  Commonly known as: CeleXA   10 mg, Oral, Daily      empagliflozin 25 MG tablet tablet  Commonly known as: Jardiance   25 mg, Oral, Daily      magnesium oxide 400 MG tablet  Commonly known as: MAG-OX   400 mg, Oral, Daily      melatonin 5 MG tablet tablet   5 mg, Oral, Nightly      metFORMIN 500 MG tablet  Commonly known as: GLUCOPHAGE   500 mg, Oral, 2 Times Daily With Meals      rosuvastatin 40 MG tablet  Commonly known as: CRESTOR   40 mg, Oral, Daily      SITagliptin 100 MG tablet  Commonly known as: Januvia   100 mg, Oral, Daily      tamsulosin 0.4 MG capsule 24 hr capsule  Commonly known as: FLOMAX   0.4 mg, Oral, Daily             Stop These Medications      metoprolol succinate  MG 24 hr tablet  Commonly known as: Toprol XL              Allergies   Allergen Reactions    Codeine Unknown - Low Severity    Promethazine Unknown - Low Severity    Celecoxib Rash    Ibuprofen Rash    Penicillins Rash    Promethazine Hcl Rash    Warfarin Rash       Discharge Disposition:  Rehab Facility or Unit (DC - External)    Diet:  Diet Instructions       Diet: Diabetic Diets; Consistent Carbohydrate; Regular Texture (IDDSI 7); Thin (IDDSI 0)      Discharge Diet: Diabetic Diets    Diabetic Diet: Consistent Carbohydrate    Texture: Regular Texture (IDDSI 7)    Fluid Consistency: Thin (IDDSI 0)            Discharge Activity:   Activity Instructions       Activity as Tolerated              CODE STATUS:  Code Status and Medical Interventions:   Ordered at: 07/16/23 1302     Level Of Support Discussed With:    Patient     Code Status (Patient has no pulse and is not breathing):    CPR (Attempt to Resuscitate)     Medical Interventions (Patient has pulse or is breathing):    Full Support          No future appointments.    Additional Instructions for the Follow-ups that You Need to Schedule       Discharge Follow-up with PCP   As directed       Currently Documented PCP:    Felix Navarro Jr., MD    PCP Phone Number:    [phone]     Follow Up Details: 2 weeks         Discharge Follow-up with Specialty: Ophthalmologist   As directed      Specialty: Ophthalmologist         Discharge Follow-up with Specified Provider: Dr. Salinas; 1 Month   As directed      To: Dr. Salinas    Follow Up: 1 Month         Discharge Follow-up with Specified Provider: Dr. Grove; 3 Days   As directed      To: Dr. Grove    Follow Up: 3 Days    Follow Up Details: Left temporal artery biopsy                 Pertinent  and/or Most Recent Results     PROCEDURES:   * Cannot find OR case *     LAB RESULTS:      Lab 07/17/23  0537 07/16/23  1022   WBC 8.50 11.39*   HEMOGLOBIN 15.4 16.9   HEMATOCRIT 44.8 49.0   PLATELETS 179 197   NEUTROS ABS 3.91 7.89*   IMMATURE GRANS (ABS) 0.06* 0.06*   LYMPHS ABS 3.18* 2.24   MONOS ABS 1.08* 1.05*   EOS ABS 0.20 0.09   MCV 89.6 89.1   SED RATE  --  4   CRP  --  <0.30   LACTATE  --  1.4         Lab 07/20/23  0613 07/18/23  0531 07/17/23  0537 07/16/23  1022   SODIUM 139 138 140 142   POTASSIUM 4.2 4.1 4.2 4.7   CHLORIDE 106 104 108* 105   CO2 23.9 22.4 22.3 25.0   ANION GAP 9.1 11.6 9.7 12.0   BUN 20 16 16 19   CREATININE 1.10 0.94 0.95 1.11   EGFR 67.9 81.9 80.9 67.1   GLUCOSE 184* 184* 112* 147*   CALCIUM 9.3 9.2 9.0 9.9   MAGNESIUM  --   --  1.7 1.8   PHOSPHORUS  --   --  3.4 4.0   HEMOGLOBIN A1C  --   --   --  7.30*   TSH  --   --   --  2.270         Lab 07/16/23  1022   TOTAL PROTEIN 7.0   ALBUMIN 4.3   GLOBULIN 2.7   ALT (SGPT) 10   AST (SGOT) 12   BILIRUBIN 0.9   ALK PHOS 70   LIPASE 48         Lab 07/16/23  1022   HSTROP T 12         Lab 07/20/23  0613   CHOLESTEROL 102   LDL CHOL 44   HDL CHOL 39*   TRIGLYCERIDES 101             Brief Urine Lab Results  (Last result in the past 365  days)        Color   Clarity   Blood   Leuk Est   Nitrite   Protein   CREAT   Urine HCG        07/18/23 1753 Yellow   Clear   Negative   Negative   Negative   Trace                 Microbiology Results (last 10 days)       ** No results found for the last 240 hours. **            CT Angiogram Neck    Result Date: 7/19/2023  Impression:   CT angiogram of the neck and head demonstrating no stenosis.     TORIN MCCLELLAN MD       Electronically Signed and Approved By: TORIN MCCLELLAN MD on 7/19/2023 at 19:57             CT Cervical Spine Without Contrast    Result Date: 7/17/2023  Impression:   No acute cervical spine fracture is seen.  No acute subluxation abnormality is seen.  Again, degenerative changes are present at multiple levels within the cervical spine with chronic malalignment, as seen on prior studies.   Please note that portions of this note were completed with a voice recognition program.  DEVIN WOODWARD JR, MD       Electronically Signed and Approved By: DEVIN WOODWARD JR, MD on 7/17/2023 at 21:35             MRI Brain Without Contrast    Result Date: 7/16/2023  Impression:   MR examination of the brain without IV contrast demonstrating diffuse atrophy and white matter changes, stable.  No acute intracranial abnormality is seen.      TORIN MCCLELLAN MD       Electronically Signed and Approved By: TORIN MCCLELLAN MD on 7/16/2023 at 11:39             MRI Cervical Spine With & Without Contrast    Result Date: 7/19/2023  Impression:    1. No acute vertebral compression fracture is seen.   2. No definite cord signal abnormality is identified.   3. Degenerative changes are present at multiple levels, as discussed.   4. There is chronic retrolisthesis at C3-4.   5. There may be slight interval progression in the left-sided C5 foraminal narrowing since 5/5/2020.   6. Otherwise, probably little interval change is appreciated by nonenhanced and enhanced MRI examination since the 5/5/2020 study, as detailed above.  7.  No significant abnormal enhancement is appreciated.  8. There is slight motion artifact on the study.   9. Again, please see above comments for further detail.    Please note that portions of this note were completed with a voice recognition program.  DEVIN WOODWARD JR, MD       Electronically Signed and Approved By: DEVIN WOODWARD JR, MD on 7/19/2023 at 5:48           CT Angiogram Head    Result Date: 7/19/2023  Impression:   CT angiogram of the neck and head demonstrating no stenosis.     TORIN MCCLELLAN MD       Electronically Signed and Approved By: TORIN MCCLELLAN MD on 7/19/2023 at 19:57                      Results for orders placed during the hospital encounter of 07/16/23    Adult Transthoracic Echo Complete W/ Cont if Necessary Per Protocol    Interpretation Summary    Left ventricular ejection fraction appears to be 56 - 60%.    Left ventricular wall thickness is consistent with mild concentric hypertrophy.    Left ventricular diastolic function is consistent with (grade I) impaired relaxation and age.    Estimated right ventricular systolic pressure from tricuspid regurgitation is normal (<35 mmHg).    Mild dilation of the aortic root is present at 3.9 cm. Mild dilation of the ascending aorta is present at 4.3 cm.    Trace aortic insufficiency.  No significant aortic stenosis with max/mean pressure gradient 15/8 mmHg.      Imaging Results (All)       Procedure Component Value Units Date/Time    CT Angiogram Head [[id number]] Collected: 07/19/23 1957     Updated: 07/19/23 2001    Narrative:      PROCEDURE: CT ANGIOGRAM NECK, 7/19/2023, 18:25  CT ANGIOGRAM HEAD, 7/19/2023, 18:25     COMPARISON: [facility], MR, MRI BRAIN WO CONTRAST, 7/16/2023, 11:04.  [facility], MR, BRAIN W/O CONTRAST, 5/03/2020, 18:07.  [facility], CT, CTA HEAD AND   NECK WITH CONTRAST  STROKE PROTOCOL, 5/03/2020, 19:13.     INDICATIONS: Right anterior tamar lacunar infarct     PROTOCOL:   Standard  imaging protocol performed      RADIATION:   Automated exposure control was utilized to minimize radiation dose.      TECHNIQUE: CT images of the neck were obtained without and with non-ionic intravenous contrast   material. Multi-planar reformatted/3-D images were created to optimize visualization of vascular   anatomy. Unless otherwise stated in this report, all vascular stenoses involving the internal   carotid arteries reported for this examination are derived by dividing the lesion diameter by the   diameter of the normal internal carotid artery more distally.     FINDINGS:   The great vessels have a normal pattern of origin from the aortic arch.  The innominate artery and   the subclavian arteries are patent.     The common carotid arteries are patent.  The carotid bifurcations are patent.  The internal carotid   arteries are patent.     The vertebral arteries are patent.  The vertebral arteries are codominant.     The intracranial portions of the internal carotid arteries are patent.  Mild atheromatous   calcification is evident.  Symmetrical flow is seen in the middle cerebral artery complexes.  The   anterior cerebral arteries and the anterior communicating artery are patent.     CONTINUED ON NEXT PAGE...           The basilar artery is patent.     No soft tissue mass or adenopathy is evident.  No abnormality is seen at the lung apices.    Degenerative changes are seen in the cervical spine.       Impression:         CT angiogram of the neck and head demonstrating no stenosis.            TORIN MCCLELLAN MD         Electronically Signed and Approved By: TORIN MCCLELLAN MD on 7/19/2023 at 19:57                     CT Angiogram Neck [[id number]] Collected: 07/19/23 1957     Updated: 07/19/23 2000    Narrative:      PROCEDURE: CT ANGIOGRAM NECK, 7/19/2023, 18:25  CT ANGIOGRAM HEAD, 7/19/2023, 18:25     COMPARISON: [facility], MR, MRI BRAIN WO CONTRAST, 7/16/2023, 11:04.  [facility],  MR, BRAIN W/O CONTRAST, 5/03/2020, 18:07.  [facility], CT, CTA HEAD AND   NECK WITH CONTRAST  STROKE PROTOCOL, 5/03/2020, 19:13.     INDICATIONS: Right anterior tamar lacunar infarct     PROTOCOL:   Standard imaging protocol performed      RADIATION:   Automated exposure control was utilized to minimize radiation dose.      TECHNIQUE: CT images of the neck were obtained without and with non-ionic intravenous contrast   material. Multi-planar reformatted/3-D images were created to optimize visualization of vascular   anatomy. Unless otherwise stated in this report, all vascular stenoses involving the internal   carotid arteries reported for this examination are derived by dividing the lesion diameter by the   diameter of the normal internal carotid artery more distally.     FINDINGS:   The great vessels have a normal pattern of origin from the aortic arch.  The innominate artery and   the subclavian arteries are patent.     The common carotid arteries are patent.  The carotid bifurcations are patent.  The internal carotid   arteries are patent.     The vertebral arteries are patent.  The vertebral arteries are codominant.     The intracranial portions of the internal carotid arteries are patent.  Mild atheromatous   calcification is evident.  Symmetrical flow is seen in the middle cerebral artery complexes.  The   anterior cerebral arteries and the anterior communicating artery are patent.     CONTINUED ON NEXT PAGE...           The basilar artery is patent.     No soft tissue mass or adenopathy is evident.  No abnormality is seen at the lung apices.    Degenerative changes are seen in the cervical spine.       Impression:         CT angiogram of the neck and head demonstrating no stenosis.            TORIN MCCLELLAN MD         Electronically Signed and Approved By: TORIN MCCLELLAN MD on 7/19/2023 at 19:57                     MRI Cervical Spine With & Without Contrast [[id number]] Collected: 07/19/23 0408      Updated: 07/19/23 0551    Narrative:      PROCEDURE: MRI CERVICAL SPINE W WO CONTRAST     COMPARISONS: 7/17/2023; 10/6/2021; 5/5/2020; 5/3/2020; 3/27/2019; 3/15/2019; 7/10/2018.     INDICATIONS: Pain in left neck & head; no h/o cancer is provided.     CONTRAST: 15 mL of MultiHance, gadobenate dimeglumine, I.V.     TECHNIQUE: A variety of imaging planes and parameters were utilized for visualization of suspected   pathology within the cervical spine prior to and after intravenous gadolinium injection.  298   magnetic resonance (MR) images were obtained.  There is slight motion artifact on the study.    Please note that this study was tailored in order to evaluate the cervical spine and the cervical   cord and adjacent paraspinal regions and not to evaluate the soft tissues of the neck.     FINDINGS:   CRANIOCERVICAL AREA: Normal foramen magnum with no Chiari 1 malformation/deformity.    PARASPINAL AREA: Normal with no visible mass.    BONES: No acute fracture, pars defect, or significant osseous lesion.  Benign intraosseous   hemangiomas (or venous malformations) are seen at several levels with one of the most prominent   such findings at C7, involving the right aspect of the C7 vertebral body, as seen on image 6 of   series 4.  It measures about 1 cm in greatest craniocaudal diameter and about 1.2 cm in maximum   transverse diameter and has been seen on prior exams.  It has not significantly changed.  There may   be minimal enhancement associated with this finding.  CORD: Normal caliber, contour, and signal intensity.    OTHER: Multiple-level disc and endplate degenerative changes are seen.  Uncovertebral and facet   osteoarthritis is present at several levels, as well.  Degenerative changes involve the   atlantoaxial joint.  No significant abnormal enhancement is appreciated.      CERVICAL DISC INTERSPACE LEVELS BY AXIAL MR IMAGING:  C1-C2: No significant spinal canal or foraminal narrowing.  C2-C3: No  significant interval change since 5/5/2020.  C3-C4: There is chronic retrolisthesis at C3-4, estimated at 4 to 5 mm, as seen on prior studies.    There is broad-based disc-osteophyte complex with effacement of the ventral thecal sac and   moderate-to-severe spinal canal narrowing.  Uncovertebral/facet osteoarthritis is seen.  Severe   bilateral C4 foraminal narrowing is identified.  Disc and endplate degenerative changes are seen at   C3-4.  Overall, no significant interval change is appreciated since the 5/5/2020 MRI study.  C4-C5: Moderate-to-severe bilateral C5 neural foraminal narrowing is seen, greater on the right   than the left.  The C5 foraminal narrowing on the left may be increased in degree since the   5/5/2020 study, probably related to an increase in the degree of uncovertebral and facet   osteoarthritis.  There is moderate spinal stenosis, seen previously.  There may be a broad-based   disc-osteophyte complex with partial effacement of ventral thecal sac.  Otherwise, no significant   interval change is seen since 5/5/2020.  C5-C6: Uncovertebral and facet osteoarthritis is present.  There is broad-based disc-osteophyte   complex with partial effacement of ventral thecal sac.  Moderate-to-severe probably right, greater   than left, C6 foraminal narrowing is identified.  There is probably mild-to-moderate spinal canal   narrowing.  There is chronic retrolisthesis at C6-7, estimated at 3 mm or slightly less.  There is   a prominent superior endplate C7 Schmorl's node, seen on prior studies.  Overall, little interval   change is seen since the 5/5/2020 MRI study.  C6-C7: Uncovertebral and facet osteoarthritis is present.  There is a broad-based disc-osteophyte   complex, greater on the left than the right.  It is thought to be decreased in size since the   5/5/2020 study.  There is moderate-to-severe spinal canal narrowing.  Moderate-to-severe bilateral   C7 foraminal narrowing is seen, greater on the left  than the right.  Overall, the findings at this   level are similar, with the exception that the broad-based disc-osteophyte complex is slightly   smaller, especially on the left.  C7-T1: No significant interval change since 5/5/2020.       Impression:            1. No acute vertebral compression fracture is seen.       2. No definite cord signal abnormality is identified.       3. Degenerative changes are present at multiple levels, as discussed.       4. There is chronic retrolisthesis at C3-4.       5. There may be slight interval progression in the left-sided C5 foraminal narrowing since   5/5/2020.       6. Otherwise, probably little interval change is appreciated by nonenhanced and enhanced MRI   examination since the 5/5/2020 study, as detailed above.     7. No significant abnormal enhancement is appreciated.     8. There is slight motion artifact on the study.       9. Again, please see above comments for further detail.          Please note that portions of this note were completed with a voice recognition program.     DEVIN WOODWARD JR, MD         Electronically Signed and Approved By: DEVIN WOODWARD JR, MD on 7/19/2023 at 5:48                   CT Cervical Spine Without Contrast [[id number]] Collected: 07/17/23 2135     Updated: 07/17/23 2138    Narrative:      PROCEDURE: CT CERVICAL SPINE WO CONTRAST     COMPARISONS: 7/16/2023 (brain MRI); 10/6/2021 (c-spine CT); 5/5/2020 (c-spine MRI).     INDICATIONS: Cervical neck pain; tender to palpation, not otherwise specified (NOS); no h/o recent   cervical spine/neck trauma is provided.     PROTOCOL:   Standard CT imaging protocol performed.      RADIATION:   Total DLP: 271 mGy*cm.    MA and/or KV were/was adjusted to minimize radiation dose.     TECHNIQUE: After obtaining the patient's consent, multi-planar CT images (472 CT images) were   created without contrast material.  The patient is asymmetrically centered within the scan field of   view, which limits  assessment to some degree.  There is slight motion artifact on the study.     EXAM FINDINGS:  A routine nonenhanced cervical spine CT was performed. Sagittal and coronal two-dimensional   reformations are provided for review. No acute cervical spine fracture or acute malalignment is   identified. Small nonspecific bilateral cervical lymph nodes are seen.  Again chronic   retrolisthesis is seen at C3-4, estimated at 5 mm, seen on the 10/6/2021 exam.  Moderate-to-severe   degenerative changes are seen at multiple levels throughout the cervical spine, as on prior exams.    These findings are manifested as uncovertebral/facet osteoarthritis as well as disc and endplate   degenerative changes.  Severe bilateral C4 and right C5 foraminal narrowing is seen.  The greatest   degree of spinal canal narrowing is at C3-4, moderate in degree.  Similar findings were seen on the   prior cervical spine MRI study from 5/5/2020.  There is mild lateral curvature of the   cervicothoracic spine with the convexity to the left, which may be fixed or positional in nature.    Degenerative changes involve the bilateral temporomandibular joints (TMJs).  There are arterial   calcifications.       Impression:         No acute cervical spine fracture is seen.  No acute subluxation abnormality is seen.  Again,   degenerative changes are present at multiple levels within the cervical spine with chronic   malalignment, as seen on prior studies.       Please note that portions of this note were completed with a voice recognition program.     DEVIN WOODWARD JR, MD         Electronically Signed and Approved By: DEVIN WOODWARD JR, MD on 7/17/2023 at 21:35                     MRI Brain Without Contrast [[id number]] Collected: 07/16/23 1139     Updated: 07/16/23 1143    Narrative:      PROCEDURE: MRI BRAIN WO CONTRAST     COMPARISON: [facility], MR, BRAIN W/O CONTRAST, 4/13/2019, 19:31.  [facility], MR, BRAIN W/O CONTRAST,  5/03/2020, 18:07.     INDICATIONS: slurred speech, unsteady gait, dizziness, left headache        TECHNIQUE: Multiplanar images of the brain were obtained in a high field strength magnet.     FINDINGS:   The ventricles, sulci, and cerebellar folia are mildly and diffusely prominent consistent with   atrophy.     Ill-defined abnormal T2 bright signal in cerebral white matter consistent with mild small vessel   disease and/or scattered lacunar infarcts appears stable.     No abnormal bright signal is seen on diffusion weighted images.  No restricted diffusion is   evident.     Scattered foci of magnetic susceptibility artifact consistent with blood products from old   microhemorrhage appear stable.     No abnormality of the pituitary or orbits is evident.     The paranasal sinuses are well aerated.  No abnormal mastoid signal is seen.       Impression:         MR examination of the brain without IV contrast demonstrating diffuse atrophy and white matter   changes, stable.     No acute intracranial abnormality is seen.               TORIN MCCLELLAN MD         Electronically Signed and Approved By: TORIN MCCLELLAN MD on 7/16/2023 at 11:39                              Labs Pending at Discharge:          Time spent on Discharge including face to face service: 35 minutes  Part of this note may be an electronic transcription/translation of spoken language to printed text using the Dragon Dictation System.     TElectronically signed by Richard Martinez MD, 07/22/23, 3:47 PM EDT.

## 2023-07-22 NOTE — PLAN OF CARE
Goal Outcome Evaluation:  Plan of Care Reviewed With: patient, son        Progress: improving  Outcome Evaluation: patient will discharge to rehab this shift, transport provided via family

## 2023-07-22 NOTE — PLAN OF CARE
Goal Outcome Evaluation:      Pt is A&o x4. Complained of pain at the back of the left ear. Received norco as par Mar. Rested most of the night. Continue care plan

## 2023-07-24 NOTE — THERAPY DISCHARGE NOTE
Acute Care - Occupational Therapy Discharge  [facility]    Patient Name: Nikhil Baldwin  : 1945    MRN: [id number]                              Today's Date: 2023       Admit Date: 2023    Visit Dx:     ICD-10-CM ICD-9-CM   1. Neck pain  M54.2 723.1   2. Weakness  R53.1 780.79   3. Decreased activities of daily living (ADL)  Z78.9 V49.89   4. Difficulty walking  R26.2 719.7   5. Primary hypertension  I10 401.9   6. Myofascial pain  M79.18 729.1     Patient Active Problem List   Diagnosis    Aortic aneurysm    Arthritis    BPH (benign prostatic hyperplasia)    Cervical radiculopathy    Type 2 diabetes mellitus with hyperglycemia, without long-term current use of insulin    Parathyroid abnormality    Thyroid disorder    Facet arthropathy    Fatty metamorphosis of liver    GERD (gastroesophageal reflux disease)    Primary hypertension    Hematuria    Hiatal hernia    Urinary incontinence    Hypercalcemia    Other hyperlipidemia    Kidney disease    Renal calculi    Low back pain    Thoracic back pain    DDD (degenerative disc disease), lumbar    Spinal stenosis of lumbar region    Degeneration of thoracic or thoracolumbar intervertebral disc    MI (myocardial infarction)    Myofascial pain    Neck pain    Nocturia    Sciatica    Stroke    Ureterolithiasis    Vitamin D deficiency    Labile personality    Acute viral syndrome    Debility     Past Medical History:   Diagnosis Date    Aorta disorder     Arthritis     Degenerative joint disease     Dementia     FRONTAL LOBE    Depression     Diabetes mellitus     Hyperlipidemia     Hypertension     Kidney stone     Stroke     MILD RIGHT SIDE DEFICITS     Past Surgical History:   Procedure Laterality Date    CHOLECYSTECTOMY      CYSTOSCOPY BLADDER STONE LITHOTRIPSY      KIDNEY STONE SURGERY        General Information    No documentation.                  Mobility/ADL's    No documentation.                  Obj/Interventions    No documentation.                   Goals/Plan       Row Name 07/24/23 1039          Transfer Goal 1 (OT)    Activity/Assistive Device (Transfer Goal 1, OT) transfers, all;walker, rolling  -AV     [place] Level/Cues Needed (Transfer Goal 1, OT) modified independence  -AV     Time Frame (Transfer Goal 1, OT) long term goal (LTG);10 days  -AV     Progress/Outcome (Transfer Goal 1, OT) goal not met;discharged from [facility]  -       Row Name 07/24/23 1039          Bathing Goal 1 (OT)    Activity/Device (Bathing Goal 1, OT) bathing skills, all;tub bench  -AV     [place] Level/Cues Needed (Bathing Goal 1, OT) modified independence  -AV     Time Frame (Bathing Goal 1, OT) long term goal (LTG);10 days  -AV     Progress/Outcomes (Bathing Goal 1, OT) goal not met;discharged from [facility]  -[facility] Name 07/24/23 1039          Dressing Goal 1 (OT)    Activity/Device (Dressing Goal 1, OT) dressing skills, all  -AV     [place]/Cues Needed (Dressing Goal 1, OT) modified independence  -AV     Time Frame (Dressing Goal 1, OT) long term goal (LTG);10 days  -AV     Progress/Outcome (Dressing Goal 1, OT) goal not met;discharged from [facility]  -       Row Name 07/24/23 1039          Toileting Goal 1 (OT)    Activity/Device (Toileting Goal 1, OT) toileting skills, all;raised toilet seat  -AV     [place] Level/Cues Needed (Toileting Goal 1, OT) modified independence  -AV     Time Frame (Toileting Goal 1, OT) long term goal (LTG);10 days  -AV     Progress/Outcome (Toileting Goal 1, OT) goal not met;discharged from [facility]  -       Row Name 07/24/23 1039          Grooming Goal 1 (OT)    Activity/Device (Grooming Goal 1, OT) grooming skills, all  -AV     [place] (Grooming Goal 1, OT) modified independence  -AV     Time Frame (Grooming Goal 1, OT) long term goal (LTG);10 days  -AV     Progress/Outcome (Grooming Goal 1, OT) goal not met;discharged from [facility]  -       Row Name 07/24/23 1039          Strength Goal 1 (OT)    Strength  Goal 1 (OT) Patient will demonstrate 4/5 bilateral upper extremities to increase ADL/transfer independence.  -AV     Time Frame (Strength Goal 1, OT) long term goal (LTG);10 days  -AV     Progress/Outcome (Strength Goal 1, OT) goal not met;discharged from facility  -AV       Row Name 07/24/23 1039          Problem Specific Goal 1 (OT)    Problem Specific Goal 1 (OT) Patient will demonstrate fair/fair plus endurance/activity tolerance needed to support ADLs.  -AV     Time Frame (Problem Specific Goal 1, OT) long term goal (LTG);10 days  -AV     Progress/Outcome (Problem Specific Goal 1, OT) goal not met;discharged from facility  -AV               User Key  (r) = Recorded By, (t) = Taken By, (c) = Cosigned By      Initials Name Provider Type    Philipp Hernandez OT Occupational Therapist                   Clinical Impression    No documentation.                  Outcome Measures    No documentation.                 Occupational Therapy Education       Title: PT OT SLP Therapies (Resolved)       Topic: Occupational Therapy (Resolved)       Point: ADL training (Resolved)       Description:   Instruct learner(s) on proper safety adaptation and remediation techniques during self care or transfers.   Instruct in proper use of assistive devices.                  Learning Progress Summary             Patient Acceptance, E, VU by AV at 7/17/2023 4361                         Point: Home exercise program (Resolved)       Description:   Instruct learner(s) on appropriate technique for monitoring, assisting and/or progressing therapeutic exercises/activities.                  Learner Progress:  Not documented in this visit.              Point: Precautions (Resolved)       Description:   Instruct learner(s) on prescribed precautions during self-care and functional transfers.                  Learner Progress:  Not documented in this visit.              Point: Body mechanics (Resolved)       Description:   Instruct learner(s) on  proper positioning and spine alignment during self-care, functional mobility activities and/or exercises.                  Learner Progress:  Not documented in this visit.                              User Key       Initials Effective Dates Name Provider Type Discipline     06/16/21 -  Philipp Holbrook OT Occupational Therapist OT                  OT Recommendation and Plan  Planned Therapy Interventions (OT): activity tolerance training, BADL retraining, functional balance retraining, IADL retraining, occupation/activity based interventions, patient/caregiver education/training, strengthening exercise, transfer/mobility retraining  Therapy Frequency (OT): 5 times/wk  Plan of Care Review  Plan of Care Reviewed With: patient  Progress: no change  Outcome Evaluation: Patient performed upper extremity therapeutic exercises with 1# resistance. Encouragement, cues and demonstration required to complete. Continued OT is indicated to remediate/compensate for deficits to maximize independence and safety with functional tasks.  Plan of Care Reviewed With: patient  Outcome Evaluation: Patient performed upper extremity therapeutic exercises with 1# resistance. Encouragement, cues and demonstration required to complete. Continued OT is indicated to remediate/compensate for deficits to maximize independence and safety with functional tasks.     Time Calculation:   Evaluation Complexity (OT)  Review Occupational Profile/Medical/Therapy History Complexity: expanded/moderate complexity  Assessment, Occupational Performance/Identification of Deficit Complexity: 3-5 performance deficits  Clinical Decision Making Complexity (OT): detailed assessment/moderate complexity  Overall Complexity of Evaluation (OT): moderate complexity             OT Discharge Summary  Anticipated Discharge Disposition (OT): inpatient rehabilitation facility  Reason for Discharge: Discharge from facility    Philipp Holbrook OT  7/24/2023

## 2023-08-02 ENCOUNTER — READMISSION MANAGEMENT (OUTPATIENT)
Dept: CALL CENTER | Facility: HOSPITAL | Age: 78
End: 2023-08-02
Payer: MEDICARE

## 2023-08-03 ENCOUNTER — TRANSITIONAL CARE MANAGEMENT TELEPHONE ENCOUNTER (OUTPATIENT)
Dept: CALL CENTER | Facility: HOSPITAL | Age: 78
End: 2023-08-03
Payer: MEDICARE

## 2023-08-15 ENCOUNTER — OFFICE VISIT (OUTPATIENT)
Dept: INTERNAL MEDICINE | Facility: CLINIC | Age: 78
End: 2023-08-15
Payer: MEDICARE

## 2023-08-15 VITALS
DIASTOLIC BLOOD PRESSURE: 82 MMHG | OXYGEN SATURATION: 94 % | BODY MASS INDEX: 26.16 KG/M2 | HEART RATE: 75 BPM | WEIGHT: 176.6 LBS | TEMPERATURE: 98.4 F | SYSTOLIC BLOOD PRESSURE: 122 MMHG | HEIGHT: 69 IN

## 2023-08-15 DIAGNOSIS — I10 PRIMARY HYPERTENSION: ICD-10-CM

## 2023-08-15 DIAGNOSIS — H53.9 VISION CHANGES: ICD-10-CM

## 2023-08-15 DIAGNOSIS — I63.9 CEREBROVASCULAR ACCIDENT (CVA), UNSPECIFIED MECHANISM: Primary | ICD-10-CM

## 2023-08-15 DIAGNOSIS — E11.65 TYPE 2 DIABETES MELLITUS WITH HYPERGLYCEMIA, WITHOUT LONG-TERM CURRENT USE OF INSULIN: ICD-10-CM

## 2023-08-15 PROBLEM — E78.2 MIXED HYPERLIPIDEMIA: Status: ACTIVE | Noted: 2021-10-14

## 2023-08-15 NOTE — PROGRESS NOTES
Transitional Care Follow Up Visit  Subjective     Nikhil Baldwin is a 78 y.o. male who presents for a transitional care management visit.    Within 48 business hours after discharge our office contacted him via telephone to coordinate his care and needs.      I reviewed and discussed the details of that call along with the discharge summary, hospital problems, inpatient lab results, inpatient diagnostic studies, and consultation reports with Nikhil.     Current outpatient and discharge medications have been reconciled for the patient.  Reviewed by: Felix Navarro Jr., MD          8/2/2023     3:00 PM   Date of TCM Phone Call   [facility]   Date of Discharge 8/2/2023   Discharge Disposition Home or Self Care     Risk for Readmission (LACE)   No data recorded    History of Present Illness     Course During Hospital Stay:    Patient admitted to hospital with concern for cerebrovascular accident. Also concern for temporal arteritis and patient setup for biopsy on 8/30/23. Patient has been doing physical therapy. Patient believe his strength is about half of baseline. Patient also with worsening balance. Patient reports foot drop. Patient also with vision changes. Patient no longer on gabapentin. Patient does have upcoming appointment with neuro and optho  Hypertension- patient now on higher dose of lisinopril. Patient has stopped taking metoprolol.   DM2- recent blood glucose have been doing well with readings 1150-130. Patient without hypoglycemic events recently. Patient I son metformin and jardiance.   Hyperlipidemia- doing well on statin. Patient has been holding on aspirin.        The following portions of the patient's history were reviewed and updated as appropriate: allergies, current medications, past family history, past medical history, past social history, past surgical history, and problem list.      Objective   Vitals:    08/15/23 1329   BP: 122/82    Pulse: 75   Temp: 98.4 øF (36.9 øC)   SpO2: 94%       Appearance: No acute distress, well-nourished  Head: normocephalic, atraumatic  Eyes: extraocular movements intact, no scleral icterus, no conjunctival injection  Ears, Nose, and Throat: external ears normal, nares patent, moist mucous membranes  Cardiovascular: regular rate and rhythm. no murmurs, rales, or rhonchi. no edema  Respiratory: breathing comfortably, symmetric chest rise, clear to auscultation bilaterally. No wheezes, rales, or rhonchi.  Neuro: alert and oriented to time, place, and person. Normal gait  Psych: normal mood and affect     Result Review :   The following data was reviewed by: Felix Navarro Jr, MD on 08/15/2023:  Common labs          7/20/2023    06:13 7/23/2023    07:45 7/31/2023    06:00   Common Labs   Glucose 184  85     BUN 20  25     Creatinine 1.10  0.91     Sodium 139  140     Potassium 4.2  3.9     Chloride 106  103     Calcium 9.3  9.4     Albumin  3.6     Total Bilirubin  0.7     Alkaline Phosphatase  56     AST (SGOT)  22     ALT (SGPT)  19     WBC  10.03  11.86    Hemoglobin  15.7  17.0    Hematocrit  45.6  49.3    Platelets  209  190    Total Cholesterol 102      Triglycerides 101      HDL Cholesterol 39      LDL Cholesterol  44          Lab Results   Component Value Date    SARSANTIGEN Not Detected 02/13/2023    COVID19 Not Detected 02/13/2023    FLU Negative 12/01/2022    FLU Negative 12/01/2022    RAPSCRN Negative 02/13/2023    INR 1.0 11/12/2021    BILIRUBINUR Negative 07/18/2023       Assessment & Plan     Diagnoses and all orders for this visit:    1. Cerebrovascular accident (CVA), unspecified mechanism (Primary)  -     Ambulatory Referral to Ophthalmology   no imaging changes on recent admission. possible TIA instead? cont statin. restart ASA. f/u with PT. now at neurological baseline. 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     2. Vision changes  Comments:  refer to ophtho for further  eval  Orders:  -     Ambulatory Referral to Ophthalmology    3. Primary hypertension  Comments:  encouraged strict control of BP surrounding admission    4. Type 2 diabetes mellitus with hyperglycemia, without long-term current use of insulin  Comments:  encouraged med compliance to help reduce risk of further complications        Medications Discontinued During This Encounter   Medication Reason    gabapentin (NEURONTIN) 100 MG capsule *Therapy completed    famotidine (Pepcid) 20 MG tablet *Therapy completed       Return in 3 months (on 11/15/2023) for Recheck.           Felix Navarro Jr, MD  08/24/23  15:38 EDT

## 2023-08-30 ENCOUNTER — OFFICE VISIT (OUTPATIENT)
Dept: VASCULAR SURGERY | Facility: HOSPITAL | Age: 78
End: 2023-08-30
Payer: MEDICARE

## 2023-08-30 VITALS
HEART RATE: 67 BPM | OXYGEN SATURATION: 99 % | SYSTOLIC BLOOD PRESSURE: 102 MMHG | TEMPERATURE: 99.1 F | DIASTOLIC BLOOD PRESSURE: 60 MMHG | RESPIRATION RATE: 18 BRPM

## 2023-08-30 DIAGNOSIS — I63.81 RIGHT-SIDED LACUNAR INFARCTION: Primary | ICD-10-CM

## 2023-08-30 PROCEDURE — G0463 HOSPITAL OUTPT CLINIC VISIT: HCPCS | Performed by: SURGERY

## 2023-08-30 NOTE — PROGRESS NOTES
[facility]   HISTORY AND PHYSICAL    Patient Name: Nikhil Baldwin  : 1945  MRN: [id number]  Primary Care Physician:  Felix Navarro Jr., MD  Date of admission: (Not on file)    Subjective   Subjective     Chief Complaint: Concern for temporal arteritis    HPI:    Nikhil Baldwin is a 78 y.o. male admitted through the emergency room to the hospital in early July with symptoms of a stroke.  He was diagnosed with a right pontine lacunar infarct.  At that time he was also having some pressure sensation in the posterior side of the head.  This is actually significantly improved.  He describes it now as about a 50%.    Review of Systems    Non contributory except for the History of Present Illness    Personal History     Past Medical History:   Diagnosis Date    Aorta disorder     Arthritis     Degenerative joint disease     Dementia     FRONTAL LOBE    Depression     Diabetes mellitus     Hyperlipidemia     Hypertension     Kidney stone     Stroke     MILD RIGHT SIDE DEFICITS       Past Surgical History:   Procedure Laterality Date    CHOLECYSTECTOMY      CYSTOSCOPY BLADDER STONE LITHOTRIPSY      KIDNEY STONE SURGERY         Family History: family history is not on file. Otherwise pertinent FHx was reviewed and not pertinent to current issue.    Social History:  reports that he has never smoked. He has never used smokeless tobacco. He reports that he does not drink alcohol and does not use drugs.    Home Medications:  Current Outpatient Medications on File Prior to Visit   Medication Sig    Cholecalciferol 125 MCG (5000 UT) tablet Take 1 tablet by mouth Daily.    citalopram (CeleXA) 20 MG tablet Take 0.5 tablets by mouth Daily.    empagliflozin (Jardiance) 25 MG tablet tablet Take 1 tablet by mouth Daily.    magnesium oxide (MAG-OX) 400 MG tablet Take 1 tablet by mouth Daily.    melatonin 5 MG tablet tablet Take 1 tablet by mouth Every Night.    metFORMIN (GLUCOPHAGE) 500 MG tablet Take 1 tablet by  mouth 2 (Two) Times a Day With Meals.    rosuvastatin (CRESTOR) 40 MG tablet Take 1 tablet by mouth Daily.    SITagliptin (Januvia) 100 MG tablet Take 1 tablet by mouth Daily.    tamsulosin (FLOMAX) 0.4 MG capsule 24 hr capsule Take 1 capsule by mouth Daily.    lisinopril (PRINIVIL,ZESTRIL) 20 MG tablet Take 0.5 tablets by mouth Daily for 30 days.     No current facility-administered medications on file prior to visit.          Allergies:  Allergies   Allergen Reactions    Codeine Unknown - Low Severity    Promethazine Unknown - Low Severity    Celecoxib Rash    Ibuprofen Rash    Penicillins Rash    Promethazine Hcl Rash    Warfarin Rash       Objective   Objective     Vitals:   Temp:  [99.1 øF (37.3 øC)] 99.1 øF (37.3 øC)  Heart Rate:  [67] 67  Resp:  [18] 18  BP: (102)/(60) 102/60    Physical Exam    General: Awake, alert, NAD  Head: No tenderness.  Palpable bilateral temporal pulses.   Eyes:  GIANCARLO   Neck: Supple   Lungs: Clear   Heart: RRR   Abdomen: benign, no masses.   Musculoskeletal:  normal motor tone, symmetric   Skin: warm, normal turgor   Neuro: strength 5/5 all extremities   Pulses: +2 bilateral radial, +2 bilateral popliteal.    Diagnostic studies:   A sed rate dated 7/16/2023 was 4, a C-reactive protein on the same day was less than 0.3.    Assessment & Plan   Assessment / Plan     Active Hospital Problems:  There are no active hospital problems to display for this patient.      Diagnoses and all orders for this visit:    1. Right-sided lacunar infarction (Primary)        Assessment/plan:   Mr. Baldwin describes symptoms that would not appear to be consistent with temporal arteritis.  At the time of the most acute symptoms his sed rate and CRP were completely normal.  His symptoms are actually improving.  At this time I would not recommend further evaluation or intervention from my standpoint.  Follow-up with his PCP as scheduled.  Follow-up with us as needed.      Electronically signed by Nguyễn Grove  MD, 08/30/23, 11:10 AM EDT.

## 2023-09-18 DIAGNOSIS — E11.65 TYPE 2 DIABETES MELLITUS WITH HYPERGLYCEMIA, WITHOUT LONG-TERM CURRENT USE OF INSULIN: ICD-10-CM

## 2023-09-22 ENCOUNTER — TELEPHONE (OUTPATIENT)
Dept: INTERNAL MEDICINE | Facility: CLINIC | Age: 78
End: 2023-09-22
Payer: MEDICARE

## 2023-09-22 RX ORDER — EMPAGLIFLOZIN 25 MG/1
TABLET, FILM COATED ORAL
Qty: 90 TABLET | Refills: 3 | Status: SHIPPED | OUTPATIENT
Start: 2023-09-22

## 2023-09-22 NOTE — TELEPHONE ENCOUNTER
Caller: [facility]    Relationship: Other    Best call back number: [phone]    What form or medical record are you requesting: LAST OFFICE VISIT NOTES REGARDING NEED FOR NEBULIZER     Who is requesting this form or medical record from you: [facility]     How would you like to receive the form or medical records (pick-up, mail, fax): FAX  If fax, what is the fax number: [phone]    Timeframe paperwork needed: AS SOON AS POSSIBLE    Additional notes: [facility] IS NEEDING WHILE THEY ARE WORKING ON AUTHORIZATION FOR THE NEBULIZER AND SUPPLIES FOR IT

## 2023-10-18 ENCOUNTER — APPOINTMENT (OUTPATIENT)
Dept: GENERAL RADIOLOGY | Facility: HOSPITAL | Age: 78
DRG: 177 | End: 2023-10-18
Payer: MEDICARE

## 2023-10-18 ENCOUNTER — HOSPITAL ENCOUNTER (INPATIENT)
Facility: HOSPITAL | Age: 78
LOS: 7 days | Discharge: NURSING, REHAB OR LONG-TERM CARE | DRG: 177 | End: 2023-10-27
Attending: EMERGENCY MEDICINE | Admitting: FAMILY MEDICINE
Payer: MEDICARE

## 2023-10-18 ENCOUNTER — APPOINTMENT (OUTPATIENT)
Dept: CT IMAGING | Facility: HOSPITAL | Age: 78
DRG: 177 | End: 2023-10-18
Payer: MEDICARE

## 2023-10-18 DIAGNOSIS — R26.2 DIFFICULTY IN WALKING: ICD-10-CM

## 2023-10-18 DIAGNOSIS — U07.1 COVID-19: Primary | ICD-10-CM

## 2023-10-18 DIAGNOSIS — Z78.9 DECREASED ACTIVITIES OF DAILY LIVING (ADL): ICD-10-CM

## 2023-10-18 DIAGNOSIS — R26.2 DIFFICULTY WALKING: ICD-10-CM

## 2023-10-18 LAB
ALBUMIN SERPL-MCNC: 4.1 G/DL (ref 3.5–5.2)
ALBUMIN/GLOB SERPL: 1.5 G/DL
ALP SERPL-CCNC: 64 U/L (ref 39–117)
ALT SERPL W P-5'-P-CCNC: 20 U/L (ref 1–41)
ANION GAP SERPL CALCULATED.3IONS-SCNC: 13.9 MMOL/L (ref 5–15)
AST SERPL-CCNC: 15 U/L (ref 1–40)
BASOPHILS # BLD AUTO: 0.06 10*3/MM3 (ref 0–0.2)
BASOPHILS NFR BLD AUTO: 0.5 % (ref 0–1.5)
BILIRUB SERPL-MCNC: 1.3 MG/DL (ref 0–1.2)
BILIRUB UR QL STRIP: NEGATIVE
BUN SERPL-MCNC: 15 MG/DL (ref 8–23)
BUN/CREAT SERPL: 13.9 (ref 7–25)
CALCIUM SPEC-SCNC: 9.7 MG/DL (ref 8.6–10.5)
CHLORIDE SERPL-SCNC: 104 MMOL/L (ref 98–107)
CLARITY UR: CLEAR
CO2 SERPL-SCNC: 23.1 MMOL/L (ref 22–29)
COLOR UR: YELLOW
CREAT SERPL-MCNC: 1.08 MG/DL (ref 0.76–1.27)
CRP SERPL-MCNC: 0.74 MG/DL (ref 0–0.5)
DEPRECATED RDW RBC AUTO: 49.3 FL (ref 37–54)
EGFRCR SERPLBLD CKD-EPI 2021: 69.4 ML/MIN/1.73
EOSINOPHIL # BLD AUTO: 0.14 10*3/MM3 (ref 0–0.4)
EOSINOPHIL NFR BLD AUTO: 1.2 % (ref 0.3–6.2)
ERYTHROCYTE [DISTWIDTH] IN BLOOD BY AUTOMATED COUNT: 14.8 % (ref 12.3–15.4)
FLUAV SUBTYP SPEC NAA+PROBE: NOT DETECTED
FLUBV RNA ISLT QL NAA+PROBE: NOT DETECTED
GLOBULIN UR ELPH-MCNC: 2.7 GM/DL
GLUCOSE SERPL-MCNC: 139 MG/DL (ref 65–99)
GLUCOSE UR STRIP-MCNC: ABNORMAL MG/DL
HCT VFR BLD AUTO: 45.7 % (ref 37.5–51)
HGB BLD-MCNC: 15.2 G/DL (ref 13–17.7)
HGB UR QL STRIP.AUTO: NEGATIVE
HOLD SPECIMEN: NORMAL
HOLD SPECIMEN: NORMAL
IMM GRANULOCYTES # BLD AUTO: 0.12 10*3/MM3 (ref 0–0.05)
IMM GRANULOCYTES NFR BLD AUTO: 1.1 % (ref 0–0.5)
KETONES UR QL STRIP: NEGATIVE
LEUKOCYTE ESTERASE UR QL STRIP.AUTO: NEGATIVE
LYMPHOCYTES # BLD AUTO: 1.97 10*3/MM3 (ref 0.7–3.1)
LYMPHOCYTES NFR BLD AUTO: 17.4 % (ref 19.6–45.3)
MAGNESIUM SERPL-MCNC: 1.9 MG/DL (ref 1.6–2.4)
MCH RBC QN AUTO: 30.3 PG (ref 26.6–33)
MCHC RBC AUTO-ENTMCNC: 33.3 G/DL (ref 31.5–35.7)
MCV RBC AUTO: 91.2 FL (ref 79–97)
MONOCYTES # BLD AUTO: 1.11 10*3/MM3 (ref 0.1–0.9)
MONOCYTES NFR BLD AUTO: 9.8 % (ref 5–12)
NEUTROPHILS NFR BLD AUTO: 7.91 10*3/MM3 (ref 1.7–7)
NEUTROPHILS NFR BLD AUTO: 70 % (ref 42.7–76)
NITRITE UR QL STRIP: NEGATIVE
NRBC BLD AUTO-RTO: 0 /100 WBC (ref 0–0.2)
PH UR STRIP.AUTO: 6.5 [PH] (ref 5–8)
PLATELET # BLD AUTO: 162 10*3/MM3 (ref 140–450)
PMV BLD AUTO: 10.7 FL (ref 6–12)
POTASSIUM SERPL-SCNC: 3.9 MMOL/L (ref 3.5–5.2)
PROT SERPL-MCNC: 6.8 G/DL (ref 6–8.5)
PROT UR QL STRIP: NEGATIVE
RBC # BLD AUTO: 5.01 10*6/MM3 (ref 4.14–5.8)
RSV RNA NPH QL NAA+NON-PROBE: NOT DETECTED
S PYO AG THROAT QL: NEGATIVE
SARS-COV-2 RNA RESP QL NAA+PROBE: DETECTED
SODIUM SERPL-SCNC: 141 MMOL/L (ref 136–145)
SP GR UR STRIP: >1.03 (ref 1–1.03)
TROPONIN T SERPL HS-MCNC: 9 NG/L
UROBILINOGEN UR QL STRIP: ABNORMAL
WBC NRBC COR # BLD: 11.31 10*3/MM3 (ref 3.4–10.8)
WHOLE BLOOD HOLD COAG: NORMAL
WHOLE BLOOD HOLD SPECIMEN: NORMAL

## 2023-10-18 PROCEDURE — 25810000003 SODIUM CHLORIDE 0.9 % SOLUTION: Performed by: EMERGENCY MEDICINE

## 2023-10-18 PROCEDURE — 81003 URINALYSIS AUTO W/O SCOPE: CPT | Performed by: EMERGENCY MEDICINE

## 2023-10-18 PROCEDURE — 25010000002 ENOXAPARIN PER 10 MG: Performed by: FAMILY MEDICINE

## 2023-10-18 PROCEDURE — 25010000002 ONDANSETRON PER 1 MG: Performed by: EMERGENCY MEDICINE

## 2023-10-18 PROCEDURE — 87880 STREP A ASSAY W/OPTIC: CPT | Performed by: PHYSICIAN ASSISTANT

## 2023-10-18 PROCEDURE — 83735 ASSAY OF MAGNESIUM: CPT

## 2023-10-18 PROCEDURE — 84484 ASSAY OF TROPONIN QUANT: CPT

## 2023-10-18 PROCEDURE — 25810000003 LACTATED RINGERS PER 1000 ML: Performed by: FAMILY MEDICINE

## 2023-10-18 PROCEDURE — 93005 ELECTROCARDIOGRAM TRACING: CPT | Performed by: EMERGENCY MEDICINE

## 2023-10-18 PROCEDURE — 93005 ELECTROCARDIOGRAM TRACING: CPT

## 2023-10-18 PROCEDURE — 36415 COLL VENOUS BLD VENIPUNCTURE: CPT

## 2023-10-18 PROCEDURE — 99285 EMERGENCY DEPT VISIT HI MDM: CPT

## 2023-10-18 PROCEDURE — 85025 COMPLETE CBC W/AUTO DIFF WBC: CPT

## 2023-10-18 PROCEDURE — 93010 ELECTROCARDIOGRAM REPORT: CPT | Performed by: INTERNAL MEDICINE

## 2023-10-18 PROCEDURE — 70491 CT SOFT TISSUE NECK W/DYE: CPT

## 2023-10-18 PROCEDURE — 74177 CT ABD & PELVIS W/CONTRAST: CPT

## 2023-10-18 PROCEDURE — 87081 CULTURE SCREEN ONLY: CPT | Performed by: PHYSICIAN ASSISTANT

## 2023-10-18 PROCEDURE — 87637 SARSCOV2&INF A&B&RSV AMP PRB: CPT | Performed by: PHYSICIAN ASSISTANT

## 2023-10-18 PROCEDURE — 25510000001 IOPAMIDOL PER 1 ML: Performed by: EMERGENCY MEDICINE

## 2023-10-18 PROCEDURE — 86140 C-REACTIVE PROTEIN: CPT | Performed by: FAMILY MEDICINE

## 2023-10-18 PROCEDURE — 71045 X-RAY EXAM CHEST 1 VIEW: CPT

## 2023-10-18 PROCEDURE — 80053 COMPREHEN METABOLIC PANEL: CPT

## 2023-10-18 RX ORDER — SODIUM CHLORIDE, SODIUM LACTATE, POTASSIUM CHLORIDE, CALCIUM CHLORIDE 600; 310; 30; 20 MG/100ML; MG/100ML; MG/100ML; MG/100ML
75 INJECTION, SOLUTION INTRAVENOUS CONTINUOUS
Status: ACTIVE | OUTPATIENT
Start: 2023-10-18 | End: 2023-10-19

## 2023-10-18 RX ORDER — CARVEDILOL 3.12 MG/1
3.12 TABLET ORAL 2 TIMES DAILY WITH MEALS
COMMUNITY
Start: 2023-09-25 | End: 2023-10-27 | Stop reason: HOSPADM

## 2023-10-18 RX ORDER — SODIUM CHLORIDE 0.9 % (FLUSH) 0.9 %
10 SYRINGE (ML) INJECTION AS NEEDED
Status: DISCONTINUED | OUTPATIENT
Start: 2023-10-18 | End: 2023-10-27 | Stop reason: HOSPADM

## 2023-10-18 RX ORDER — NITROGLYCERIN 0.4 MG/1
0.4 TABLET SUBLINGUAL
Status: DISCONTINUED | OUTPATIENT
Start: 2023-10-18 | End: 2023-10-27 | Stop reason: HOSPADM

## 2023-10-18 RX ORDER — BENZONATATE 100 MG/1
100 CAPSULE ORAL 3 TIMES DAILY PRN
Status: DISCONTINUED | OUTPATIENT
Start: 2023-10-18 | End: 2023-10-27 | Stop reason: HOSPADM

## 2023-10-18 RX ORDER — ONDANSETRON 2 MG/ML
4 INJECTION INTRAMUSCULAR; INTRAVENOUS ONCE
Status: COMPLETED | OUTPATIENT
Start: 2023-10-18 | End: 2023-10-18

## 2023-10-18 RX ORDER — AZITHROMYCIN 250 MG/1
500 TABLET, FILM COATED ORAL ONCE
Status: COMPLETED | OUTPATIENT
Start: 2023-10-18 | End: 2023-10-18

## 2023-10-18 RX ORDER — ONDANSETRON 2 MG/ML
4 INJECTION INTRAMUSCULAR; INTRAVENOUS EVERY 6 HOURS PRN
Status: DISCONTINUED | OUTPATIENT
Start: 2023-10-18 | End: 2023-10-27 | Stop reason: HOSPADM

## 2023-10-18 RX ORDER — IPRATROPIUM BROMIDE AND ALBUTEROL SULFATE 2.5; .5 MG/3ML; MG/3ML
3 SOLUTION RESPIRATORY (INHALATION) EVERY 4 HOURS PRN
Status: DISCONTINUED | OUTPATIENT
Start: 2023-10-18 | End: 2023-10-27 | Stop reason: HOSPADM

## 2023-10-18 RX ORDER — ENOXAPARIN SODIUM 100 MG/ML
40 INJECTION SUBCUTANEOUS DAILY
Status: DISCONTINUED | OUTPATIENT
Start: 2023-10-18 | End: 2023-10-27 | Stop reason: HOSPADM

## 2023-10-18 RX ORDER — CHOLECALCIFEROL (VITAMIN D3) 125 MCG
10 CAPSULE ORAL NIGHTLY PRN
Status: DISCONTINUED | OUTPATIENT
Start: 2023-10-18 | End: 2023-10-27 | Stop reason: HOSPADM

## 2023-10-18 RX ORDER — GUAIFENESIN 600 MG/1
600 TABLET, EXTENDED RELEASE ORAL EVERY 12 HOURS SCHEDULED
Status: DISCONTINUED | OUTPATIENT
Start: 2023-10-18 | End: 2023-10-27 | Stop reason: HOSPADM

## 2023-10-18 RX ORDER — AZITHROMYCIN 250 MG/1
250 TABLET, FILM COATED ORAL
Status: COMPLETED | OUTPATIENT
Start: 2023-10-19 | End: 2023-10-22

## 2023-10-18 RX ADMIN — ONDANSETRON 4 MG: 2 INJECTION INTRAMUSCULAR; INTRAVENOUS at 17:49

## 2023-10-18 RX ADMIN — SODIUM CHLORIDE, POTASSIUM CHLORIDE, SODIUM LACTATE AND CALCIUM CHLORIDE 75 ML/HR: 600; 310; 30; 20 INJECTION, SOLUTION INTRAVENOUS at 21:58

## 2023-10-18 RX ADMIN — AZITHROMYCIN DIHYDRATE 500 MG: 250 TABLET, FILM COATED ORAL at 22:57

## 2023-10-18 RX ADMIN — SODIUM CHLORIDE 1000 ML: 9 INJECTION, SOLUTION INTRAVENOUS at 17:45

## 2023-10-18 RX ADMIN — ENOXAPARIN SODIUM 40 MG: 100 INJECTION SUBCUTANEOUS at 22:57

## 2023-10-18 RX ADMIN — IOPAMIDOL 50 ML: 755 INJECTION, SOLUTION INTRAVENOUS at 18:36

## 2023-10-18 RX ADMIN — GUAIFENESIN 600 MG: 600 TABLET ORAL at 22:57

## 2023-10-18 NOTE — ED PROVIDER NOTES
"Time: 3:42 PM EDT  Date of encounter:  10/18/2023  Independent Historian/Clinical History and Information was obtained by:   Patient and Family    History is limited by: N/A    Chief Complaint   Patient presents with    Vomiting     vomiting,poor appetite,chills,weakness started yesterday, sore throat    Weakness - Generalized    Chills         History of Present Illness:  Patient is a 78 y.o. year old male who presents to the emergency department for evaluation of nausea and vomiting since yesterday.  Family member reports decreased p.o. intake for the past 48 hours.  Patient denies chest pain or shortness of breath.  Patient does report abdominal pain.  Patient has no dysuria urinary frequency.  No known sick contacts. Decreased appetite since yesterday. Only other complaint is sore throat \"telling family he feels like he has a knot in his throat\". Denies cough, changes in urination. (Leesa Willis PA-C provider in triage 3:43 PM EDT )     Patient Care Team  Primary Care Provider: Felix Navarro Jr., MD    Past Medical History:     Allergies   Allergen Reactions    Codeine Unknown - Low Severity    Promethazine Unknown - Low Severity    Celecoxib Rash    Ibuprofen Rash    Penicillins Rash    Promethazine Hcl Rash    Warfarin Rash     Past Medical History:   Diagnosis Date    Aorta disorder     Arthritis     Degenerative joint disease     Dementia     FRONTAL LOBE    Depression     Diabetes mellitus     Hyperlipidemia     Hypertension     Kidney stone     Stroke     MILD RIGHT SIDE DEFICITS     Past Surgical History:   Procedure Laterality Date    CHOLECYSTECTOMY      CYSTOSCOPY BLADDER STONE LITHOTRIPSY      KIDNEY STONE SURGERY       History reviewed. No pertinent family history.    Home Medications:  Prior to Admission medications    Medication Sig Start Date End Date Taking? Authorizing Provider   Cholecalciferol 125 MCG (5000 UT) tablet Take 1 tablet by mouth Daily.    Provider, MD Mark "   citalopram (CeleXA) 20 MG tablet Take 0.5 tablets by mouth Daily. 6/13/23   Felix Navarro Jr., MD   empagliflozin (Jardiance) 25 MG tablet tablet Take 1 tablet by mouth once daily 9/22/23   Felix Navarro Jr., MD   lisinopril (PRINIVIL,ZESTRIL) 20 MG tablet Take 0.5 tablets by mouth Daily for 30 days. 7/22/23 8/21/23  Richard Martinez MD   magnesium oxide (MAG-OX) 400 MG tablet Take 1 tablet by mouth Daily. 3/6/23   Felix Navarro Jr., MD   melatonin 5 MG tablet tablet Take 1 tablet by mouth Every Night.    ProviderMark MD   metFORMIN (GLUCOPHAGE) 500 MG tablet TAKE 1 TABLET BY MOUTH TWICE DAILY WITH MEALS 9/18/23   Felix Navarro Jr., MD   rosuvastatin (CRESTOR) 40 MG tablet Take 1 tablet by mouth Daily. 2/13/23   Fleix Navarro Jr., MD   SITagliptin (Januvia) 100 MG tablet Take 1 tablet by mouth Daily. 6/13/23   Felix Navarro Jr., MD   tamsulosin (FLOMAX) 0.4 MG capsule 24 hr capsule Take 1 capsule by mouth Daily. 3/3/23   Felix Navarro Jr., MD        Social History:   Social History     Tobacco Use    Smoking status: Never    Smokeless tobacco: Never   Vaping Use    Vaping Use: Never used   Substance Use Topics    Alcohol use: Never    Drug use: Never         Review of Systems:  Review of Systems   Constitutional:  Negative for chills and fever.   HENT:  Negative for congestion, rhinorrhea and sore throat.    Eyes:  Negative for pain and visual disturbance.   Respiratory:  Negative for apnea, cough, chest tightness and shortness of breath.    Cardiovascular:  Negative for chest pain and palpitations.   Gastrointestinal:  Positive for nausea and vomiting. Negative for abdominal pain and diarrhea.   Genitourinary:  Negative for difficulty urinating and dysuria.   Musculoskeletal:  Negative for joint swelling and myalgias.   Skin:  Negative for color change.   Neurological:  Negative for seizures and headaches.   Psychiatric/Behavioral:  "Negative.     All other systems reviewed and are negative.       Physical Exam:  /71   Pulse 69   Temp 99.1 °F (37.3 °C) (Oral)   Resp 16   Ht 175.3 cm (69\")   Wt 81.5 kg (179 lb 10.8 oz)   SpO2 95%   BMI 26.53 kg/m²         Physical Exam  Vitals and nursing note reviewed.   Constitutional:       General: He is not in acute distress.     Appearance: Normal appearance. He is not toxic-appearing.      Comments: Dry heaving in triage   HENT:      Head: Normocephalic and atraumatic.      Jaw: There is normal jaw occlusion.   Eyes:      General: Lids are normal.      Extraocular Movements: Extraocular movements intact.      Conjunctiva/sclera: Conjunctivae normal.      Pupils: Pupils are equal, round, and reactive to light.   Cardiovascular:      Rate and Rhythm: Normal rate and regular rhythm.      Pulses: Normal pulses.      Heart sounds: Normal heart sounds.   Pulmonary:      Effort: Pulmonary effort is normal. No respiratory distress.      Breath sounds: Normal breath sounds. No wheezing or rhonchi.   Abdominal:      General: Abdomen is flat. There is no distension.      Palpations: Abdomen is soft.      Tenderness: There is abdominal tenderness. There is no guarding or rebound.   Musculoskeletal:         General: Normal range of motion.      Cervical back: Normal range of motion and neck supple.      Right lower leg: No edema.      Left lower leg: No edema.   Skin:     General: Skin is warm and dry.      Coloration: Skin is not cyanotic.   Neurological:      Mental Status: He is alert and oriented to person, place, and time. Mental status is at baseline.   Psychiatric:         Attention and Perception: Attention and perception normal.         Mood and Affect: Mood normal.                      Procedures:  Procedures      Medical Decision Making:      Comorbidities that affect care:    Diabetes, Hypertension    External Notes reviewed:    Hospital Discharge Summary: Patient was recently admitted for " strokelike symptoms.      The following orders were placed and all results were independently analyzed by me:  Orders Placed This Encounter   Procedures    Rapid Strep A Screen - Swab, Throat    COVID-19, FLU A/B, RSV PCR - Swab, Nasopharynx    Beta Strep Culture, Throat - Swab, Throat    XR Chest 1 View    CT Abdomen Pelvis With Contrast    CT Soft Tissue Neck With Contrast    [place] Draw    Comprehensive Metabolic Panel    Single High Sensitivity Troponin T    Magnesium    Urinalysis With Microscopic If Indicated (No Culture) - Urine, Clean Catch    CBC Auto Differential    NPO Diet NPO Type: Strict NPO    Undress & Gown    Continuous Pulse Oximetry    Vital Signs    Orthostatic Blood Pressure    Inpatient Hospitalist Consult    Oxygen Therapy- Nasal Cannula; Titrate 1-6 LPM Per SpO2; 90 - 95%    POC Glucose Once    ECG 12 Lead ED Triage Standing Order; Weak / Dizzy / AMS    Insert Peripheral IV    Fall Precautions    CBC & Differential    Green Top (Gel)    Lavender Top    Gold Top - SST    Light Blue Top       Medications Given in the Emergency Department:  Medications   sodium chloride 0.9 % flush 10 mL (has no administration in time range)   sodium chloride 0.9 % bolus 1,000 mL (0 mL Intravenous Stopped 10/18/23 2009)   ondansetron (ZOFRAN) injection 4 mg (4 mg Intravenous Given 10/18/23 1749)   iopamidol (ISOVUE-370) 76 % injection 100 mL (50 mL Intravenous Given 10/18/23 1836)        ED Course:    The patient was initially evaluated in the triage area where orders were placed. The patient was later dispositioned by Lynda Lala MD.      The patient was advised to stay for completion of workup which includes but is not limited to communication of labs and radiological results, reassessment and plan. The patient was advised that leaving prior to disposition by a provider could result in critical findings that are not communicated to the patient.          Labs:    Lab Results (last 24 hours)        Procedure Component Value Units Date/Time    CBC & Differential [[id number]]  (Abnormal) Collected: 10/18/23 1550    Specimen: Blood from Arm, Right Updated: 10/18/23 1608    Narrative:      The following orders were created for panel order CBC & Differential.  Procedure                               Abnormality         Status                     ---------                               -----------         ------                     CBC Auto Differential[[id number]]        Abnormal            Final result                 Please view results for these tests on the individual orders.    Comprehensive Metabolic Panel [[id number]]  (Abnormal) Collected: 10/18/23 1550    Specimen: Blood from Arm, Right Updated: 10/18/23 1631     Glucose 139 mg/dL      BUN 15 mg/dL      Creatinine 1.08 mg/dL      Sodium 141 mmol/L      Potassium 3.9 mmol/L      Chloride 104 mmol/L      CO2 23.1 mmol/L      Calcium 9.7 mg/dL      Total Protein 6.8 g/dL      Albumin 4.1 g/dL      ALT (SGPT) 20 U/L      AST (SGOT) 15 U/L      Alkaline Phosphatase 64 U/L      Total Bilirubin 1.3 mg/dL      Globulin 2.7 gm/dL      A/G Ratio 1.5 g/dL      BUN/Creatinine Ratio 13.9     Anion Gap 13.9 mmol/L      eGFR 69.4 mL/min/1.73     Narrative:      GFR Normal >60  Chronic Kidney Disease <60  Kidney Failure <15    The GFR formula is only valid for adults with stable renal function between ages 18 and 70.    Single High Sensitivity Troponin T [[id number]]  (Normal) Collected: 10/18/23 1550    Specimen: Blood from Arm, Right Updated: 10/18/23 1631     HS Troponin T 9 ng/L     Narrative:      High Sensitive Troponin T Reference Range:  <10.0 ng/L- Negative Female for AMI  <15.0 ng/L- Negative Male for AMI  >=10 - Abnormal Female indicating possible myocardial injury.  >=15 - Abnormal Male indicating possible myocardial injury.   Clinicians would have to utilize clinical acumen, EKG, Troponin, and serial changes to determine if it is an Acute Myocardial Infarction or  myocardial injury due to an underlying chronic condition.         Magnesium [[id number]]  (Normal) Collected: 10/18/23 1550    Specimen: Blood from Arm, Right Updated: 10/18/23 1631     Magnesium 1.9 mg/dL     CBC Auto Differential [[id number]]  (Abnormal) Collected: 10/18/23 1550    Specimen: Blood from Arm, Right Updated: 10/18/23 1608     WBC 11.31 10*3/mm3      RBC 5.01 10*6/mm3      Hemoglobin 15.2 g/dL      Hematocrit 45.7 %      MCV 91.2 fL      MCH 30.3 pg      MCHC 33.3 g/dL      RDW 14.8 %      RDW-SD 49.3 fl      MPV 10.7 fL      Platelets 162 10*3/mm3      Neutrophil % 70.0 %      Lymphocyte % 17.4 %      Monocyte % 9.8 %      Eosinophil % 1.2 %      Basophil % 0.5 %      Immature Grans % 1.1 %      Neutrophils, Absolute 7.91 10*3/mm3      Lymphocytes, Absolute 1.97 10*3/mm3      Monocytes, Absolute 1.11 10*3/mm3      Eosinophils, Absolute 0.14 10*3/mm3      Basophils, Absolute 0.06 10*3/mm3      Immature Grans, Absolute 0.12 10*3/mm3      nRBC 0.0 /100 WBC     Rapid Strep A Screen - Swab, Throat [[id number]]  (Normal) Collected: 10/18/23 1641    Specimen: Swab from Throat Updated: 10/18/23 1657     Strep A Ag Negative    COVID-19, FLU A/B, RSV PCR - Swab, Nasopharynx [[id number]]  (Abnormal) Collected: 10/18/23 1641    Specimen: Swab from Nasopharynx Updated: 10/18/23 1734     COVID19 Detected     Influenza A PCR Not Detected     Influenza B PCR Not Detected     RSV, PCR Not Detected    Narrative:      Fact sheet for providers: https://www.fda.gov/media/708765/download    Fact sheet for patients: https://www.fda.gov/media/913684/download    Test performed by PCR.    Beta Strep Culture, Throat - Swab, Throat [[id number]] Collected: 10/18/23 1641    Specimen: Swab from Throat Updated: 10/18/23 1657             Imaging:    CT Abdomen Pelvis With Contrast    Result Date: 10/18/2023  PROCEDURE: CT ABDOMEN PELVIS W CONTRAST  COMPARISON: [facility], CT, CT ABDOMEN PELVIS W CONTRAST, 5/09/2022, 23:30.   INDICATIONS: abdominal pain  TECHNIQUE: After obtaining the patient's consent, CT images were created with non-ionic intravenous contrast material.   PROTOCOL:   Standard imaging protocol performed    RADIATION:   DLP: 700.4mGy*cm   Automated exposure control was utilized to minimize radiation dose. CONTRAST: 100cc  FINDINGS:  Respiratory motion limits evaluation lung bases.  Cardiomegaly.  Small hiatal hernia.  Limited evaluation of genitourinary system due to contrast related to recent contrast administration for CT neck.  No hydronephrosis.  There is posterior impression or bladder by enlarged prostate gland.  There is no free air free fluid in the abdomen pelvis.  The appendix is well seen and normal.  Status post cholecystectomy.  Mild bile duct dilatation.  Expected bile duct measures maximally 1.9 centimeters.  This is unchanged.  No focal hepatic lesions.  Pancreas, spleen and adrenal glands are normal appearance.  Moderate atherosclerotic calcification abdominal aorta.  Abdominal aorta is normal caliber.  There is diverticulosis in the sigmoid colon and some in the descending colon.  None appear acutely inflamed.  Small fat containing bilateral inguinal hernias right larger than left.  Osteopenia.  Ankylosis sacroiliac joints.  No aggressive focal lytic or sclerotic osseous lesions.       No acute abnormality on CT. Status post cholecystectomy with bile duct dilatation similar previous study. Enlarged prostate gland, hyperplasia versus neoplasia. Small hiatal hernia.     TOMMY PALM MD       Electronically Signed and Approved By: TOMMY PALM MD on 10/18/2023 at 19:31             CT Soft Tissue Neck With Contrast    Result Date: 10/18/2023  PROCEDURE: CT SOFT TISSUE NECK W CONTRAST  COMPARISON: [facility], CT, CT ANGIOGRAM NECK, 7/19/2023, 18:25.  INDICATIONS: neck pain  PROTOCOL:   Standard imaging protocol performed    RADIATION:   DLP: 292.5mGy*cm   Automated exposure control was utilized to  minimize radiation dose. CONTRAST: 50cc Isovue 370 I.V.  TECHNIQUE: After obtaining the patient's consent, CT images were obtained with non-ionic intravenous contrast material.   FINDINGS: Visualized portions of brain are normal in appearance.  The airway is patent.  There are no pathologically enlarged cervical chain lymph nodes.  The thyroid gland is symmetric and homogeneous.  Superior mediastinum is normal in appearance.  Lung apices are clear.  The submandibular glands and parotid glands are normal appearance.  Patient is edentulous.  There is moderate degenerative disc disease in the cervical spine.  The prevertebral soft tissues are normal in appearance.  The epiglottis is normal appearance.       No etiology to explain patient's neck pain.  Degenerative disc disease is seen.  The     TOMMY PALM MD       Electronically Signed and Approved By: TOMMY PALM MD on 10/18/2023 at 19:26             XR Chest 1 View    Result Date: 10/18/2023  PROCEDURE: XR CHEST 1 VW  COMPARISON: [facility], , XR CHEST 1 VW, 12/01/2022, 20:41.  INDICATIONS: weakness today  FINDINGS:  Lungs normally expanded.  Mild cardiomegaly.  Bibasilar hazy opacity.  No pneumothorax or pleural effusion.       There is mild bibasilar airspace opacity question subsegmental atelectasis.  Developing atypical pneumonia less likely.       TOMMY PALM MD       Electronically Signed and Approved By: TOMMY PALM MD on 10/18/2023 at 16:35                Differential Diagnosis and Discussion:      Weakness: Based on the patient's history, signs, and symptoms, the diffential diagnosis includes but is not limited to meningitis, stroke, sepsis, subarachnoid hemorrhage, intracranial bleeding, encephalitis, acute uti, dehydration, MS, myasthenia gravis, Guillan [place], migraine variant, neuromuscular disorders vertigo, electrolyte imbalance, and metabolic disorders.    All labs were reviewed and interpreted by me.  All X-rays impressions were  independently interpreted by me.  CT scan radiology impression was interpreted by me.    MDM     The patient´s CBC that was reviewed and interpreted by me shows no abnormalities of critical concern. Of note, there is no anemia requiring a blood transfusion and the platelet count is acceptable.  The patient´s CMP that was reviewed and interpretted by me shows no abnormalities of critical concern. Of note, the patient´s sodium and potassium are acceptable. The patient´s liver enzymes are unremarkable. The patient´s renal function (creatinine) is preserved. The patient has a normal anion gap.  COVID-19 swab is positive.  Troponin is negative.  CT scan abdomen pelvis is negative for acute intra-abdominal pathology.  Soft tissue neck CT is negative.  Chest x-ray is negative for acute infiltrate.    Patient is weak with decreased p.o. intake.  Decision was made to admit the patient for rehydration.        Patient Care Considerations:    I considered ordering antibiotics, however no bacterial focus of infection was found.      Consultants/Shared Management Plan:    Case was discussed with Dr. Collier who agrees with admission.    Social Determinants of Health:    Patient is independent, reliable, and has access to care.       Disposition and Care Coordination:    Admit:   Through independent evaluation of the patient's history, physical, and imperical data, the patient meets criteria for observation/admission to the hospital.        Final diagnoses:   COVID-19        ED Disposition       ED Disposition   Decision to Admit    Condition   --    Comment   --               This medical record created using voice recognition software.             Lynda Lala MD  10/18/23 2016

## 2023-10-19 PROBLEM — J06.9 UPPER RESPIRATORY TRACT INFECTION DUE TO COVID-19 VIRUS: Status: ACTIVE | Noted: 2023-10-19

## 2023-10-19 PROBLEM — U07.1 UPPER RESPIRATORY TRACT INFECTION DUE TO COVID-19 VIRUS: Status: ACTIVE | Noted: 2023-10-19

## 2023-10-19 PROCEDURE — 25010000002 ONDANSETRON PER 1 MG: Performed by: FAMILY MEDICINE

## 2023-10-19 PROCEDURE — 25810000003 LACTATED RINGERS PER 1000 ML: Performed by: FAMILY MEDICINE

## 2023-10-19 PROCEDURE — G0378 HOSPITAL OBSERVATION PER HR: HCPCS

## 2023-10-19 PROCEDURE — 63710000001 DEXAMETHASONE PER 0.25 MG: Performed by: INTERNAL MEDICINE

## 2023-10-19 RX ORDER — BENZONATATE 100 MG/1
100 CAPSULE ORAL 3 TIMES DAILY PRN
Qty: 10 CAPSULE | Refills: 0 | Status: SHIPPED | OUTPATIENT
Start: 2023-10-19 | End: 2023-10-27 | Stop reason: HOSPADM

## 2023-10-19 RX ORDER — AZITHROMYCIN 250 MG/1
TABLET, FILM COATED ORAL
Qty: 3 TABLET | Refills: 0 | Status: SHIPPED | OUTPATIENT
Start: 2023-10-19 | End: 2023-10-27 | Stop reason: HOSPADM

## 2023-10-19 RX ORDER — GUAIFENESIN 600 MG/1
600 TABLET, EXTENDED RELEASE ORAL EVERY 12 HOURS SCHEDULED
Qty: 60 TABLET | Refills: 0 | Status: SHIPPED | OUTPATIENT
Start: 2023-10-19 | End: 2023-10-27 | Stop reason: SDUPTHER

## 2023-10-19 RX ADMIN — AZITHROMYCIN DIHYDRATE 250 MG: 250 TABLET ORAL at 22:18

## 2023-10-19 RX ADMIN — SODIUM CHLORIDE, POTASSIUM CHLORIDE, SODIUM LACTATE AND CALCIUM CHLORIDE 75 ML/HR: 600; 310; 30; 20 INJECTION, SOLUTION INTRAVENOUS at 11:58

## 2023-10-19 RX ADMIN — BENZONATATE 100 MG: 100 CAPSULE ORAL at 08:38

## 2023-10-19 RX ADMIN — GUAIFENESIN 600 MG: 600 TABLET ORAL at 08:38

## 2023-10-19 RX ADMIN — ONDANSETRON 4 MG: 2 INJECTION INTRAMUSCULAR; INTRAVENOUS at 08:38

## 2023-10-19 RX ADMIN — GUAIFENESIN 600 MG: 600 TABLET ORAL at 22:17

## 2023-10-19 RX ADMIN — DEXAMETHASONE 6 MG: 0.5 TABLET ORAL at 16:14

## 2023-10-19 NOTE — PLAN OF CARE
Goal Outcome Evaluation:  Plan of Care Reviewed With: patient        Progress: no change  Outcome Evaluation: patient is alert and oriented with some intermittent forgetfulness. no c/o pain. IV fluids maintained. pt up to chair this shift. educated on use of IS. encouraged patient to increase PO intake. no other changes at this time

## 2023-10-19 NOTE — H&P
[facility]   HISTORY AND PHYSICAL    Patient Name: Nikhil Baldwni  : 1945  MRN: [id number]  Primary Care Physician:  Felix Navarro Jr., MD  Date of admission: 10/18/2023    Subjective   Subjective     Chief Complaint: Generalized weakness    HPI:    Nikhil Baldwin is a 78 y.o. male with past with a history of diabetes, hypertension, hyperlipidemia, prior CVA, dementia, aortic aneurysm, arthritis, and GERD presented to the ED with complaints of generalized weakness, malaise, nausea vomiting, and decreased appetite.  Patient states that he has been symptomatic for the last few days and has not been able to keep anything down due to his nausea.  Family at bedside states that he is pretty much been bedridden due to his illness.  Due to worsening symptoms patient was brought into the ED for further evaluation.  In the ED patient's vitals were all within normal limits on room air.  Labs showed that he was COVID-positive with mild leukocytosis with remaining labs being unremarkable given his chronic conditions including a negative troponin, and lactic acid.  Chest x-ray showed bibasilar atelectasis.  CT of the abdomen was negative for any acute findings but did show an enlarged prostate.  When asked he denied any recent fevers, headaches, focal weakness, chest pain, diarrhea, constipation, dysuria, hematuria, hematochezia, melena, or anxiety.  Patient admitted for further evaluation and treatment.    Review of Systems   All systems were reviewed and negative except for: As stated in HPI    Personal History     Past Medical History:   Diagnosis Date   • Aorta disorder    • Arthritis    • Degenerative joint disease    • Dementia     FRONTAL LOBE   • Depression    • Diabetes mellitus    • Hyperlipidemia    • Hypertension    • Kidney stone    • Stroke     MILD RIGHT SIDE DEFICITS       Past Surgical History:   Procedure Laterality Date   • CHOLECYSTECTOMY     • CYSTOSCOPY BLADDER STONE LITHOTRIPSY     •  KIDNEY STONE SURGERY         Family History: family history is not on file. Otherwise pertinent FHx was reviewed and not pertinent to current issue.    Social History:  reports that he has never smoked. He has never used smokeless tobacco. He reports that he does not drink alcohol and does not use drugs.    Home Medications:  Cholecalciferol, SITagliptin, carvedilol, citalopram, empagliflozin, lisinopril, melatonin, metFORMIN, rosuvastatin, and tamsulosin      Allergies:  Allergies   Allergen Reactions   • Codeine Unknown - Low Severity   • Promethazine Unknown - Low Severity   • Celecoxib Rash   • Ibuprofen Rash   • Penicillins Rash   • Promethazine Hcl Rash   • Warfarin Rash       Objective   Objective     Vitals:   Temp:  [99.1 °F (37.3 °C)-99.9 °F (37.7 °C)] 99.1 °F (37.3 °C)  Heart Rate:  [64-80] 69  Resp:  [16] 16  BP: (113-166)/() 114/69  Physical Exam    Constitutional: Awake, alert, frail   Eyes: PERRLA, sclerae anicteric, no conjunctival injection   HENT: NCAT, mucous membranes moist   Neck: Supple, no thyromegaly, no lymphadenopathy, trachea midline   Respiratory: Rhonchi, nonlabored respirations    Cardiovascular: RRR, no murmurs, rubs, or gallops, palpable pedal pulses bilaterally   Gastrointestinal: Positive bowel sounds, soft, nontender, nondistended   Musculoskeletal: No bilateral ankle edema, no clubbing or cyanosis to extremities   Psychiatric: Appropriate affect, cooperative   Neurologic: Oriented x 3, strength symmetric in all extremities, Cranial Nerves grossly intact to confrontation, speech clear   Skin: No rashes     Result Review    Result Review:  I have personally reviewed the results from the time of this admission to 10/18/2023 21:36 EDT and agree with these findings:  [x]  Laboratory list / accordion  []  Microbiology  [x]  Radiology  [x]  EKG/Telemetry   []  Cardiology/Vascular   []  Pathology  []  Old records  []  Other:  Most notable findings include: COVID-positive, normal  lactic acid, x-ray of basilar atelectasis/developing pneumonia, CT of the abdomen negative for any acute findings.      Assessment & Plan   Assessment / Plan     Brief Patient Summary:  Nikhil Baldwin is a 78 y.o. male who with past with a history of diabetes, hypertension, hyperlipidemia, prior CVA, dementia, aortic aneurysm, arthritis, and GERD presented to the ED with complaints of generalized weakness, malaise, nausea vomiting, and decreased appetite.    Active Hospital Problems:  Active Hospital Problems    Diagnosis    • **COVID-19    • Aortic aneurysm    • DDD (degenerative disc disease), lumbar    • GERD (gastroesophageal reflux disease)    • Type 2 diabetes mellitus with hyperglycemia, without long-term current use of insulin    • Primary hypertension    • BPH (benign prostatic hyperplasia)      Plan:     COVID-19 pneumonitis  -Admit to isolation  -Patient currently on room air, supplemental oxygen if needed  -X-ray reviewed, possible developing pneumonia  -Azithromycin  -DuoNebs as needed  -We will hold off on steroids for now, start if warranted  -Mucinex, Tessalon Perles  -Patient limited p.o. intake due to symptoms.  IVF  -Diet as tolerated  -We will hold off on remdesivir for now  -Supportive care    Diabetes  -Insulin sliding scale  -Levemir at bedtime  -Titrate as needed    HTN  -Currently well controlled  -PRN BP meds  -Resume home meds when available  -Titrate if needed    BPH    Hx CAD  Hx prior CVA    GI ppx  DVT ppx      DVT prophylaxis:  No DVT prophylaxis order currently exists.    CODE STATUS: Full code     Admission Status:  I believe this patient meets inpatient status.      Electronically signed by Brendon Collier MD, 10/18/23, 9:36 PM EDT.

## 2023-10-19 NOTE — PLAN OF CARE
Goal Outcome Evaluation:   Patient new admit to 4nt from ED this shift. A&Ox3, intermittently forgetful of situation. Covid positive, room air, does have frequent productive cough, incentive spirometer at bedside. No complaints of pain.

## 2023-10-19 NOTE — PROGRESS NOTES
Family concerned about pt weakness and primary caregiver concerned about patient being able to care for himself at home and thus will hold patient another night, get PT consult and plan for d/c tomorrow if improving.    Will also start decadron for COVID tx

## 2023-10-19 NOTE — DISCHARGE SUMMARY
[facility] Medicine Services  DISCHARGE SUMMARY    Date of Admission: 10/18/2023    Date of Discharge:  10/19/2023    Length of stay:  LOS: 1 day     Presenting Problem:   COVID-19 [U07.1]  Upper respiratory tract infection due to COVID-19 virus [U07.1, J06.9]      Active Diagnosis During Hospital Stay/Discharge Diagnoses/Course by Diagnoses:    COVID-19 pneumonitis  -generalized weakness d/t the same  -supportive care at home  -short course of azithromycin at d/c      Diabetes  -resume home regimen      HTN  -resume home meds at d/c      BPH     Hx CAD  Hx prior CVA       Active Hospital Problems    Diagnosis  POA    **COVID-19 [U07.1]  Yes    Upper respiratory tract infection due to COVID-19 virus [U07.1, J06.9]  Yes    Aortic aneurysm [I71.9]  Yes    DDD (degenerative disc disease), lumbar [M51.36]  Yes    GERD (gastroesophageal reflux disease) [K21.9]  Yes    Type 2 diabetes mellitus with hyperglycemia, without long-term current use of insulin [E11.65]  Yes    Primary hypertension [I10]  Yes    BPH (benign prostatic hyperplasia) [N40.0]  Yes      Resolved Hospital Problems   No resolved problems to display.         Hospital Course  Patient is a 78 y.o. male presented with COVID and generalized weakness. Was admitted given IVf and appeared to be back to his baseline. At this point he was felt able to be d/c home and can complete therapy as an outpt.        Procedures Performed:none         Consults:   Consults       Date and Time Order Name Status Description    10/18/2023  7:56 PM Inpatient Hospitalist Consult              Pertinent Test Results:     Results from last 7 days   Lab Units 10/18/23  1550   WBC 10*3/mm3 11.31*   HEMOGLOBIN g/dL 15.2   HEMATOCRIT % 45.7   PLATELETS 10*3/mm3 162     Results from last 7 days   Lab Units 10/18/23  1550   SODIUM mmol/L 141   POTASSIUM mmol/L 3.9   CHLORIDE mmol/L 104   CO2 mmol/L 23.1   BUN mg/dL 15   CREATININE mg/dL 1.08   CALCIUM mg/dL 9.7   BILIRUBIN  mg/dL 1.3*   ALK PHOS U/L 64   ALT (SGPT) U/L 20   AST (SGOT) U/L 15   GLUCOSE mg/dL 139*       Microbiology Results (last 10 days)       Procedure Component Value - Date/Time    Rapid Strep A Screen - Swab, Throat [[id number]]  (Normal) Collected: 10/18/23 1641    Lab Status: Final result Specimen: Swab from Throat Updated: 10/18/23 1657     Strep A Ag Negative    COVID-19, FLU A/B, RSV PCR - Swab, Nasopharynx [[id number]]  (Abnormal) Collected: 10/18/23 1641    Lab Status: Final result Specimen: Swab from Nasopharynx Updated: 10/18/23 1734     COVID19 Detected     Influenza A PCR Not Detected     Influenza B PCR Not Detected     RSV, PCR Not Detected    Narrative:      Fact sheet for providers: https://www.fda.gov/media/575774/download    Fact sheet for patients: https://www.fda.gov/media/052452/download    Test performed by PCR.    Beta Strep Culture, Throat - Swab, Throat [[id number]]  (Normal) Collected: 10/18/23 1641    Lab Status: Preliminary result Specimen: Swab from Throat Updated: 10/19/23 0858     Throat Culture, Beta Strep No Beta Hemolytic Streptococcus Isolated    Narrative:      Group A Strep incidence is low in adults. Positive culture for Beta hemolytic Streptococcus species can reflect colonization and not true infection. Please correlate clinically.            Results for orders placed during the hospital encounter of 07/16/23    Adult Transthoracic Echo Complete W/ Cont if Necessary Per Protocol    Interpretation Summary    Left ventricular ejection fraction appears to be 56 - 60%.    Left ventricular wall thickness is consistent with mild concentric hypertrophy.    Left ventricular diastolic function is consistent with (grade I) impaired relaxation and age.    Estimated right ventricular systolic pressure from tricuspid regurgitation is normal (<35 mmHg).    Mild dilation of the aortic root is present at 3.9 cm. Mild dilation of the ascending aorta is present at 4.3 cm.    Trace aortic  insufficiency.  No significant aortic stenosis with max/mean pressure gradient 15/8 mmHg.      Imaging Results (All)       Procedure Component Value Units Date/Time    CT Abdomen Pelvis With Contrast [[id number]] Collected: 10/18/23 1931     Updated: 10/18/23 1935    Narrative:      PROCEDURE: CT ABDOMEN PELVIS W CONTRAST     COMPARISON: [facility], CT, CT ABDOMEN PELVIS W CONTRAST, 5/09/2022, 23:30.     INDICATIONS: abdominal pain     TECHNIQUE: After obtaining the patient's consent, CT images were created with non-ionic intravenous   contrast material.       PROTOCOL:   Standard imaging protocol performed      RADIATION:   DLP: 700.4mGy*cm    Automated exposure control was utilized to minimize radiation dose.   CONTRAST: 100cc      FINDINGS:   Respiratory motion limits evaluation lung bases.  Cardiomegaly.  Small hiatal hernia.     Limited evaluation of genitourinary system due to contrast related to recent contrast   administration for CT neck.  No hydronephrosis.  There is posterior impression or bladder by   enlarged prostate gland.     There is no free air free fluid in the abdomen pelvis.  The appendix is well seen and normal.     Status post cholecystectomy.  Mild bile duct dilatation.  Expected bile duct measures maximally 1.9   centimeters.  This is unchanged.  No focal hepatic lesions.     Pancreas, spleen and adrenal glands are normal appearance.     Moderate atherosclerotic calcification abdominal aorta.  Abdominal aorta is normal caliber.     There is diverticulosis in the sigmoid colon and some in the descending colon.  None appear acutely   inflamed.  Small fat containing bilateral inguinal hernias right larger than left.     Osteopenia.  Ankylosis sacroiliac joints.  No aggressive focal lytic or sclerotic osseous lesions.       Impression:       No acute abnormality on CT.  Status post cholecystectomy with bile duct dilatation similar previous study.  Enlarged prostate gland,  hyperplasia versus neoplasia.  Small hiatal hernia.            TOMMY PALM MD         Electronically Signed and Approved By: TOMMY PALM MD on 10/18/2023 at 19:31                     CT Soft Tissue Neck With Contrast [[id number]] Collected: 10/18/23 1926     Updated: 10/18/23 1929    Narrative:      PROCEDURE: CT SOFT TISSUE NECK W CONTRAST     COMPARISON: [facility], CT, CT ANGIOGRAM NECK, 7/19/2023, 18:25.     INDICATIONS: neck pain     PROTOCOL:   Standard imaging protocol performed      RADIATION:   DLP: 292.5mGy*cm    Automated exposure control was utilized to minimize radiation dose.   CONTRAST: 50cc Isovue 370 I.V.     TECHNIQUE: After obtaining the patient's consent, CT images were obtained with non-ionic   intravenous contrast material.       FINDINGS: Visualized portions of brain are normal in appearance.  The airway is patent.  There are   no pathologically enlarged cervical chain lymph nodes.  The thyroid gland is symmetric and   homogeneous.  Superior mediastinum is normal in appearance.  Lung apices are clear.  The   submandibular glands and parotid glands are normal appearance.  Patient is edentulous.  There is   moderate degenerative disc disease in the cervical spine.  The prevertebral soft tissues are normal   in appearance.  The epiglottis is normal appearance.       Impression:       No etiology to explain patient's neck pain.  Degenerative disc disease is seen.  The            TOMMY PALM MD         Electronically Signed and Approved By: TOMMY PALM MD on 10/18/2023 at 19:26                     XR Chest 1 View [[id number]] Collected: 10/18/23 1635     Updated: 10/18/23 1638    Narrative:      PROCEDURE: XR CHEST 1 VW     COMPARISON: [facility], CR, XR CHEST 1 VW, 12/01/2022, 20:41.     INDICATIONS: weakness today     FINDINGS:   Lungs normally expanded.  Mild cardiomegaly.  Bibasilar hazy opacity.  No pneumothorax or pleural   effusion.       Impression:       There is  mild bibasilar airspace opacity question subsegmental atelectasis.  Developing   atypical pneumonia less likely.                  TOMMY PALM MD         Electronically Signed and Approved By: TOMMY PALM MD on 10/18/2023 at 16:35                               Condition on Discharge:  stable seems to be at baseline    Vital Signs  Temp:  [98 °F (36.7 °C)-99.9 °F (37.7 °C)] 99.1 °F (37.3 °C)  Heart Rate:  [54-80] 56  Resp:  [16-22] 20  BP: (110-166)/() 110/70    Physical Exam:  Physical Exam  Vitals reviewed.   Cardiovascular:      Rate and Rhythm: Normal rate.   Pulmonary:      Effort: No respiratory distress.   Abdominal:      General: There is no distension.   Neurological:      Mental Status: He is alert. Mental status is at baseline.             Discharge Disposition  Home or Self Care    Discharge Medications     Discharge Medications        New Medications        Instructions Start Date   azithromycin 250 MG tablet  Commonly known as: ZITHROMAX   Take one tablet daily      benzonatate 100 MG capsule  Commonly known as: TESSALON   100 mg, Oral, 3 Times Daily PRN      guaiFENesin 600 MG 12 hr tablet  Commonly known as: MUCINEX   600 mg, Oral, Every 12 Hours Scheduled             Changes to Medications        Instructions Start Date   lisinopril 20 MG tablet  Commonly known as: PRINIVIL,ZESTRIL  What changed: how much to take   10 mg, Oral, Daily      metFORMIN 500 MG tablet  Commonly known as: GLUCOPHAGE  What changed: how to take this   TAKE 1 TABLET BY MOUTH TWICE DAILY WITH MEALS             Continue These Medications        Instructions Start Date   carvedilol 3.125 MG tablet  Commonly known as: COREG   3.125 mg, Oral, 2 Times Daily With Meals      Cholecalciferol 125 MCG (5000 UT) tablet   5,000 Units, Oral, Daily      citalopram 20 MG tablet  Commonly known as: CeleXA   10 mg, Oral, Daily      Jardiance 25 MG tablet tablet  Generic drug: empagliflozin   Take 1 tablet by mouth once daily      melatonin  5 MG tablet tablet   5 mg, Oral, Nightly      rosuvastatin 40 MG tablet  Commonly known as: CRESTOR   40 mg, Oral, Daily      SITagliptin 100 MG tablet  Commonly known as: Januvia   100 mg, Oral, Daily      tamsulosin 0.4 MG capsule 24 hr capsule  Commonly known as: FLOMAX   0.4 mg, Oral, Daily               Discharge Diet:   Diet Instructions       Diet: Cardiac Diets, Gastrointestinal Diets; Healthy Heart (2-3 Na+); Regular Texture (IDDSI 7); Thin (IDDSI 0); Fiber-Restricted      Discharge Diet:  Cardiac Diets  Gastrointestinal Diets       Cardiac Diet: Healthy Heart (2-3 Na+)    Texture: Regular Texture (IDDSI 7)    Fluid Consistency: Thin (IDDSI 0)    Gastrointestinal Diet: Fiber-Restricted            Activity at Discharge:   Activity Instructions       Activity as Tolerated              Follow-up Appointments  Future Appointments   Date Time Provider Department Center   11/21/2023 11:30 AM Felix Navarro Jr., MD [facility]     Additional Instructions for the Follow-ups that You Need to Schedule       Discharge Follow-up with PCP   As directed       Currently Documented PCP:    Felix Navarro Jr., MD    PCP Phone Number:    [phone]     Follow Up Details: one week                Test Results Pending at Discharge  Pending Labs       Order Current Status    Beta Strep Culture, Throat - Swab, Throat Preliminary result             Risk for Readmission (LACE) Score: 11 (10/19/2023  6:00 AM)        Time: Discharge 32 min with face-to-face history exam, writing of prescriptions, and documenting discharge data including care coordination with the nursing staff.      Electronically signed by Nikita Bowen DO, 10/19/23, 13:32 EDT.      Note disclaimer: At [facility], we believe that sharing information builds trust and better relationships. You are receiving this note because you recently visited [facility]. It is possible you will see health information before a provider has talked  with you about it. This kind of information can be easy to misunderstand. To help you fully understand what it means for your health, we urge you to discuss this note with your provider.

## 2023-10-20 LAB
ANION GAP SERPL CALCULATED.3IONS-SCNC: 12.5 MMOL/L (ref 5–15)
BACTERIA SPEC AEROBE CULT: NORMAL
BUN SERPL-MCNC: 21 MG/DL (ref 8–23)
BUN/CREAT SERPL: 21.4 (ref 7–25)
CALCIUM SPEC-SCNC: 8.8 MG/DL (ref 8.6–10.5)
CHLORIDE SERPL-SCNC: 104 MMOL/L (ref 98–107)
CO2 SERPL-SCNC: 18.5 MMOL/L (ref 22–29)
CREAT SERPL-MCNC: 0.98 MG/DL (ref 0.76–1.27)
DEPRECATED RDW RBC AUTO: 52.4 FL (ref 37–54)
EGFRCR SERPLBLD CKD-EPI 2021: 78 ML/MIN/1.73
ERYTHROCYTE [DISTWIDTH] IN BLOOD BY AUTOMATED COUNT: 14.6 % (ref 12.3–15.4)
GLUCOSE SERPL-MCNC: 149 MG/DL (ref 65–99)
HCT VFR BLD AUTO: 44.7 % (ref 37.5–51)
HGB BLD-MCNC: 14 G/DL (ref 13–17.7)
MCH RBC QN AUTO: 30.5 PG (ref 26.6–33)
MCHC RBC AUTO-ENTMCNC: 31.3 G/DL (ref 31.5–35.7)
MCV RBC AUTO: 97.4 FL (ref 79–97)
PLATELET # BLD AUTO: 132 10*3/MM3 (ref 140–450)
PMV BLD AUTO: 10.9 FL (ref 6–12)
POTASSIUM SERPL-SCNC: 4.2 MMOL/L (ref 3.5–5.2)
QT INTERVAL: 411 MS
QTC INTERVAL: 441 MS
RBC # BLD AUTO: 4.59 10*6/MM3 (ref 4.14–5.8)
SODIUM SERPL-SCNC: 135 MMOL/L (ref 136–145)
WBC NRBC COR # BLD: 7.59 10*3/MM3 (ref 3.4–10.8)

## 2023-10-20 PROCEDURE — 97166 OT EVAL MOD COMPLEX 45 MIN: CPT

## 2023-10-20 PROCEDURE — 63710000001 DEXAMETHASONE PER 0.25 MG: Performed by: INTERNAL MEDICINE

## 2023-10-20 PROCEDURE — 97161 PT EVAL LOW COMPLEX 20 MIN: CPT

## 2023-10-20 PROCEDURE — G0378 HOSPITAL OBSERVATION PER HR: HCPCS

## 2023-10-20 PROCEDURE — 85027 COMPLETE CBC AUTOMATED: CPT | Performed by: INTERNAL MEDICINE

## 2023-10-20 PROCEDURE — 93010 ELECTROCARDIOGRAM REPORT: CPT | Performed by: INTERNAL MEDICINE

## 2023-10-20 PROCEDURE — 25010000002 ENOXAPARIN PER 10 MG: Performed by: FAMILY MEDICINE

## 2023-10-20 PROCEDURE — 93005 ELECTROCARDIOGRAM TRACING: CPT | Performed by: PHYSICIAN ASSISTANT

## 2023-10-20 PROCEDURE — 80048 BASIC METABOLIC PNL TOTAL CA: CPT | Performed by: INTERNAL MEDICINE

## 2023-10-20 RX ORDER — CARVEDILOL 3.12 MG/1
3.12 TABLET ORAL 2 TIMES DAILY WITH MEALS
Status: DISCONTINUED | OUTPATIENT
Start: 2023-10-20 | End: 2023-10-23

## 2023-10-20 RX ORDER — TAMSULOSIN HYDROCHLORIDE 0.4 MG/1
0.4 CAPSULE ORAL DAILY
Status: DISCONTINUED | OUTPATIENT
Start: 2023-10-20 | End: 2023-10-27 | Stop reason: HOSPADM

## 2023-10-20 RX ORDER — CITALOPRAM 20 MG/1
10 TABLET ORAL DAILY
Status: DISCONTINUED | OUTPATIENT
Start: 2023-10-20 | End: 2023-10-27 | Stop reason: HOSPADM

## 2023-10-20 RX ORDER — ASPIRIN 81 MG/1
81 TABLET ORAL DAILY
Status: DISCONTINUED | OUTPATIENT
Start: 2023-10-20 | End: 2023-10-27 | Stop reason: HOSPADM

## 2023-10-20 RX ORDER — ROSUVASTATIN CALCIUM 20 MG/1
40 TABLET, COATED ORAL DAILY
Status: DISCONTINUED | OUTPATIENT
Start: 2023-10-20 | End: 2023-10-22

## 2023-10-20 RX ADMIN — CITALOPRAM HYDROBROMIDE 10 MG: 20 TABLET ORAL at 08:41

## 2023-10-20 RX ADMIN — ENOXAPARIN SODIUM 40 MG: 100 INJECTION SUBCUTANEOUS at 08:42

## 2023-10-20 RX ADMIN — GUAIFENESIN 600 MG: 600 TABLET ORAL at 08:41

## 2023-10-20 RX ADMIN — TAMSULOSIN HYDROCHLORIDE 0.4 MG: 0.4 CAPSULE ORAL at 08:42

## 2023-10-20 RX ADMIN — AZITHROMYCIN DIHYDRATE 250 MG: 250 TABLET ORAL at 08:42

## 2023-10-20 RX ADMIN — ROSUVASTATIN 40 MG: 20 TABLET, FILM COATED ORAL at 08:42

## 2023-10-20 RX ADMIN — ASPIRIN 81 MG: 81 TABLET, COATED ORAL at 18:01

## 2023-10-20 RX ADMIN — DEXAMETHASONE 6 MG: 0.5 TABLET ORAL at 08:43

## 2023-10-20 RX ADMIN — CARVEDILOL 3.12 MG: 3.12 TABLET, FILM COATED ORAL at 18:01

## 2023-10-20 RX ADMIN — GUAIFENESIN 600 MG: 600 TABLET ORAL at 20:16

## 2023-10-20 RX ADMIN — CARVEDILOL 3.12 MG: 3.12 TABLET, FILM COATED ORAL at 08:43

## 2023-10-20 NOTE — PLAN OF CARE
Goal Outcome Evaluation:  Plan of Care Reviewed With: patient        Progress: no change  Outcome Evaluation: Pt presesnts with decreased balance, decreased endurance, and decreased strength limiting pt's performance with functional transfers and ambulation. Pt will benefot from PT services to address these impairments and restore maximum level of function with ambulation and transfers.      Anticipated Discharge Disposition (PT): sub acute care setting

## 2023-10-20 NOTE — PLAN OF CARE
Goal Outcome Evaluation:  Plan of Care Reviewed With: patient        Progress: no change (first session: evaluation)  Outcome Evaluation: Patient presents with limitations of balance, strength, cognition and endurance/ activity tolerance which are impacting ADL/transfer independence. Skilled OT is indicated to remediate/compensate for deficits to maximize independence and safety with functional tasks.      Anticipated Discharge Disposition (OT): inpatient rehabilitation facility

## 2023-10-20 NOTE — THERAPY EVALUATION
Acute Care - Physical Therapy Initial Evaluation   [facility]     Patient Name: Nikhil Baldwin  : 1945  MRN: [id number]  Today's Date: 10/20/2023      Visit Dx:     ICD-10-CM ICD-9-CM   1. COVID-19  U07.1 079.89   2. Decreased activities of daily living (ADL)  Z78.9 V49.89   3. Difficulty walking  R26.2 719.7     Patient Active Problem List   Diagnosis    Aortic aneurysm    Arthritis    BPH (benign prostatic hyperplasia)    Cervical radiculopathy    Type 2 diabetes mellitus with hyperglycemia, without long-term current use of insulin    Parathyroid abnormality    Thyroid disorder    Facet arthropathy    Fatty metamorphosis of liver    GERD (gastroesophageal reflux disease)    Primary hypertension    Hematuria    Hiatal hernia    Urinary incontinence    Hypercalcemia    Mixed hyperlipidemia    Kidney disease    Renal calculi    Low back pain    Thoracic back pain    DDD (degenerative disc disease), lumbar    Spinal stenosis of lumbar region    Degeneration of thoracic or thoracolumbar intervertebral disc    MI (myocardial infarction)    Myofascial pain    Neck pain    Nocturia    Sciatica    Stroke    Ureterolithiasis    Vitamin D deficiency    Labile personality    Acute viral syndrome    Debility    COVID-19    Upper respiratory tract infection due to COVID-19 virus     Past Medical History:   Diagnosis Date    Aorta disorder     Arthritis     Degenerative joint disease     Dementia     FRONTAL LOBE    Depression     Diabetes mellitus     Hyperlipidemia     Hypertension     Kidney stone     Stroke     MILD RIGHT SIDE DEFICITS     Past Surgical History:   Procedure Laterality Date    CHOLECYSTECTOMY      CYSTOSCOPY BLADDER STONE LITHOTRIPSY      KIDNEY STONE SURGERY       PT Assessment (last 12 hours)       PT Evaluation and Treatment       Row Name 10/20/23 1400          Physical Therapy Time and Intention    Subjective Information no complaints  -DP     Document Type evaluation  -DP     Mode of Treatment  individual therapy;physical therapy  -DP     Patient Effort good  -DP     Symptoms Noted During/After Treatment none  -DP       Row Name 10/20/23 1400          General Information    Patient Profile Reviewed yes  -DP     Patient Observations alert;cooperative;agree to therapy  -DP     Prior Level of Function independent:;gait;transfer;bed mobility;ADL's  -DP     Equipment Currently Used at Home none  -DP     Existing Precautions/Restrictions fall  -DP     Barriers to Rehab none identified  -DP       Row Name 10/20/23 1400          Living Environment    Current Living Arrangements home  -DP     Home Accessibility stairs to enter home  -DP     People in Home alone  -DP     Primary Care Provided by self  -DP       Row Name 10/20/23 1400          Home Main Entrance    Number of Stairs, Main Entrance one  -DP       Row Name 10/20/23 1400          Home Use of Assistive/Adaptive Equipment    Equipment Currently Used at Home none  -DP       Row Name 10/20/23 1400          Cognition    Orientation Status (Cognition) oriented x 4  -DP       Row Name 10/20/23 1400          Range of Motion (ROM)    Range of Motion bilateral lower extremities;ROM is WFL  -DP       Row Name 10/20/23 1400          Strength (Manual Muscle Testing)    Strength (Manual Muscle Testing) bilateral lower extremities  4-/5  -DP       Row Name 10/20/23 1400          Bed Mobility    Comment, (Bed Mobility) in recliner on arrival  -DP       Row Name 10/20/23 1400          Transfers    Transfers sit-stand transfer;bed-chair transfer  -DP       Row Name 10/20/23 1400          Sit-Stand Transfer    Sit-Stand [place] (Transfers) contact guard  -DP     Assistive Device (Sit-Stand Transfers) walker, front-wheeled  -DP       Row Name 10/20/23 1400          Gait/Stairs (Locomotion)    Gait/Stairs Locomotion gait/ambulation assistive device  -DP     [place] Level (Gait) contact guard  -DP     Assistive Device (Gait) walker, front-wheeled  -DP     Patient  was able to Ambulate yes  -DP     Distance in Feet (Gait) 30  -DP     Pattern (Gait) step-through  -DP     Deviations/Abnormal Patterns (Gait) base of support, narrow;stride length decreased;gait speed decreased  -DP     Comment, (Gait/Stairs) pt reported feeling weak and unsteady with ambulation  -DP       Row Name 10/20/23 1400          Safety Issues, Functional Mobility    Impairments Affecting Function (Mobility) balance;endurance/activity tolerance;strength  -DP       Row Name 10/20/23 1400          Balance    Balance Assessment standing dynamic balance  -DP     Dynamic Standing Balance contact guard  -DP       Row Name 10/20/23 1400          Plan of Care Review    Plan of Care Reviewed With patient  -DP     Progress no change  -DP     Outcome Evaluation Pt presesnts with decreased balance, decreased endurance, and decreased strength limiting pt's performance with functional transfers and ambulation. Pt will benefot from PT services to address these impairments and restore maximum level of function with ambulation and transfers.  -DP       Row Name 10/20/23 1400          Therapy Assessment/Plan (PT)    Rehab Potential (PT) good, to achieve stated therapy goals  -DP     Criteria for Skilled Interventions Met (PT) yes;meets criteria  -DP     Therapy Frequency (PT) daily  -DP     Predicted Duration of Therapy Intervention (PT) 10 days  -DP     Problem List (PT) problems related to;balance;mobility;strength  -DP     Activity Limitations Related to Problem List (PT) unable to ambulate safely;unable to transfer safely  -DP       Row Name 10/20/23 1400          PT Evaluation Complexity    History, PT Evaluation Complexity no personal factors and/or comorbidities  -DP     Examination of Body Systems (PT Eval Complexity) total of 4 or more elements  -DP     Clinical Presentation (PT Evaluation Complexity) stable  -DP     Clinical Decision Making (PT Evaluation Complexity) low complexity  -DP     Overall Complexity (PT  Evaluation Complexity) low complexity  -DP       Row Name 10/20/23 1400          Physical Therapy Goals    Bed Mobility Goal Selection (PT) bed mobility, PT goal 1  -DP     Transfer Goal Selection (PT) transfer, PT goal 1  -DP     Gait Training Goal Selection (PT) gait training, PT goal 1  -DP       Row Name 10/20/23 1400          Bed Mobility Goal 1 (PT)    Activity/Assistive Device (Bed Mobility Goal 1, PT) sit to supine;supine to sit  -DP     [place] Level/Cues Needed (Bed Mobility Goal 1, PT) contact guard required  -DP     Time Frame (Bed Mobility Goal 1, PT) 10 days  -DP       Row Name 10/20/23 1400          Transfer Goal 1 (PT)    Activity/Assistive Device (Transfer Goal 1, PT) sit-to-stand/stand-to-sit  -DP     [place] Level/Cues Needed (Transfer Goal 1, PT) standby assist  -DP     Time Frame (Transfer Goal 1, PT) 10 days  -DP       Row Name 10/20/23 1400          Gait Training Goal 1 (PT)    Activity/Assistive Device (Gait Training Goal 1, PT) gait (walking locomotion);assistive device use;improve balance and speed;increase endurance/gait distance;walker, rolling  -DP     [place] Level (Gait Training Goal 1, PT) standby assist  -DP     Distance (Gait Training Goal 1, PT) 200  -DP     Time Frame (Gait Training Goal 1, PT) 10 days  -DP               User Key  (r) = Recorded By, (t) = Taken By, (c) = Cosigned By      Initials Name Provider Type    Markell Zelaya, PT Physical Therapist                      PT Recommendation and Plan  Anticipated Discharge Disposition (PT): sub acute care setting  Planned Therapy Interventions (PT): balance training, bed mobility training, gait training, neuromuscular re-education, stair training, strengthening, transfer training  Therapy Frequency (PT): daily  Plan of Care Reviewed With: patient  Progress: no change  Outcome Evaluation: Pt presesnts with decreased balance, decreased endurance, and decreased strength limiting pt's performance with functional  transfers and ambulation. Pt will benefot from PT services to address these impairments and restore maximum level of function with ambulation and transfers.   Outcome Measures       Row Name 10/20/23 1400             How much help from another person do you currently need...    Turning from your back to your side while in flat bed without using bedrails? 4  -DP      Moving from lying on back to sitting on the side of a flat bed without bedrails? 4  -DP      Moving to and from a bed to a chair (including a wheelchair)? 3  -DP      Standing up from a chair using your arms (e.g., wheelchair, bedside chair)? 3  -DP      Climbing 3-5 steps with a railing? 2  -DP      To walk in hospital room? 3  -DP      AM-PAC 6 Clicks Score (PT) 19  -DP      Highest level of mobility 6 --> Walked 10 steps or more  -DP         Functional Assessment    Outcome Measure Options AM-PAC 6 Clicks Basic Mobility (PT)  -DP                User Key  (r) = Recorded By, (t) = Taken By, (c) = Cosigned By      Initials Name Provider Type    Markell Zelaya, PT Physical Therapist                     Time Calculation:    PT Charges       Row Name 10/20/23 1408             Time Calculation    PT Received On 10/20/23  -DP      PT Goal Re-Cert Due Date 10/29/23  -DP         Untimed Charges    PT Eval/Re-eval Minutes 30  -DP         Total Minutes    Untimed Charges Total Minutes 30  -DP       Total Minutes 30  -DP                User Key  (r) = Recorded By, (t) = Taken By, (c) = Cosigned By      Initials Name Provider Type    Markell Zelaya, PT Physical Therapist                  Therapy Charges for Today       Code Description Service Date Service Provider Modifiers Qty    38184714055 HC PT EVAL LOW COMPLEXITY 2 10/20/2023 Markell Ritchie, PT GP 1            PT G-Codes  Outcome Measure Options: AM-PAC 6 Clicks Basic Mobility (PT)  AM-PAC 6 Clicks Score (PT): 19  AM-PAC 6 Clicks Score (OT): 18    Markell Ritchie PT  10/20/2023

## 2023-10-20 NOTE — PROGRESS NOTES
" [facility]   Hospitalist Progress Note  Date: 10/20/2023  Patient Name: Nikhil Baldwin  : 1945  MRN: [id number]  Date of admission: 10/18/2023  Room/Bed: [facility]1/1      Subjective   Subjective     Chief Complaint: f/u genalized weakness    Summary:Nikhil Baldwin is a 78 y.o. male admitted with COVID-19 and general weakness    Interval Followup:   Reports he is still very weak and \"shaky\" when he walks. Appetite improved.         Objective   Objective     Vitals:   Temp:  [97.5 °F (36.4 °C)-99.5 °F (37.5 °C)] 97.5 °F (36.4 °C)  Heart Rate:  [54-81] 63  Resp:  [20] 20  BP: (103-155)/(68-83) 103/75    Physical Exam   Physical Exam  Constitutional:       Comments: Appears chronically ill    HENT:      Head: Normocephalic.   Cardiovascular:      Rate and Rhythm: Normal rate.   Pulmonary:      Effort: No respiratory distress.   Abdominal:      General: There is no distension.   Musculoskeletal:      Right lower leg: No edema.      Left lower leg: No edema.   Skin:     General: Skin is warm.   Neurological:      Mental Status: He is alert.      Comments: Some intermittent confusion noted at times.           Result Review    Result Review:  I have personally reviewed these results:  [x]  Laboratory      Lab 10/20/23  0453 10/18/23  1550   WBC 7.59 11.31*   HEMOGLOBIN 14.0 15.2   HEMATOCRIT 44.7 45.7   PLATELETS 132* 162   NEUTROS ABS  --  7.91*   IMMATURE GRANS (ABS)  --  0.12*   LYMPHS ABS  --  1.97   MONOS ABS  --  1.11*   EOS ABS  --  0.14   MCV 97.4* 91.2   CRP  --  0.74*         Lab 10/20/23  0453 10/18/23  1550   SODIUM 135* 141   POTASSIUM 4.2 3.9   CHLORIDE 104 104   CO2 18.5* 23.1   ANION GAP 12.5 13.9   BUN 21 15   CREATININE 0.98 1.08   EGFR 78.0 69.4   GLUCOSE 149* 139*   CALCIUM 8.8 9.7   MAGNESIUM  --  1.9         Lab 10/18/23  1550   TOTAL PROTEIN 6.8   ALBUMIN 4.1   GLOBULIN 2.7   ALT (SGPT) 20   AST (SGOT) 15   BILIRUBIN 1.3*   ALK PHOS 64         Lab 10/18/23  1550   HSTROP T 9               "   Brief Urine Lab Results  (Last result in the past 365 days)        Color   Clarity   Blood   Leuk Est   Nitrite   Protein   CREAT   Urine HCG        10/18/23 2043 Yellow   Clear   Negative   Negative   Negative   Negative                 [x]  Microbiology   Microbiology Results (last 10 days)       Procedure Component Value - Date/Time    Rapid Strep A Screen - Swab, Throat [[id number]]  (Normal) Collected: 10/18/23 1641    Lab Status: Final result Specimen: Swab from Throat Updated: 10/18/23 1657     Strep A Ag Negative    COVID-19, FLU A/B, RSV PCR - Swab, Nasopharynx [[id number]]  (Abnormal) Collected: 10/18/23 1641    Lab Status: Final result Specimen: Swab from Nasopharynx Updated: 10/18/23 1734     COVID19 Detected     Influenza A PCR Not Detected     Influenza B PCR Not Detected     RSV, PCR Not Detected    Narrative:      Fact sheet for providers: https://www.fda.gov/media/014101/download    Fact sheet for patients: https://www.fda.gov/media/042077/download    Test performed by PCR.    Beta Strep Culture, Throat - Swab, Throat [[id number]]  (Normal) Collected: 10/18/23 1641    Lab Status: Final result Specimen: Swab from Throat Updated: 10/20/23 0931     Throat Culture, Beta Strep No Beta Hemolytic Streptococcus Isolated    Narrative:      Group A Strep incidence is low in adults. Positive culture for Beta hemolytic Streptococcus species can reflect colonization and not true infection. Please correlate clinically.            [x]  Radiology  CT Abdomen Pelvis With Contrast    Result Date: 10/18/2023   No acute abnormality on CT. Status post cholecystectomy with bile duct dilatation similar previous study. Enlarged prostate gland, hyperplasia versus neoplasia. Small hiatal hernia.     TOMMY PALM MD       Electronically Signed and Approved By: TOMMY PALM MD on 10/18/2023 at 19:31             CT Soft Tissue Neck With Contrast    Result Date: 10/18/2023   No etiology to explain patient's neck pain.  Degenerative  disc disease is seen.  The     TOMMY PALM MD       Electronically Signed and Approved By: TOMMY PALM MD on 10/18/2023 at 19:26             XR Chest 1 View    Result Date: 10/18/2023   There is mild bibasilar airspace opacity question subsegmental atelectasis.  Developing atypical pneumonia less likely.       TOMMY PALM MD       Electronically Signed and Approved By: TOMMY PALM MD on 10/18/2023 at 16:35            []  EKG/Telemetry   []  Cardiology/Vascular   []  Pathology  []  Old records  []  Other:    Assessment & Plan   Assessment / Plan     Assessment/plan:    COVID-19 pneumonitis  -generalized weakness d/t the same  -continue short azithromycin course   -continue decadron  -supportive care, remains on room air and resp status stable      Diabetes  -resume home regimen      HTN/HLD, CAD  -chronic  -continue crestor and coreg  -follow and adjust w/routine VS      BPH  -chronic and continue home flomax    Hx prior CVA  -is on statin above  -will add daily ASA       Discussed with RN.    DVT prophylaxis:  Medical DVT prophylaxis orders are present.    CODE STATUS:        Electronically signed by Nikita Bowen DO, 10/20/23, 1:47 PM EDT.

## 2023-10-20 NOTE — PLAN OF CARE
Goal Outcome Evaluation:  Plan of Care Reviewed With: patient        Progress: no change  Outcome Evaluation: Patient intermittenly confused at times but plesant. Up in chair most of shift, chair alert in place and working, as well as bed alert. Patient states he thought he slipped down in chair close to ground on rounding 1700, but patient was all the way up in chair and no alarm went off, call light was within reach. No one at bedside. Patient didn't complain of pain, no issues noted. VSS. Patient pending rehab cert.

## 2023-10-20 NOTE — THERAPY EVALUATION
Patient Name: Nikhil Baldwin  : 1945    MRN: [id number]                              Today's Date: 10/20/2023       Admit Date: 10/18/2023    Visit Dx:     ICD-10-CM ICD-9-CM   1. COVID-19  U07.1 079.89   2. Decreased activities of daily living (ADL)  Z78.9 V49.89     Patient Active Problem List   Diagnosis    Aortic aneurysm    Arthritis    BPH (benign prostatic hyperplasia)    Cervical radiculopathy    Type 2 diabetes mellitus with hyperglycemia, without long-term current use of insulin    Parathyroid abnormality    Thyroid disorder    Facet arthropathy    Fatty metamorphosis of liver    GERD (gastroesophageal reflux disease)    Primary hypertension    Hematuria    Hiatal hernia    Urinary incontinence    Hypercalcemia    Mixed hyperlipidemia    Kidney disease    Renal calculi    Low back pain    Thoracic back pain    DDD (degenerative disc disease), lumbar    Spinal stenosis of lumbar region    Degeneration of thoracic or thoracolumbar intervertebral disc    MI (myocardial infarction)    Myofascial pain    Neck pain    Nocturia    Sciatica    Stroke    Ureterolithiasis    Vitamin D deficiency    Labile personality    Acute viral syndrome    Debility    COVID-19    Upper respiratory tract infection due to COVID-19 virus     Past Medical History:   Diagnosis Date    Aorta disorder     Arthritis     Degenerative joint disease     Dementia     FRONTAL LOBE    Depression     Diabetes mellitus     Hyperlipidemia     Hypertension     Kidney stone     Stroke     MILD RIGHT SIDE DEFICITS     Past Surgical History:   Procedure Laterality Date    CHOLECYSTECTOMY      CYSTOSCOPY BLADDER STONE LITHOTRIPSY      KIDNEY STONE SURGERY        General Information       Row Name 10/20/23 1029          OT Time and Intention    Document Type evaluation  -AV     Mode of Treatment individual therapy;occupational therapy  -AV       Row Name 10/20/23 1021          General Information    Patient Profile Reviewed yes  -AV     Prior  Level of Function independent:;ADL's;home management;all household mobility;transfer  Independent ADL and light home management tasks. sits to bathe (tub w seat). stands at sink to groom (electric razor/ no teeth). standard commode. has STC and RW but reports he does not use. no  home O2.  -AV     Existing Precautions/Restrictions fall  Enhance airborne isolation: COVID  -AV     Barriers to Rehab none identified  -AV       Row Name 10/20/23 1029          Occupational Profile    Reason for Services/Referral (Occupational Profile) Patient is an 78 year old male admitted to [facility] on 10/18/23 with weakness, vomiting and chills. He is currently on 4NT/ room air. OT consulted due to recent decline in ADL/transfer independence. No previous OT services for current condition.  -AV       Row Name 10/20/23 1029          Living Environment    People in Home child(janice), adult  son  -AV       Row Name 10/20/23 1029          Home Main Entrance    Number of Stairs, Main Entrance --  walk-out basement  -AV       Row Name 10/20/23 1029          Cognition    Orientation Status (Cognition) --  alert, pleasant and able to follow commands. impaired attention to task, requiring cues to redirect throughout session. questionable safety awareness.  -AV       Row Name 10/20/23 1029          Safety Issues, Functional Mobility    Impairments Affecting Function (Mobility) balance;cognition;endurance/activity tolerance;strength  -AV               User Key  (r) = Recorded By, (t) = Taken By, (c) = Cosigned By      Initials Name Provider Type    AV Philipp Holbrook, OT Occupational Therapist                     Mobility/ADL's       Row Name 10/20/23 1033          Bed Mobility    Bed Mobility supine-sit  -AV     Supine-Sit [place] (Bed Mobility) independent  -AV       Row Name 10/20/23 1033          Transfers    Transfers sit-stand transfer;bed-chair transfer  -AV       Row Name 10/20/23 1033          Bed-Chair Transfer     Bed-Chair [place] (Transfers) contact guard;minimum assist (75% patient effort);verbal cues  -AV       Row Name 10/20/23 1033          Sit-Stand Transfer    Sit-Stand [place] (Transfers) contact guard  -AV       Row Name 10/20/23 1033          Activities of Daily Living    BADL Assessment/Intervention --  Independent feeding (decreased intake recently). Min assist grooming with setup while seated. Min-mod assist bathing/dressing. CGA urinal in standing position/ no BM.  -AV               User Key  (r) = Recorded By, (t) = Taken By, (c) = Cosigned By      Initials Name Provider Type    Philipp Hernandez OT Occupational Therapist                   Obj/Interventions       Row Name 10/20/23 1034          Sensory Assessment (Somatosensory)    Sensory Assessment (Somatosensory) UE sensation intact  -AV       Row Name 10/20/23 1034          Vision Assessment/Intervention    Visual Impairment/Limitations WFL;corrective lenses for reading  -AV       Row Name 10/20/23 1034          Range of Motion Comprehensive    General Range of Motion bilateral upper extremity ROM WFL  -AV     Comment, General Range of Motion AROM  -AV       Row Name 10/20/23 1034          Strength Comprehensive (MMT)    Comment, General Manual Muscle Testing (MMT) Assessment 4(-)/5 bilateral upper extremities  -AV       Row Name 10/20/23 1034          Motor Skills    Motor Skills coordination;functional endurance  -AV     Coordination --  right dominant  -AV     Functional Endurance fair minus  -AV       Row Name 10/20/23 1034          Balance    Comment, Balance CGA  -AV               User Key  (r) = Recorded By, (t) = Taken By, (c) = Cosigned By      Initials Name Provider Type    Philipp Hernandez OT Occupational Therapist                   Goals/Plan       Row Name 10/20/23 1038          Transfer Goal 1 (OT)    Activity/Assistive Device (Transfer Goal 1, OT) transfers, all  -AV     [place] Level/Cues Needed (Transfer Goal 1, OT)  supervision required;verbal cues required  -AV     Time Frame (Transfer Goal 1, OT) long term goal (LTG);10 days  -AV       Row Name 10/20/23 1038          Bathing Goal 1 (OT)    Activity/Device (Bathing Goal 1, OT) bathing skills, all;tub bench  -AV     [place] Level/Cues Needed (Bathing Goal 1, OT) supervision required;set-up required;verbal cues required  -AV     Time Frame (Bathing Goal 1, OT) long term goal (LTG);10 days  -AV       Row Name 10/20/23 1038          Dressing Goal 1 (OT)    Activity/Device (Dressing Goal 1, OT) dressing skills, all  -AV     [place]/Cues Needed (Dressing Goal 1, OT) supervision required;set-up required;verbal cues required  -AV     Time Frame (Dressing Goal 1, OT) long term goal (LTG);10 days  -AV       Row Name 10/20/23 1038          Toileting Goal 1 (OT)    Activity/Device (Toileting Goal 1, OT) toileting skills, all  -AV     [place] Level/Cues Needed (Toileting Goal 1, OT) supervision required;set-up required;verbal cues required  -AV     Time Frame (Toileting Goal 1, OT) long term goal (LTG);10 days  -AV       Row Name 10/20/23 1038          Grooming Goal 1 (OT)    Activity/Device (Grooming Goal 1, OT) grooming skills, all  -AV     [place] (Grooming Goal 1, OT) supervision required;set-up required;verbal cues required  standing at sink  -AV     Time Frame (Grooming Goal 1, OT) long term goal (LTG);10 days  -AV       Row Name 10/20/23 1038          Strength Goal 1 (OT)    Strength Goal 1 (OT) Patient will demonstrate 4/5 bilateral upper extremities to increase ADL/ transfer independence.  -AV     Time Frame (Strength Goal 1, OT) long term goal (LTG);10 days  -AV       Row Name 10/20/23 1038          Problem Specific Goal 1 (OT)    Problem Specific Goal 1 (OT) Patient will demonstrate fair endurance/ activity tolerance needed to support ADLs.  -AV     Time Frame (Problem Specific Goal 1, OT) long term goal (LTG);10 days  -AV       Row Name 10/20/23 1038           Therapy Assessment/Plan (OT)    Planned Therapy Interventions (OT) activity tolerance training;BADL retraining;functional balance retraining;occupation/activity based interventions;patient/caregiver education/training;strengthening exercise;transfer/mobility retraining  -AV               User Key  (r) = Recorded By, (t) = Taken By, (c) = Cosigned By      Initials Name Provider Type    AV Philipp Holbrook, AJITH Occupational Therapist                   Clinical Impression       Row Name 10/20/23 1036          Pain Assessment    Additional Documentation Pain Scale: FACES Pre/Post-Treatment (Group)  -AV       Row Name 10/20/23 1036          Pain Scale: FACES Pre/Post-Treatment    Pain: FACES Scale, Pretreatment 0-->no hurt  -AV     Posttreatment Pain Rating 0-->no hurt  -AV       Row Name 10/20/23 1036          Plan of Care Review    Plan of Care Reviewed With patient  -AV     Progress no change  first session: evaluation  -AV     Outcome Evaluation Patient presents with limitations of balance, strength, cognition and endurance/ activity tolerance which are impacting ADL/transfer independence. Skilled OT is indicated to remediate/compensate for deficits to maximize independence and safety with functional tasks.  -AV       Row Name 10/20/23 1036          Therapy Assessment/Plan (OT)    Patient/Family Therapy Goal Statement (OT) none stated  -AV     Rehab Potential (OT) good, to achieve stated therapy goals  -AV     Criteria for Skilled Therapeutic Interventions Met (OT) yes;meets criteria;skilled treatment is necessary  -AV     Therapy Frequency (OT) 5 times/wk  -AV       Row Name 10/20/23 1036          Therapy Plan Review/Discharge Plan (OT)    Equipment Needs Upon Discharge (OT) commode chair  reports having tub seat and RW available at home  -AV     Anticipated Discharge Disposition (OT) inpatient rehabilitation facility  -AV       Row Name 10/20/23 1036          Vital Signs    O2 Delivery Pre Treatment room air  -AV      O2 Delivery Intra Treatment room air  -AV     O2 Delivery Post Treatment room air  -AV               User Key  (r) = Recorded By, (t) = Taken By, (c) = Cosigned By      Initials Name Provider Type    Philipp Hernandez, AJITH Occupational Therapist                   Outcome Measures       Row Name 10/20/23 1039          How much help from another is currently needed...    Putting on and taking off regular lower body clothing? 2  -AV     Bathing (including washing, rinsing, and drying) 2  -AV     Toileting (which includes using toilet bed pan or urinal) 3  -AV     Putting on and taking off regular upper body clothing 4  -AV     Taking care of personal grooming (such as brushing teeth) 3  -AV     Eating meals 4  -AV     AM-PAC 6 Clicks Score (OT) 18  -AV       Row Name 10/20/23 0803          How much help from another person do you currently need...    Turning from your back to your side while in flat bed without using bedrails? 4  -BB     Moving from lying on back to sitting on the side of a flat bed without bedrails? 4  -BB     Moving to and from a bed to a chair (including a wheelchair)? 4  -BB     Standing up from a chair using your arms (e.g., wheelchair, bedside chair)? 3  -BB     Climbing 3-5 steps with a railing? 2  -BB     To walk in hospital room? 3  -BB     AM-PAC 6 Clicks Score (PT) 20  -BB     Highest level of mobility 6 --> Walked 10 steps or more  -BB       Row Name 10/20/23 1039          Functional Assessment    Outcome Measure Options AM-PAC 6 Clicks Daily Activity (OT);Optimal Instrument  -AV       Row Name 10/20/23 1039          Optimal Instrument    Optimal Instrument Optimal - 3  -AV     Bending/Stooping 2  -AV     Standing 2  -AV     Reaching 1  -AV     From the list, choose the 3 activities you would most like to be able to do without any difficulty Bending/stooping;Standing;Reaching  -AV     Total Score Optimal - 3 5  -AV               User Key  (r) = Recorded By, (t) = Taken By, (c) = Cosigned  By      Initials Name Provider Type    BB Jeanie Sevilla, RN Registered Nurse    Philipp Hernandez OT Occupational Therapist                    Occupational Therapy Education       Title: PT OT SLP Therapies (Done)       Topic: Occupational Therapy (Done)       Point: ADL training (Done)       Description:   Instruct learner(s) on proper safety adaptation and remediation techniques during self care or transfers.   Instruct in proper use of assistive devices.                  Learning Progress Summary             Patient Acceptance, E, VU by  at 10/20/2023 1040                         Point: Home exercise program (Done)       Description:   Instruct learner(s) on appropriate technique for monitoring, assisting and/or progressing therapeutic exercises/activities.                  Learning Progress Summary             Patient Acceptance, E, VU by AV at 10/20/2023 1040                         Point: Precautions (Done)       Description:   Instruct learner(s) on prescribed precautions during self-care and functional transfers.                  Learning Progress Summary             Patient Acceptance, E, VU by AV at 10/20/2023 1040                         Point: Body mechanics (Done)       Description:   Instruct learner(s) on proper positioning and spine alignment during self-care, functional mobility activities and/or exercises.                  Learning Progress Summary             Patient Acceptance, E, VU by  at 10/20/2023 1040                                         User Key       Initials Effective Dates Name Provider Type Discipline     06/16/21 -  Philipp Holbrook OT Occupational Therapist OT                  OT Recommendation and Plan  Planned Therapy Interventions (OT): activity tolerance training, BADL retraining, functional balance retraining, occupation/activity based interventions, patient/caregiver education/training, strengthening exercise, transfer/mobility retraining  Therapy Frequency (OT): 5  times/wk  Plan of Care Review  Plan of Care Reviewed With: patient  Progress: no change (first session: evaluation)  Outcome Evaluation: Patient presents with limitations of balance, strength, cognition and endurance/ activity tolerance which are impacting ADL/transfer independence. Skilled OT is indicated to remediate/compensate for deficits to maximize independence and safety with functional tasks.     Time Calculation:   Evaluation Complexity (OT)  Review Occupational Profile/Medical/Therapy History Complexity: expanded/moderate complexity  Assessment, Occupational Performance/Identification of Deficit Complexity: 3-5 performance deficits  Clinical Decision Making Complexity (OT): detailed assessment/moderate complexity  Overall Complexity of Evaluation (OT): moderate complexity     Time Calculation- OT       Row Name 10/20/23 1043             Time Calculation- OT    OT Received On 10/20/23  -AV      OT Goal Re-Cert Due Date 10/29/23  -AV         Untimed Charges    OT Eval/Re-eval Minutes 35  -AV         Total Minutes    Untimed Charges Total Minutes 35  -AV       Total Minutes 35  -AV                User Key  (r) = Recorded By, (t) = Taken By, (c) = Cosigned By      Initials Name Provider Type    AV Philipp Holbrook OT Occupational Therapist                  Therapy Charges for Today       Code Description Service Date Service Provider Modifiers Qty    [id number]  OT EVAL MOD COMPLEXITY 3 10/20/2023 Philipp Holbrook OT GO 1                 Philipp Holbrook OT  10/20/2023

## 2023-10-21 PROBLEM — U07.1 COVID: Status: ACTIVE | Noted: 2023-10-21

## 2023-10-21 LAB
ANION GAP SERPL CALCULATED.3IONS-SCNC: 8.1 MMOL/L (ref 5–15)
BASOPHILS # BLD AUTO: 0.01 10*3/MM3 (ref 0–0.2)
BASOPHILS NFR BLD AUTO: 0.1 % (ref 0–1.5)
BUN SERPL-MCNC: 26 MG/DL (ref 8–23)
BUN/CREAT SERPL: 26 (ref 7–25)
CALCIUM SPEC-SCNC: 9.3 MG/DL (ref 8.6–10.5)
CHLORIDE SERPL-SCNC: 107 MMOL/L (ref 98–107)
CO2 SERPL-SCNC: 24.9 MMOL/L (ref 22–29)
CREAT SERPL-MCNC: 1 MG/DL (ref 0.76–1.27)
DEPRECATED RDW RBC AUTO: 45.5 FL (ref 37–54)
EGFRCR SERPLBLD CKD-EPI 2021: 76.1 ML/MIN/1.73
EOSINOPHIL # BLD AUTO: 0 10*3/MM3 (ref 0–0.4)
EOSINOPHIL NFR BLD AUTO: 0 % (ref 0.3–6.2)
ERYTHROCYTE [DISTWIDTH] IN BLOOD BY AUTOMATED COUNT: 14.1 % (ref 12.3–15.4)
GLUCOSE SERPL-MCNC: 141 MG/DL (ref 65–99)
HCT VFR BLD AUTO: 42.4 % (ref 37.5–51)
HGB BLD-MCNC: 14.4 G/DL (ref 13–17.7)
IMM GRANULOCYTES # BLD AUTO: 0.03 10*3/MM3 (ref 0–0.05)
IMM GRANULOCYTES NFR BLD AUTO: 0.3 % (ref 0–0.5)
LYMPHOCYTES # BLD AUTO: 2.52 10*3/MM3 (ref 0.7–3.1)
LYMPHOCYTES NFR BLD AUTO: 24.2 % (ref 19.6–45.3)
MCH RBC QN AUTO: 29.9 PG (ref 26.6–33)
MCHC RBC AUTO-ENTMCNC: 34 G/DL (ref 31.5–35.7)
MCV RBC AUTO: 88.1 FL (ref 79–97)
MONOCYTES # BLD AUTO: 1.1 10*3/MM3 (ref 0.1–0.9)
MONOCYTES NFR BLD AUTO: 10.6 % (ref 5–12)
NEUTROPHILS NFR BLD AUTO: 6.74 10*3/MM3 (ref 1.7–7)
NEUTROPHILS NFR BLD AUTO: 64.8 % (ref 42.7–76)
NRBC BLD AUTO-RTO: 0 /100 WBC (ref 0–0.2)
PLATELET # BLD AUTO: 160 10*3/MM3 (ref 140–450)
PMV BLD AUTO: 11.2 FL (ref 6–12)
POTASSIUM SERPL-SCNC: 4 MMOL/L (ref 3.5–5.2)
QT INTERVAL: 537 MS
QTC INTERVAL: 450 MS
RBC # BLD AUTO: 4.81 10*6/MM3 (ref 4.14–5.8)
SODIUM SERPL-SCNC: 140 MMOL/L (ref 136–145)
WBC NRBC COR # BLD: 10.4 10*3/MM3 (ref 3.4–10.8)

## 2023-10-21 PROCEDURE — 63710000001 DEXAMETHASONE PER 0.25 MG: Performed by: INTERNAL MEDICINE

## 2023-10-21 PROCEDURE — 85025 COMPLETE CBC W/AUTO DIFF WBC: CPT | Performed by: HOSPITALIST

## 2023-10-21 PROCEDURE — 80048 BASIC METABOLIC PNL TOTAL CA: CPT | Performed by: HOSPITALIST

## 2023-10-21 PROCEDURE — 25010000002 ENOXAPARIN PER 10 MG: Performed by: FAMILY MEDICINE

## 2023-10-21 RX ADMIN — ROSUVASTATIN 40 MG: 20 TABLET, FILM COATED ORAL at 10:07

## 2023-10-21 RX ADMIN — DEXAMETHASONE 6 MG: 0.5 TABLET ORAL at 10:07

## 2023-10-21 RX ADMIN — GUAIFENESIN 600 MG: 600 TABLET ORAL at 20:31

## 2023-10-21 RX ADMIN — ENOXAPARIN SODIUM 40 MG: 100 INJECTION SUBCUTANEOUS at 10:07

## 2023-10-21 RX ADMIN — Medication 10 ML: at 10:07

## 2023-10-21 RX ADMIN — Medication 10 MG: at 20:31

## 2023-10-21 RX ADMIN — GUAIFENESIN 600 MG: 600 TABLET ORAL at 10:07

## 2023-10-21 RX ADMIN — AZITHROMYCIN DIHYDRATE 250 MG: 250 TABLET ORAL at 09:00

## 2023-10-21 RX ADMIN — CITALOPRAM HYDROBROMIDE 10 MG: 20 TABLET ORAL at 10:07

## 2023-10-21 RX ADMIN — CARVEDILOL 3.12 MG: 3.12 TABLET, FILM COATED ORAL at 10:07

## 2023-10-21 RX ADMIN — ASPIRIN 81 MG: 81 TABLET, COATED ORAL at 10:07

## 2023-10-21 RX ADMIN — TAMSULOSIN HYDROCHLORIDE 0.4 MG: 0.4 CAPSULE ORAL at 10:07

## 2023-10-21 NOTE — PLAN OF CARE
Goal Outcome Evaluation:  Plan of Care Reviewed With: patient      Pt has been alert and oriented. Sat in chair during the day. Reports feeling better, but continues to need assistance with ADL's and transfers. Pt has been pleasant and cooperative. Call light in reach.

## 2023-10-21 NOTE — PROGRESS NOTES
[facility]   Hospitalist Progress Note  Date: 10/21/2023  Patient Name: Nikhil Baldwin  : 1945  MRN: [id number]  Date of admission: 10/18/2023  Room/Bed: [facility]/1      Subjective   Subjective     Chief Complaint: f/u genalized weakness    Summary:Nikhil Baldwin is a 78 y.o. male admitted with COVID-19 and general weakness    Interval Followup: No new issues. Patient continues to endorse weakness.  Awaiting placement.     Objective   Objective     Vitals:   Temp:  [97.5 °F (36.4 °C)-99 °F (37.2 °C)] 98.8 °F (37.1 °C)  Heart Rate:  [45-76] 51  Resp:  [20] 20  BP: (103-142)/(68-91) 142/72    Physical Exam   Physical Exam  Constitutional:       Comments: Appears chronically ill    HENT:      Head: Normocephalic.   Cardiovascular:      Rate and Rhythm: Normal rate.   Pulmonary:      Effort: No respiratory distress.   Abdominal:      General: There is no distension.   Musculoskeletal:      Right lower leg: No edema.      Left lower leg: No edema.   Skin:     General: Skin is warm.   Neurological:      Mental Status: He is alert.      Comments: Some intermittent confusion noted at times.           Result Review    Result Review:  I have personally reviewed these results:  [x]  Laboratory      Lab 10/20/23  0453 10/18/23  1550   WBC 7.59 11.31*   HEMOGLOBIN 14.0 15.2   HEMATOCRIT 44.7 45.7   PLATELETS 132* 162   NEUTROS ABS  --  7.91*   IMMATURE GRANS (ABS)  --  0.12*   LYMPHS ABS  --  1.97   MONOS ABS  --  1.11*   EOS ABS  --  0.14   MCV 97.4* 91.2   CRP  --  0.74*         Lab 10/20/23  0453 10/18/23  1550   SODIUM 135* 141   POTASSIUM 4.2 3.9   CHLORIDE 104 104   CO2 18.5* 23.1   ANION GAP 12.5 13.9   BUN 21 15   CREATININE 0.98 1.08   EGFR 78.0 69.4   GLUCOSE 149* 139*   CALCIUM 8.8 9.7   MAGNESIUM  --  1.9         Lab 10/18/23  1550   TOTAL PROTEIN 6.8   ALBUMIN 4.1   GLOBULIN 2.7   ALT (SGPT) 20   AST (SGOT) 15   BILIRUBIN 1.3*   ALK PHOS 64         Lab 10/18/23  1550   HSTROP T 9                 Brief Urine  Lab Results  (Last result in the past 365 days)        Color   Clarity   Blood   Leuk Est   Nitrite   Protein   CREAT   Urine HCG        10/18/23 2043 Yellow   Clear   Negative   Negative   Negative   Negative                 [x]  Microbiology   Microbiology Results (last 10 days)       Procedure Component Value - Date/Time    Rapid Strep A Screen - Swab, Throat [[id number]]  (Normal) Collected: 10/18/23 1641    Lab Status: Final result Specimen: Swab from Throat Updated: 10/18/23 1657     Strep A Ag Negative    COVID-19, FLU A/B, RSV PCR - Swab, Nasopharynx [[id number]]  (Abnormal) Collected: 10/18/23 1641    Lab Status: Final result Specimen: Swab from Nasopharynx Updated: 10/18/23 1734     COVID19 Detected     Influenza A PCR Not Detected     Influenza B PCR Not Detected     RSV, PCR Not Detected    Narrative:      Fact sheet for providers: https://www.fda.gov/media/081794/download    Fact sheet for patients: https://www.fda.gov/media/595334/download    Test performed by PCR.    Beta Strep Culture, Throat - Swab, Throat [[id number]]  (Normal) Collected: 10/18/23 1641    Lab Status: Final result Specimen: Swab from Throat Updated: 10/20/23 0931     Throat Culture, Beta Strep No Beta Hemolytic Streptococcus Isolated    Narrative:      Group A Strep incidence is low in adults. Positive culture for Beta hemolytic Streptococcus species can reflect colonization and not true infection. Please correlate clinically.            [x]  Radiology  CT Abdomen Pelvis With Contrast    Result Date: 10/18/2023   No acute abnormality on CT. Status post cholecystectomy with bile duct dilatation similar previous study. Enlarged prostate gland, hyperplasia versus neoplasia. Small hiatal hernia.     TOMMY PALM MD       Electronically Signed and Approved By: TOMMY PALM MD on 10/18/2023 at 19:31             CT Soft Tissue Neck With Contrast    Result Date: 10/18/2023   No etiology to explain patient's neck pain.  Degenerative disc disease  is seen.  The     TOMMY PALM MD       Electronically Signed and Approved By: TOMMY PALM MD on 10/18/2023 at 19:26             XR Chest 1 View    Result Date: 10/18/2023   There is mild bibasilar airspace opacity question subsegmental atelectasis.  Developing atypical pneumonia less likely.       TOMMY PALM MD       Electronically Signed and Approved By: TOMMY PALM MD on 10/18/2023 at 16:35            []  EKG/Telemetry   []  Cardiology/Vascular   []  Pathology  []  Old records  []  Other:    Assessment & Plan   Assessment / Plan     Assessment/plan:  COVID-19 pneumonitis  -generalized weakness d/t the same  -continue short azithromycin course   -continue decadron  -supportive care, remains on room air and resp status stable      Diabetes  -resume home regimen      HTN/HLD, CAD  -chronic  -continue crestor .  Hold coreg, patient bradycardic.   -follow and adjust w/routine VS      BPH  -chronic and continue home flomax    Hx prior CVA  -is on statin above  -will add daily ASA       Dispo plan: awaiting placement.     Discussed with RN.    DVT prophylaxis:  Medical DVT prophylaxis orders are present.    CODE STATUS:   Code Status (Patient has no pulse and is not breathing): CPR (Attempt to Resuscitate)  Medical Interventions (Patient has pulse or is breathing): Full Support    Electronically signed by Estefany Ritchie DO, 10/21/23, 12:50 PM EDT.

## 2023-10-21 NOTE — PLAN OF CARE
Goal Outcome Evaluation:  Plan of Care Reviewed With: patient        Progress: no change  Outcome Evaluation: Pt waiting on placement

## 2023-10-22 LAB
ANION GAP SERPL CALCULATED.3IONS-SCNC: 11.8 MMOL/L (ref 5–15)
BASOPHILS # BLD AUTO: 0.01 10*3/MM3 (ref 0–0.2)
BASOPHILS NFR BLD AUTO: 0.1 % (ref 0–1.5)
BUN SERPL-MCNC: 26 MG/DL (ref 8–23)
BUN/CREAT SERPL: 26.5 (ref 7–25)
CALCIUM SPEC-SCNC: 9.3 MG/DL (ref 8.6–10.5)
CHLORIDE SERPL-SCNC: 105 MMOL/L (ref 98–107)
CO2 SERPL-SCNC: 23.2 MMOL/L (ref 22–29)
CREAT SERPL-MCNC: 0.98 MG/DL (ref 0.76–1.27)
DEPRECATED RDW RBC AUTO: 45.6 FL (ref 37–54)
EGFRCR SERPLBLD CKD-EPI 2021: 78 ML/MIN/1.73
EOSINOPHIL # BLD AUTO: 0 10*3/MM3 (ref 0–0.4)
EOSINOPHIL NFR BLD AUTO: 0 % (ref 0.3–6.2)
ERYTHROCYTE [DISTWIDTH] IN BLOOD BY AUTOMATED COUNT: 14.2 % (ref 12.3–15.4)
GLUCOSE BLDC GLUCOMTR-MCNC: 150 MG/DL (ref 70–99)
GLUCOSE BLDC GLUCOMTR-MCNC: 236 MG/DL (ref 70–99)
GLUCOSE SERPL-MCNC: 224 MG/DL (ref 65–99)
HCT VFR BLD AUTO: 43 % (ref 37.5–51)
HGB BLD-MCNC: 14.6 G/DL (ref 13–17.7)
IMM GRANULOCYTES # BLD AUTO: 0.07 10*3/MM3 (ref 0–0.05)
IMM GRANULOCYTES NFR BLD AUTO: 0.7 % (ref 0–0.5)
LYMPHOCYTES # BLD AUTO: 2.3 10*3/MM3 (ref 0.7–3.1)
LYMPHOCYTES NFR BLD AUTO: 23.8 % (ref 19.6–45.3)
MCH RBC QN AUTO: 30 PG (ref 26.6–33)
MCHC RBC AUTO-ENTMCNC: 34 G/DL (ref 31.5–35.7)
MCV RBC AUTO: 88.3 FL (ref 79–97)
MONOCYTES # BLD AUTO: 0.99 10*3/MM3 (ref 0.1–0.9)
MONOCYTES NFR BLD AUTO: 10.2 % (ref 5–12)
NEUTROPHILS NFR BLD AUTO: 6.3 10*3/MM3 (ref 1.7–7)
NEUTROPHILS NFR BLD AUTO: 65.2 % (ref 42.7–76)
NRBC BLD AUTO-RTO: 0 /100 WBC (ref 0–0.2)
PLATELET # BLD AUTO: 171 10*3/MM3 (ref 140–450)
PMV BLD AUTO: 11.3 FL (ref 6–12)
POTASSIUM SERPL-SCNC: 4.3 MMOL/L (ref 3.5–5.2)
QT INTERVAL: 521 MS
QTC INTERVAL: 522 MS
RBC # BLD AUTO: 4.87 10*6/MM3 (ref 4.14–5.8)
SODIUM SERPL-SCNC: 140 MMOL/L (ref 136–145)
WBC NRBC COR # BLD: 9.67 10*3/MM3 (ref 3.4–10.8)

## 2023-10-22 PROCEDURE — 85025 COMPLETE CBC W/AUTO DIFF WBC: CPT | Performed by: HOSPITALIST

## 2023-10-22 PROCEDURE — 80048 BASIC METABOLIC PNL TOTAL CA: CPT | Performed by: HOSPITALIST

## 2023-10-22 PROCEDURE — 93010 ELECTROCARDIOGRAM REPORT: CPT | Performed by: INTERNAL MEDICINE

## 2023-10-22 PROCEDURE — 93005 ELECTROCARDIOGRAM TRACING: CPT | Performed by: HOSPITALIST

## 2023-10-22 PROCEDURE — 63710000001 DEXAMETHASONE PER 0.25 MG: Performed by: INTERNAL MEDICINE

## 2023-10-22 PROCEDURE — 25010000002 ENOXAPARIN PER 10 MG: Performed by: FAMILY MEDICINE

## 2023-10-22 PROCEDURE — 82948 REAGENT STRIP/BLOOD GLUCOSE: CPT

## 2023-10-22 PROCEDURE — 63710000001 INSULIN DETEMIR PER 5 UNITS: Performed by: HOSPITALIST

## 2023-10-22 PROCEDURE — 63710000001 INSULIN LISPRO (HUMAN) PER 5 UNITS: Performed by: HOSPITALIST

## 2023-10-22 RX ORDER — DEXTROSE MONOHYDRATE 25 G/50ML
25 INJECTION, SOLUTION INTRAVENOUS
Status: DISCONTINUED | OUTPATIENT
Start: 2023-10-22 | End: 2023-10-27 | Stop reason: HOSPADM

## 2023-10-22 RX ORDER — ROSUVASTATIN CALCIUM 20 MG/1
40 TABLET, COATED ORAL NIGHTLY
Status: DISCONTINUED | OUTPATIENT
Start: 2023-10-22 | End: 2023-10-27 | Stop reason: HOSPADM

## 2023-10-22 RX ORDER — NICOTINE POLACRILEX 4 MG
15 LOZENGE BUCCAL
Status: DISCONTINUED | OUTPATIENT
Start: 2023-10-22 | End: 2023-10-27 | Stop reason: HOSPADM

## 2023-10-22 RX ORDER — LISINOPRIL 10 MG/1
10 TABLET ORAL DAILY
Status: DISCONTINUED | OUTPATIENT
Start: 2023-10-22 | End: 2023-10-27 | Stop reason: HOSPADM

## 2023-10-22 RX ORDER — ATROPINE SULFATE 0.4 MG/ML
0.4 INJECTION, SOLUTION INTRAVENOUS ONCE AS NEEDED
Status: DISCONTINUED | OUTPATIENT
Start: 2023-10-22 | End: 2023-10-27 | Stop reason: HOSPADM

## 2023-10-22 RX ORDER — INSULIN LISPRO 100 [IU]/ML
3-14 INJECTION, SOLUTION INTRAVENOUS; SUBCUTANEOUS
Status: DISCONTINUED | OUTPATIENT
Start: 2023-10-22 | End: 2023-10-27 | Stop reason: HOSPADM

## 2023-10-22 RX ADMIN — EMPAGLIFLOZIN 25 MG: 25 TABLET, FILM COATED ORAL at 17:16

## 2023-10-22 RX ADMIN — CITALOPRAM HYDROBROMIDE 10 MG: 20 TABLET ORAL at 09:57

## 2023-10-22 RX ADMIN — INSULIN LISPRO 5 UNITS: 100 INJECTION, SOLUTION INTRAVENOUS; SUBCUTANEOUS at 17:16

## 2023-10-22 RX ADMIN — ASPIRIN 81 MG: 81 TABLET, COATED ORAL at 09:57

## 2023-10-22 RX ADMIN — LISINOPRIL 10 MG: 10 TABLET ORAL at 17:16

## 2023-10-22 RX ADMIN — TAMSULOSIN HYDROCHLORIDE 0.4 MG: 0.4 CAPSULE ORAL at 09:58

## 2023-10-22 RX ADMIN — DEXAMETHASONE 6 MG: 0.5 TABLET ORAL at 09:56

## 2023-10-22 RX ADMIN — AZITHROMYCIN DIHYDRATE 250 MG: 250 TABLET ORAL at 09:57

## 2023-10-22 RX ADMIN — Medication 10 ML: at 09:57

## 2023-10-22 RX ADMIN — INSULIN DETEMIR 10 UNITS: 100 INJECTION, SOLUTION SUBCUTANEOUS at 22:34

## 2023-10-22 RX ADMIN — LINAGLIPTIN 5 MG: 5 TABLET, FILM COATED ORAL at 17:16

## 2023-10-22 RX ADMIN — ROSUVASTATIN 40 MG: 20 TABLET, FILM COATED ORAL at 22:34

## 2023-10-22 RX ADMIN — INSULIN LISPRO 3 UNITS: 100 INJECTION, SOLUTION INTRAVENOUS; SUBCUTANEOUS at 22:44

## 2023-10-22 RX ADMIN — GUAIFENESIN 600 MG: 600 TABLET ORAL at 09:57

## 2023-10-22 RX ADMIN — GUAIFENESIN 600 MG: 600 TABLET ORAL at 22:34

## 2023-10-22 NOTE — PLAN OF CARE
Goal Outcome Evaluation:           Progress: no change  Outcome Evaluation: Pt rested well during shift. Pt remains bradycardic but if not symptomatic. All other vital signs stable. No complaints or concerns voiced. Will continue plan of care.

## 2023-10-22 NOTE — PLAN OF CARE
Goal Outcome Evaluation:  Plan of Care Reviewed With: patient        Progress: improving  Outcome Evaluation: Alert and oriented x4. Up to chair this shift. Vital signs within normal limits for patient. No complaints of pain. No new issues at this time.

## 2023-10-22 NOTE — PROGRESS NOTES
[facility] Hospitalist Progress Note  Date: 10/22/2023  Patient Name: Nikhil Baldwin  : 1945  MRN: [id number]  Date of admission: 10/18/2023  Room/Bed: [facility]/1      Subjective   Subjective     Chief Complaint: f/u genalized weakness    Summary:Nikhil Baldwin is a 78 y.o. male admitted with COVID-19 and general weakness    Interval Followup: Patient intermittently bradycardic.  He is not symptomatic.      Objective   Objective     Vitals:   Temp:  [97.3 °F (36.3 °C)-98.2 °F (36.8 °C)] 98.2 °F (36.8 °C)  Heart Rate:  [43-65] 43  Resp:  [18-20] 20  BP: (130-170)/(59-85) 145/59    Physical Exam   Physical Exam  Constitutional:       Comments: Appears chronically ill    HENT:      Head: Normocephalic.   Cardiovascular:      Rate and Rhythm: Normal rate.   Pulmonary:      Effort: No respiratory distress.   Abdominal:      General: There is no distension.   Musculoskeletal:      Right lower leg: No edema.      Left lower leg: No edema.   Skin:     General: Skin is warm.   Neurological:      Mental Status: He is alert.      Comments: Some intermittent confusion noted at times.           Result Review    Result Review:  I have personally reviewed these results:  [x]  Laboratory      Lab 10/22/23  0519 10/21/23  1027 10/20/23  0453 10/18/23  1550   WBC 9.67 10.40 7.59 11.31*   HEMOGLOBIN 14.6 14.4 14.0 15.2   HEMATOCRIT 43.0 42.4 44.7 45.7   PLATELETS 171 160 132* 162   NEUTROS ABS 6.30 6.74  --  7.91*   IMMATURE GRANS (ABS) 0.07* 0.03  --  0.12*   LYMPHS ABS 2.30 2.52  --  1.97   MONOS ABS 0.99* 1.10*  --  1.11*   EOS ABS 0.00 0.00  --  0.14   MCV 88.3 88.1 97.4* 91.2   CRP  --   --   --  0.74*         Lab 10/22/23  0519 10/21/23  1027 10/20/23  0453 10/18/23  1550   SODIUM 140 140 135* 141   POTASSIUM 4.3 4.0 4.2 3.9   CHLORIDE 105 107 104 104   CO2 23.2 24.9 18.5* 23.1   ANION GAP 11.8 8.1 12.5 13.9   BUN 26* 26* 21 15   CREATININE 0.98 1.00 0.98 1.08   EGFR 78.0 76.1 78.0 69.4   GLUCOSE 224* 141* 149* 139*    CALCIUM 9.3 9.3 8.8 9.7   MAGNESIUM  --   --   --  1.9         Lab 10/18/23  1550   TOTAL PROTEIN 6.8   ALBUMIN 4.1   GLOBULIN 2.7   ALT (SGPT) 20   AST (SGOT) 15   BILIRUBIN 1.3*   ALK PHOS 64         Lab 10/18/23  1550   HSTROP T 9                 Brief Urine Lab Results  (Last result in the past 365 days)        Color   Clarity   Blood   Leuk Est   Nitrite   Protein   CREAT   Urine HCG        10/18/23 2043 Yellow   Clear   Negative   Negative   Negative   Negative                 [x]  Microbiology   Microbiology Results (last 10 days)       Procedure Component Value - Date/Time    Rapid Strep A Screen - Swab, Throat [[id number]]  (Normal) Collected: 10/18/23 1641    Lab Status: Final result Specimen: Swab from Throat Updated: 10/18/23 1657     Strep A Ag Negative    COVID-19, FLU A/B, RSV PCR - Swab, Nasopharynx [[id number]]  (Abnormal) Collected: 10/18/23 1641    Lab Status: Final result Specimen: Swab from Nasopharynx Updated: 10/18/23 1734     COVID19 Detected     Influenza A PCR Not Detected     Influenza B PCR Not Detected     RSV, PCR Not Detected    Narrative:      Fact sheet for providers: https://www.fda.gov/media/893982/download    Fact sheet for patients: https://www.fda.gov/media/393632/download    Test performed by PCR.    Beta Strep Culture, Throat - Swab, Throat [[id number]]  (Normal) Collected: 10/18/23 1641    Lab Status: Final result Specimen: Swab from Throat Updated: 10/20/23 0931     Throat Culture, Beta Strep No Beta Hemolytic Streptococcus Isolated    Narrative:      Group A Strep incidence is low in adults. Positive culture for Beta hemolytic Streptococcus species can reflect colonization and not true infection. Please correlate clinically.            [x]  Radiology  CT Abdomen Pelvis With Contrast    Result Date: 10/18/2023   No acute abnormality on CT. Status post cholecystectomy with bile duct dilatation similar previous study. Enlarged prostate gland, hyperplasia versus neoplasia. Small  hiatal hernia.     TOMMY PALM MD       Electronically Signed and Approved By: TOMMY PALM MD on 10/18/2023 at 19:31             CT Soft Tissue Neck With Contrast    Result Date: 10/18/2023   No etiology to explain patient's neck pain.  Degenerative disc disease is seen.  The     TOMMY PALM MD       Electronically Signed and Approved By: TOMMY PALM MD on 10/18/2023 at 19:26             XR Chest 1 View    Result Date: 10/18/2023   There is mild bibasilar airspace opacity question subsegmental atelectasis.  Developing atypical pneumonia less likely.       TOMMY PALM MD       Electronically Signed and Approved By: TOMMY PALM MD on 10/18/2023 at 16:35            []  EKG/Telemetry   []  Cardiology/Vascular   []  Pathology  []  Old records  []  Other:    Assessment & Plan   Assessment / Plan     Assessment/plan:  COVID-19 pneumonitis  -generalized weakness d/t the same  -continue short azithromycin course   -continue decadron  -supportive care, remains on room air and resp status stable      Diabetes  -resume home oral regimen.  Will cover with ISS.        HTN/HLD, CAD  -continue crestor. Hold coreg, patient bradycardic. Restart lisinopril.       BPH  -chronic and continue home flomax    Hx prior CVA  -is on statin above  -will add daily ASA    Asymptomatic Bradycardia  -will check tsh  -order prn atropine for HR less than 35      Dispo plan: awaiting placement.     Discussed with RN.    DVT prophylaxis:  Medical DVT prophylaxis orders are present.    CODE STATUS:   Code Status (Patient has no pulse and is not breathing): CPR (Attempt to Resuscitate)  Medical Interventions (Patient has pulse or is breathing): Full Support    Electronically signed by Estefany Ritchie DO, 10/22/23, 9:05 AM EDT.

## 2023-10-23 LAB
ANION GAP SERPL CALCULATED.3IONS-SCNC: 12.5 MMOL/L (ref 5–15)
BASOPHILS # BLD AUTO: 0.05 10*3/MM3 (ref 0–0.2)
BASOPHILS NFR BLD AUTO: 0.4 % (ref 0–1.5)
BUN SERPL-MCNC: 25 MG/DL (ref 8–23)
BUN/CREAT SERPL: 29.8 (ref 7–25)
CALCIUM SPEC-SCNC: 9 MG/DL (ref 8.6–10.5)
CHLORIDE SERPL-SCNC: 104 MMOL/L (ref 98–107)
CO2 SERPL-SCNC: 21.5 MMOL/L (ref 22–29)
CREAT SERPL-MCNC: 0.84 MG/DL (ref 0.76–1.27)
DEPRECATED RDW RBC AUTO: 45 FL (ref 37–54)
EGFRCR SERPLBLD CKD-EPI 2021: 88.2 ML/MIN/1.73
EOSINOPHIL # BLD AUTO: 0 10*3/MM3 (ref 0–0.4)
EOSINOPHIL NFR BLD AUTO: 0 % (ref 0.3–6.2)
ERYTHROCYTE [DISTWIDTH] IN BLOOD BY AUTOMATED COUNT: 13.9 % (ref 12.3–15.4)
GLUCOSE BLDC GLUCOMTR-MCNC: 102 MG/DL (ref 70–99)
GLUCOSE BLDC GLUCOMTR-MCNC: 113 MG/DL (ref 70–99)
GLUCOSE BLDC GLUCOMTR-MCNC: 178 MG/DL (ref 70–99)
GLUCOSE BLDC GLUCOMTR-MCNC: 262 MG/DL (ref 70–99)
GLUCOSE SERPL-MCNC: 114 MG/DL (ref 65–99)
HCT VFR BLD AUTO: 45.1 % (ref 37.5–51)
HGB BLD-MCNC: 15.2 G/DL (ref 13–17.7)
IMM GRANULOCYTES # BLD AUTO: 0.19 10*3/MM3 (ref 0–0.05)
IMM GRANULOCYTES NFR BLD AUTO: 1.7 % (ref 0–0.5)
LYMPHOCYTES # BLD AUTO: 3 10*3/MM3 (ref 0.7–3.1)
LYMPHOCYTES NFR BLD AUTO: 26.4 % (ref 19.6–45.3)
MCH RBC QN AUTO: 29.7 PG (ref 26.6–33)
MCHC RBC AUTO-ENTMCNC: 33.7 G/DL (ref 31.5–35.7)
MCV RBC AUTO: 88.1 FL (ref 79–97)
MONOCYTES # BLD AUTO: 1.13 10*3/MM3 (ref 0.1–0.9)
MONOCYTES NFR BLD AUTO: 9.9 % (ref 5–12)
NEUTROPHILS NFR BLD AUTO: 61.6 % (ref 42.7–76)
NEUTROPHILS NFR BLD AUTO: 7.01 10*3/MM3 (ref 1.7–7)
NRBC BLD AUTO-RTO: 0 /100 WBC (ref 0–0.2)
PLATELET # BLD AUTO: 179 10*3/MM3 (ref 140–450)
PMV BLD AUTO: 10.8 FL (ref 6–12)
POTASSIUM SERPL-SCNC: 4 MMOL/L (ref 3.5–5.2)
RBC # BLD AUTO: 5.12 10*6/MM3 (ref 4.14–5.8)
SODIUM SERPL-SCNC: 138 MMOL/L (ref 136–145)
WBC NRBC COR # BLD: 11.38 10*3/MM3 (ref 3.4–10.8)

## 2023-10-23 PROCEDURE — 25010000002 ENOXAPARIN PER 10 MG: Performed by: FAMILY MEDICINE

## 2023-10-23 PROCEDURE — 63710000001 INSULIN LISPRO (HUMAN) PER 5 UNITS: Performed by: HOSPITALIST

## 2023-10-23 PROCEDURE — 80048 BASIC METABOLIC PNL TOTAL CA: CPT | Performed by: HOSPITALIST

## 2023-10-23 PROCEDURE — 82948 REAGENT STRIP/BLOOD GLUCOSE: CPT

## 2023-10-23 PROCEDURE — 85025 COMPLETE CBC W/AUTO DIFF WBC: CPT | Performed by: HOSPITALIST

## 2023-10-23 PROCEDURE — 63710000001 INSULIN DETEMIR PER 5 UNITS: Performed by: HOSPITALIST

## 2023-10-23 PROCEDURE — 63710000001 DEXAMETHASONE PER 0.25 MG: Performed by: INTERNAL MEDICINE

## 2023-10-23 RX ORDER — ACETAMINOPHEN 325 MG/1
650 TABLET ORAL EVERY 4 HOURS PRN
Status: DISCONTINUED | OUTPATIENT
Start: 2023-10-23 | End: 2023-10-27 | Stop reason: HOSPADM

## 2023-10-23 RX ADMIN — ASPIRIN 81 MG: 81 TABLET, COATED ORAL at 09:22

## 2023-10-23 RX ADMIN — ACETAMINOPHEN 650 MG: 325 TABLET ORAL at 21:45

## 2023-10-23 RX ADMIN — TAMSULOSIN HYDROCHLORIDE 0.4 MG: 0.4 CAPSULE ORAL at 09:22

## 2023-10-23 RX ADMIN — DEXAMETHASONE 6 MG: 0.5 TABLET ORAL at 09:21

## 2023-10-23 RX ADMIN — GUAIFENESIN 600 MG: 600 TABLET ORAL at 21:45

## 2023-10-23 RX ADMIN — INSULIN LISPRO 8 UNITS: 100 INJECTION, SOLUTION INTRAVENOUS; SUBCUTANEOUS at 21:45

## 2023-10-23 RX ADMIN — LISINOPRIL 10 MG: 10 TABLET ORAL at 09:21

## 2023-10-23 RX ADMIN — GUAIFENESIN 600 MG: 600 TABLET ORAL at 09:21

## 2023-10-23 RX ADMIN — LINAGLIPTIN 5 MG: 5 TABLET, FILM COATED ORAL at 09:21

## 2023-10-23 RX ADMIN — CITALOPRAM HYDROBROMIDE 10 MG: 20 TABLET ORAL at 09:21

## 2023-10-23 RX ADMIN — EMPAGLIFLOZIN 25 MG: 25 TABLET, FILM COATED ORAL at 09:21

## 2023-10-23 RX ADMIN — INSULIN LISPRO 3 UNITS: 100 INJECTION, SOLUTION INTRAVENOUS; SUBCUTANEOUS at 17:09

## 2023-10-23 RX ADMIN — ROSUVASTATIN 40 MG: 20 TABLET, FILM COATED ORAL at 21:45

## 2023-10-23 RX ADMIN — Medication 10 ML: at 21:47

## 2023-10-23 RX ADMIN — INSULIN DETEMIR 10 UNITS: 100 INJECTION, SOLUTION SUBCUTANEOUS at 21:45

## 2023-10-23 NOTE — PLAN OF CARE
Goal Outcome Evaluation:  Plan of Care Reviewed With: patient        Progress: improving  Outcome Evaluation: pt alert and oriented x4. blood glucose monitored per orders. no c/o pain this shift. no other concerns noted at this time.

## 2023-10-23 NOTE — PROGRESS NOTES
[facility]   Hospitalist Progress Note  Date: 10/23/2023  Patient Name: Nikhil Baldwin  : 1945  MRN: [id number]  Date of admission: 10/18/2023  Room/Bed: 4001/1      Subjective   Subjective     Chief Complaint: f/u genalized weakness    Summary:Nikhil Baldwin is a 78 y.o. male admitted with COVID-19 and general weakness    Interval Followup: coughed up yellow stuff last night and feels better.  Awaiting rehab.    Objective   Objective     Vitals:   Temp:  [97.9 °F (36.6 °C)-98.6 °F (37 °C)] 97.9 °F (36.6 °C)  Heart Rate:  [45-62] 45  Resp:  [18-20] 18  BP: (134-154)/(64-88) 145/64    Physical Exam     General: sitting in chair in NAD  HENT: NCAT, MMM  Eyes: pupils equal, no scleral icterus  Cardiovascular: RRR, no murmurs   Pulmonary: CTA bilaterally; no wheezes; no conversational dyspnea  Gastrointestinal: S/ND/NT, +BS  Musculoskeletal: No gross deformities  Skin: No jaundice, no rash on exposed skin appreciated  Neuro: CN II through XII grossly intact; speech clear; no tremor  Psych: Mood and affect appropriate      Result Review    Result Review:  I have personally reviewed these results:  [x]  Laboratory      Lab 10/23/23  0537 10/22/23  0519 10/21/23  1027 10/20/23  0453 10/18/23  1550   WBC 11.38* 9.67 10.40   < > 11.31*   HEMOGLOBIN 15.2 14.6 14.4   < > 15.2   HEMATOCRIT 45.1 43.0 42.4   < > 45.7   PLATELETS 179 171 160   < > 162   NEUTROS ABS 7.01* 6.30 6.74  --  7.91*   IMMATURE GRANS (ABS) 0.19* 0.07* 0.03  --  0.12*   LYMPHS ABS 3.00 2.30 2.52  --  1.97   MONOS ABS 1.13* 0.99* 1.10*  --  1.11*   EOS ABS 0.00 0.00 0.00  --  0.14   MCV 88.1 88.3 88.1   < > 91.2   CRP  --   --   --   --  0.74*    < > = values in this interval not displayed.         Lab 10/23/23  0538 10/22/23  0519 10/21/23  1027 10/20/23  0453 10/18/23  1550   SODIUM 138 140 140   < > 141   POTASSIUM 4.0 4.3 4.0   < > 3.9   CHLORIDE 104 105 107   < > 104   CO2 21.5* 23.2 24.9   < > 23.1   ANION GAP 12.5 11.8 8.1   < > 13.9   BUN  25* 26* 26*   < > 15   CREATININE 0.84 0.98 1.00   < > 1.08   EGFR 88.2 78.0 76.1   < > 69.4   GLUCOSE 114* 224* 141*   < > 139*   CALCIUM 9.0 9.3 9.3   < > 9.7   MAGNESIUM  --   --   --   --  1.9    < > = values in this interval not displayed.         Lab 10/18/23  1550   TOTAL PROTEIN 6.8   ALBUMIN 4.1   GLOBULIN 2.7   ALT (SGPT) 20   AST (SGOT) 15   BILIRUBIN 1.3*   ALK PHOS 64         Lab 10/18/23  1550   HSTROP T 9                 Brief Urine Lab Results  (Last result in the past 365 days)        Color   Clarity   Blood   Leuk Est   Nitrite   Protein   CREAT   Urine HCG        10/18/23 2043 Yellow   Clear   Negative   Negative   Negative   Negative                 [x]  Microbiology   Microbiology Results (last 10 days)       Procedure Component Value - Date/Time    Rapid Strep A Screen - Swab, Throat [[id number]]  (Normal) Collected: 10/18/23 1641    Lab Status: Final result Specimen: Swab from Throat Updated: 10/18/23 1657     Strep A Ag Negative    COVID-19, FLU A/B, RSV PCR - Swab, Nasopharynx [[id number]]  (Abnormal) Collected: 10/18/23 1641    Lab Status: Final result Specimen: Swab from Nasopharynx Updated: 10/18/23 1734     COVID19 Detected     Influenza A PCR Not Detected     Influenza B PCR Not Detected     RSV, PCR Not Detected    Narrative:      Fact sheet for providers: https://www.fda.gov/media/232683/download    Fact sheet for patients: https://www.fda.gov/media/662871/download    Test performed by PCR.    Beta Strep Culture, Throat - Swab, Throat [[id number]]  (Normal) Collected: 10/18/23 1641    Lab Status: Final result Specimen: Swab from Throat Updated: 10/20/23 0931     Throat Culture, Beta Strep No Beta Hemolytic Streptococcus Isolated    Narrative:      Group A Strep incidence is low in adults. Positive culture for Beta hemolytic Streptococcus species can reflect colonization and not true infection. Please correlate clinically.            [x]  Radiology  CT Abdomen Pelvis With  Contrast    Result Date: 10/18/2023   No acute abnormality on CT. Status post cholecystectomy with bile duct dilatation similar previous study. Enlarged prostate gland, hyperplasia versus neoplasia. Small hiatal hernia.     TOMMY PALM MD       Electronically Signed and Approved By: TOMMY PALM MD on 10/18/2023 at 19:31             CT Soft Tissue Neck With Contrast    Result Date: 10/18/2023   No etiology to explain patient's neck pain.  Degenerative disc disease is seen.  The     TOMMY PALM MD       Electronically Signed and Approved By: TOMMY PALM MD on 10/18/2023 at 19:26             XR Chest 1 View    Result Date: 10/18/2023   There is mild bibasilar airspace opacity question subsegmental atelectasis.  Developing atypical pneumonia less likely.       TOMMY PALM MD       Electronically Signed and Approved By: TOMMY PALM MD on 10/18/2023 at 16:35            []  EKG/Telemetry   []  Cardiology/Vascular   []  Pathology  []  Old records  []  Other:    Assessment & Plan   Assessment / Plan     Assessment:  COVID-19 infection  Diabetes  Asymptomatic bradycardia  History of prior stroke    Plan:  Admitted to medicine  Complete course of Decadron  Complete course of antibiotics  Discontinued beta-blocker, monitor heart rate  Awaiting rehab arrangements to be finalized      Discussed with RN.    DVT prophylaxis:  Medical DVT prophylaxis orders are present.    CODE STATUS:   Code Status (Patient has no pulse and is not breathing): CPR (Attempt to Resuscitate)  Medical Interventions (Patient has pulse or is breathing): Full Support    Electronically signed by Garcia Park MD, 10/23/23, 10:53 AM EDT.

## 2023-10-23 NOTE — PLAN OF CARE
Goal Outcome Evaluation:  Plan of Care Reviewed With: patient        Progress: improving  Outcome Evaluation: patient is alert and oriented, no c/o pain. blood glucose monitored. up to chair this shift. encouraged use of IS. awaiting rehab placement, JANI on board. no other changes at this time

## 2023-10-24 LAB
ANION GAP SERPL CALCULATED.3IONS-SCNC: 9.6 MMOL/L (ref 5–15)
BASOPHILS # BLD AUTO: 0.1 10*3/MM3 (ref 0–0.2)
BASOPHILS NFR BLD AUTO: 0.9 % (ref 0–1.5)
BUN SERPL-MCNC: 31 MG/DL (ref 8–23)
BUN/CREAT SERPL: 29 (ref 7–25)
CALCIUM SPEC-SCNC: 9.1 MG/DL (ref 8.6–10.5)
CHLORIDE SERPL-SCNC: 104 MMOL/L (ref 98–107)
CO2 SERPL-SCNC: 24.4 MMOL/L (ref 22–29)
CREAT SERPL-MCNC: 1.07 MG/DL (ref 0.76–1.27)
DEPRECATED RDW RBC AUTO: 44.6 FL (ref 37–54)
EGFRCR SERPLBLD CKD-EPI 2021: 70.2 ML/MIN/1.73
EOSINOPHIL # BLD AUTO: 0.04 10*3/MM3 (ref 0–0.4)
EOSINOPHIL NFR BLD AUTO: 0.3 % (ref 0.3–6.2)
ERYTHROCYTE [DISTWIDTH] IN BLOOD BY AUTOMATED COUNT: 14 % (ref 12.3–15.4)
GLUCOSE BLDC GLUCOMTR-MCNC: 111 MG/DL (ref 70–99)
GLUCOSE BLDC GLUCOMTR-MCNC: 189 MG/DL (ref 70–99)
GLUCOSE BLDC GLUCOMTR-MCNC: 325 MG/DL (ref 70–99)
GLUCOSE SERPL-MCNC: 109 MG/DL (ref 65–99)
HCT VFR BLD AUTO: 45 % (ref 37.5–51)
HGB BLD-MCNC: 15.9 G/DL (ref 13–17.7)
IMM GRANULOCYTES # BLD AUTO: 0.48 10*3/MM3 (ref 0–0.05)
IMM GRANULOCYTES NFR BLD AUTO: 4.1 % (ref 0–0.5)
LYMPHOCYTES # BLD AUTO: 4.28 10*3/MM3 (ref 0.7–3.1)
LYMPHOCYTES NFR BLD AUTO: 36.5 % (ref 19.6–45.3)
MCH RBC QN AUTO: 30.8 PG (ref 26.6–33)
MCHC RBC AUTO-ENTMCNC: 35.3 G/DL (ref 31.5–35.7)
MCV RBC AUTO: 87.2 FL (ref 79–97)
MONOCYTES # BLD AUTO: 1.18 10*3/MM3 (ref 0.1–0.9)
MONOCYTES NFR BLD AUTO: 10.1 % (ref 5–12)
NEUTROPHILS NFR BLD AUTO: 48.1 % (ref 42.7–76)
NEUTROPHILS NFR BLD AUTO: 5.64 10*3/MM3 (ref 1.7–7)
NRBC BLD AUTO-RTO: 0 /100 WBC (ref 0–0.2)
PLATELET # BLD AUTO: 200 10*3/MM3 (ref 140–450)
PMV BLD AUTO: 11.5 FL (ref 6–12)
POTASSIUM SERPL-SCNC: 4 MMOL/L (ref 3.5–5.2)
RBC # BLD AUTO: 5.16 10*6/MM3 (ref 4.14–5.8)
SODIUM SERPL-SCNC: 138 MMOL/L (ref 136–145)
WBC NRBC COR # BLD: 11.72 10*3/MM3 (ref 3.4–10.8)

## 2023-10-24 PROCEDURE — 85025 COMPLETE CBC W/AUTO DIFF WBC: CPT | Performed by: HOSPITALIST

## 2023-10-24 PROCEDURE — 63710000001 DEXAMETHASONE PER 0.25 MG: Performed by: INTERNAL MEDICINE

## 2023-10-24 PROCEDURE — 82948 REAGENT STRIP/BLOOD GLUCOSE: CPT

## 2023-10-24 PROCEDURE — 97116 GAIT TRAINING THERAPY: CPT

## 2023-10-24 PROCEDURE — 97535 SELF CARE MNGMENT TRAINING: CPT

## 2023-10-24 PROCEDURE — 80048 BASIC METABOLIC PNL TOTAL CA: CPT | Performed by: HOSPITALIST

## 2023-10-24 PROCEDURE — 63710000001 INSULIN LISPRO (HUMAN) PER 5 UNITS: Performed by: HOSPITALIST

## 2023-10-24 PROCEDURE — 63710000001 INSULIN DETEMIR PER 5 UNITS: Performed by: HOSPITALIST

## 2023-10-24 RX ORDER — POLYETHYLENE GLYCOL 3350 17 G/17G
17 POWDER, FOR SOLUTION ORAL DAILY PRN
Status: DISCONTINUED | OUTPATIENT
Start: 2023-10-24 | End: 2023-10-27 | Stop reason: HOSPADM

## 2023-10-24 RX ORDER — BISACODYL 5 MG/1
5 TABLET, DELAYED RELEASE ORAL DAILY PRN
Status: DISCONTINUED | OUTPATIENT
Start: 2023-10-24 | End: 2023-10-27 | Stop reason: HOSPADM

## 2023-10-24 RX ORDER — AMOXICILLIN 250 MG
2 CAPSULE ORAL 2 TIMES DAILY
Status: DISCONTINUED | OUTPATIENT
Start: 2023-10-24 | End: 2023-10-27 | Stop reason: HOSPADM

## 2023-10-24 RX ORDER — BISACODYL 10 MG
10 SUPPOSITORY, RECTAL RECTAL DAILY PRN
Status: DISCONTINUED | OUTPATIENT
Start: 2023-10-24 | End: 2023-10-27 | Stop reason: HOSPADM

## 2023-10-24 RX ADMIN — DEXAMETHASONE 6 MG: 0.5 TABLET ORAL at 08:14

## 2023-10-24 RX ADMIN — LINAGLIPTIN 5 MG: 5 TABLET, FILM COATED ORAL at 08:15

## 2023-10-24 RX ADMIN — EMPAGLIFLOZIN 25 MG: 25 TABLET, FILM COATED ORAL at 08:14

## 2023-10-24 RX ADMIN — INSULIN DETEMIR 10 UNITS: 100 INJECTION, SOLUTION SUBCUTANEOUS at 20:55

## 2023-10-24 RX ADMIN — INSULIN LISPRO 3 UNITS: 100 INJECTION, SOLUTION INTRAVENOUS; SUBCUTANEOUS at 12:34

## 2023-10-24 RX ADMIN — INSULIN LISPRO 10 UNITS: 100 INJECTION, SOLUTION INTRAVENOUS; SUBCUTANEOUS at 20:55

## 2023-10-24 RX ADMIN — ROSUVASTATIN 40 MG: 20 TABLET, FILM COATED ORAL at 20:55

## 2023-10-24 RX ADMIN — INSULIN LISPRO 5 UNITS: 100 INJECTION, SOLUTION INTRAVENOUS; SUBCUTANEOUS at 17:26

## 2023-10-24 RX ADMIN — GUAIFENESIN 600 MG: 600 TABLET ORAL at 08:14

## 2023-10-24 RX ADMIN — GUAIFENESIN 600 MG: 600 TABLET ORAL at 20:55

## 2023-10-24 RX ADMIN — TAMSULOSIN HYDROCHLORIDE 0.4 MG: 0.4 CAPSULE ORAL at 08:14

## 2023-10-24 RX ADMIN — CITALOPRAM HYDROBROMIDE 10 MG: 20 TABLET ORAL at 08:14

## 2023-10-24 RX ADMIN — LISINOPRIL 10 MG: 10 TABLET ORAL at 08:14

## 2023-10-24 RX ADMIN — DOCUSATE SODIUM 50MG AND SENNOSIDES 8.6MG 2 TABLET: 8.6; 5 TABLET, FILM COATED ORAL at 20:55

## 2023-10-24 RX ADMIN — ASPIRIN 81 MG: 81 TABLET, COATED ORAL at 08:14

## 2023-10-24 NOTE — THERAPY TREATMENT NOTE
Acute Care - Physical Therapy Treatment Note  TONY Guillory     Patient Name: Nikhil Baldwin  : 1945  MRN: [id number]  Today's Date: 10/24/2023      Visit Dx:     ICD-10-CM ICD-9-CM   1. COVID-19  U07.1 079.89   2. Decreased activities of daily living (ADL)  Z78.9 V49.89   3. Difficulty walking  R26.2 719.7   4. Difficulty in walking  R26.2 719.7     Patient Active Problem List   Diagnosis    Aortic aneurysm    Arthritis    BPH (benign prostatic hyperplasia)    Cervical radiculopathy    Type 2 diabetes mellitus with hyperglycemia, without long-term current use of insulin    Parathyroid abnormality    Thyroid disorder    Facet arthropathy    Fatty metamorphosis of liver    GERD (gastroesophageal reflux disease)    Primary hypertension    Hematuria    Hiatal hernia    Urinary incontinence    Hypercalcemia    Mixed hyperlipidemia    Kidney disease    Renal calculi    Low back pain    Thoracic back pain    DDD (degenerative disc disease), lumbar    Spinal stenosis of lumbar region    Degeneration of thoracic or thoracolumbar intervertebral disc    MI (myocardial infarction)    Myofascial pain    Neck pain    Nocturia    Sciatica    Stroke    Ureterolithiasis    Vitamin D deficiency    Labile personality    Acute viral syndrome    Debility    COVID-19    Upper respiratory tract infection due to COVID-19 virus    COVID     Past Medical History:   Diagnosis Date    Aorta disorder     Arthritis     Degenerative joint disease     Dementia     FRONTAL LOBE    Depression     Diabetes mellitus     Hyperlipidemia     Hypertension     Kidney stone     Stroke     MILD RIGHT SIDE DEFICITS     Past Surgical History:   Procedure Laterality Date    CHOLECYSTECTOMY      CYSTOSCOPY BLADDER STONE LITHOTRIPSY      KIDNEY STONE SURGERY       PT Assessment (last 12 hours)       PT Evaluation and Treatment       Row Name 10/24/23 1100          Physical Therapy Time and Intention    Subjective Information no complaints (P)   -ZT      Document Type therapy note (daily note) (P)   -ZT     Mode of Treatment individual therapy;physical therapy (P)   -ZT     Patient Effort good (P)   -ZT       Row Name 10/24/23 1100          General Information    Patient Profile Reviewed yes (P)   -ZT     Patient Observations alert;cooperative;agree to therapy (P)   -ZT     Existing Precautions/Restrictions fall (P)   -ZT       Row Name 10/24/23 1100          Bed Mobility    Bed Mobility bed mobility (all) activities (P)   -ZT     All Activities, Murphy (Bed Mobility) independent (P)   -ZT       Row Name 10/24/23 1100          Transfers    Transfers sit-stand transfer;stand-sit transfer (P)   -ZT       Row Name 10/24/23 1100          Sit-Stand Transfer    Sit-Stand Murphy (Transfers) standby assist (P)   -ZT     Assistive Device (Sit-Stand Transfers) walker, front-wheeled (P)   -ZT       Row Name 10/24/23 1100          Stand-Sit Transfer    Stand-Sit Murphy (Transfers) standby assist (P)   -ZT     Assistive Device (Stand-Sit Transfers) walker, front-wheeled (P)   -ZT       Row Name 10/24/23 1100          Gait/Stairs (Locomotion)    Gait/Stairs Locomotion gait/ambulation assistive device (P)   -ZT     Murphy Level (Gait) contact guard (P)   -ZT     Assistive Device (Gait) walker, front-wheeled (P)   -ZT     Distance in Feet (Gait) 30 (P)   -ZT     Pattern (Gait) step-through (P)   -ZT     Deviations/Abnormal Patterns (Gait) base of support, narrow;stride length decreased;gait speed decreased (P)   -ZT       Row Name 10/24/23 1100          Safety Issues, Functional Mobility    Impairments Affecting Function (Mobility) balance;endurance/activity tolerance;strength (P)   -ZT       Row Name 10/24/23 1100          Balance    Balance Assessment standing dynamic balance (P)   -ZT     Dynamic Standing Balance contact guard (P)   -ZT     Position/Device Used, Standing Balance walker, front-wheeled (P)   -ZT       Row Name 10/24/23 1100          Plan of  Care Review    Plan of Care Reviewed With patient (P)   -ZT       Row Name 10/24/23 1100          Positioning and Restraints    Pre-Treatment Position sitting in chair/recliner (P)   -ZT     Post Treatment Position bed (P)   -ZT     In Bed call light within reach;encouraged to call for assist (P)   Nurse notified of transition from chair to bed  -ZT       Row Name 10/24/23 1100          Therapy Assessment/Plan (PT)    Rehab Potential (PT) good, to achieve stated therapy goals (P)   -ZT     Criteria for Skilled Interventions Met (PT) yes;skilled treatment is necessary (P)   -ZT     Therapy Frequency (PT) daily (P)   -ZT     Problem List (PT) problems related to;balance;mobility;strength (P)   -ZT     Activity Limitations Related to Problem List (PT) unable to ambulate safely;unable to transfer safely (P)   -ZT       Row Name 10/24/23 1100          Progress Summary (PT)    Progress Toward Functional Goals (PT) progress toward functional goals is good (P)   -ZT     Daily Progress Summary (PT) Pt presents with decreased activity endurance and dynamic standing balance. He is mildly unsteady on his feet and fatigues quickly. He continues to require skilled PT services to address these deficits so that he can safely return to a PLOF. (P)   -ZT       Row Name 10/24/23 1100          Therapy Plan Review/Discharge Plan (PT)    Therapy Plan Review (PT) evaluation/treatment results reviewed;care plan/treatment goals reviewed;participants included;patient (P)   -ZT               User Key  (r) = Recorded By, (t) = Taken By, (c) = Cosigned By      Initials Name Provider Type    ZT Miki Brian, PT Student PT Student                      PT Recommendation and Plan  Anticipated Discharge Disposition (PT): (P) sub acute care setting  Planned Therapy Interventions (PT): (P) balance training, bed mobility training, gait training, stair training, strengthening, transfer training  Therapy Frequency (PT): (P) daily  Progress Summary  (PT)  Progress Toward Functional Goals (PT): (P) progress toward functional goals is good  Daily Progress Summary (PT): (P) Pt presents with decreased activity endurance and dynamic standing balance. He is mildly unsteady on his feet and fatigues quickly. He continues to require skilled PT services to address these deficits so that he can safely return to a PLOF.  Plan of Care Reviewed With: (P) patient   Outcome Measures       Row Name 10/24/23 1100             How much help from another person do you currently need...    Turning from your back to your side while in flat bed without using bedrails? 4 (P)   -ZT      Moving from lying on back to sitting on the side of a flat bed without bedrails? 4 (P)   -ZT      Moving to and from a bed to a chair (including a wheelchair)? 3 (P)   -ZT      Standing up from a chair using your arms (e.g., wheelchair, bedside chair)? 3 (P)   -ZT      Climbing 3-5 steps with a railing? 2 (P)   -ZT      To walk in hospital room? 3 (P)   -ZT      AM-PAC 6 Clicks Score (PT) 19 (P)   -ZT      Highest level of mobility 6 --> Walked 10 steps or more (P)   -ZT                User Key  (r) = Recorded By, (t) = Taken By, (c) = Cosigned By      Initials Name Provider Type    ZT Miki Brian PT Student PT Student                     Time Calculation:    PT Charges       Row Name 10/24/23 1104             Time Calculation    PT Received On 10/24/23 (P)   -ZT         Timed Charges    22431 - Gait Training Minutes  15 (P)   -ZT         Total Minutes    Timed Charges Total Minutes 15 (P)   -ZT       Total Minutes 15 (P)   -ZT                User Key  (r) = Recorded By, (t) = Taken By, (c) = Cosigned By      Initials Name Provider Type    ZT Miki Brian PT Student PT Student                      PT G-Codes  Outcome Measure Options: AM-PAC 6 Clicks Basic Mobility (PT)  AM-PAC 6 Clicks Score (PT): (P) 19  AM-PAC 6 Clicks Score (OT): 18    Miki Brian PT Student  10/24/2023

## 2023-10-24 NOTE — PLAN OF CARE
Goal Outcome Evaluation:  Plan of Care Reviewed With: patient        Progress: improving  Outcome Evaluation: patient is alert and oriented x4. c/o of pain in the head neck and shouder area, medicated per orders. blood glucose monitored per orders. pt on RA. no other concerns at this time.

## 2023-10-24 NOTE — PROGRESS NOTES
[facility]   Hospitalist Progress Note  Date: 10/24/2023  Patient Name: Nikhil Baldwin  : 1945  MRN: [id number]  Date of admission: 10/18/2023  Room/Bed: [facility] [place]      Subjective   Subjective     Chief Complaint: f/u genalized weakness    Summary:Nikhil Baldwin is a 78 y.o. male admitted with COVID-19 and general weakness    Interval Followup: HR improved.  Awaiting rehab.    Objective   Objective     Vitals:   Temp:  [97.5 °F (36.4 °C)-98.8 °F (37.1 °C)] 98.4 °F (36.9 °C)  Heart Rate:  [58-72] 70  Resp:  [16-18] 18  BP: (104-148)/(67-80) 122/70    Physical Exam     General: sitting in chair in NAD  HENT: NCAT, MMM  Eyes: pupils equal, no scleral icterus  Cardiovascular: RRR, no murmurs   Pulmonary: CTA bilaterally; no wheezes; no conversational dyspnea  Gastrointestinal: S/ND/NT, +BS  Musculoskeletal: No gross deformities  Skin: No jaundice, no rash on exposed skin appreciated  Neuro: CN II through XII grossly intact; speech clear; no tremor  Psych: Mood and affect appropriate      Result Review    Result Review:  I have personally reviewed these results:  [x]  Laboratory      Lab 10/24/23  0619 10/23/23  0537 10/22/23  0519 10/20/23  0453 10/18/23  1550   WBC 11.72* 11.38* 9.67   < > 11.31*   HEMOGLOBIN 15.9 15.2 14.6   < > 15.2   HEMATOCRIT 45.0 45.1 43.0   < > 45.7   PLATELETS 200 179 171   < > 162   NEUTROS ABS 5.64 7.01* 6.30   < > 7.91*   IMMATURE GRANS (ABS) 0.48* 0.19* 0.07*   < > 0.12*   LYMPHS ABS 4.28* 3.00 2.30   < > 1.97   MONOS ABS 1.18* 1.13* 0.99*   < > 1.11*   EOS ABS 0.04 0.00 0.00   < > 0.14   MCV 87.2 88.1 88.3   < > 91.2   CRP  --   --   --   --  0.74*    < > = values in this interval not displayed.         Lab 10/24/23  0619 10/23/23  0538 10/22/23  0519 10/20/23  0453 10/18/23  1550   SODIUM 138 138 140   < > 141   POTASSIUM 4.0 4.0 4.3   < > 3.9   CHLORIDE 104 104 105   < > 104   CO2 24.4 21.5* 23.2   < > 23.1   ANION GAP 9.6 12.5 11.8   < > 13.9   BUN 31* 25* 26*   < > 15    CREATININE 1.07 0.84 0.98   < > 1.08   EGFR 70.2 88.2 78.0   < > 69.4   GLUCOSE 109* 114* 224*   < > 139*   CALCIUM 9.1 9.0 9.3   < > 9.7   MAGNESIUM  --   --   --   --  1.9    < > = values in this interval not displayed.         Lab 10/18/23  1550   TOTAL PROTEIN 6.8   ALBUMIN 4.1   GLOBULIN 2.7   ALT (SGPT) 20   AST (SGOT) 15   BILIRUBIN 1.3*   ALK PHOS 64         Lab 10/18/23  1550   HSTROP T 9                 Brief Urine Lab Results  (Last result in the past 365 days)        Color   Clarity   Blood   Leuk Est   Nitrite   Protein   CREAT   Urine HCG        10/18/23 2043 Yellow   Clear   Negative   Negative   Negative   Negative                 [x]  Microbiology   Microbiology Results (last 10 days)       Procedure Component Value - Date/Time    Rapid Strep A Screen - Swab, Throat [[id number]]  (Normal) Collected: 10/18/23 1641    Lab Status: Final result Specimen: Swab from Throat Updated: 10/18/23 1657     Strep A Ag Negative    COVID-19, FLU A/B, RSV PCR - Swab, Nasopharynx [[id number]]  (Abnormal) Collected: 10/18/23 1641    Lab Status: Final result Specimen: Swab from Nasopharynx Updated: 10/18/23 1734     COVID19 Detected     Influenza A PCR Not Detected     Influenza B PCR Not Detected     RSV, PCR Not Detected    Narrative:      Fact sheet for providers: https://www.fda.gov/media/312074/download    Fact sheet for patients: https://www.fda.gov/media/818167/download    Test performed by PCR.    Beta Strep Culture, Throat - Swab, Throat [[id number]]  (Normal) Collected: 10/18/23 1641    Lab Status: Final result Specimen: Swab from Throat Updated: 10/20/23 0931     Throat Culture, Beta Strep No Beta Hemolytic Streptococcus Isolated    Narrative:      Group A Strep incidence is low in adults. Positive culture for Beta hemolytic Streptococcus species can reflect colonization and not true infection. Please correlate clinically.            [x]  Radiology  CT Abdomen Pelvis With Contrast    Result Date:  10/18/2023   No acute abnormality on CT. Status post cholecystectomy with bile duct dilatation similar previous study. Enlarged prostate gland, hyperplasia versus neoplasia. Small hiatal hernia.     TOMMY PALM MD       Electronically Signed and Approved By: TOMMY PALM MD on 10/18/2023 at 19:31             CT Soft Tissue Neck With Contrast    Result Date: 10/18/2023   No etiology to explain patient's neck pain.  Degenerative disc disease is seen.  The     TOMMY PALM MD       Electronically Signed and Approved By: TOMMY PALM MD on 10/18/2023 at 19:26             XR Chest 1 View    Result Date: 10/18/2023   There is mild bibasilar airspace opacity question subsegmental atelectasis.  Developing atypical pneumonia less likely.       TOMMY PALM MD       Electronically Signed and Approved By: TOMMY PALM MD on 10/18/2023 at 16:35            []  EKG/Telemetry   []  Cardiology/Vascular   []  Pathology  []  Old records  []  Other:    Assessment & Plan   Assessment / Plan     Assessment:  COVID-19 infection  Diabetes  Asymptomatic bradycardia  History of prior stroke    Plan:  Admitted to medicine  Complete course of Decadron  Complete course of antibiotics  Discontinued beta-blocker, monitor heart rate --> doing better  Awaiting rehab arrangements to be finalized      Discussed with RN.    DVT prophylaxis:  Medical DVT prophylaxis orders are present.    CODE STATUS:   Code Status (Patient has no pulse and is not breathing): CPR (Attempt to Resuscitate)  Medical Interventions (Patient has pulse or is breathing): Full Support    Electronically signed by Garcia Park MD, 10/24/23, 2:26 PM EDT.                              General

## 2023-10-24 NOTE — PLAN OF CARE
Goal Outcome Evaluation:  Plan of Care Reviewed With: patient        Progress: improving  Outcome Evaluation: patient is alert and oriented. no c/o pain. up to chair this shift. educated on importance of IS use. blood glucose monitored. no other changes at this time.

## 2023-10-24 NOTE — THERAPY TREATMENT NOTE
Patient Name: Nikhil Baldwin  : 1945    MRN: [id number]                              Today's Date: 10/24/2023       Admit Date: 10/18/2023    Visit Dx:     ICD-10-CM ICD-9-CM   1. COVID-19  U07.1 079.89   2. Decreased activities of daily living (ADL)  Z78.9 V49.89   3. Difficulty walking  R26.2 719.7   4. Difficulty in walking  R26.2 719.7     Patient Active Problem List   Diagnosis    Aortic aneurysm    Arthritis    BPH (benign prostatic hyperplasia)    Cervical radiculopathy    Type 2 diabetes mellitus with hyperglycemia, without long-term current use of insulin    Parathyroid abnormality    Thyroid disorder    Facet arthropathy    Fatty metamorphosis of liver    GERD (gastroesophageal reflux disease)    Primary hypertension    Hematuria    Hiatal hernia    Urinary incontinence    Hypercalcemia    Mixed hyperlipidemia    Kidney disease    Renal calculi    Low back pain    Thoracic back pain    DDD (degenerative disc disease), lumbar    Spinal stenosis of lumbar region    Degeneration of thoracic or thoracolumbar intervertebral disc    MI (myocardial infarction)    Myofascial pain    Neck pain    Nocturia    Sciatica    Stroke    Ureterolithiasis    Vitamin D deficiency    Labile personality    Acute viral syndrome    Debility    COVID-19    Upper respiratory tract infection due to COVID-19 virus    COVID     Past Medical History:   Diagnosis Date    Aorta disorder     Arthritis     Degenerative joint disease     Dementia     FRONTAL LOBE    Depression     Diabetes mellitus     Hyperlipidemia     Hypertension     Kidney stone     Stroke     MILD RIGHT SIDE DEFICITS     Past Surgical History:   Procedure Laterality Date    CHOLECYSTECTOMY      CYSTOSCOPY BLADDER STONE LITHOTRIPSY      KIDNEY STONE SURGERY        General Information       Row Name 10/24/23 1315          OT Time and Intention    Document Type therapy note (daily note)  -AV     Mode of Treatment individual therapy;occupational therapy  -AV        Row Name 10/24/23 1315          General Information    Existing Precautions/Restrictions fall  Enhanced airborne isolation: COVID  -AV     Barriers to Rehab --  Pleasant and cooperative  -AV       Row Name 10/24/23 1315          Cognition    Orientation Status (Cognition) --  Receptive to cues for safe transfer techniques  -AV       Row Name 10/24/23 1315          Safety Issues, Functional Mobility    Impairments Affecting Function (Mobility) balance;endurance/activity tolerance  -AV               User Key  (r) = Recorded By, (t) = Taken By, (c) = Cosigned By      Initials Name Provider Type    AV Philipp Holbrook OT Occupational Therapist                     Mobility/ADL's       Row Name 10/24/23 1316          Bed Mobility    Supine-Sit [place] (Bed Mobility) independent  -AV     Assistive Device (Bed Mobility) bed rails  -AV       Row Name 10/24/23 1316          Transfers    Transfers sit-stand transfer;bed-chair transfer  -AV       Row Name 10/24/23 1316          Bed-Chair Transfer    Bed-Chair [place] (Transfers) contact guard;verbal cues;nonverbal cues (demo/gesture)  -AV     Assistive Device (Bed-Chair Transfers) walker, front-wheeled  -AV     Comment, (Bed-Chair Transfer) Cues for safe transfer technique/walker placement  -AV       Row Name 10/24/23 1316          Sit-Stand Transfer    Sit-Stand [place] (Transfers) contact guard  -AV     Assistive Device (Sit-Stand Transfers) walker, front-wheeled  -AV       Row Name 10/24/23 1316          Activities of Daily Living    BADL Assessment/Intervention --  Patient able to don slipper socks independently with increased time requirement.  Impaired attention to task with moderate cues required for completion.  -AV               User Key  (r) = Recorded By, (t) = Taken By, (c) = Cosigned By      Initials Name Provider Type    Philipp Hernandez OT Occupational Therapist                   Obj/Interventions       Row Name 10/24/23 1317          Balance     Balance Assessment sit to stand dynamic balance;standing dynamic balance;sitting dynamic balance  -AV     Dynamic Sitting Balance independent  -AV     Dynamic Standing Balance contact guard;verbal cues  -AV     Position/Device Used, Standing Balance walker, rolling  -AV     Balance Interventions sit to stand;supported;minimal challenge;occupation based/functional task  -AV               User Key  (r) = Recorded By, (t) = Taken By, (c) = Cosigned By      Initials Name Provider Type    AV Philipp Holbrook OT Occupational Therapist                   Goals/Plan    No documentation.                  Clinical Impression       Row Name 10/24/23 1317          Pain Assessment    Pretreatment Pain Rating 0/10 - no pain  -AV     Posttreatment Pain Rating 0/10 - no pain  -AV       Row Name 10/24/23 1317          Plan of Care Review    Progress improving  -AV     Outcome Evaluation Patient able to don slipper socks independently.  Transferred to chair CGA/RW with cues for safe technique.  Patient continues to demonstrate impaired attention to task requiring moderate cues to redirect.  Continued OT is indicated to remediate/compensate for deficits to maximize independence.  -AV       Row Name 10/24/23 1317          Therapy Assessment/Plan (OT)    Rehab Potential (OT) good, to achieve stated therapy goals  -AV       Row Name 10/24/23 1317          Vital Signs    O2 Delivery Pre Treatment room air  -AV     O2 Delivery Intra Treatment room air  -AV     O2 Delivery Post Treatment room air  -AV       Row Name 10/24/23 1317          Positioning and Restraints    Pre-Treatment Position in bed  -AV     Post Treatment Position chair  -AV     In Chair reclined;call light within reach;encouraged to call for assist;exit alarm on  -AV               User Key  (r) = Recorded By, (t) = Taken By, (c) = Cosigned By      Initials Name Provider Type    Philipp Hernandez OT Occupational Therapist                   Outcome Measures       Row Name  10/24/23 1319          How much help from another is currently needed...    Putting on and taking off regular lower body clothing? 3  -AV     Bathing (including washing, rinsing, and drying) 3  -AV     Toileting (which includes using toilet bed pan or urinal) 3  -AV     Putting on and taking off regular upper body clothing 4  -AV     Taking care of personal grooming (such as brushing teeth) 3  -AV     Eating meals 4  -AV     AM-PAC 6 Clicks Score (OT) 20  -AV       Row Name 10/24/23 1100 10/24/23 0820       How much help from another person do you currently need...    Turning from your back to your side while in flat bed without using bedrails? 4  -JOSE EDUARDO (r) ZT (t) JOSE EDUARDO (c) 4  -AH    Moving from lying on back to sitting on the side of a flat bed without bedrails? 4  -JOSE EDUARDO (r) ZT (t) JOSE EDUARDO (c) 4  -AH    Moving to and from a bed to a chair (including a wheelchair)? 3  -JOSE EDUARDO (r) ZT (t) JOSE EDUARDO (c) 3  -AH    Standing up from a chair using your arms (e.g., wheelchair, bedside chair)? 3  -JOSE EDUARDO (r) ZT (t) JOSE EDUARDO (c) 3  -AH    Climbing 3-5 steps with a railing? 2  -JOSE EDUARDO (r) ZT (t) JOSE EDUARDO (c) 2  -AH    To walk in hospital room? 3  -JOSE EDUARDO (r) ZT (t) JOSE EDUARDO (c) 3  -AH    AM-PAC 6 Clicks Score (PT) 19  -JOSE EDUARDO (r) ZT (t) 19  -AH    Highest level of mobility 6 --> Walked 10 steps or more  -JOSE EDUARDO (r) ZT (t) 6 --> Walked 10 steps or more  -AH      Row Name 10/24/23 1319          Optimal Instrument    Bending/Stooping 2  -AV     Standing 2  -AV     Reaching 1  -AV               User Key  (r) = Recorded By, (t) = Taken By, (c) = Cosigned By      Initials Name Provider Type    Trina Padron RN Registered Nurse    Philipp Hernandez, OT Occupational Therapist    Jaydon Alvarado, PT Physical Therapist    Miki Bernard, PT Student PT Student                    Occupational Therapy Education       Title: PT OT SLP Therapies (Done)       Topic: Occupational Therapy (Done)       Point: ADL training (Done)       Description:   Instruct learner(s) on proper safety  adaptation and remediation techniques during self care or transfers.   Instruct in proper use of assistive devices.                  Learning Progress Summary             Patient Acceptance, E, VU by AV at 10/24/2023 1319    Comment: Safe transfer techniques    Acceptance, E, VU by AV at 10/20/2023 1040                         Point: Home exercise program (Done)       Description:   Instruct learner(s) on appropriate technique for monitoring, assisting and/or progressing therapeutic exercises/activities.                  Learning Progress Summary             Patient Acceptance, E, VU by AV at 10/24/2023 1319    Comment: Safe transfer techniques    Acceptance, E, VU by AV at 10/20/2023 1040                         Point: Precautions (Done)       Description:   Instruct learner(s) on prescribed precautions during self-care and functional transfers.                  Learning Progress Summary             Patient Acceptance, E, VU by AV at 10/24/2023 1319    Comment: Safe transfer techniques    Acceptance, E, VU by AV at 10/20/2023 1040                         Point: Body mechanics (Done)       Description:   Instruct learner(s) on proper positioning and spine alignment during self-care, functional mobility activities and/or exercises.                  Learning Progress Summary             Patient Acceptance, E, VU by AV at 10/24/2023 1319    Comment: Safe transfer techniques    Acceptance, E, VU by AV at 10/20/2023 1040                                         User Key       Initials Effective Dates Name Provider Type Discipline    DAKSHA 06/16/21 -  Philipp Holbrook OT Occupational Therapist OT                  OT Recommendation and Plan  Planned Therapy Interventions (OT): activity tolerance training, BADL retraining, functional balance retraining, occupation/activity based interventions, patient/caregiver education/training, strengthening exercise, transfer/mobility retraining  Therapy Frequency (OT): 5 times/wk  Plan of  Care Review  Plan of Care Reviewed With: patient  Progress: improving  Outcome Evaluation: Patient able to don slipper socks independently.  Transferred to chair CGA/RW with cues for safe technique.  Patient continues to demonstrate impaired attention to task requiring moderate cues to redirect.  Continued OT is indicated to remediate/compensate for deficits to maximize independence.     Time Calculation:   Evaluation Complexity (OT)  Review Occupational Profile/Medical/Therapy History Complexity: expanded/moderate complexity  Assessment, Occupational Performance/Identification of Deficit Complexity: 3-5 performance deficits  Clinical Decision Making Complexity (OT): detailed assessment/moderate complexity  Overall Complexity of Evaluation (OT): moderate complexity     Time Calculation- OT       Row Name 10/24/23 1320 10/24/23 1104          Time Calculation- OT    OT Received On 10/24/23  -AV --     OT Goal Re-Cert Due Date 10/29/23  -AV --        Timed Charges    72446 - Gait Training Minutes  -- 15  -JOSE EDUARDO (r) ZT (t) JOSE EDUARDO (c)     02002 - OT Self Care/Mgmt Minutes 10  -AV --        Total Minutes    Timed Charges Total Minutes 10  -AV 15  -JOSE EDUARDO (r) ZT (t)      Total Minutes 10  -AV 15  -JOSE EDUARDO (r) ZT (t)               User Key  (r) = Recorded By, (t) = Taken By, (c) = Cosigned By      Initials Name Provider Type    AV Philipp Holbrook OT Occupational Therapist    Jaydon Alvarado, PT Physical Therapist    ZT Miki Brian, NATHANAEL Student PT Student                  Therapy Charges for Today       Code Description Service Date Service Provider Modifiers Qty    47464145000 HC OT SELF CARE/MGMT/TRAIN EA 15 MIN 10/24/2023 Philipp Holbrook OT GO 1                 Philipp Holbrook OT  10/24/2023

## 2023-10-25 LAB
ANION GAP SERPL CALCULATED.3IONS-SCNC: 9.8 MMOL/L (ref 5–15)
BUN SERPL-MCNC: 31 MG/DL (ref 8–23)
BUN/CREAT SERPL: 34.8 (ref 7–25)
CALCIUM SPEC-SCNC: 9.3 MG/DL (ref 8.6–10.5)
CHLORIDE SERPL-SCNC: 104 MMOL/L (ref 98–107)
CO2 SERPL-SCNC: 25.2 MMOL/L (ref 22–29)
CREAT SERPL-MCNC: 0.89 MG/DL (ref 0.76–1.27)
DEPRECATED RDW RBC AUTO: 46.5 FL (ref 37–54)
EGFRCR SERPLBLD CKD-EPI 2021: 86.6 ML/MIN/1.73
ERYTHROCYTE [DISTWIDTH] IN BLOOD BY AUTOMATED COUNT: 14.4 % (ref 12.3–15.4)
GLUCOSE BLDC GLUCOMTR-MCNC: 142 MG/DL (ref 70–99)
GLUCOSE BLDC GLUCOMTR-MCNC: 188 MG/DL (ref 70–99)
GLUCOSE BLDC GLUCOMTR-MCNC: 188 MG/DL (ref 70–99)
GLUCOSE BLDC GLUCOMTR-MCNC: 234 MG/DL (ref 70–99)
GLUCOSE BLDC GLUCOMTR-MCNC: 265 MG/DL (ref 70–99)
GLUCOSE SERPL-MCNC: 117 MG/DL (ref 65–99)
HCT VFR BLD AUTO: 47.3 % (ref 37.5–51)
HGB BLD-MCNC: 16.1 G/DL (ref 13–17.7)
LYMPHOCYTES # BLD MANUAL: 5.24 10*3/MM3 (ref 0.7–3.1)
LYMPHOCYTES NFR BLD MANUAL: 9 % (ref 5–12)
MCH RBC QN AUTO: 30.4 PG (ref 26.6–33)
MCHC RBC AUTO-ENTMCNC: 34 G/DL (ref 31.5–35.7)
MCV RBC AUTO: 89.4 FL (ref 79–97)
MONOCYTES # BLD: 1.27 10*3/MM3 (ref 0.1–0.9)
NEUTROPHILS # BLD AUTO: 7.65 10*3/MM3 (ref 1.7–7)
NEUTROPHILS NFR BLD MANUAL: 54 % (ref 42.7–76)
PLATELET # BLD AUTO: 213 10*3/MM3 (ref 140–450)
PMV BLD AUTO: 10.6 FL (ref 6–12)
POTASSIUM SERPL-SCNC: 4 MMOL/L (ref 3.5–5.2)
RBC # BLD AUTO: 5.29 10*6/MM3 (ref 4.14–5.8)
RBC MORPH BLD: NORMAL
SMALL PLATELETS BLD QL SMEAR: ADEQUATE
SODIUM SERPL-SCNC: 139 MMOL/L (ref 136–145)
VARIANT LYMPHS NFR BLD MANUAL: 37 % (ref 19.6–45.3)
WBC MORPH BLD: NORMAL
WBC NRBC COR # BLD: 14.16 10*3/MM3 (ref 3.4–10.8)

## 2023-10-25 PROCEDURE — 85025 COMPLETE CBC W/AUTO DIFF WBC: CPT | Performed by: HOSPITALIST

## 2023-10-25 PROCEDURE — 63710000001 INSULIN DETEMIR PER 5 UNITS: Performed by: HOSPITALIST

## 2023-10-25 PROCEDURE — 63710000001 DEXAMETHASONE PER 0.25 MG: Performed by: INTERNAL MEDICINE

## 2023-10-25 PROCEDURE — 25010000002 ENOXAPARIN PER 10 MG: Performed by: FAMILY MEDICINE

## 2023-10-25 PROCEDURE — 80048 BASIC METABOLIC PNL TOTAL CA: CPT | Performed by: HOSPITALIST

## 2023-10-25 PROCEDURE — 82948 REAGENT STRIP/BLOOD GLUCOSE: CPT

## 2023-10-25 PROCEDURE — 97110 THERAPEUTIC EXERCISES: CPT

## 2023-10-25 PROCEDURE — 63710000001 INSULIN LISPRO (HUMAN) PER 5 UNITS: Performed by: HOSPITALIST

## 2023-10-25 PROCEDURE — 85007 BL SMEAR W/DIFF WBC COUNT: CPT | Performed by: HOSPITALIST

## 2023-10-25 RX ADMIN — ENOXAPARIN SODIUM 40 MG: 100 INJECTION SUBCUTANEOUS at 10:43

## 2023-10-25 RX ADMIN — DEXAMETHASONE 6 MG: 0.5 TABLET ORAL at 10:44

## 2023-10-25 RX ADMIN — DOCUSATE SODIUM 50MG AND SENNOSIDES 8.6MG 2 TABLET: 8.6; 5 TABLET, FILM COATED ORAL at 10:44

## 2023-10-25 RX ADMIN — GUAIFENESIN 600 MG: 600 TABLET ORAL at 21:25

## 2023-10-25 RX ADMIN — LISINOPRIL 10 MG: 10 TABLET ORAL at 10:43

## 2023-10-25 RX ADMIN — GUAIFENESIN 600 MG: 600 TABLET ORAL at 10:45

## 2023-10-25 RX ADMIN — CITALOPRAM HYDROBROMIDE 10 MG: 20 TABLET ORAL at 10:45

## 2023-10-25 RX ADMIN — INSULIN DETEMIR 10 UNITS: 100 INJECTION, SOLUTION SUBCUTANEOUS at 21:25

## 2023-10-25 RX ADMIN — INSULIN LISPRO 3 UNITS: 100 INJECTION, SOLUTION INTRAVENOUS; SUBCUTANEOUS at 16:59

## 2023-10-25 RX ADMIN — ASPIRIN 81 MG: 81 TABLET, COATED ORAL at 10:44

## 2023-10-25 RX ADMIN — ROSUVASTATIN 40 MG: 20 TABLET, FILM COATED ORAL at 21:25

## 2023-10-25 RX ADMIN — DOCUSATE SODIUM 50MG AND SENNOSIDES 8.6MG 2 TABLET: 8.6; 5 TABLET, FILM COATED ORAL at 21:25

## 2023-10-25 RX ADMIN — INSULIN LISPRO 8 UNITS: 100 INJECTION, SOLUTION INTRAVENOUS; SUBCUTANEOUS at 21:25

## 2023-10-25 RX ADMIN — EMPAGLIFLOZIN 25 MG: 25 TABLET, FILM COATED ORAL at 10:45

## 2023-10-25 RX ADMIN — LINAGLIPTIN 5 MG: 5 TABLET, FILM COATED ORAL at 10:43

## 2023-10-25 RX ADMIN — INSULIN LISPRO 3 UNITS: 100 INJECTION, SOLUTION INTRAVENOUS; SUBCUTANEOUS at 11:50

## 2023-10-25 RX ADMIN — TAMSULOSIN HYDROCHLORIDE 0.4 MG: 0.4 CAPSULE ORAL at 10:43

## 2023-10-25 NOTE — PLAN OF CARE
Goal Outcome Evaluation:  Plan of Care Reviewed With: patient        Progress: improving  Outcome Evaluation: pt alert and oriented x4. no c/o of pain this shift. c/o of constipation stool softner given per order. blood glucose monitored. no other concerns noted.

## 2023-10-25 NOTE — THERAPY TREATMENT NOTE
Patient Name: Nikhil Baldwin  : 1945    MRN: [id number]                              Today's Date: 10/25/2023       Admit Date: 10/18/2023    Visit Dx:     ICD-10-CM ICD-9-CM   1. COVID-19  U07.1 079.89   2. Decreased activities of daily living (ADL)  Z78.9 V49.89   3. Difficulty walking  R26.2 719.7   4. Difficulty in walking  R26.2 719.7     Patient Active Problem List   Diagnosis    Aortic aneurysm    Arthritis    BPH (benign prostatic hyperplasia)    Cervical radiculopathy    Type 2 diabetes mellitus with hyperglycemia, without long-term current use of insulin    Parathyroid abnormality    Thyroid disorder    Facet arthropathy    Fatty metamorphosis of liver    GERD (gastroesophageal reflux disease)    Primary hypertension    Hematuria    Hiatal hernia    Urinary incontinence    Hypercalcemia    Mixed hyperlipidemia    Kidney disease    Renal calculi    Low back pain    Thoracic back pain    DDD (degenerative disc disease), lumbar    Spinal stenosis of lumbar region    Degeneration of thoracic or thoracolumbar intervertebral disc    MI (myocardial infarction)    Myofascial pain    Neck pain    Nocturia    Sciatica    Stroke    Ureterolithiasis    Vitamin D deficiency    Labile personality    Acute viral syndrome    Debility    COVID-19    Upper respiratory tract infection due to COVID-19 virus    COVID     Past Medical History:   Diagnosis Date    Aorta disorder     Arthritis     Degenerative joint disease     Dementia     FRONTAL LOBE    Depression     Diabetes mellitus     Hyperlipidemia     Hypertension     Kidney stone     Stroke     MILD RIGHT SIDE DEFICITS     Past Surgical History:   Procedure Laterality Date    CHOLECYSTECTOMY      CYSTOSCOPY BLADDER STONE LITHOTRIPSY      KIDNEY STONE SURGERY        General Information       Row Name 10/25/23 1024          OT Time and Intention    Document Type therapy note (daily note)  -AV     Mode of Treatment individual therapy;occupational therapy  -AV        "Row Name 10/25/23 1024          General Information    Existing Precautions/Restrictions fall  Enhanced airborne isolation  -       Row Name 10/25/23 1024          Cognition    Orientation Status (Cognition) --  Alert.  Agreeable to therapeutic exercises however required encouragement throughout session.  -       Row Name 10/25/23 1024          Safety Issues, Functional Mobility    Impairments Affecting Function (Mobility) balance;endurance/activity tolerance  -AV               User Key  (r) = Recorded By, (t) = Taken By, (c) = Cosigned By      Initials Name Provider Type    Philipp Hernandez OT Occupational Therapist                     Mobility/ADL's       Row Name 10/25/23 1025          Bed Mobility    Bed Mobility bed mobility (all) activities  -     All Activities, [place] (Bed Mobility) independent  -AV               User Key  (r) = Recorded By, (t) = Taken By, (c) = Cosigned By      Initials Name Provider Type    Philipp Hernandez OT Occupational Therapist                   Obj/Interventions       Row Name 10/25/23 1025          Shoulder (Therapeutic Exercise)    Shoulder (Therapeutic Exercise) AROM (active range of motion)  -     Shoulder AROM (Therapeutic Exercise) bilateral;scapular elevation;flexion  Shoulder flexion x8.  Scapular elevation x6.  \"I quit\"  -       Row Name 10/25/23 1025          Elbow/Forearm (Therapeutic Exercise)    Elbow/Forearm (Therapeutic Exercise) AROM (active range of motion)  -     Elbow/Forearm AROM (Therapeutic Exercise) bilateral;flexion;extension;15 repititions  -       Row Name 10/25/23 1025          Hand (Therapeutic Exercise)    Hand (Therapeutic Exercise) AROM (active range of motion)  -     Hand AROM/AAROM (Therapeutic Exercise) bilateral;finger flexion;finger extension;15 repititions  -       Row Name 10/25/23 1025          Motor Skills    Therapeutic Exercise shoulder;elbow/forearm;hand  Performed in [facility]/room air.  Increased time " "requirements/encouragement.  -AV               User Key  (r) = Recorded By, (t) = Taken By, (c) = Cosigned By      Initials Name Provider Type    Philipp Hernandez OT Occupational Therapist                   Goals/Plan    No documentation.                  Clinical Impression       Row Name 10/25/23 1027          Pain Scale: FACES Pre/Post-Treatment    Pain: FACES Scale, Pretreatment 0-->no hurt  -AV     Posttreatment Pain Rating 0-->no hurt  -AV       Row Name 10/25/23 1027          Plan of Care Review    Progress no change  -AV     Outcome Evaluation Patient agreeable to upper extremity therapeutic exercises however required encouragement throughout session to complete.  Patient demonstrating some difficulty with exercises.  Session ended with patient stating \"I quit\".  Continued OT is indicated to remediate/compensate for deficits to maximize independence.  -AV       Row Name 10/25/23 1027          Vital Signs    O2 Delivery Pre Treatment room air  -AV     O2 Delivery Intra Treatment room air  -AV     O2 Delivery Post Treatment room air  -AV       Row Name 10/25/23 1027          Positioning and Restraints    Pre-Treatment Position in bed  -AV     Post Treatment Position bed  -AV     In Bed exit alarm on;call light within reach;encouraged to call for assist  -AV               User Key  (r) = Recorded By, (t) = Taken By, (c) = Cosigned By      Initials Name Provider Type    Philipp Hernandez OT Occupational Therapist                   Outcome Measures       Row Name 10/25/23 1028          How much help from another is currently needed...    Putting on and taking off regular lower body clothing? 3  -AV     Bathing (including washing, rinsing, and drying) 3  -AV     Toileting (which includes using toilet bed pan or urinal) 3  -AV     Putting on and taking off regular upper body clothing 4  -AV     Taking care of personal grooming (such as brushing teeth) 3  -AV     Eating meals 4  -AV     AM-PAC 6 Clicks Score (OT) " 20  -AV       Row Name 10/24/23 9722          How much help from another person do you currently need...    Turning from your back to your side while in flat bed without using bedrails? 4  -AS     Moving from lying on back to sitting on the side of a flat bed without bedrails? 4  -AS     Moving to and from a bed to a chair (including a wheelchair)? 3  -AS     Standing up from a chair using your arms (e.g., wheelchair, bedside chair)? 2  -AS     Climbing 3-5 steps with a railing? 3  -AS     To walk in hospital room? 3  -AS     AM-PAC 6 Clicks Score (PT) 19  -AS     Highest level of mobility 6 --> Walked 10 steps or more  -AS       Row Name 10/25/23 1028          Optimal Instrument    Bending/Stooping 2  -AV     Standing 2  -AV     Reaching 1  -AV               User Key  (r) = Recorded By, (t) = Taken By, (c) = Cosigned By      Initials Name Provider Type    AV Philipp Holbrook OT Occupational Therapist    AS Angelique Amato RN Registered Nurse                    Occupational Therapy Education       Title: PT OT SLP Therapies (Done)       Topic: Occupational Therapy (Done)       Point: ADL training (Done)       Description:   Instruct learner(s) on proper safety adaptation and remediation techniques during self care or transfers.   Instruct in proper use of assistive devices.                  Learning Progress Summary             Patient Acceptance, E, VU by AV at 10/24/2023 1319    Comment: Safe transfer techniques    Acceptance, E, VU by AV at 10/20/2023 1040                         Point: Home exercise program (Done)       Description:   Instruct learner(s) on appropriate technique for monitoring, assisting and/or progressing therapeutic exercises/activities.                  Learning Progress Summary             Patient Acceptance, E, VU by AV at 10/24/2023 1319    Comment: Safe transfer techniques    Acceptance, E, VU by AV at 10/20/2023 1040                         Point: Precautions (Done)       Description:  "  Instruct learner(s) on prescribed precautions during self-care and functional transfers.                  Learning Progress Summary             Patient Acceptance, E, VU by AV at 10/24/2023 1319    Comment: Safe transfer techniques    Acceptance, E, VU by AV at 10/20/2023 1040                         Point: Body mechanics (Done)       Description:   Instruct learner(s) on proper positioning and spine alignment during self-care, functional mobility activities and/or exercises.                  Learning Progress Summary             Patient Acceptance, E, VU by AV at 10/24/2023 1319    Comment: Safe transfer techniques    Acceptance, E, VU by AV at 10/20/2023 1040                                         User Key       Initials Effective Dates Name Provider Type Discipline    AV 06/16/21 -  Philipp Holbrook, AJITH Occupational Therapist OT                  OT Recommendation and Plan  Planned Therapy Interventions (OT): activity tolerance training, BADL retraining, functional balance retraining, occupation/activity based interventions, patient/caregiver education/training, strengthening exercise, transfer/mobility retraining  Therapy Frequency (OT): 5 times/wk  Plan of Care Review  Plan of Care Reviewed With: patient  Progress: no change  Outcome Evaluation: Patient agreeable to upper extremity therapeutic exercises however required encouragement throughout session to complete.  Patient demonstrating some difficulty with exercises.  Session ended with patient stating \"I quit\".  Continued OT is indicated to remediate/compensate for deficits to maximize independence.     Time Calculation:   Evaluation Complexity (OT)  Review Occupational Profile/Medical/Therapy History Complexity: expanded/moderate complexity  Assessment, Occupational Performance/Identification of Deficit Complexity: 3-5 performance deficits  Clinical Decision Making Complexity (OT): detailed assessment/moderate complexity  Overall Complexity of Evaluation " (OT): moderate complexity     Time Calculation- OT       Row Name 10/25/23 1028             Time Calculation- OT    OT Received On 10/25/23  -AV      OT Goal Re-Cert Due Date 10/29/23  -AV         Timed Charges    24405 - OT Therapeutic Exercise Minutes 10  -AV         Total Minutes    Timed Charges Total Minutes 10  -AV       Total Minutes 10  -AV                User Key  (r) = Recorded By, (t) = Taken By, (c) = Cosigned By      Initials Name Provider Type    AV Philipp Holbrook OT Occupational Therapist                  Therapy Charges for Today       Code Description Service Date Service Provider Modifiers Qty    98503791810 HC OT SELF CARE/MGMT/TRAIN EA 15 MIN 10/24/2023 Philipp Holbrook OT GO 1    28417824669 HC OT THER PROC EA 15 MIN 10/25/2023 Philipp Holbrook OT GO 1                 Philipp Holbrook OT  10/25/2023

## 2023-10-25 NOTE — PLAN OF CARE
Goal Outcome Evaluation:           Progress: no change  Outcome Evaluation: alert and oriented to person, place, and situation. up with x 1 assistance/walker. blood glucose monitored.

## 2023-10-25 NOTE — PROGRESS NOTES
[facility]   Hospitalist Progress Note  Date: 10/25/2023  Patient Name: Nikhil Baldwin  : 1945  MRN: [id number]  Date of admission: 10/18/2023  Room/Bed: [facility]/1      Subjective   Subjective     Chief Complaint: f/u genalized weakness    Summary:Nikhil Baldwin is a 78 y.o. male admitted with COVID-19 and general weakness    Interval Followup:  a little ester again, no symptoms.     Objective   Objective     Vitals:   Temp:  [97.3 °F (36.3 °C)-98.4 °F (36.9 °C)] 98.4 °F (36.9 °C)  Heart Rate:  [43-68] 43  Resp:  [16-18] 16  BP: ()/(58-79) 152/68    Physical Exam     General: sitting in chair in NAD  HENT: NCAT, MMM  Eyes: pupils equal, no scleral icterus  Cardiovascular: RRR, no murmurs   Pulmonary: CTA bilaterally; no wheezes; no conversational dyspnea  Gastrointestinal: S/ND/NT, +BS  Musculoskeletal: No gross deformities  Skin: No jaundice, no rash on exposed skin appreciated  Neuro: CN II through XII grossly intact; speech clear; no tremor  Psych: Mood and affect appropriate      Result Review    Result Review:  I have personally reviewed these results:  [x]  Laboratory      Lab 10/25/23  0842 10/24/23  0619 10/23/23  0537 10/22/23  0519 10/20/23  0453 10/18/23  1550   WBC 14.16* 11.72* 11.38* 9.67   < > 11.31*   HEMOGLOBIN 16.1 15.9 15.2 14.6   < > 15.2   HEMATOCRIT 47.3 45.0 45.1 43.0   < > 45.7   PLATELETS 213 200 179 171   < > 162   NEUTROS ABS 7.65* 5.64 7.01* 6.30   < > 7.91*   IMMATURE GRANS (ABS)  --  0.48* 0.19* 0.07*   < > 0.12*   LYMPHS ABS  --  4.28* 3.00 2.30   < > 1.97   MONOS ABS  --  1.18* 1.13* 0.99*   < > 1.11*   EOS ABS  --  0.04 0.00 0.00   < > 0.14   MCV 89.4 87.2 88.1 88.3   < > 91.2   CRP  --   --   --   --   --  0.74*    < > = values in this interval not displayed.         Lab 10/25/23  0842 10/24/23  0619 10/23/23  0538 10/20/23  0453 10/18/23  1550   SODIUM 139 138 138   < > 141   POTASSIUM 4.0 4.0 4.0   < > 3.9   CHLORIDE 104 104 104   < > 104   CO2 25.2 24.4 21.5*   < >  23.1   ANION GAP 9.8 9.6 12.5   < > 13.9   BUN 31* 31* 25*   < > 15   CREATININE 0.89 1.07 0.84   < > 1.08   EGFR 86.6 70.2 88.2   < > 69.4   GLUCOSE 117* 109* 114*   < > 139*   CALCIUM 9.3 9.1 9.0   < > 9.7   MAGNESIUM  --   --   --   --  1.9    < > = values in this interval not displayed.         Lab 10/18/23  1550   TOTAL PROTEIN 6.8   ALBUMIN 4.1   GLOBULIN 2.7   ALT (SGPT) 20   AST (SGOT) 15   BILIRUBIN 1.3*   ALK PHOS 64         Lab 10/18/23  1550   HSTROP T 9                 Brief Urine Lab Results  (Last result in the past 365 days)        Color   Clarity   Blood   Leuk Est   Nitrite   Protein   CREAT   Urine HCG        10/18/23 2043 Yellow   Clear   Negative   Negative   Negative   Negative                 [x]  Microbiology   Microbiology Results (last 10 days)       Procedure Component Value - Date/Time    Rapid Strep A Screen - Swab, Throat [[id number]]  (Normal) Collected: 10/18/23 1641    Lab Status: Final result Specimen: Swab from Throat Updated: 10/18/23 1657     Strep A Ag Negative    COVID-19, FLU A/B, RSV PCR - Swab, Nasopharynx [[id number]]  (Abnormal) Collected: 10/18/23 1641    Lab Status: Final result Specimen: Swab from Nasopharynx Updated: 10/18/23 1734     COVID19 Detected     Influenza A PCR Not Detected     Influenza B PCR Not Detected     RSV, PCR Not Detected    Narrative:      Fact sheet for providers: https://www.fda.gov/media/784143/download    Fact sheet for patients: https://www.fda.gov/media/101732/download    Test performed by PCR.    Beta Strep Culture, Throat - Swab, Throat [[id number]]  (Normal) Collected: 10/18/23 1641    Lab Status: Final result Specimen: Swab from Throat Updated: 10/20/23 0931     Throat Culture, Beta Strep No Beta Hemolytic Streptococcus Isolated    Narrative:      Group A Strep incidence is low in adults. Positive culture for Beta hemolytic Streptococcus species can reflect colonization and not true infection. Please correlate clinically.            [x]   Radiology  CT Abdomen Pelvis With Contrast    Result Date: 10/18/2023   No acute abnormality on CT. Status post cholecystectomy with bile duct dilatation similar previous study. Enlarged prostate gland, hyperplasia versus neoplasia. Small hiatal hernia.     TOMMY PALM MD       Electronically Signed and Approved By: TOMMY PALM MD on 10/18/2023 at 19:31             CT Soft Tissue Neck With Contrast    Result Date: 10/18/2023   No etiology to explain patient's neck pain.  Degenerative disc disease is seen.  The     TOMMY PALM MD       Electronically Signed and Approved By: TOMMY PALM MD on 10/18/2023 at 19:26             XR Chest 1 View    Result Date: 10/18/2023   There is mild bibasilar airspace opacity question subsegmental atelectasis.  Developing atypical pneumonia less likely.       TOMMY PALM MD       Electronically Signed and Approved By: TOMMY PALM MD on 10/18/2023 at 16:35            []  EKG/Telemetry   []  Cardiology/Vascular   []  Pathology  []  Old records  []  Other:    Assessment & Plan   Assessment / Plan     Assessment:  COVID-19 infection  Diabetes  Asymptomatic bradycardia  History of prior stroke    Plan:  Admitted to medicine  Complete course of Decadron  Complete course of antibiotics  Discontinued beta-blocker, monitor heart rate   Would benefit from rehab.  Insurance has reportedly denied.  I called the provided number for a peer to peer and could only get voicemail.  Left a message with my name/location/callback number/patient info as requested.  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  Would need rehab.      Discussed with RN.    DVT prophylaxis:  Medical DVT prophylaxis orders are present.    CODE STATUS:   Code Status (Patient has no pulse and is not breathing): CPR (Attempt to Resuscitate)  Medical Interventions (Patient has pulse or is breathing): Full Support    Electronically signed by Garcia Park MD, 10/25/23, 12:07 PM  EDT.

## 2023-10-26 LAB
ANION GAP SERPL CALCULATED.3IONS-SCNC: 11.5 MMOL/L (ref 5–15)
BASOPHILS # BLD AUTO: 0.08 10*3/MM3 (ref 0–0.2)
BASOPHILS NFR BLD AUTO: 0.5 % (ref 0–1.5)
BUN SERPL-MCNC: 34 MG/DL (ref 8–23)
BUN/CREAT SERPL: 34.3 (ref 7–25)
CALCIUM SPEC-SCNC: 9.6 MG/DL (ref 8.6–10.5)
CHLORIDE SERPL-SCNC: 103 MMOL/L (ref 98–107)
CO2 SERPL-SCNC: 23.5 MMOL/L (ref 22–29)
CREAT SERPL-MCNC: 0.99 MG/DL (ref 0.76–1.27)
DEPRECATED RDW RBC AUTO: 46.3 FL (ref 37–54)
EGFRCR SERPLBLD CKD-EPI 2021: 77 ML/MIN/1.73
EOSINOPHIL # BLD AUTO: 0.05 10*3/MM3 (ref 0–0.4)
EOSINOPHIL NFR BLD AUTO: 0.3 % (ref 0.3–6.2)
ERYTHROCYTE [DISTWIDTH] IN BLOOD BY AUTOMATED COUNT: 14.7 % (ref 12.3–15.4)
GLUCOSE BLDC GLUCOMTR-MCNC: 127 MG/DL (ref 70–99)
GLUCOSE BLDC GLUCOMTR-MCNC: 213 MG/DL (ref 70–99)
GLUCOSE BLDC GLUCOMTR-MCNC: 273 MG/DL (ref 70–99)
GLUCOSE BLDC GLUCOMTR-MCNC: 287 MG/DL (ref 70–99)
GLUCOSE SERPL-MCNC: 120 MG/DL (ref 65–99)
HCT VFR BLD AUTO: 48.9 % (ref 37.5–51)
HGB BLD-MCNC: 16.9 G/DL (ref 13–17.7)
IMM GRANULOCYTES # BLD AUTO: 0.76 10*3/MM3 (ref 0–0.05)
IMM GRANULOCYTES NFR BLD AUTO: 4.7 % (ref 0–0.5)
LYMPHOCYTES # BLD AUTO: 4.01 10*3/MM3 (ref 0.7–3.1)
LYMPHOCYTES NFR BLD AUTO: 25 % (ref 19.6–45.3)
MCH RBC QN AUTO: 30.5 PG (ref 26.6–33)
MCHC RBC AUTO-ENTMCNC: 34.6 G/DL (ref 31.5–35.7)
MCV RBC AUTO: 88.1 FL (ref 79–97)
MONOCYTES # BLD AUTO: 1.26 10*3/MM3 (ref 0.1–0.9)
MONOCYTES NFR BLD AUTO: 7.9 % (ref 5–12)
NEUTROPHILS NFR BLD AUTO: 61.6 % (ref 42.7–76)
NEUTROPHILS NFR BLD AUTO: 9.85 10*3/MM3 (ref 1.7–7)
NRBC BLD AUTO-RTO: 0 /100 WBC (ref 0–0.2)
PLATELET # BLD AUTO: 232 10*3/MM3 (ref 140–450)
PMV BLD AUTO: 10.6 FL (ref 6–12)
POTASSIUM SERPL-SCNC: 4.6 MMOL/L (ref 3.5–5.2)
RBC # BLD AUTO: 5.55 10*6/MM3 (ref 4.14–5.8)
SODIUM SERPL-SCNC: 138 MMOL/L (ref 136–145)
WBC NRBC COR # BLD: 16.01 10*3/MM3 (ref 3.4–10.8)

## 2023-10-26 PROCEDURE — 63710000001 INSULIN LISPRO (HUMAN) PER 5 UNITS: Performed by: HOSPITALIST

## 2023-10-26 PROCEDURE — 82948 REAGENT STRIP/BLOOD GLUCOSE: CPT

## 2023-10-26 PROCEDURE — 25010000002 ENOXAPARIN PER 10 MG: Performed by: FAMILY MEDICINE

## 2023-10-26 PROCEDURE — 63710000001 DEXAMETHASONE PER 0.25 MG: Performed by: INTERNAL MEDICINE

## 2023-10-26 PROCEDURE — 63710000001 INSULIN DETEMIR PER 5 UNITS: Performed by: HOSPITALIST

## 2023-10-26 PROCEDURE — 85025 COMPLETE CBC W/AUTO DIFF WBC: CPT | Performed by: HOSPITALIST

## 2023-10-26 PROCEDURE — 80048 BASIC METABOLIC PNL TOTAL CA: CPT | Performed by: HOSPITALIST

## 2023-10-26 RX ADMIN — DOCUSATE SODIUM 50MG AND SENNOSIDES 8.6MG 2 TABLET: 8.6; 5 TABLET, FILM COATED ORAL at 08:14

## 2023-10-26 RX ADMIN — DEXAMETHASONE 6 MG: 0.5 TABLET ORAL at 08:13

## 2023-10-26 RX ADMIN — GUAIFENESIN 600 MG: 600 TABLET ORAL at 08:14

## 2023-10-26 RX ADMIN — LISINOPRIL 10 MG: 10 TABLET ORAL at 08:14

## 2023-10-26 RX ADMIN — DOCUSATE SODIUM 50MG AND SENNOSIDES 8.6MG 2 TABLET: 8.6; 5 TABLET, FILM COATED ORAL at 22:23

## 2023-10-26 RX ADMIN — INSULIN LISPRO 8 UNITS: 100 INJECTION, SOLUTION INTRAVENOUS; SUBCUTANEOUS at 22:23

## 2023-10-26 RX ADMIN — EMPAGLIFLOZIN 25 MG: 25 TABLET, FILM COATED ORAL at 08:14

## 2023-10-26 RX ADMIN — ENOXAPARIN SODIUM 40 MG: 100 INJECTION SUBCUTANEOUS at 08:14

## 2023-10-26 RX ADMIN — INSULIN DETEMIR 10 UNITS: 100 INJECTION, SOLUTION SUBCUTANEOUS at 22:23

## 2023-10-26 RX ADMIN — GUAIFENESIN 600 MG: 600 TABLET ORAL at 22:24

## 2023-10-26 RX ADMIN — INSULIN LISPRO 5 UNITS: 100 INJECTION, SOLUTION INTRAVENOUS; SUBCUTANEOUS at 11:40

## 2023-10-26 RX ADMIN — LINAGLIPTIN 5 MG: 5 TABLET, FILM COATED ORAL at 08:14

## 2023-10-26 RX ADMIN — ASPIRIN 81 MG: 81 TABLET, COATED ORAL at 08:14

## 2023-10-26 RX ADMIN — CITALOPRAM HYDROBROMIDE 10 MG: 20 TABLET ORAL at 08:13

## 2023-10-26 RX ADMIN — TAMSULOSIN HYDROCHLORIDE 0.4 MG: 0.4 CAPSULE ORAL at 08:13

## 2023-10-26 RX ADMIN — INSULIN LISPRO 8 UNITS: 100 INJECTION, SOLUTION INTRAVENOUS; SUBCUTANEOUS at 17:09

## 2023-10-26 RX ADMIN — ROSUVASTATIN 40 MG: 20 TABLET, FILM COATED ORAL at 22:23

## 2023-10-26 NOTE — PROGRESS NOTES
[facility]   Hospitalist Progress Note  Date: 10/26/2023  Patient Name: Nikhil Baldwin  : 1945  MRN: [id number]  Date of admission: 10/18/2023  Room/Bed: [facility]/      Subjective   Subjective     Chief Complaint: f/u genalized weakness    Summary:Nikhil Baldwin is a 78 y.o. male admitted with COVID-19 and general weakness    Interval Followup:  denies new complaints; remains generally weak    Objective   Objective     Vitals:   Temp:  [96.8 °F (36 °C)-98.3 °F (36.8 °C)] 96.8 °F (36 °C)  Heart Rate:  [56-76] 58  Resp:  [18] 18  BP: (113-136)/(60-86) 136/74    Physical Exam     General: sitting in chair in NAD  HENT: NCAT, MMM  Eyes: pupils equal, no scleral icterus  Cardiovascular: RRR, no murmurs   Pulmonary: CTA bilaterally; no wheezes; no conversational dyspnea  Gastrointestinal: S/ND/NT, +BS  Musculoskeletal: No gross deformities  Skin: No jaundice, no rash on exposed skin appreciated  Neuro: CN II through XII grossly intact; speech clear; no tremor  Psych: Mood and affect appropriate      Result Review    Result Review:  I have personally reviewed these results:  [x]  Laboratory      Lab 10/25/23  0842 10/24/23  0619 10/23/23  0537 10/22/23  0519   WBC 14.16* 11.72* 11.38* 9.67   HEMOGLOBIN 16.1 15.9 15.2 14.6   HEMATOCRIT 47.3 45.0 45.1 43.0   PLATELETS 213 200 179 171   NEUTROS ABS 7.65* 5.64 7.01* 6.30   IMMATURE GRANS (ABS)  --  0.48* 0.19* 0.07*   LYMPHS ABS  --  4.28* 3.00 2.30   MONOS ABS  --  1.18* 1.13* 0.99*   EOS ABS  --  0.04 0.00 0.00   MCV 89.4 87.2 88.1 88.3         Lab 10/26/23  0842 10/25/23  0842 10/24/23  0619   SODIUM 138 139 138   POTASSIUM 4.6 4.0 4.0   CHLORIDE 103 104 104   CO2 23.5 25.2 24.4   ANION GAP 11.5 9.8 9.6   BUN 34* 31* 31*   CREATININE 0.99 0.89 1.07   EGFR 77.0 86.6 70.2   GLUCOSE 120* 117* 109*   CALCIUM 9.6 9.3 9.1                             Brief Urine Lab Results  (Last result in the past 365 days)        Color   Clarity   Blood   Leuk Est   Nitrite   Protein    CREAT   Urine HCG        10/18/23 2043 Yellow   Clear   Negative   Negative   Negative   Negative                 [x]  Microbiology   Microbiology Results (last 10 days)       Procedure Component Value - Date/Time    Rapid Strep A Screen - Swab, Throat [[id number]]  (Normal) Collected: 10/18/23 1641    Lab Status: Final result Specimen: Swab from Throat Updated: 10/18/23 1657     Strep A Ag Negative    COVID-19, FLU A/B, RSV PCR - Swab, Nasopharynx [[id number]]  (Abnormal) Collected: 10/18/23 1641    Lab Status: Final result Specimen: Swab from Nasopharynx Updated: 10/18/23 1734     COVID19 Detected     Influenza A PCR Not Detected     Influenza B PCR Not Detected     RSV, PCR Not Detected    Narrative:      Fact sheet for providers: https://www.fda.gov/media/470445/download    Fact sheet for patients: https://www.fda.gov/media/811374/download    Test performed by PCR.    Beta Strep Culture, Throat - Swab, Throat [[id number]]  (Normal) Collected: 10/18/23 1641    Lab Status: Final result Specimen: Swab from Throat Updated: 10/20/23 0931     Throat Culture, Beta Strep No Beta Hemolytic Streptococcus Isolated    Narrative:      Group A Strep incidence is low in adults. Positive culture for Beta hemolytic Streptococcus species can reflect colonization and not true infection. Please correlate clinically.            [x]  Radiology  CT Abdomen Pelvis With Contrast    Result Date: 10/18/2023   No acute abnormality on CT. Status post cholecystectomy with bile duct dilatation similar previous study. Enlarged prostate gland, hyperplasia versus neoplasia. Small hiatal hernia.     TOMMY PALM MD       Electronically Signed and Approved By: TOMMY PALM MD on 10/18/2023 at 19:31             CT Soft Tissue Neck With Contrast    Result Date: 10/18/2023   No etiology to explain patient's neck pain.  Degenerative disc disease is seen.  The     TOMMY PALM MD       Electronically Signed and Approved By: TOMMY PALM MD on 10/18/2023 at  19:26             XR Chest 1 View    Result Date: 10/18/2023   There is mild bibasilar airspace opacity question subsegmental atelectasis.  Developing atypical pneumonia less likely.       TOMMY PALM MD       Electronically Signed and Approved By: TOMMY PALM MD on 10/18/2023 at 16:35            []  EKG/Telemetry   []  Cardiology/Vascular   []  Pathology  []  Old records  []  Other:    Assessment & Plan   Assessment / Plan     Assessment:  COVID-19 infection  Diabetes  Asymptomatic bradycardia  History of prior stroke    Plan:  Admitted to medicine  Complete course of Decadron  Complete course of antibiotics  Discontinued beta-blocker.  HR better.  Denied acute rehab by insurance.  Will pursue subacute.      Discussed with RN.    DVT prophylaxis:  Medical DVT prophylaxis orders are present.    CODE STATUS:   Code Status (Patient has no pulse and is not breathing): CPR (Attempt to Resuscitate)  Medical Interventions (Patient has pulse or is breathing): Full Support    Electronically signed by Gracia Park MD, 10/26/23, 10:17 AM EDT.

## 2023-10-26 NOTE — PLAN OF CARE
Goal Outcome Evaluation:           Progress: no change  Outcome Evaluation: alert and oriented x 4. up with x 1 assistance/walker and up in the chair some. blood glucose monitored.

## 2023-10-27 VITALS
SYSTOLIC BLOOD PRESSURE: 100 MMHG | OXYGEN SATURATION: 95 % | TEMPERATURE: 97.7 F | DIASTOLIC BLOOD PRESSURE: 61 MMHG | WEIGHT: 169.75 LBS | BODY MASS INDEX: 25.14 KG/M2 | HEIGHT: 69 IN | RESPIRATION RATE: 16 BRPM | HEART RATE: 73 BPM

## 2023-10-27 LAB
ANION GAP SERPL CALCULATED.3IONS-SCNC: 11 MMOL/L (ref 5–15)
BASOPHILS # BLD AUTO: 0.07 10*3/MM3 (ref 0–0.2)
BASOPHILS NFR BLD AUTO: 0.4 % (ref 0–1.5)
BUN SERPL-MCNC: 37 MG/DL (ref 8–23)
BUN/CREAT SERPL: 32.7 (ref 7–25)
CALCIUM SPEC-SCNC: 9.3 MG/DL (ref 8.6–10.5)
CHLORIDE SERPL-SCNC: 103 MMOL/L (ref 98–107)
CO2 SERPL-SCNC: 24 MMOL/L (ref 22–29)
CREAT SERPL-MCNC: 1.13 MG/DL (ref 0.76–1.27)
DEPRECATED RDW RBC AUTO: 46.9 FL (ref 37–54)
EGFRCR SERPLBLD CKD-EPI 2021: 65.7 ML/MIN/1.73
EOSINOPHIL # BLD AUTO: 0.05 10*3/MM3 (ref 0–0.4)
EOSINOPHIL NFR BLD AUTO: 0.3 % (ref 0.3–6.2)
ERYTHROCYTE [DISTWIDTH] IN BLOOD BY AUTOMATED COUNT: 14.6 % (ref 12.3–15.4)
GLUCOSE BLDC GLUCOMTR-MCNC: 114 MG/DL (ref 70–99)
GLUCOSE BLDC GLUCOMTR-MCNC: 170 MG/DL (ref 70–99)
GLUCOSE SERPL-MCNC: 151 MG/DL (ref 65–99)
HCT VFR BLD AUTO: 45.9 % (ref 37.5–51)
HGB BLD-MCNC: 15.6 G/DL (ref 13–17.7)
IMM GRANULOCYTES # BLD AUTO: 0.91 10*3/MM3 (ref 0–0.05)
IMM GRANULOCYTES NFR BLD AUTO: 5.6 % (ref 0–0.5)
LYMPHOCYTES # BLD AUTO: 4.1 10*3/MM3 (ref 0.7–3.1)
LYMPHOCYTES NFR BLD AUTO: 25.2 % (ref 19.6–45.3)
MCH RBC QN AUTO: 30.1 PG (ref 26.6–33)
MCHC RBC AUTO-ENTMCNC: 34 G/DL (ref 31.5–35.7)
MCV RBC AUTO: 88.4 FL (ref 79–97)
MONOCYTES # BLD AUTO: 1.45 10*3/MM3 (ref 0.1–0.9)
MONOCYTES NFR BLD AUTO: 8.9 % (ref 5–12)
NEUTROPHILS NFR BLD AUTO: 59.6 % (ref 42.7–76)
NEUTROPHILS NFR BLD AUTO: 9.68 10*3/MM3 (ref 1.7–7)
NRBC BLD AUTO-RTO: 0 /100 WBC (ref 0–0.2)
PLAT MORPH BLD: NORMAL
PLATELET # BLD AUTO: 231 10*3/MM3 (ref 140–450)
PMV BLD AUTO: 10.9 FL (ref 6–12)
POIKILOCYTOSIS BLD QL SMEAR: NORMAL
POTASSIUM SERPL-SCNC: 4.3 MMOL/L (ref 3.5–5.2)
RBC # BLD AUTO: 5.19 10*6/MM3 (ref 4.14–5.8)
SODIUM SERPL-SCNC: 138 MMOL/L (ref 136–145)
WBC MORPH BLD: NORMAL
WBC NRBC COR # BLD: 16.26 10*3/MM3 (ref 3.4–10.8)

## 2023-10-27 PROCEDURE — 94642 AEROSOL INHALATION TREATMENT: CPT

## 2023-10-27 PROCEDURE — 85007 BL SMEAR W/DIFF WBC COUNT: CPT | Performed by: HOSPITALIST

## 2023-10-27 PROCEDURE — 94664 DEMO&/EVAL PT USE INHALER: CPT

## 2023-10-27 PROCEDURE — 25010000002 INFLUENZA VAC HIGH-DOSE QUAD 0.7 ML SUSPENSION PREFILLED SYRINGE: Performed by: FAMILY MEDICINE

## 2023-10-27 PROCEDURE — G0008 ADMIN INFLUENZA VIRUS VAC: HCPCS | Performed by: FAMILY MEDICINE

## 2023-10-27 PROCEDURE — 63710000001 INSULIN LISPRO (HUMAN) PER 5 UNITS: Performed by: HOSPITALIST

## 2023-10-27 PROCEDURE — 80048 BASIC METABOLIC PNL TOTAL CA: CPT | Performed by: HOSPITALIST

## 2023-10-27 PROCEDURE — 25010000002 ENOXAPARIN PER 10 MG: Performed by: FAMILY MEDICINE

## 2023-10-27 PROCEDURE — 90662 IIV NO PRSV INCREASED AG IM: CPT | Performed by: FAMILY MEDICINE

## 2023-10-27 PROCEDURE — 63710000001 DEXAMETHASONE PER 0.25 MG: Performed by: INTERNAL MEDICINE

## 2023-10-27 PROCEDURE — 85025 COMPLETE CBC W/AUTO DIFF WBC: CPT | Performed by: HOSPITALIST

## 2023-10-27 PROCEDURE — 82948 REAGENT STRIP/BLOOD GLUCOSE: CPT

## 2023-10-27 PROCEDURE — 94640 AIRWAY INHALATION TREATMENT: CPT

## 2023-10-27 PROCEDURE — 94799 UNLISTED PULMONARY SVC/PX: CPT

## 2023-10-27 RX ORDER — GUAIFENESIN 600 MG/1
600 TABLET, EXTENDED RELEASE ORAL 2 TIMES DAILY PRN
Start: 2023-10-27

## 2023-10-27 RX ORDER — ASPIRIN 81 MG/1
81 TABLET ORAL DAILY
Start: 2023-10-28

## 2023-10-27 RX ORDER — ALBUTEROL SULFATE 2.5 MG/3ML
2.5 SOLUTION RESPIRATORY (INHALATION) ONCE
Status: COMPLETED | OUTPATIENT
Start: 2023-10-27 | End: 2023-10-27

## 2023-10-27 RX ORDER — DEXAMETHASONE 6 MG/1
6 TABLET ORAL
Qty: 1 TABLET | Refills: 0 | Status: SHIPPED | OUTPATIENT
Start: 2023-10-28 | End: 2023-10-29

## 2023-10-27 RX ORDER — IPRATROPIUM BROMIDE AND ALBUTEROL SULFATE 2.5; .5 MG/3ML; MG/3ML
3 SOLUTION RESPIRATORY (INHALATION) EVERY 4 HOURS PRN
Start: 2023-10-27

## 2023-10-27 RX ADMIN — ENOXAPARIN SODIUM 40 MG: 100 INJECTION SUBCUTANEOUS at 08:14

## 2023-10-27 RX ADMIN — ASPIRIN 81 MG: 81 TABLET, COATED ORAL at 08:14

## 2023-10-27 RX ADMIN — LINAGLIPTIN 5 MG: 5 TABLET, FILM COATED ORAL at 08:14

## 2023-10-27 RX ADMIN — CITALOPRAM HYDROBROMIDE 10 MG: 20 TABLET ORAL at 08:14

## 2023-10-27 RX ADMIN — INFLUENZA A VIRUS A/VICTORIA/4897/2022 IVR-238 (H1N1) ANTIGEN (FORMALDEHYDE INACTIVATED), INFLUENZA A VIRUS A/DARWIN/9/2021 SAN-010 (H3N2) ANTIGEN (FORMALDEHYDE INACTIVATED), INFLUENZA B VIRUS B/PHUKET/3073/2013 ANTIGEN (FORMALDEHYDE INACTIVATED), AND INFLUENZA B VIRUS B/MICHIGAN/01/2021 ANTIGEN (FORMALDEHYDE INACTIVATED) 0.7 ML: 60; 60; 60; 60 INJECTION, SUSPENSION INTRAMUSCULAR at 11:58

## 2023-10-27 RX ADMIN — ALBUTEROL SULFATE 2.5 MG: 2.5 SOLUTION RESPIRATORY (INHALATION) at 11:39

## 2023-10-27 RX ADMIN — EMPAGLIFLOZIN 25 MG: 25 TABLET, FILM COATED ORAL at 08:14

## 2023-10-27 RX ADMIN — DEXAMETHASONE 6 MG: 0.5 TABLET ORAL at 08:14

## 2023-10-27 RX ADMIN — GUAIFENESIN 600 MG: 600 TABLET ORAL at 08:14

## 2023-10-27 RX ADMIN — DOCUSATE SODIUM 50MG AND SENNOSIDES 8.6MG 2 TABLET: 8.6; 5 TABLET, FILM COATED ORAL at 08:14

## 2023-10-27 RX ADMIN — LISINOPRIL 10 MG: 10 TABLET ORAL at 08:14

## 2023-10-27 RX ADMIN — INSULIN LISPRO 3 UNITS: 100 INJECTION, SOLUTION INTRAVENOUS; SUBCUTANEOUS at 12:00

## 2023-10-27 RX ADMIN — TAMSULOSIN HYDROCHLORIDE 0.4 MG: 0.4 CAPSULE ORAL at 08:14

## 2023-10-27 NOTE — DISCHARGE SUMMARY
[facility]         HOSPITALIST  DISCHARGE SUMMARY    Patient Name: Nikhil Baldwin  : 1945  MRN: [id number]    Date of Admission: 10/18/2023  Date of Discharge:  10/27/2023  Primary Care Physician: Felix Navarro Jr., MD  Admitting: Medicine    Final diagnoses:  COVID-19  Diabetes  History of prior CVA  Asymptomatic bradycardia, improved      Hospital Course     Hospital Course:  Nikhil Baldwin is a 78 y.o. male admitted with COVID-19 and generalized weakness.  He has a history of a previous stroke that has left him weaker.  He was placed in the hospital, originally plan to discharge home but family felt that he needed rehab.  His insurance denied acute rehab but have approved subacute rehab.  Clinically he has done well.  He has 1 more day of Decadron.  He completed course of antibiotics.  His beta-blocker was held due to asymptomatic bradycardia that is resolved.  He is stable for discharge today to [facility].        DISCHARGE Follow Up Recommendations for labs and diagnostics: Follow-up with PCP within 1 week      Day of Discharge     Vital Signs:  Temp:  [97.6 °F (36.4 °C)-98.6 °F (37 °C)] 97.8 °F (36.6 °C)  Heart Rate:  [50-78] 60  Resp:  [16-18] 16  BP: ()/(64-74) 115/65  Physical Exam: NAD, S1-S2, chest clear, abdomen soft nondistended nontender, extremities and lower extremity edema       Discharge Details        Discharge Medications        New Medications        Instructions Start Date   aspirin 81 MG EC tablet   81 mg, Oral, Daily   Start Date: 2023     dexAMETHasone 6 MG tablet  Commonly known as: DECADRON   6 mg, Oral, Daily With Breakfast   Start Date: 2023     guaiFENesin 600 MG 12 hr tablet  Commonly known as: MUCINEX   600 mg, Oral, 2 Times Daily PRN             Changes to Medications        Instructions Start Date   lisinopril 20 MG tablet  Commonly known as: PRINIVIL,ZESTRIL  What changed: how much to take   10 mg, Oral, Daily       metFORMIN 500 MG tablet  Commonly known as: GLUCOPHAGE  What changed: how to take this   TAKE 1 TABLET BY MOUTH TWICE DAILY WITH MEALS      rosuvastatin 40 MG tablet  Commonly known as: CRESTOR  What changed:   when to take this  additional instructions   40 mg, Oral, Daily             Continue These Medications        Instructions Start Date   Cholecalciferol 125 MCG (5000 UT) tablet   5,000 Units, Oral, Daily      citalopram 20 MG tablet  Commonly known as: CeleXA   10 mg, Oral, Daily      Jardiance 25 MG tablet tablet  Generic drug: empagliflozin   Take 1 tablet by mouth once daily      melatonin 5 MG tablet tablet   5 mg, Oral, Nightly      SITagliptin 100 MG tablet  Commonly known as: Januvia   100 mg, Oral, Daily      tamsulosin 0.4 MG capsule 24 hr capsule  Commonly known as: FLOMAX   0.4 mg, Oral, Daily             Stop These Medications      carvedilol 3.125 MG tablet  Commonly known as: COREG              Allergies   Allergen Reactions    Codeine Unknown - Low Severity    Promethazine Unknown - Low Severity    Celecoxib Rash    Ibuprofen Rash    Penicillins Rash    Promethazine Hcl Rash    Warfarin Rash       Discharge Disposition:  Skilled Nursing Facility (DC - External)    Diet:  Hospital:  Diet Order   Procedures    Diet: Cardiac Diets, Diabetic Diets; Healthy Heart (2-3 Na+); Consistent Carbohydrate; Texture: Regular Texture (IDDSI 7); Fluid Consistency: Thin (IDDSI 0)       Discharge Activity:   Activity Instructions       Activity as Tolerated              CODE STATUS:  Code Status and Medical Interventions:   Ordered at: 10/20/23 1351     Code Status (Patient has no pulse and is not breathing):    CPR (Attempt to Resuscitate)     Medical Interventions (Patient has pulse or is breathing):    Full Support         Future Appointments   Date Time Provider Department Center   11/21/2023 11:30 AM Felix Navarro Jr., MD [facility]       Additional Instructions for the Follow-ups that You  Need to Schedule       Discharge Follow-up with PCP   As directed       Currently Documented PCP:    Felix Navarro Jr., MD    PCP Phone Number:    [phone]     Follow Up Details: one week                Pertinent  and/or Most Recent Results     PROCEDURES:   None    LAB RESULTS:      Lab 10/27/23  0452 10/26/23  0842 10/25/23  0842 10/24/23  0619 10/23/23  0537 10/22/23  0519   WBC 16.26* 16.01* 14.16* 11.72* 11.38* 9.67   HEMOGLOBIN 15.6 16.9 16.1 15.9 15.2 14.6   HEMATOCRIT 45.9 48.9 47.3 45.0 45.1 43.0   PLATELETS 231 232 213 200 179 171   NEUTROS ABS 9.68* 9.85* 7.65* 5.64 7.01* 6.30   IMMATURE GRANS (ABS) 0.91* 0.76*  --  0.48* 0.19* 0.07*   LYMPHS ABS 4.10* 4.01*  --  4.28* 3.00 2.30   MONOS ABS 1.45* 1.26*  --  1.18* 1.13* 0.99*   EOS ABS 0.05 0.05  --  0.04 0.00 0.00   MCV 88.4 88.1 89.4 87.2 88.1 88.3         Lab 10/27/23  0452 10/26/23  0842 10/25/23  0842 10/24/23  0619 10/23/23  0538   SODIUM 138 138 139 138 138   POTASSIUM 4.3 4.6 4.0 4.0 4.0   CHLORIDE 103 103 104 104 104   CO2 24.0 23.5 25.2 24.4 21.5*   ANION GAP 11.0 11.5 9.8 9.6 12.5   BUN 37* 34* 31* 31* 25*   CREATININE 1.13 0.99 0.89 1.07 0.84   EGFR 65.7 77.0 86.6 70.2 88.2   GLUCOSE 151* 120* 117* 109* 114*   CALCIUM 9.3 9.6 9.3 9.1 9.0                         Brief Urine Lab Results  (Last result in the past 365 days)        Color   Clarity   Blood   Leuk Est   Nitrite   Protein   CREAT   Urine HCG        10/18/23 2043 Yellow   Clear   Negative   Negative   Negative   Negative                 Microbiology Results (last 10 days)       Procedure Component Value - Date/Time    Rapid Strep A Screen - Swab, Throat [[id number]]  (Normal) Collected: 10/18/23 1641    Lab Status: Final result Specimen: Swab from Throat Updated: 10/18/23 1657     Strep A Ag Negative    COVID-19, FLU A/B, RSV PCR - Swab, Nasopharynx [[id number]]  (Abnormal) Collected: 10/18/23 1641    Lab Status: Final result Specimen: Swab from Nasopharynx Updated: 10/18/23  1734     COVID19 Detected     Influenza A PCR Not Detected     Influenza B PCR Not Detected     RSV, PCR Not Detected    Narrative:      Fact sheet for providers: https://www.fda.gov/media/713614/download    Fact sheet for patients: https://www.fda.gov/media/094821/download    Test performed by PCR.    Beta Strep Culture, Throat - Swab, Throat [[id number]]  (Normal) Collected: 10/18/23 1641    Lab Status: Final result Specimen: Swab from Throat Updated: 10/20/23 0931     Throat Culture, Beta Strep No Beta Hemolytic Streptococcus Isolated    Narrative:      Group A Strep incidence is low in adults. Positive culture for Beta hemolytic Streptococcus species can reflect colonization and not true infection. Please correlate clinically.              No radiology results for the last 7 days             Results for orders placed during the hospital encounter of 07/16/23    Adult Transthoracic Echo Complete W/ Cont if Necessary Per Protocol    Interpretation Summary    Left ventricular ejection fraction appears to be 56 - 60%.    Left ventricular wall thickness is consistent with mild concentric hypertrophy.    Left ventricular diastolic function is consistent with (grade I) impaired relaxation and age.    Estimated right ventricular systolic pressure from tricuspid regurgitation is normal (<35 mmHg).    Mild dilation of the aortic root is present at 3.9 cm. Mild dilation of the ascending aorta is present at 4.3 cm.    Trace aortic insufficiency.  No significant aortic stenosis with max/mean pressure gradient 15/8 mmHg.      Labs Pending at Discharge:    Condition at discharge: Stable for discharge to subacute rehab    Time spent on Discharge including face to face service: Approximately 35 minutes    Electronically signed by Garcia Park MD, 10/27/23, 9:36 AM EDT.

## 2023-10-27 NOTE — CASE MANAGEMENT/SOCIAL WORK
Discharge Planning Assessment  TONY Guillory     Patient Name: Nikhil Baldwin  MRN: [id number]  Today's Date: 10/27/2023    Admit Date: 10/18/2023     Discharge Plan       Row Name 10/27/23 0915       Plan    Patient/Family in Agreement with Plan yes    Final Discharge Disposition Code 03 - skilled nursing facility (SNF)    Final Note Pre-cert has been approved for Signature of Joseph. MD notified.             Continued Care and Services - Admitted Since 10/18/2023       Destination       Service Provider Request Status Selected Services Address Phone Fax Patient Preferred    [facility] Accepted N/A [address] [address] [phone] --                    Expected Discharge Date and Time       Expected Discharge Date Expected Discharge Time    Oct 27, 2023         YONI Virk

## 2023-11-02 DIAGNOSIS — E11.65 TYPE 2 DIABETES MELLITUS WITH HYPERGLYCEMIA, WITHOUT LONG-TERM CURRENT USE OF INSULIN: ICD-10-CM

## 2023-11-12 DIAGNOSIS — I10 PRIMARY HYPERTENSION: ICD-10-CM

## 2023-11-14 RX ORDER — METOPROLOL SUCCINATE 50 MG/1
50 TABLET, EXTENDED RELEASE ORAL DAILY
Qty: 90 TABLET | Refills: 0 | OUTPATIENT
Start: 2023-11-14

## 2023-11-14 NOTE — TELEPHONE ENCOUNTER
Please inquire with patient and his caregivers. I dont think he is supposed to be on this any longer.

## 2023-11-21 ENCOUNTER — OFFICE VISIT (OUTPATIENT)
Dept: INTERNAL MEDICINE | Facility: CLINIC | Age: 78
End: 2023-11-21
Payer: MEDICARE

## 2023-11-21 VITALS
BODY MASS INDEX: 26.04 KG/M2 | OXYGEN SATURATION: 92 % | DIASTOLIC BLOOD PRESSURE: 78 MMHG | WEIGHT: 175.8 LBS | TEMPERATURE: 98.1 F | HEART RATE: 100 BPM | HEIGHT: 69 IN | SYSTOLIC BLOOD PRESSURE: 118 MMHG

## 2023-11-21 DIAGNOSIS — E11.65 TYPE 2 DIABETES MELLITUS WITH HYPERGLYCEMIA, WITHOUT LONG-TERM CURRENT USE OF INSULIN: Primary | ICD-10-CM

## 2023-11-21 DIAGNOSIS — I10 PRIMARY HYPERTENSION: ICD-10-CM

## 2023-11-21 DIAGNOSIS — Z29.11 NEED FOR RSV VACCINATION: ICD-10-CM

## 2023-11-21 DIAGNOSIS — Z23 NEED FOR COVID-19 VACCINE: ICD-10-CM

## 2023-11-21 DIAGNOSIS — E78.2 MIXED HYPERLIPIDEMIA: ICD-10-CM

## 2023-11-21 LAB
ALBUMIN SERPL-MCNC: 4.4 G/DL (ref 3.5–5.2)
ALBUMIN UR-MCNC: 1.2 MG/DL
ALBUMIN/GLOB SERPL: 2.1 G/DL
ALP SERPL-CCNC: 59 U/L (ref 39–117)
ALT SERPL W P-5'-P-CCNC: 18 U/L (ref 1–41)
ANION GAP SERPL CALCULATED.3IONS-SCNC: 13.8 MMOL/L (ref 5–15)
AST SERPL-CCNC: 22 U/L (ref 1–40)
BASOPHILS # BLD AUTO: 0.05 10*3/MM3 (ref 0–0.2)
BASOPHILS NFR BLD AUTO: 0.7 % (ref 0–1.5)
BILIRUB SERPL-MCNC: 0.7 MG/DL (ref 0–1.2)
BUN SERPL-MCNC: 13 MG/DL (ref 8–23)
BUN/CREAT SERPL: 11.1 (ref 7–25)
CALCIUM SPEC-SCNC: 10.4 MG/DL (ref 8.6–10.5)
CHLORIDE SERPL-SCNC: 106 MMOL/L (ref 98–107)
CHOLEST SERPL-MCNC: 138 MG/DL (ref 0–200)
CO2 SERPL-SCNC: 23.2 MMOL/L (ref 22–29)
CREAT SERPL-MCNC: 1.17 MG/DL (ref 0.76–1.27)
CREAT UR-MCNC: 154.8 MG/DL
DEPRECATED RDW RBC AUTO: 45.4 FL (ref 37–54)
EGFRCR SERPLBLD CKD-EPI 2021: 63 ML/MIN/1.73
EOSINOPHIL # BLD AUTO: 0.13 10*3/MM3 (ref 0–0.4)
EOSINOPHIL NFR BLD AUTO: 1.8 % (ref 0.3–6.2)
ERYTHROCYTE [DISTWIDTH] IN BLOOD BY AUTOMATED COUNT: 14 % (ref 12.3–15.4)
GLOBULIN UR ELPH-MCNC: 2.1 GM/DL
GLUCOSE SERPL-MCNC: 131 MG/DL (ref 65–99)
HBA1C MFR BLD: 6.5 % (ref 4.8–5.6)
HCT VFR BLD AUTO: 47.9 % (ref 37.5–51)
HDLC SERPL-MCNC: 51 MG/DL (ref 40–60)
HGB BLD-MCNC: 16.2 G/DL (ref 13–17.7)
IMM GRANULOCYTES # BLD AUTO: 0.08 10*3/MM3 (ref 0–0.05)
IMM GRANULOCYTES NFR BLD AUTO: 1.1 % (ref 0–0.5)
LDLC SERPL CALC-MCNC: 58 MG/DL (ref 0–100)
LDLC/HDLC SERPL: 1.03 {RATIO}
LYMPHOCYTES # BLD AUTO: 2.9 10*3/MM3 (ref 0.7–3.1)
LYMPHOCYTES NFR BLD AUTO: 40.5 % (ref 19.6–45.3)
MCH RBC QN AUTO: 30.1 PG (ref 26.6–33)
MCHC RBC AUTO-ENTMCNC: 33.8 G/DL (ref 31.5–35.7)
MCV RBC AUTO: 88.9 FL (ref 79–97)
MICROALBUMIN/CREAT UR: 7.8 MG/G (ref 0–29)
MONOCYTES # BLD AUTO: 0.81 10*3/MM3 (ref 0.1–0.9)
MONOCYTES NFR BLD AUTO: 11.3 % (ref 5–12)
NEUTROPHILS NFR BLD AUTO: 3.19 10*3/MM3 (ref 1.7–7)
NEUTROPHILS NFR BLD AUTO: 44.6 % (ref 42.7–76)
NRBC BLD AUTO-RTO: 0.1 /100 WBC (ref 0–0.2)
PLATELET # BLD AUTO: 234 10*3/MM3 (ref 140–450)
PMV BLD AUTO: 11.4 FL (ref 6–12)
POTASSIUM SERPL-SCNC: 4.8 MMOL/L (ref 3.5–5.2)
PROT SERPL-MCNC: 6.5 G/DL (ref 6–8.5)
RBC # BLD AUTO: 5.39 10*6/MM3 (ref 4.14–5.8)
SODIUM SERPL-SCNC: 143 MMOL/L (ref 136–145)
TRIGL SERPL-MCNC: 172 MG/DL (ref 0–150)
VLDLC SERPL-MCNC: 29 MG/DL (ref 5–40)
WBC NRBC COR # BLD AUTO: 7.16 10*3/MM3 (ref 3.4–10.8)

## 2023-11-21 PROCEDURE — 82570 ASSAY OF URINE CREATININE: CPT | Performed by: INTERNAL MEDICINE

## 2023-11-21 PROCEDURE — 82043 UR ALBUMIN QUANTITATIVE: CPT | Performed by: INTERNAL MEDICINE

## 2023-11-21 PROCEDURE — 83036 HEMOGLOBIN GLYCOSYLATED A1C: CPT | Performed by: INTERNAL MEDICINE

## 2023-11-21 PROCEDURE — 80061 LIPID PANEL: CPT | Performed by: INTERNAL MEDICINE

## 2023-11-21 PROCEDURE — 80053 COMPREHEN METABOLIC PANEL: CPT | Performed by: INTERNAL MEDICINE

## 2023-11-21 PROCEDURE — 85025 COMPLETE CBC W/AUTO DIFF WBC: CPT | Performed by: INTERNAL MEDICINE

## 2023-11-21 NOTE — PROGRESS NOTES
"Chief Complaint  Dementia, Diabetes Mellitus, Hyperlipidemia, and Follow-up    Subjective          Nikhil Baldwin presents to [facility]  History of Present Illness  Patient's son present to add to history. Patient overall doing well. He is continuing to gain strength from his hospitalization. His ambulation is improving.   DM2- patient overall doing well without hypoglycemic events. Can have intermittent compliance. He is eating well.   History of cerebrovascular accident- patient is taking aspirin and statin medication.   Hyperlipidemia- doing well on statin and due for recheck.     Current Outpatient Medications   Medication Instructions    aspirin 81 mg, Oral, Daily    Cholecalciferol 5,000 Units, Oral, Daily    citalopram (CELEXA) 10 mg, Oral, Daily    empagliflozin (Jardiance) 25 MG tablet tablet Take 1 tablet by mouth once daily    guaiFENesin (MUCINEX) 600 mg, Oral, 2 Times Daily PRN    ipratropium-albuterol (DUO-NEB) 0.5-2.5 mg/3 ml nebulizer 3 mL, Nebulization, Every 4 Hours PRN    lisinopril (PRINIVIL,ZESTRIL) 10 mg, Oral, Daily    melatonin 5 mg, Oral, Nightly    metFORMIN (GLUCOPHAGE) 500 mg, Oral, 2 Times Daily With Meals    rosuvastatin (CRESTOR) 40 mg, Oral, Daily    SITagliptin (JANUVIA) 100 mg, Oral, Daily    tamsulosin (FLOMAX) 0.4 mg, Oral, Daily       The following portions of the patient's history were reviewed and updated as appropriate: allergies, current medications, past family history, past medical history, past social history, past surgical history, and problem list.    Objective   Vital Signs:   /78 (BP Location: Left arm, Patient Position: Sitting, Cuff Size: Adult)   Pulse 100   Temp 98.1 °F (36.7 °C) (Temporal)   Ht 175.3 cm (69\")   Wt 79.7 kg (175 lb 12.8 oz)   SpO2 92%   BMI 25.96 kg/m²     BP Readings from Last 3 Encounters:   11/21/23 118/78   10/27/23 100/61   08/30/23 102/60     Wt Readings from Last 3 Encounters: "   11/21/23 79.7 kg (175 lb 12.8 oz)   10/18/23 77 kg (169 lb 12.1 oz)   08/15/23 80.1 kg (176 lb 9.6 oz)         Physical Exam   Appearance: No acute distress, well-nourished  Head: normocephalic, atraumatic  Eyes: extraocular movements intact, no scleral icterus, no conjunctival injection  Ears, Nose, and Throat: external ears normal, nares patent, moist mucous membranes  Cardiovascular: regular rate and rhythm. no murmurs, rubs, or gallops. no edema  Respiratory: breathing comfortably, symmetric chest rise, clear to auscultation bilaterally. No wheezes, rales, or rhonchi.  Neuro: alert and oriented to time, place, and person. Normal gait  Psych: normal mood and affect     Result Review :   The following data was reviewed by: Felix Navarro Jr, MD on 11/21/2023:  Common labs          10/25/2023    08:42 10/26/2023    08:42 10/27/2023    04:52   Common Labs   Glucose 117  120  151    BUN 31  34  37    Creatinine 0.89  0.99  1.13    Sodium 139  138  138    Potassium 4.0  4.6  4.3    Chloride 104  103  103    Calcium 9.3  9.6  9.3    WBC 14.16  16.01  16.26    Hemoglobin 16.1  16.9  15.6    Hematocrit 47.3  48.9  45.9    Platelets 213  232  231             Lab Results   Component Value Date    SARSANTIGEN Not Detected 02/13/2023    COVID19 Detected (C) 10/18/2023    FLU Negative 12/01/2022    FLU Negative 12/01/2022    RAPSCRN Negative 02/13/2023    STREPAAG Negative 10/18/2023    RSV Not Detected 10/18/2023    INR 1.0 11/12/2021    BILIRUBINUR Negative 10/18/2023          Assessment and Plan    Diagnoses and all orders for this visit:    1. Type 2 diabetes mellitus with hyperglycemia, without long-term current use of insulin (Primary)  Comments:  check labs. cont current med regimen.  Orders:  -     Hemoglobin A1c  -     Microalbumin / Creatinine Urine Ratio - Urine, Clean Catch    2. Primary hypertension  Comments:  well controlled on regimen.  Orders:  -     CBC & Differential  -     Comprehensive  Metabolic Panel    3. Mixed hyperlipidemia  Comments:  cont statin and asa. check labs.  Orders:  -     Lipid Panel    4. Need for RSV vaccination    5. Need for COVID-19 vaccine  -     COVID-19 F23 (Pfizer) 12yrs+ (COMIRNATY)          There are no discontinued medications.       Follow Up   Return in about 4 months (around 3/21/2024) for Recheck.  Patient was given instructions and counseling regarding his condition or for health maintenance advice. Please see specific information pulled into the AVS if appropriate.       Felix Navarro Jr, MD  11/21/23  15:47 EST

## 2023-11-27 ENCOUNTER — TELEPHONE (OUTPATIENT)
Dept: INTERNAL MEDICINE | Facility: CLINIC | Age: 78
End: 2023-11-27
Payer: MEDICARE

## 2023-11-27 NOTE — TELEPHONE ENCOUNTER
Tried to call patient son no answer and couldn't leave a vm due to vm box not set  up.     OKAY FOR HUB TO READ/ADVISE     ----- Message from Felix Navarro Jr., MD sent at 11/25/2023  8:51 PM EST -----  Elevated triglycerides, which are the storage form of sugar - recommend lower carbohydrate/sugar intake.      Good control of blood glucose.      Other labs reassuring.

## 2023-11-27 NOTE — TELEPHONE ENCOUNTER
----- Message from Felix Navarro Jr., MD sent at 11/25/2023  8:51 PM EST -----  Elevated triglycerides, which are the storage form of sugar - recommend lower carbohydrate/sugar intake.     Good control of blood glucose.     Other labs reassuring.

## 2023-12-07 ENCOUNTER — APPOINTMENT (OUTPATIENT)
Dept: MRI IMAGING | Facility: HOSPITAL | Age: 78
End: 2023-12-07
Payer: MEDICARE

## 2023-12-07 ENCOUNTER — TELEPHONE (OUTPATIENT)
Dept: INTERNAL MEDICINE | Facility: CLINIC | Age: 78
End: 2023-12-07
Payer: MEDICARE

## 2023-12-07 ENCOUNTER — HOSPITAL ENCOUNTER (INPATIENT)
Facility: HOSPITAL | Age: 78
LOS: 1 days | Discharge: HOME | End: 2023-12-12
Attending: EMERGENCY MEDICINE | Admitting: FAMILY MEDICINE
Payer: MEDICARE

## 2023-12-07 ENCOUNTER — APPOINTMENT (OUTPATIENT)
Dept: GENERAL RADIOLOGY | Facility: HOSPITAL | Age: 78
End: 2023-12-07
Payer: MEDICARE

## 2023-12-07 DIAGNOSIS — R26.2 DIFFICULTY WALKING: ICD-10-CM

## 2023-12-07 DIAGNOSIS — E78.49 OTHER HYPERLIPIDEMIA: ICD-10-CM

## 2023-12-07 DIAGNOSIS — Z78.9 DECREASED ACTIVITIES OF DAILY LIVING (ADL): ICD-10-CM

## 2023-12-07 DIAGNOSIS — R42 DIZZINESS: Primary | ICD-10-CM

## 2023-12-07 DIAGNOSIS — I10 PRIMARY HYPERTENSION: ICD-10-CM

## 2023-12-07 PROBLEM — R53.1 GENERALIZED WEAKNESS: Status: ACTIVE | Noted: 2023-12-07

## 2023-12-07 PROBLEM — J20.9 ACUTE BRONCHITIS: Status: ACTIVE | Noted: 2023-12-07

## 2023-12-07 LAB
ALBUMIN SERPL-MCNC: 4.4 G/DL (ref 3.5–5.2)
ALBUMIN/GLOB SERPL: 1.8 G/DL
ALP SERPL-CCNC: 69 U/L (ref 39–117)
ALT SERPL W P-5'-P-CCNC: 14 U/L (ref 1–41)
ANION GAP SERPL CALCULATED.3IONS-SCNC: 14.4 MMOL/L (ref 5–15)
AST SERPL-CCNC: 14 U/L (ref 1–40)
BASOPHILS # BLD AUTO: 0.06 10*3/MM3 (ref 0–0.2)
BASOPHILS NFR BLD AUTO: 0.8 % (ref 0–1.5)
BILIRUB SERPL-MCNC: 1.2 MG/DL (ref 0–1.2)
BILIRUB UR QL STRIP: NEGATIVE
BUN SERPL-MCNC: 13 MG/DL (ref 8–23)
BUN/CREAT SERPL: 11.2 (ref 7–25)
CALCIUM SPEC-SCNC: 10.1 MG/DL (ref 8.6–10.5)
CHLORIDE SERPL-SCNC: 103 MMOL/L (ref 98–107)
CLARITY UR: CLEAR
CO2 SERPL-SCNC: 22.6 MMOL/L (ref 22–29)
COLOR UR: YELLOW
CREAT SERPL-MCNC: 1.16 MG/DL (ref 0.76–1.27)
DEPRECATED RDW RBC AUTO: 46.8 FL (ref 37–54)
EGFRCR SERPLBLD CKD-EPI 2021: 63.7 ML/MIN/1.73
EOSINOPHIL # BLD AUTO: 0.09 10*3/MM3 (ref 0–0.4)
EOSINOPHIL NFR BLD AUTO: 1.2 % (ref 0.3–6.2)
ERYTHROCYTE [DISTWIDTH] IN BLOOD BY AUTOMATED COUNT: 14.6 % (ref 12.3–15.4)
FLUAV SUBTYP SPEC NAA+PROBE: NOT DETECTED
FLUBV RNA ISLT QL NAA+PROBE: NOT DETECTED
GLOBULIN UR ELPH-MCNC: 2.5 GM/DL
GLUCOSE BLDC GLUCOMTR-MCNC: 130 MG/DL (ref 70–99)
GLUCOSE BLDC GLUCOMTR-MCNC: 94 MG/DL (ref 70–99)
GLUCOSE SERPL-MCNC: 135 MG/DL (ref 65–99)
GLUCOSE UR STRIP-MCNC: ABNORMAL MG/DL
HCT VFR BLD AUTO: 46.9 % (ref 37.5–51)
HGB BLD-MCNC: 15.6 G/DL (ref 13–17.7)
HGB UR QL STRIP.AUTO: NEGATIVE
HOLD SPECIMEN: NORMAL
HOLD SPECIMEN: NORMAL
IMM GRANULOCYTES # BLD AUTO: 0.04 10*3/MM3 (ref 0–0.05)
IMM GRANULOCYTES NFR BLD AUTO: 0.5 % (ref 0–0.5)
KETONES UR QL STRIP: NEGATIVE
LEUKOCYTE ESTERASE UR QL STRIP.AUTO: NEGATIVE
LYMPHOCYTES # BLD AUTO: 1.34 10*3/MM3 (ref 0.7–3.1)
LYMPHOCYTES NFR BLD AUTO: 17.3 % (ref 19.6–45.3)
MAGNESIUM SERPL-MCNC: 1.6 MG/DL (ref 1.6–2.4)
MCH RBC QN AUTO: 29.5 PG (ref 26.6–33)
MCHC RBC AUTO-ENTMCNC: 33.3 G/DL (ref 31.5–35.7)
MCV RBC AUTO: 88.8 FL (ref 79–97)
MONOCYTES # BLD AUTO: 1.07 10*3/MM3 (ref 0.1–0.9)
MONOCYTES NFR BLD AUTO: 13.8 % (ref 5–12)
NEUTROPHILS NFR BLD AUTO: 5.15 10*3/MM3 (ref 1.7–7)
NEUTROPHILS NFR BLD AUTO: 66.4 % (ref 42.7–76)
NITRITE UR QL STRIP: NEGATIVE
NRBC BLD AUTO-RTO: 0 /100 WBC (ref 0–0.2)
PH UR STRIP.AUTO: 5.5 [PH] (ref 5–8)
PLATELET # BLD AUTO: 162 10*3/MM3 (ref 140–450)
PMV BLD AUTO: 10.9 FL (ref 6–12)
POTASSIUM SERPL-SCNC: 4 MMOL/L (ref 3.5–5.2)
PROT SERPL-MCNC: 6.9 G/DL (ref 6–8.5)
PROT UR QL STRIP: ABNORMAL
RBC # BLD AUTO: 5.28 10*6/MM3 (ref 4.14–5.8)
RSV RNA NPH QL NAA+NON-PROBE: NOT DETECTED
SARS-COV-2 RNA RESP QL NAA+PROBE: NOT DETECTED
SODIUM SERPL-SCNC: 140 MMOL/L (ref 136–145)
SP GR UR STRIP: >1.03 (ref 1–1.03)
TROPONIN T SERPL HS-MCNC: 12 NG/L
UROBILINOGEN UR QL STRIP: ABNORMAL
WBC NRBC COR # BLD AUTO: 7.75 10*3/MM3 (ref 3.4–10.8)
WHOLE BLOOD HOLD COAG: NORMAL
WHOLE BLOOD HOLD SPECIMEN: NORMAL

## 2023-12-07 PROCEDURE — G0378 HOSPITAL OBSERVATION PER HR: HCPCS

## 2023-12-07 PROCEDURE — 84484 ASSAY OF TROPONIN QUANT: CPT | Performed by: EMERGENCY MEDICINE

## 2023-12-07 PROCEDURE — 81003 URINALYSIS AUTO W/O SCOPE: CPT | Performed by: EMERGENCY MEDICINE

## 2023-12-07 PROCEDURE — 82948 REAGENT STRIP/BLOOD GLUCOSE: CPT

## 2023-12-07 PROCEDURE — 83036 HEMOGLOBIN GLYCOSYLATED A1C: CPT | Performed by: INTERNAL MEDICINE

## 2023-12-07 PROCEDURE — 93005 ELECTROCARDIOGRAM TRACING: CPT | Performed by: EMERGENCY MEDICINE

## 2023-12-07 PROCEDURE — 71045 X-RAY EXAM CHEST 1 VIEW: CPT

## 2023-12-07 PROCEDURE — 87637 SARSCOV2&INF A&B&RSV AMP PRB: CPT | Performed by: EMERGENCY MEDICINE

## 2023-12-07 PROCEDURE — 83735 ASSAY OF MAGNESIUM: CPT | Performed by: EMERGENCY MEDICINE

## 2023-12-07 PROCEDURE — 93005 ELECTROCARDIOGRAM TRACING: CPT

## 2023-12-07 PROCEDURE — 80053 COMPREHEN METABOLIC PANEL: CPT | Performed by: EMERGENCY MEDICINE

## 2023-12-07 PROCEDURE — 25810000003 SODIUM CHLORIDE 0.9 % SOLUTION: Performed by: EMERGENCY MEDICINE

## 2023-12-07 PROCEDURE — 99285 EMERGENCY DEPT VISIT HI MDM: CPT

## 2023-12-07 PROCEDURE — 36415 COLL VENOUS BLD VENIPUNCTURE: CPT

## 2023-12-07 PROCEDURE — 85025 COMPLETE CBC W/AUTO DIFF WBC: CPT

## 2023-12-07 PROCEDURE — 99223 1ST HOSP IP/OBS HIGH 75: CPT | Performed by: FAMILY MEDICINE

## 2023-12-07 PROCEDURE — 25810000003 SODIUM CHLORIDE 0.9 % SOLUTION: Performed by: FAMILY MEDICINE

## 2023-12-07 PROCEDURE — 70551 MRI BRAIN STEM W/O DYE: CPT

## 2023-12-07 RX ORDER — PREDNISONE 10 MG/1
10 TABLET ORAL
Status: DISCONTINUED | OUTPATIENT
Start: 2023-12-08 | End: 2023-12-08

## 2023-12-07 RX ORDER — IPRATROPIUM BROMIDE AND ALBUTEROL SULFATE 2.5; .5 MG/3ML; MG/3ML
3 SOLUTION RESPIRATORY (INHALATION) ONCE
Status: DISCONTINUED | OUTPATIENT
Start: 2023-12-07 | End: 2023-12-08

## 2023-12-07 RX ORDER — BISACODYL 5 MG/1
5 TABLET, DELAYED RELEASE ORAL DAILY PRN
Status: DISCONTINUED | OUTPATIENT
Start: 2023-12-07 | End: 2023-12-12 | Stop reason: HOSPADM

## 2023-12-07 RX ORDER — SODIUM CHLORIDE 0.9 % (FLUSH) 0.9 %
10 SYRINGE (ML) INJECTION AS NEEDED
Status: DISCONTINUED | OUTPATIENT
Start: 2023-12-07 | End: 2023-12-12 | Stop reason: HOSPADM

## 2023-12-07 RX ORDER — ROSUVASTATIN CALCIUM 20 MG/1
40 TABLET, COATED ORAL NIGHTLY
Status: DISCONTINUED | OUTPATIENT
Start: 2023-12-07 | End: 2023-12-12 | Stop reason: HOSPADM

## 2023-12-07 RX ORDER — NICOTINE POLACRILEX 4 MG
15 LOZENGE BUCCAL
Status: DISCONTINUED | OUTPATIENT
Start: 2023-12-07 | End: 2023-12-12 | Stop reason: HOSPADM

## 2023-12-07 RX ORDER — SODIUM CHLORIDE 9 MG/ML
100 INJECTION, SOLUTION INTRAVENOUS CONTINUOUS
Status: ACTIVE | OUTPATIENT
Start: 2023-12-07 | End: 2023-12-08

## 2023-12-07 RX ORDER — BENZONATATE 100 MG/1
100 CAPSULE ORAL 3 TIMES DAILY PRN
Status: DISCONTINUED | OUTPATIENT
Start: 2023-12-07 | End: 2023-12-12 | Stop reason: HOSPADM

## 2023-12-07 RX ORDER — INSULIN LISPRO 100 [IU]/ML
2-9 INJECTION, SOLUTION INTRAVENOUS; SUBCUTANEOUS
Status: DISCONTINUED | OUTPATIENT
Start: 2023-12-07 | End: 2023-12-12 | Stop reason: HOSPADM

## 2023-12-07 RX ORDER — SODIUM CHLORIDE 0.9 % (FLUSH) 0.9 %
10 SYRINGE (ML) INJECTION EVERY 12 HOURS SCHEDULED
Status: DISCONTINUED | OUTPATIENT
Start: 2023-12-07 | End: 2023-12-12 | Stop reason: HOSPADM

## 2023-12-07 RX ORDER — ONDANSETRON 2 MG/ML
4 INJECTION INTRAMUSCULAR; INTRAVENOUS EVERY 6 HOURS PRN
Status: DISCONTINUED | OUTPATIENT
Start: 2023-12-07 | End: 2023-12-12 | Stop reason: HOSPADM

## 2023-12-07 RX ORDER — AMOXICILLIN 250 MG
2 CAPSULE ORAL 2 TIMES DAILY
Status: DISCONTINUED | OUTPATIENT
Start: 2023-12-07 | End: 2023-12-12 | Stop reason: HOSPADM

## 2023-12-07 RX ORDER — IBUPROFEN 600 MG/1
1 TABLET ORAL
Status: DISCONTINUED | OUTPATIENT
Start: 2023-12-07 | End: 2023-12-12 | Stop reason: HOSPADM

## 2023-12-07 RX ORDER — IPRATROPIUM BROMIDE AND ALBUTEROL SULFATE 2.5; .5 MG/3ML; MG/3ML
3 SOLUTION RESPIRATORY (INHALATION) EVERY 4 HOURS PRN
Status: DISCONTINUED | OUTPATIENT
Start: 2023-12-07 | End: 2023-12-10

## 2023-12-07 RX ORDER — SODIUM CHLORIDE 9 MG/ML
40 INJECTION, SOLUTION INTRAVENOUS AS NEEDED
Status: DISCONTINUED | OUTPATIENT
Start: 2023-12-07 | End: 2023-12-12 | Stop reason: HOSPADM

## 2023-12-07 RX ORDER — POLYETHYLENE GLYCOL 3350 17 G/17G
17 POWDER, FOR SOLUTION ORAL DAILY PRN
Status: DISCONTINUED | OUTPATIENT
Start: 2023-12-07 | End: 2023-12-12 | Stop reason: HOSPADM

## 2023-12-07 RX ORDER — BISACODYL 10 MG
10 SUPPOSITORY, RECTAL RECTAL DAILY PRN
Status: DISCONTINUED | OUTPATIENT
Start: 2023-12-07 | End: 2023-12-12 | Stop reason: HOSPADM

## 2023-12-07 RX ORDER — TAMSULOSIN HYDROCHLORIDE 0.4 MG/1
0.4 CAPSULE ORAL DAILY
Status: DISCONTINUED | OUTPATIENT
Start: 2023-12-08 | End: 2023-12-12 | Stop reason: HOSPADM

## 2023-12-07 RX ORDER — ENOXAPARIN SODIUM 100 MG/ML
40 INJECTION SUBCUTANEOUS DAILY
Status: DISCONTINUED | OUTPATIENT
Start: 2023-12-08 | End: 2023-12-12 | Stop reason: HOSPADM

## 2023-12-07 RX ORDER — DEXTROSE MONOHYDRATE 25 G/50ML
25 INJECTION, SOLUTION INTRAVENOUS
Status: DISCONTINUED | OUTPATIENT
Start: 2023-12-07 | End: 2023-12-12 | Stop reason: HOSPADM

## 2023-12-07 RX ORDER — CITALOPRAM 20 MG/1
10 TABLET ORAL DAILY
Status: DISCONTINUED | OUTPATIENT
Start: 2023-12-08 | End: 2023-12-12 | Stop reason: HOSPADM

## 2023-12-07 RX ORDER — GUAIFENESIN 600 MG/1
600 TABLET, EXTENDED RELEASE ORAL EVERY 12 HOURS SCHEDULED
Status: DISCONTINUED | OUTPATIENT
Start: 2023-12-07 | End: 2023-12-08

## 2023-12-07 RX ORDER — CHOLECALCIFEROL (VITAMIN D3) 125 MCG
5 CAPSULE ORAL NIGHTLY PRN
Status: DISCONTINUED | OUTPATIENT
Start: 2023-12-07 | End: 2023-12-12 | Stop reason: HOSPADM

## 2023-12-07 RX ADMIN — SODIUM CHLORIDE 1000 ML: 9 INJECTION, SOLUTION INTRAVENOUS at 14:29

## 2023-12-07 RX ADMIN — GUAIFENESIN 600 MG: 600 TABLET ORAL at 23:18

## 2023-12-07 RX ADMIN — ROSUVASTATIN 40 MG: 20 TABLET, FILM COATED ORAL at 23:18

## 2023-12-07 RX ADMIN — Medication 10 ML: at 20:37

## 2023-12-07 RX ADMIN — SODIUM CHLORIDE 100 ML/HR: 9 INJECTION, SOLUTION INTRAVENOUS at 21:47

## 2023-12-07 RX ADMIN — SENNOSIDES AND DOCUSATE SODIUM 2 TABLET: 50; 8.6 TABLET ORAL at 20:37

## 2023-12-07 NOTE — TELEPHONE ENCOUNTER
Caller: JHONATAN SWAN    Relationship to patient: Emergency Contact    Best call back number: [phone]     Patient is needing: PER PATIENT'S SON, PATIENT HAS RUNNY NOSE AND SORE THROAT. NO SAME DAY APPOINTMENTS AVAILABLE.      UNABLE TO WARM TRANSFER

## 2023-12-07 NOTE — ED PROVIDER NOTES
Time: 2:03 PM EST  Date of encounter:  12/7/2023  Independent Historian/Clinical History and Information was obtained by:   Patient and Family    History is limited by: N/A    Chief Complaint: Dizziness      History of Present Illness:  Patient is a 78 y.o. year old male who presents to the emergency department for evaluation of dizziness.  Patient and family report that he is had some type of upper respiratory virus or infection in the last 2 to 3 days.  Presents today with dizziness and no oral intake for the past 24 to 48 hours.  Family states that today due to his dizziness he rocked back and fell backwards but thankfully they did catch him so he avoided injury.  Patient has a history of multiple strokes in the past and states he is unsure if this is what is occurring again.  He does note no new focal numbness tingling or weakness of either arm or legs.  He complains of nausea but no vomiting.    HPI    Patient Care Team  Primary Care Provider: Felix Navarro Jr., MD    Past Medical History:     Allergies   Allergen Reactions   • Codeine Unknown - Low Severity   • Promethazine Unknown - Low Severity   • Celecoxib Rash   • Ibuprofen Rash   • Penicillins Rash   • Promethazine Hcl Rash   • Warfarin Rash     Past Medical History:   Diagnosis Date   • Aorta disorder    • Arthritis    • Degenerative joint disease    • Dementia     FRONTAL LOBE   • Depression    • Diabetes mellitus    • Hyperlipidemia    • Hypertension    • Kidney stone    • Stroke     MILD RIGHT SIDE DEFICITS     Past Surgical History:   Procedure Laterality Date   • CHOLECYSTECTOMY     • CYSTOSCOPY BLADDER STONE LITHOTRIPSY     • KIDNEY STONE SURGERY       History reviewed. No pertinent family history.    Home Medications:  Prior to Admission medications    Medication Sig Start Date End Date Taking? Authorizing Provider   aspirin 81 MG EC tablet Take 1 tablet by mouth Daily. 10/28/23   Garcia Park MD   Cholecalciferol 125 MCG (5000 UT)  tablet Take 1 tablet by mouth Daily.    Provider, MD Mark   citalopram (CeleXA) 20 MG tablet Take 0.5 tablets by mouth Daily. 6/13/23   Felix Navarro Jr., MD   empagliflozin (Jardiance) 25 MG tablet tablet Take 1 tablet by mouth once daily  Patient taking differently: Take 1 tablet by mouth Daily. 9/22/23   Felix Navarro Jr., MD   guaiFENesin (MUCINEX) 600 MG 12 hr tablet Take 1 tablet by mouth 2 (Two) Times a Day As Needed for Cough. 10/27/23   Garcia Park MD   ipratropium-albuterol (DUO-NEB) 0.5-2.5 mg/3 ml nebulizer Take 3 mL by nebulization Every 4 (Four) Hours As Needed for Shortness of Air or Wheezing. 10/27/23   Garcia Park MD   lisinopril (PRINIVIL,ZESTRIL) 20 MG tablet Take 0.5 tablets by mouth Daily for 30 days.  Patient taking differently: Take  by mouth Daily. 7/22/23 11/21/23  Richard Martinez MD   melatonin 5 MG tablet tablet Take 1 tablet by mouth Every Night.    Provider, MD Mark   metFORMIN (GLUCOPHAGE) 500 MG tablet Take 1 tablet by mouth 2 (Two) Times a Day With Meals. 11/2/23   Felix Navarro Jr., MD   rosuvastatin (CRESTOR) 40 MG tablet Take 1 tablet by mouth Daily.  Patient taking differently: Take 1 tablet by mouth Every Night. Patient has been taking this med at night. 2/13/23   Felix Navarro Jr., MD   SITagliptin (Januvia) 100 MG tablet Take 1 tablet by mouth Daily. 6/13/23   Felix Navarro Jr., MD   tamsulosin (FLOMAX) 0.4 MG capsule 24 hr capsule Take 1 capsule by mouth Daily. 3/3/23   Felix Navarro Jr., MD        Social History:   Social History     Tobacco Use   • Smoking status: Never   • Smokeless tobacco: Never   Vaping Use   • Vaping Use: Never used   Substance Use Topics   • Alcohol use: Never   • Drug use: Never         Review of Systems:  Review of Systems   Constitutional:  Positive for appetite change. Negative for chills and fever.   HENT:  Positive for congestion and sore throat. Negative for  "rhinorrhea.    Eyes:  Negative for photophobia.   Respiratory:  Negative for apnea, cough, chest tightness and shortness of breath.    Cardiovascular:  Negative for chest pain and palpitations.   Gastrointestinal:  Negative for abdominal pain, diarrhea, nausea and vomiting.   Endocrine: Negative.    Genitourinary:  Negative for difficulty urinating and dysuria.   Musculoskeletal:  Negative for back pain, joint swelling and myalgias.   Skin:  Negative for color change and wound.   Allergic/Immunologic: Negative.    Neurological:  Positive for dizziness and weakness. Negative for seizures and headaches.   Psychiatric/Behavioral: Negative.     All other systems reviewed and are negative.       Physical Exam:  /71 (BP Location: Right arm, Patient Position: Lying)   Pulse 62   Temp 97.9 °F (36.6 °C) (Oral)   Resp 18   Ht 175.3 cm (69\")   Wt 75.4 kg (166 lb 3.6 oz)   SpO2 94%   BMI 24.55 kg/m²     Physical Exam  Vitals and nursing note reviewed.   Constitutional:       General: He is awake.      Appearance: Normal appearance.   HENT:      Head: Normocephalic and atraumatic.      Nose: Nose normal.      Mouth/Throat:      Mouth: Mucous membranes are moist.   Eyes:      Extraocular Movements: Extraocular movements intact.      Pupils: Pupils are equal, round, and reactive to light.   Cardiovascular:      Rate and Rhythm: Normal rate and regular rhythm.      Heart sounds: Normal heart sounds.   Pulmonary:      Effort: Pulmonary effort is normal. No respiratory distress.      Breath sounds: Normal breath sounds. No wheezing, rhonchi or rales.   Abdominal:      General: Bowel sounds are normal.      Palpations: Abdomen is soft.      Tenderness: There is no abdominal tenderness. There is no guarding or rebound.      Comments: No rigidity   Musculoskeletal:         General: No tenderness. Normal range of motion.      Cervical back: Normal range of motion and neck supple.   Skin:     General: Skin is warm and dry.    "   Coloration: Skin is not jaundiced.   Neurological:      General: No focal deficit present.      Mental Status: He is alert. Mental status is at baseline.   Psychiatric:         Mood and Affect: Mood normal.                Procedures:  Procedures      Medical Decision Making:      Comorbidities that affect care:    Hypertension, depression, hyperlipidemia, CVA, diabetes, dementia    External Notes reviewed:    Hospital Discharge Summary:    Patient Name: Nikhil Baldwin  : 1945  MRN: [id number]     Date of Admission: 10/18/2023  Date of Discharge:  10/27/2023  Primary Care Physician: Felix Navarro Jr., MD  Admitting: Medicine     Final diagnoses:  COVID-19  Diabetes  History of prior CVA  Asymptomatic bradycardia, improved        Hospital Course      Hospital Course:  Nikhil Baldwin is a 78 y.o. male admitted with COVID-19 and generalized weakness.  He has a history of a previous stroke that has left him weaker.  He was placed in the hospital, originally plan to discharge home but family felt that he needed rehab.  His insurance denied acute rehab but have approved subacute rehab.  Clinically he has done well.  He has 1 more day of Decadron.  He completed course of antibiotics.  His beta-blocker was held due to asymptomatic bradycardia that is resolved.  He is stable for discharge today to [facility].       The following orders were placed and all results were independently analyzed by me:  Orders Placed This Encounter   Procedures   • COVID PRE-OP / PRE-PROCEDURE SCREENING ORDER (NO ISOLATION) - Swab, Nasopharynx   • COVID-19, FLU A/B, RSV PCR 1 HR TAT - Swab, Nasopharynx   • XR Chest 1 View   • MRI Brain Without Contrast   • [place] Draw   • Comprehensive Metabolic Panel   • Single High Sensitivity Troponin T   • Magnesium   • Urinalysis With Microscopic If Indicated (No Culture) - Urine, Clean Catch   • CBC Auto Differential   • Diet: Diabetic Diets; Consistent Carbohydrate; Texture: Regular  Texture (IDDSI 7); Fluid Consistency: Thin (IDDSI 0)   • Undress & Gown   • Continuous Pulse Oximetry   • Vital Signs   • Orthostatic Blood Pressure   • Vital Signs   • Intake & Output   • Weigh Patient   • Oral Care   • Saline Lock & Maintain IV Access   • Discontinue Cardiac Monitoring   • Code Status and Medical Interventions:   • Hospitalist (on-call MD unless specified)   • PT Consult: Eval & Treat Discharge Placement Assessment, Functional Mobility Below Baseline   • Oxygen Therapy- Nasal Cannula; Titrate 1-6 LPM Per SpO2; 90 - 95%   • POC Glucose Once   • POC Glucose Once   • POC Glucose 4x Daily Before Meals & at Bedtime   • POC Glucose Once   • ECG 12 Lead ED Triage Standing Order; Weak / Dizzy / AMS   • Insert Peripheral IV   • Insert Peripheral IV   • Initiate Observation Status   • Fall Precautions   • CBC & Differential   • Green Top (Gel)   • Lavender Top   • Gold Top - SST   • Light Blue Top       Medications Given in the Emergency Department:  Medications   sodium chloride 0.9 % flush 10 mL (has no administration in time range)   gadobenate dimeglumine (MULTIHANCE) injection 15 mL (has no administration in time range)   sodium chloride 0.9 % flush 10 mL (10 mL Intravenous Given 12/7/23 2037)   sodium chloride 0.9 % flush 10 mL (has no administration in time range)   sodium chloride 0.9 % infusion 40 mL (has no administration in time range)   sennosides-docusate (PERICOLACE) 8.6-50 MG per tablet 2 tablet (2 tablets Oral Given 12/7/23 2037)     And   polyethylene glycol (MIRALAX) packet 17 g (has no administration in time range)     And   bisacodyl (DULCOLAX) EC tablet 5 mg (has no administration in time range)     And   bisacodyl (DULCOLAX) suppository 10 mg (has no administration in time range)   Enoxaparin Sodium (LOVENOX) syringe 40 mg (has no administration in time range)   sodium chloride 0.9 % infusion (100 mL/hr Intravenous New Bag 12/7/23 2147)   ondansetron (ZOFRAN) injection 4 mg (has no  administration in time range)   citalopram (CeleXA) tablet 10 mg (has no administration in time range)   melatonin tablet 5 mg (has no administration in time range)   rosuvastatin (CRESTOR) tablet 40 mg (40 mg Oral Given 12/7/23 2318)   tamsulosin (FLOMAX) 24 hr capsule 0.4 mg (has no administration in time range)   dextrose (GLUTOSE) oral gel 15 g (has no administration in time range)   dextrose (D50W) (25 g/50 mL) IV injection 25 g (has no administration in time range)   glucagon (GLUCAGEN) injection 1 mg (has no administration in time range)   Insulin Lispro (humaLOG) injection 2-9 Units ( Subcutaneous Not Given 12/7/23 2247)   guaiFENesin (MUCINEX) 12 hr tablet 600 mg (600 mg Oral Given 12/7/23 2318)   benzonatate (TESSALON) capsule 100 mg (has no administration in time range)   predniSONE (DELTASONE) tablet 10 mg (has no administration in time range)   ipratropium-albuterol (DUO-NEB) nebulizer solution 3 mL (3 mL Nebulization Not Given 12/7/23 2239)   ipratropium-albuterol (DUO-NEB) nebulizer solution 3 mL (has no administration in time range)   sodium chloride 0.9 % bolus 1,000 mL (0 mL Intravenous Stopped 12/7/23 2026)        ED Course:    ED Course as of 12/07/23 2345   Thu Dec 07, 2023   1406 I have personally interpreted the EKG today and it shows no evidence of any acute ischemia or heart arrhythmia. [RP]   1834 Hemoglobin: 15.6 [RP]      ED Course User Index  [RP] Hermes Olmedo MD       Labs:    Lab Results (last 24 hours)       Procedure Component Value Units Date/Time    POC Glucose Once [[id number]]  (Abnormal) Collected: 12/07/23 1310    Specimen: Blood Updated: 12/07/23 1312     Glucose 130 mg/dL      Comment: Serial Number: [id number]:  [id number]       CBC & Differential [[id number]]  (Abnormal) Collected: 12/07/23 1318    Specimen: Blood from Arm, Right Updated: 12/07/23 1331    Narrative:      The following orders were created for panel order CBC & Differential.  Procedure                                Abnormality         Status                     ---------                               -----------         ------                     CBC Auto Differential[[id number]]        Abnormal            Final result                 Please view results for these tests on the individual orders.    Comprehensive Metabolic Panel [[id number]]  (Abnormal) Collected: 12/07/23 1318    Specimen: Blood from Arm, Right Updated: 12/07/23 1351     Glucose 135 mg/dL      BUN 13 mg/dL      Creatinine 1.16 mg/dL      Sodium 140 mmol/L      Potassium 4.0 mmol/L      Chloride 103 mmol/L      CO2 22.6 mmol/L      Calcium 10.1 mg/dL      Total Protein 6.9 g/dL      Albumin 4.4 g/dL      ALT (SGPT) 14 U/L      AST (SGOT) 14 U/L      Alkaline Phosphatase 69 U/L      Total Bilirubin 1.2 mg/dL      Globulin 2.5 gm/dL      A/G Ratio 1.8 g/dL      BUN/Creatinine Ratio 11.2     Anion Gap 14.4 mmol/L      eGFR 63.7 mL/min/1.73     Narrative:      GFR Normal >60  Chronic Kidney Disease <60  Kidney Failure <15    The GFR formula is only valid for adults with stable renal function between ages 18 and 70.    Single High Sensitivity Troponin T [[id number]]  (Normal) Collected: 12/07/23 1318    Specimen: Blood from Arm, Right Updated: 12/07/23 1351     HS Troponin T 12 ng/L     Narrative:      High Sensitive Troponin T Reference Range:  <14.0 ng/L- Negative Female for AMI  <22.0 ng/L- Negative Male for AMI  >=14 - Abnormal Female indicating possible myocardial injury.  >=22 - Abnormal Male indicating possible myocardial injury.   Clinicians would have to utilize clinical acumen, EKG, Troponin, and serial changes to determine if it is an Acute Myocardial Infarction or myocardial injury due to an underlying chronic condition.         Magnesium [[id number]]  (Normal) Collected: 12/07/23 1318    Specimen: Blood from Arm, Right Updated: 12/07/23 1351     Magnesium 1.6 mg/dL     CBC Auto Differential [[id number]]  (Abnormal) Collected: 12/07/23 1318     Specimen: Blood from Arm, Right Updated: 12/07/23 1331     WBC 7.75 10*3/mm3      RBC 5.28 10*6/mm3      Hemoglobin 15.6 g/dL      Hematocrit 46.9 %      MCV 88.8 fL      MCH 29.5 pg      MCHC 33.3 g/dL      RDW 14.6 %      RDW-SD 46.8 fl      MPV 10.9 fL      Platelets 162 10*3/mm3      Neutrophil % 66.4 %      Lymphocyte % 17.3 %      Monocyte % 13.8 %      Eosinophil % 1.2 %      Basophil % 0.8 %      Immature Grans % 0.5 %      Neutrophils, Absolute 5.15 10*3/mm3      Lymphocytes, Absolute 1.34 10*3/mm3      Monocytes, Absolute 1.07 10*3/mm3      Eosinophils, Absolute 0.09 10*3/mm3      Basophils, Absolute 0.06 10*3/mm3      Immature Grans, Absolute 0.04 10*3/mm3      nRBC 0.0 /100 WBC     COVID PRE-OP / PRE-PROCEDURE SCREENING ORDER (NO ISOLATION) - Swab, Nasopharynx [[id number]]  (Normal) Collected: 12/07/23 1414    Specimen: Swab from Nasopharynx Updated: 12/07/23 1454    Narrative:      The following orders were created for panel order COVID PRE-OP / PRE-PROCEDURE SCREENING ORDER (NO ISOLATION) - Swab, Nasopharynx.  Procedure                               Abnormality         Status                     ---------                               -----------         ------                     COVID-19, FLU A/B, RSV P...[[id number]]  Normal              Final result                 Please view results for these tests on the individual orders.    COVID-19, FLU A/B, RSV PCR 1 HR TAT - Swab, Nasopharynx [[id number]]  (Normal) Collected: 12/07/23 1414    Specimen: Swab from Nasopharynx Updated: 12/07/23 1454     COVID19 Not Detected     Influenza A PCR Not Detected     Influenza B PCR Not Detected     RSV, PCR Not Detected    Narrative:      Fact sheet for providers: https://www.fda.gov/media/270114/download    Fact sheet for patients: https://www.fda.gov/media/134507/download    Test performed by PCR.    Urinalysis With Microscopic If Indicated (No Culture) - Urine, Clean Catch [[id number]]  (Abnormal) Collected: 12/07/23  1450    Specimen: Urine, Clean Catch Updated: 12/07/23 1500     Color, UA Yellow     Appearance, UA Clear     pH, UA 5.5     Specific Gravity, UA >1.030     Glucose, UA >=1000 mg/dL (3+)     Ketones, UA Negative     Bilirubin, UA Negative     Blood, UA Negative     Protein, UA Trace     Leuk Esterase, UA Negative     Nitrite, UA Negative     Urobilinogen, UA 1.0 E.U./dL    Narrative:      Urine microscopic not indicated.    POC Glucose Once [[id number]]  (Normal) Collected: 12/07/23 2246    Specimen: Blood Updated: 12/07/23 2248     Glucose 94 mg/dL      Comment: Serial Number: [id number]:  [id number]                Imaging:    MRI Brain Without Contrast    Result Date: 12/7/2023  PROCEDURE: MRI BRAIN WO CONTRAST  COMPARISON: [facility], MR, MRI BRAIN WO CONTRAST, 7/16/2023, 11:04.  INDICATIONS: History of multiple strokes, new dizziness since yesterday      TECHNIQUE: A variety of imaging planes and parameters were utilized for visualization of suspected pathology.  Images were performed without contrast.  FINDINGS:  Diffusion-weighted images are negative for acute infarction.  There is no evidence of intracranial hemorrhage on susceptibility weighted imaging.  Moderate to severe T2 high signal abnormality is noted in the cerebral white matter bilaterally and tamar, favored to represent small vessel ischemic disease.  Findings appear similar to the 7/16/2023 exam.  No mass or mass-effect is seen.  There is moderate diffuse cerebral and cerebellar atrophy.  The ventricles are normal in size and configuration given the degree of atrophy.  Intravenous contrast was not given to assess for abnormal enhancement.  No focal calvarial abnormality is seen.  Visualized extracranial soft tissues appear normal.  No mastoid or middle ear effusion is seen.        1. No acute infarction or intracranial hemorrhage 2. Moderate to severe small vessel ischemic changes in the white matter      Davi Elmore M.D.        Electronically Signed and Approved By: Davi Elmore M.D. on 12/07/2023 at 17:35             XR Chest 1 View    Result Date: 12/7/2023  PROCEDURE: XR CHEST 1 VW  COMPARISON: None  INDICATIONS: Weak/Dizzy/AMS triage protocol/generalized weakness  FINDINGS:  Heart size and pulmonary vasculature appear within normal limits.  Lungs clear.  Costophrenic angles sharp       No active cardiopulmonary disease       YUAN YORK MD       Electronically Signed and Approved By: YUAN YORK MD on 12/07/2023 at 14:20                Differential Diagnosis and Discussion:    Dizziness: Based on the patient's history, signs, and symptoms, the diffential diagnosis includes but is not limited to meningitis, stroke, sepsis, subarachnoid hemorrhage, intracranial bleeding, encephalitis, vertigo, electrolyte imbalance, and metabolic disorders.    All labs were reviewed and interpreted by me.  All X-rays impressions were independently interpreted by me.  EKG was interpreted by me.  MRI impression was interpreted by me.     MDM     Amount and/or Complexity of Data Reviewed  Clinical lab tests: reviewed  Tests in the medicine section of CPT®: reviewed                 Patient Care Considerations:    CONSULT: I considered consulting neurology, however MRI brain is negative and patient has no focal neurologic deficits.      Consultants/Shared Management Plan:    Hospitalist: I have discussed the case with Dr. Collier who agrees to accept the patient for admission.    Social Determinants of Health:    Patient has presented with family members who are responsible, reliable and will ensure follow up care.      Disposition and Care Coordination:    Admit:   Through independent evaluation of the patient's history, physical, and imperical data, the patient meets criteria for observation/admission to the hospital.        Final diagnoses:   Dizziness        ED Disposition       ED Disposition   Decision to Admit    Condition   --    Comment    Level of Care: Med/Surg [1]   Diagnosis: Generalized weakness [348786]   Admitting Physician: DONAL BRADY [[id number]]                 This medical record created using voice recognition software.             Hermes Olmedo MD  12/07/23 0141

## 2023-12-08 LAB
ANION GAP SERPL CALCULATED.3IONS-SCNC: 12 MMOL/L (ref 5–15)
BASOPHILS # BLD AUTO: 0.07 10*3/MM3 (ref 0–0.2)
BASOPHILS NFR BLD AUTO: 0.9 % (ref 0–1.5)
BUN SERPL-MCNC: 14 MG/DL (ref 8–23)
BUN/CREAT SERPL: 13.6 (ref 7–25)
CALCIUM SPEC-SCNC: 9.3 MG/DL (ref 8.6–10.5)
CHLORIDE SERPL-SCNC: 105 MMOL/L (ref 98–107)
CO2 SERPL-SCNC: 22 MMOL/L (ref 22–29)
CREAT SERPL-MCNC: 1.03 MG/DL (ref 0.76–1.27)
DEPRECATED RDW RBC AUTO: 47.8 FL (ref 37–54)
EGFRCR SERPLBLD CKD-EPI 2021: 73.4 ML/MIN/1.73
EOSINOPHIL # BLD AUTO: 0.07 10*3/MM3 (ref 0–0.4)
EOSINOPHIL NFR BLD AUTO: 0.9 % (ref 0.3–6.2)
ERYTHROCYTE [DISTWIDTH] IN BLOOD BY AUTOMATED COUNT: 14.6 % (ref 12.3–15.4)
GLUCOSE BLDC GLUCOMTR-MCNC: 100 MG/DL (ref 70–99)
GLUCOSE BLDC GLUCOMTR-MCNC: 192 MG/DL (ref 70–99)
GLUCOSE BLDC GLUCOMTR-MCNC: 279 MG/DL (ref 70–99)
GLUCOSE BLDC GLUCOMTR-MCNC: 75 MG/DL (ref 70–99)
GLUCOSE BLDC GLUCOMTR-MCNC: 90 MG/DL (ref 70–99)
GLUCOSE SERPL-MCNC: 131 MG/DL (ref 65–99)
HBA1C MFR BLD: 6.2 % (ref 4.8–5.6)
HCT VFR BLD AUTO: 42.9 % (ref 37.5–51)
HGB BLD-MCNC: 14.5 G/DL (ref 13–17.7)
IMM GRANULOCYTES # BLD AUTO: 0.04 10*3/MM3 (ref 0–0.05)
IMM GRANULOCYTES NFR BLD AUTO: 0.5 % (ref 0–0.5)
LYMPHOCYTES # BLD AUTO: 1.69 10*3/MM3 (ref 0.7–3.1)
LYMPHOCYTES NFR BLD AUTO: 22.3 % (ref 19.6–45.3)
MAGNESIUM SERPL-MCNC: 1.7 MG/DL (ref 1.6–2.4)
MCH RBC QN AUTO: 30.2 PG (ref 26.6–33)
MCHC RBC AUTO-ENTMCNC: 33.8 G/DL (ref 31.5–35.7)
MCV RBC AUTO: 89.4 FL (ref 79–97)
MONOCYTES # BLD AUTO: 1.01 10*3/MM3 (ref 0.1–0.9)
MONOCYTES NFR BLD AUTO: 13.3 % (ref 5–12)
NEUTROPHILS NFR BLD AUTO: 4.69 10*3/MM3 (ref 1.7–7)
NEUTROPHILS NFR BLD AUTO: 62.1 % (ref 42.7–76)
NRBC BLD AUTO-RTO: 0 /100 WBC (ref 0–0.2)
PHOSPHATE SERPL-MCNC: 2.7 MG/DL (ref 2.5–4.5)
PLATELET # BLD AUTO: 138 10*3/MM3 (ref 140–450)
PMV BLD AUTO: 10.7 FL (ref 6–12)
POTASSIUM SERPL-SCNC: 3.9 MMOL/L (ref 3.5–5.2)
PROCALCITONIN SERPL-MCNC: 0.08 NG/ML (ref 0–0.25)
RBC # BLD AUTO: 4.8 10*6/MM3 (ref 4.14–5.8)
S PYO AG THROAT QL: NEGATIVE
SODIUM SERPL-SCNC: 139 MMOL/L (ref 136–145)
WBC NRBC COR # BLD AUTO: 7.57 10*3/MM3 (ref 3.4–10.8)

## 2023-12-08 PROCEDURE — 25810000003 LACTATED RINGERS PER 1000 ML: Performed by: INTERNAL MEDICINE

## 2023-12-08 PROCEDURE — 84100 ASSAY OF PHOSPHORUS: CPT | Performed by: INTERNAL MEDICINE

## 2023-12-08 PROCEDURE — 92610 EVALUATE SWALLOWING FUNCTION: CPT

## 2023-12-08 PROCEDURE — 87880 STREP A ASSAY W/OPTIC: CPT | Performed by: INTERNAL MEDICINE

## 2023-12-08 PROCEDURE — 87070 CULTURE OTHR SPECIMN AEROBIC: CPT | Performed by: INTERNAL MEDICINE

## 2023-12-08 PROCEDURE — 84145 PROCALCITONIN (PCT): CPT | Performed by: INTERNAL MEDICINE

## 2023-12-08 PROCEDURE — G0378 HOSPITAL OBSERVATION PER HR: HCPCS

## 2023-12-08 PROCEDURE — 94799 UNLISTED PULMONARY SVC/PX: CPT

## 2023-12-08 PROCEDURE — 25010000002 MAGNESIUM SULFATE 2 GM/50ML SOLUTION: Performed by: INTERNAL MEDICINE

## 2023-12-08 PROCEDURE — 25010000002 METHYLPREDNISOLONE PER 40 MG: Performed by: INTERNAL MEDICINE

## 2023-12-08 PROCEDURE — 99233 SBSQ HOSP IP/OBS HIGH 50: CPT | Performed by: INTERNAL MEDICINE

## 2023-12-08 PROCEDURE — 87081 CULTURE SCREEN ONLY: CPT | Performed by: INTERNAL MEDICINE

## 2023-12-08 PROCEDURE — 97161 PT EVAL LOW COMPLEX 20 MIN: CPT

## 2023-12-08 PROCEDURE — 82948 REAGENT STRIP/BLOOD GLUCOSE: CPT

## 2023-12-08 PROCEDURE — 80048 BASIC METABOLIC PNL TOTAL CA: CPT | Performed by: INTERNAL MEDICINE

## 2023-12-08 PROCEDURE — 63710000001 PREDNISONE PER 5 MG: Performed by: FAMILY MEDICINE

## 2023-12-08 PROCEDURE — 94640 AIRWAY INHALATION TREATMENT: CPT

## 2023-12-08 PROCEDURE — 83735 ASSAY OF MAGNESIUM: CPT | Performed by: INTERNAL MEDICINE

## 2023-12-08 PROCEDURE — 85025 COMPLETE CBC W/AUTO DIFF WBC: CPT | Performed by: INTERNAL MEDICINE

## 2023-12-08 PROCEDURE — 87205 SMEAR GRAM STAIN: CPT | Performed by: INTERNAL MEDICINE

## 2023-12-08 PROCEDURE — 63710000001 INSULIN LISPRO (HUMAN) PER 5 UNITS: Performed by: FAMILY MEDICINE

## 2023-12-08 PROCEDURE — 25010000002 ENOXAPARIN PER 10 MG: Performed by: FAMILY MEDICINE

## 2023-12-08 RX ORDER — BUDESONIDE AND FORMOTEROL FUMARATE DIHYDRATE 160; 4.5 UG/1; UG/1
2 AEROSOL RESPIRATORY (INHALATION)
Status: DISCONTINUED | OUTPATIENT
Start: 2023-12-08 | End: 2023-12-12 | Stop reason: HOSPADM

## 2023-12-08 RX ORDER — METHYLPREDNISOLONE SODIUM SUCCINATE 40 MG/ML
40 INJECTION, POWDER, LYOPHILIZED, FOR SOLUTION INTRAMUSCULAR; INTRAVENOUS ONCE
Status: COMPLETED | OUTPATIENT
Start: 2023-12-08 | End: 2023-12-08

## 2023-12-08 RX ORDER — GUAIFENESIN 600 MG/1
1200 TABLET, EXTENDED RELEASE ORAL EVERY 12 HOURS SCHEDULED
Status: DISCONTINUED | OUTPATIENT
Start: 2023-12-08 | End: 2023-12-12 | Stop reason: HOSPADM

## 2023-12-08 RX ORDER — FAMOTIDINE 20 MG/1
20 TABLET, FILM COATED ORAL
Status: DISCONTINUED | OUTPATIENT
Start: 2023-12-08 | End: 2023-12-09

## 2023-12-08 RX ORDER — PREDNISONE 20 MG/1
20 TABLET ORAL
Status: DISCONTINUED | OUTPATIENT
Start: 2023-12-09 | End: 2023-12-10

## 2023-12-08 RX ORDER — MAGNESIUM SULFATE HEPTAHYDRATE 40 MG/ML
2 INJECTION, SOLUTION INTRAVENOUS ONCE
Status: COMPLETED | OUTPATIENT
Start: 2023-12-08 | End: 2023-12-08

## 2023-12-08 RX ORDER — SODIUM CHLORIDE, SODIUM LACTATE, POTASSIUM CHLORIDE, CALCIUM CHLORIDE 600; 310; 30; 20 MG/100ML; MG/100ML; MG/100ML; MG/100ML
100 INJECTION, SOLUTION INTRAVENOUS CONTINUOUS
Status: ACTIVE | OUTPATIENT
Start: 2023-12-08 | End: 2023-12-08

## 2023-12-08 RX ORDER — ALBUTEROL SULFATE 2.5 MG/3ML
2.5 SOLUTION RESPIRATORY (INHALATION)
Status: DISCONTINUED | OUTPATIENT
Start: 2023-12-08 | End: 2023-12-12

## 2023-12-08 RX ORDER — DOXYCYCLINE 100 MG/1
100 CAPSULE ORAL EVERY 12 HOURS SCHEDULED
Status: DISCONTINUED | OUTPATIENT
Start: 2023-12-08 | End: 2023-12-12 | Stop reason: HOSPADM

## 2023-12-08 RX ORDER — ALBUTEROL SULFATE 2.5 MG/3ML
2.5 SOLUTION RESPIRATORY (INHALATION) EVERY 6 HOURS PRN
Status: DISCONTINUED | OUTPATIENT
Start: 2023-12-08 | End: 2023-12-12 | Stop reason: HOSPADM

## 2023-12-08 RX ADMIN — SODIUM CHLORIDE, POTASSIUM CHLORIDE, SODIUM LACTATE AND CALCIUM CHLORIDE 100 ML/HR: 600; 310; 30; 20 INJECTION, SOLUTION INTRAVENOUS at 13:01

## 2023-12-08 RX ADMIN — BUDESONIDE AND FORMOTEROL FUMARATE DIHYDRATE 2 PUFF: 160; 4.5 AEROSOL RESPIRATORY (INHALATION) at 19:05

## 2023-12-08 RX ADMIN — METHYLPREDNISOLONE SODIUM SUCCINATE 40 MG: 40 INJECTION INTRAMUSCULAR; INTRAVENOUS at 13:01

## 2023-12-08 RX ADMIN — Medication 10 ML: at 20:35

## 2023-12-08 RX ADMIN — MAGNESIUM SULFATE HEPTAHYDRATE 2 G: 40 INJECTION, SOLUTION INTRAVENOUS at 15:27

## 2023-12-08 RX ADMIN — DOXYCYCLINE 100 MG: 100 CAPSULE ORAL at 20:35

## 2023-12-08 RX ADMIN — ALBUTEROL SULFATE 2.5 MG: 2.5 SOLUTION RESPIRATORY (INHALATION) at 13:32

## 2023-12-08 RX ADMIN — ALBUTEROL SULFATE 2.5 MG: 2.5 SOLUTION RESPIRATORY (INHALATION) at 23:04

## 2023-12-08 RX ADMIN — DOXYCYCLINE 100 MG: 100 CAPSULE ORAL at 15:26

## 2023-12-08 RX ADMIN — INSULIN LISPRO 6 UNITS: 100 INJECTION, SOLUTION INTRAVENOUS; SUBCUTANEOUS at 20:52

## 2023-12-08 RX ADMIN — ALBUTEROL SULFATE 2.5 MG: 2.5 SOLUTION RESPIRATORY (INHALATION) at 19:05

## 2023-12-08 RX ADMIN — SENNOSIDES AND DOCUSATE SODIUM 2 TABLET: 50; 8.6 TABLET ORAL at 20:51

## 2023-12-08 RX ADMIN — ENOXAPARIN SODIUM 40 MG: 100 INJECTION SUBCUTANEOUS at 08:05

## 2023-12-08 RX ADMIN — ROSUVASTATIN 40 MG: 20 TABLET, FILM COATED ORAL at 20:35

## 2023-12-08 RX ADMIN — GUAIFENESIN 1200 MG: 600 TABLET ORAL at 20:35

## 2023-12-08 RX ADMIN — INSULIN LISPRO 2 UNITS: 100 INJECTION, SOLUTION INTRAVENOUS; SUBCUTANEOUS at 17:47

## 2023-12-08 RX ADMIN — ALBUTEROL SULFATE 2.5 MG: 2.5 SOLUTION RESPIRATORY (INHALATION) at 15:16

## 2023-12-08 RX ADMIN — Medication 10 ML: at 08:06

## 2023-12-08 RX ADMIN — FAMOTIDINE 20 MG: 20 TABLET ORAL at 17:47

## 2023-12-08 NOTE — THERAPY EVALUATION
Acute Care - Speech Language Pathology   Swallow Initial Evaluation TONY Guillory     Patient Name: Nikhil Baldwin  : 1945  MRN: [id number]  Today's Date: 2023               Admit Date: 2023    Visit Dx:     ICD-10-CM ICD-9-CM   1. Dizziness  R42 780.4     Patient Active Problem List   Diagnosis    Aortic aneurysm    Arthritis    BPH (benign prostatic hyperplasia)    Cervical radiculopathy    Type 2 diabetes mellitus with hyperglycemia, without long-term current use of insulin    Parathyroid abnormality    Thyroid disorder    Facet arthropathy    Fatty metamorphosis of liver    GERD (gastroesophageal reflux disease)    Primary hypertension    Hematuria    Hiatal hernia    Urinary incontinence    Hypercalcemia    Mixed hyperlipidemia    Kidney disease    Renal calculi    Low back pain    Thoracic back pain    DDD (degenerative disc disease), lumbar    Spinal stenosis of lumbar region    Degeneration of thoracic or thoracolumbar intervertebral disc    MI (myocardial infarction)    Myofascial pain    Neck pain    Nocturia    Sciatica    Stroke    Ureterolithiasis    Vitamin D deficiency    Labile personality    Acute viral syndrome    Debility    COVID-19    Upper respiratory tract infection due to COVID-19 virus    COVID    Generalized weakness    Acute bronchitis    Dizziness     Past Medical History:   Diagnosis Date    Aorta disorder     Arthritis     Degenerative joint disease     Dementia     FRONTAL LOBE    Depression     Diabetes mellitus     Hyperlipidemia     Hypertension     Kidney stone     Stroke     MILD RIGHT SIDE DEFICITS     Past Surgical History:   Procedure Laterality Date    CHOLECYSTECTOMY      CYSTOSCOPY BLADDER STONE LITHOTRIPSY      KIDNEY STONE SURGERY     PAIN SCALE: None noted    PRECAUTIONS/CONTRAINDICATIONS: None noted    SUSPECTED ABUSE/NEGLECT/EXPLOITATION: None noted    SOCIAL/PSYCHOLOGICAL NEEDS/BARRIERS: None noted    PAST SOCIAL HISTORY: None noted    PRIOR FUNCTION:  Lives at home    PATIENT GOALS/EXPECTATIONS: Some dependence for care    HISTORY: This patient is an 78-year-old admitted with the above diagnosis.  Patient reported having difficulty swallowing oral meds, denies difficulty swallowing food/liquid    CURRENT DIET LEVEL: Regular diet    OBJECTIVE:    TEST ADMINISTERED: Clinical dysphagia evaluation    COGNITION/SAFETY AWARENESS: Alert    BEHAVIORAL OBSERVATIONS: Cooperative    ORAL MOTOR EXAM: Generalized oral motor weakness.  Edentulous    VOICE QUALITY: Within functional limits    REFLEX EXAM: Deferred    POSTURE: 90 degrees upright    FEEDING/SWALLOWING FUNCTION: Assessed with thin liquids, purée consistencies soft and hard solids    CLINICAL OBSERVATIONS: Oral stage is characterized by mildly prolonged mastication/bolus preparation with soft and hard solids.  Patient having to expel some hard solids.  Timely oral transit.  No oral residue noted after the swallow.  Pharyngeal phase appears timely with good laryngeal elevation per palpation.  No clinical signs or symptoms of aspiration noted.  Vocal quality remained clear to auscultation.  Denies globus sensation after completion of swallow.    DYSPHAGIA CRITERIA: N/A    FUNCTIONAL ASSESSMENT INSTRUMENT: Patient currently scored a level 6 of 7 on Functional Communication Measures for swallowing indicating a 1-19% limitation in function.    ASSESSMENT/ PLAN OF CARE:  Pt presents with functional oropharyngeal swallow.  No clinical signs or symptoms of aspiration noted.      REHAB POTENTIAL:  Pt has good rehab potential.  The following limitations may influence improvement/ length of tx medical status.    RECOMMENDATIONS:   1.   DIET: Mechanical soft diet-thin liquids    2.  POSITION: 90 degrees upright    3.  COMPENSATORY STRATEGIES: General swallow precautions, oral meds in applesauce crushed if needed    YES: Pt/responsible party agrees with plan of care and has been informed of all alternatives, risks and  benefits.      EDUCATION  The patient has been educated in the following areas:   Dysphagia (Swallowing Impairment).       Sheila Humphries, SLP  12/8/2023

## 2023-12-08 NOTE — PROCEDURES
Respiratory Therapist Walking Oximetry Progress Note      Patient Name:  Nikhil Baldwin  YOB: 1945  Date of Procedure: 12/08/23              ROOM AIR BASELINE   SpO2%     Heart Rate      EXERCISE ON ROOM AIR SpO2% EXERCISE ON O2 LPM SpO2%   1 MINUTE  1 MINUTE     2 MINUTES  2 MINUTES     3 MINUTES  3 MINUTES     4 MINUTES  4 MINUTES     5 MINUTES  5 MINUTES     6 MINUTES  6 MINUTES                SpO2% Post Exercise     HR Post Exercise     Time to Recovery       Comments: Patient refused walk test.          Electronically signed by Alison Pacheco CRT, 12/08/23, 8:58 AM EST.

## 2023-12-08 NOTE — H&P
[facility]   HISTORY AND PHYSICAL    Patient Name: Nikhil Baldwin  : 1945  MRN: [id number]  Primary Care Physician:  Felix Navarro Jr., MD  Date of admission: 2023    Subjective   Subjective     Chief Complaint: Weakness    HPI:    Nikhil Baldwin is a 78 y.o. male with past medical history of diabetes, hypertension, hyperlipidemia, dementia, and GERD presented to the ED with complaints of weakness and decreased appetite.  Family at bedside states that the patient for the last few days has not been eating well and has been mostly sedentary which is unlike him.  Patient also has been having a productive cough with no fevers, chills or sick contacts.  Family had continued concern was brought up into the ED for further evaluation.  In the ED patient's vitals were all within normal limits on arrival.  Labs were all relatively unremarkable given his chronic conditions including a negative troponin and urinalysis.  There was some concern for possible strokes MRI was performed which was negative for any acute findings.  Chest x-ray was also negative.  When asked he denied any recent fevers, chills, headaches, focal weakness, chest pain, palpitation, abdominal pain, nausea, vomiting, diarrhea, constipation, dysuria, hematuria, hematochezia, melena, or anxiety.  Patient admitted for further evaluation and treatment    Review of Systems   All systems were reviewed and negative except for: As stated in HPI    Personal History     Past Medical History:   Diagnosis Date   • Aorta disorder    • Arthritis    • Degenerative joint disease    • Dementia     FRONTAL LOBE   • Depression    • Diabetes mellitus    • Hyperlipidemia    • Hypertension    • Kidney stone    • Stroke     MILD RIGHT SIDE DEFICITS       Past Surgical History:   Procedure Laterality Date   • CHOLECYSTECTOMY     • CYSTOSCOPY BLADDER STONE LITHOTRIPSY     • KIDNEY STONE SURGERY         Family History: family history is not on file.  Otherwise pertinent FHx was reviewed and not pertinent to current issue.    Social History:  reports that he has never smoked. He has never used smokeless tobacco. He reports that he does not drink alcohol and does not use drugs.    Home Medications:  Cholecalciferol, SITagliptin, citalopram, empagliflozin, ipratropium-albuterol, lisinopril, melatonin, metFORMIN, rosuvastatin, and tamsulosin      Allergies:  Allergies   Allergen Reactions   • Codeine Unknown - Low Severity   • Promethazine Unknown - Low Severity   • Celecoxib Rash   • Ibuprofen Rash   • Penicillins Rash   • Promethazine Hcl Rash   • Warfarin Rash       Objective   Objective     Vitals:   Temp:  [98.7 °F (37.1 °C)-98.9 °F (37.2 °C)] 98.7 °F (37.1 °C)  Heart Rate:  [74-92] 74  Resp:  [17-19] 17  BP: (107-125)/(63-89) 107/63  Physical Exam    Constitutional: Awake, alert   Eyes: PERRLA, sclerae anicteric, no conjunctival injection   HENT: NCAT, mucous membranes moist   Neck: Supple, no thyromegaly, no lymphadenopathy, trachea midline   Respiratory: Wheezing and rhonchi   Cardiovascular: RRR, no murmurs, rubs, or gallops, palpable pedal pulses bilaterally   Gastrointestinal: Positive bowel sounds, soft, nontender, nondistended   Musculoskeletal: No bilateral ankle edema, no clubbing or cyanosis to extremities   Psychiatric: Appropriate affect, cooperative   Neurologic: strength symmetric in all extremities, Cranial Nerves grossly intact to confrontation, speech clear   Skin: No rashes     Result Review    Result Review:  I have personally reviewed the results from the time of this admission to 12/7/2023 21:59 EST and agree with these findings:  [x]  Laboratory list / accordion  []  Microbiology  [x]  Radiology  [x]  EKG/Telemetry   []  Cardiology/Vascular   []  Pathology  []  Old records  []  Other:  Most notable findings include: Labs unremarkable, UA negative, MRI of the brain negative for any acute findings, chest x-ray negative      Assessment &  Plan   Assessment / Plan     Brief Patient Summary:  Nikhil Baldwin is a 78 y.o. male with past medical history of diabetes, hypertension, hyperlipidemia, dementia, and GERD presented to the ED with complaints of weakness and decreased appetite    Active Hospital Problems:  Active Hospital Problems    Diagnosis    • **Generalized weakness    • Acute bronchitis    • Dizziness    • Arthritis    • Primary hypertension    • Type 2 diabetes mellitus with hyperglycemia, without long-term current use of insulin    • BPH (benign prostatic hyperplasia)      Plan:     Generalized weakness  -Admit to medical floor  -Likely secondary to dehydration given her poor p.o. intake as well as likely bronchitis  -History of prior CVA  -MRI negative for any acute findings  -Concern for falls at home  -IVF  -Supplement meals with Ensure  -Fall precautions  -PT  -Supportive care    Bronchitis  -Productive cough for over a week  -Denies any fevers or chills  -Mucinex, Tessalon Perles  -Prednisone  -DuoNebs as needed    HTN  -Currently well controlled  -PRN BP meds  -Resume home meds when available  -Titrate if needed    Diabetes  -Insulin sliding scale  -Levemir at bedtime  -Titrate as needed    Hx CAD    GI ppx  DVT ppx    DVT prophylaxis:  Medical DVT prophylaxis orders are present.    CODE STATUS:    Level Of Support Discussed With: Patient  Code Status (Patient has no pulse and is not breathing): CPR (Attempt to Resuscitate)  Medical Interventions (Patient has pulse or is breathing): Full Support    Admission Status:  I believe this patient meets observation status.      Electronically signed by Brendon Collier MD, 12/07/23, 9:59 PM EST.

## 2023-12-08 NOTE — PLAN OF CARE
Problem: Adult Inpatient Plan of Care  Goal: Plan of Care Review  Outcome: Ongoing, Progressing  Goal: Patient-Specific Goal (Individualized)  Outcome: Ongoing, Progressing  Goal: Absence of Hospital-Acquired Illness or Injury  Outcome: Ongoing, Progressing  Intervention: Prevent and Manage VTE (Venous Thromboembolism) Risk  Recent Flowsheet Documentation  Taken 12/7/2023 2318 by Talia Moran RN  VTE Prevention/Management: (PT REFUSED)   sequential compression devices off   other (see comments)  Range of Motion: active ROM (range of motion) encouraged  Goal: Optimal Comfort and Wellbeing  Outcome: Ongoing, Progressing  Intervention: Provide Person-Centered Care  Recent Flowsheet Documentation  Taken 12/7/2023 2318 by Talia Moran RN  Trust Relationship/Rapport:   care explained   choices provided   emotional support provided   empathic listening provided   questions answered   questions encouraged   reassurance provided   thoughts/feelings acknowledged  Goal: Readiness for Transition of Care  Outcome: Ongoing, Progressing  Intervention: Mutually Develop Transition Plan  Recent Flowsheet Documentation  Taken 12/7/2023 2304 by Talia Moran, RN  Equipment Currently Used at Home:   walker, rolling   grab bar   commode  Taken 12/7/2023 2302 by Talia Moran, RN  Transportation Anticipated: family or friend will provide  Patient/Family Anticipated Services at Transition: none  Patient/Family Anticipates Transition to: home with family     Problem: Asthma Comorbidity  Goal: Maintenance of Asthma Control  Outcome: Ongoing, Progressing     Problem: Behavioral Health Comorbidity  Goal: Maintenance of Behavioral Health Symptom Control  Outcome: Ongoing, Progressing     Problem: COPD (Chronic Obstructive Pulmonary Disease) Comorbidity  Goal: Maintenance of COPD Symptom Control  Outcome: Ongoing, Progressing  Intervention: Maintain COPD-Symptom Control  Recent Flowsheet Documentation  Taken  12/7/2023 2318 by Talia Moran, RN  Supportive Measures: active listening utilized     Problem: Diabetes Comorbidity  Goal: Blood Glucose Level Within Targeted Range  Outcome: Ongoing, Progressing     Problem: Heart Failure Comorbidity  Goal: Maintenance of Heart Failure Symptom Control  Outcome: Ongoing, Progressing     Problem: Hypertension Comorbidity  Goal: Blood Pressure in Desired Range  Outcome: Ongoing, Progressing     Problem: Obstructive Sleep Apnea Risk or Actual Comorbidity Management  Goal: Unobstructed Breathing During Sleep  Outcome: Ongoing, Progressing     Problem: Osteoarthritis Comorbidity  Goal: Maintenance of Osteoarthritis Symptom Control  Outcome: Ongoing, Progressing     Problem: Pain Chronic (Persistent) (Comorbidity Management)  Goal: Acceptable Pain Control and Functional Ability  Outcome: Ongoing, Progressing  Intervention: Optimize Psychosocial Wellbeing  Recent Flowsheet Documentation  Taken 12/7/2023 2318 by Talia Moran, RN  Supportive Measures: active listening utilized  Diversional Activities: television     Problem: Seizure Disorder Comorbidity  Goal: Maintenance of Seizure Control  Outcome: Ongoing, Progressing     Problem: Skin Injury Risk Increased  Goal: Skin Health and Integrity  Outcome: Ongoing, Progressing   Goal Outcome Evaluation:             Pt alert oriented x3  disoriented to time. VSS.  Patient No C/O pain or discomfort this shift. Continue plan of care.

## 2023-12-08 NOTE — PLAN OF CARE
Goal Outcome Evaluation:  Plan of Care Reviewed With: patient, son        Progress: no change  Outcome Evaluation: Pt presents with limitations that impede their ability to safely and independently transfer and ambulate. The skills of a therapist will be required to safely and effectively implement the following treatment plan to restore maximal level of function.      Anticipated Discharge Disposition (PT): inpatient rehabilitation facility, sub acute care setting

## 2023-12-08 NOTE — THERAPY EVALUATION
Acute Care - Physical Therapy Initial Evaluation   [facility]     Patient Name: Nikhil Baldwin  : 1945  MRN: [id number]  Today's Date: 2023      Visit Dx:     ICD-10-CM ICD-9-CM   1. Dizziness  R42 780.4   2. Difficulty walking  R26.2 719.7     Patient Active Problem List   Diagnosis    Aortic aneurysm    Arthritis    BPH (benign prostatic hyperplasia)    Cervical radiculopathy    Type 2 diabetes mellitus with hyperglycemia, without long-term current use of insulin    Parathyroid abnormality    Thyroid disorder    Facet arthropathy    Fatty metamorphosis of liver    GERD (gastroesophageal reflux disease)    Primary hypertension    Hematuria    Hiatal hernia    Urinary incontinence    Hypercalcemia    Mixed hyperlipidemia    Kidney disease    Renal calculi    Low back pain    Thoracic back pain    DDD (degenerative disc disease), lumbar    Spinal stenosis of lumbar region    Degeneration of thoracic or thoracolumbar intervertebral disc    MI (myocardial infarction)    Myofascial pain    Neck pain    Nocturia    Sciatica    Stroke    Ureterolithiasis    Vitamin D deficiency    Labile personality    Acute viral syndrome    Debility    COVID-19    Upper respiratory tract infection due to COVID-19 virus    COVID    Generalized weakness    Acute bronchitis    Dizziness     Past Medical History:   Diagnosis Date    Aorta disorder     Arthritis     Degenerative joint disease     Dementia     FRONTAL LOBE    Depression     Diabetes mellitus     Hyperlipidemia     Hypertension     Kidney stone     Stroke     MILD RIGHT SIDE DEFICITS     Past Surgical History:   Procedure Laterality Date    CHOLECYSTECTOMY      CYSTOSCOPY BLADDER STONE LITHOTRIPSY      KIDNEY STONE SURGERY       PT Assessment (last 12 hours)       PT Evaluation and Treatment       Row Name 23 1517          Physical Therapy Time and Intention    Subjective Information no complaints  -CS     Document Type evaluation  -CS     Mode of Treatment  individual therapy;physical therapy  -CS     Patient Effort adequate  -CS     Symptoms Noted During/After Treatment --  cough  -CS       Row Name 12/08/23 1517          General Information    Patient Profile Reviewed yes  -CS     Patient Observations alert;cooperative;agree to therapy  -CS     Prior Level of Function --  Pt had been mostly independent at [facility] before recent health issues. Pt had assist if needed from family with ADLs, meals, home management. Pt attempted to be completely independent without AD  -CS     Equipment Currently Used at Home none  has a STC he used off and on, stood to take showers with grab bar available  -CS     Pertinent History of Current Functional Problem Pt recently was in rehab however he was not receiving the appropriate care or rehab needed  -CS     Existing Precautions/Restrictions fall  -CS     Limitations/Impairments safety/cognitive  -CS       Row Name 12/08/23 1517          Living Environment    Current Living Arrangements home  -CS     People in Home alone  -CS     Primary Care Provided by self  -CS       Row Name 12/08/23 1517          Pain    Pretreatment Pain Rating 0/10 - no pain  -CS     Posttreatment Pain Rating 0/10 - no pain  -CS       Row Name 12/08/23 1517          Cognition    Affect/Mental Status (Cognition) --  Intermittent confusion; can be easily frustrated  -CS     Orientation Status (Cognition) oriented to;person;place  -CS       Row Name 12/08/23 1517          Range of Motion Comprehensive    General Range of Motion bilateral lower extremity ROM WFL  -CS       Row Name 12/08/23 1517          Strength Comprehensive (MMT)    General Manual Muscle Testing (MMT) Assessment lower extremity strength deficits identified  -CS     Comment, General Manual Muscle Testing (MMT) Assessment Bilateral lower extremities assessed at 4 -/5  -CS       Row Name 12/08/23 1517          Bed Mobility    Bed Mobility bed mobility (all) activities  -CS     All Activities,  [place] (Bed Mobility) verbal cues;contact guard;minimum assist (75% patient effort)  -CS     Bed Mobility, Safety Issues decreased use of arms for pushing/pulling;decreased use of legs for bridging/pushing  -CS     Assistive Device (Bed Mobility) bed rails  -       Row Name 12/08/23 1517          Transfers    Transfers sit-stand transfer;stand-sit transfer  -       Row Name 12/08/23 1517          Sit-Stand Transfer    Sit-Stand [place] (Transfers) verbal cues;contact guard;minimum assist (75% patient effort)  -CS     Assistive Device (Sit-Stand Transfers) walker, front-wheeled  -CS     Comment, (Sit-Stand Transfer) At times patient adopts a posterior lean taking rolling walker with him raising the front wheels off the ground requiring increased assistance and education.  -       Row Name 12/08/23 1517          Stand-Sit Transfer    Stand-Sit [place] (Transfers) verbal cues;contact guard;minimum assist (75% patient effort)  -     Assistive Device (Stand-Sit Transfers) walker, front-wheeled  -       Row Name 12/08/23 1517          Gait/Stairs (Locomotion)    Gait/Stairs Locomotion gait/ambulation assistive device  -     [place] Level (Gait) verbal cues;minimum assist (75% patient effort)  -CS     Assistive Device (Gait) walker, front-wheeled  -     Distance in Feet (Gait) 15x2  -     Pattern (Gait) step-through  -CS     Deviations/Abnormal Patterns (Gait) base of support, narrow;gait speed decreased;stride length decreased  -     Bilateral Gait Deviations forward flexed posture  -       Row Name 12/08/23 1517          Safety Issues, Functional Mobility    Safety Issues Affecting Function (Mobility) awareness of need for assistance;impulsivity;insight into deficits/self-awareness;problem-solving  -CS     Impairments Affecting Function (Mobility) balance;cognition;endurance/activity tolerance;pain;strength;shortness of breath  -CS     Cognitive Impairments, Mobility  Safety/Performance awareness, need for assistance;insight into deficits/self-awareness;judgment;problem-solving/reasoning  -CS       Row Name 12/08/23 1517          Balance    Balance Assessment standing dynamic balance  -CS     Dynamic Standing Balance minimal assist  -CS     Position/Device Used, Standing Balance supported;walker, front-wheeled  -CS       Row Name 12/08/23 1517          Plan of Care Review    Plan of Care Reviewed With patient;son  -CS     Progress no change  -CS     Outcome Evaluation Pt presents with limitations that impede their ability to safely and independently transfer and ambulate. The skills of a therapist will be required to safely and effectively implement the following treatment plan to restore maximal level of function.  -CS       Row Name 12/08/23 1517          Positioning and Restraints    Pre-Treatment Position in bed  -CS     Post Treatment Position chair  -CS     In Chair reclined;call light within reach;encouraged to call for assist;exit alarm on;with family/caregiver;with nsg  -CS       Row Name 12/08/23 1517          Therapy Assessment/Plan (PT)    Rehab Potential (PT) good, to achieve stated therapy goals  -CS     Criteria for Skilled Interventions Met (PT) yes;skilled treatment is necessary  -CS     Therapy Frequency (PT) daily  -CS     Predicted Duration of Therapy Intervention (PT) 10 days  -CS     Problem List (PT) balance;mobility;pain;strength;cognition  -CS     Activity Limitations Related to Problem List (PT) unable to ambulate safely;unable to transfer safely  -CS       Row Name 12/08/23 1517          PT Evaluation Complexity    History, PT Evaluation Complexity 1-2 personal factors and/or comorbidities  -CS     Examination of Body Systems (PT Eval Complexity) total of 4 or more elements  -CS     Clinical Presentation (PT Evaluation Complexity) stable  -CS     Clinical Decision Making (PT Evaluation Complexity) low complexity  -CS     Overall Complexity (PT  Evaluation Complexity) low complexity  -CS       Row Name 12/08/23 1517          Therapy Plan Review/Discharge Plan (PT)    Therapy Plan Review (PT) evaluation/treatment results reviewed;patient;son  -       Row Name 12/08/23 1517          Physical Therapy Goals    Bed Mobility Goal Selection (PT) bed mobility, PT goal 1  -CS     Transfer Goal Selection (PT) transfer, PT goal 1  -CS     Gait Training Goal Selection (PT) gait training, PT goal 1  -       Row Name 12/08/23 1517          Bed Mobility Goal 1 (PT)    Activity/Assistive Device (Bed Mobility Goal 1, PT) bed mobility activities, all  -CS     [place] Level/Cues Needed (Bed Mobility Goal 1, PT) modified independence  -CS     Time Frame (Bed Mobility Goal 1, PT) long term goal (LTG);10 days  -       Row Name 12/08/23 1517          Transfer Goal 1 (PT)    Activity/Assistive Device (Transfer Goal 1, PT) sit-to-stand/stand-to-sit;bed-to-chair/chair-to-bed;walker, rolling  -CS     [place] Level/Cues Needed (Transfer Goal 1, PT) supervision required  -CS     Time Frame (Transfer Goal 1, PT) long term goal (LTG);10 days  -       Row Name 12/08/23 1517          Gait Training Goal 1 (PT)    Activity/Assistive Device (Gait Training Goal 1, PT) gait (walking locomotion);assistive device use;walker, rolling  -CS     [place] Level (Gait Training Goal 1, PT) supervision required  -CS     Distance (Gait Training Goal 1, PT) 250  -CS     Time Frame (Gait Training Goal 1, PT) long term goal (LTG);10 days  -CS               User Key  (r) = Recorded By, (t) = Taken By, (c) = Cosigned By      Initials Name Provider Type    CS Yuli Miranda PT Physical Therapist                    Physical Therapy Education       Title: PT OT SLP Therapies (In Progress)       Topic: Physical Therapy (In Progress)       Point: Mobility training (In Progress)       Learning Progress Summary             Patient Acceptance, E, NR by MICHAEL at 12/8/2023 4673                          Point: Home exercise program (Not Started)       Learner Progress:  Not documented in this visit.              Point: Body mechanics (In Progress)       Learning Progress Summary             Patient Acceptance, E, NR by CS at 12/8/2023 1555                         Point: Precautions (In Progress)       Learning Progress Summary             Patient Acceptance, E, NR by CS at 12/8/2023 1555                                         User Key       Initials Effective Dates Name Provider Type Discipline     04/25/21 -  Yuli Miranda, PT Physical Therapist PT                  PT Recommendation and Plan  Anticipated Discharge Disposition (PT): inpatient rehabilitation facility, sub acute care setting  Planned Therapy Interventions (PT): balance training, bed mobility training, gait training, strengthening, transfer training  Therapy Frequency (PT): daily  Plan of Care Reviewed With: patient, son  Progress: no change  Outcome Evaluation: Pt presents with limitations that impede their ability to safely and independently transfer and ambulate. The skills of a therapist will be required to safely and effectively implement the following treatment plan to restore maximal level of function.   Outcome Measures       Row Name 12/08/23 1500             How much help from another person do you currently need...    Turning from your back to your side while in flat bed without using bedrails? 3  -CS      Moving from lying on back to sitting on the side of a flat bed without bedrails? 3  -CS      Moving to and from a bed to a chair (including a wheelchair)? 3  -CS      Standing up from a chair using your arms (e.g., wheelchair, bedside chair)? 3  -CS      Climbing 3-5 steps with a railing? 2  -CS      To walk in hospital room? 3  -CS      AM-PAC 6 Clicks Score (PT) 17  -CS      Highest Level of Mobility Goal 5 --> Static standing  -CS         Functional Assessment    Outcome Measure Options AM-PAC 6 Clicks Basic Mobility (PT)   -CS                User Key  (r) = Recorded By, (t) = Taken By, (c) = Cosigned By      Initials Name Provider Type    CS Yuli Miranda, PT Physical Therapist                     Time Calculation:    PT Charges       Row Name 12/08/23 1546             Time Calculation    PT Received On 12/08/23  -CS      PT Goal Re-Cert Due Date 12/17/23  -CS         Untimed Charges    PT Eval/Re-eval Minutes 48  -CS         Total Minutes    Untimed Charges Total Minutes 48  -CS       Total Minutes 48  -CS                User Key  (r) = Recorded By, (t) = Taken By, (c) = Cosigned By      Initials Name Provider Type    Yuli Warren, PT Physical Therapist                  Therapy Charges for Today       Code Description Service Date Service Provider Modifiers Qty    [id number] HC PT EVAL LOW COMPLEXITY 4 12/8/2023 Yuli Miranda PT GP 1            PT G-Codes  Outcome Measure Options: AM-PAC 6 Clicks Basic Mobility (PT)  AM-PAC 6 Clicks Score (PT): 17    Yuli Miranda PT  12/8/2023

## 2023-12-08 NOTE — PROGRESS NOTES
[facility]   Hospitalist Progress Note    Date of admission: 12/7/2023  Patient Name: Nikhil Baldwin  1945  Date: 12/8/2023      Subjective     Chief Complaint   Patient presents with   • Dizziness       Interval Followup: Patient having frequent coughing episodes and had some difficulty clearing his sputum earlier today.  His clear to whitish in color.  Is a non-smoker.  Still feels very weak, had some dizziness earlier and trying to work with physical therapy.  Feels a little better now in terms of symptoms but still with notable shortness of air and is very notable dyspnea on exertion, and try to ambulate even in the room with assistance.  Son is at bedside and patient and have been talking is now willing for rehab placement.      Objective     Vitals:   Temp:  [97.9 °F (36.6 °C)-98.7 °F (37.1 °C)] 98.7 °F (37.1 °C)  Heart Rate:  [59-81] 73  Resp:  [17-18] 18  BP: (107-144)/(58-89) 144/84    Physical Exam  Awake in chair at bedside family present  Bilateral rhonchi, mild end expiratory wheeze, diminished breath sounds bases, has clear to whitish colored sputum production during some episodes  RRR  Abdomen soft nontender nondistended  Alert to self hospital recognizes family, can answer basic questions, has poor health literacy, occasionally distractible  Generalized weakness    Result Review:  Vital signs, labs and recent relevant imaging reviewed.      •  albuterol  •  benzonatate  •  senna-docusate sodium **AND** polyethylene glycol **AND** bisacodyl **AND** bisacodyl  •  dextrose  •  dextrose  •  glucagon (human recombinant)  •  ipratropium-albuterol  •  melatonin  •  ondansetron  •  sodium chloride  •  sodium chloride  •  sodium chloride    albuterol, 2.5 mg, Nebulization, Q4H - RT  budesonide-formoterol, 2 puff, Inhalation, BID - RT  citalopram, 10 mg, Oral, Daily  doxycycline, 100 mg, Oral, Q12H  enoxaparin, 40 mg, Subcutaneous, Daily  famotidine, 20 mg, Oral, BID AC  gadobenate dimeglumine, 15 mL,  Intravenous, Once in imaging  guaiFENesin, 1,200 mg, Oral, Q12H  insulin lispro, 2-9 Units, Subcutaneous, 4x Daily AC & at Bedtime  magnesium sulfate, 2 g, Intravenous, Once  [START ON 12/9/2023] predniSONE, 20 mg, Oral, Daily With Breakfast  rosuvastatin, 40 mg, Oral, Nightly  senna-docusate sodium, 2 tablet, Oral, BID  sodium chloride, 10 mL, Intravenous, Q12H  tamsulosin, 0.4 mg, Oral, Daily        MRI Brain Without Contrast    Result Date: 12/7/2023    1. No acute infarction or intracranial hemorrhage 2. Moderate to severe small vessel ischemic changes in the white matter      Davi Elmore M.D.       Electronically Signed and Approved By: Davi Elmore M.D. on 12/07/2023 at 17:35             XR Chest 1 View    Result Date: 12/7/2023   No active cardiopulmonary disease       YUAN YORK MD       Electronically Signed and Approved By: YUAN YORK MD on 12/07/2023 at 14:20              Assessment / Plan     Summary: 78-year-old male with history of mentioned diabetes and hypertension who presented with worsening cough weakness and decreased appetite and increased sedation.  Family brought patient in given concerns that this is worsening his normal.  Treating for bronchitis    Assessment/Plan (clinically significant if listed here)  Acute bronchitis  URI, question viral versus bacterial  Hypomagnesemia  Fall, question presyncopal/hypotension related versus chronic neurogenic/prior CVA related  Dementia  Depression  History of hypertension  DM 2  BPH  Hyperlipidemia  History of CVA, right pontine lacunar infarct July, and prior stroke with loss of peripheral vision  History of C6-C7 disc protrusion with mild/moderate left neural foraminal narrowing     COVID flu and RSV negative, check sputum, strep throat.  Patient had been treated and discharged for COVID-19 infection in October.  Add guaifenesin, incentive spirometry and OPEP, BPH protocol, respiratory hygiene  Give dose of IV steroids and then will  continue on short course of prednisone  Placed on Symbicort, give albuterol neb for wheezing, patient denies smoking history  Chest x-ray personally reviewed no acute infiltrate noted  Patient borderline requiring oxygen with a sat of 91% on room air, monitor closely, need to check a walk test prior to discharge as he still may need oxygen with exertion  Add doxycycline 5-day course for now for anti-inflammatory benefit primarily follow-up cultures.  White count within normal limits, will trend Pro-Nabil follow-up sputum  Hold lisinopril, likely need to resume once blood pressure and volume status improves, check orthostatics, symptoms may all be from progressive dementia and prior CVA history also possible component of hypoglycemia and hypotension with decreased p.o. intake.  Give IV fluids, falls precautions, PT OT  Continue SSI monitor blood glucose check A1c, is on Jardiance, metformin, Januvia at home and with his dementia and decreased p.o. intake question if he is on to have regimen with possible hyperglycemia episode at home  Continue Flomax monitor for retention  Continue home Celexa, melatonin  Continue statin  Speech therapy evaluation, appreciate assistance, mechanical soft diet with thin liquids and aspiration precautions  PT/OT  Check a.m. CBC, BMP, magnesium, phosphorus and repeat procalcitonin  Not stable for discharge at this time as given his age and level of respiratory symptoms with mucus and easy fatigability is at high risk for further decompensation and manage requiring supplemental oxygen.  Continue treatment and monitoring as above ultimately will need rehab placement apparently was previously declined for acute rehab and went to subacute rehab at [facility] in October  Discussed with social work RN and patient's family        DVT prophylaxis:  Medical DVT prophylaxis orders are present.    Level Of Support Discussed With: Patient  Code Status (Patient has no pulse and is not breathing):  CPR (Attempt to Resuscitate)  Medical Interventions (Patient has pulse or is breathing): Full Support    Over 50 minutes spent on patient care coordination including review of prior records, discussion with patient's family social work PT, monitoring and adjusting orders and documenting    CBC          11/21/2023    13:13 12/7/2023    13:18 12/8/2023    12:17   CBC   WBC 7.16  7.75  7.57    RBC 5.39  5.28  4.80    Hemoglobin 16.2  15.6  14.5    Hematocrit 47.9  46.9  42.9    MCV 88.9  88.8  89.4    MCH 30.1  29.5  30.2    MCHC 33.8  33.3  33.8    RDW 14.0  14.6  14.6    Platelets 234  162  138        CMP          11/21/2023    13:13 12/7/2023    13:18 12/8/2023    12:17   CMP   Glucose 131  135  131    BUN 13  13  14    Creatinine 1.17  1.16  1.03    EGFR 63.0  63.7  73.4    Sodium 143  140  139    Potassium 4.8  4.0  3.9    Chloride 106  103  105    Calcium 10.4  10.1  9.3    Total Protein 6.5  6.9     Albumin 4.4  4.4     Globulin 2.1  2.5     Total Bilirubin 0.7  1.2     Alkaline Phosphatase 59  69     AST (SGOT) 22  14     ALT (SGPT) 18  14     Albumin/Globulin Ratio 2.1  1.8     BUN/Creatinine Ratio 11.1  11.2  13.6    Anion Gap 13.8  14.4  12.0

## 2023-12-09 LAB
BASOPHILS # BLD AUTO: 0.01 10*3/MM3 (ref 0–0.2)
BASOPHILS NFR BLD AUTO: 0.2 % (ref 0–1.5)
DEPRECATED RDW RBC AUTO: 46.7 FL (ref 37–54)
EOSINOPHIL # BLD AUTO: 0.01 10*3/MM3 (ref 0–0.4)
EOSINOPHIL NFR BLD AUTO: 0.2 % (ref 0.3–6.2)
ERYTHROCYTE [DISTWIDTH] IN BLOOD BY AUTOMATED COUNT: 14.4 % (ref 12.3–15.4)
GLUCOSE BLDC GLUCOMTR-MCNC: 101 MG/DL (ref 70–99)
GLUCOSE BLDC GLUCOMTR-MCNC: 162 MG/DL (ref 70–99)
GLUCOSE BLDC GLUCOMTR-MCNC: 194 MG/DL (ref 70–99)
GLUCOSE BLDC GLUCOMTR-MCNC: 210 MG/DL (ref 70–99)
HCT VFR BLD AUTO: 39.6 % (ref 37.5–51)
HGB BLD-MCNC: 13.4 G/DL (ref 13–17.7)
IMM GRANULOCYTES # BLD AUTO: 0.02 10*3/MM3 (ref 0–0.05)
IMM GRANULOCYTES NFR BLD AUTO: 0.3 % (ref 0–0.5)
LYMPHOCYTES # BLD AUTO: 1.75 10*3/MM3 (ref 0.7–3.1)
LYMPHOCYTES NFR BLD AUTO: 27.4 % (ref 19.6–45.3)
MAGNESIUM SERPL-MCNC: 2.2 MG/DL (ref 1.6–2.4)
MCH RBC QN AUTO: 29.8 PG (ref 26.6–33)
MCHC RBC AUTO-ENTMCNC: 33.8 G/DL (ref 31.5–35.7)
MCV RBC AUTO: 88.2 FL (ref 79–97)
MONOCYTES # BLD AUTO: 1.01 10*3/MM3 (ref 0.1–0.9)
MONOCYTES NFR BLD AUTO: 15.8 % (ref 5–12)
NEUTROPHILS NFR BLD AUTO: 3.58 10*3/MM3 (ref 1.7–7)
NEUTROPHILS NFR BLD AUTO: 56.1 % (ref 42.7–76)
NRBC BLD AUTO-RTO: 0 /100 WBC (ref 0–0.2)
PHOSPHATE SERPL-MCNC: 3.2 MG/DL (ref 2.5–4.5)
PLATELET # BLD AUTO: 142 10*3/MM3 (ref 140–450)
PMV BLD AUTO: 11 FL (ref 6–12)
PROCALCITONIN SERPL-MCNC: 0.07 NG/ML (ref 0–0.25)
RBC # BLD AUTO: 4.49 10*6/MM3 (ref 4.14–5.8)
WBC NRBC COR # BLD AUTO: 6.38 10*3/MM3 (ref 3.4–10.8)

## 2023-12-09 PROCEDURE — 63710000001 PREDNISONE PER 1 MG: Performed by: INTERNAL MEDICINE

## 2023-12-09 PROCEDURE — 99232 SBSQ HOSP IP/OBS MODERATE 35: CPT | Performed by: INTERNAL MEDICINE

## 2023-12-09 PROCEDURE — 94799 UNLISTED PULMONARY SVC/PX: CPT

## 2023-12-09 PROCEDURE — 83735 ASSAY OF MAGNESIUM: CPT | Performed by: INTERNAL MEDICINE

## 2023-12-09 PROCEDURE — 84145 PROCALCITONIN (PCT): CPT | Performed by: INTERNAL MEDICINE

## 2023-12-09 PROCEDURE — 85025 COMPLETE CBC W/AUTO DIFF WBC: CPT | Performed by: INTERNAL MEDICINE

## 2023-12-09 PROCEDURE — 82948 REAGENT STRIP/BLOOD GLUCOSE: CPT

## 2023-12-09 PROCEDURE — 94664 DEMO&/EVAL PT USE INHALER: CPT

## 2023-12-09 PROCEDURE — 97165 OT EVAL LOW COMPLEX 30 MIN: CPT

## 2023-12-09 PROCEDURE — G0378 HOSPITAL OBSERVATION PER HR: HCPCS

## 2023-12-09 PROCEDURE — 25010000002 ENOXAPARIN PER 10 MG: Performed by: FAMILY MEDICINE

## 2023-12-09 PROCEDURE — 63710000001 INSULIN LISPRO (HUMAN) PER 5 UNITS: Performed by: FAMILY MEDICINE

## 2023-12-09 PROCEDURE — 84100 ASSAY OF PHOSPHORUS: CPT | Performed by: INTERNAL MEDICINE

## 2023-12-09 RX ORDER — FAMOTIDINE 20 MG/1
20 TABLET, FILM COATED ORAL
Qty: 12 TABLET | Refills: 0 | Status: DISCONTINUED | OUTPATIENT
Start: 2023-12-09 | End: 2023-12-12 | Stop reason: HOSPADM

## 2023-12-09 RX ADMIN — ALBUTEROL SULFATE 2.5 MG: 2.5 SOLUTION RESPIRATORY (INHALATION) at 15:23

## 2023-12-09 RX ADMIN — ALBUTEROL SULFATE 2.5 MG: 2.5 SOLUTION RESPIRATORY (INHALATION) at 07:49

## 2023-12-09 RX ADMIN — PREDNISONE 20 MG: 20 TABLET ORAL at 09:30

## 2023-12-09 RX ADMIN — FAMOTIDINE 20 MG: 20 TABLET ORAL at 09:30

## 2023-12-09 RX ADMIN — SENNOSIDES AND DOCUSATE SODIUM 2 TABLET: 50; 8.6 TABLET ORAL at 22:27

## 2023-12-09 RX ADMIN — Medication 10 ML: at 09:29

## 2023-12-09 RX ADMIN — ALBUTEROL SULFATE 2.5 MG: 2.5 SOLUTION RESPIRATORY (INHALATION) at 10:39

## 2023-12-09 RX ADMIN — CITALOPRAM HYDROBROMIDE 10 MG: 20 TABLET ORAL at 09:30

## 2023-12-09 RX ADMIN — BUDESONIDE AND FORMOTEROL FUMARATE DIHYDRATE 2 PUFF: 160; 4.5 AEROSOL RESPIRATORY (INHALATION) at 18:18

## 2023-12-09 RX ADMIN — TAMSULOSIN HYDROCHLORIDE 0.4 MG: 0.4 CAPSULE ORAL at 09:30

## 2023-12-09 RX ADMIN — Medication 10 ML: at 22:30

## 2023-12-09 RX ADMIN — GUAIFENESIN 1200 MG: 600 TABLET ORAL at 22:25

## 2023-12-09 RX ADMIN — GUAIFENESIN 1200 MG: 600 TABLET ORAL at 09:30

## 2023-12-09 RX ADMIN — DOXYCYCLINE 100 MG: 100 CAPSULE ORAL at 22:25

## 2023-12-09 RX ADMIN — DOXYCYCLINE 100 MG: 100 CAPSULE ORAL at 09:30

## 2023-12-09 RX ADMIN — ALBUTEROL SULFATE 2.5 MG: 2.5 SOLUTION RESPIRATORY (INHALATION) at 18:18

## 2023-12-09 RX ADMIN — INSULIN LISPRO 2 UNITS: 100 INJECTION, SOLUTION INTRAVENOUS; SUBCUTANEOUS at 22:39

## 2023-12-09 RX ADMIN — INSULIN LISPRO 2 UNITS: 100 INJECTION, SOLUTION INTRAVENOUS; SUBCUTANEOUS at 12:29

## 2023-12-09 RX ADMIN — BUDESONIDE AND FORMOTEROL FUMARATE DIHYDRATE 2 PUFF: 160; 4.5 AEROSOL RESPIRATORY (INHALATION) at 10:39

## 2023-12-09 RX ADMIN — ROSUVASTATIN 40 MG: 20 TABLET, FILM COATED ORAL at 22:26

## 2023-12-09 RX ADMIN — INSULIN LISPRO 4 UNITS: 100 INJECTION, SOLUTION INTRAVENOUS; SUBCUTANEOUS at 17:02

## 2023-12-09 RX ADMIN — ENOXAPARIN SODIUM 40 MG: 100 INJECTION SUBCUTANEOUS at 09:30

## 2023-12-09 RX ADMIN — FAMOTIDINE 20 MG: 20 TABLET ORAL at 17:02

## 2023-12-09 NOTE — PROGRESS NOTES
[facility]   Hospitalist Progress Note    Date of admission: 12/7/2023  Patient Name: Nikhil Baldwin  1945  Date: 12/9/2023      Subjective     Chief Complaint   Patient presents with   • Dizziness       Interval Followup: Still complaining of shortness of air but feels slightly better than yesterday, frequent dry coughing, occasional clear sputum production    Objective     Vitals:   Temp:  [97.9 °F (36.6 °C)-99.4 °F (37.4 °C)] 98.5 °F (36.9 °C)  Heart Rate:  [48-74] 59  Resp:  [16-18] 18  BP: (113-133)/(55-84) 115/55    Physical Exam  Sitting at side of bed, nursing present, eating lunch  Bilateral rhonchi improving aeration no acute wheezing today  RRR  Abdomen soft nontender nondistended  Alert to self hospital recognizes family, can answer basic questions, has poor health literacy, occasionally distractible  Generalized weakness    Result Review:  Vital signs, labs and recent relevant imaging reviewed.      •  albuterol  •  benzonatate  •  senna-docusate sodium **AND** polyethylene glycol **AND** bisacodyl **AND** bisacodyl  •  dextrose  •  dextrose  •  glucagon (human recombinant)  •  ipratropium-albuterol  •  melatonin  •  ondansetron  •  sodium chloride  •  sodium chloride  •  sodium chloride    albuterol, 2.5 mg, Nebulization, Q4H - RT  budesonide-formoterol, 2 puff, Inhalation, BID - RT  citalopram, 10 mg, Oral, Daily  doxycycline, 100 mg, Oral, Q12H  enoxaparin, 40 mg, Subcutaneous, Daily  famotidine, 20 mg, Oral, BID AC  gadobenate dimeglumine, 15 mL, Intravenous, Once in imaging  guaiFENesin, 1,200 mg, Oral, Q12H  insulin lispro, 2-9 Units, Subcutaneous, 4x Daily AC & at Bedtime  predniSONE, 20 mg, Oral, Daily With Breakfast  rosuvastatin, 40 mg, Oral, Nightly  senna-docusate sodium, 2 tablet, Oral, BID  sodium chloride, 10 mL, Intravenous, Q12H  tamsulosin, 0.4 mg, Oral, Daily        MRI Brain Without Contrast    Result Date: 12/7/2023    1. No acute infarction or intracranial hemorrhage 2.  Moderate to severe small vessel ischemic changes in the white matter      Davi Elmore M.D.       Electronically Signed and Approved By: Davi Elmore M.D. on 12/07/2023 at 17:35             XR Chest 1 View    Result Date: 12/7/2023   No active cardiopulmonary disease       YUAN YORK MD       Electronically Signed and Approved By: YUAN YORK MD on 12/07/2023 at 14:20              Assessment / Plan     Summary: 78-year-old male with history of mentioned diabetes and hypertension who presented with worsening cough weakness and decreased appetite and increased sedation.  Family brought patient in given concerns that this is worsening his normal.  Treating for bronchitis    Assessment/Plan (clinically significant if listed here)  Acute bronchitis  URI, question viral versus bacterial  Hypomagnesemia  Fall, question presyncopal/hypotension related versus chronic neurogenic/prior CVA related  Dementia  Depression  History of hypertension  DM 2 A1c 6.2  BPH  Hyperlipidemia  History of CVA, right pontine lacunar infarct July, and prior stroke with loss of peripheral vision  History of C6-C7 disc protrusion with mild/moderate left neural foraminal narrowing     Viral studies negative prelim sputum appears unremarkable, Pro-Nabil negative x 2 white count within normal limits  Continue prednisone course, short course of doxycycline and will add famotidine for GI prophylaxis  Symbicort, nebs  BPH respiratory hygiene  Blood pressure stable without lisinopril  Status post additional IV fluids, patient still weak and debility, falls precautions PT OT  Given mild bradycardia and low normal blood pressure even without home medications question component of orthostatic hypotension and low blood pressure contribute to falls as well as advancing dementia with debility  Continue SSI monitor blood glucose A1c of 6.2 suspected onto many medications at home with Jardiance metformin and Januvia probably having some hypoglycemic  episodes will reduce regimen for discharge monitor SSI requirements while here  Continue Flomax monitor for retention  Continue home Celexa, melatonin  Continue statin  Speech therapy evaluation, appreciate assistance, mechanical soft diet with thin liquids and aspiration precautions  PT/OT  Continue inpatient monitoring and treatment as above.  Working on rehab for placement     DVT prophylaxis:  Medical DVT prophylaxis orders are present.    Level Of Support Discussed With: Patient  Code Status (Patient has no pulse and is not breathing): CPR (Attempt to Resuscitate)  Medical Interventions (Patient has pulse or is breathing): Full Support        CBC          12/7/2023    13:18 12/8/2023    12:17 12/9/2023    04:50   CBC   WBC 7.75  7.57  6.38    RBC 5.28  4.80  4.49    Hemoglobin 15.6  14.5  13.4    Hematocrit 46.9  42.9  39.6    MCV 88.8  89.4  88.2    MCH 29.5  30.2  29.8    MCHC 33.3  33.8  33.8    RDW 14.6  14.6  14.4    Platelets 162  138  142        CMP          11/21/2023    13:13 12/7/2023    13:18 12/8/2023    12:17   CMP   Glucose 131  135  131    BUN 13  13  14    Creatinine 1.17  1.16  1.03    EGFR 63.0  63.7  73.4    Sodium 143  140  139    Potassium 4.8  4.0  3.9    Chloride 106  103  105    Calcium 10.4  10.1  9.3    Total Protein 6.5  6.9     Albumin 4.4  4.4     Globulin 2.1  2.5     Total Bilirubin 0.7  1.2     Alkaline Phosphatase 59  69     AST (SGOT) 22  14     ALT (SGPT) 18  14     Albumin/Globulin Ratio 2.1  1.8     BUN/Creatinine Ratio 11.1  11.2  13.6    Anion Gap 13.8  14.4  12.0

## 2023-12-09 NOTE — PROGRESS NOTES
Respiratory Therapist Broncho-Pulmonary Hygiene Progress Note      Patient Name:  Nikhil Baldwin  YOB: 1945    Nikhil Baldwin meets the qualification for Level 1 of the Bronco-Pulmonary Hygiene Protocol. This was based on my daily patient assessment and includes review of chest x-ray results, cough ability and quality, oxygenation, secretions or risk for secretion development and patient mobility.     Broncho-Pulmonary Hygiene Assessment:    Level of Movement: Actively changing positions-requires assistance  Disoriented/Follows Commands    Breath Sounds: Clear to slightly diminished    Cough: Strong, effective    Chest X-Ray: Possible signs of consolidation and/or atelectasis or clear.     Sputum Productions: None or small amount of thin or watery secretions with effective cough    History and Physical: New onset of bronchitis or existing chronic pulmonary conditions.  **(not in an exacerbation)    SpO2 to Oxygen Need: greater than 92% on room air or  less than 3L nasal canula    Current SpO2 is: 96 on RA    Based on this information, I have completed the following interventions: Teach/Instruct patient on cough and deep breathe      Electronically signed by Nesha Abbott RRT, 12/09/23, 8:55 AM EST.

## 2023-12-09 NOTE — PLAN OF CARE
Goal Outcome Evaluation:           Progress: improving  Outcome Evaluation: Pt. alert and oriented. Pleasant and calm this shift. Rested quietly with no c/o pain. VSS

## 2023-12-09 NOTE — PLAN OF CARE
Goal Outcome Evaluation:           Progress: no change  Outcome Evaluation: Vital signs stable. Pt rested well, no complaints of pain or nausea. Still having large amounts of thick white sputum. Will continue to assess.

## 2023-12-09 NOTE — THERAPY EVALUATION
Patient Name: Nikhil Baldwin  : 1945    MRN: [id number]                              Today's Date: 2023       Admit Date: 2023    Visit Dx:     ICD-10-CM ICD-9-CM   1. Dizziness  R42 780.4   2. Difficulty walking  R26.2 719.7   3. Decreased activities of daily living (ADL)  Z78.9 V49.89     Patient Active Problem List   Diagnosis    Aortic aneurysm    Arthritis    BPH (benign prostatic hyperplasia)    Cervical radiculopathy    Type 2 diabetes mellitus with hyperglycemia, without long-term current use of insulin    Parathyroid abnormality    Thyroid disorder    Facet arthropathy    Fatty metamorphosis of liver    GERD (gastroesophageal reflux disease)    Primary hypertension    Hematuria    Hiatal hernia    Urinary incontinence    Hypercalcemia    Mixed hyperlipidemia    Kidney disease    Renal calculi    Low back pain    Thoracic back pain    DDD (degenerative disc disease), lumbar    Spinal stenosis of lumbar region    Degeneration of thoracic or thoracolumbar intervertebral disc    MI (myocardial infarction)    Myofascial pain    Neck pain    Nocturia    Sciatica    Stroke    Ureterolithiasis    Vitamin D deficiency    Labile personality    Acute viral syndrome    Debility    COVID-19    Upper respiratory tract infection due to COVID-19 virus    COVID    Generalized weakness    Acute bronchitis    Dizziness     Past Medical History:   Diagnosis Date    Aorta disorder     Arthritis     Degenerative joint disease     Dementia     FRONTAL LOBE    Depression     Diabetes mellitus     Hyperlipidemia     Hypertension     Kidney stone     Stroke     MILD RIGHT SIDE DEFICITS     Past Surgical History:   Procedure Laterality Date    CHOLECYSTECTOMY      CYSTOSCOPY BLADDER STONE LITHOTRIPSY      KIDNEY STONE SURGERY        General Information       Row Name 23 1502          OT Time and Intention    Document Type evaluation  -LF     Mode of Treatment individual therapy;occupational therapy  -LF        Row Name 12/09/23 1502          General Information    Patient Profile Reviewed yes  -LF     Prior Level of Function --  (I) with ADLs, ambulated w/o a device but has RW if needed, has a walk-in shower with shower chair/handheld shower head, elevated commode, stands to groom, drives, and no home O2.  -     Existing Precautions/Restrictions fall  -     Barriers to Rehab none identified  -       Row Name 12/09/23 1502          Occupational Profile    Reason for Services/Referral (Occupational Profile) Patient is an 78 year old male admitted to [facility] for weakness on December 7th, 2023. Occupational therapy consulted due to recent decline in ADLs/functional transfers. No previous occupational therapy services for current condition.  -       Row Name 12/09/23 1502          Living Environment    People in Home alone  Lives between his two sons  -       Row Name 12/09/23 1502          Home Main Entrance    Number of Stairs, Main Entrance one  -       Row Name 12/09/23 1502          Cognition    Orientation Status (Cognition) oriented x 3  -       Row Name 12/09/23 1502          Safety Issues, Functional Mobility    Safety Issues Affecting Function (Mobility) awareness of need for assistance;insight into deficits/self-awareness  -     Impairments Affecting Function (Mobility) balance;endurance/activity tolerance;strength  -               User Key  (r) = Recorded By, (t) = Taken By, (c) = Cosigned By      Initials Name Provider Type     Bozena Smith OT Occupational Therapist                     Mobility/ADL's       Row Name 12/09/23 1503          Bed Mobility    Bed Mobility supine-sit;sit-supine  -LF     Supine-Sit [place] (Bed Mobility) standby assist  -     Sit-Supine [place] (Bed Mobility) standby assist  -     Bed Mobility, Safety Issues decreased use of arms for pushing/pulling;decreased use of legs for bridging/pushing  -     Assistive Device (Bed Mobility) bed  rails  -       Row Name 12/09/23 1503          Transfers    Transfers sit-stand transfer;stand-sit transfer  -       Row Name 12/09/23 1503          Sit-Stand Transfer    Sit-Stand [place] (Transfers) contact guard  -     Assistive Device (Sit-Stand Transfers) walker, front-wheeled  -LF       Row Name 12/09/23 1503          Stand-Sit Transfer    Stand-Sit [place] (Transfers) contact guard  -LF     Assistive Device (Stand-Sit Transfers) walker, front-wheeled  -LF       Row Name 12/09/23 1503          Functional Mobility    Functional Mobility- Ind. Level contact guard assist  -     Functional Mobility- Device walker, front-wheeled  -LF     Functional Mobility- Comment Patient completed functional mobility from EOB to recliner with CGA using RW.  -       Row Name 12/09/23 1503          Activities of Daily Living    BADL Assessment/Intervention bathing;upper body dressing;lower body dressing;grooming;feeding;toileting  -       Row Name 12/09/23 1503          Bathing Assessment/Intervention    [place] Level (Bathing) bathing skills;upper body;standby assist;lower body;moderate assist (50% patient effort)  -       Row Name 12/09/23 1503          Upper Body Dressing Assessment/Training    [place] Level (Upper Body Dressing) upper body dressing skills;standby assist  -       Row Name 12/09/23 1503          Lower Body Dressing Assessment/Training    [place] Level (Lower Body Dressing) lower body dressing skills;moderate assist (50% patient effort)  -       Row Name 12/09/23 1503          Grooming Assessment/Training    [place] Level (Grooming) grooming skills;set up  -       Row Name 12/09/23 1503          Self-Feeding Assessment/Training    [place] Level (Feeding) feeding skills;set up  -       Row Name 12/09/23 1503          Toileting Assessment/Training    [place] Level (Toileting) toileting skills;minimum assist (75% patient effort)  -               User  Key  (r) = Recorded By, (t) = Taken By, (c) = Cosigned By      Initials Name Provider Type    LF Bozena Smith OT Occupational Therapist                   Obj/Interventions       Row Name 12/09/23 1504          Sensory Assessment (Somatosensory)    Sensory Assessment (Somatosensory) UE sensation intact  -       Row Name 12/09/23 1504          Vision Assessment/Intervention    Visual Impairment/Limitations WFL  -LF       Row Name 12/09/23 1504          Range of Motion Comprehensive    General Range of Motion bilateral upper extremity ROM WFL  -       Row Name 12/09/23 1504          Strength Comprehensive (MMT)    Comment, General Manual Muscle Testing (MMT) Assessment 4/5 BUEs  -       Row Name 12/09/23 1504          Motor Skills    Motor Skills coordination;functional endurance  -LF     Coordination bilateral;upper extremity;WFL  -LF     Functional Endurance Fair  -LF       Row Name 12/09/23 1504          Balance    Balance Assessment sitting dynamic balance;standing dynamic balance  -LF     Dynamic Sitting Balance supervision  -LF     Position, Sitting Balance unsupported;sitting edge of bed  -LF     Dynamic Standing Balance contact guard  -LF     Position/Device Used, Standing Balance supported;walker, front-wheeled  -LF               User Key  (r) = Recorded By, (t) = Taken By, (c) = Cosigned By      Initials Name Provider Type    LF Bozena Smith OT Occupational Therapist                   Goals/Plan       Row Name 12/09/23 1506          Bed Mobility Goal 1 (OT)    Activity/Assistive Device (Bed Mobility Goal 1, OT) bed mobility activities, all  -LF     [place] Level/Cues Needed (Bed Mobility Goal 1, OT) modified independence  -LF     Time Frame (Bed Mobility Goal 1, OT) long term goal (LTG);10 days  -       Row Name 12/09/23 1506          Transfer Goal 1 (OT)    Activity/Assistive Device (Transfer Goal 1, OT) transfers, all  -LF     [place] Level/Cues Needed (Transfer Goal 1, OT)  modified independence  -LF     Time Frame (Transfer Goal 1, OT) long term goal (LTG);10 days  -LF       Row Name 12/09/23 1506          Bathing Goal 1 (OT)    Activity/Device (Bathing Goal 1, OT) bathing skills, all  -LF     [place] Level/Cues Needed (Bathing Goal 1, OT) modified independence  -LF     Time Frame (Bathing Goal 1, OT) long term goal (LTG);10 days  -LF       Row Name 12/09/23 1506          Dressing Goal 1 (OT)    Activity/Device (Dressing Goal 1, OT) dressing skills, all  -LF     [place]/Cues Needed (Dressing Goal 1, OT) modified independence  -LF     Time Frame (Dressing Goal 1, OT) long term goal (LTG);10 days  -LF       Row Name 12/09/23 1506          Toileting Goal 1 (OT)    Activity/Device (Toileting Goal 1, OT) toileting skills, all  -LF     [place] Level/Cues Needed (Toileting Goal 1, OT) modified independence  -LF     Time Frame (Toileting Goal 1, OT) long term goal (LTG);10 days  -LF       Row Name 12/09/23 1506          Problem Specific Goal 1 (OT)    Problem Specific Goal 1 (OT) Patient will demonstrate good- endurance to support ADLs/functional transfers.  -LF     Time Frame (Problem Specific Goal 1, OT) long term goal (LTG);10 days  -       Row Name 12/09/23 1506          Therapy Assessment/Plan (OT)    Planned Therapy Interventions (OT) activity tolerance training;BADL retraining;functional balance retraining;occupation/activity based interventions;patient/caregiver education/training;strengthening exercise;transfer/mobility retraining  -               User Key  (r) = Recorded By, (t) = Taken By, (c) = Cosigned By      Initials Name Provider Type    LF Bozena Smith OT Occupational Therapist                   Clinical Impression       Row Name 12/09/23 150          Pain Assessment    Additional Documentation Pain Scale: FACES Pre/Post-Treatment (Group)  -       Row Name 12/09/23 1504          Pain Scale: FACES Pre/Post-Treatment    Pain: FACES Scale, Pretreatment  0-->no hurt  -LF     Posttreatment Pain Rating 0-->no hurt  -LF       Row Name 12/09/23 1504          Plan of Care Review    Plan of Care Reviewed With patient  -LF     Progress no change  -LF     Outcome Evaluation Patient presents with limitations in self-care, functional transfers, balance, and endurance. He would benefit from continued skilled occupational therapy services to maximize independence with ADLs/functional transfers.  -       Row Name 12/09/23 1504          Therapy Assessment/Plan (OT)    Patient/Family Therapy Goal Statement (OT) To maximize independence.  -     Rehab Potential (OT) good, to achieve stated therapy goals  -     Criteria for Skilled Therapeutic Interventions Met (OT) yes;meets criteria;skilled treatment is necessary  -     Therapy Frequency (OT) 5 times/wk  -       Row Name 12/09/23 1508          Therapy Plan Review/Discharge Plan (OT)    Anticipated Discharge Disposition (OT) home with home health;home with assist  -       Row Name 12/09/23 1504          Vital Signs    O2 Delivery Pre Treatment room air  -     O2 Delivery Intra Treatment room air  -LF     O2 Delivery Post Treatment room air  -       Row Name 12/09/23 1502          Positioning and Restraints    Pre-Treatment Position in bed  -LF     Post Treatment Position chair  -LF     In Chair reclined;call light within reach;encouraged to call for assist;exit alarm on  -LF               User Key  (r) = Recorded By, (t) = Taken By, (c) = Cosigned By      Initials Name Provider Type    LF Bozena Smith, OT Occupational Therapist                   Outcome Measures       Row Name 12/09/23 1507          How much help from another is currently needed...    Putting on and taking off regular lower body clothing? 2  -LF     Bathing (including washing, rinsing, and drying) 2  -LF     Toileting (which includes using toilet bed pan or urinal) 3  -LF     Putting on and taking off regular upper body clothing 3  -LF     Taking  care of personal grooming (such as brushing teeth) 4  -LF     Eating meals 4  -LF     AM-PAC 6 Clicks Score (OT) 18  -LF       Row Name 12/09/23 0750          How much help from another person do you currently need...    Turning from your back to your side while in flat bed without using bedrails? 3  -MP     Moving from lying on back to sitting on the side of a flat bed without bedrails? 3  -MP     Moving to and from a bed to a chair (including a wheelchair)? 3  -MP     Standing up from a chair using your arms (e.g., wheelchair, bedside chair)? 3  -MP     Climbing 3-5 steps with a railing? 2  -MP     To walk in hospital room? 3  -MP     AM-PAC 6 Clicks Score (PT) 17  -MP     Highest Level of Mobility Goal 5 --> Static standing  -MP       Row Name 12/09/23 1507          Functional Assessment    Outcome Measure Options AM-PAC 6 Clicks Daily Activity (OT);Optimal Instrument  -LF       Row Name 12/09/23 1507          Optimal Instrument    Optimal Instrument Optimal - 3  -LF     Bending/Stooping 3  -LF     Standing 2  -LF     Reaching 1  -LF     From the list, choose the 3 activities you would most like to be able to do without any difficulty Bending/stooping;Standing;Reaching  -LF     Total Score Optimal - 3 6  -LF               User Key  (r) = Recorded By, (t) = Taken By, (c) = Cosigned By      Initials Name Provider Type    Leonela Desir, RN Registered Nurse    Bozena Monroy OT Occupational Therapist                    Occupational Therapy Education       Title: PT OT SLP Therapies (In Progress)       Topic: Occupational Therapy (In Progress)       Point: ADL training (In Progress)       Description:   Instruct learner(s) on proper safety adaptation and remediation techniques during self care or transfers.   Instruct in proper use of assistive devices.                  Learning Progress Summary             Patient Acceptance, E,TB, NR by  at 12/9/2023 2735                         Point: Precautions  (In Progress)       Description:   Instruct learner(s) on prescribed precautions during self-care and functional transfers.                  Learning Progress Summary             Patient Acceptance, E,TB, NR by  at 12/9/2023 1507                         Point: Body mechanics (In Progress)       Description:   Instruct learner(s) on proper positioning and spine alignment during self-care, functional mobility activities and/or exercises.                  Learning Progress Summary             Patient Acceptance, E,TB, NR by  at 12/9/2023 1507                                         User Key       Initials Effective Dates Name Provider Type Discipline     06/16/21 -  Bozena Smith OT Occupational Therapist OT                  OT Recommendation and Plan  Planned Therapy Interventions (OT): activity tolerance training, BADL retraining, functional balance retraining, occupation/activity based interventions, patient/caregiver education/training, strengthening exercise, transfer/mobility retraining  Therapy Frequency (OT): 5 times/wk  Plan of Care Review  Plan of Care Reviewed With: patient  Progress: no change  Outcome Evaluation: Patient presents with limitations in self-care, functional transfers, balance, and endurance. He would benefit from continued skilled occupational therapy services to maximize independence with ADLs/functional transfers.     Time Calculation:   Evaluation Complexity (OT)  Review Occupational Profile/Medical/Therapy History Complexity: brief/low complexity  Assessment, Occupational Performance/Identification of Deficit Complexity: 3-5 performance deficits  Clinical Decision Making Complexity (OT): problem focused assessment/low complexity  Overall Complexity of Evaluation (OT): low complexity     Time Calculation- OT       Row Name 12/09/23 1507             Time Calculation- OT    OT Received On 12/09/23  -      OT Goal Re-Cert Due Date 12/18/23  -         Untimed Charges    OT  Eval/Re-eval Minutes 35  -LF         Total Minutes    Untimed Charges Total Minutes 35  -LF       Total Minutes 35  -LF                User Key  (r) = Recorded By, (t) = Taken By, (c) = Cosigned By      Initials Name Provider Type    Bozena Monroy OT Occupational Therapist                  Therapy Charges for Today       Code Description Service Date Service Provider Modifiers Qty    [id number] HC OT EVAL LOW COMPLEXITY 3 12/9/2023 Bozena Smith OT GO 1                 Bozena Smith OT  12/9/2023

## 2023-12-10 LAB
BACTERIA SPEC AEROBE CULT: NORMAL
GLUCOSE BLDC GLUCOMTR-MCNC: 111 MG/DL (ref 70–99)
GLUCOSE BLDC GLUCOMTR-MCNC: 143 MG/DL (ref 70–99)
QT INTERVAL: 370 MS
QTC INTERVAL: 454 MS

## 2023-12-10 PROCEDURE — 63710000001 REVEFENACIN 175 MCG/3ML SOLUTION: Performed by: INTERNAL MEDICINE

## 2023-12-10 PROCEDURE — 99232 SBSQ HOSP IP/OBS MODERATE 35: CPT | Performed by: INTERNAL MEDICINE

## 2023-12-10 PROCEDURE — 94799 UNLISTED PULMONARY SVC/PX: CPT

## 2023-12-10 PROCEDURE — 94664 DEMO&/EVAL PT USE INHALER: CPT

## 2023-12-10 PROCEDURE — 63710000001 INSULIN LISPRO (HUMAN) PER 5 UNITS: Performed by: FAMILY MEDICINE

## 2023-12-10 PROCEDURE — 82948 REAGENT STRIP/BLOOD GLUCOSE: CPT

## 2023-12-10 PROCEDURE — 63710000001 PREDNISONE PER 1 MG: Performed by: INTERNAL MEDICINE

## 2023-12-10 PROCEDURE — G0378 HOSPITAL OBSERVATION PER HR: HCPCS

## 2023-12-10 PROCEDURE — 25010000002 ENOXAPARIN PER 10 MG: Performed by: FAMILY MEDICINE

## 2023-12-10 RX ORDER — PREDNISONE 20 MG/1
20 TABLET ORAL ONCE
Status: COMPLETED | OUTPATIENT
Start: 2023-12-10 | End: 2023-12-10

## 2023-12-10 RX ORDER — PREDNISONE 20 MG/1
40 TABLET ORAL
Status: DISCONTINUED | OUTPATIENT
Start: 2023-12-11 | End: 2023-12-12 | Stop reason: HOSPADM

## 2023-12-10 RX ADMIN — ALBUTEROL SULFATE 2.5 MG: 2.5 SOLUTION RESPIRATORY (INHALATION) at 15:58

## 2023-12-10 RX ADMIN — Medication 10 ML: at 08:59

## 2023-12-10 RX ADMIN — BUDESONIDE AND FORMOTEROL FUMARATE DIHYDRATE 2 PUFF: 160; 4.5 AEROSOL RESPIRATORY (INHALATION) at 19:25

## 2023-12-10 RX ADMIN — ALBUTEROL SULFATE 2.5 MG: 2.5 SOLUTION RESPIRATORY (INHALATION) at 23:43

## 2023-12-10 RX ADMIN — INSULIN LISPRO 6 UNITS: 100 INJECTION, SOLUTION INTRAVENOUS; SUBCUTANEOUS at 17:09

## 2023-12-10 RX ADMIN — REVEFENACIN 175 MCG: 175 SOLUTION RESPIRATORY (INHALATION) at 11:10

## 2023-12-10 RX ADMIN — TAMSULOSIN HYDROCHLORIDE 0.4 MG: 0.4 CAPSULE ORAL at 09:00

## 2023-12-10 RX ADMIN — ALBUTEROL SULFATE 2.5 MG: 2.5 SOLUTION RESPIRATORY (INHALATION) at 00:22

## 2023-12-10 RX ADMIN — FAMOTIDINE 20 MG: 20 TABLET ORAL at 17:09

## 2023-12-10 RX ADMIN — CITALOPRAM HYDROBROMIDE 10 MG: 20 TABLET ORAL at 09:00

## 2023-12-10 RX ADMIN — GUAIFENESIN 1200 MG: 600 TABLET ORAL at 09:00

## 2023-12-10 RX ADMIN — DOXYCYCLINE 100 MG: 100 CAPSULE ORAL at 09:00

## 2023-12-10 RX ADMIN — BUDESONIDE AND FORMOTEROL FUMARATE DIHYDRATE 2 PUFF: 160; 4.5 AEROSOL RESPIRATORY (INHALATION) at 11:10

## 2023-12-10 RX ADMIN — INSULIN LISPRO 4 UNITS: 100 INJECTION, SOLUTION INTRAVENOUS; SUBCUTANEOUS at 20:24

## 2023-12-10 RX ADMIN — ALBUTEROL SULFATE 2.5 MG: 2.5 SOLUTION RESPIRATORY (INHALATION) at 11:10

## 2023-12-10 RX ADMIN — GUAIFENESIN 1200 MG: 600 TABLET ORAL at 20:24

## 2023-12-10 RX ADMIN — Medication 10 ML: at 20:24

## 2023-12-10 RX ADMIN — ALBUTEROL SULFATE 2.5 MG: 2.5 SOLUTION RESPIRATORY (INHALATION) at 07:50

## 2023-12-10 RX ADMIN — PREDNISONE 20 MG: 20 TABLET ORAL at 11:20

## 2023-12-10 RX ADMIN — FAMOTIDINE 20 MG: 20 TABLET ORAL at 09:00

## 2023-12-10 RX ADMIN — PREDNISONE 20 MG: 20 TABLET ORAL at 09:00

## 2023-12-10 RX ADMIN — SENNOSIDES AND DOCUSATE SODIUM 2 TABLET: 50; 8.6 TABLET ORAL at 20:34

## 2023-12-10 RX ADMIN — DOXYCYCLINE 100 MG: 100 CAPSULE ORAL at 20:24

## 2023-12-10 RX ADMIN — ROSUVASTATIN 40 MG: 20 TABLET, FILM COATED ORAL at 20:24

## 2023-12-10 RX ADMIN — ENOXAPARIN SODIUM 40 MG: 100 INJECTION SUBCUTANEOUS at 08:59

## 2023-12-10 RX ADMIN — BENZONATATE 100 MG: 100 CAPSULE ORAL at 09:00

## 2023-12-10 RX ADMIN — ALBUTEROL SULFATE 2.5 MG: 2.5 SOLUTION RESPIRATORY (INHALATION) at 19:25

## 2023-12-10 RX ADMIN — Medication 5 MG: at 20:24

## 2023-12-10 NOTE — PROGRESS NOTES
[facility]   Hospitalist Progress Note    Date of admission: 12/7/2023  Patient Name: Nikhil Baldwin  1945  Date: 12/10/2023      Subjective     Chief Complaint   Patient presents with   • Dizziness       Interval Followup: Still with frequent coughing and sensation of feeling short of air especially during coughing episodes.  Still with mucus production but that seems to be improving somewhat.  Very tired still little worn out.  No fevers.        Objective     Vitals:   Temp:  [97.9 °F (36.6 °C)-98.5 °F (36.9 °C)] 98.3 °F (36.8 °C)  Heart Rate:  [56-80] 80  Resp:  [14-20] 14  BP: (106-134)/(52-77) 130/66    Physical Exam  Tired in bed  Bilateral moderate and expiratory wheezing, increased air movement noted today, still with some conversational dyspnea on room air  RRR  Abdomen soft nontender nondistended  Alert to self hospital recognizes family, can answer basic questions, has poor health literacy, occasionally distractible  Generalized weakness    Result Review:  Vital signs, labs and recent relevant imaging reviewed.      •  albuterol  •  benzonatate  •  senna-docusate sodium **AND** polyethylene glycol **AND** bisacodyl **AND** bisacodyl  •  dextrose  •  dextrose  •  glucagon (human recombinant)  •  melatonin  •  ondansetron  •  sodium chloride  •  sodium chloride  •  sodium chloride    albuterol, 2.5 mg, Nebulization, Q4H - RT  budesonide-formoterol, 2 puff, Inhalation, BID - RT  citalopram, 10 mg, Oral, Daily  doxycycline, 100 mg, Oral, Q12H  enoxaparin, 40 mg, Subcutaneous, Daily  famotidine, 20 mg, Oral, BID AC  gadobenate dimeglumine, 15 mL, Intravenous, Once in imaging  guaiFENesin, 1,200 mg, Oral, Q12H  insulin lispro, 2-9 Units, Subcutaneous, 4x Daily AC & at Bedtime  [START ON 12/11/2023] predniSONE, 40 mg, Oral, Daily With Breakfast  revefenacin, 175 mcg, Nebulization, Daily - RT  rosuvastatin, 40 mg, Oral, Nightly  senna-docusate sodium, 2 tablet, Oral, BID  sodium chloride, 10 mL,  Intravenous, Q12H  tamsulosin, 0.4 mg, Oral, Daily        MRI Brain Without Contrast    Result Date: 12/7/2023    1. No acute infarction or intracranial hemorrhage 2. Moderate to severe small vessel ischemic changes in the white matter      Davi Elmore M.D.       Electronically Signed and Approved By: Davi Elmore M.D. on 12/07/2023 at 17:35             XR Chest 1 View    Result Date: 12/7/2023   No active cardiopulmonary disease       YUAN YORK MD       Electronically Signed and Approved By: YUAN YORK MD on 12/07/2023 at 14:20              Assessment / Plan     Summary: 78-year-old male with history of mentioned diabetes and hypertension who presented with worsening cough weakness and decreased appetite and increased sedation.  Family brought patient in given concerns that this is worsening his normal.  Treating for bronchitis    Assessment/Plan (clinically significant if listed here)  Acute bronchitis  URI, question viral versus bacterial  Hypomagnesemia  Fall, question presyncopal/hypotension related versus chronic neurogenic/prior CVA related  Dementia  Depression  History of hypertension  DM 2 A1c 6.2  BPH  Hyperlipidemia  History of CVA, right pontine lacunar infarct July, and prior stroke with loss of peripheral vision  History of C6-C7 disc protrusion with mild/moderate left neural foraminal narrowing     Any pathology continue Symbicort and scheduled albuterol nebulizers, increase prednisone to 40 mg daily for bronchitis  Continue respiratory hygiene mucus clearance, remains on room air but still with easy fatigability and dyspnea on exertion significantly limiting his physical tolerance at this time.    Viral studies negative, prelim sputum negative, do not suspect needs additional antibiotics at this time will continue short course of doxycycline as well  Blood pressure reasonable without home medications, question if having some orthostatic hypotension at home, continue to hold and monitor  blood pressure  Continue SSI monitor blood glucose A1c of 6.2 suspected onto many medications at home with Jardiance metformin and Januvia probably having some hypoglycemic episodes will reduce regimen for discharge monitor SSI requirements while here  Continue Flomax monitor for retention  Continue home Celexa, melatonin  Continue statin  Speech therapy evaluation, appreciate assistance, mechanical soft diet with thin liquids and aspiration precautions  PT/OT  Continue inpatient monitoring and treatment as above.  Working on rehab for placement     DVT prophylaxis:  Medical DVT prophylaxis orders are present.    Level Of Support Discussed With: Patient  Code Status (Patient has no pulse and is not breathing): CPR (Attempt to Resuscitate)  Medical Interventions (Patient has pulse or is breathing): Full Support        CBC          12/7/2023    13:18 12/8/2023    12:17 12/9/2023    04:50   CBC   WBC 7.75  7.57  6.38    RBC 5.28  4.80  4.49    Hemoglobin 15.6  14.5  13.4    Hematocrit 46.9  42.9  39.6    MCV 88.8  89.4  88.2    MCH 29.5  30.2  29.8    MCHC 33.3  33.8  33.8    RDW 14.6  14.6  14.4    Platelets 162  138  142        CMP          11/21/2023    13:13 12/7/2023    13:18 12/8/2023    12:17   CMP   Glucose 131  135  131    BUN 13  13  14    Creatinine 1.17  1.16  1.03    EGFR 63.0  63.7  73.4    Sodium 143  140  139    Potassium 4.8  4.0  3.9    Chloride 106  103  105    Calcium 10.4  10.1  9.3    Total Protein 6.5  6.9     Albumin 4.4  4.4     Globulin 2.1  2.5     Total Bilirubin 0.7  1.2     Alkaline Phosphatase 59  69     AST (SGOT) 22  14     ALT (SGPT) 18  14     Albumin/Globulin Ratio 2.1  1.8     BUN/Creatinine Ratio 11.1  11.2  13.6    Anion Gap 13.8  14.4  12.0

## 2023-12-10 NOTE — PLAN OF CARE
Goal Outcome Evaluation:  Plan of Care Reviewed With: patient        Progress: improving  Outcome Evaluation: NO C/O PAIN. SLEPT WELL THIS SHIFT.

## 2023-12-10 NOTE — PROGRESS NOTES
Respiratory Therapist Broncho-Pulmonary Hygiene Progress Note      Patient Name:  Nikhil Baldwin  YOB: 1945    Nikhil Baldwin meets the qualification for Level 1 of the Bronco-Pulmonary Hygiene Protocol. This was based on my daily patient assessment and includes review of chest x-ray results, cough ability and quality, oxygenation, secretions or risk for secretion development and patient mobility.     Broncho-Pulmonary Hygiene Assessment:    Level of Movement: Actively changing positions without assistance  Alert/ oriented/ cooperative    Breath Sounds: Diminished and/or coarse rhonchi    Cough: Strong, effective    Chest X-Ray: Possible signs of consolidation and/or atelectasis or clear.     Sputum Productions: None or small amount of thin or watery secretions with effective cough    History and Physical: New onset of bronchitis or existing chronic pulmonary conditions.  **(not in an exacerbation)    SpO2 to Oxygen Need: greater than 92% on room air or  less than 3L nasal canula    Current SpO2 is: 94 on RA    Based on this information, I have completed the following interventions: Teach/Instruct patient on cough and deep breathe      Electronically signed by Nesha Abbott RRT, 12/10/23, 11:13 AM EST.

## 2023-12-11 PROBLEM — J40 BRONCHITIS: Status: ACTIVE | Noted: 2023-12-11

## 2023-12-11 LAB
BACTERIA SPEC RESP CULT: NORMAL
GLUCOSE BLDC GLUCOMTR-MCNC: 141 MG/DL (ref 70–99)
GLUCOSE BLDC GLUCOMTR-MCNC: 150 MG/DL (ref 70–99)
GLUCOSE BLDC GLUCOMTR-MCNC: 199 MG/DL (ref 70–99)
GLUCOSE BLDC GLUCOMTR-MCNC: 204 MG/DL (ref 70–99)
GLUCOSE BLDC GLUCOMTR-MCNC: 228 MG/DL (ref 70–99)
GLUCOSE BLDC GLUCOMTR-MCNC: 296 MG/DL (ref 70–99)
GRAM STN SPEC: NORMAL
GRAM STN SPEC: NORMAL

## 2023-12-11 PROCEDURE — 94799 UNLISTED PULMONARY SVC/PX: CPT

## 2023-12-11 PROCEDURE — 63710000001 REVEFENACIN 175 MCG/3ML SOLUTION: Performed by: INTERNAL MEDICINE

## 2023-12-11 PROCEDURE — 99232 SBSQ HOSP IP/OBS MODERATE 35: CPT | Performed by: INTERNAL MEDICINE

## 2023-12-11 PROCEDURE — 82948 REAGENT STRIP/BLOOD GLUCOSE: CPT

## 2023-12-11 PROCEDURE — 63710000001 PREDNISONE PER 1 MG: Performed by: INTERNAL MEDICINE

## 2023-12-11 PROCEDURE — 94664 DEMO&/EVAL PT USE INHALER: CPT

## 2023-12-11 PROCEDURE — 25010000002 ENOXAPARIN PER 10 MG: Performed by: FAMILY MEDICINE

## 2023-12-11 PROCEDURE — 63710000001 INSULIN LISPRO (HUMAN) PER 5 UNITS: Performed by: FAMILY MEDICINE

## 2023-12-11 RX ORDER — ASPIRIN 81 MG/1
81 TABLET, CHEWABLE ORAL DAILY
Status: DISCONTINUED | OUTPATIENT
Start: 2023-12-12 | End: 2023-12-12 | Stop reason: HOSPADM

## 2023-12-11 RX ADMIN — DOXYCYCLINE 100 MG: 100 CAPSULE ORAL at 08:43

## 2023-12-11 RX ADMIN — ALBUTEROL SULFATE 2.5 MG: 2.5 SOLUTION RESPIRATORY (INHALATION) at 20:04

## 2023-12-11 RX ADMIN — PREDNISONE 40 MG: 20 TABLET ORAL at 08:44

## 2023-12-11 RX ADMIN — Medication 10 ML: at 21:43

## 2023-12-11 RX ADMIN — GUAIFENESIN 1200 MG: 600 TABLET ORAL at 08:44

## 2023-12-11 RX ADMIN — BUDESONIDE AND FORMOTEROL FUMARATE DIHYDRATE 2 PUFF: 160; 4.5 AEROSOL RESPIRATORY (INHALATION) at 20:05

## 2023-12-11 RX ADMIN — INSULIN LISPRO 4 UNITS: 100 INJECTION, SOLUTION INTRAVENOUS; SUBCUTANEOUS at 17:31

## 2023-12-11 RX ADMIN — INSULIN LISPRO 2 UNITS: 100 INJECTION, SOLUTION INTRAVENOUS; SUBCUTANEOUS at 21:43

## 2023-12-11 RX ADMIN — Medication 10 ML: at 08:45

## 2023-12-11 RX ADMIN — BENZONATATE 100 MG: 100 CAPSULE ORAL at 08:43

## 2023-12-11 RX ADMIN — ALBUTEROL SULFATE 2.5 MG: 2.5 SOLUTION RESPIRATORY (INHALATION) at 08:13

## 2023-12-11 RX ADMIN — FAMOTIDINE 20 MG: 20 TABLET ORAL at 17:31

## 2023-12-11 RX ADMIN — DOXYCYCLINE 100 MG: 100 CAPSULE ORAL at 21:42

## 2023-12-11 RX ADMIN — REVEFENACIN 175 MCG: 175 SOLUTION RESPIRATORY (INHALATION) at 08:13

## 2023-12-11 RX ADMIN — GUAIFENESIN 1200 MG: 600 TABLET ORAL at 21:43

## 2023-12-11 RX ADMIN — Medication 5 MG: at 21:42

## 2023-12-11 RX ADMIN — FAMOTIDINE 20 MG: 20 TABLET ORAL at 08:43

## 2023-12-11 RX ADMIN — CITALOPRAM HYDROBROMIDE 10 MG: 20 TABLET ORAL at 08:43

## 2023-12-11 RX ADMIN — SENNOSIDES AND DOCUSATE SODIUM 2 TABLET: 50; 8.6 TABLET ORAL at 08:44

## 2023-12-11 RX ADMIN — ALBUTEROL SULFATE 2.5 MG: 2.5 SOLUTION RESPIRATORY (INHALATION) at 14:06

## 2023-12-11 RX ADMIN — ALBUTEROL SULFATE 2.5 MG: 2.5 SOLUTION RESPIRATORY (INHALATION) at 23:46

## 2023-12-11 RX ADMIN — ENOXAPARIN SODIUM 40 MG: 100 INJECTION SUBCUTANEOUS at 08:44

## 2023-12-11 RX ADMIN — TAMSULOSIN HYDROCHLORIDE 0.4 MG: 0.4 CAPSULE ORAL at 08:43

## 2023-12-11 RX ADMIN — BUDESONIDE AND FORMOTEROL FUMARATE DIHYDRATE 2 PUFF: 160; 4.5 AEROSOL RESPIRATORY (INHALATION) at 08:13

## 2023-12-11 RX ADMIN — BENZONATATE 100 MG: 100 CAPSULE ORAL at 21:43

## 2023-12-11 RX ADMIN — INSULIN LISPRO 2 UNITS: 100 INJECTION, SOLUTION INTRAVENOUS; SUBCUTANEOUS at 08:44

## 2023-12-11 NOTE — PROGRESS NOTES
[facility]   Hospitalist Progress Note    Date of admission: 12/7/2023  Patient Name: Nikhil Baldwin  1945  Date: 12/11/2023      Subjective     Chief Complaint   Patient presents with    Dizziness       Interval Followup: Patient feels like his breathing turned a corner about 2 hours ago when he was able to clear additional mucus and his breathing is doing a lot better now.  Still weak but starting to improve.  Encouraged rehab placement still initially he is willing to him that I am discussed with him    Objective     Vitals:   Temp:  [97.7 °F (36.5 °C)-98.8 °F (37.1 °C)] 98.8 °F (37.1 °C)  Heart Rate:  [] 65  Resp:  [18] 18  BP: (105-144)/(57-79) 144/77    Physical Exam  Awake conversant appears more comfortable, notably improving aeration today no acute end expiratory wheezing   RRR  Abdomen soft nontender nondistended  Alert to self hospital recognizes family, can answer basic questions, has poor health literacy, occasionally distractible  Generalized weakness    Result Review:  Vital signs, labs and recent relevant imaging reviewed.        albuterol    benzonatate    senna-docusate sodium **AND** polyethylene glycol **AND** bisacodyl **AND** bisacodyl    dextrose    dextrose    glucagon (human recombinant)    melatonin    ondansetron    sodium chloride    sodium chloride    sodium chloride    albuterol, 2.5 mg, Nebulization, Q4H - RT  [START ON 12/12/2023] aspirin, 81 mg, Oral, Daily  budesonide-formoterol, 2 puff, Inhalation, BID - RT  citalopram, 10 mg, Oral, Daily  doxycycline, 100 mg, Oral, Q12H  enoxaparin, 40 mg, Subcutaneous, Daily  famotidine, 20 mg, Oral, BID AC  gadobenate dimeglumine, 15 mL, Intravenous, Once in imaging  guaiFENesin, 1,200 mg, Oral, Q12H  insulin lispro, 2-9 Units, Subcutaneous, 4x Daily AC & at Bedtime  predniSONE, 40 mg, Oral, Daily With Breakfast  revefenacin, 175 mcg, Nebulization, Daily - RT  rosuvastatin, 40 mg, Oral, Nightly  senna-docusate sodium, 2 tablet,  Oral, BID  sodium chloride, 10 mL, Intravenous, Q12H  tamsulosin, 0.4 mg, Oral, Daily        MRI Brain Without Contrast    Result Date: 12/7/2023    1. No acute infarction or intracranial hemorrhage 2. Moderate to severe small vessel ischemic changes in the white matter      Davi Elmore M.D.       Electronically Signed and Approved By: Davi Elmore M.D. on 12/07/2023 at 17:35             XR Chest 1 View    Result Date: 12/7/2023   No active cardiopulmonary disease       YUAN YORK MD       Electronically Signed and Approved By: YUAN YORK MD on 12/07/2023 at 14:20              Assessment / Plan     Summary: 78-year-old male with history of mentioned diabetes and hypertension who presented with worsening cough weakness and decreased appetite and increased sedation.  Family brought patient in given concerns that this is worsening his normal.  Treating for bronchitis with mucous plugging/airway clearance issues, improving with steroids and respiratory hygiene.  Patient responding well to steroids nebulizers and respiratory hygiene.  Initial pending for rehab placement  Patient leaning towards wanting to go home.  Possibly can go home on 12/12 Suspect patient with advancing dementia contributing.      Assessment/Plan (clinically significant if listed here)  Acute bronchitis  URI suspect viral from unspecified organism   Hypomagnesemia  Fall, question presyncopal/hypotension related versus chronic neurogenic/prior CVA related  Dementia  Depression  History of hypertension  DM 2 A1c 6.2  BPH  Hyperlipidemia  History of CVA, right pontine lacunar infarct July, and prior stroke with loss of peripheral vision  History of C6-C7 disc protrusion with mild/moderate left neural foraminal narrowing     Continue Symbicort nebulizers as directed  Continue steroids today, likely DC tomorrow unless any further wheezing  Guaifenesin  Short course of doxycycline sputum cultures and infectious studies otherwise negative  Blood  pressure doing better, home medication have been held, will not resume at this time still recheck orthostatic vital signs tomorrow.  Suspect falls at home multifactorial in setting of hypertension and age-related debility as well as his respiratory illness.  May have also been having some hypoglycemia at home as his A1c is 6.2 when he is 3 oral antidiabetic agents.  Recommend reduced regimen at discharge.  Even while on steroids.  He has had minimal SSI requirements would favor no diabetic meds for discharge and monitoring as outpatient with follow-up with PCP only  PT OT recommending inpatient rehab patient now stating that he wants to go home likely, follow-up results of above testing and if still declining rehab placement should be able to discharge home tomorrow with family and home health minimally.  Continue SSI monitor blood glucose A1c of 6.2 suspected onto many medications at home with Jardiance metformin and Januvia probably having some hypoglycemic episodes will reduce regimen for discharge monitor SSI requirements while here  Continue Flomax monitor for retention  Continue home Celexa, melatonin  Continue statin, consider continuing aspirin at discharge but given falls history, OSIRIS Tobias deferring until follows up with his PCP.  Does have advanced chronic ischemic changes similar to prior CTs with moderate diffuse cerebellar and cerebral atrophy on CT imaging  Speech therapy evaluation, appreciate assistance, mechanical soft diet with thin liquids and aspiration precautions  PT/OT  Continue inpatient monitoring and treatment as above.  Home tomorrow vs rehab depending on clinical response        DVT prophylaxis:  Medical DVT prophylaxis orders are present.    Level Of Support Discussed With: Patient  Code Status (Patient has no pulse and is not breathing): CPR (Attempt to Resuscitate)  Medical Interventions (Patient has pulse or is breathing): Full Support        CBC          12/7/2023    13:18 12/8/2023     12:17 12/9/2023    04:50   CBC   WBC 7.75  7.57  6.38    RBC 5.28  4.80  4.49    Hemoglobin 15.6  14.5  13.4    Hematocrit 46.9  42.9  39.6    MCV 88.8  89.4  88.2    MCH 29.5  30.2  29.8    MCHC 33.3  33.8  33.8    RDW 14.6  14.6  14.4    Platelets 162  138  142        CMP          11/21/2023    13:13 12/7/2023    13:18 12/8/2023    12:17   CMP   Glucose 131  135  131    BUN 13  13  14    Creatinine 1.17  1.16  1.03    EGFR 63.0  63.7  73.4    Sodium 143  140  139    Potassium 4.8  4.0  3.9    Chloride 106  103  105    Calcium 10.4  10.1  9.3    Total Protein 6.5  6.9     Albumin 4.4  4.4     Globulin 2.1  2.5     Total Bilirubin 0.7  1.2     Alkaline Phosphatase 59  69     AST (SGOT) 22  14     ALT (SGPT) 18  14     Albumin/Globulin Ratio 2.1  1.8     BUN/Creatinine Ratio 11.1  11.2  13.6    Anion Gap 13.8  14.4  12.0

## 2023-12-11 NOTE — PLAN OF CARE
Goal Outcome Evaluation:           Progress: no change  Outcome Evaluation: No complaints of pain or nausea today. Vital signs stable. Pt was very congested this am with expiratory wheezing and fine crackles but this evening feeling better. Awaiting rehab placement.

## 2023-12-11 NOTE — PLAN OF CARE
Goal Outcome Evaluation:  Plan of Care Reviewed With: patient      PATIENT WITH CRACKLES THROUGHOUT NO ACUTE EVENTS WAIT FOR DC TO REHAB

## 2023-12-11 NOTE — DISCHARGE PLACEMENT REQUEST
"Franc Swan (78 y.o. Male)       Date of Birth   1945    Social Security Number       Address   [address] [place] [address]    Home Phone   [phone]    MRN   [id number]       Anabaptist   Orthodox    Marital Status                               Admission Date   12/7/23    Admission Type   Emergency    Admitting Provider   Brendon Collier MD    Attending Provider   Felton August MD    Department, Room/Bed   [address], 3007/1       Discharge Date       Discharge Disposition       Discharge Destination                                 Attending Provider: Felton August MD    Allergies: Codeine, Promethazine, Celecoxib, Ibuprofen, Penicillins, Promethazine Hcl, Warfarin    Isolation: None   Infection: COVID (History) (12/07/23)   Code Status: CPR    Ht: 175.3 cm (69\")   Wt: 75.4 kg (166 lb 3.6 oz)    Admission Cmt: None   Principal Problem: Generalized weakness [R53.1]                   Active Insurance as of 12/7/2023       Primary Coverage       Payor Plan Insurance Group Employer/Plan Group    ANTHEM MEDICARE REPLACEMENT [employer] MEDICARE ADVANTAGE [id number]       Payor Plan Address Payor Plan Phone Number Payor Plan Fax Number Effective Dates    [address] [phone]  1/1/2020 - None Entered    [facility] [id number]         Subscriber Name Subscriber Birth Date Member ID       FRANC SWAN 1945 [id number]                     Emergency Contacts        (Rel.) Home Phone Work Phone Mobile Phone    JHONATAN SWAN (Son) [phone] -- [phone]    Julius Swan (Son) -- -- [phone]              Insurance Information                  ANTHEM MEDICARE REPLACEMENT/ANTHEM MEDICARE ADVANTAGE Phone: [phone]    Subscriber: Franc Swan Subscriber#: [id number]    Group#: [id number] Precert#: --             History & Physical        Brendon Collier MD at 12/07/23 4891           [facility]   HISTORY AND PHYSICAL    Patient Name: " Nikhil Baldwin  : 1945  MRN: [id number]  Primary Care Physician:  Felix Navarro Jr., MD  Date of admission: 2023    Subjective  Subjective     Chief Complaint: Weakness    HPI:    Nikhil Baldwin is a 78 y.o. male with past medical history of diabetes, hypertension, hyperlipidemia, dementia, and GERD presented to the ED with complaints of weakness and decreased appetite.  Family at bedside states that the patient for the last few days has not been eating well and has been mostly sedentary which is unlike him.  Patient also has been having a productive cough with no fevers, chills or sick contacts.  Family had continued concern was brought up into the ED for further evaluation.  In the ED patient's vitals were all within normal limits on arrival.  Labs were all relatively unremarkable given his chronic conditions including a negative troponin and urinalysis.  There was some concern for possible strokes MRI was performed which was negative for any acute findings.  Chest x-ray was also negative.  When asked he denied any recent fevers, chills, headaches, focal weakness, chest pain, palpitation, abdominal pain, nausea, vomiting, diarrhea, constipation, dysuria, hematuria, hematochezia, melena, or anxiety.  Patient admitted for further evaluation and treatment    Review of Systems   All systems were reviewed and negative except for: As stated in HPI    Personal History     Past Medical History:   Diagnosis Date   • Aorta disorder    • Arthritis    • Degenerative joint disease    • Dementia     FRONTAL LOBE   • Depression    • Diabetes mellitus    • Hyperlipidemia    • Hypertension    • Kidney stone    • Stroke     MILD RIGHT SIDE DEFICITS       Past Surgical History:   Procedure Laterality Date   • CHOLECYSTECTOMY     • CYSTOSCOPY BLADDER STONE LITHOTRIPSY     • KIDNEY STONE SURGERY         Family History: family history is not on file. Otherwise pertinent FHx was reviewed and not pertinent to current  issue.    Social History:  reports that he has never smoked. He has never used smokeless tobacco. He reports that he does not drink alcohol and does not use drugs.    Home Medications:  Cholecalciferol, SITagliptin, citalopram, empagliflozin, ipratropium-albuterol, lisinopril, melatonin, metFORMIN, rosuvastatin, and tamsulosin      Allergies:  Allergies   Allergen Reactions   • Codeine Unknown - Low Severity   • Promethazine Unknown - Low Severity   • Celecoxib Rash   • Ibuprofen Rash   • Penicillins Rash   • Promethazine Hcl Rash   • Warfarin Rash       Objective  Objective     Vitals:   Temp:  [98.7 °F (37.1 °C)-98.9 °F (37.2 °C)] 98.7 °F (37.1 °C)  Heart Rate:  [74-92] 74  Resp:  [17-19] 17  BP: (107-125)/(63-89) 107/63  Physical Exam    Constitutional: Awake, alert   Eyes: PERRLA, sclerae anicteric, no conjunctival injection   HENT: NCAT, mucous membranes moist   Neck: Supple, no thyromegaly, no lymphadenopathy, trachea midline   Respiratory: Wheezing and rhonchi   Cardiovascular: RRR, no murmurs, rubs, or gallops, palpable pedal pulses bilaterally   Gastrointestinal: Positive bowel sounds, soft, nontender, nondistended   Musculoskeletal: No bilateral ankle edema, no clubbing or cyanosis to extremities   Psychiatric: Appropriate affect, cooperative   Neurologic: strength symmetric in all extremities, Cranial Nerves grossly intact to confrontation, speech clear   Skin: No rashes     Result Review   Result Review:  I have personally reviewed the results from the time of this admission to 12/7/2023 21:59 EST and agree with these findings:  [x]  Laboratory list / accordion  []  Microbiology  [x]  Radiology  [x]  EKG/Telemetry   []  Cardiology/Vascular   []  Pathology  []  Old records  []  Other:  Most notable findings include: Labs unremarkable, UA negative, MRI of the brain negative for any acute findings, chest x-ray negative      Assessment & Plan  Assessment / Plan     Brief Patient Summary:  Nikhil Baldwin is a  78 y.o. male with past medical history of diabetes, hypertension, hyperlipidemia, dementia, and GERD presented to the ED with complaints of weakness and decreased appetite    Active Hospital Problems:  Active Hospital Problems    Diagnosis    • **Generalized weakness    • Acute bronchitis    • Dizziness    • Arthritis    • Primary hypertension    • Type 2 diabetes mellitus with hyperglycemia, without long-term current use of insulin    • BPH (benign prostatic hyperplasia)      Plan:     Generalized weakness  -Admit to medical floor  -Likely secondary to dehydration given her poor p.o. intake as well as likely bronchitis  -History of prior CVA  -MRI negative for any acute findings  -Concern for falls at home  -IVF  -Supplement meals with Ensure  -Fall precautions  -PT  -Supportive care    Bronchitis  -Productive cough for over a week  -Denies any fevers or chills  -Mucinex, Tessalon Perles  -Prednisone  -DuoNebs as needed    HTN  -Currently well controlled  -PRN BP meds  -Resume home meds when available  -Titrate if needed    Diabetes  -Insulin sliding scale  -Levemir at bedtime  -Titrate as needed    Hx CAD    GI ppx  DVT ppx    DVT prophylaxis:  Medical DVT prophylaxis orders are present.    CODE STATUS:    Level Of Support Discussed With: Patient  Code Status (Patient has no pulse and is not breathing): CPR (Attempt to Resuscitate)  Medical Interventions (Patient has pulse or is breathing): Full Support    Admission Status:  I believe this patient meets observation status.      Electronically signed by Brendon Collier MD, 12/07/23, 9:59 PM EST.    Electronically signed by Brendon Collier MD at 12/07/23 1066       Current Facility-Administered Medications   Medication Dose Route Frequency Provider Last Rate Last Admin   • albuterol (PROVENTIL) nebulizer solution 0.083% 2.5 mg/3mL  2.5 mg Nebulization Q6H PRN Felton August MD       • albuterol (PROVENTIL) nebulizer solution 0.083% 2.5 mg/3mL  2.5 mg Nebulization  Q4H - RT Felton August MD   2.5 mg at 12/11/23 0813   • benzonatate (TESSALON) capsule 100 mg  100 mg Oral TID PRN Brendon Collier MD   100 mg at 12/11/23 0843   • sennosides-docusate (PERICOLACE) 8.6-50 MG per tablet 2 tablet  2 tablet Oral BID Brendon Collier MD   2 tablet at 12/11/23 0844    And   • polyethylene glycol (MIRALAX) packet 17 g  17 g Oral Daily PRN Brendon Collier MD        And   • bisacodyl (DULCOLAX) EC tablet 5 mg  5 mg Oral Daily PRN Brendon Collier MD        And   • bisacodyl (DULCOLAX) suppository 10 mg  10 mg Rectal Daily PRN Brendon Collier MD       • budesonide-formoterol (SYMBICORT) 160-4.5 MCG/ACT inhaler 2 puff  2 puff Inhalation BID - RT Felton August MD   2 puff at 12/11/23 0813   • citalopram (CeleXA) tablet 10 mg  10 mg Oral Daily Brendon Collier MD   10 mg at 12/11/23 0843   • dextrose (D50W) (25 g/50 mL) IV injection 25 g  25 g Intravenous Q15 Min PRN Brendon Collier MD       • dextrose (GLUTOSE) oral gel 15 g  15 g Oral Q15 Min PRN Brendon Collier MD       • doxycycline (MONODOX) capsule 100 mg  100 mg Oral Q12H Felton August MD   100 mg at 12/11/23 0843   • Enoxaparin Sodium (LOVENOX) syringe 40 mg  40 mg Subcutaneous Daily Brendon Collier MD   40 mg at 12/11/23 0844   • famotidine (PEPCID) tablet 20 mg  20 mg Oral BID AC Felton August MD   20 mg at 12/11/23 0843   • gadobenate dimeglumine (MULTIHANCE) injection 15 mL  15 mL Intravenous Once in imaging Hermes Olmedo MD       • glucagon (GLUCAGEN) injection 1 mg  1 mg Intramuscular Q15 Min PRN Brendon Collier MD       • guaiFENesin (MUCINEX) 12 hr tablet 1,200 mg  1,200 mg Oral Q12H Felton August MD   1,200 mg at 12/11/23 0844   • Insulin Lispro (humaLOG) injection 2-9 Units  2-9 Units Subcutaneous 4x Daily AC & at Bedtime Brendon Collier MD   2 Units at 12/11/23 0844   • melatonin tablet 5 mg  5 mg Oral Nightly PRN Brendon Collier MD   5 mg at 12/10/23 2024   • ondansetron (ZOFRAN) injection 4 mg  4 mg  Intravenous Q6H PRN Brendon Collier MD       • predniSONE (DELTASONE) tablet 40 mg  40 mg Oral Daily With Breakfast Felton August MD   40 mg at 12/11/23 0844   • revefenacin (YUPELRI) nebulizer solution 175 mcg  175 mcg Nebulization Daily - RT Felton August MD   175 mcg at 12/11/23 0813   • rosuvastatin (CRESTOR) tablet 40 mg  40 mg Oral Nightly Brendon Collier MD   40 mg at 12/10/23 2024   • sodium chloride 0.9 % flush 10 mL  10 mL Intravenous PRN Hermes Olmedo MD       • sodium chloride 0.9 % flush 10 mL  10 mL Intravenous Q12H Brendon Collier MD   10 mL at 12/11/23 0845   • sodium chloride 0.9 % flush 10 mL  10 mL Intravenous PRN Brendon Collier MD       • sodium chloride 0.9 % infusion 40 mL  40 mL Intravenous PRN Brendon Collier MD       • tamsulosin (FLOMAX) 24 hr capsule 0.4 mg  0.4 mg Oral Daily Brendon Collier MD   0.4 mg at 12/11/23 0843     Lab Results (last 24 hours)       Procedure Component Value Units Date/Time    POC Glucose Once [[id number]]  (Abnormal) Collected: 12/11/23 0752    Specimen: Blood Updated: 12/11/23 0755     Glucose 150 mg/dL      Comment: Serial Number: [id number]:  [id number]       POC Glucose Once [[id number]]  (Abnormal) Collected: 12/10/23 2011    Specimen: Blood Updated: 12/11/23 0746     Glucose 228 mg/dL      Comment: Serial Number: [id number]:  [id number]       POC Glucose Once [[id number]]  (Abnormal) Collected: 12/10/23 1635    Specimen: Blood Updated: 12/11/23 0745     Glucose 296 mg/dL      Comment: Serial Number: [id number]:  [id number]       Beta Strep Culture, Throat - Swab, Throat [[id number]]  (Normal) Collected: 12/08/23 1527    Specimen: Swab from Throat Updated: 12/10/23 1206     Throat Culture, Beta Strep No Beta Hemolytic Streptococcus Isolated    Narrative:      Group A Strep incidence is low in adults. Positive culture for Beta hemolytic Streptococcus species can reflect colonization and not true infection. Please correlate  clinically.      POC Glucose Once [[id number]]  (Abnormal) Collected: 12/10/23 1124    Specimen: Blood Updated: 12/10/23 1125     Glucose 143 mg/dL      Comment: Serial Number: [id number]:  [id number]       Respiratory Culture - Sputum, Cough [[id number]] Collected: 12/08/23 2306    Specimen: Sputum from Cough Updated: 12/10/23 1037     Respiratory Culture Light growth (2+) The culture consists of normal respiratory oscar. This is a preliminary report; final report to follow.     Gram Stain Moderate (3+) WBCs seen      Rare (1+) Epithelial cells seen             Physician Progress Notes (most recent note)        Felton August MD at 12/10/23 1402           [facility] Hospitalist Progress Note    Date of admission: 12/7/2023  Patient Name: Nikhil Baldwin  1945  Date: 12/10/2023      Subjective     Chief Complaint   Patient presents with   • Dizziness       Interval Followup: Still with frequent coughing and sensation of feeling short of air especially during coughing episodes.  Still with mucus production but that seems to be improving somewhat.  Very tired still little worn out.  No fevers.        Objective     Vitals:   Temp:  [97.9 °F (36.6 °C)-98.5 °F (36.9 °C)] 98.3 °F (36.8 °C)  Heart Rate:  [56-80] 80  Resp:  [14-20] 14  BP: (106-134)/(52-77) 130/66    Physical Exam  Tired in bed  Bilateral moderate and expiratory wheezing, increased air movement noted today, still with some conversational dyspnea on room air  RRR  Abdomen soft nontender nondistended  Alert to self hospital recognizes family, can answer basic questions, has poor health literacy, occasionally distractible  Generalized weakness    Result Review:  Vital signs, labs and recent relevant imaging reviewed.      •  albuterol  •  benzonatate  •  senna-docusate sodium **AND** polyethylene glycol **AND** bisacodyl **AND** bisacodyl  •  dextrose  •  dextrose  •  glucagon (human recombinant)  •  melatonin  •  ondansetron  •  sodium  chloride  •  sodium chloride  •  sodium chloride    albuterol, 2.5 mg, Nebulization, Q4H - RT  budesonide-formoterol, 2 puff, Inhalation, BID - RT  citalopram, 10 mg, Oral, Daily  doxycycline, 100 mg, Oral, Q12H  enoxaparin, 40 mg, Subcutaneous, Daily  famotidine, 20 mg, Oral, BID AC  gadobenate dimeglumine, 15 mL, Intravenous, Once in imaging  guaiFENesin, 1,200 mg, Oral, Q12H  insulin lispro, 2-9 Units, Subcutaneous, 4x Daily AC & at Bedtime  [START ON 12/11/2023] predniSONE, 40 mg, Oral, Daily With Breakfast  revefenacin, 175 mcg, Nebulization, Daily - RT  rosuvastatin, 40 mg, Oral, Nightly  senna-docusate sodium, 2 tablet, Oral, BID  sodium chloride, 10 mL, Intravenous, Q12H  tamsulosin, 0.4 mg, Oral, Daily        MRI Brain Without Contrast    Result Date: 12/7/2023    1. No acute infarction or intracranial hemorrhage 2. Moderate to severe small vessel ischemic changes in the white matter      Davi Elmore M.D.       Electronically Signed and Approved By: Davi Elmore M.D. on 12/07/2023 at 17:35             XR Chest 1 View    Result Date: 12/7/2023   No active cardiopulmonary disease       YUAN YORK MD       Electronically Signed and Approved By: YUAN YORK MD on 12/07/2023 at 14:20              Assessment / Plan     Summary: 78-year-old male with history of mentioned diabetes and hypertension who presented with worsening cough weakness and decreased appetite and increased sedation.  Family brought patient in given concerns that this is worsening his normal.  Treating for bronchitis    Assessment/Plan (clinically significant if listed here)  Acute bronchitis  URI, question viral versus bacterial  Hypomagnesemia  Fall, question presyncopal/hypotension related versus chronic neurogenic/prior CVA related  Dementia  Depression  History of hypertension  DM 2 A1c 6.2  BPH  Hyperlipidemia  History of CVA, right pontine lacunar infarct July, and prior stroke with loss of peripheral vision  History of C6-C7  disc protrusion with mild/moderate left neural foraminal narrowing     Any pathology continue Symbicort and scheduled albuterol nebulizers, increase prednisone to 40 mg daily for bronchitis  Continue respiratory hygiene mucus clearance, remains on room air but still with easy fatigability and dyspnea on exertion significantly limiting his physical tolerance at this time.    Viral studies negative, prelim sputum negative, do not suspect needs additional antibiotics at this time will continue short course of doxycycline as well  Blood pressure reasonable without home medications, question if having some orthostatic hypotension at home, continue to hold and monitor blood pressure  Continue SSI monitor blood glucose A1c of 6.2 suspected onto many medications at home with Jardiance metformin and Januvia probably having some hypoglycemic episodes will reduce regimen for discharge monitor SSI requirements while here  Continue Flomax monitor for retention  Continue home Celexa, melatonin  Continue statin  Speech therapy evaluation, appreciate assistance, mechanical soft diet with thin liquids and aspiration precautions  PT/OT  Continue inpatient monitoring and treatment as above.  Working on rehab for placement     DVT prophylaxis:  Medical DVT prophylaxis orders are present.    Level Of Support Discussed With: Patient  Code Status (Patient has no pulse and is not breathing): CPR (Attempt to Resuscitate)  Medical Interventions (Patient has pulse or is breathing): Full Support        CBC          12/7/2023    13:18 12/8/2023    12:17 12/9/2023    04:50   CBC   WBC 7.75  7.57  6.38    RBC 5.28  4.80  4.49    Hemoglobin 15.6  14.5  13.4    Hematocrit 46.9  42.9  39.6    MCV 88.8  89.4  88.2    MCH 29.5  30.2  29.8    MCHC 33.3  33.8  33.8    RDW 14.6  14.6  14.4    Platelets 162  138  142        CMP          11/21/2023    13:13 12/7/2023    13:18 12/8/2023    12:17   CMP   Glucose 131  135  131    BUN 13  13  14    Creatinine  1.17  1.16  1.03    EGFR 63.0  63.7  73.4    Sodium 143  140  139    Potassium 4.8  4.0  3.9    Chloride 106  103  105    Calcium 10.4  10.1  9.3    Total Protein 6.5  6.9     Albumin 4.4  4.4     Globulin 2.1  2.5     Total Bilirubin 0.7  1.2     Alkaline Phosphatase 59  69     AST (SGOT) 22  14     ALT (SGPT) 18  14     Albumin/Globulin Ratio 2.1  1.8     BUN/Creatinine Ratio 11.1  11.2  13.6    Anion Gap 13.8  14.4  12.0            Electronically signed by Felton August MD at 12/10/23 1404       Consult Notes (most recent note)    No notes of this type exist for this encounter.          Physical Therapy Notes (most recent note)        Yuli Miranda PT at 23 1555  Version 1 of 1         Acute Care - Physical Therapy Initial Evaluation   [facility]     Patient Name: Nikhil Baldwin  : 1945  MRN: [id number]  Today's Date: 2023      Visit Dx:     ICD-10-CM ICD-9-CM   1. Dizziness  R42 780.4   2. Difficulty walking  R26.2 719.7     Patient Active Problem List   Diagnosis   • Aortic aneurysm   • Arthritis   • BPH (benign prostatic hyperplasia)   • Cervical radiculopathy   • Type 2 diabetes mellitus with hyperglycemia, without long-term current use of insulin   • Parathyroid abnormality   • Thyroid disorder   • Facet arthropathy   • Fatty metamorphosis of liver   • GERD (gastroesophageal reflux disease)   • Primary hypertension   • Hematuria   • Hiatal hernia   • Urinary incontinence   • Hypercalcemia   • Mixed hyperlipidemia   • Kidney disease   • Renal calculi   • Low back pain   • Thoracic back pain   • DDD (degenerative disc disease), lumbar   • Spinal stenosis of lumbar region   • Degeneration of thoracic or thoracolumbar intervertebral disc   • MI (myocardial infarction)   • Myofascial pain   • Neck pain   • Nocturia   • Sciatica   • Stroke   • Ureterolithiasis   • Vitamin D deficiency   • Labile personality   • Acute viral syndrome   • Debility   • COVID-19   • Upper respiratory tract  infection due to COVID-19 virus   • COVID   • Generalized weakness   • Acute bronchitis   • Dizziness     Past Medical History:   Diagnosis Date   • Aorta disorder    • Arthritis    • Degenerative joint disease    • Dementia     FRONTAL LOBE   • Depression    • Diabetes mellitus    • Hyperlipidemia    • Hypertension    • Kidney stone    • Stroke     MILD RIGHT SIDE DEFICITS     Past Surgical History:   Procedure Laterality Date   • CHOLECYSTECTOMY     • CYSTOSCOPY BLADDER STONE LITHOTRIPSY     • KIDNEY STONE SURGERY       PT Assessment (last 12 hours)       PT Evaluation and Treatment       Row Name 12/08/23 1517          Physical Therapy Time and Intention    Subjective Information no complaints  -CS     Document Type evaluation  -CS     Mode of Treatment individual therapy;physical therapy  -CS     Patient Effort adequate  -CS     Symptoms Noted During/After Treatment --  cough  -CS       Row Name 12/08/23 1517          General Information    Patient Profile Reviewed yes  -CS     Patient Observations alert;cooperative;agree to therapy  -CS     Prior Level of Function --  Pt had been mostly independent at [facility] before recent health issues. Pt had assist if needed from family with ADLs, meals, home management. Pt attempted to be completely independent without AD  -CS     Equipment Currently Used at Home none  has a STC he used off and on, stood to take showers with grab bar available  -CS     Pertinent History of Current Functional Problem Pt recently was in rehab however he was not receiving the appropriate care or rehab needed  -CS     Existing Precautions/Restrictions fall  -CS     Limitations/Impairments safety/cognitive  -CS       Row Name 12/08/23 1517          Living Environment    Current Living Arrangements home  -CS     People in Home alone  -CS     Primary Care Provided by self  -CS       Row Name 12/08/23 1517          Pain    Pretreatment Pain Rating 0/10 - no pain  -CS     Posttreatment Pain Rating 0/10  - no pain  -       Row Name 12/08/23 1517          Cognition    Affect/Mental Status (Cognition) --  Intermittent confusion; can be easily frustrated  -     Orientation Status (Cognition) oriented to;person;place  -[facility] Name 12/08/23 1517          Range of Motion Comprehensive    General Range of Motion bilateral lower extremity ROM WFL  -[facility] Name 12/08/23 1517          Strength Comprehensive (MMT)    General Manual Muscle Testing (MMT) Assessment lower extremity strength deficits identified  -     Comment, General Manual Muscle Testing (MMT) Assessment Bilateral lower extremities assessed at 4 -/5  -[facility] Name 12/08/23 1517          Bed Mobility    Bed Mobility bed mobility (all) activities  -     All Activities, [place] (Bed Mobility) verbal cues;contact guard;minimum assist (75% patient effort)  -     Bed Mobility, Safety Issues decreased use of arms for pushing/pulling;decreased use of legs for bridging/pushing  -     Assistive Device (Bed Mobility) bed rails  -[facility] Name 12/08/23 1517          Transfers    Transfers sit-stand transfer;stand-sit transfer  -[facility] Name 12/08/23 1517          Sit-Stand Transfer    Sit-Stand [place] (Transfers) verbal cues;contact guard;minimum assist (75% patient effort)  -     Assistive Device (Sit-Stand Transfers) walker, front-wheeled  -     Comment, (Sit-Stand Transfer) At times patient adopts a posterior lean taking rolling walker with him raising the front wheels off the ground requiring increased assistance and education.  -[facility] Name 12/08/23 1517          Stand-Sit Transfer    Stand-Sit [place] (Transfers) verbal cues;contact guard;minimum assist (75% patient effort)  -     Assistive Device (Stand-Sit Transfers) walker, front-wheeled  -       Row Name 12/08/23 1517          Gait/Stairs (Locomotion)    Gait/Stairs Locomotion gait/ambulation assistive device  -     [place] Level (Gait)  verbal cues;minimum assist (75% patient effort)  -CS     Assistive Device (Gait) walker, front-wheeled  -CS     Distance in Feet (Gait) 15x2  -CS     Pattern (Gait) step-through  -CS     Deviations/Abnormal Patterns (Gait) base of support, narrow;gait speed decreased;stride length decreased  -CS     Bilateral Gait Deviations forward flexed posture  -CS       Row Name 12/08/23 1517          Safety Issues, Functional Mobility    Safety Issues Affecting Function (Mobility) awareness of need for assistance;impulsivity;insight into deficits/self-awareness;problem-solving  -CS     Impairments Affecting Function (Mobility) balance;cognition;endurance/activity tolerance;pain;strength;shortness of breath  -CS     Cognitive Impairments, Mobility Safety/Performance awareness, need for assistance;insight into deficits/self-awareness;judgment;problem-solving/reasoning  -CS       Row Name 12/08/23 1517          Balance    Balance Assessment standing dynamic balance  -CS     Dynamic Standing Balance minimal assist  -CS     Position/Device Used, Standing Balance supported;walker, front-wheeled  -CS       Row Name 12/08/23 1517          Plan of Care Review    Plan of Care Reviewed With patient;son  -CS     Progress no change  -CS     Outcome Evaluation Pt presents with limitations that impede their ability to safely and independently transfer and ambulate. The skills of a therapist will be required to safely and effectively implement the following treatment plan to restore maximal level of function.  -CS       Row Name 12/08/23 1517          Positioning and Restraints    Pre-Treatment Position in bed  -CS     Post Treatment Position chair  -CS     In Chair reclined;call light within reach;encouraged to call for assist;exit alarm on;with family/caregiver;with nsg  -CS       Row Name 12/08/23 1517          Therapy Assessment/Plan (PT)    Rehab Potential (PT) good, to achieve stated therapy goals  -CS     Criteria for Skilled  Interventions Met (PT) yes;skilled treatment is necessary  -CS     Therapy Frequency (PT) daily  -CS     Predicted Duration of Therapy Intervention (PT) 10 days  -CS     Problem List (PT) balance;mobility;pain;strength;cognition  -CS     Activity Limitations Related to Problem List (PT) unable to ambulate safely;unable to transfer safely  -CS       Row Name 12/08/23 1517          PT Evaluation Complexity    History, PT Evaluation Complexity 1-2 personal factors and/or comorbidities  -CS     Examination of Body Systems (PT Eval Complexity) total of 4 or more elements  -CS     Clinical Presentation (PT Evaluation Complexity) stable  -CS     Clinical Decision Making (PT Evaluation Complexity) low complexity  -CS     Overall Complexity (PT Evaluation Complexity) low complexity  -CS       Row Name 12/08/23 1517          Therapy Plan Review/Discharge Plan (PT)    Therapy Plan Review (PT) evaluation/treatment results reviewed;patient;son  -       Row Name 12/08/23 1517          Physical Therapy Goals    Bed Mobility Goal Selection (PT) bed mobility, PT goal 1  -CS     Transfer Goal Selection (PT) transfer, PT goal 1  -CS     Gait Training Goal Selection (PT) gait training, PT goal 1  -       Row Name 12/08/23 1517          Bed Mobility Goal 1 (PT)    Activity/Assistive Device (Bed Mobility Goal 1, PT) bed mobility activities, all  -CS     [place] Level/Cues Needed (Bed Mobility Goal 1, PT) modified independence  -CS     Time Frame (Bed Mobility Goal 1, PT) long term goal (LTG);10 days  -       Row Name 12/08/23 1517          Transfer Goal 1 (PT)    Activity/Assistive Device (Transfer Goal 1, PT) sit-to-stand/stand-to-sit;bed-to-chair/chair-to-bed;walker, rolling  -CS     [place] Level/Cues Needed (Transfer Goal 1, PT) supervision required  -CS     Time Frame (Transfer Goal 1, PT) long term goal (LTG);10 days  -       Row Name 12/08/23 1517          Gait Training Goal 1 (PT)    Activity/Assistive Device  (Gait Training Goal 1, PT) gait (walking locomotion);assistive device use;walker, rolling  -CS     [place] Level (Gait Training Goal 1, PT) supervision required  -CS     Distance (Gait Training Goal 1, PT) 250  -CS     Time Frame (Gait Training Goal 1, PT) long term goal (LTG);10 days  -CS               User Key  (r) = Recorded By, (t) = Taken By, (c) = Cosigned By      Initials Name Provider Type    Yuli Warren PT Physical Therapist                    Physical Therapy Education       Title: PT OT SLP Therapies (In Progress)       Topic: Physical Therapy (In Progress)       Point: Mobility training (In Progress)       Learning Progress Summary             Patient Acceptance, E, NR by CS at 12/8/2023 1555                         Point: Home exercise program (Not Started)       Learner Progress:  Not documented in this visit.              Point: Body mechanics (In Progress)       Learning Progress Summary             Patient Acceptance, E, NR by CS at 12/8/2023 1555                         Point: Precautions (In Progress)       Learning Progress Summary             Patient Acceptance, E, NR by CS at 12/8/2023 1555                                         User Key       Initials Effective Dates Name Provider Type Discipline     04/25/21 -  Yuli Miranda PT Physical Therapist PT                  PT Recommendation and Plan  Anticipated Discharge Disposition (PT): inpatient rehabilitation facility, sub acute care setting  Planned Therapy Interventions (PT): balance training, bed mobility training, gait training, strengthening, transfer training  Therapy Frequency (PT): daily  Plan of Care Reviewed With: patient, cheri  Progress: no change  Outcome Evaluation: Pt presents with limitations that impede their ability to safely and independently transfer and ambulate. The skills of a therapist will be required to safely and effectively implement the following treatment plan to restore maximal level of  function.   Outcome Measures       Row Name 12/08/23 1500             How much help from another person do you currently need...    Turning from your back to your side while in flat bed without using bedrails? 3  -CS      Moving from lying on back to sitting on the side of a flat bed without bedrails? 3  -CS      Moving to and from a bed to a chair (including a wheelchair)? 3  -CS      Standing up from a chair using your arms (e.g., wheelchair, bedside chair)? 3  -CS      Climbing 3-5 steps with a railing? 2  -CS      To walk in hospital room? 3  -CS      AM-PAC 6 Clicks Score (PT) 17  -CS      Highest Level of Mobility Goal 5 --> Static standing  -CS         Functional Assessment    Outcome Measure Options AM-PAC 6 Clicks Basic Mobility (PT)  -CS                User Key  (r) = Recorded By, (t) = Taken By, (c) = Cosigned By      Initials Name Provider Type    CS Yuli Miranda, PT Physical Therapist                     Time Calculation:    PT Charges       Row Name 12/08/23 1546             Time Calculation    PT Received On 12/08/23  -CS      PT Goal Re-Cert Due Date 12/17/23  -CS         Untimed Charges    PT Eval/Re-eval Minutes 48  -CS         Total Minutes    Untimed Charges Total Minutes 48  -CS       Total Minutes 48  -CS                User Key  (r) = Recorded By, (t) = Taken By, (c) = Cosigned By      Initials Name Provider Type    CS Yuli Miranda, PT Physical Therapist                  Therapy Charges for Today       Code Description Service Date Service Provider Modifiers Qty    77953796316 HC PT EVAL LOW COMPLEXITY 4 12/8/2023 Yuli Miranda, PT GP 1            PT G-Codes  Outcome Measure Options: AM-PAC 6 Clicks Basic Mobility (PT)  AM-PAC 6 Clicks Score (PT): 17    Yuli Miranda PT  12/8/2023      Electronically signed by Yuli Miranda PT at 12/08/23 1555          Occupational Therapy Notes (most recent note)        Bozena Smith, OT at 12/09/23 1508          Patient Name: Nikhil KELLY  Denny  : 1945    MRN: [id number]                              Today's Date: 2023       Admit Date: 2023    Visit Dx:     ICD-10-CM ICD-9-CM   1. Dizziness  R42 780.4   2. Difficulty walking  R26.2 719.7   3. Decreased activities of daily living (ADL)  Z78.9 V49.89     Patient Active Problem List   Diagnosis   • Aortic aneurysm   • Arthritis   • BPH (benign prostatic hyperplasia)   • Cervical radiculopathy   • Type 2 diabetes mellitus with hyperglycemia, without long-term current use of insulin   • Parathyroid abnormality   • Thyroid disorder   • Facet arthropathy   • Fatty metamorphosis of liver   • GERD (gastroesophageal reflux disease)   • Primary hypertension   • Hematuria   • Hiatal hernia   • Urinary incontinence   • Hypercalcemia   • Mixed hyperlipidemia   • Kidney disease   • Renal calculi   • Low back pain   • Thoracic back pain   • DDD (degenerative disc disease), lumbar   • Spinal stenosis of lumbar region   • Degeneration of thoracic or thoracolumbar intervertebral disc   • MI (myocardial infarction)   • Myofascial pain   • Neck pain   • Nocturia   • Sciatica   • Stroke   • Ureterolithiasis   • Vitamin D deficiency   • Labile personality   • Acute viral syndrome   • Debility   • COVID-19   • Upper respiratory tract infection due to COVID-19 virus   • COVID   • Generalized weakness   • Acute bronchitis   • Dizziness     Past Medical History:   Diagnosis Date   • Aorta disorder    • Arthritis    • Degenerative joint disease    • Dementia     FRONTAL LOBE   • Depression    • Diabetes mellitus    • Hyperlipidemia    • Hypertension    • Kidney stone    • Stroke     MILD RIGHT SIDE DEFICITS     Past Surgical History:   Procedure Laterality Date   • CHOLECYSTECTOMY     • CYSTOSCOPY BLADDER STONE LITHOTRIPSY     • KIDNEY STONE SURGERY        General Information       Row Name 23 1502          OT Time and Intention    Document Type evaluation  -LF     Mode of Treatment individual  therapy;occupational therapy  -       Row Name 12/09/23 1502          General Information    Patient Profile Reviewed yes  -     Prior Level of Function --  (I) with ADLs, ambulated w/o a device but has RW if needed, has a walk-in shower with shower chair/handheld shower head, elevated commode, stands to groom, drives, and no home O2.  -     Existing Precautions/Restrictions fall  -     Barriers to Rehab none identified  -       Row Name 12/09/23 1502          Occupational Profile    Reason for Services/Referral (Occupational Profile) Patient is an 78 year old male admitted to [facility] for weakness on December 7th, 2023. Occupational therapy consulted due to recent decline in ADLs/functional transfers. No previous occupational therapy services for current condition.  -       Row Name 12/09/23 1502          Living Environment    People in Home alone  Lives between his two sons  -       Row Name 12/09/23 1502          Home Main Entrance    Number of Stairs, Main Entrance one  -       Row Name 12/09/23 1502          Cognition    Orientation Status (Cognition) oriented x 3  -       Row Name 12/09/23 1502          Safety Issues, Functional Mobility    Safety Issues Affecting Function (Mobility) awareness of need for assistance;insight into deficits/self-awareness  -     Impairments Affecting Function (Mobility) balance;endurance/activity tolerance;strength  -               User Key  (r) = Recorded By, (t) = Taken By, (c) = Cosigned By      Initials Name Provider Type     Bozena Smith OT Occupational Therapist                     Mobility/ADL's       Row Name 12/09/23 1503          Bed Mobility    Bed Mobility supine-sit;sit-supine  -     Supine-Sit [place] (Bed Mobility) standby assist  -     Sit-Supine [place] (Bed Mobility) standby assist  -     Bed Mobility, Safety Issues decreased use of arms for pushing/pulling;decreased use of legs for bridging/pushing  -      Assistive Device (Bed Mobility) bed rails  -       Row Name 12/09/23 1503          Transfers    Transfers sit-stand transfer;stand-sit transfer  -       Row Name 12/09/23 1503          Sit-Stand Transfer    Sit-Stand [place] (Transfers) contact guard  -     Assistive Device (Sit-Stand Transfers) walker, front-wheeled  -LF       Row Name 12/09/23 1503          Stand-Sit Transfer    Stand-Sit [place] (Transfers) contact guard  -     Assistive Device (Stand-Sit Transfers) walker, front-wheeled  -LF       Row Name 12/09/23 1503          Functional Mobility    Functional Mobility- Ind. Level contact guard assist  -     Functional Mobility- Device walker, front-wheeled  -LF     Functional Mobility- Comment Patient completed functional mobility from EOB to recliner with CGA using RW.  -       Row Name 12/09/23 1503          Activities of Daily Living    BADL Assessment/Intervention bathing;upper body dressing;lower body dressing;grooming;feeding;toileting  -       Row Name 12/09/23 1503          Bathing Assessment/Intervention    [place] Level (Bathing) bathing skills;upper body;standby assist;lower body;moderate assist (50% patient effort)  -       Row Name 12/09/23 1503          Upper Body Dressing Assessment/Training    [place] Level (Upper Body Dressing) upper body dressing skills;standby assist  -       Row Name 12/09/23 1503          Lower Body Dressing Assessment/Training    [place] Level (Lower Body Dressing) lower body dressing skills;moderate assist (50% patient effort)  -       Row Name 12/09/23 1503          Grooming Assessment/Training    [place] Level (Grooming) grooming skills;set up  -       Row Name 12/09/23 1503          Self-Feeding Assessment/Training    [place] Level (Feeding) feeding skills;set up  -       Row Name 12/09/23 1503          Toileting Assessment/Training    [place] Level (Toileting) toileting skills;minimum assist (75%  patient effort)  -               User Key  (r) = Recorded By, (t) = Taken By, (c) = Cosigned By      Initials Name Provider Type    LF Bozena Smith OT Occupational Therapist                   Obj/Interventions       Row Name 12/09/23 1504          Sensory Assessment (Somatosensory)    Sensory Assessment (Somatosensory) UE sensation intact  -[facility] Name 12/09/23 1504          Vision Assessment/Intervention    Visual Impairment/Limitations WFL  -LF       Row Name 12/09/23 1504          Range of Motion Comprehensive    General Range of Motion bilateral upper extremity ROM WFL  -       Row Name 12/09/23 1504          Strength Comprehensive (MMT)    Comment, General Manual Muscle Testing (MMT) Assessment 4/5 BUEs  -       Row Name 12/09/23 1504          Motor Skills    Motor Skills coordination;functional endurance  -LF     Coordination bilateral;upper extremity;WFL  -LF     Functional Endurance Fair  -LF       [facility] Name 12/09/23 1504          Balance    Balance Assessment sitting dynamic balance;standing dynamic balance  -LF     Dynamic Sitting Balance supervision  -LF     Position, Sitting Balance unsupported;sitting edge of bed  -     Dynamic Standing Balance contact guard  -LF     Position/Device Used, Standing Balance supported;walker, front-wheeled  -LF               User Key  (r) = Recorded By, (t) = Taken By, (c) = Cosigned By      Initials Name Provider Type    LF Bozena Smith OT Occupational Therapist                   Goals/Plan       Row Name 12/09/23 1506          Bed Mobility Goal 1 (OT)    Activity/Assistive Device (Bed Mobility Goal 1, OT) bed mobility activities, all  -LF     [place] Level/Cues Needed (Bed Mobility Goal 1, OT) modified independence  -LF     Time Frame (Bed Mobility Goal 1, OT) long term goal (LTG);10 days  -       Row Name 12/09/23 1506          Transfer Goal 1 (OT)    Activity/Assistive Device (Transfer Goal 1, OT) transfers, all  -LF     [place]  Level/Cues Needed (Transfer Goal 1, OT) modified independence  -LF     Time Frame (Transfer Goal 1, OT) long term goal (LTG);10 days  -       Row Name 12/09/23 1506          Bathing Goal 1 (OT)    Activity/Device (Bathing Goal 1, OT) bathing skills, all  -LF     [place] Level/Cues Needed (Bathing Goal 1, OT) modified independence  -LF     Time Frame (Bathing Goal 1, OT) long term goal (LTG);10 days  -LF       Row Name 12/09/23 1506          Dressing Goal 1 (OT)    Activity/Device (Dressing Goal 1, OT) dressing skills, all  -LF     [place]/Cues Needed (Dressing Goal 1, OT) modified independence  -LF     Time Frame (Dressing Goal 1, OT) long term goal (LTG);10 days  -       Row Name 12/09/23 1506          Toileting Goal 1 (OT)    Activity/Device (Toileting Goal 1, OT) toileting skills, all  -LF     [place] Level/Cues Needed (Toileting Goal 1, OT) modified independence  -LF     Time Frame (Toileting Goal 1, OT) long term goal (LTG);10 days  -LF       Row Name 12/09/23 1506          Problem Specific Goal 1 (OT)    Problem Specific Goal 1 (OT) Patient will demonstrate good- endurance to support ADLs/functional transfers.  -LF     Time Frame (Problem Specific Goal 1, OT) long term goal (LTG);10 days  -       Row Name 12/09/23 1506          Therapy Assessment/Plan (OT)    Planned Therapy Interventions (OT) activity tolerance training;BADL retraining;functional balance retraining;occupation/activity based interventions;patient/caregiver education/training;strengthening exercise;transfer/mobility retraining  -               User Key  (r) = Recorded By, (t) = Taken By, (c) = Cosigned By      Initials Name Provider Type     Bozena Smith OT Occupational Therapist                   Clinical Impression       Row Name 12/09/23 1504          Pain Assessment    Additional Documentation Pain Scale: FACES Pre/Post-Treatment (Group)  -       Row Name 12/09/23 1504          Pain Scale: FACES  Pre/Post-Treatment    Pain: FACES Scale, Pretreatment 0-->no hurt  -LF     Posttreatment Pain Rating 0-->no hurt  -LF       Row Name 12/09/23 1503          Plan of Care Review    Plan of Care Reviewed With patient  -     Progress no change  -     Outcome Evaluation Patient presents with limitations in self-care, functional transfers, balance, and endurance. He would benefit from continued skilled occupational therapy services to maximize independence with ADLs/functional transfers.  -       Row Name 12/09/23 1502          Therapy Assessment/Plan (OT)    Patient/Family Therapy Goal Statement (OT) To maximize independence.  -     Rehab Potential (OT) good, to achieve stated therapy goals  -     Criteria for Skilled Therapeutic Interventions Met (OT) yes;meets criteria;skilled treatment is necessary  -     Therapy Frequency (OT) 5 times/wk  -       Row Name 12/09/23 1505          Therapy Plan Review/Discharge Plan (OT)    Anticipated Discharge Disposition (OT) home with home health;home with assist  -       Row Name 12/09/23 1504          Vital Signs    O2 Delivery Pre Treatment room air  -     O2 Delivery Intra Treatment room air  -LF     O2 Delivery Post Treatment room air  -       Row Name 12/09/23 1508          Positioning and Restraints    Pre-Treatment Position in bed  -LF     Post Treatment Position chair  -LF     In Chair reclined;call light within reach;encouraged to call for assist;exit alarm on  -LF               User Key  (r) = Recorded By, (t) = Taken By, (c) = Cosigned By      Initials Name Provider Type     Bozena Smith, OT Occupational Therapist                   Outcome Measures       Row Name 12/09/23 1502          How much help from another is currently needed...    Putting on and taking off regular lower body clothing? 2  -LF     Bathing (including washing, rinsing, and drying) 2  -LF     Toileting (which includes using toilet bed pan or urinal) 3  -LF     Putting on and  taking off regular upper body clothing 3  -LF     Taking care of personal grooming (such as brushing teeth) 4  -LF     Eating meals 4  -LF     AM-PAC 6 Clicks Score (OT) 18  -LF       Row Name 12/09/23 0750          How much help from another person do you currently need...    Turning from your back to your side while in flat bed without using bedrails? 3  -MP     Moving from lying on back to sitting on the side of a flat bed without bedrails? 3  -MP     Moving to and from a bed to a chair (including a wheelchair)? 3  -MP     Standing up from a chair using your arms (e.g., wheelchair, bedside chair)? 3  -MP     Climbing 3-5 steps with a railing? 2  -MP     To walk in hospital room? 3  -MP     AM-PAC 6 Clicks Score (PT) 17  -MP     Highest Level of Mobility Goal 5 --> Static standing  -MP       Row Name 12/09/23 1507          Functional Assessment    Outcome Measure Options AM-PAC 6 Clicks Daily Activity (OT);Optimal Instrument  -LF       Row Name 12/09/23 1507          Optimal Instrument    Optimal Instrument Optimal - 3  -LF     Bending/Stooping 3  -LF     Standing 2  -LF     Reaching 1  -LF     From the list, choose the 3 activities you would most like to be able to do without any difficulty Bending/stooping;Standing;Reaching  -LF     Total Score Optimal - 3 6  -LF               User Key  (r) = Recorded By, (t) = Taken By, (c) = Cosigned By      Initials Name Provider Type    Leonela Desir RN Registered Nurse    Bozena Monroy OT Occupational Therapist                    Occupational Therapy Education       Title: PT OT SLP Therapies (In Progress)       Topic: Occupational Therapy (In Progress)       Point: ADL training (In Progress)       Description:   Instruct learner(s) on proper safety adaptation and remediation techniques during self care or transfers.   Instruct in proper use of assistive devices.                  Learning Progress Summary             Patient Acceptance, E,TB, NR by  at  12/9/2023 1507                         Point: Precautions (In Progress)       Description:   Instruct learner(s) on prescribed precautions during self-care and functional transfers.                  Learning Progress Summary             Patient Acceptance, E,TB, NR by  at 12/9/2023 1507                         Point: Body mechanics (In Progress)       Description:   Instruct learner(s) on proper positioning and spine alignment during self-care, functional mobility activities and/or exercises.                  Learning Progress Summary             Patient Acceptance, E,TB, NR by  at 12/9/2023 1507                                         User Key       Initials Effective Dates Name Provider Type Discipline     06/16/21 -  Bozena Smith OT Occupational Therapist OT                  OT Recommendation and Plan  Planned Therapy Interventions (OT): activity tolerance training, BADL retraining, functional balance retraining, occupation/activity based interventions, patient/caregiver education/training, strengthening exercise, transfer/mobility retraining  Therapy Frequency (OT): 5 times/wk  Plan of Care Review  Plan of Care Reviewed With: patient  Progress: no change  Outcome Evaluation: Patient presents with limitations in self-care, functional transfers, balance, and endurance. He would benefit from continued skilled occupational therapy services to maximize independence with ADLs/functional transfers.     Time Calculation:   Evaluation Complexity (OT)  Review Occupational Profile/Medical/Therapy History Complexity: brief/low complexity  Assessment, Occupational Performance/Identification of Deficit Complexity: 3-5 performance deficits  Clinical Decision Making Complexity (OT): problem focused assessment/low complexity  Overall Complexity of Evaluation (OT): low complexity     Time Calculation- OT       Row Name 12/09/23 1507             Time Calculation- OT    OT Received On 12/09/23  [facility]      OT Goal Re-Cert  Due Date 23  -LF         Untimed Charges    OT Eval/Re-eval Minutes 35  -LF         Total Minutes    Untimed Charges Total Minutes 35  -LF       Total Minutes 35  -LF                User Key  (r) = Recorded By, (t) = Taken By, (c) = Cosigned By      Initials Name Provider Type    LF Bozena Smith OT Occupational Therapist                  Therapy Charges for Today       Code Description Service Date Service Provider Modifiers Qty    22771365946 HC OT EVAL LOW COMPLEXITY 3 2023 Bozena Smith OT GO 1                 Bozena Smith OT  2023    Electronically signed by Bozena Smith OT at 23 1508          Speech Language Pathology Notes (most recent note)        Sheila Humphries, SLP at 23 1308          Acute Care - Speech Language Pathology   Swallow Initial Evaluation  Kahlil     Patient Name: Nikhil Baldwin  : 1945  MRN: [id number]  Today's Date: 2023               Admit Date: 2023    Visit Dx:     ICD-10-CM ICD-9-CM   1. Dizziness  R42 780.4     Patient Active Problem List   Diagnosis   • Aortic aneurysm   • Arthritis   • BPH (benign prostatic hyperplasia)   • Cervical radiculopathy   • Type 2 diabetes mellitus with hyperglycemia, without long-term current use of insulin   • Parathyroid abnormality   • Thyroid disorder   • Facet arthropathy   • Fatty metamorphosis of liver   • GERD (gastroesophageal reflux disease)   • Primary hypertension   • Hematuria   • Hiatal hernia   • Urinary incontinence   • Hypercalcemia   • Mixed hyperlipidemia   • Kidney disease   • Renal calculi   • Low back pain   • Thoracic back pain   • DDD (degenerative disc disease), lumbar   • Spinal stenosis of lumbar region   • Degeneration of thoracic or thoracolumbar intervertebral disc   • MI (myocardial infarction)   • Myofascial pain   • Neck pain   • Nocturia   • Sciatica   • Stroke   • Ureterolithiasis   • Vitamin D deficiency   • Labile personality   • Acute viral syndrome   •  Debility   • COVID-19   • Upper respiratory tract infection due to COVID-19 virus   • COVID   • Generalized weakness   • Acute bronchitis   • Dizziness     Past Medical History:   Diagnosis Date   • Aorta disorder    • Arthritis    • Degenerative joint disease    • Dementia     FRONTAL LOBE   • Depression    • Diabetes mellitus    • Hyperlipidemia    • Hypertension    • Kidney stone    • Stroke     MILD RIGHT SIDE DEFICITS     Past Surgical History:   Procedure Laterality Date   • CHOLECYSTECTOMY     • CYSTOSCOPY BLADDER STONE LITHOTRIPSY     • KIDNEY STONE SURGERY     PAIN SCALE: None noted    PRECAUTIONS/CONTRAINDICATIONS: None noted    SUSPECTED ABUSE/NEGLECT/EXPLOITATION: None noted    SOCIAL/PSYCHOLOGICAL NEEDS/BARRIERS: None noted    PAST SOCIAL HISTORY: None noted    PRIOR FUNCTION: Lives at home    PATIENT GOALS/EXPECTATIONS: Some dependence for care    HISTORY: This patient is an 78-year-old admitted with the above diagnosis.  Patient reported having difficulty swallowing oral meds, denies difficulty swallowing food/liquid    CURRENT DIET LEVEL: Regular diet    OBJECTIVE:    TEST ADMINISTERED: Clinical dysphagia evaluation    COGNITION/SAFETY AWARENESS: Alert    BEHAVIORAL OBSERVATIONS: Cooperative    ORAL MOTOR EXAM: Generalized oral motor weakness.  Edentulous    VOICE QUALITY: Within functional limits    REFLEX EXAM: Deferred    POSTURE: 90 degrees upright    FEEDING/SWALLOWING FUNCTION: Assessed with thin liquids, purée consistencies soft and hard solids    CLINICAL OBSERVATIONS: Oral stage is characterized by mildly prolonged mastication/bolus preparation with soft and hard solids.  Patient having to expel some hard solids.  Timely oral transit.  No oral residue noted after the swallow.  Pharyngeal phase appears timely with good laryngeal elevation per palpation.  No clinical signs or symptoms of aspiration noted.  Vocal quality remained clear to auscultation.  Denies globus sensation after completion  of swallow.    DYSPHAGIA CRITERIA: N/A    FUNCTIONAL ASSESSMENT INSTRUMENT: Patient currently scored a level 6 of 7 on Functional Communication Measures for swallowing indicating a 1-19% limitation in function.    ASSESSMENT/ PLAN OF CARE:  Pt presents with functional oropharyngeal swallow.  No clinical signs or symptoms of aspiration noted.      REHAB POTENTIAL:  Pt has good rehab potential.  The following limitations may influence improvement/ length of tx medical status.    RECOMMENDATIONS:   1.   DIET: Mechanical soft diet-thin liquids    2.  POSITION: 90 degrees upright    3.  COMPENSATORY STRATEGIES: General swallow precautions, oral meds in applesauce crushed if needed    YES: Pt/responsible party agrees with plan of care and has been informed of all alternatives, risks and benefits.      EDUCATION  The patient has been educated in the following areas:   Dysphagia (Swallowing Impairment).       DARRICK King  12/8/2023    Electronically signed by Sheila Humphries, SLP at 12/08/23 [id number]

## 2023-12-12 ENCOUNTER — READMISSION MANAGEMENT (OUTPATIENT)
Dept: CALL CENTER | Facility: HOSPITAL | Age: 78
End: 2023-12-12
Payer: MEDICARE

## 2023-12-12 VITALS
RESPIRATION RATE: 18 BRPM | BODY MASS INDEX: 24.62 KG/M2 | TEMPERATURE: 98 F | HEART RATE: 52 BPM | OXYGEN SATURATION: 94 % | HEIGHT: 69 IN | WEIGHT: 166.23 LBS | DIASTOLIC BLOOD PRESSURE: 88 MMHG | SYSTOLIC BLOOD PRESSURE: 160 MMHG

## 2023-12-12 LAB
GLUCOSE BLDC GLUCOMTR-MCNC: 152 MG/DL (ref 70–99)
GLUCOSE BLDC GLUCOMTR-MCNC: 175 MG/DL (ref 70–99)

## 2023-12-12 PROCEDURE — 94799 UNLISTED PULMONARY SVC/PX: CPT

## 2023-12-12 PROCEDURE — 63710000001 INSULIN LISPRO (HUMAN) PER 5 UNITS: Performed by: FAMILY MEDICINE

## 2023-12-12 PROCEDURE — 94664 DEMO&/EVAL PT USE INHALER: CPT

## 2023-12-12 PROCEDURE — 82948 REAGENT STRIP/BLOOD GLUCOSE: CPT

## 2023-12-12 PROCEDURE — 25010000002 ENOXAPARIN PER 10 MG: Performed by: FAMILY MEDICINE

## 2023-12-12 PROCEDURE — 63710000001 PREDNISONE PER 1 MG: Performed by: INTERNAL MEDICINE

## 2023-12-12 PROCEDURE — 99239 HOSP IP/OBS DSCHRG MGMT >30: CPT | Performed by: INTERNAL MEDICINE

## 2023-12-12 PROCEDURE — 97110 THERAPEUTIC EXERCISES: CPT

## 2023-12-12 PROCEDURE — 63710000001 REVEFENACIN 175 MCG/3ML SOLUTION: Performed by: INTERNAL MEDICINE

## 2023-12-12 RX ORDER — ALBUTEROL SULFATE 2.5 MG/3ML
2.5 SOLUTION RESPIRATORY (INHALATION)
Status: DISCONTINUED | OUTPATIENT
Start: 2023-12-12 | End: 2023-12-12 | Stop reason: HOSPADM

## 2023-12-12 RX ORDER — GUAIFENESIN 600 MG/1
1200 TABLET, EXTENDED RELEASE ORAL EVERY 12 HOURS SCHEDULED
Qty: 20 TABLET | Refills: 0 | Status: SHIPPED | OUTPATIENT
Start: 2023-12-12 | End: 2023-12-17

## 2023-12-12 RX ORDER — ASPIRIN 81 MG/1
81 TABLET, CHEWABLE ORAL DAILY
Qty: 30 TABLET | Refills: 0 | Status: SHIPPED | OUTPATIENT
Start: 2023-12-13 | End: 2024-01-12

## 2023-12-12 RX ORDER — LISINOPRIL 20 MG/1
20 TABLET ORAL DAILY
Qty: 30 TABLET | Refills: 0 | Status: SHIPPED | OUTPATIENT
Start: 2023-12-12 | End: 2024-01-11

## 2023-12-12 RX ORDER — ROSUVASTATIN CALCIUM 20 MG/1
10 TABLET, COATED ORAL DAILY
Qty: 30 TABLET | Refills: 0 | Status: SHIPPED | OUTPATIENT
Start: 2023-12-12

## 2023-12-12 RX ADMIN — ENOXAPARIN SODIUM 40 MG: 100 INJECTION SUBCUTANEOUS at 08:06

## 2023-12-12 RX ADMIN — Medication 10 ML: at 08:07

## 2023-12-12 RX ADMIN — ALBUTEROL SULFATE 2.5 MG: 2.5 SOLUTION RESPIRATORY (INHALATION) at 03:13

## 2023-12-12 RX ADMIN — ALBUTEROL SULFATE 2.5 MG: 2.5 SOLUTION RESPIRATORY (INHALATION) at 09:11

## 2023-12-12 RX ADMIN — GUAIFENESIN 1200 MG: 600 TABLET ORAL at 08:07

## 2023-12-12 RX ADMIN — PREDNISONE 40 MG: 20 TABLET ORAL at 08:07

## 2023-12-12 RX ADMIN — CITALOPRAM HYDROBROMIDE 10 MG: 20 TABLET ORAL at 08:07

## 2023-12-12 RX ADMIN — REVEFENACIN 175 MCG: 175 SOLUTION RESPIRATORY (INHALATION) at 09:11

## 2023-12-12 RX ADMIN — INSULIN LISPRO 2 UNITS: 100 INJECTION, SOLUTION INTRAVENOUS; SUBCUTANEOUS at 08:06

## 2023-12-12 RX ADMIN — INSULIN LISPRO 2 UNITS: 100 INJECTION, SOLUTION INTRAVENOUS; SUBCUTANEOUS at 12:21

## 2023-12-12 RX ADMIN — BUDESONIDE AND FORMOTEROL FUMARATE DIHYDRATE 2 PUFF: 160; 4.5 AEROSOL RESPIRATORY (INHALATION) at 09:12

## 2023-12-12 RX ADMIN — TAMSULOSIN HYDROCHLORIDE 0.4 MG: 0.4 CAPSULE ORAL at 08:07

## 2023-12-12 RX ADMIN — FAMOTIDINE 20 MG: 20 TABLET ORAL at 08:07

## 2023-12-12 RX ADMIN — BENZONATATE 100 MG: 100 CAPSULE ORAL at 04:42

## 2023-12-12 RX ADMIN — ASPIRIN 81 MG: 81 TABLET, CHEWABLE ORAL at 08:07

## 2023-12-12 RX ADMIN — DOXYCYCLINE 100 MG: 100 CAPSULE ORAL at 08:07

## 2023-12-12 NOTE — THERAPY TREATMENT NOTE
Patient Name: Nikhil Baldwin  : 1945    MRN: [id number]                              Today's Date: 2023       Admit Date: 2023    Visit Dx:     ICD-10-CM ICD-9-CM   1. Dizziness  R42 780.4   2. Difficulty walking  R26.2 719.7   3. Decreased activities of daily living (ADL)  Z78.9 V49.89   4. Primary hypertension  I10 401.9   5. Other hyperlipidemia  E78.49 272.4     Patient Active Problem List   Diagnosis    Aortic aneurysm    Arthritis    BPH (benign prostatic hyperplasia)    Cervical radiculopathy    Type 2 diabetes mellitus with hyperglycemia, without long-term current use of insulin    Parathyroid abnormality    Thyroid disorder    Facet arthropathy    Fatty metamorphosis of liver    GERD (gastroesophageal reflux disease)    Primary hypertension    Hematuria    Hiatal hernia    Urinary incontinence    Hypercalcemia    Mixed hyperlipidemia    Kidney disease    Renal calculi    Low back pain    Thoracic back pain    DDD (degenerative disc disease), lumbar    Spinal stenosis of lumbar region    Degeneration of thoracic or thoracolumbar intervertebral disc    MI (myocardial infarction)    Myofascial pain    Neck pain    Nocturia    Sciatica    Stroke    Ureterolithiasis    Vitamin D deficiency    Labile personality    Acute viral syndrome    Debility    COVID-19    Upper respiratory tract infection due to COVID-19 virus    COVID    Generalized weakness    Acute bronchitis    Dizziness    Bronchitis     Past Medical History:   Diagnosis Date    Aorta disorder     Arthritis     Degenerative joint disease     Dementia     FRONTAL LOBE    Depression     Diabetes mellitus     Hyperlipidemia     Hypertension     Kidney stone     Stroke     MILD RIGHT SIDE DEFICITS     Past Surgical History:   Procedure Laterality Date    CHOLECYSTECTOMY      CYSTOSCOPY BLADDER STONE LITHOTRIPSY      KIDNEY STONE SURGERY        General Information       Row Name 23 1581          OT Time and Intention    Document  Type therapy note (daily note)  -PG     Mode of Treatment individual therapy;occupational therapy  -PG               User Key  (r) = Recorded By, (t) = Taken By, (c) = Cosigned By      Initials Name Provider Type    PG Josr Langston OT Occupational Therapist                     Mobility/ADL's    No documentation.                  Obj/Interventions       Row Name 12/12/23 1301          Shoulder (Therapeutic Exercise)    Shoulder (Therapeutic Exercise) strengthening exercise  -PG     Shoulder Strengthening (Therapeutic Exercise) 15 repititions;yellow;resistance band  -PG       Row Name 12/12/23 1301          Elbow/Forearm (Therapeutic Exercise)    Elbow/Forearm (Therapeutic Exercise) strengthening exercise  -PG     Elbow/Forearm Strengthening (Therapeutic Exercise) resistance band;yellow;15 repititions  -PG       Row Name 12/12/23 1301          Motor Skills    Therapeutic Exercise shoulder;elbow/forearm  -PG               User Key  (r) = Recorded By, (t) = Taken By, (c) = Cosigned By      Initials Name Provider Type    PG Josr Langston OT Occupational Therapist                   Goals/Plan    No documentation.                  Clinical Impression       Row Name 12/12/23 1301          Plan of Care Review    Progress no change  -PG               User Key  (r) = Recorded By, (t) = Taken By, (c) = Cosigned By      Initials Name Provider Type    PG Josr Langston OT Occupational Therapist                   Outcome Measures       Row Name 12/12/23 1301          How much help from another is currently needed...    Putting on and taking off regular lower body clothing? 3  -PG     Bathing (including washing, rinsing, and drying) 3  -PG     Toileting (which includes using toilet bed pan or urinal) 3  -PG     Putting on and taking off regular upper body clothing 3  -PG     Taking care of personal grooming (such as brushing teeth) 3  -PG     Eating meals 4  -PG     AM-PAC 6 Clicks Score (OT) 19  -PG       Row Name 12/12/23 3769           How much help from another person do you currently need...    Turning from your back to your side while in flat bed without using bedrails? 4  -MP     Moving from lying on back to sitting on the side of a flat bed without bedrails? 4  -MP     Moving to and from a bed to a chair (including a wheelchair)? 3  -MP     Standing up from a chair using your arms (e.g., wheelchair, bedside chair)? 4  -MP     Climbing 3-5 steps with a railing? 2  -MP     To walk in hospital room? 3  -MP     AM-PAC 6 Clicks Score (PT) 20  -MP     Highest Level of Mobility Goal 6 --> Walk 10 steps or more  -MP       Row Name 12/12/23 1301          Functional Assessment    Outcome Measure Options AM-PAC 6 Clicks Daily Activity (OT);Optimal Instrument  -PG       Row Name 12/12/23 1301          Optimal Instrument    Optimal Instrument Optimal - 3  -PG     Bending/Stooping 2  -PG     Standing 2  -PG     Reaching 1  -PG               User Key  (r) = Recorded By, (t) = Taken By, (c) = Cosigned By      Initials Name Provider Type    Leonela Desir, RN Registered Nurse    PG Josr Langston OT Occupational Therapist                    Occupational Therapy Education       Title: PT OT SLP Therapies (In Progress)       Topic: Occupational Therapy (In Progress)       Point: ADL training (In Progress)       Description:   Instruct learner(s) on proper safety adaptation and remediation techniques during self care or transfers.   Instruct in proper use of assistive devices.                  Learning Progress Summary             Patient Acceptance, E,TB, NR by  at 12/9/2023 1507                         Point: Precautions (In Progress)       Description:   Instruct learner(s) on prescribed precautions during self-care and functional transfers.                  Learning Progress Summary             Patient Acceptance, E,TB, NR by  at 12/9/2023 1507                         Point: Body mechanics (In Progress)       Description:   Instruct  learner(s) on proper positioning and spine alignment during self-care, functional mobility activities and/or exercises.                  Learning Progress Summary             Patient Acceptance, E,TB, NR by  at 12/9/2023 1507                                         User Key       Initials Effective Dates Name Provider Type Discipline     06/16/21 -  Bozena Smith OT Occupational Therapist OT                  OT Recommendation and Plan     Plan of Care Review  Progress: no change     Time Calculation:         Time Calculation- OT       Row Name 12/12/23 1302             Time Calculation- OT    OT Received On 12/12/23  -PG      OT Goal Re-Cert Due Date 12/18/23  -PG         Timed Charges    76392 - OT Therapeutic Exercise Minutes 10  -PG         Total Minutes    Timed Charges Total Minutes 10  -PG       Total Minutes 10  -PG                User Key  (r) = Recorded By, (t) = Taken By, (c) = Cosigned By      Initials Name Provider Type    PG Josr Langston OT Occupational Therapist                  Therapy Charges for Today       Code Description Service Date Service Provider Modifiers Qty    52252674427 HC OT THER PROC EA 15 MIN 12/12/2023 Josr Langston OT GO 1                 Josr Langston OT  12/12/2023

## 2023-12-12 NOTE — PLAN OF CARE
Goal Outcome Evaluation:  Plan of Care Reviewed With: patient        Progress: no change  Outcome Evaluation: No complaints. Vital signs stable. Patient required insulin with breakfast and dinner. See discharge care plans for further details.

## 2023-12-12 NOTE — OUTREACH NOTE
Prep Survey      Flowsheet Row Responses   [facility] patient discharged from? [facility]   Is LACE score < 7 ? No   Eligibility [facility]   Date of Admission 12/07/23   Date of Discharge 12/12/23   Discharge Disposition Home or Self Care   Discharge diagnosis Generalized weakness, Dizziness   Does the patient have one of the following disease processes/diagnoses(primary or secondary)? Other   Does the patient have Home health ordered? No   Is there a DME ordered? No   Prep survey completed? Yes            Deb GARCIA - Registered Nurse

## 2023-12-12 NOTE — TELEPHONE ENCOUNTER
"[facility]  NPI:[id number]    Utilization Review  Contact: Syl Ma RN  Phone: [phone]  Fax:[phone]    INITIATE INPATIENT AUTHORIZATION  Donaldo Roberts (69 y.o. Male)       Date of Birth   1954    Social Security Number       Address   [address] [place] [address]    Home Phone   [phone]    MRN   [id number]       L[employer] day   [place]    Marital Status                               Admission Date   12/10/23    Admission Type   Emergency    Admitting Provider   Ward Paul DO    Attending Provider   Eugenio Duarte MD    Department, Room/Bed   [address], 3347/1S       Discharge Date       Discharge Disposition       Discharge Destination                                 Attending Provider: Eugenio Duarte MD    Allergies: Penicillins    Isolation: None   Infection: None   Code Status: CPR    Ht: 182.9 cm (72\")   Wt: 107 kg (235 lb)    Admission Cmt: None   Principal Problem: Hypercapnic respiratory failure [J96.92]                   Active Insurance as of 12/10/2023       Primary Coverage       Payor Plan Insurance Group Employer/Plan Group    [facility]AC[facility] WELLCARE MED ADV HMO        Payor Plan Address Payor Plan Phone Number Payor Plan Fax Number Effective Dates    [address] [address]   11/1/2023 - None Entered    [facility] [id number]         Subscriber Name Subscriber Birth Date Member ID       DONALDO ROBERTS 1954 [id number]                     Emergency Contacts        (Rel.) Home Phone Work Phone Mobile Phone    Inna Roberts (Spouse) [phone] -- [phone]    Chad Roberts (Son) [phone] -- [phone]    VALERI ROBERTS -- -- [phone]                 History & Physical        OTosha Manning PA at 12/11/23 0807       Attestation signed by Eugenio Duarte MD at 12/11/23 1411    I have reviewed this documentation and agree.    Mr. Roberts is our 70 yo M with hx COPD, " Patient is no longer taking   chronic hypoxic respiratory failure (3 LPM NC), HFpEF, PATTY non compliant with home CPAP, essential hypertension, hyperlipidemia, carotid artery disease s/p right CEA in past, s/p PPM insertion in the past, severe pulmonary hypertension, history of ischemic CVA in past, anxiety, GERD, obesity by BMI, and former tobacco abuse who presented with AMS and respiratory distress. Patient did not recall presenting symptoms but noted feeling better. No family bedside. Patient had been discharged from swing bed on  home in stable condition. Patient noted to be noncompliant during that visit. Patient again hypercapnic on this visit with initial respiratory acidosis. PH normalized with BiPAP. Baseline PCO2 appears to be somewhere between 65-75. PCO2 trended up after placing patient on 4L NC but PH still wnl. Patient refused to put BiPAP back on and BiO2 decreased to 3L NC with goal SpO2 88-92%. Patient with CHF and COPD excacerbation. Recommended IV Lasix to be given in ED given evidence of overload on plain film. Will monitor response. Will start on nebs and steroids. PT/OT ordered.                      [facility] Medicine Services  HISTORY & PHYSICAL    Patient Identification:  Name:  Donaldo Roberts  Age:  69 y.o.  Sex:  male  :  1954  MRN:  [id number]   Visit Number:  [id number]  Admit Date: 12/10/2023   Primary Care Physician:  Duke Rosen MD     Subjective     Chief complaint:   Chief Complaint   Patient presents with    Altered Mental Status    Respiratory Distress     History of presenting illness:   Patient is a 69 y.o. male with past medical history significant for COPD, chronic hypoxic respiratory failure (3 LPM NC), HFpEF, PATTY non compliant with home CPAP, essential hypertension, hyperlipidemia, carotid artery disease s/p right CEA in past, s/p PPM insertion in the past, severe pulmonary hypertension, history of ischemic CVA in past, anxiety, GERD, obesity by BMI, and former  "tobacco abuse that presented to the [facility] emergency department for evaluation of altered mental status and respiratory distress. It is of note that during my evaluation, patient was lying in bed with NC in place. O2 saturation adequate. Patient received sedatives in ED for agitation. He wakes to verbal stimuli but quickly falls back asleep. He does state \"I feel awful\". He does not answer any further questions. No family at bedside. Patient unable to provide significant history of presenting illness, thus history per ED report and chart review.  Per report, patient with altered mental status starting yesterday and increased work of breathing.  EMS was called, patient noted to have pinpoint pupils.  Narcan was administered with no change in mental status.    It is of note that the patient was admitted to this facility 11/17/2023 through 11/24/2023 where he was treated for acute hypercapnic and chronic hypoxemic respiratory failure, acute heart failure with preserved ejection fraction, and myocardial injury secondary to hypoxia.  It was charted that during hospitalization patient was difficult to agree to using BiPAP consistently, history of noncompliance with CPAP in the outpatient setting.  Patient was ultimately discharged to swing bed 11/24/2023 through 11/29/2023 for prolonged therapy and ultimately discharged home with family in stable medical condition.  It was recommended that patient continue with physical therapy, however he did not want to remain inpatient, he was agreeable for home health that was arranged at discharge.    Upon arrival to the ED, vitals were temperature 98.5, HR 77, RR 24, /73, and oxygen saturation 92% on 50% FiO2 BiPAP. ABG with pH 7.209, pCO2 >104, pO2 57, HCO3 42.3, and oxygen saturation 88.2% on 6 LPM NC. HS troponin T 36. ProBNP 336.7. CMP with potassium 5.8, chloride 95, CO2 42.9, and glucose 137. Lactate 0.8. CBC with hemoglobin 12.1 and .5, neutrophil " % 77.2%. COVID-19/Influenza screening negative. UDS negative. UA with dark yellow appearance, trace blood, 1+protein, small protein, 6-10 RBC. CT head without contrast with mild generalized brain volume loss consistent with age, no acute intracranial abnormalities. Mild mucosal thickening in the paranasal sinuses noted. CXR with enlarged heart size with central pulmonary vascular congestion, mild interstitial edema, mild atelectasis at the lung bases and right upper lobe adjacent to the minor fissure, hypo inflated lungs. Known ED medication administration: 2 mg IV Narcan X 1, 80 mg IV lasix x 1, 5 mg IV haldol, 1 mg IV ativan x 1, and 1 liter bolus NS x 1.     Patient has been admitted to the medical surgical floor for further evaluation and treatment.     Present during exam: N/A  ---------------------------------------------------------------------------------------------------------------------   Review of Systems   Unable to perform ROS: Mental status change      ---------------------------------------------------------------------------------------------------------------------   Past Medical History:   Diagnosis Date    Acid reflux     Anxiety     Arthritis     Carotid artery stenosis, symptomatic, right 11/23/2021    Chronic obstructive pulmonary disease 12/5/2023    Essential hypertension 11/23/2021    Herpes     History of lipoma     History of transfusion     Hyperlipidemia     Inguinal hernia     Peptic ulcer     Stroke      Past Surgical History:   Procedure Laterality Date    ABDOMINAL SURGERY      CARDIAC ELECTROPHYSIOLOGY PROCEDURE N/A 12/10/2021    Procedure: Pacemaker DC new;  Surgeon: Jony Monte MD;  Location: [facility] INVASIVE LOCATION;  Service: Cardiology;  Laterality: N/A;    CAROTID ENDARTERECTOMY Left 2021    CHOLECYSTECTOMY WITH INTRAOPERATIVE CHOLANGIOGRAM N/A 07/25/2017    Procedure: CHOLECYSTECTOMY LAPAROSCOPIC INTRAOPERATIVE CHOLANGIOGRAM;  Surgeon: Luis Coe MD;   Location: [facility] OR;  Service:     HERNIA REPAIR      inguinal    SINUS SURGERY      TUMOR EXCISION       Family History   Problem Relation Age of Onset    Stroke Mother     Heart disease Mother     Diabetes Mother     No Known Problems Father     Diabetes Sister      Social History     Socioeconomic History    Marital status:     Number of children: 6   Tobacco Use    Smoking status: Former     Packs/day: 0.25     Years: 55.00     Additional pack years: 0.00     Total pack years: 13.75     Types: Cigarettes     Quit date: 10/2023     Years since quittin.1     Passive exposure: Current    Smokeless tobacco: Never    Tobacco comments:     2 cigs per day/A pack per week.   Vaping Use    Vaping Use: Never used   Substance and Sexual Activity    Alcohol use: No    Drug use: No    Sexual activity: Defer     ---------------------------------------------------------------------------------------------------------------------   Allergies:  Penicillins  ---------------------------------------------------------------------------------------------------------------------   Medications below are reported home medications pulling from within the system; at this time, these medications have not been reconciled unless otherwise specified and are in the verification process for further verifcation as current home medications.    Prior to Admission Medications       Prescriptions Last Dose Informant Patient Reported? Taking?    albuterol sulfate  (90 Base) MCG/ACT inhaler   No No    Inhale 2 puffs Every 4 (Four) Hours As Needed for Wheezing or Shortness of Air.    amLODIPine (NORVASC) 10 MG tablet  Pharmacy No No    Take 1 tablet by mouth Every Night.    clopidogrel (PLAVIX) 75 MG tablet  Pharmacy No No    Take 1 tablet by mouth Daily.    Fluticasone-Umeclidin-Vilant (Trelegy Ellipta) 100-62.5-25 MCG/ACT inhaler   No No    Inhale 1 puff Daily.    furosemide (LASIX) 40 MG tablet   No No    Take 1 tablet by mouth  Every Other Day for 30 days.    lisinopril (PRINIVIL,ZESTRIL) 20 MG tablet   No No    TAKE 1 TABLET BY MOUTH DAILY AS DIRECTED    pantoprazole (PROTONIX) 40 MG EC tablet   No No    Take 1 tablet by mouth Every Morning for 30 days.    rosuvastatin (CRESTOR) 10 MG tablet   No No    TAKE 1 TABLET BY MOUTH DAILY          ---------------------------------------------------------------------------------------------------------------------    Objective     Hospital Scheduled Meds:        Current listed hospital scheduled medications may not yet reflect those currently placed in orders that are signed and held, awaiting patient's arrival to floor/unit.    ---------------------------------------------------------------------------------------------------------------------   Vital Signs:  Temp:  [98.5 °F (36.9 °C)] 98.5 °F (36.9 °C)  Heart Rate:  [68-91] 86  Resp:  [20-24] 24  BP: (133-169)/() 133/68  Mean Arterial Pressure (Non-Invasive) for the past 24 hrs (Last 3 readings):   Noninvasive MAP (mmHg)   12/10/23 2340 114   12/10/23 2320 95   12/10/23 2310 95     SpO2 Percentage    12/11/23 0239 12/11/23 0320 12/11/23 0633   SpO2: 94% 91% 93%     SpO2:  [83 %-94 %] 93 %  on  Flow (L/min):  [3-4] 4;   Device (Oxygen Therapy): nasal cannula    Body mass index is 31.87 kg/m².  Wt Readings from Last 3 Encounters:   12/10/23 107 kg (235 lb)   12/06/23 107 kg (235 lb)   12/04/23 130 kg (287 lb)       ---------------------------------------------------------------------------------------------------------------------   Physical Exam:  Physical Exam  Nursing note reviewed.   Constitutional:       General: He is awake. He is not in acute distress.     Appearance: He is well-developed. He is obese. He is ill-appearing (Chronically).      Interventions: Nasal cannula in place.   HENT:      Head: Normocephalic and atraumatic.      Mouth/Throat:      Mouth: Mucous membranes are moist.   Eyes:      Conjunctiva/sclera: Conjunctivae  normal.      Pupils: Pupils are equal, round, and reactive to light.   Cardiovascular:      Rate and Rhythm: Normal rate and regular rhythm.      Pulses:           Dorsalis pedis pulses are 2+ on the right side and 2+ on the left side.      Heart sounds: Normal heart sounds. No murmur heard.     No friction rub. No gallop.   Pulmonary:      Effort: Pulmonary effort is normal. No tachypnea, accessory muscle usage or respiratory distress.      Breath sounds: Normal air entry. Examination of the right-lower field reveals decreased breath sounds. Examination of the left-lower field reveals decreased breath sounds. Decreased breath sounds present. No wheezing, rhonchi or rales.   Abdominal:      General: Bowel sounds are normal. There is no distension.      Palpations: Abdomen is soft.      Tenderness: There is no abdominal tenderness.   Genitourinary:     Comments: No andrade catheter in place.  Musculoskeletal:      Cervical back: Neck supple.      Right lower leg: Edema present.      Left lower leg: Edema present.   Skin:     General: Skin is warm and dry.      Capillary Refill: Capillary refill takes less than 2 seconds.   Neurological:      Mental Status: He is lethargic and confused.      GCS: GCS eye subscore is 3. GCS verbal subscore is 4. GCS motor subscore is 5.      Comments: Patient wakens to verbal stimuli but quickly falls back asleep, does not participate in exam.      ---------------------------------------------------------------------------------------------------------------------  EKG:  Pending cardiology read. Per my interpretation: Paced, HR 72 BPM, QTc 407 ms. No evidence of acute ST elevation. Await final cardiology read.     Telemetry:    Patient not currently on telemetry monitoring     I have personally reviewed the EKG/Telemetry strip  ---------------------------------------------------------------------------------------------------------------------   Results from last 7 days   Lab Units  12/10/23  2226   HSTROP T ng/L 36*     Results from last 7 days   Lab Units 12/10/23  2226   PROBNP pg/mL 336.7       Results from last 7 days   Lab Units 12/11/23  0319   PH, ARTERIAL pH units 7.379   PO2 ART mm Hg 62.4*   PCO2, ARTERIAL mm Hg 71.5*   HCO3 ART mmol/L 42.1*     Results from last 7 days   Lab Units 12/10/23  2226   LACTATE mmol/L 0.8   WBC 10*3/mm3 8.41   HEMOGLOBIN g/dL 12.1*   HEMATOCRIT % 44.0   MCV fL 105.5*   MCHC g/dL 27.5*   PLATELETS 10*3/mm3 182     Results from last 7 days   Lab Units 12/10/23  2226   SODIUM mmol/L 138   POTASSIUM mmol/L 5.8*   CHLORIDE mmol/L 95*   CO2 mmol/L 42.9*   BUN mg/dL 23   CREATININE mg/dL 0.95   CALCIUM mg/dL 9.7   GLUCOSE mg/dL 137*   ALBUMIN g/dL 4.4   BILIRUBIN mg/dL 0.5   ALK PHOS U/L 94   AST (SGOT) U/L 15   ALT (SGPT) U/L 18   Estimated Creatinine Clearance: 90.2 mL/min (by C-G formula based on SCr of 0.95 mg/dL).    Lab Results   Component Value Date    TSH 0.681 12/08/2021    FREET4 1.11 12/08/2021     Microbiology Results (last 10 days)       ** No results found for the last 240 hours. **           Pain Management Panel  More data exists         Latest Ref Rng & Units 12/10/2023 11/18/2023   Pain Management Panel   Amphetamine, Urine Qual Negative Negative  Negative    Barbiturates Screen, Urine Negative Negative  Negative    Benzodiazepine Screen, Urine Negative Negative  Negative    Buprenorphine, Screen, Urine Negative Negative  Negative    Cocaine Screen, Urine Negative Negative  Negative    Fentanyl, Urine Negative Negative  Negative    Methadone Screen , Urine Negative Negative  Negative    Methamphetamine, Ur Negative Negative  Negative      I have personally reviewed the above laboratory results.   ---------------------------------------------------------------------------------------------------------------------  Imaging Results (Last 7 Days)       Procedure Component Value Units Date/Time    XR Chest 1 View [[id number]] Collected: 12/10/23 6376      Updated: 12/10/23 2327    Narrative:      PROCEDURE: Portable chest x-ray examination performed on December 10,  2023. Single view. Portable technique. Semiupright position.     HISTORY: Shortness of breath.     COMPARISON: None.     FINDINGS:     Enlarged heart size.  Central pulmonary vascular congestion with mild edema.  Mild atelectasis at the lung bases.  Mild atelectasis in the right upper lobe adjacent to the minor fissure.  No pleural effusion or pneumothorax.  Hypoinflated lungs.  Mild osteoarthritis at the glenohumeral joints.  No free air in the upper abdomen.       Impression:         1.  Enlarged heart size with central pulmonary vascular congestion.  2.  Mild interstitial edema.  3.  Mild atelectasis at the lung bases and right upper lobe adjacent to  the minor fissure.  4.  Hypoinflated lungs.  5.  Left-sided pacemaker with leads to the right atrium and right  ventricle.     This report was finalized on 12/10/2023 11:25 PM by Rambo Felix MD.       CT Head Without Contrast [[id number]] Collected: 12/10/23 2322     Updated: 12/10/23 2326    Narrative:      PROCEDURE: CT of the brain performed without IV contrast on December 10,  2023. The examination was performed with 3 mm axial imaging and 3 mm  sagittal and coronal reconstruction images. Total DLP = 856. The  examination was performed according to as low as reasonably achievable  dose protocol.     HISTORY: Altered mental status. Respiratory distress.     COMPARISON: None.     FINDINGS:     Mild generalized brain volume loss with mild chronic small vessel  ischemic type changes in the white matter.  No acute intracranial hemorrhage, mass, infarct, or edema.  No hydrocephalus.  No shift of midline structures.  No fracture or foreign body.  Mild mucosal thickening in the paranasal sinuses.  Dense calcified plaque along the supraclinoid ICA segments.  No lytic or blastic lesion.       Impression:         1.  Mild generalized brain volume loss  consistent with age.  2.  No acute intracranial hemorrhage, mass, infarct, or edema.  3.  Mild mucosal thickening in the paranasal sinuses.  4.  No fracture or foreign body.     This report was finalized on 12/10/2023 11:24 PM by Rambo Felix MD.             I have personally reviewed the above radiology results.     Last Echocardiogram:  Results for orders placed during the hospital encounter of 11/17/23    Adult Transthoracic Echo Complete W/ Cont if Necessary Per Protocol    Interpretation Summary    The quality of the study is limited with poor acoustic windows.  The contrast study was done using Lumason to enhance endocardial border to assess LV function.    Left ventricular wall thickness is consistent with mild concentric hypertrophy.    Left ventricular systolic function is normal.  Estimated LVEF =  66 - 70%.    Left ventricular diastolic function is consistent with (grade I) impaired relaxation    No significant valvular heart disease is noted.    Estimated right ventricular systolic pressure from tricuspid regurgitation is markedly elevated (>55 mmHg)    Severe pulmonary hypertension is present.    There is no evidence of pericardial effusion. .    ---------------------------------------------------------------------------------------------------------------------    Assessment & Plan      ACUTE HOSPITAL PROBLEMS    -Acute COPD exacerbation  -Acute on chronic hypoxic and hypercapnic respiratory failure   -Respiratory acidosis   -PATTY non compliant with CPAP   -CO2 narcosis   Imaging of chest without infiltrate/consolidation  Head CT without acute intracranial abnormalities    Initial ABG with pH 7.209 and pCO2 > 104 with pO2 57 and SPO2 88.2%   Patient was placed on BiPAP in ED, intermittently non compliant and taking himself off BiPAP. Currently on 4 LPM. ABG with improvement overall   COVID-19/Influenza screening negative. Obtain viral respiratory panel PCR.  Continue IV solumedrol   Nebulizer  treatments, inhalants   Continue supplemental oxygen as necessary to titrate SpO2 > 90%. Continuous pulse oximetry monitoring.  Incentive spirometry as tolerated   Pulmonology consulted, input/assistance much appreciated   Patient did received Haldol and Ativan in ED as he was agitated and pulling off BiPAP  Closely monitor vitals/temperature curve   Repeat labs in AM     -Acute on chronic HFpEF  -Grade I diastolic dysfunction   -Severe pulmonary hypertension   Imaging of chest with pulmonary vascular congestion/interstitial edema   ProBNP elevated   IV lasix 80 mg x 1 administered in ED, await diuretic response   Previous TTE 11/2023 reviewed with preserved EF   Strict Is and Os, daily weights  Monitor volume status closely     -?Hyperkalemia   Slight hemolysis noted on labs   Obtain repeat BMP now   Telemetry monitoring     -Mild macrocytic anemia   H&H grossly stable   No active bleeding noted/reported   Obtain vitamin B12 and folate levels, supplement if patient is found to be deficient   Transfuse if necessary   Repeat CBC in AM     -Mild hyperglycemia without hx of diabetes mellitus  Obtain HgbA1C    -Debility   PT/OT consults    CHRONIC MEDICAL PROBLEMS    -Essential hypertension  BP appears well controlled   Plan to resume home antihypertensive regimen once reconciled per pharmacy with appropriate holding parameters to prevent hypotension and/or bradycardia   Closely monitor BP per hospital protocol, titrate medications as necessary  -Hyperlipidemia: Continue statin   -History of carotid artery stenosis s/p right CEA   -S/P PPM implantation in the past: Telemetry monitoring   -History of ischemic CVA in past: Continue home medication regimen   -Anxiety: Supportive care, continue home medication regimen once reconciled per pharmacy.   -GERD: PPI   -Obesity by BMI, Body mass index is 31.87 kg/m².  Affecting all aspects of care  -Former smoker   ---------------------------------------------------  DVT  Prophylaxis: Lovenox   Activity: Up with assistance as tolerated   ---------------------------------------------------  INPATIENT status due to the need for care which can only be reasonably provided in an hospital setting such as aggressive/expedited ancillary services and/or consultation services, the necessity for IV medications, close physician monitoring and/or the possible need for procedures.  In such, I feel patient’s risk for adverse outcomes and need for care warrant INPATIENT evaluation and predict the patient’s care encounter to likely last beyond 2 midnights.     Code Status: FULL CODE   ---------------------------------------------------  Disposition/Discharge planning: Plans on home once medically stable, pending clinical course   ---------------------------------------------------  I have discussed the patient's assessment and plan with the patient, nursing staff, and attending physician DO Tosha Sotelo PA-C  Hospitalist Service -- [facility]   Pager: [phone]    12/11/23  08:07 EST    Attending Physician: Dr. Humberto DO      ---------------------------------------------------------------------------------------------------------------------        Electronically signed by Eugenio Duarte MD at 12/11/23 1411          Emergency Department Notes        Vahe Schumacher DO at 12/10/23 2222          Subjective   History of Present Illness    69-year-old male with past medical history of hypertension, hyperlipidemia, CVA, carotid arteries stenosis, PATTY noncompliant with BiPAP presenting for altered mental status and respiratory distress.  No reported fevers.  No reported chest pain, vomiting diarrhea, abdominal pain.    Review of Systems    Past Medical History:   Diagnosis Date    Acid reflux     Anxiety     Arthritis     Carotid artery stenosis, symptomatic, right 11/23/2021    Chronic obstructive pulmonary disease 12/5/2023    Essential hypertension 11/23/2021     Herpes     History of lipoma     History of transfusion     Hyperlipidemia     Inguinal hernia     Peptic ulcer     Stroke        Allergies   Allergen Reactions    Penicillins Hives     Mother told him that, has taken amoxicillin with no problems       Past Surgical History:   Procedure Laterality Date    ABDOMINAL SURGERY      CARDIAC ELECTROPHYSIOLOGY PROCEDURE N/A 12/10/2021    Procedure: Pacemaker DC new;  Surgeon: Jony Monte MD;  [facility];  Service: Cardiology;  Laterality: N/A;    CAROTID ENDARTERECTOMY Left     CHOLECYSTECTOMY WITH INTRAOPERATIVE CHOLANGIOGRAM N/A 2017    Procedure: CHOLECYSTECTOMY LAPAROSCOPIC INTRAOPERATIVE CHOLANGIOGRAM;  Surgeon: Luis Coe MD;  Location: [facility] OR;  Service:     HERNIA REPAIR      inguinal    SINUS SURGERY      TUMOR EXCISION         Family History   Problem Relation Age of Onset    Stroke Mother     Heart disease Mother     Diabetes Mother     No Known Problems Father     Diabetes Sister        Social History     Socioeconomic History    Marital status:     Number of children: 6   Tobacco Use    Smoking status: Former     Packs/day: 0.25     Years: 55.00     Additional pack years: 0.00     Total pack years: 13.75     Types: Cigarettes     Quit date: 10/2023     Years since quittin.1     Passive exposure: Current    Smokeless tobacco: Never    Tobacco comments:     2 cigs per day/A pack per week.   Vaping Use    Vaping Use: Never used   Substance and Sexual Activity    Alcohol use: No    Drug use: No    Sexual activity: Defer           Objective   Physical Exam  Vitals and nursing note reviewed.   Constitutional:       General: He is in acute distress.      Comments: Somnolent   HENT:      Head: Normocephalic.      Mouth/Throat:      Mouth: Mucous membranes are moist.   Eyes:      Extraocular Movements: Extraocular movements intact.      Pupils: Pupils are equal, round, and reactive to light.    Cardiovascular:      Rate and Rhythm: Normal rate and regular rhythm.      Pulses: Normal pulses.   Pulmonary:      Effort: Respiratory distress present.      Breath sounds: Normal breath sounds.   Abdominal:      General: Abdomen is flat. There is no distension.      Palpations: Abdomen is soft.   Musculoskeletal:         General: Normal range of motion.      Cervical back: Normal range of motion and neck supple.   Skin:     General: Skin is warm and dry.      Capillary Refill: Capillary refill takes less than 2 seconds.   Neurological:      Mental Status: He is lethargic and confused.      Comments: No focal deficits         Procedures          ED Course  ED Course as of 12/11/23 0733   [facility] Dec 10, 2023   2224 ABG with hypercarbic respiratory failure. Placed on Bipap. Will monitor closely, if not improving will need intubation [KH]   2330 CT head negative for acute pathology.  Chest x-ray shows decreased lung volume and atelectasis.  No obvious pneumonia. [KH]   2330 Hemoglobin similar to priors.  Elevated potassium of 5.8 noted.  Will hydrate.  UDS negative.    Altered mental status appears to be due to hypercarbia [KH]   Mon Dec 11, 2023   0114 Patient's hypercarbia improving on BiPAP.  Patient is more alert but still confused.  Agitated and repeatedly trying to take off BiPAP.  Patient eventually was able to be successfully redirected.  I did speak to patient's family who were updated regarding the patient and requested to come back to ED to help calm the patient as he is repeatedly asking for his wife.  Family agreeable to return to the ED. [KH]   0653 Patient's son did present to the emergency department and help with redirecting the patient.  However he continued to remain confused and agitated though significantly more alert than arrival.  Patient's son ultimately did have to return home.  Patient continuing to take off BiPAP mask and trying to get out out of bed.  Due to concern for patient's safety  patient was given Haldol but continued to remove BiPAP mask and had broken several BiPAP mask.  Patient was given 1 mg of Ativan.  Currently tolerating BiPAP without issue, remains alert. []   0656 Delay in admission for patient due to bed status at [facility].  Patient remained in the ED.  Patient to be admitted to hospital service after change of shift.  Patient signed out to oncoming provider, . [KH]      ED Course User Index  [KH] Aracelis Boo MD      XR Chest 1 View    Result Date: 12/10/2023   1.  Enlarged heart size with central pulmonary vascular congestion. 2.  Mild interstitial edema. 3.  Mild atelectasis at the lung bases and right upper lobe adjacent to the minor fissure. 4.  Hypoinflated lungs. 5.  Left-sided pacemaker with leads to the right atrium and right ventricle.  This report was finalized on 12/10/2023 11:25 PM by Rambo Felix MD.      CT Head Without Contrast    Result Date: 12/10/2023   1.  Mild generalized brain volume loss consistent with age. 2.  No acute intracranial hemorrhage, mass, infarct, or edema. 3.  Mild mucosal thickening in the paranasal sinuses. 4.  No fracture or foreign body.  This report was finalized on 12/10/2023 11:24 PM by Rambo Felix MD.         Results for orders placed or performed during the hospital encounter of 12/10/23   Blood Gas, Arterial With Co-Ox    Specimen: Arterial Blood   Result Value Ref Range    Site Left Brachial     Russell's Test N/A     pH, Arterial 7.209 (C) 7.350 - 7.450 pH units    pCO2, Arterial >104.0 (C) 35.0 - 45.0 mm Hg    pO2, Arterial 57.0 (L) 83.0 - 108.0 mm Hg    HCO3, Arterial 42.3 (H) 20.0 - 26.0 mmol/L    Base Excess, Arterial 10.4 (H) 0.0 - 2.0 mmol/L    O2 Saturation, Arterial 88.2 (L) 94.0 - 99.0 %    Hemoglobin, Blood Gas 12.6 (L) 14 - 18 g/dL    Hematocrit, Blood Gas 38.6 38.0 - 51.0 %    Oxyhemoglobin 87.1 (L) 94 - 99 %    Methemoglobin <-0.10 (L) 0.00 - 3.00 %    Carboxyhemoglobin 2.0 0 - 5 %    A-a DO2 126.5  0.0 - 300.0 mmHg    CO2 Content 45.6 (H) 22 - 33 mmol/L    Barometric Pressure for Blood Gas 727 mmHg    Modality Nasal Cannula     FIO2 44 %    Flow Rate 6.0 lpm    Ventilator Mode NA     Note Read back and acknowledge     Notified Who DR BROWN // RN     Notified By [id number]     Notified Time 12/10/2023 22:10     Collected by [id number]     pH, Temp Corrected      pCO2, Temperature Corrected      pO2, Temperature Corrected     Comprehensive Metabolic Panel    Specimen: Arm, Left; Blood   Result Value Ref Range    Glucose 137 (H) 65 - 99 mg/dL    BUN 23 8 - 23 mg/dL    Creatinine 0.95 0.76 - 1.27 mg/dL    Sodium 138 136 - 145 mmol/L    Potassium 5.8 (H) 3.5 - 5.2 mmol/L    Chloride 95 (L) 98 - 107 mmol/L    CO2 42.9 (H) 22.0 - 29.0 mmol/L    Calcium 9.7 8.6 - 10.5 mg/dL    Total Protein 7.8 6.0 - 8.5 g/dL    Albumin 4.4 3.5 - 5.2 g/dL    ALT (SGPT) 18 1 - 41 U/L    AST (SGOT) 15 1 - 40 U/L    Alkaline Phosphatase 94 39 - 117 U/L    Total Bilirubin 0.5 0.0 - 1.2 mg/dL    Globulin 3.4 gm/dL    A/G Ratio 1.3 g/dL    BUN/Creatinine Ratio 24.2 7.0 - 25.0    Anion Gap 0.1 (L) 5.0 - 15.0 mmol/L    eGFR 85.6 >60.0 mL/min/1.73   Urinalysis With Culture If Indicated - Urine, Catheter    Specimen: Urine, Catheter   Result Value Ref Range    Color, UA Dark Yellow (A) Yellow, Straw    Appearance, UA Clear Clear    pH, UA <=5.0 5.0 - 8.0    Specific Gravity, UA 1.022 1.005 - 1.030    Glucose, UA Negative Negative    Ketones, UA Negative Negative    Bilirubin, UA Small (1+) (A) Negative    Blood, UA Trace (A) Negative    Protein, UA 30 mg/dL (1+) (A) Negative    Leuk Esterase, UA Negative Negative    Nitrite, UA Negative Negative    Urobilinogen, UA 1.0 E.U./dL 0.2 - 1.0 E.U./dL   Lactic Acid, Plasma    Specimen: Arm, Left; Blood   Result Value Ref Range    Lactate 0.8 0.5 - 2.0 mmol/L   Single High Sensitivity Troponin T    Specimen: Arm, Left; Blood   Result Value Ref Range    HS Troponin T 36 (H) <22 ng/L   BNP    Specimen: Arm,  Left; Blood   Result Value Ref Range    proBNP 336.7 0.0 - 900.0 pg/mL   CBC Auto Differential    Specimen: Arm, Left; Blood   Result Value Ref Range    WBC 8.41 3.40 - 10.80 10*3/mm3    RBC 4.17 4.14 - 5.80 10*6/mm3    Hemoglobin 12.1 (L) 13.0 - 17.7 g/dL    Hematocrit 44.0 37.5 - 51.0 %    .5 (H) 79.0 - 97.0 fL    MCH 29.0 26.6 - 33.0 pg    MCHC 27.5 (L) 31.5 - 35.7 g/dL    RDW 12.5 12.3 - 15.4 %    RDW-SD 49.0 37.0 - 54.0 fl    MPV 10.4 6.0 - 12.0 fL    Platelets 182 140 - 450 10*3/mm3    Neutrophil % 77.2 (H) 42.7 - 76.0 %    Lymphocyte % 12.0 (L) 19.6 - 45.3 %    Monocyte % 9.2 5.0 - 12.0 %    Eosinophil % 0.1 (L) 0.3 - 6.2 %    Basophil % 0.2 0.0 - 1.5 %    Immature Grans % 1.3 (H) 0.0 - 0.5 %    Neutrophils, Absolute 6.49 1.70 - 7.00 10*3/mm3    Lymphocytes, Absolute 1.01 0.70 - 3.10 10*3/mm3    Monocytes, Absolute 0.77 0.10 - 0.90 10*3/mm3    Eosinophils, Absolute 0.01 0.00 - 0.40 10*3/mm3    Basophils, Absolute 0.02 0.00 - 0.20 10*3/mm3    Immature Grans, Absolute 0.11 (H) 0.00 - 0.05 10*3/mm3    nRBC 0.0 0.0 - 0.2 /100 WBC   Urine Drug Screen - Urine, Clean Catch    Specimen: Urine, Clean Catch   Result Value Ref Range    THC, Screen, Urine Negative Negative    Phencyclidine (PCP), Urine Negative Negative    Cocaine Screen, Urine Negative Negative    Methamphetamine, Ur Negative Negative    Opiate Screen Negative Negative    Amphetamine Screen, Urine Negative Negative    Benzodiazepine Screen, Urine Negative Negative    Tricyclic Antidepressants Screen Negative Negative    Methadone Screen, Urine Negative Negative    Barbiturates Screen, Urine Negative Negative    Oxycodone Screen, Urine Negative Negative    Buprenorphine, Screen, Urine Negative Negative   Fentanyl, Urine - Urine, Clean Catch    Specimen: Urine, Clean Catch   Result Value Ref Range    Fentanyl, Urine Negative Negative   Urinalysis, Microscopic Only - Urine, Catheter    Specimen: Urine, Catheter   Result Value Ref Range    RBC, UA  6-10 (A) None Seen, 0-2 /HPF    WBC, UA 0-2 None Seen, 0-2 /HPF    Bacteria, UA None Seen None Seen /HPF    Squamous Epithelial Cells, UA 0-2 None Seen, 0-2 /HPF    Hyaline Casts, UA 13-20 None Seen /LPF    Methodology Automated Microscopy    Scan Slide    Specimen: Arm, Left; Blood   Result Value Ref Range    Anisocytosis Slight/1+ None Seen    Hypochromia Slight/1+ None Seen    Stomatocytes Slight/1+ None Seen    Platelet Morphology Normal Normal   Blood Gas, Arterial With Co-Ox    Specimen: Arterial Blood   Result Value Ref Range    Site Right Radial     Russell's Test Positive     pH, Arterial 7.286 (L) 7.350 - 7.450 pH units    pCO2, Arterial 87.4 (C) 35.0 - 45.0 mm Hg    pO2, Arterial 54.4 (C) 83.0 - 108.0 mm Hg    HCO3, Arterial 41.7 (H) 20.0 - 26.0 mmol/L    Base Excess, Arterial 11.5 (H) 0.0 - 2.0 mmol/L    O2 Saturation, Arterial 89.2 (L) 94.0 - 99.0 %    Hemoglobin, Blood Gas 12.4 (L) 14 - 18 g/dL    Hematocrit, Blood Gas 38.1 38.0 - 51.0 %    Oxyhemoglobin 88.2 (L) 94 - 99 %    Methemoglobin <-0.10 (L) 0.00 - 3.00 %    Carboxyhemoglobin 1.6 0 - 5 %    A-a DO2 191.1 0.0 - 300.0 mmHg    CO2 Content 44.3 (H) 22 - 33 mmol/L    Barometric Pressure for Blood Gas 727 mmHg    Modality BiPap     FIO2 50 %    Ventilator Mode NA     Set [facility] Resp Rate 24.0     IPAP 24     EPAP 8     Note Read back and acknowledge     Notified Who DR BROWN // RN     Notified By [id number]     Notified Time 12/11/2023 00:07     Collected by [id number]     pH, Temp Corrected      pCO2, Temperature Corrected      pO2, Temperature Corrected     Blood Gas, Arterial With Co-Ox    Specimen: Arterial Blood   Result Value Ref Range    Site Right Radial     Russell's Test Positive     pH, Arterial 7.379 7.350 - 7.450 pH units    pCO2, Arterial 71.5 (C) 35.0 - 45.0 mm Hg    pO2, Arterial 62.4 (L) 83.0 - 108.0 mm Hg    HCO3, Arterial 42.1 (H) 20.0 - 26.0 mmol/L    Base Excess, Arterial 14.0 (H) 0.0 - 2.0 mmol/L    O2 Saturation, Arterial 94.0 94.0 - 99.0 %     Hemoglobin, Blood Gas 12.0 (L) 14 - 18 g/dL    Hematocrit, Blood Gas 36.7 (L) 38.0 - 51.0 %    Oxyhemoglobin 92.2 (L) 94 - 99 %    Methemoglobin 0.30 0.00 - 3.00 %    Carboxyhemoglobin 1.6 0 - 5 %    A-a DO2 200.8 0.0 - 300.0 mmHg    CO2 Content 44.3 (H) 22 - 33 mmol/L    Barometric Pressure for Blood Gas 728 mmHg    Modality BiPap     FIO2 50 %    Ventilator Mode NA     Set [facility] Resp Rate 24.0     IPAP 24     EPAP 8     Note Read back and acknowledge     Notified Who DR BROWN // RN     Notified By [id number]     Notified Time 12/11/2023 03:23     Collected by [id number]     pH, Temp Corrected      pCO2, Temperature Corrected      pO2, Temperature Corrected     ECG 12 Lead Altered Mental Status   Result Value Ref Range    QT Interval 372 ms    QTC Interval 407 ms                                    HUONG reviewed by Aracelis Boo MD, Vahe Schumacher DO       Medical Decision Making  I assumed care of this patient at shift change from Dr. Boo.  Patient is currently improving on BiPAP.  Patient did require some mood control/mild sedation due to agitation from hypercapnia.  Other laboratory workup listed.  Hypercapnia has improved.  Patient's chronic hypercapnia has returned to baseline.  BiPAP will be removed.  Recommend admission for further work up and treatment.  Hospitalist team consulted and made aware of the patient.  Consults and orders placed per hospitalist request.  Patient was agreeable to admission plan.  Vitals stable on admission.    Problems Addressed:  Acute on chronic respiratory failure with hypoxia and hypercapnia: complicated acute illness or injury  Altered mental status, unspecified altered mental status type: complicated acute illness or injury    Amount and/or Complexity of Data Reviewed  Labs: ordered. Decision-making details documented in ED Course.  Radiology: ordered. Decision-making details documented in ED Course.  ECG/medicine tests: ordered.    Risk  Prescription drug  management.  Decision regarding hospitalization.    Critical Care  Total time providing critical care: 60 minutes        Final diagnoses:   Acute on chronic respiratory failure with hypoxia and hypercapnia   Altered mental status, unspecified altered mental status type       ED Disposition  ED Disposition       ED Disposition   Decision to Admit    Condition   --    Comment   --               No follow-up provider specified.       Medication List      No changes were made to your prescriptions during this visit.            Vahe Schumacher DO  12/11/23 0733      Electronically signed by Vahe Schumacher DO at 12/11/23 0733       Yakelin Andino PCT at 12/10/23 2205          Dummy stick done on pt, glucose was 116    Electronically signed by Yakelin Andino PCT at 12/10/23 2206       Yakelin Andino PCT at 12/10/23 2205          EKG done at 2203 and given to provider at 2204    Electronically signed by Yakelin Andino PCT at 12/10/23 2205       Facility-Administered Medications as of 12/12/2023   Medication Dose Route Frequency Provider Last Rate Last Admin    acetaminophen (TYLENOL) tablet 650 mg  650 mg Oral Q6H PRN Tosha Keen PA        amLODIPine (NORVASC) tablet 10 mg  10 mg Oral Nightly Eugenio Duarte MD        aspirin EC tablet 81 mg  81 mg Oral Daily Eugenio Duarte MD        sennosides-docusate (PERICOLACE) 8.6-50 MG per tablet 2 tablet  2 tablet Oral BID Eugenio Duarte MD        And    polyethylene glycol (MIRALAX) packet 17 g  17 g Oral Daily PRN Eugenio Duarte MD        And    bisacodyl (DULCOLAX) EC tablet 5 mg  5 mg Oral Daily PRN Eugenio Duarte MD        And    bisacodyl (DULCOLAX) suppository 10 mg  10 mg Rectal Daily PRN Eugenio Duarte MD        budesonide-formoterol (SYMBICORT) 160-4.5 MCG/ACT inhaler 2 puff  2 puff Inhalation BID - RT Eugenio Duarte MD   2 puff at 12/11/23 1823    clopidogrel (PLAVIX) tablet 75 mg  75 mg Oral Daily Eugenio Duarte MD        dextrose (D50W) (25 g/50 mL) IV  injection 25 g  25 g Intravenous Q15 Min PRN Tosha Keen PA        dextrose (GLUTOSE) oral gel 15 g  15 g Oral Q15 Min PRN Tosha Keen PA        Enoxaparin Sodium (LOVENOX) syringe 40 mg  40 mg Subcutaneous Daily Eugenio Duarte MD   40 mg at 12/11/23 1103    [COMPLETED] furosemide (LASIX) injection 80 mg  80 mg Intravenous Once Vahe Schumacher DO   80 mg at 12/11/23 0800    glucagon HCl (Diagnostic) injection 1 mg  1 mg Intramuscular Q15 Min PRN Tosha Keen PA        [COMPLETED] haloperidol lactate (HALDOL) injection 5 mg  5 mg Intravenous Once Aracelis Boo MD   5 mg at 12/11/23 0346    hydrOXYzine (ATARAX) tablet 25 mg  25 mg Oral TID PRN Faiza Roberts PA-C   25 mg at 12/11/23 2033    Insulin Lispro (humaLOG) injection 2-7 Units  2-7 Units Subcutaneous 4x Daily AC & at Bedtime Tosha Keen PA        ipratropium-albuterol (DUO-NEB) nebulizer solution 3 mL  3 mL Nebulization 4x Daily - RT Eugenio Duarte MD   3 mL at 12/12/23 0010    lisinopril (PRINIVIL,ZESTRIL) tablet 20 mg  20 mg Oral Daily Eugenio Duarte MD        [COMPLETED] LORazepam (ATIVAN) injection 1 mg  1 mg Intravenous Once Aracelis Boo MD   1 mg at 12/11/23 0515    methylPREDNISolone sodium succinate (SOLU-Medrol) injection 40 mg  40 mg Intravenous Q12H Eugenio Duarte MD   40 mg at 12/11/23 2203    [COMPLETED] Naloxone HCl (NARCAN) injection 2 mg  2 mg Intravenous Once Aracelis Boo MD   2 mg at 12/10/23 2219    nitroglycerin (NITROSTAT) SL tablet 0.4 mg  0.4 mg Sublingual Q5 Min PRN Eugenio Duarte MD        rosuvastatin (CRESTOR) tablet 10 mg  10 mg Oral Daily Eugenio Duarte MD        [COMPLETED] sodium chloride 0.9 % bolus 1,000 mL  1,000 mL Intravenous Once Aracelis Boo MD   Stopped at 12/11/23 0115    sodium chloride 0.9 % flush 10 mL  10 mL Intravenous PRN Aracelis Boo MD        sodium chloride 0.9 % flush 10 mL  10 mL Intravenous Q12H Eugenio Duarte MD   10 mL at 12/11/23 2033    sodium chloride 0.9 %  flush 10 mL  10 mL Intravenous PRN Eugenio Duarte MD        sodium chloride 0.9 % infusion 40 mL  40 mL Intravenous PRN Eugenio Duarte MD                Consult Notes (all)        Kwaku Looney MD at 12/11/23 1308        Consult Orders    1. Inpatient Pulmonology Consult [[id number]] ordered by Eugenio Duarte MD at 12/11/23 6824                 Consult Note Pulmonary     Referring Provider: Dr. Keen    Reason for Consultation: Hypercapnic hypoxic respiratory failure.          Chief complaint   Shortness of breath, drowsiness    History of present illness:    Mr. Donaldo Roberts is a 69-year-old male very well-known to me from previous hospitalization.  He has history of COPD, chronic hypoxic hypercapnic respiratory failure, obstructive sleep apnea, hypertension, heart failure with preserved ejection fraction, Hyperlyte lipidemia.  He comes in with complaint of worsening shortness of breath and altered sensorium.    Patient was found to be agitated in the ER and got IV sedation.    Patient was started on BiPAP at a setting of 24/8 with a backup rate of 24.  Initial blood gas showed a pH of 7.21 pCO2 more than 104 pO2 57.    Follow-up blood gases while on BiPAP showed a pH of 7.38 pCO2 71 pO2 62 on BiPAP of 24/8 with a backup rate of 24.    Patient had another blood gas while he was on room air which showed increasing pCO2 to 80 from 72.    Patient during my evaluation was drowsy but arousable, oriented x 3.  Reported chronic shortness of breath on minimal exertion.    Denies chest pain, orthopnea but reported chronic leg edema.    Denies rash, joint stiffness, photosensitivity.      Review of Systems  As described in HPI    History  Past Medical History:   Diagnosis Date    Acid reflux     Anxiety     Arthritis     Carotid artery stenosis, symptomatic, right 11/23/2021    Chronic obstructive pulmonary disease 12/5/2023    Essential hypertension 11/23/2021    Herpes     History of lipoma     History of  transfusion     Hyperlipidemia     Inguinal hernia     Peptic ulcer     Stroke    ,   Past Surgical History:   Procedure Laterality Date    ABDOMINAL SURGERY      CARDIAC ELECTROPHYSIOLOGY PROCEDURE N/A 12/10/2021    Procedure: Pacemaker DC new;  Surgeon: Jony Monte MD;  Location: [facility] CATH INVASIVE LOCATION;  Service: Cardiology;  Laterality: N/A;    CAROTID ENDARTERECTOMY Left     CHOLECYSTECTOMY WITH INTRAOPERATIVE CHOLANGIOGRAM N/A 2017    Procedure: CHOLECYSTECTOMY LAPAROSCOPIC INTRAOPERATIVE CHOLANGIOGRAM;  Surgeon: Luis Coe MD;  Location: [facility] OR;  Service:     HERNIA REPAIR      inguinal    SINUS SURGERY      TUMOR EXCISION     ,   Family History   Problem Relation Age of Onset    Stroke Mother     Heart disease Mother     Diabetes Mother     No Known Problems Father     Diabetes Sister    ,   Social History     Tobacco Use    Smoking status: Former     Packs/day: 0.25     Years: 55.00     Additional pack years: 0.00     Total pack years: 13.75     Types: Cigarettes     Quit date: 10/2023     Years since quittin.1     Passive exposure: Current    Smokeless tobacco: Never    Tobacco comments:     2 cigs per day/A pack per week.   Vaping Use    Vaping Use: Never used   Substance Use Topics    Alcohol use: No    Drug use: No   ,   Medications Prior to Admission   Medication Sig Dispense Refill Last Dose    amLODIPine (NORVASC) 10 MG tablet Take 1 tablet by mouth Every Night. 90 tablet 1 12/10/2023    aspirin 81 MG EC tablet Take 1 tablet by mouth Daily.   12/10/2023    clopidogrel (PLAVIX) 75 MG tablet Take 1 tablet by mouth Daily. 30 tablet 11 12/10/2023    furosemide (LASIX) 40 MG tablet Take 1 tablet by mouth Every Other Day for 30 days. 15 tablet 0 Past Week    lisinopril (PRINIVIL,ZESTRIL) 20 MG tablet Take 1 tablet by mouth Daily.   12/10/2023    omeprazole (priLOSEC) 20 MG capsule Take 1 capsule by mouth Daily.   12/10/2023    rosuvastatin (CRESTOR) 10 MG tablet Take 1  tablet by mouth Daily.   12/10/2023    acetaminophen (TYLENOL) 500 MG tablet Take 1 tablet by mouth Every 6 (Six) Hours As Needed for Mild Pain.   Unknown    albuterol sulfate  (90 Base) MCG/ACT inhaler Inhale 2 puffs Every 4 (Four) Hours As Needed for Wheezing or Shortness of Air. 18 g 8 Unknown    Fluticasone-Umeclidin-Vilant (Trelegy Ellipta) 100-62.5-25 MCG/ACT inhaler Inhale 1 puff Daily. 30 each 8 Unknown   , Scheduled Meds:  budesonide-formoterol, 2 puff, Inhalation, BID - RT  enoxaparin, 40 mg, Subcutaneous, Daily  insulin lispro, 2-7 Units, Subcutaneous, 4x Daily AC & at Bedtime  ipratropium-albuterol, 3 mL, Nebulization, 4x Daily - RT  methylPREDNISolone sodium succinate, 40 mg, Intravenous, Q12H  senna-docusate sodium, 2 tablet, Oral, BID  sodium chloride, 10 mL, Intravenous, Q12H    , Continuous Infusions:    and Allergies:  Penicillins    Objective     Vital Signs   Temp:  [98.5 °F (36.9 °C)-99 °F (37.2 °C)] 99 °F (37.2 °C)  Heart Rate:  [68-95] 95  Resp:  [18-24] 20  BP: (132-169)/() 156/72    Physical Exam:          General-drowsy, arousable, mild respiratory distress    HEENT- pupils equally reactive to light, normal in size, no scleral icterus    Neck-supple, No JVD, no carotid bruit    Respiratory-bilateral air entry, scattered wheezing, no egophony    Cardiovascular-  Normal S1 and S2. No S3, S4 or murmurs.      GI-nontender nondistended bowel sounds positive    CNS-  oriented x3, grossly nonfocal      Extremities-no clubbing and 1+ bipedal edema    Psychiatric-mood good, good eye contact,     Skin- no visible rash             Results Review:    LABS:    Lab Results   Component Value Date    GLUCOSE 140 (H) 12/11/2023    BUN 22 12/11/2023    CREATININE 0.73 (L) 12/11/2023    EGFRIFNONA 93 12/11/2021    BCR 30.1 (H) 12/11/2023    CO2 37.1 (H) 12/11/2023    CALCIUM 10.0 12/11/2023    ALBUMIN 4.4 12/10/2023    AST 15 12/10/2023    ALT 18 12/10/2023    WBC 7.96 12/11/2023    HGB 12.1 (L)  12/11/2023    HCT 42.9 12/11/2023    .6 (H) 12/11/2023     12/11/2023     (L) 12/11/2023    K 4.4 12/11/2023    CL 90 (L) 12/11/2023    ANIONGAP 6.9 12/11/2023       Lab Results   Component Value Date    INR 0.82 (L) 12/08/2021    PROTIME 11.7 (L) 12/08/2021                I reviewed the patient's new clinical results.  I reviewed the patient's new imaging results and agree with the interpretation.      Assessment & Plan     #1 COPD exacerbation.    2.  Acute on chronic hypoxic respiratory failure.  #3 chronic hypercapnia.    3.  Obstructive sleep apnea, obesity hypoventilation syndrome.    4.:  Pulmonary hypertension.    5.  Macrocytic anemia.    Seems like present BiPAP setting of 24/8 with a backup rate of 24 is effective.  Will keep him on BiPAP 4-hour on, 4-hour off and continues during sleep.  In the past patient has refused to use the BiPAP.  Patient was reeducated.  I will talk to him again tomorrow when he is more awake.            GI- PPI prophylaxis      Endrocrinology- Maintain Blood sugar 140 -180        DVT prophylaxis-    Have personally reviewed x-rays, labs, medication list.  Total time spent 30 minutes.      Kwaku Looney MD     12/11/23 13:08 EST     Electronically signed by Kwaku Looney MD at 12/11/23 9577

## 2023-12-12 NOTE — PLAN OF CARE
Goal Outcome Evaluation:  Plan of Care Reviewed With: patient        Progress: improving    PATIENT RESTED WELL, NO ACUTE EVENTS, VSS

## 2023-12-12 NOTE — SIGNIFICANT NOTE
12/12/23 1000   Coping/Psychosocial   Observed Emotional State calm;cooperative   Verbalized Emotional State anxiety   Trust Relationship/Rapport emotional support provided   Involvement in Care interacting with patient   Additional Documentation Spiritual Care (Group)   Spiritual Care   Use of Spiritual Resources non-Judaism use of spiritual care   Spiritual Care Source  initiative   Spiritual Care Follow-Up follow-up, none required as presently assessed   Response to Spiritual Care receptive of support   Spiritual Care Interventions supportive conversation provided   Spiritual Care Visit Type initial   Receptivity to Spiritual Care visit welcomed

## 2023-12-12 NOTE — DISCHARGE SUMMARY
[facility]         HOSPITALIST  DISCHARGE SUMMARY    Patient Name: iNkhil Baldwin  : 1945  MRN: [id number]    Date of Admission: 2023  Date of Discharge:  23  Primary Care Physician: Felix Navarro Jr., MD    Consults       Date and Time Order Name Status Description    2023  6:18 PM Hospitalist (on-call MD unless specified)              Active and Resolved Hospital Problems:  Active Hospital Problems    Diagnosis POA   • **Generalized weakness [R53.1] Yes   • Acute bronchitis [J20.9] Unknown   • Dizziness [R42] Unknown   • Arthritis [M19.90] Yes   • Primary hypertension [I10] Yes   • Type 2 diabetes mellitus with hyperglycemia, without long-term current use of insulin [E11.65] Yes   • BPH (benign prostatic hyperplasia) [N40.0] Yes   Dementia  History of CVA with loss of peripheral vision  History of C6-C7 disc protrusion with mild/moderate left neural foraminal narrowing      Hospital Course     Hospital Course:  Nikhil Baldwin is a 78 y.o. male type 2 diabetes, hypertension, hyperlipidemia, CVA with residual peripheral vision loss, dementia who presented with generalized weakness, fatigue, malaise, decreased appetite and cough.  Workup was suggestive of acute bronchitis with mucous plugging/airway clearance issues.  He improved with steroids, breathing treatments, decongestants.  Sputum culture no growth.  Infective studies otherwise negative, completed short course of doxycycline.  Remained on room air and was afebrile.  Tolerated oral intake.  PT recommended inpatient rehab however he wanted to go home and his son was fine with this.  Close follow-up with primary care provider recommended.  Discharged home in stable condition    Day of Discharge     Vital Signs:  Temp:  [97.9 °F (36.6 °C)-98.3 °F (36.8 °C)] 98 °F (36.7 °C)  Heart Rate:  [51-65] 52  Resp:  [18-20] 18  BP: (132-160)/(71-88) 160/88  Physical Exam:   GENERAL: The patient is elderly, conversant and  nontoxic.  HEENT: PERRLA, Oropharynx clear. Moist mucous membranes.   HEART: Regular rate and rhythm. No edema  LUNGS: Equal air entry, clear to auscultation bilaterally.  ABDOMEN: Soft, positive bowel sounds, nontender, nondistended  SKIN: No rash or open wounds   NEUROLOGIC: Alert, cranial nerves grossly intact, speech clear        Discharge Details        Discharge Medications        New Medications        Instructions Start Date   aspirin 81 MG chewable tablet   81 mg, Oral, Daily   Start Date: December 13, 2023     guaiFENesin 600 MG 12 hr tablet  Commonly known as: MUCINEX   1,200 mg, Oral, Every 12 Hours Scheduled             Changes to Medications        Instructions Start Date   rosuvastatin 20 MG tablet  Commonly known as: CRESTOR  What changed:   medication strength  how much to take   10 mg, Oral, Daily             Continue These Medications        Instructions Start Date   Cholecalciferol 125 MCG (5000 UT) tablet   5,000 Units, Oral, Daily      citalopram 20 MG tablet  Commonly known as: CeleXA   10 mg, Oral, Daily      ipratropium-albuterol 0.5-2.5 mg/3 ml nebulizer  Commonly known as: DUO-NEB   3 mL, Nebulization, Every 4 Hours PRN      Jardiance 25 MG tablet tablet  Generic drug: empagliflozin   Take 1 tablet by mouth once daily      lisinopril 20 MG tablet  Commonly known as: PRINIVIL,ZESTRIL   20 mg, Oral, Daily      melatonin 5 MG tablet tablet   5 mg, Oral, Nightly      metFORMIN 500 MG tablet  Commonly known as: GLUCOPHAGE   500 mg, Oral, 2 Times Daily With Meals      tamsulosin 0.4 MG capsule 24 hr capsule  Commonly known as: FLOMAX   0.4 mg, Oral, Daily             Stop These Medications      SITagliptin 100 MG tablet  Commonly known as: Januvia              Allergies   Allergen Reactions   • Codeine Unknown - Low Severity   • Promethazine Unknown - Low Severity   • Celecoxib Rash   • Ibuprofen Rash   • Penicillins Rash   • Promethazine Hcl Rash   • Warfarin Rash       Discharge  Disposition:  Home or Self Care    Diet:  Hospital:  Diet Order   Procedures   • Diet: Diabetic Diets; Consistent Carbohydrate; Texture: Mechanical Ground (NDD 2); Fluid Consistency: Thin (IDDSI 0)         CODE STATUS:  Code Status and Medical Interventions:   Ordered at: 12/07/23 1838     Level Of Support Discussed With:    Patient     Code Status (Patient has no pulse and is not breathing):    CPR (Attempt to Resuscitate)     Medical Interventions (Patient has pulse or is breathing):    Full Support         Future Appointments   Date Time Provider Department Center   3/21/2024 11:30 AM Felix Navarro Jr., [place] [facility] [place] [facility]           Pertinent  and/or Most Recent Results     PROCEDURES:  NONE    LAB RESULTS:      Lab 12/09/23  0450 12/08/23  1217 12/07/23  1318   WBC 6.38 7.57 7.75   HEMOGLOBIN 13.4 14.5 15.6   HEMATOCRIT 39.6 42.9 46.9   PLATELETS 142 138* 162   NEUTROS ABS 3.58 4.69 5.15   IMMATURE GRANS (ABS) 0.02 0.04 0.04   LYMPHS ABS 1.75 1.69 1.34   MONOS ABS 1.01* 1.01* 1.07*   EOS ABS 0.01 0.07 0.09   MCV 88.2 89.4 88.8   PROCALCITONIN 0.07 0.08  --          Lab 12/09/23  0450 12/08/23  1217 12/07/23  1318   SODIUM  --  139 140   POTASSIUM  --  3.9 4.0   CHLORIDE  --  105 103   CO2  --  22.0 22.6   ANION GAP  --  12.0 14.4   BUN  --  14 13   CREATININE  --  1.03 1.16   EGFR  --  73.4 63.7   GLUCOSE  --  131* 135*   CALCIUM  --  9.3 10.1   MAGNESIUM 2.2 1.7 1.6   PHOSPHORUS 3.2 2.7  --    HEMOGLOBIN A1C  --   --  6.20*         Lab 12/07/23  1318   TOTAL PROTEIN 6.9   ALBUMIN 4.4   GLOBULIN 2.5   ALT (SGPT) 14   AST (SGOT) 14   BILIRUBIN 1.2   ALK PHOS 69         Lab 12/07/23  1318   HSTROP T 12                 Brief Urine Lab Results  (Last result in the past 365 days)        Color   Clarity   Blood   Leuk Est   Nitrite   Protein   CREAT   Urine HCG        12/07/23 1450 Yellow   Clear   Negative   Negative   Negative   Trace                 Microbiology Results (last 10 days)       Procedure  Component Value - Date/Time    Respiratory Culture - Sputum, Cough [[id number]] Collected: 12/08/23 2306    Lab Status: Final result Specimen: Sputum from Cough Updated: 12/11/23 1156     Respiratory Culture Light growth (2+) Normal respiratory oscar. No S. aureus or Pseudomonas aeruginosa detected. Final report.     Gram Stain Moderate (3+) WBCs seen      Rare (1+) Epithelial cells seen    Rapid Strep A Screen - Swab, Throat [[id number]]  (Normal) Collected: 12/08/23 1527    Lab Status: Final result Specimen: Swab from Throat Updated: 12/08/23 1557     Strep A Ag Negative    Beta Strep Culture, Throat - Swab, Throat [[id number]]  (Normal) Collected: 12/08/23 1527    Lab Status: Final result Specimen: Swab from Throat Updated: 12/10/23 1206     Throat Culture, Beta Strep No Beta Hemolytic Streptococcus Isolated    Narrative:      Group A Strep incidence is low in adults. Positive culture for Beta hemolytic Streptococcus species can reflect colonization and not true infection. Please correlate clinically.      COVID PRE-OP / PRE-PROCEDURE SCREENING ORDER (NO ISOLATION) - Swab, Nasopharynx [[id number]]  (Normal) Collected: 12/07/23 1414    Lab Status: Final result Specimen: Swab from Nasopharynx Updated: 12/07/23 1454    Narrative:      The following orders were created for panel order COVID PRE-OP / PRE-PROCEDURE SCREENING ORDER (NO ISOLATION) - Swab, Nasopharynx.  Procedure                               Abnormality         Status                     ---------                               -----------         ------                     COVID-19, FLU A/B, RSV P...[[id number]]  Normal              Final result                 Please view results for these tests on the individual orders.    COVID-19, FLU A/B, RSV PCR 1 HR TAT - Swab, Nasopharynx [[id number]]  (Normal) Collected: 12/07/23 1414    Lab Status: Final result Specimen: Swab from Nasopharynx Updated: 12/07/23 1454     COVID19 Not Detected     Influenza A PCR Not  Detected     Influenza B PCR Not Detected     RSV, PCR Not Detected    Narrative:      Fact sheet for providers: https://www.fda.gov/media/828494/download    Fact sheet for patients: https://www.fda.gov/media/545786/download    Test performed by PCR.            MRI Brain Without Contrast    Result Date: 12/7/2023    1. No acute infarction or intracranial hemorrhage 2. Moderate to severe small vessel ischemic changes in the white matter      Davi Elmore M.D.       Electronically Signed and Approved By: Davi Elmore M.D. on 12/07/2023 at 17:35             XR Chest 1 View    Result Date: 12/7/2023   No active cardiopulmonary disease       YUAN YORK MD       Electronically Signed and Approved By: YUAN YORK MD on 12/07/2023 at 14:20                       Results for orders placed during the hospital encounter of 07/16/23    Adult Transthoracic Echo Complete W/ Cont if Necessary Per Protocol    Interpretation Summary  •  Left ventricular ejection fraction appears to be 56 - 60%.  •  Left ventricular wall thickness is consistent with mild concentric hypertrophy.  •  Left ventricular diastolic function is consistent with (grade I) impaired relaxation and age.  •  Estimated right ventricular systolic pressure from tricuspid regurgitation is normal (<35 mmHg).  •  Mild dilation of the aortic root is present at 3.9 cm. Mild dilation of the ascending aorta is present at 4.3 cm.  •  Trace aortic insufficiency.  No significant aortic stenosis with max/mean pressure gradient 15/8 mmHg.      Labs Pending at Discharge: NONE      Time spent on Discharge including face to face service: >30 minutes    Electronically signed by Jaron Michele DO, 12/12/23, 12:53 PM EST.

## 2023-12-12 NOTE — PLAN OF CARE
Goal Outcome Evaluation       Patient discharged home self care. Discharge information and medications explained to pt and his son. Pt verbalized understanding.

## 2023-12-13 ENCOUNTER — TRANSITIONAL CARE MANAGEMENT TELEPHONE ENCOUNTER (OUTPATIENT)
Dept: CALL CENTER | Facility: HOSPITAL | Age: 78
End: 2023-12-13
Payer: MEDICARE

## 2023-12-13 NOTE — OUTREACH NOTE
Call Center TCM Note      Flowsheet Row Responses   [facility] patient discharged from? [facility]   Does the patient have one of the following disease processes/diagnoses(primary or secondary)? Other   TCM attempt successful? Yes   Call start time 1051   Call end time 1055   Discharge diagnosis Generalized weakness, Dizziness   Is patient permission given to speak with other caregiver? Yes   Person spoke with today (if not patient) and relationship son Quinten Hudson reviewed with patient/caregiver? Yes   Is the patient having any side effects they believe may be caused by any medication additions or changes? No   Does the patient have all medications ordered at discharge? Yes   Is the patient taking all medications as directed (includes completed medication regime)? Yes   Comments TCM APPT WITH PCP DR REYNA IS 12/22/2023   Does the patient have an appointment with their PCP within 7-14 days of discharge? Yes   Has home health visited the patient within 72 hours of discharge? N/A   Psychosocial issues? No   Did the patient receive a copy of their discharge instructions? Yes   Nursing interventions Reviewed instructions with patient   What is the patient's perception of their health status since discharge? Improving   Is the patient/caregiver able to teach back signs and symptoms related to disease process for when to call PCP? Yes   Is the patient/caregiver able to teach back signs and symptoms related to disease process for when to call 911? Yes   Is the patient/caregiver able to teach back the hierarchy of who to call/visit for symptoms/problems? PCP, Specialist, Home health nurse, Urgent Care, ED, 911 Yes   If the patient is a current smoker, are they able to teach back resources for cessation? Not a smoker   TCM call completed? Yes   Wrap up additional comments D/C DX,  Generalized weakness, Dizziness - Per son Quinten pt doing pretty well since home. New rx's asa, Guaifenesin, and updated directions on  Rosuvastatin in place. Januvia discontinued. No questions at this time. Pt son Quinten will be scheduling any needed follow ups with specialists. TCM APPT with PCP Dr Navarro has been sched for 12/22/2023   Call end time [phone]            Gena Brandt MA    12/13/2023, 11:06 EST

## 2023-12-19 ENCOUNTER — TELEPHONE (OUTPATIENT)
Dept: INTERNAL MEDICINE | Facility: CLINIC | Age: 78
End: 2023-12-19

## 2023-12-19 NOTE — TELEPHONE ENCOUNTER
Caller: JHONATAN SWAN (ON  [place])    Relationship: Emergency Contact    Best call back number:     [phone]       Requested Prescriptions:     MAGOXIDE 400 MG    Requested Prescriptions      No prescriptions requested or ordered in this encounter        Pharmacy where request should be sent: [facility] [address] [place] [address] [address] [facility] - [phone] [facility] [phone] FX     Last office visit with prescribing clinician: 11/21/2023   Last telemedicine visit with prescribing clinician: Visit date not found   Next office visit with prescribing clinician: 12/22/2023     Additional details provided by patient: NOT SHOWING THIS MEDICATION ON HIS LIST.    CALLER ALSO WANTING TO KNOW IF PATIENT IS STILL SUPPOSED TO BE TAKING CARVEDILOL.    PLEASE CALL AND ADVISE    Does the patient have less than a 3 day supply:  [x] Yes  [] No    Would you like a call back once the refill request has been completed: [] Yes [] No    If the office needs to give you a call back, can they leave a voicemail: [] Yes [] No    Ricardo Mendez Rep   12/19/23 12:57 EST

## 2023-12-21 RX ORDER — MAGNESIUM OXIDE 400 MG/1
400 TABLET ORAL DAILY
Qty: 90 TABLET | Refills: 1 | Status: SHIPPED | OUTPATIENT
Start: 2023-12-21

## 2023-12-22 ENCOUNTER — OFFICE VISIT (OUTPATIENT)
Dept: INTERNAL MEDICINE | Facility: CLINIC | Age: 78
End: 2023-12-22
Payer: MEDICARE

## 2023-12-22 VITALS
SYSTOLIC BLOOD PRESSURE: 118 MMHG | WEIGHT: 172.6 LBS | HEART RATE: 98 BPM | HEIGHT: 69 IN | OXYGEN SATURATION: 95 % | DIASTOLIC BLOOD PRESSURE: 78 MMHG | TEMPERATURE: 97.6 F | BODY MASS INDEX: 25.56 KG/M2

## 2023-12-22 DIAGNOSIS — J18.9 COMMUNITY ACQUIRED PNEUMONIA OF LEFT LOWER LOBE OF LUNG: Primary | ICD-10-CM

## 2023-12-22 DIAGNOSIS — I25.10 CORONARY ARTERY DISEASE INVOLVING NATIVE CORONARY ARTERY OF NATIVE HEART WITHOUT ANGINA PECTORIS: ICD-10-CM

## 2023-12-22 DIAGNOSIS — E11.65 TYPE 2 DIABETES MELLITUS WITH HYPERGLYCEMIA, WITHOUT LONG-TERM CURRENT USE OF INSULIN: ICD-10-CM

## 2023-12-22 RX ORDER — CARVEDILOL 3.12 MG/1
3.12 TABLET ORAL EVERY 12 HOURS SCHEDULED
Qty: 180 TABLET | Refills: 1 | Status: SHIPPED | OUTPATIENT
Start: 2023-12-22

## 2023-12-22 RX ORDER — METHYLPREDNISOLONE 4 MG/1
TABLET ORAL
Qty: 21 TABLET | Refills: 0 | Status: SHIPPED | OUTPATIENT
Start: 2023-12-22

## 2023-12-22 RX ORDER — CARVEDILOL 3.12 MG/1
TABLET ORAL
COMMUNITY
Start: 2023-12-20 | End: 2023-12-22 | Stop reason: SDUPTHER

## 2023-12-22 RX ORDER — LEVOFLOXACIN 500 MG/1
500 TABLET, FILM COATED ORAL DAILY
Qty: 7 TABLET | Refills: 0 | Status: SHIPPED | OUTPATIENT
Start: 2023-12-22 | End: 2023-12-29

## 2023-12-22 NOTE — PROGRESS NOTES
Transitional Care Follow Up Visit  Subjective     Nikhil Baldwin is a 78 y.o. male who presents for a transitional care management visit.    Within 48 business hours after discharge our office contacted him via telephone to coordinate his care and needs.      I reviewed and discussed the details of that call along with the discharge summary, hospital problems, inpatient lab results, inpatient diagnostic studies, and consultation reports with Nikhil.     Current outpatient and discharge medications have been reconciled for the patient.  Reviewed by: Felix Navarro Jr., MD          12/12/2023     4:39 PM   Date of TCM Phone Call   [facility]   Date of Admission 12/7/2023   Date of Discharge 12/12/2023   Discharge Disposition Home or Self Care     Risk for Readmission (LACE) Score: 15 (12/12/2023  6:00 AM)      History of Present Illness     Course During Hospital Stay:    Patient admitted to hospital with concern for CAP. Patient has been on doxycyline. Patient continue to be coughing. Patient also with fatigue. Patient reports he was wheezing intermittently as well. Patient denies shortness of breath and chest pain. He feels as though his left lung is full. Patient unsure if he has stopped the januvia. Patient has been holding the coreg per hospital instruction as well.      The following portions of the patient's history were reviewed and updated as appropriate: allergies, current medications, past family history, past medical history, past social history, past surgical history, and problem list.      Objective   Vitals:    12/22/23 1316   BP: 118/78   Pulse: 98   Temp: 97.6 °F (36.4 °C)   SpO2: 95%     Wt Readings from Last 3 Encounters:   12/22/23 78.3 kg (172 lb 9.6 oz)   12/07/23 75.4 kg (166 lb 3.6 oz)   11/21/23 79.7 kg (175 lb 12.8 oz)       Appearance: No acute distress, well-nourished  Head: normocephalic, atraumatic  Eyes: extraocular movements intact, no scleral icterus, no  conjunctival injection  Ears, Nose, and Throat: external ears normal, nares patent, moist mucous membranes  Cardiovascular: regular rate and rhythm. no murmurs, rales, or rhonchi. no edema  Respiratory: breathing comfortably, symmetric chest rise, LLL rales. No rhonchi or wheezes.   Neuro: alert and oriented to time, place, and person. Normal gait  Psych: normal mood and affect     Result Review :   The following data was reviewed by: Felix Navarro Jr, MD on 12/22/2023:  Common labs          12/7/2023    13:18 12/8/2023    12:17 12/9/2023    04:50   Common Labs   Glucose 135  131     BUN 13  14     Creatinine 1.16  1.03     Sodium 140  139     Potassium 4.0  3.9     Chloride 103  105     Calcium 10.1  9.3     Albumin 4.4      Total Bilirubin 1.2      Alkaline Phosphatase 69      AST (SGOT) 14      ALT (SGPT) 14      WBC 7.75  7.57  6.38    Hemoglobin 15.6  14.5  13.4    Hematocrit 46.9  42.9  39.6    Platelets 162  138  142    Hemoglobin A1C 6.20            Lab Results   Component Value Date    SARSANTIGEN Not Detected 02/13/2023    COVID19 Not Detected 12/07/2023    FLU Negative 12/01/2022    FLU Negative 12/01/2022    RAPSCRN Negative 02/13/2023    STREPAAG Negative 12/08/2023    RSV Not Detected 12/07/2023    INR 1.0 11/12/2021    BILIRUBINUR Negative 12/07/2023       Assessment & Plan     Diagnoses and all orders for this visit:    1. Community acquired pneumonia of left lower lobe of lung (Primary)  Comments:  concern for residual infection - will Rx levaquin and medrol. oxygenating ok. call or RTC with concerns.  Orders:  -     methylPREDNISolone (MEDROL) 4 MG dose pack; Take as directed on package instructions.  Dispense: 21 tablet; Refill: 0  -     levoFLOXacin (Levaquin) 500 MG tablet; Take 1 tablet by mouth Daily for 7 days.  Dispense: 7 tablet; Refill: 0    2. Type 2 diabetes mellitus with hyperglycemia, without long-term current use of insulin  Comments:  restart januvia - expect higher BG with  steroids and the holidays.  Orders:  -     SITagliptin (Januvia) 100 MG tablet; Take 1 tablet by mouth Daily.  Dispense: 90 tablet; Refill: 3    3. Coronary artery disease involving native coronary artery of native heart without angina pectoris  Comments:  restart coreg for cardiac protection. stay off toprol.  Orders:  -     carvedilol (COREG) 3.125 MG tablet; Take 1 tablet by mouth Every 12 (Twelve) Hours.  Dispense: 180 tablet; Refill: 1        Medications Discontinued During This Encounter   Medication Reason    carvedilol (COREG) 3.125 MG tablet Reorder       Return for Next scheduled follow up.           Felix Navarro Jr, MD  12/22/23  14:14 EST

## 2024-01-09 DIAGNOSIS — E78.49 OTHER HYPERLIPIDEMIA: ICD-10-CM

## 2024-01-09 RX ORDER — ROSUVASTATIN CALCIUM 40 MG/1
40 TABLET, COATED ORAL DAILY
Qty: 90 TABLET | Refills: 0 | OUTPATIENT
Start: 2024-01-09

## 2024-02-01 ENCOUNTER — TELEPHONE (OUTPATIENT)
Dept: INTERNAL MEDICINE | Facility: CLINIC | Age: 79
End: 2024-02-01
Payer: MEDICARE

## 2024-02-01 ENCOUNTER — OFFICE VISIT (OUTPATIENT)
Dept: INTERNAL MEDICINE | Facility: CLINIC | Age: 79
End: 2024-02-01
Payer: MEDICARE

## 2024-02-01 VITALS
HEIGHT: 69 IN | OXYGEN SATURATION: 95 % | SYSTOLIC BLOOD PRESSURE: 105 MMHG | TEMPERATURE: 97.9 F | HEART RATE: 72 BPM | DIASTOLIC BLOOD PRESSURE: 64 MMHG | WEIGHT: 176 LBS | BODY MASS INDEX: 26.07 KG/M2

## 2024-02-01 DIAGNOSIS — Z87.442 HISTORY OF NEPHROLITHIASIS: ICD-10-CM

## 2024-02-01 DIAGNOSIS — R30.0 DYSURIA: Primary | ICD-10-CM

## 2024-02-01 DIAGNOSIS — N30.01 ACUTE CYSTITIS WITH HEMATURIA: ICD-10-CM

## 2024-02-01 DIAGNOSIS — M54.9 BACK PAIN, UNSPECIFIED BACK LOCATION, UNSPECIFIED BACK PAIN LATERALITY, UNSPECIFIED CHRONICITY: ICD-10-CM

## 2024-02-01 LAB
BILIRUB BLD-MCNC: NEGATIVE MG/DL
CLARITY, POC: CLEAR
COLOR UR: YELLOW
EXPIRATION DATE: ABNORMAL
GLUCOSE UR STRIP-MCNC: ABNORMAL MG/DL
KETONES UR QL: NEGATIVE
LEUKOCYTE EST, POC: NEGATIVE
Lab: ABNORMAL
NITRITE UR-MCNC: NEGATIVE MG/ML
PH UR: 5 [PH] (ref 5–8)
PROT UR STRIP-MCNC: ABNORMAL MG/DL
RBC # UR STRIP: ABNORMAL /UL
SP GR UR: 1.02 (ref 1–1.03)
UROBILINOGEN UR QL: ABNORMAL

## 2024-02-01 PROCEDURE — 99213 OFFICE O/P EST LOW 20 MIN: CPT | Performed by: STUDENT IN AN ORGANIZED HEALTH CARE EDUCATION/TRAINING PROGRAM

## 2024-02-01 PROCEDURE — 81003 URINALYSIS AUTO W/O SCOPE: CPT | Performed by: STUDENT IN AN ORGANIZED HEALTH CARE EDUCATION/TRAINING PROGRAM

## 2024-02-01 PROCEDURE — 3074F SYST BP LT 130 MM HG: CPT | Performed by: STUDENT IN AN ORGANIZED HEALTH CARE EDUCATION/TRAINING PROGRAM

## 2024-02-01 PROCEDURE — 3078F DIAST BP <80 MM HG: CPT | Performed by: STUDENT IN AN ORGANIZED HEALTH CARE EDUCATION/TRAINING PROGRAM

## 2024-02-01 PROCEDURE — 87086 URINE CULTURE/COLONY COUNT: CPT | Performed by: STUDENT IN AN ORGANIZED HEALTH CARE EDUCATION/TRAINING PROGRAM

## 2024-02-01 RX ORDER — NITROFURANTOIN 25; 75 MG/1; MG/1
100 CAPSULE ORAL 2 TIMES DAILY
Qty: 14 CAPSULE | Refills: 0 | Status: SHIPPED | OUTPATIENT
Start: 2024-02-01 | End: 2024-02-08

## 2024-02-01 NOTE — PROGRESS NOTES
"Chief Complaint  Difficulty Urinating (Painful when urinating ), Back Pain (Lower back pain), and Constipation    Subjective          Nikhil Baldwin presents to [facility]  History of Present Illness    Here with son.  Mr. Nikhil Baldwin is primary historian.    Here with one day of painful urination.  No hematuria.  Having lower back pain.  No fever.  Has a history of kidney stones.      Current Outpatient Medications   Medication Instructions    carvedilol (COREG) 3.125 mg, Oral, Every 12 Hours Scheduled    Cholecalciferol 5,000 Units, Oral, Daily    citalopram (CELEXA) 10 mg, Oral, Daily    empagliflozin (Jardiance) 25 MG tablet tablet Take 1 tablet by mouth once daily    ipratropium-albuterol (DUO-NEB) 0.5-2.5 mg/3 ml nebulizer 3 mL, Nebulization, Every 4 Hours PRN    lisinopril (PRINIVIL,ZESTRIL) 20 mg, Oral, Daily    magnesium oxide (MAG-OX) 400 mg, Oral, Daily    melatonin 5 mg, Oral, Nightly    metFORMIN (GLUCOPHAGE) 500 mg, Oral, 2 Times Daily With Meals    methylPREDNISolone (MEDROL) 4 MG dose pack Take as directed on package instructions.    nitrofurantoin (macrocrystal-monohydrate) (MACROBID) 100 mg, Oral, 2 Times Daily    rosuvastatin (CRESTOR) 10 mg, Oral, Daily    SITagliptin (JANUVIA) 100 mg, Oral, Daily    tamsulosin (FLOMAX) 0.4 mg, Oral, Daily       The following portions of the patient's history were reviewed and updated as appropriate: allergies, current medications, past family history, past medical history, past social history, past surgical history, and problem list.    Objective   Vital Signs:   /64 (BP Location: Left arm, Patient Position: Sitting, Cuff Size: Large Adult)   Pulse 72   Temp 97.9 °F (36.6 °C) (Temporal)   Ht 175.3 cm (69\")   Wt 79.8 kg (176 lb)   SpO2 95%   BMI 25.99 kg/m²     BP Readings from Last 3 Encounters:   02/01/24 105/64   12/22/23 118/78   12/12/23 160/88     Wt Readings from Last 3 Encounters:   02/01/24 79.8 " kg (176 lb)   12/22/23 78.3 kg (172 lb 9.6 oz)   12/07/23 75.4 kg (166 lb 3.6 oz)           Physical Exam  Vitals reviewed.   Constitutional:       General: He is not in acute distress.     Appearance: Normal appearance. He is not ill-appearing, toxic-appearing or diaphoretic.   HENT:      Head: Normocephalic and atraumatic.      Right Ear: External ear normal.      Left Ear: External ear normal.   Eyes:      Conjunctiva/sclera: Conjunctivae normal.   Cardiovascular:      Rate and Rhythm: Normal rate and regular rhythm.      Pulses: Normal pulses.      Heart sounds: Normal heart sounds. No murmur heard.     No friction rub. No gallop.   Pulmonary:      Effort: Pulmonary effort is normal. No respiratory distress.      Breath sounds: Normal breath sounds. No stridor. No wheezing, rhonchi or rales.   Chest:      Chest wall: No tenderness.   Abdominal:      General: Abdomen is flat.      Palpations: Abdomen is soft. There is no mass.      Tenderness: There is no abdominal tenderness.   Musculoskeletal:      Right lower leg: No edema.      Left lower leg: No edema.      Comments: Left sided CVA tenderness noted.  No CVA tenderness, right side   Skin:     General: Skin is warm and dry.   Neurological:      General: No focal deficit present.      Mental Status: He is alert. Mental status is at baseline.   Psychiatric:         Mood and Affect: Mood normal.         Behavior: Behavior normal.         Thought Content: Thought content normal.         Judgment: Judgment normal.        Result Review :   The following data was reviewed by: Roc Glass MD on 02/01/2024:  Common labs          12/7/2023    13:18 12/8/2023    12:17 12/9/2023    04:50   Common Labs   Glucose 135  131     BUN 13  14     Creatinine 1.16  1.03     Sodium 140  139     Potassium 4.0  3.9     Chloride 103  105     Calcium 10.1  9.3     Albumin 4.4      Total Bilirubin 1.2      Alkaline Phosphatase 69      AST (SGOT) 14      ALT (SGPT) 14      WBC 7.75   7.57  6.38    Hemoglobin 15.6  14.5  13.4    Hematocrit 46.9  42.9  39.6    Platelets 162  138  142    Hemoglobin A1C 6.20               Lab Results   Component Value Date    SARSANTIGEN Not Detected 02/13/2023    COVID19 Not Detected 12/07/2023    FLU Negative 12/01/2022    FLU Negative 12/01/2022    RAPSCRN Negative 02/13/2023    STREPAAG Negative 12/08/2023    RSV Not Detected 12/07/2023    INR 1.0 11/12/2021    BILIRUBINUR Negative 02/01/2024       Procedures        Assessment and Plan    Diagnoses and all orders for this visit:    1. Dysuria (Primary)  -     POCT urinalysis dipstick, automated  -     Urine Culture - Urine, Urine, Clean Catch; Future  -     Urine Culture - Urine, Urine, Clean Catch  -     CT Abdomen Pelvis Stone Protocol; Future    2. Acute cystitis with hematuria  -     nitrofurantoin, macrocrystal-monohydrate, (Macrobid) 100 MG capsule; Take 1 capsule by mouth 2 (Two) Times a Day for 7 days.  Dispense: 14 capsule; Refill: 0    3. History of nephrolithiasis  -     CT Abdomen Pelvis Stone Protocol; Future    4. Back pain, unspecified back location, unspecified back pain laterality, unspecified chronicity  -     CT Abdomen Pelvis Stone Protocol; Future      -UA with blood, but negative LE & negative nitrites  -will empirically treat w/ antibiotic.  Culture sent  -will also obtain CT abdomen to evaluate for nephrolithiasis  -already taking flomax    There are no discontinued medications.       Follow Up   Return if symptoms worsen or fail to improve.  Patient was given instructions and counseling regarding his condition or for health maintenance advice. Please see specific information pulled into the AVS if appropriate.       Roc Glass MD  02/01/24  20:48 EST

## 2024-02-01 NOTE — TELEPHONE ENCOUNTER
Caller: JHONATAN SWAN    Relationship: Emergency Contact    Best call back number: [phone]    Caller requesting test results: SON     What test was performed: URINE TEST     When was the test performed: 02/01/2024    Where was the test performed: IN OFFICE

## 2024-02-02 ENCOUNTER — HOSPITAL ENCOUNTER (OUTPATIENT)
Dept: CT IMAGING | Facility: HOSPITAL | Age: 79
Discharge: HOME | End: 2024-02-02
Payer: MEDICARE

## 2024-02-02 DIAGNOSIS — Z87.442 HISTORY OF NEPHROLITHIASIS: ICD-10-CM

## 2024-02-02 DIAGNOSIS — M54.9 BACK PAIN, UNSPECIFIED BACK LOCATION, UNSPECIFIED BACK PAIN LATERALITY, UNSPECIFIED CHRONICITY: ICD-10-CM

## 2024-02-02 DIAGNOSIS — R30.0 DYSURIA: ICD-10-CM

## 2024-02-02 PROCEDURE — 74176 CT ABD & PELVIS W/O CONTRAST: CPT

## 2024-02-03 LAB — BACTERIA SPEC AEROBE CULT: NO GROWTH

## 2024-03-20 NOTE — PROGRESS NOTES
The ABCs of the Annual Wellness Visit  Subsequent Medicare Wellness Visit    Subjective    Nikhil Baldwin is a 78 y.o. male who presents for a Subsequent Medicare Wellness Visit.    The following portions of the patient's history were reviewed and   updated as appropriate: allergies, current medications, past family history, past medical history, past social history, past surgical history, and problem list.    Compared to one year ago, the patient feels his physical   health is worse.    Compared to one year ago, the patient feels his mental   health is the same.    Recent Hospitalizations:  This patient has had a [facility] admission record on file within the last 365 days.    Current Medical Providers:  Patient Care Team:  Felix Navarro Jr., MD as PCP - General (Internal Medicine)  Marcia Palomares as Medical Assistant    Outpatient Medications Prior to Visit   Medication Sig Dispense Refill    carvedilol (COREG) 3.125 MG tablet Take 1 tablet by mouth Every 12 (Twelve) Hours. 180 tablet 1    Cholecalciferol 125 MCG (5000 UT) tablet Take 1 tablet by mouth Daily.      citalopram (CeleXA) 20 MG tablet Take 0.5 tablets by mouth Daily. 90 tablet 1    empagliflozin (Jardiance) 25 MG tablet tablet Take 1 tablet by mouth once daily (Patient taking differently: Take 1 tablet by mouth Daily.) 90 tablet 3    lisinopril (PRINIVIL,ZESTRIL) 20 MG tablet Take 1 tablet by mouth Daily for 30 days. 30 tablet 0    magnesium oxide (MAG-OX) 400 MG tablet Take 1 tablet by mouth Daily. 90 tablet 1    melatonin 5 MG tablet tablet Take 1 tablet by mouth Every Night.      metFORMIN (GLUCOPHAGE) 500 MG tablet Take 1 tablet by mouth 2 (Two) Times a Day With Meals. 180 tablet 0    rosuvastatin (CRESTOR) 20 MG tablet Take 0.5 tablets by mouth Daily. 30 tablet 0    SITagliptin (Januvia) 100 MG tablet Take 1 tablet by mouth Daily. 90 tablet 3    tamsulosin (FLOMAX) 0.4 MG capsule 24 hr capsule Take 1 capsule by mouth Daily. 90  capsule 3    ipratropium-albuterol (DUO-NEB) 0.5-2.5 mg/3 ml nebulizer Take 3 mL by nebulization Every 4 (Four) Hours As Needed for Shortness of Air or Wheezing. (Patient not taking: Reported on 3/21/2024)      methylPREDNISolone (MEDROL) 4 MG dose pack Take as directed on package instructions. (Patient not taking: Reported on 3/21/2024) 21 tablet 0     No facility-administered medications prior to visit.       No opioid medication identified on active medication list. I have reviewed chart for other potential  high risk medication/s and harmful drug interactions in the elderly.        Aspirin is not on active medication list.  Aspirin use is indicated based on review of current medical condition/s. Pros and cons of this therapy have been discussed with this patient. Benefits of this medication outweigh potential harm.  Patient has been instructed to start taking this medication..    Patient Active Problem List   Diagnosis    Aortic aneurysm    Arthritis    BPH (benign prostatic hyperplasia)    Cervical radiculopathy    Type 2 diabetes mellitus with hyperglycemia, without long-term current use of insulin    Parathyroid abnormality    Thyroid disorder    Facet arthropathy    Fatty metamorphosis of liver    GERD (gastroesophageal reflux disease)    Primary hypertension    Hematuria    Hiatal hernia    Urinary incontinence    Hypercalcemia    Mixed hyperlipidemia    Kidney disease    Renal calculi    Low back pain    Thoracic back pain    DDD (degenerative disc disease), lumbar    Spinal stenosis of lumbar region    Degeneration of thoracic or thoracolumbar intervertebral disc    MI (myocardial infarction)    Myofascial pain    Neck pain    Nocturia    Sciatica    Stroke    Vitamin D deficiency    Labile personality    Debility    COVID-19    Generalized weakness    Dizziness     Advance Care Planning   Advance Care Planning     Advance Directive is not on file.  ACP discussion was held with the patient during this  "visit. Patient does not have an advance directive, information provided.     Objective    Vitals:    03/21/24 1122   BP: 129/75   BP Location: Right arm   Pulse: 71   Temp: 98.6 °F (37 °C)   TempSrc: Temporal   SpO2: 94%   Weight: 81.5 kg (179 lb 9.6 oz)   Height: 175.3 cm (69\")     Estimated body mass index is 26.52 kg/m² as calculated from the following:    Height as of this encounter: 175.3 cm (69\").    Weight as of this encounter: 81.5 kg (179 lb 9.6 oz).    Does the patient have evidence of cognitive impairment? Yes        HEALTH RISK ASSESSMENT    Smoking Status:  Social History     Tobacco Use   Smoking Status Never    Passive exposure: Never   Smokeless Tobacco Never     Alcohol Consumption:  Social History     Substance and Sexual Activity   Alcohol Use Never     Fall Risk Screen:    MORENO Fall Risk Assessment was completed, and patient is at MODERATE risk for falls. Assessment completed on:3/21/2024    Depression Screening:      3/21/2024    11:30 AM   PHQ-2/PHQ-9 Depression Screening   Little Interest or Pleasure in Doing Things 0-->not at all   Feeling Down, Depressed or Hopeless 0-->not at all   PHQ-9: Brief Depression Severity Measure Score 0       Health Habits and Functional and Cognitive Screening:      3/21/2024    11:30 AM   Functional & Cognitive Status   Do you have difficulty preparing food and eating? No   Do you have difficulty bathing yourself, getting dressed or grooming yourself? Yes   Do you have difficulty using the toilet? No   Do you have difficulty moving around from place to place? Yes   Do you have trouble with steps or getting out of a bed or a chair? Yes   Current Diet Well Balanced Diet   Dental Exam Not up to date   Eye Exam Up to date   Exercise (times per week) 0 times per week   Current Exercises Include No Regular Exercise   Do you need help using the phone?  No   Are you deaf or do you have serious difficulty hearing?  No   Do you need help to go to places out of walking " distance? Yes   Do you need help shopping? Yes   Do you need help preparing meals?  No   Do you need help with housework?  Yes   Do you need help with laundry? Yes   Do you need help taking your medications? Yes   Do you need help managing money? No   Do you ever drive or ride in a car without wearing a seat belt? No   Have you felt unusual stress, anger or loneliness in the last month? No   Who do you live with? Child   If you need help, do you have trouble finding someone available to you? No   Have you been bothered in the last four weeks by sexual problems? No   Do you have difficulty concentrating, remembering or making decisions? No       Age-appropriate Screening Schedule:  Refer to the list below for future screening recommendations based on patient's age, sex and/or medical conditions. Orders for these recommended tests are listed in the plan section. The patient has been provided with a written plan.    Health Maintenance   Topic Date Due    ZOSTER VACCINE (1 of 2) Never done    RSV Vaccine - Adults (1 - 1-dose 60+ series) Never done    DIABETIC EYE EXAM  09/01/2023    HEMOGLOBIN A1C  06/07/2024    LIPID PANEL  11/21/2024    URINE MICROALBUMIN  11/21/2024    ANNUAL WELLNESS VISIT  03/21/2025    BMI FOLLOWUP  03/21/2025    TDAP/TD VACCINES (2 - Td or Tdap) 12/07/2031    COVID-19 Vaccine  Completed    INFLUENZA VACCINE  Completed    Pneumococcal Vaccine 65+  Completed            CMS Preventative Services Quick Reference  Risk Factors Identified During Encounter  Immunizations Discussed/Encouraged: RSV (Respiratory Syncytial Virus)  The above risks/problems have been discussed with the patient.  Pertinent information has been shared with the patient in the After Visit Summary.  An After Visit Summary and PPPS were made available to the patient.    Follow Up:   Next Medicare Wellness visit to be scheduled in 1 year.       Additional E&M Note during same encounter follows:  Patient has multiple medical problems  "which are significant and separately identifiable that require additional work above and beyond the Medicare Wellness Visit.      Chief Complaint  Medicare Wellness-subsequent, Diabetes (Type 2 ), and Arthritis (Left side)    Subjective          Nikhil Baldwin presents to [facility]  History of Present Illness  Patient's son here to add to history.   Hypertension- patient denies headaches, dizziness, chest pain.   DM2- doing well with medications. Patient is eating well. Patient has been gaining.   Hyperlipidemia- doing well on statin. He reports his aspirin Prescription ran out.   Osteoarthritis- patient reports ongoing shoulder and neck pain. He reports related to falling on that shoulder when he has his cerebrovascular accident.     Current Outpatient Medications   Medication Instructions    aspirin 81 mg, Oral, Daily    carvedilol (COREG) 3.125 mg, Oral, Every 12 Hours Scheduled    Cholecalciferol 5,000 Units, Oral, Daily    citalopram (CELEXA) 10 mg, Oral, Daily    empagliflozin (Jardiance) 25 MG tablet tablet Take 1 tablet by mouth once daily    lisinopril (PRINIVIL,ZESTRIL) 20 mg, Oral, Daily    magnesium oxide (MAG-OX) 400 mg, Oral, Daily    melatonin 5 mg, Oral, Nightly    meloxicam (MOBIC) 15 mg, Oral, Daily PRN    metFORMIN (GLUCOPHAGE) 500 mg, Oral, 2 Times Daily With Meals    rosuvastatin (CRESTOR) 10 mg, Oral, Daily    SITagliptin (JANUVIA) 100 mg, Oral, Daily    tamsulosin (FLOMAX) 0.4 mg, Oral, Daily       The following portions of the patient's history were reviewed and updated as appropriate: allergies, current medications, past family history, past medical history, past social history, past surgical history, and problem list.    Objective   Vital Signs:   /75 (BP Location: Right arm)   Pulse 71   Temp 98.6 °F (37 °C) (Temporal)   Ht 175.3 cm (69\")   Wt 81.5 kg (179 lb 9.6 oz)   SpO2 94%   BMI 26.52 kg/m²     BP Readings from Last 3 " Encounters:   03/21/24 129/75   02/01/24 105/64   12/22/23 118/78     Wt Readings from Last 3 Encounters:   03/21/24 81.5 kg (179 lb 9.6 oz)   02/01/24 79.8 kg (176 lb)   12/22/23 78.3 kg (172 lb 9.6 oz)         Physical Exam   Appearance: No acute distress, well-nourished  Head: normocephalic, atraumatic  Eyes: extraocular movements intact, no scleral icterus, no conjunctival injection  Ears, Nose, and Throat: external ears normal, nares patent, moist mucous membranes  Cardiovascular: regular rate and rhythm. no murmurs, rubs, or gallops. no edema  Respiratory: breathing comfortably, symmetric chest rise, clear to auscultation bilaterally. No wheezes, rales, or rhonchi.  Neuro: alert and oriented to time, place, and person. Normal gait  Psych: normal mood and affect     Result Review :   The following data was reviewed by: Felix Navarro Jr, MD on 03/21/2024:  Common labs          12/7/2023    13:18 12/8/2023    12:17 12/9/2023    04:50   Common Labs   Glucose 135  131     BUN 13  14     Creatinine 1.16  1.03     Sodium 140  139     Potassium 4.0  3.9     Chloride 103  105     Calcium 10.1  9.3     Albumin 4.4      Total Bilirubin 1.2      Alkaline Phosphatase 69      AST (SGOT) 14      ALT (SGPT) 14      WBC 7.75  7.57  6.38    Hemoglobin 15.6  14.5  13.4    Hematocrit 46.9  42.9  39.6    Platelets 162  138  142    Hemoglobin A1C 6.20          Lab Results   Component Value Date    SARSANTIGEN Not Detected 02/13/2023    COVID19 Not Detected 12/07/2023    FLU Negative 12/01/2022    FLU Negative 12/01/2022    RAPSCRN Negative 02/13/2023    STREPAAG Negative 12/08/2023    RSV Not Detected 12/07/2023    INR 1.0 11/12/2021    BILIRUBINUR Negative 02/01/2024        Assessment and Plan    Diagnoses and all orders for this visit:    1. Annual physical exam (Primary)    2. Primary hypertension  Comments:  well controlled  Orders:  -     CBC & Differential  -     Comprehensive Metabolic Panel    3. Mixed  hyperlipidemia  Comments:  cont statin. restart ASA.  Orders:  -     Lipid Panel    4. Vitamin D deficiency  -     Vitamin D,25-Hydroxy    5. Type 2 diabetes mellitus with hyperglycemia, without long-term current use of insulin  Comments:  monitoring with labs. cont regimen  Orders:  -     Hemoglobin A1c    6. Thyroid disorder  -     TSH Rfx On Abnormal To Free T4    7. Arthritis  -     meloxicam (MOBIC) 15 MG tablet; Take 1 tablet by mouth Daily As Needed for Moderate Pain.  Dispense: 30 tablet; Refill: 0    8. Myocardial infarction, unspecified MI type, unspecified artery  -     aspirin 81 MG EC tablet; Take 1 tablet by mouth Daily.  Dispense: 90 tablet; Refill: 3    9. Need for RSV vaccination    10. Renal calculi  -     Ambulatory Referral to Urology        Medications Discontinued During This Encounter   Medication Reason    ipratropium-albuterol (DUO-NEB) 0.5-2.5 mg/3 ml nebulizer *Therapy completed    methylPREDNISolone (MEDROL) 4 MG dose pack *Therapy completed        Follow Up   Return in about 4 months (around 7/21/2024) for DM, HTN.  Patient was given instructions and counseling regarding his condition or for health maintenance advice. Please see specific information pulled into the AVS if appropriate.       Felix Navarro Jr, MD  03/21/24  12:21 EDT

## 2024-03-21 ENCOUNTER — OFFICE VISIT (OUTPATIENT)
Dept: INTERNAL MEDICINE | Facility: CLINIC | Age: 79
End: 2024-03-21
Payer: MEDICARE

## 2024-03-21 VITALS
TEMPERATURE: 98.6 F | BODY MASS INDEX: 26.6 KG/M2 | HEIGHT: 69 IN | SYSTOLIC BLOOD PRESSURE: 129 MMHG | WEIGHT: 179.6 LBS | DIASTOLIC BLOOD PRESSURE: 75 MMHG | HEART RATE: 71 BPM | OXYGEN SATURATION: 94 %

## 2024-03-21 DIAGNOSIS — E11.65 TYPE 2 DIABETES MELLITUS WITH HYPERGLYCEMIA, WITHOUT LONG-TERM CURRENT USE OF INSULIN: ICD-10-CM

## 2024-03-21 DIAGNOSIS — N20.0 RENAL CALCULI: ICD-10-CM

## 2024-03-21 DIAGNOSIS — Z00.00 ANNUAL PHYSICAL EXAM: Primary | ICD-10-CM

## 2024-03-21 DIAGNOSIS — E78.2 MIXED HYPERLIPIDEMIA: ICD-10-CM

## 2024-03-21 DIAGNOSIS — Z29.11 NEED FOR RSV VACCINATION: ICD-10-CM

## 2024-03-21 DIAGNOSIS — M19.90 ARTHRITIS: ICD-10-CM

## 2024-03-21 DIAGNOSIS — E55.9 VITAMIN D DEFICIENCY: ICD-10-CM

## 2024-03-21 DIAGNOSIS — E07.9 THYROID DISORDER: ICD-10-CM

## 2024-03-21 DIAGNOSIS — I10 PRIMARY HYPERTENSION: ICD-10-CM

## 2024-03-21 DIAGNOSIS — I21.9 MYOCARDIAL INFARCTION, UNSPECIFIED MI TYPE, UNSPECIFIED ARTERY: ICD-10-CM

## 2024-03-21 PROBLEM — J20.9 ACUTE BRONCHITIS: Status: RESOLVED | Noted: 2023-12-07 | Resolved: 2024-03-21

## 2024-03-21 PROBLEM — U07.1 COVID: Status: RESOLVED | Noted: 2023-10-21 | Resolved: 2024-03-21

## 2024-03-21 PROBLEM — B34.9 ACUTE VIRAL SYNDROME: Status: RESOLVED | Noted: 2022-12-02 | Resolved: 2024-03-21

## 2024-03-21 PROBLEM — U07.1 UPPER RESPIRATORY TRACT INFECTION DUE TO COVID-19 VIRUS: Status: RESOLVED | Noted: 2023-10-19 | Resolved: 2024-03-21

## 2024-03-21 PROBLEM — J06.9 UPPER RESPIRATORY TRACT INFECTION DUE TO COVID-19 VIRUS: Status: RESOLVED | Noted: 2023-10-19 | Resolved: 2024-03-21

## 2024-03-21 PROBLEM — J40 BRONCHITIS: Status: RESOLVED | Noted: 2023-12-11 | Resolved: 2024-03-21

## 2024-03-21 LAB
25(OH)D3 SERPL-MCNC: 47 NG/ML (ref 30–100)
ALBUMIN SERPL-MCNC: 4.1 G/DL (ref 3.5–5.2)
ALBUMIN/GLOB SERPL: 2 G/DL
ALP SERPL-CCNC: 57 U/L (ref 39–117)
ALT SERPL W P-5'-P-CCNC: 23 U/L (ref 1–41)
ANION GAP SERPL CALCULATED.3IONS-SCNC: 12.4 MMOL/L (ref 5–15)
AST SERPL-CCNC: 24 U/L (ref 1–40)
BASOPHILS # BLD AUTO: 0.05 10*3/MM3 (ref 0–0.2)
BASOPHILS NFR BLD AUTO: 0.7 % (ref 0–1.5)
BILIRUB SERPL-MCNC: 0.8 MG/DL (ref 0–1.2)
BUN SERPL-MCNC: 12 MG/DL (ref 8–23)
BUN/CREAT SERPL: 12.4 (ref 7–25)
CALCIUM SPEC-SCNC: 9.7 MG/DL (ref 8.6–10.5)
CHLORIDE SERPL-SCNC: 106 MMOL/L (ref 98–107)
CHOLEST SERPL-MCNC: 117 MG/DL (ref 0–200)
CO2 SERPL-SCNC: 22.6 MMOL/L (ref 22–29)
CREAT SERPL-MCNC: 0.97 MG/DL (ref 0.76–1.27)
DEPRECATED RDW RBC AUTO: 46.9 FL (ref 37–54)
EGFRCR SERPLBLD CKD-EPI 2021: 78.9 ML/MIN/1.73
EOSINOPHIL # BLD AUTO: 0.16 10*3/MM3 (ref 0–0.4)
EOSINOPHIL NFR BLD AUTO: 2.4 % (ref 0.3–6.2)
ERYTHROCYTE [DISTWIDTH] IN BLOOD BY AUTOMATED COUNT: 14.2 % (ref 12.3–15.4)
GLOBULIN UR ELPH-MCNC: 2.1 GM/DL
GLUCOSE SERPL-MCNC: 190 MG/DL (ref 65–99)
HBA1C MFR BLD: 6 % (ref 4.8–5.6)
HCT VFR BLD AUTO: 45.9 % (ref 37.5–51)
HDLC SERPL-MCNC: 42 MG/DL (ref 40–60)
HGB BLD-MCNC: 15.5 G/DL (ref 13–17.7)
IMM GRANULOCYTES # BLD AUTO: 0.04 10*3/MM3 (ref 0–0.05)
IMM GRANULOCYTES NFR BLD AUTO: 0.6 % (ref 0–0.5)
LDLC SERPL CALC-MCNC: 46 MG/DL (ref 0–100)
LDLC/HDLC SERPL: 0.95 {RATIO}
LYMPHOCYTES # BLD AUTO: 2.09 10*3/MM3 (ref 0.7–3.1)
LYMPHOCYTES NFR BLD AUTO: 31.1 % (ref 19.6–45.3)
MCH RBC QN AUTO: 30.3 PG (ref 26.6–33)
MCHC RBC AUTO-ENTMCNC: 33.8 G/DL (ref 31.5–35.7)
MCV RBC AUTO: 89.8 FL (ref 79–97)
MONOCYTES # BLD AUTO: 0.66 10*3/MM3 (ref 0.1–0.9)
MONOCYTES NFR BLD AUTO: 9.8 % (ref 5–12)
NEUTROPHILS NFR BLD AUTO: 3.73 10*3/MM3 (ref 1.7–7)
NEUTROPHILS NFR BLD AUTO: 55.4 % (ref 42.7–76)
NRBC BLD AUTO-RTO: 0 /100 WBC (ref 0–0.2)
PLATELET # BLD AUTO: 164 10*3/MM3 (ref 140–450)
PMV BLD AUTO: 11.7 FL (ref 6–12)
POTASSIUM SERPL-SCNC: 4.4 MMOL/L (ref 3.5–5.2)
PROT SERPL-MCNC: 6.2 G/DL (ref 6–8.5)
RBC # BLD AUTO: 5.11 10*6/MM3 (ref 4.14–5.8)
SODIUM SERPL-SCNC: 141 MMOL/L (ref 136–145)
TRIGL SERPL-MCNC: 175 MG/DL (ref 0–150)
TSH SERPL DL<=0.05 MIU/L-ACNC: 1.77 UIU/ML (ref 0.27–4.2)
VLDLC SERPL-MCNC: 29 MG/DL (ref 5–40)
WBC NRBC COR # BLD AUTO: 6.73 10*3/MM3 (ref 3.4–10.8)

## 2024-03-21 PROCEDURE — 84443 ASSAY THYROID STIM HORMONE: CPT | Performed by: INTERNAL MEDICINE

## 2024-03-21 PROCEDURE — 82306 VITAMIN D 25 HYDROXY: CPT | Performed by: INTERNAL MEDICINE

## 2024-03-21 PROCEDURE — 83036 HEMOGLOBIN GLYCOSYLATED A1C: CPT | Performed by: INTERNAL MEDICINE

## 2024-03-21 PROCEDURE — 80053 COMPREHEN METABOLIC PANEL: CPT | Performed by: INTERNAL MEDICINE

## 2024-03-21 PROCEDURE — 85025 COMPLETE CBC W/AUTO DIFF WBC: CPT | Performed by: INTERNAL MEDICINE

## 2024-03-21 PROCEDURE — 80061 LIPID PANEL: CPT | Performed by: INTERNAL MEDICINE

## 2024-03-21 RX ORDER — MELOXICAM 15 MG/1
15 TABLET ORAL DAILY PRN
Qty: 30 TABLET | Refills: 0 | Status: SHIPPED | OUTPATIENT
Start: 2024-03-21

## 2024-03-21 RX ORDER — ASPIRIN 81 MG/1
81 TABLET ORAL DAILY
Qty: 90 TABLET | Refills: 3 | Status: SHIPPED | OUTPATIENT
Start: 2024-03-21

## 2024-04-20 NOTE — PROGRESS NOTES
Chief Complaint: Urologic complaint    Subjective         History of Present Illness  Nikhil Baldwin is a 78 y.o. male         Nephrolithiasis, history of PCNL  BPH    History of thoracic aortic aneurysm, hypertension, BPH, chronic constipation, GERD      Patient does have intermittent dysuria that is bothersome.    On Flomax 0.4 mg daily.  No straining.  Some intermittency of stream.  Nocturia x 3.  Bothered by urgency/frequency, no pads.  Been going on for many years.  No UTIs for a while    No GH      Has had some chronic left flank pain.    PVR    4/24   000, UA negative       3/24   0.9, GFR 78    2/2/2024 CT abdomen/pelvis without - diverticulosis, prostatomegaly, fat-containing bilateral inguinal hernias.  No stones on the left.  1.1 cm stone on the right.  Nonobstructing midpole, no other stones.  Images reviewed.    History of thyroid surgery with your surgeries    Was followed by [facility] at [facility] point, had some sort of prostate procedure    3/20   PCNL      History of CVA x 2, most recent 1/23   Non-smoker  ASA 79        Objective     Past Medical History:   Diagnosis Date    Aorta disorder     Arthritis     Degenerative joint disease     Dementia     FRONTAL LOBE    Depression     Diabetes mellitus     Hyperlipidemia     Hypertension     Kidney stone     Stroke     MILD RIGHT SIDE DEFICITS       Past Surgical History:   Procedure Laterality Date    CHOLECYSTECTOMY      CYSTOSCOPY BLADDER STONE LITHOTRIPSY      KIDNEY STONE SURGERY           Current Outpatient Medications:     aspirin 81 MG EC tablet, Take 1 tablet by mouth Daily., Disp: 90 tablet, Rfl: 3    carvedilol (COREG) 3.125 MG tablet, Take 1 tablet by mouth Every 12 (Twelve) Hours., Disp: 180 tablet, Rfl: 1    Cholecalciferol 125 MCG (5000 UT) tablet, Take 1 tablet by mouth Daily., Disp: , Rfl:     citalopram (CeleXA) 20 MG tablet, Take 0.5 tablets by mouth Daily., Disp: 90 tablet, Rfl: 1    empagliflozin (Jardiance) 25 MG tablet tablet, Take 1  tablet by mouth once daily (Patient taking differently: Take 1 tablet by mouth Daily.), Disp: 90 tablet, Rfl: 3    lisinopril (PRINIVIL,ZESTRIL) 20 MG tablet, Take 1 tablet by mouth Daily for 30 days., Disp: 30 tablet, Rfl: 0    magnesium oxide (MAG-OX) 400 MG tablet, Take 1 tablet by mouth Daily., Disp: 90 tablet, Rfl: 1    melatonin 5 MG tablet tablet, Take 1 tablet by mouth Every Night., Disp: , Rfl:     meloxicam (MOBIC) 15 MG tablet, Take 1 tablet by mouth Daily As Needed for Moderate Pain., Disp: 30 tablet, Rfl: 0    metFORMIN (GLUCOPHAGE) 500 MG tablet, Take 1 tablet by mouth 2 (Two) Times a Day With Meals., Disp: 180 tablet, Rfl: 0    rosuvastatin (CRESTOR) 20 MG tablet, Take 0.5 tablets by mouth Daily., Disp: 30 tablet, Rfl: 0    SITagliptin (Januvia) 100 MG tablet, Take 1 tablet by mouth Daily., Disp: 90 tablet, Rfl: 3    tamsulosin (FLOMAX) 0.4 MG capsule 24 hr capsule, Take 1 capsule by mouth Daily., Disp: 90 capsule, Rfl: 3    Allergies   Allergen Reactions    Codeine Unknown - Low Severity    Promethazine Unknown - Low Severity    Celecoxib Rash    Ibuprofen Rash    Penicillins Rash    Promethazine Hcl Rash    Warfarin Rash        No family history on file.    Social History     Socioeconomic History    Marital status:    Tobacco Use    Smoking status: Never     Passive exposure: Never    Smokeless tobacco: Never   Vaping Use    Vaping status: Never Used   Substance and Sexual Activity    Alcohol use: Never    Drug use: Never    Sexual activity: Defer       Vital Signs:   There were no vitals taken for this visit.     Physical exam    Alert and orient x3  Well appearing, well developed, in no acute distress   Unlabored respirations  Nontender/nondistended      Grossly oriented to person, place and time, judgment is intact, normal mood and affect       Bladder Scan interpretation 04/22/2024    Estimation of residual urine via [employer] 3000 Cvergenx Bladder Scan  MA/nurse performing: Alaina  MA  Residual Urine: 0 ml  Indication: Nephrolithiasis    Benign prostatic hyperplasia with lower urinary tract symptoms, symptom details unspecified   Position: Supine  Examination: Incremental scanning of the suprapubic area using 2.0 MHz transducer using copious amounts of acoustic gel.   Findings: An anechoic area was demonstrated which represented the bladder, with measurement of residual urine as noted. I inspected this myself. In that the residual urine was stable or insignificant, refer to plan for treatment and plan necessary at this time.              Assessment and Plan    Diagnoses and all orders for this visit:    1. Nephrolithiasis (Primary)      CT images reviewed with the patient.    Records reviewed today and summarized in chart          Nephrolithiasis    Patient with a 1 cm stone in his right kidney.  Nonobstructing.  After discussion of ureteroscopy versus monitoring at this time we will monitor him conservatively.  He will need KUB every 1 to 2 years as he has had a history of PCNL           BPH      Continue tamsulosin 0.4 mg daily.  He is bothered by some intermittency and urgency.  After discussion we will go and start finasteride 5 mg daily.    Follow-up in 1 year with NP

## 2024-04-22 ENCOUNTER — OFFICE VISIT (OUTPATIENT)
Dept: UROLOGY | Facility: CLINIC | Age: 79
End: 2024-04-22
Payer: MEDICARE

## 2024-04-22 VITALS — RESPIRATION RATE: 16 BRPM | WEIGHT: 179 LBS | BODY MASS INDEX: 26.51 KG/M2 | HEIGHT: 69 IN

## 2024-04-22 DIAGNOSIS — N20.0 NEPHROLITHIASIS: Primary | ICD-10-CM

## 2024-04-22 DIAGNOSIS — N40.1 BENIGN PROSTATIC HYPERPLASIA WITH LOWER URINARY TRACT SYMPTOMS, SYMPTOM DETAILS UNSPECIFIED: ICD-10-CM

## 2024-04-22 LAB
BILIRUB BLD-MCNC: NEGATIVE MG/DL
CLARITY, POC: CLEAR
COLOR UR: YELLOW
EXPIRATION DATE: ABNORMAL
GLUCOSE UR STRIP-MCNC: ABNORMAL MG/DL
KETONES UR QL: NEGATIVE
LEUKOCYTE EST, POC: NEGATIVE
Lab: ABNORMAL
NITRITE UR-MCNC: NEGATIVE MG/ML
PH UR: 7 [PH] (ref 5–8)
PROT UR STRIP-MCNC: NEGATIVE MG/DL
RBC # UR STRIP: NEGATIVE /UL
SP GR UR: 1.02 (ref 1–1.03)
SPECIMEN VOL 24H UR: 0 L
UROBILINOGEN UR QL: ABNORMAL

## 2024-04-22 PROCEDURE — 99204 OFFICE O/P NEW MOD 45 MIN: CPT | Performed by: UROLOGY

## 2024-04-22 PROCEDURE — 81003 URINALYSIS AUTO W/O SCOPE: CPT | Performed by: UROLOGY

## 2024-04-22 PROCEDURE — 1159F MED LIST DOCD IN RCRD: CPT | Performed by: UROLOGY

## 2024-04-22 PROCEDURE — 1160F RVW MEDS BY RX/DR IN RCRD: CPT | Performed by: UROLOGY

## 2024-04-22 PROCEDURE — 51798 US URINE CAPACITY MEASURE: CPT | Performed by: UROLOGY

## 2024-04-22 RX ORDER — FINASTERIDE 5 MG/1
5 TABLET, FILM COATED ORAL DAILY
Qty: 90 TABLET | Refills: 4 | Status: SHIPPED | OUTPATIENT
Start: 2024-04-22

## 2024-04-24 ENCOUNTER — PATIENT ROUNDING (BHMG ONLY) (OUTPATIENT)
Dept: UROLOGY | Facility: CLINIC | Age: 79
End: 2024-04-24
Payer: MEDICARE

## 2024-04-24 NOTE — PROGRESS NOTES
Johnnie, my name is  Dia. I am with  [facility], [address], [place] [address]. In an effort to hear the opinions of our patients, I would like to ask you a few questions about your visit with our office.     Can you tell me about your visit with us? What things went well?    We're always looking for ways to make our patients' experiences even better. Do you have any recommendations on ways we may improve?    Overall, were you satisfied with your first visit to our practice?    Is there a particular staff member/s who you would like to recognize for doing a great job?      I appreciate you taking the time to answer these questions. The providers and staff here in our office want you to get the healthcare you need and deserve and we look forward to your comments.     Thank you for your input and have a wonderful day!

## 2024-05-14 DIAGNOSIS — E11.65 TYPE 2 DIABETES MELLITUS WITH HYPERGLYCEMIA, WITHOUT LONG-TERM CURRENT USE OF INSULIN: ICD-10-CM

## 2024-05-24 ENCOUNTER — OFFICE VISIT (OUTPATIENT)
Dept: INTERNAL MEDICINE | Facility: CLINIC | Age: 79
End: 2024-05-24
Payer: MEDICARE

## 2024-05-24 VITALS
SYSTOLIC BLOOD PRESSURE: 131 MMHG | HEART RATE: 76 BPM | HEIGHT: 69 IN | DIASTOLIC BLOOD PRESSURE: 81 MMHG | BODY MASS INDEX: 26.22 KG/M2 | WEIGHT: 177 LBS | OXYGEN SATURATION: 97 % | TEMPERATURE: 97.5 F

## 2024-05-24 DIAGNOSIS — E11.65 TYPE 2 DIABETES MELLITUS WITH HYPERGLYCEMIA, WITHOUT LONG-TERM CURRENT USE OF INSULIN: ICD-10-CM

## 2024-05-24 DIAGNOSIS — R42 DIZZINESS: Primary | ICD-10-CM

## 2024-05-24 DIAGNOSIS — I10 PRIMARY HYPERTENSION: ICD-10-CM

## 2024-05-24 LAB
ALBUMIN SERPL-MCNC: 4.4 G/DL (ref 3.5–5.2)
ALBUMIN/GLOB SERPL: 1.8 G/DL
ALP SERPL-CCNC: 64 U/L (ref 39–117)
ALT SERPL W P-5'-P-CCNC: 19 U/L (ref 1–41)
ANION GAP SERPL CALCULATED.3IONS-SCNC: 12.7 MMOL/L (ref 5–15)
AST SERPL-CCNC: 23 U/L (ref 1–40)
BASOPHILS # BLD AUTO: 0.04 10*3/MM3 (ref 0–0.2)
BASOPHILS NFR BLD AUTO: 0.5 % (ref 0–1.5)
BILIRUB BLD-MCNC: NEGATIVE MG/DL
BILIRUB SERPL-MCNC: 0.7 MG/DL (ref 0–1.2)
BUN SERPL-MCNC: 18 MG/DL (ref 8–23)
BUN/CREAT SERPL: 15.9 (ref 7–25)
CALCIUM SPEC-SCNC: 10.1 MG/DL (ref 8.6–10.5)
CHLORIDE SERPL-SCNC: 105 MMOL/L (ref 98–107)
CLARITY, POC: ABNORMAL
CO2 SERPL-SCNC: 24.3 MMOL/L (ref 22–29)
COLOR UR: ABNORMAL
CREAT SERPL-MCNC: 1.13 MG/DL (ref 0.76–1.27)
DEPRECATED RDW RBC AUTO: 43.5 FL (ref 37–54)
EGFRCR SERPLBLD CKD-EPI 2021: 65.7 ML/MIN/1.73
EOSINOPHIL # BLD AUTO: 0.19 10*3/MM3 (ref 0–0.4)
EOSINOPHIL NFR BLD AUTO: 2.3 % (ref 0.3–6.2)
ERYTHROCYTE [DISTWIDTH] IN BLOOD BY AUTOMATED COUNT: 13.8 % (ref 12.3–15.4)
EXPIRATION DATE: 0
GLOBULIN UR ELPH-MCNC: 2.4 GM/DL
GLUCOSE SERPL-MCNC: 143 MG/DL (ref 65–99)
GLUCOSE UR STRIP-MCNC: ABNORMAL MG/DL
HCT VFR BLD AUTO: 50.2 % (ref 37.5–51)
HGB BLD-MCNC: 17.2 G/DL (ref 13–17.7)
IMM GRANULOCYTES # BLD AUTO: 0.05 10*3/MM3 (ref 0–0.05)
IMM GRANULOCYTES NFR BLD AUTO: 0.6 % (ref 0–0.5)
KETONES UR QL: NEGATIVE
LEUKOCYTE EST, POC: NEGATIVE
LYMPHOCYTES # BLD AUTO: 2.17 10*3/MM3 (ref 0.7–3.1)
LYMPHOCYTES NFR BLD AUTO: 26.6 % (ref 19.6–45.3)
Lab: 0
MCH RBC QN AUTO: 30.5 PG (ref 26.6–33)
MCHC RBC AUTO-ENTMCNC: 34.3 G/DL (ref 31.5–35.7)
MCV RBC AUTO: 89 FL (ref 79–97)
MONOCYTES # BLD AUTO: 0.71 10*3/MM3 (ref 0.1–0.9)
MONOCYTES NFR BLD AUTO: 8.7 % (ref 5–12)
NEUTROPHILS NFR BLD AUTO: 4.99 10*3/MM3 (ref 1.7–7)
NEUTROPHILS NFR BLD AUTO: 61.3 % (ref 42.7–76)
NITRITE UR-MCNC: NEGATIVE MG/ML
NRBC BLD AUTO-RTO: 0 /100 WBC (ref 0–0.2)
PH UR: 6 [PH] (ref 5–8)
PLATELET # BLD AUTO: 188 10*3/MM3 (ref 140–450)
PMV BLD AUTO: 11.3 FL (ref 6–12)
POTASSIUM SERPL-SCNC: 4.2 MMOL/L (ref 3.5–5.2)
PROT SERPL-MCNC: 6.8 G/DL (ref 6–8.5)
PROT UR STRIP-MCNC: NEGATIVE MG/DL
RBC # BLD AUTO: 5.64 10*6/MM3 (ref 4.14–5.8)
RBC # UR STRIP: NEGATIVE /UL
SODIUM SERPL-SCNC: 142 MMOL/L (ref 136–145)
SP GR UR: 1.01 (ref 1–1.03)
UROBILINOGEN UR QL: NORMAL
WBC NRBC COR # BLD AUTO: 8.15 10*3/MM3 (ref 3.4–10.8)

## 2024-05-24 PROCEDURE — 81003 URINALYSIS AUTO W/O SCOPE: CPT | Performed by: INTERNAL MEDICINE

## 2024-05-24 PROCEDURE — 85025 COMPLETE CBC W/AUTO DIFF WBC: CPT | Performed by: INTERNAL MEDICINE

## 2024-05-24 PROCEDURE — 80053 COMPREHEN METABOLIC PANEL: CPT | Performed by: INTERNAL MEDICINE

## 2024-05-24 PROCEDURE — 99214 OFFICE O/P EST MOD 30 MIN: CPT | Performed by: INTERNAL MEDICINE

## 2024-05-24 PROCEDURE — 3075F SYST BP GE 130 - 139MM HG: CPT | Performed by: INTERNAL MEDICINE

## 2024-05-24 PROCEDURE — G2211 COMPLEX E/M VISIT ADD ON: HCPCS | Performed by: INTERNAL MEDICINE

## 2024-05-24 PROCEDURE — 1126F AMNT PAIN NOTED NONE PRSNT: CPT | Performed by: INTERNAL MEDICINE

## 2024-05-24 PROCEDURE — 3079F DIAST BP 80-89 MM HG: CPT | Performed by: INTERNAL MEDICINE

## 2024-05-24 RX ORDER — LISINOPRIL 10 MG/1
10 TABLET ORAL DAILY
Qty: 90 TABLET | Refills: 1 | Status: SHIPPED | OUTPATIENT
Start: 2024-05-24

## 2024-05-24 NOTE — PROGRESS NOTES
"Chief Complaint  Dizziness (2 WEEK /OFF AND ON /)    Subjective          Nikhil Baldwin presents to [facility]  History of Present Illness  Patient reports having intermittent episodes of dizziness. Patient's son present to add to history. Patient feel after water his plants. Patient reports \"feeling drunk\" often. Patient had difficulty getting out of the bathtub. Patient son reports his Blood Pressure and blood glucose seem ok during these episodes. Patient does not want to take finasteride as recommended by urology.       Current Outpatient Medications   Medication Instructions    aspirin 81 mg, Oral, Daily    carvedilol (COREG) 3.125 mg, Oral, Every 12 Hours Scheduled    Cholecalciferol 5,000 Units, Oral, Daily    citalopram (CELEXA) 10 mg, Oral, Daily    empagliflozin (Jardiance) 25 MG tablet tablet Take 1 tablet by mouth once daily    lisinopril (PRINIVIL,ZESTRIL) 10 mg, Oral, Daily    magnesium oxide (MAG-OX) 400 mg, Oral, Daily    melatonin 5 mg, Oral, Nightly    meloxicam (MOBIC) 15 mg, Oral, Daily PRN    metFORMIN (GLUCOPHAGE) 500 mg, Oral, 2 Times Daily With Meals    rosuvastatin (CRESTOR) 10 mg, Oral, Daily    tamsulosin (FLOMAX) 0.4 mg, Oral, Daily       The following portions of the patient's history were reviewed and updated as appropriate: allergies, current medications, past family history, past medical history, past social history, past surgical history, and problem list.      Objective   Vital Signs:   /81 (BP Location: Right arm)   Pulse 76   Temp 97.5 °F (36.4 °C) (Temporal)   Ht 175.3 cm (69\")   Wt 80.3 kg (177 lb)   SpO2 97%   BMI 26.14 kg/m²     BP Readings from Last 3 Encounters:   05/24/24 131/81   03/21/24 129/75   02/01/24 105/64     Wt Readings from Last 3 Encounters:   05/24/24 80.3 kg (177 lb)   04/22/24 81.2 kg (179 lb)   03/21/24 81.5 kg (179 lb 9.6 oz)         Physical Exam   Appearance: No acute distress, " well-nourished  Head: normocephalic, atraumatic  Eyes: extraocular movements intact, no scleral icterus, no conjunctival injection  Ears, Nose, and Throat: external ears normal, nares patent, moist mucous membranes  Cardiovascular: regular rate and rhythm. no murmurs, rubs, or gallops. no edema  Respiratory: breathing comfortably, symmetric chest rise, clear to auscultation bilaterally. No wheezes, rales, or rhonchi.  Neuro: alert and oriented to time, place, and person. Normal gait  Psych: normal mood and affect     Result Review :   The following data was reviewed by: Felix Navarro Jr, MD on 05/24/2024:  Common labs          12/8/2023    12:17 12/9/2023    04:50 3/21/2024    12:40   Common Labs   Glucose 131   190    BUN 14   12    Creatinine 1.03   0.97    Sodium 139   141    Potassium 3.9   4.4    Chloride 105   106    Calcium 9.3   9.7    Albumin   4.1    Total Bilirubin   0.8    Alkaline Phosphatase   57    AST (SGOT)   24    ALT (SGPT)   23    WBC 7.57  6.38  6.73    Hemoglobin 14.5  13.4  15.5    Hematocrit 42.9  39.6  45.9    Platelets 138  142  164    Total Cholesterol   117    Triglycerides   175    HDL Cholesterol   42    LDL Cholesterol    46    Hemoglobin A1C   6.00        Lab Results   Component Value Date    SARSANTIGEN Not Detected 02/13/2023    COVID19 Not Detected 12/07/2023    FLU Negative 12/01/2022    FLU Negative 12/01/2022    RAPSCRN Negative 02/13/2023    STREPAAG Negative 12/08/2023    RSV Not Detected 12/07/2023    INR 1.0 11/12/2021    BILIRUBINUR Negative 05/24/2024       Brief Urine Lab Results  (Last result in the past 365 days)        Color   Clarity   Blood   Leuk Est   Nitrite   Protein   CREAT   Urine HCG        05/24/24 1343 Dark Yellow   Slightly Cloudy   Negative   Negative   Negative   Negative                   Assessment and Plan    Diagnoses and all orders for this visit:    1. Dizziness (Primary)  Comments:  urine dip neg. check labs. close f/u to assess  effectiveness of meds. CTA head and neck reviewed and normal approx 9 mo ago.  Orders:  -     CBC & Differential  -     Comprehensive Metabolic Panel  -     POCT urinalysis dipstick, automated    2. Primary hypertension  Comments:  reduce lisniopril to 10mg daily. monitor. goal <140/90  Orders:  -     lisinopril (PRINIVIL,ZESTRIL) 10 MG tablet; Take 1 tablet by mouth Daily.  Dispense: 90 tablet; Refill: 1    3. Type 2 diabetes mellitus with hyperglycemia, without long-term current use of insulin  Comments:  stop januvia as may be contributing to dizziness with low BG.        Medications Discontinued During This Encounter   Medication Reason    SITagliptin (Januvia) 100 MG tablet *Therapy completed    lisinopril (PRINIVIL,ZESTRIL) 20 MG tablet     finasteride (PROSCAR) 5 MG tablet *Therapy completed        Follow Up   Return if symptoms worsen or fail to improve.  Patient was given instructions and counseling regarding his condition or for health maintenance advice. Please see specific information pulled into the AVS if appropriate.       Felix Navarro Jr, MD  05/24/24  13:46 EDT

## 2024-05-29 ENCOUNTER — TELEPHONE (OUTPATIENT)
Dept: INTERNAL MEDICINE | Facility: CLINIC | Age: 79
End: 2024-05-29
Payer: MEDICARE

## 2024-05-29 NOTE — TELEPHONE ENCOUNTER
Called patient no answer left vm     OKAY FOR HUB TO READ/ADVISE     Labs reassuring. Make medication changes as discussed and will have close follow-up.

## 2024-06-13 RX ORDER — CALCIUM CARBONATE 300MG(750)
1 TABLET,CHEWABLE ORAL DAILY
Qty: 90 TABLET | Refills: 0 | Status: SHIPPED | OUTPATIENT
Start: 2024-06-13

## 2024-06-16 DIAGNOSIS — F02.80 FRONTAL LOBE DEMENTIA: ICD-10-CM

## 2024-06-16 DIAGNOSIS — G31.09 FRONTAL LOBE DEMENTIA: ICD-10-CM

## 2024-06-17 RX ORDER — CITALOPRAM 20 MG/1
10 TABLET ORAL DAILY
Qty: 45 TABLET | Refills: 0 | Status: SHIPPED | OUTPATIENT
Start: 2024-06-17

## 2024-06-21 DIAGNOSIS — F02.80 FRONTAL LOBE DEMENTIA: ICD-10-CM

## 2024-06-21 DIAGNOSIS — G31.09 FRONTAL LOBE DEMENTIA: ICD-10-CM

## 2024-07-23 ENCOUNTER — OFFICE VISIT (OUTPATIENT)
Dept: INTERNAL MEDICINE | Facility: CLINIC | Age: 79
End: 2024-07-23
Payer: MEDICARE

## 2024-07-23 VITALS
OXYGEN SATURATION: 97 % | HEART RATE: 65 BPM | DIASTOLIC BLOOD PRESSURE: 87 MMHG | HEIGHT: 69 IN | WEIGHT: 179 LBS | BODY MASS INDEX: 26.51 KG/M2 | SYSTOLIC BLOOD PRESSURE: 127 MMHG | TEMPERATURE: 97.4 F

## 2024-07-23 DIAGNOSIS — R41.3 MEMORY CHANGES: ICD-10-CM

## 2024-07-23 DIAGNOSIS — I10 PRIMARY HYPERTENSION: Primary | ICD-10-CM

## 2024-07-23 DIAGNOSIS — E11.65 TYPE 2 DIABETES MELLITUS WITH HYPERGLYCEMIA, WITHOUT LONG-TERM CURRENT USE OF INSULIN: ICD-10-CM

## 2024-07-23 DIAGNOSIS — E78.2 MIXED HYPERLIPIDEMIA: ICD-10-CM

## 2024-07-23 DIAGNOSIS — N40.1 BENIGN PROSTATIC HYPERPLASIA WITH URINARY FREQUENCY: ICD-10-CM

## 2024-07-23 DIAGNOSIS — R35.0 BENIGN PROSTATIC HYPERPLASIA WITH URINARY FREQUENCY: ICD-10-CM

## 2024-07-23 DIAGNOSIS — N30.00 ACUTE CYSTITIS WITHOUT HEMATURIA: ICD-10-CM

## 2024-07-23 LAB
ALBUMIN SERPL-MCNC: 4.3 G/DL (ref 3.5–5.2)
ALBUMIN/GLOB SERPL: 1.7 G/DL
ALP SERPL-CCNC: 69 U/L (ref 39–117)
ALT SERPL W P-5'-P-CCNC: 22 U/L (ref 1–41)
ANION GAP SERPL CALCULATED.3IONS-SCNC: 13 MMOL/L (ref 5–15)
AST SERPL-CCNC: 24 U/L (ref 1–40)
BASOPHILS # BLD AUTO: 0.07 10*3/MM3 (ref 0–0.2)
BASOPHILS NFR BLD AUTO: 0.9 % (ref 0–1.5)
BILIRUB BLD-MCNC: NEGATIVE MG/DL
BILIRUB SERPL-MCNC: 0.7 MG/DL (ref 0–1.2)
BUN SERPL-MCNC: 20 MG/DL (ref 8–23)
BUN/CREAT SERPL: 15.6 (ref 7–25)
CALCIUM SPEC-SCNC: 9.7 MG/DL (ref 8.6–10.5)
CHLORIDE SERPL-SCNC: 103 MMOL/L (ref 98–107)
CHOLEST SERPL-MCNC: 141 MG/DL (ref 0–200)
CLARITY, POC: CLEAR
CO2 SERPL-SCNC: 21 MMOL/L (ref 22–29)
COLOR UR: YELLOW
CREAT SERPL-MCNC: 1.28 MG/DL (ref 0.76–1.27)
DEPRECATED RDW RBC AUTO: 47.6 FL (ref 37–54)
EGFRCR SERPLBLD CKD-EPI 2021: 56.2 ML/MIN/1.73
EOSINOPHIL # BLD AUTO: 0.24 10*3/MM3 (ref 0–0.4)
EOSINOPHIL NFR BLD AUTO: 3 % (ref 0.3–6.2)
ERYTHROCYTE [DISTWIDTH] IN BLOOD BY AUTOMATED COUNT: 14.3 % (ref 12.3–15.4)
EXPIRATION DATE: ABNORMAL
GLOBULIN UR ELPH-MCNC: 2.5 GM/DL
GLUCOSE SERPL-MCNC: 328 MG/DL (ref 65–99)
GLUCOSE UR STRIP-MCNC: ABNORMAL MG/DL
HBA1C MFR BLD: 7.3 % (ref 4.8–5.6)
HCT VFR BLD AUTO: 53.3 % (ref 37.5–51)
HDLC SERPL-MCNC: 46 MG/DL (ref 40–60)
HGB BLD-MCNC: 17.6 G/DL (ref 13–17.7)
IMM GRANULOCYTES # BLD AUTO: 0.06 10*3/MM3 (ref 0–0.05)
IMM GRANULOCYTES NFR BLD AUTO: 0.7 % (ref 0–0.5)
KETONES UR QL: NEGATIVE
LDLC SERPL CALC-MCNC: 59 MG/DL (ref 0–100)
LDLC/HDLC SERPL: 1.09 {RATIO}
LEUKOCYTE EST, POC: NEGATIVE
LYMPHOCYTES # BLD AUTO: 2.57 10*3/MM3 (ref 0.7–3.1)
LYMPHOCYTES NFR BLD AUTO: 31.7 % (ref 19.6–45.3)
Lab: ABNORMAL
MCH RBC QN AUTO: 30.3 PG (ref 26.6–33)
MCHC RBC AUTO-ENTMCNC: 33 G/DL (ref 31.5–35.7)
MCV RBC AUTO: 91.7 FL (ref 79–97)
MONOCYTES # BLD AUTO: 0.8 10*3/MM3 (ref 0.1–0.9)
MONOCYTES NFR BLD AUTO: 9.9 % (ref 5–12)
NEUTROPHILS NFR BLD AUTO: 4.36 10*3/MM3 (ref 1.7–7)
NEUTROPHILS NFR BLD AUTO: 53.8 % (ref 42.7–76)
NITRITE UR-MCNC: NEGATIVE MG/ML
NRBC BLD AUTO-RTO: 0 /100 WBC (ref 0–0.2)
PH UR: 6 [PH] (ref 5–8)
PLATELET # BLD AUTO: 177 10*3/MM3 (ref 140–450)
PMV BLD AUTO: 10.9 FL (ref 6–12)
POTASSIUM SERPL-SCNC: 4.6 MMOL/L (ref 3.5–5.2)
PROT SERPL-MCNC: 6.8 G/DL (ref 6–8.5)
PROT UR STRIP-MCNC: NEGATIVE MG/DL
RBC # BLD AUTO: 5.81 10*6/MM3 (ref 4.14–5.8)
RBC # UR STRIP: NEGATIVE /UL
SODIUM SERPL-SCNC: 137 MMOL/L (ref 136–145)
SP GR UR: 1.01 (ref 1–1.03)
TRIGL SERPL-MCNC: 225 MG/DL (ref 0–150)
TSH SERPL DL<=0.05 MIU/L-ACNC: 2.91 UIU/ML (ref 0.27–4.2)
UROBILINOGEN UR QL: NORMAL
VLDLC SERPL-MCNC: 36 MG/DL (ref 5–40)
WBC NRBC COR # BLD AUTO: 8.1 10*3/MM3 (ref 3.4–10.8)

## 2024-07-23 PROCEDURE — 1126F AMNT PAIN NOTED NONE PRSNT: CPT | Performed by: INTERNAL MEDICINE

## 2024-07-23 PROCEDURE — 84443 ASSAY THYROID STIM HORMONE: CPT | Performed by: INTERNAL MEDICINE

## 2024-07-23 PROCEDURE — 83036 HEMOGLOBIN GLYCOSYLATED A1C: CPT | Performed by: INTERNAL MEDICINE

## 2024-07-23 PROCEDURE — 3079F DIAST BP 80-89 MM HG: CPT | Performed by: INTERNAL MEDICINE

## 2024-07-23 PROCEDURE — 85025 COMPLETE CBC W/AUTO DIFF WBC: CPT | Performed by: INTERNAL MEDICINE

## 2024-07-23 PROCEDURE — 81003 URINALYSIS AUTO W/O SCOPE: CPT | Performed by: INTERNAL MEDICINE

## 2024-07-23 PROCEDURE — 3062F POS MACROALBUMINURIA REV: CPT | Performed by: INTERNAL MEDICINE

## 2024-07-23 PROCEDURE — 87086 URINE CULTURE/COLONY COUNT: CPT | Performed by: INTERNAL MEDICINE

## 2024-07-23 PROCEDURE — 80053 COMPREHEN METABOLIC PANEL: CPT | Performed by: INTERNAL MEDICINE

## 2024-07-23 PROCEDURE — 80061 LIPID PANEL: CPT | Performed by: INTERNAL MEDICINE

## 2024-07-23 PROCEDURE — 3074F SYST BP LT 130 MM HG: CPT | Performed by: INTERNAL MEDICINE

## 2024-07-23 PROCEDURE — 99214 OFFICE O/P EST MOD 30 MIN: CPT | Performed by: INTERNAL MEDICINE

## 2024-07-23 RX ORDER — FINASTERIDE 5 MG/1
5 TABLET, FILM COATED ORAL DAILY
COMMUNITY
Start: 2024-07-17

## 2024-07-23 RX ORDER — CIPROFLOXACIN 500 MG/1
500 TABLET, FILM COATED ORAL 2 TIMES DAILY
Qty: 14 TABLET | Refills: 0 | Status: SHIPPED | OUTPATIENT
Start: 2024-07-23 | End: 2024-07-29

## 2024-07-23 NOTE — PROGRESS NOTES
"Chief Complaint  Dizziness (Off and on for 2 weeks /He feels drunk ), Urinary Incontinence (He can't hold his urine- This has been going on for 2 weeks ), Diabetes, Hypertension, and Back Pain (Having a lot of back pain /Down below his kidney )    Subjective          Nikhil Baldwin presents to [facility]  History of Present Illness  Patient report shaving dizziness for approx 2 weeks. He also reports urinary incontinence for approx 2 weeks. Patient also reports having low back pain. Patient unsure if taking finasteride.   Hypertension- patient denies headaches, chest pain. Patient without home Blood Pressure readings.   DM2- patient reports med compliance. Patient without hypoglycemic events. HE is unsure if taking januvia    Current Outpatient Medications   Medication Instructions    aspirin 81 mg, Oral, Daily    carvedilol (COREG) 3.125 mg, Oral, Every 12 Hours Scheduled    Cholecalciferol 5,000 Units, Oral, Daily    ciprofloxacin (CIPRO) 500 mg, Oral, 2 Times Daily    citalopram (CELEXA) 10 mg, Oral, Daily    empagliflozin (Jardiance) 25 MG tablet tablet Take 1 tablet by mouth once daily    finasteride (PROSCAR) 5 mg, Daily    lisinopril (PRINIVIL,ZESTRIL) 10 mg, Oral, Daily    magnesium oxide (MAG-OX) 400 mg, Oral, Daily    melatonin 5 mg, Oral, Nightly    metFORMIN (GLUCOPHAGE) 500 mg, Oral, 2 Times Daily With Meals    rosuvastatin (CRESTOR) 10 mg, Oral, Daily    tamsulosin (FLOMAX) 0.4 mg, Oral, Daily       The following portions of the patient's history were reviewed and updated as appropriate: allergies, current medications, past family history, past medical history, past social history, past surgical history, and problem list.    Objective   Vital Signs:   /87   Pulse 65   Temp 97.4 °F (36.3 °C) (Temporal)   Ht 175.3 cm (69\")   Wt 81.2 kg (179 lb)   SpO2 97%   BMI 26.43 kg/m²     BP Readings from Last 3 Encounters:   07/23/24 127/87   05/24/24 " 131/81   03/21/24 129/75     Wt Readings from Last 3 Encounters:   07/23/24 81.2 kg (179 lb)   05/24/24 80.3 kg (177 lb)   04/22/24 81.2 kg (179 lb)         Physical Exam   Appearance: No acute distress, well-nourished  Head: normocephalic, atraumatic  Eyes: extraocular movements intact, no scleral icterus, no conjunctival injection  Ears, Nose, and Throat: external ears normal, nares patent, moist mucous membranes  Cardiovascular: regular rate and rhythm. no murmurs, rubs, or gallops. no edema  Respiratory: breathing comfortably, symmetric chest rise, clear to auscultation bilaterally. No wheezes, rales, or rhonchi.  Neuro: alert and oriented to time, place, and person. Normal gait  Psych: normal mood and affect     Result Review :   The following data was reviewed by: Felix Navarro Jr, MD on 07/23/2024:  Common labs          12/9/2023    04:50 3/21/2024    12:40 5/24/2024    13:42   Common Labs   Glucose  190  143    BUN  12  18    Creatinine  0.97  1.13    Sodium  141  142    Potassium  4.4  4.2    Chloride  106  105    Calcium  9.7  10.1    Albumin  4.1  4.4    Total Bilirubin  0.8  0.7    Alkaline Phosphatase  57  64    AST (SGOT)  24  23    ALT (SGPT)  23  19    WBC 6.38  6.73  8.15    Hemoglobin 13.4  15.5  17.2    Hematocrit 39.6  45.9  50.2    Platelets 142  164  188    Total Cholesterol  117     Triglycerides  175     HDL Cholesterol  42     LDL Cholesterol   46     Hemoglobin A1C  6.00         Lab Results   Component Value Date    SARSANTIGEN Not Detected 02/13/2023    COVID19 Not Detected 12/07/2023    FLU Negative 12/01/2022    FLU Negative 12/01/2022    RAPSCRN Negative 02/13/2023    STREPAAG Negative 12/08/2023    RSV Not Detected 12/07/2023    INR 1.0 11/12/2021    BILIRUBINUR Negative 07/23/2024       Brief Urine Lab Results  (Last result in the past 365 days)        Color   Clarity   Blood   Leuk Est   Nitrite   Protein   CREAT   Urine HCG        07/23/24 1020 Yellow   Clear   Negative    Negative   Negative   Negative                   Assessment and Plan    Diagnoses and all orders for this visit:    1. Primary hypertension (Primary)  Comments:  Controlled.  Check labs.  Goal blood pressure 130/80  Orders:  -     CBC & Differential  -     Comprehensive Metabolic Panel  -     POCT urinalysis dipstick, automated    2. Mixed hyperlipidemia  Comments:  Continue statin.  Check labs.  Orders:  -     Lipid Panel    3. Type 2 diabetes mellitus with hyperglycemia, without long-term current use of insulin  Comments:  Patient needs to confirm medication regimen continue metformin and Jardiance.  Questionable if taking Januvia.  Orders:  -     Hemoglobin A1c  -     POCT urinalysis dipstick, automated    4. Memory changes  Comments:  Thought possibly related to UTI given concurrent symptoms.  Monitor for resolution with antibiotic.  Orders:  -     TSH    5. Benign prostatic hyperplasia with urinary frequency  Comments:  Neurology notes reviewed.  Continue Flomax.  Questionable if taking finasteride.  Orders:  -     Urine Culture - Urine, Urine, Clean Catch    6. Acute cystitis without hematuria  -     ciprofloxacin (Cipro) 500 MG tablet; Take 1 tablet by mouth 2 (Two) Times a Day for 7 days.  Dispense: 14 tablet; Refill: 0      Medications Discontinued During This Encounter   Medication Reason    meloxicam (MOBIC) 15 MG tablet *Therapy completed    Magnesium 400 MG tablet *Therapy completed          Follow Up   Return in about 4 months (around 11/23/2024).  Patient was given instructions and counseling regarding his condition or for health maintenance advice. Please see specific information pulled into the AVS if appropriate.       Felix Navarro Jr, MD  07/23/24  11:24 EDT

## 2024-07-24 DIAGNOSIS — E78.49 OTHER HYPERLIPIDEMIA: ICD-10-CM

## 2024-07-25 LAB — BACTERIA SPEC AEROBE CULT: NORMAL

## 2024-07-25 RX ORDER — ROSUVASTATIN CALCIUM 40 MG/1
40 TABLET, COATED ORAL DAILY
Qty: 90 TABLET | Refills: 0 | OUTPATIENT
Start: 2024-07-25

## 2024-07-25 RX ORDER — ROSUVASTATIN CALCIUM 10 MG/1
10 TABLET, COATED ORAL DAILY
Qty: 90 TABLET | Refills: 3 | Status: SHIPPED | OUTPATIENT
Start: 2024-07-25

## 2024-07-26 ENCOUNTER — TELEPHONE (OUTPATIENT)
Dept: INTERNAL MEDICINE | Facility: CLINIC | Age: 79
End: 2024-07-26
Payer: MEDICARE

## 2024-07-26 NOTE — TELEPHONE ENCOUNTER
----- Message from Felix Navarro sent at 7/26/2024 10:41 AM EDT -----  Elevated triglycerides, which are the storage form of sugar - recommend lower carbohydrate/sugar intake.     HgbA1c elevated from previous check. Would continue current med regimen for now.     Please review medication list with patient when we call. Thank you.

## 2024-07-26 NOTE — TELEPHONE ENCOUNTER
Called PT, PT is aware and confirmed.   No further question or concerns       I went over the medication list.   He is taking everything but he isnt taking the aspirin

## 2024-07-29 ENCOUNTER — HOSPITAL ENCOUNTER (EMERGENCY)
Facility: HOSPITAL | Age: 79
Discharge: HOME | End: 2024-07-30
Attending: EMERGENCY MEDICINE
Payer: MEDICARE

## 2024-07-29 ENCOUNTER — APPOINTMENT (OUTPATIENT)
Dept: GENERAL RADIOLOGY | Facility: HOSPITAL | Age: 79
End: 2024-07-29
Payer: MEDICARE

## 2024-07-29 ENCOUNTER — APPOINTMENT (OUTPATIENT)
Dept: CT IMAGING | Facility: HOSPITAL | Age: 79
End: 2024-07-29
Payer: MEDICARE

## 2024-07-29 DIAGNOSIS — I77.810 ASCENDING AORTA DILATION: ICD-10-CM

## 2024-07-29 DIAGNOSIS — E86.0 DEHYDRATION: Primary | ICD-10-CM

## 2024-07-29 DIAGNOSIS — T50.905A ADVERSE EFFECT OF DRUG, INITIAL ENCOUNTER: ICD-10-CM

## 2024-07-29 DIAGNOSIS — K52.9 ENTEROCOLITIS: ICD-10-CM

## 2024-07-29 DIAGNOSIS — K76.0 HEPATIC STEATOSIS: ICD-10-CM

## 2024-07-29 DIAGNOSIS — N20.0 NEPHROLITHIASIS: ICD-10-CM

## 2024-07-29 LAB
ALBUMIN SERPL-MCNC: 4 G/DL (ref 3.5–5.2)
ALBUMIN/GLOB SERPL: 1.6 G/DL
ALP SERPL-CCNC: 74 U/L (ref 39–117)
ALT SERPL W P-5'-P-CCNC: 16 U/L (ref 1–41)
AMYLASE SERPL-CCNC: 72 U/L (ref 28–100)
ANION GAP SERPL CALCULATED.3IONS-SCNC: 13.8 MMOL/L (ref 5–15)
AST SERPL-CCNC: 19 U/L (ref 1–40)
BASOPHILS # BLD AUTO: 0.03 10*3/MM3 (ref 0–0.2)
BASOPHILS NFR BLD AUTO: 0.3 % (ref 0–1.5)
BILIRUB SERPL-MCNC: 0.7 MG/DL (ref 0–1.2)
BILIRUB UR QL STRIP: NEGATIVE
BUN SERPL-MCNC: 22 MG/DL (ref 8–23)
BUN/CREAT SERPL: 17.1 (ref 7–25)
CALCIUM SPEC-SCNC: 9.3 MG/DL (ref 8.6–10.5)
CHLORIDE SERPL-SCNC: 100 MMOL/L (ref 98–107)
CLARITY UR: CLEAR
CO2 SERPL-SCNC: 24.2 MMOL/L (ref 22–29)
COLOR UR: YELLOW
CREAT SERPL-MCNC: 1.29 MG/DL (ref 0.76–1.27)
D-LACTATE SERPL-SCNC: 1.8 MMOL/L (ref 0.5–2)
D-LACTATE SERPL-SCNC: 3.2 MMOL/L (ref 0.5–2)
DEPRECATED RDW RBC AUTO: 47.4 FL (ref 37–54)
EGFRCR SERPLBLD CKD-EPI 2021: 55.7 ML/MIN/1.73
EOSINOPHIL # BLD AUTO: 0.2 10*3/MM3 (ref 0–0.4)
EOSINOPHIL NFR BLD AUTO: 2.3 % (ref 0.3–6.2)
ERYTHROCYTE [DISTWIDTH] IN BLOOD BY AUTOMATED COUNT: 14.8 % (ref 12.3–15.4)
FLUAV SUBTYP SPEC NAA+PROBE: NOT DETECTED
FLUBV RNA ISLT QL NAA+PROBE: NOT DETECTED
GLOBULIN UR ELPH-MCNC: 2.5 GM/DL
GLUCOSE SERPL-MCNC: 181 MG/DL (ref 65–99)
GLUCOSE UR STRIP-MCNC: ABNORMAL MG/DL
HCT VFR BLD AUTO: 50 % (ref 37.5–51)
HGB BLD-MCNC: 17.1 G/DL (ref 13–17.7)
HGB UR QL STRIP.AUTO: NEGATIVE
HOLD SPECIMEN: NORMAL
HOLD SPECIMEN: NORMAL
IMM GRANULOCYTES # BLD AUTO: 0.04 10*3/MM3 (ref 0–0.05)
IMM GRANULOCYTES NFR BLD AUTO: 0.5 % (ref 0–0.5)
KETONES UR QL STRIP: NEGATIVE
LEUKOCYTE ESTERASE UR QL STRIP.AUTO: NEGATIVE
LIPASE SERPL-CCNC: 200 U/L (ref 13–60)
LYMPHOCYTES # BLD AUTO: 2.45 10*3/MM3 (ref 0.7–3.1)
LYMPHOCYTES NFR BLD AUTO: 28.3 % (ref 19.6–45.3)
MCH RBC QN AUTO: 29.8 PG (ref 26.6–33)
MCHC RBC AUTO-ENTMCNC: 34.2 G/DL (ref 31.5–35.7)
MCV RBC AUTO: 87.3 FL (ref 79–97)
MONOCYTES # BLD AUTO: 1.32 10*3/MM3 (ref 0.1–0.9)
MONOCYTES NFR BLD AUTO: 15.2 % (ref 5–12)
NEUTROPHILS NFR BLD AUTO: 4.62 10*3/MM3 (ref 1.7–7)
NEUTROPHILS NFR BLD AUTO: 53.4 % (ref 42.7–76)
NITRITE UR QL STRIP: NEGATIVE
NRBC BLD AUTO-RTO: 0 /100 WBC (ref 0–0.2)
PH UR STRIP.AUTO: <=5 [PH] (ref 5–8)
PLATELET # BLD AUTO: 174 10*3/MM3 (ref 140–450)
PMV BLD AUTO: 10 FL (ref 6–12)
POTASSIUM SERPL-SCNC: 4.3 MMOL/L (ref 3.5–5.2)
PROT SERPL-MCNC: 6.5 G/DL (ref 6–8.5)
PROT UR QL STRIP: NEGATIVE
RBC # BLD AUTO: 5.73 10*6/MM3 (ref 4.14–5.8)
RSV RNA NPH QL NAA+NON-PROBE: NOT DETECTED
SARS-COV-2 RNA RESP QL NAA+PROBE: NOT DETECTED
SODIUM SERPL-SCNC: 138 MMOL/L (ref 136–145)
SP GR UR STRIP: >1.03 (ref 1–1.03)
TROPONIN T SERPL HS-MCNC: 10 NG/L
UROBILINOGEN UR QL STRIP: ABNORMAL
WBC NRBC COR # BLD AUTO: 8.66 10*3/MM3 (ref 3.4–10.8)
WHOLE BLOOD HOLD COAG: NORMAL
WHOLE BLOOD HOLD SPECIMEN: NORMAL

## 2024-07-29 PROCEDURE — 25810000003 SODIUM CHLORIDE 0.9 % SOLUTION

## 2024-07-29 PROCEDURE — 82150 ASSAY OF AMYLASE: CPT

## 2024-07-29 PROCEDURE — 70450 CT HEAD/BRAIN W/O DYE: CPT

## 2024-07-29 PROCEDURE — 99285 EMERGENCY DEPT VISIT HI MDM: CPT

## 2024-07-29 PROCEDURE — 83605 ASSAY OF LACTIC ACID: CPT

## 2024-07-29 PROCEDURE — 93005 ELECTROCARDIOGRAM TRACING: CPT

## 2024-07-29 PROCEDURE — 81003 URINALYSIS AUTO W/O SCOPE: CPT

## 2024-07-29 PROCEDURE — 84484 ASSAY OF TROPONIN QUANT: CPT

## 2024-07-29 PROCEDURE — 87637 SARSCOV2&INF A&B&RSV AMP PRB: CPT

## 2024-07-29 PROCEDURE — 74177 CT ABD & PELVIS W/CONTRAST: CPT

## 2024-07-29 PROCEDURE — 36415 COLL VENOUS BLD VENIPUNCTURE: CPT

## 2024-07-29 PROCEDURE — 85025 COMPLETE CBC W/AUTO DIFF WBC: CPT

## 2024-07-29 PROCEDURE — 80053 COMPREHEN METABOLIC PANEL: CPT

## 2024-07-29 PROCEDURE — 71045 X-RAY EXAM CHEST 1 VIEW: CPT

## 2024-07-29 PROCEDURE — 25510000001 IOPAMIDOL PER 1 ML: Performed by: EMERGENCY MEDICINE

## 2024-07-29 PROCEDURE — 83690 ASSAY OF LIPASE: CPT

## 2024-07-29 RX ORDER — SODIUM CHLORIDE 0.9 % (FLUSH) 0.9 %
10 SYRINGE (ML) INJECTION AS NEEDED
Status: DISCONTINUED | OUTPATIENT
Start: 2024-07-29 | End: 2024-07-30 | Stop reason: HOSPADM

## 2024-07-29 RX ADMIN — IOPAMIDOL 80 ML: 755 INJECTION, SOLUTION INTRAVENOUS at 23:58

## 2024-07-29 RX ADMIN — SODIUM CHLORIDE 1000 ML: 9 INJECTION, SOLUTION INTRAVENOUS at 22:51

## 2024-07-29 NOTE — ED PROVIDER NOTES
Time: 5:05 PM EDT  Date of encounter:  7/29/2024  Independent Historian/Clinical History and Information was obtained by:   Patient    History is limited by: N/A    Chief Complaint   Patient presents with    Abdominal Pain    Dizziness         History of Present Illness:  Patient is a 79 y.o. year old male who presents to the emergency department for evaluation of dizziness dizziness and abdominal pain.  Patient states he was seen by PCP recently and started on Cipro for a urinary tract infection.  States he has been dizzy for a week and he feels like he is inside a dryer.  Has had some episodes of nausea and vomiting.  Also complains of abdominal pain.  BASIL Mccann    Patient Care Team  Primary Care Provider: Felix Navarro Jr., MD    Past Medical History:     Allergies   Allergen Reactions    Ciprofloxacin Nausea And Vomiting    Codeine Unknown - Low Severity    Promethazine Unknown - Low Severity    Celecoxib Rash    Ibuprofen Rash    Penicillins Rash    Promethazine Hcl Rash    Warfarin Rash     Past Medical History:   Diagnosis Date    Aorta disorder     Arthritis     Degenerative joint disease     Dementia     FRONTAL LOBE    Depression     Diabetes mellitus     Hyperlipidemia     Hypertension     Kidney stone     Stroke     MILD RIGHT SIDE DEFICITS     Past Surgical History:   Procedure Laterality Date    CHOLECYSTECTOMY      CYSTOSCOPY BLADDER STONE LITHOTRIPSY      KIDNEY STONE SURGERY       History reviewed. No pertinent family history.    Home Medications:  Prior to Admission medications    Medication Sig Start Date End Date Taking? Authorizing Provider   aspirin 81 MG EC tablet Take 1 tablet by mouth Daily.  Patient not taking: Reported on 7/23/2024 3/21/24   Felix Navarro Jr., MD   carvedilol (COREG) 3.125 MG tablet Take 1 tablet by mouth Every 12 (Twelve) Hours. 12/22/23   Felix Navarro Jr., MD   Cholecalciferol 125 MCG (5000 UT) tablet Take 1 tablet by mouth Daily.     "ProviderMark MD   ciprofloxacin (Cipro) 500 MG tablet Take 1 tablet by mouth 2 (Two) Times a Day for 7 days. 7/23/24 7/30/24  Felix Navarro Jr., MD   citalopram (CeleXA) 20 MG tablet Take 1/2 (one-half) tablet by mouth once daily 6/17/24   Felix Navarro Jr., MD   empagliflozin (Jardiance) 25 MG tablet tablet Take 1 tablet by mouth once daily  Patient taking differently: Take 1 tablet by mouth Daily. 9/22/23   Felix Navarro Jr., MD   finasteride (PROSCAR) 5 MG tablet Take 1 tablet by mouth Daily.  Patient not taking: Reported on 7/23/2024 7/17/24   Mark Martinez MD   lisinopril (PRINIVIL,ZESTRIL) 10 MG tablet Take 1 tablet by mouth Daily. 5/24/24   Felix Navarro Jr., MD   magnesium oxide (MAG-OX) 400 MG tablet Take 1 tablet by mouth Daily. 12/21/23   Felix Navarro Jr., MD   melatonin 5 MG tablet tablet Take 1 tablet by mouth Every Night.    Mark Martinez MD   metFORMIN (GLUCOPHAGE) 500 MG tablet TAKE 1 TABLET BY MOUTH TWICE DAILY WITH MEALS 5/14/24   Felix Navarro Jr., MD   rosuvastatin (CRESTOR) 10 MG tablet Take 1 tablet by mouth Daily. 7/25/24   Felix Navarro Jr., MD   tamsulosin (FLOMAX) 0.4 MG capsule 24 hr capsule Take 1 capsule by mouth Daily. 3/3/23   Felix Navarro Jr., MD        Social History:   Social History     Tobacco Use    Smoking status: Never     Passive exposure: Never    Smokeless tobacco: Never   Vaping Use    Vaping status: Never Used   Substance Use Topics    Alcohol use: Never    Drug use: Never         Review of Systems:  Review of Systems   Constitutional:  Negative for fever.   Gastrointestinal:  Positive for abdominal pain and vomiting (after taking cipro).   Neurological:  Positive for dizziness, weakness and headaches (back of head). Negative for syncope and numbness.        Physical Exam:  /70   Pulse 53   Temp 99.1 °F (37.3 °C) (Oral)   Resp 18   Ht 175.3 cm (69\")   Wt " 80.7 kg (177 lb 14.6 oz)   SpO2 95%   BMI 26.27 kg/m²         Physical Exam  Vitals reviewed.   Constitutional:       Appearance: He is ill-appearing.   HENT:      Head: Normocephalic.   Eyes:      Extraocular Movements: Extraocular movements intact.      Conjunctiva/sclera: Conjunctivae normal.   Cardiovascular:      Rate and Rhythm: Tachycardia present. Rhythm regularly irregular.      Heart sounds: Normal heart sounds.   Pulmonary:      Effort: Pulmonary effort is normal.      Breath sounds: Normal breath sounds.   Abdominal:      General: Abdomen is flat. Bowel sounds are normal. There is no distension.      Palpations: Abdomen is soft.      Tenderness: There is no abdominal tenderness.   Skin:     General: Skin is warm.      Coloration: Skin is not cyanotic.   Neurological:      Mental Status: He is alert and oriented to person, place, and time.      Motor: Weakness present. No tremor or seizure activity.      Coordination: Heel to Shin Test normal.      Gait: Gait normal.   Psychiatric:         Attention and Perception: Attention and perception normal.         Mood and Affect: Mood normal.               Procedures:  Procedures      Medical Decision Making:      Comorbidities that affect care:    Hypertension, dementia, diabetes, hyperlipidemia, aortic disorder, depression, history of stroke    External Notes reviewed:    None      The following orders were placed and all results were independently analyzed by me:  Orders Placed This Encounter   Procedures    COVID-19, FLU A/B, RSV PCR 1 HR TAT - Swab, Nasopharynx    CT Head Without Contrast    CT Abdomen Pelvis With Contrast    XR Chest 1 View    [place] Draw    Comprehensive Metabolic Panel    Lipase    Urinalysis With Microscopic If Indicated (No Culture) - Urine, Clean Catch    Lactic Acid, Plasma    CBC Auto Differential    STAT Lactic Acid, Reflex    Amylase    Single High Sensitivity Troponin T    Ambulatory Referral to Cardiothoracic Surgery    NPO  Diet NPO Type: Strict NPO    Undress & Gown    ECG 12 Lead Other; weakness    Insert Peripheral IV    CBC & Differential    Green Top (Gel)    Lavender Top    Gold Top - SST    Light Blue Top       Medications Given in the Emergency Department:  Medications   sodium chloride 0.9 % flush 10 mL (has no administration in time range)   sodium chloride 0.9 % bolus 1,000 mL (0 mL Intravenous Stopped 7/30/24 0114)   iopamidol (ISOVUE-370) 76 % injection 100 mL (80 mL Intravenous Given 7/29/24 7563)        ED Course:    The patient was initially evaluated in the triage area where orders were placed. The patient was later dispositioned by Alyce B Seaver, APRN.      The patient was advised to stay for completion of workup which includes but is not limited to communication of labs and radiological results, reassessment and plan. The patient was advised that leaving prior to disposition by a provider could result in critical findings that are not communicated to the patient.          Labs:    Lab Results (last 24 hours)       Procedure Component Value Units Date/Time    CBC & Differential [[id number]]  (Abnormal) Collected: 07/29/24 1712    Specimen: Blood from Arm, Right Updated: 07/29/24 1721    Narrative:      The following orders were created for panel order CBC & Differential.  Procedure                               Abnormality         Status                     ---------                               -----------         ------                     CBC Auto Differential[[id number]]        Abnormal            Final result                 Please view results for these tests on the individual orders.    Comprehensive Metabolic Panel [[id number]]  (Abnormal) Collected: 07/29/24 1712    Specimen: Blood from Arm, Right Updated: 07/29/24 1744     Glucose 181 mg/dL      BUN 22 mg/dL      Creatinine 1.29 mg/dL      Sodium 138 mmol/L      Potassium 4.3 mmol/L      Chloride 100 mmol/L      CO2 24.2 mmol/L      Calcium 9.3 mg/dL       Total Protein 6.5 g/dL      Albumin 4.0 g/dL      ALT (SGPT) 16 U/L      AST (SGOT) 19 U/L      Alkaline Phosphatase 74 U/L      Total Bilirubin 0.7 mg/dL      Globulin 2.5 gm/dL      A/G Ratio 1.6 g/dL      BUN/Creatinine Ratio 17.1     Anion Gap 13.8 mmol/L      eGFR 55.7 mL/min/1.73     Narrative:      GFR Normal >60  Chronic Kidney Disease <60  Kidney Failure <15    The GFR formula is only valid for adults with stable renal function between ages 18 and 70.    Lipase [[id number]]  (Abnormal) Collected: 07/29/24 1712    Specimen: Blood from Arm, Right Updated: 07/29/24 1744     Lipase 200 U/L     Lactic Acid, Plasma [[id number]]  (Abnormal) Collected: 07/29/24 1712    Specimen: Blood from Arm, Right Updated: 07/29/24 1747     Lactate 3.2 mmol/L     CBC Auto Differential [[id number]]  (Abnormal) Collected: 07/29/24 1712    Specimen: Blood from Arm, Right Updated: 07/29/24 1721     WBC 8.66 10*3/mm3      RBC 5.73 10*6/mm3      Hemoglobin 17.1 g/dL      Hematocrit 50.0 %      MCV 87.3 fL      MCH 29.8 pg      MCHC 34.2 g/dL      RDW 14.8 %      RDW-SD 47.4 fl      MPV 10.0 fL      Platelets 174 10*3/mm3      Neutrophil % 53.4 %      Lymphocyte % 28.3 %      Monocyte % 15.2 %      Eosinophil % 2.3 %      Basophil % 0.3 %      Immature Grans % 0.5 %      Neutrophils, Absolute 4.62 10*3/mm3      Lymphocytes, Absolute 2.45 10*3/mm3      Monocytes, Absolute 1.32 10*3/mm3      Eosinophils, Absolute 0.20 10*3/mm3      Basophils, Absolute 0.03 10*3/mm3      Immature Grans, Absolute 0.04 10*3/mm3      nRBC 0.0 /100 WBC     Urinalysis With Microscopic If Indicated (No Culture) - Urine, Clean Catch [[id number]]  (Abnormal) Collected: 07/29/24 1942    Specimen: Urine, Clean Catch Updated: 07/29/24 1953     Color, UA Yellow     Appearance, UA Clear     pH, UA <=5.0     Specific Gravity, UA >1.030     Glucose, UA >=1000 mg/dL (3+)     Ketones, UA Negative     Bilirubin, UA Negative     Blood, UA Negative     Protein, UA Negative      Leuk Esterase, UA Negative     Nitrite, UA Negative     Urobilinogen, UA 1.0 E.U./dL    Narrative:      Urine microscopic not indicated.    STAT Lactic Acid, Reflex [[id number]]  (Normal) Collected: 07/29/24 1954    Specimen: Blood Updated: 07/29/24 2024     Lactate 1.8 mmol/L     Amylase [[id number]]  (Normal) Collected: 07/29/24 2252    Specimen: Blood Updated: 07/29/24 2318     Amylase 72 U/L     Single High Sensitivity Troponin T [[id number]]  (Normal) Collected: 07/29/24 2252    Specimen: Blood Updated: 07/29/24 2319     HS Troponin T 10 ng/L     Narrative:      High Sensitive Troponin T Reference Range:  <14.0 ng/L- Negative Female for AMI  <22.0 ng/L- Negative Male for AMI  >=14 - Abnormal Female indicating possible myocardial injury.  >=22 - Abnormal Male indicating possible myocardial injury.   Clinicians would have to utilize clinical acumen, EKG, Troponin, and serial changes to determine if it is an Acute Myocardial Infarction or myocardial injury due to an underlying chronic condition.         COVID-19, FLU A/B, RSV PCR 1 HR TAT - Swab, Nasopharynx [[id number]]  (Normal) Collected: 07/29/24 2253    Specimen: Swab from Nasopharynx Updated: 07/29/24 2335     COVID19 Not Detected     Influenza A PCR Not Detected     Influenza B PCR Not Detected     RSV, PCR Not Detected    Narrative:      Fact sheet for providers: https://www.fda.gov/media/021119/download    Fact sheet for patients: https://www.fda.gov/media/143461/download    Test performed by PCR.             Imaging:    CT Abdomen Pelvis With Contrast    Result Date: 7/30/2024  CT ABDOMEN PELVIS W CONTRAST-  Date of exam: 7/29/2024 11:37 PM.  Indication: abdominal pain, not otherwise specified  Comparison: 2/2/2024.  TECHNIQUE: Axial CT images were obtained of the abdomen and pelvis following the uneventful intravenous administration of 80 mL of Isovue-370 nonionic iodinated contrast agent. Reconstructed coronal and sagittal images were also obtained.  Automated exposure control and iterative construction methods were used.  FINDINGS: A moderate-to-large stool burden is identified, probably increased in degree since the prior study. Colonic diverticula are present without definite acute diverticulitis. New right-sided age-indeterminate (but probably acute-to-subacute) infectious/inflammatory mild-to-moderate colitis is possible without a pericolonic/perienteric fluid collection to suggest abscess. There may also be involvement of the terminal ileum as with infectious/inflammatory ileitis. No pneumoperitoneum. No pneumatosis. No portal or mesenteric venous gas is seen. Ischemic enterocolitis is thought to be less likely. No acute appendicitis is seen.  Additionally, nonobstructing nephrolithiasis is seen on the right with calyceal stones estimated at 1 cm in greatest size. No left nephrolithiasis. No hydronephrosis. No ureterolithiasis. No acute pyelonephritis. There are bilateral inguinal hernias, as before. They contain fat and vessels and no bowel. There is a tiny umbilical hernia, which contains fat and no bowel. There is diffuse prostatomegaly with central prostatic calcifications, seen previously. The urinary bladder is under- distended, limiting its assessment. No urinary bladder calculi are seen. No abdominal aortic aneurysm or dissection. No acute intraperitoneal or retroperitoneal hemorrhage. No ascites. There is diffuse hepatic steatosis without hepatomegaly. Probably no splenomegaly. The patient has undergone cholecystectomy. There is compensatory dilatation of the biliary tree (after cholecystectomy). No acute pancreatitis. Please correlate with pertinent lab values. The partially imaged lung bases are clear of acute infiltrate. Coronary artery calcifications are seen. There may be fusiform dilatation of the ascending aorta, measuring about 5.2 cm in greatest diameter.        1. The study is ABNORMAL. New age-indeterminate but probably  acute-to-subacute right-sided enterocolitis is suspected involving the terminal ileum and the cecum predominantly but also possibly other portions of the right colon. No pericolonic or perienteric fluid collection is seen to suggest abscess. No pneumoperitoneum. No mechanical bowel obstruction. No pneumatosis is seen to suggest ischemic enterocolitis. Consider close interval clinical, lab, and imaging follow-up to ensure a benign progression and to exclude an underlying malignant process.  2. There may be fusiform dilatation of the ascending aorta, measuring about 5.2 cm in greatest diameter. Consider close interval clinical and imaging follow-up of this finding, as well.  3. Please see above comments for further detail.    Please note that portions of this note were completed with a voice recognition program.     Electronically Signed By-Nemesio Grigsby MD On:7/30/2024 12:29 AM      XR Chest 1 View    Result Date: 7/29/2024  XR CHEST 1 VW Date of exam: 7/29/2024 10:38, P.M. EDT. Indication: weakness, not otherwise specified Comparison: 12/7/2023. FINDINGS: A single AP (or PA) upright portable chest radiograph was performed. No cardiac enlargement is seen. No acute infiltrate is appreciated. No pleural effusion or pneumothorax is identified. The thoracic aorta is prominent, atherosclerotic, and ectatic, as before. Chronic calcified granulomatous disease involves the chest. Degenerative changes involve the bilateral shoulders. No significant interval change is seen since the prior study (or studies).      No acute infiltrate is appreciated.    Please note that portions of this note were completed with a voice recognition program.  Electronically Signed: Nemesio Grigsby MD  7/29/2024 11:02 PM EDT  Workstation ID: [id number]    CT Head Without Contrast    Result Date: 7/29/2024  CT HEAD WO CONTRAST Date of Exam: 7/29/2024 5:42 PM EDT Indication: Dizziness. Comparison: MRI 12/7/2023 Technique: Axial CT images were obtained  of the head without contrast administration.  Reconstructed coronal and sagittal images were also obtained. Automated exposure control and iterative construction methods were used. Findings: No large territory infarct. There is no evidence of hemorrhage. No mass effect, edema or midline shift Moderate periventricular and subcortical white matter hypodensities, nonspecific but most likely represents chronic small vessel ischemic changes. No extra-axial fluid collection. Prominent ventricular system secondary to chronic parenchymal volume loss. The visualized orbits are unremarkable. The visualized paranasal sinuses and mastoid air cells are clear. The visualized soft tissues are unremarkable. No acute osseous abnormality.     Impression: No acute intracranial abnormality. Electronically Signed: Maikel Pacheco,   7/29/2024 6:07 PM EDT  Workstation ID: [id number]       Differential Diagnosis and Discussion:      Abdominal Pain: Based on the patient's signs and symptoms, I considered abdominal aortic aneurysm, small bowel obstruction, pancreatitis, acute cholecystitis, acute appendecitis, peptic ulcer disease, gastritis, colitis, endocrine disorders, irritable bowel syndrome and other differential diagnosis an etiology of the patient's abdominal pain.    All labs were reviewed and interpreted by me.  All X-rays impressions were independently interpreted by me.  EKG was interpreted by me.  EKG was interpreted by supervising attending.  CT scan radiology impression was interpreted by me.    MDM  Number of Diagnoses or Management Options  Adverse effect of drug, initial encounter  Ascending aorta dilation  Dehydration  Enterocolitis  Hepatic steatosis  Nephrolithiasis  Diagnosis management comments: The patient presents with vomiting and diarrhea consistent with gastroenteritis. The patient has a soft and benign abdominal exam. The patient is now resting comfortably and feels better, is alert, and is in no distress. The  patient is able to tolerate po intake in the ED. The patient´s labs were reviewed and hydration status was assessed. The patient has no signs of acute renal failure, sepsis, or dehydration that warrants admission to the hospital. The vital signs have been stable. The patient's condition is stable and appropriate for discharge. The patient will pursue further outpatient evaluation with the primary care physician or designated physician. The patient and/or caregivers have expressed a clear and thorough understanding and agreed to follow-up as instructed.       Amount and/or Complexity of Data Reviewed  Clinical lab tests: reviewed  Tests in the radiology section of CPT®: reviewed  Tests in the medicine section of CPT®: reviewed  Decide to obtain previous medical records or to obtain history from someone other than the patient: yes           Patient Care Considerations:    SEPSIS was considered but is NOT present in the emergency department as SIRS criteria is not present.      Consultants/Shared Management Plan:    None    Social Determinants of Health:    Patient is independent, reliable, and has access to care.       Disposition and Care Coordination:    Discharged: The patient is suitable and stable for discharge with no need for consideration of admission.    I have explained the patient´s condition, diagnoses and treatment plan based on the information available to me at this time. I have answered questions and addressed any concerns. The patient has a good  understanding of the patient´s diagnosis, condition, and treatment plan as can be expected at this point. The vital signs have been stable. The patient´s condition is stable and appropriate for discharge from the emergency department.      The patient will pursue further outpatient evaluation with the primary care physician or other designated or consulting physician as outlined in the discharge instructions. They are agreeable to this plan of care and  follow-up instructions have been explained in detail. The patient has received these instructions in written format and has expressed an understanding of the discharge instructions. The patient is aware that any significant change in condition or worsening of symptoms should prompt an immediate return to this or the closest emergency department or call to 911.  I have explained discharge medications and the need for follow up with the patient/caretakers. This was also printed in the discharge instructions. Patient was discharged with the following medications and follow up:      Medication List        New Prescriptions      metroNIDAZOLE 500 MG tablet  Commonly known as: FLAGYL  Take 1 tablet by mouth 2 (Two) Times a Day for 7 days.     sulfamethoxazole-trimethoprim 800-160 MG per tablet  Commonly known as: BACTRIM DS,SEPTRA DS  Take 1 tablet by mouth 2 (Two) Times a Day for 7 days.            Stop      ciprofloxacin 500 MG tablet  Commonly known as: Cipro               Where to Get Your Medications        These medications were sent to [facility]/pharmacy #[id number] - [place], [place] - [address] - [phone] [facility] [phone]   [address] [place] [address]      Hours: 24-hours Phone: [phone]   metroNIDAZOLE 500 MG tablet  sulfamethoxazole-trimethoprim 800-160 MG per tablet      [facility] SURGERY PROVIDER  [address] [facility] [address] [phone]        Felix Navarro Jr., MD  [address]    [place] [id number] [phone]             Final diagnoses:   Dehydration   Adverse effect of drug, initial encounter   Enterocolitis   Ascending aorta dilation   Nephrolithiasis   Hepatic steatosis        ED Disposition       ED Disposition   Discharge    Condition   Stable    Comment   --               This medical record created using voice recognition software.             Seaver, Alyce B, APRN  07/30/24 0127

## 2024-07-30 VITALS
TEMPERATURE: 99.1 F | SYSTOLIC BLOOD PRESSURE: 119 MMHG | OXYGEN SATURATION: 95 % | DIASTOLIC BLOOD PRESSURE: 70 MMHG | BODY MASS INDEX: 26.35 KG/M2 | HEIGHT: 69 IN | HEART RATE: 53 BPM | WEIGHT: 177.91 LBS | RESPIRATION RATE: 18 BRPM

## 2024-07-30 LAB
QT INTERVAL: 477 MS
QTC INTERVAL: 453 MS

## 2024-07-30 RX ORDER — METRONIDAZOLE 500 MG/1
500 TABLET ORAL 2 TIMES DAILY
Qty: 14 TABLET | Refills: 0 | Status: SHIPPED | OUTPATIENT
Start: 2024-07-30 | End: 2024-08-06

## 2024-07-30 RX ORDER — SULFAMETHOXAZOLE AND TRIMETHOPRIM 800; 160 MG/1; MG/1
1 TABLET ORAL 2 TIMES DAILY
Qty: 14 TABLET | Refills: 0 | Status: SHIPPED | OUTPATIENT
Start: 2024-07-30 | End: 2024-08-06

## 2024-07-30 NOTE — ED PROVIDER NOTES
"Patient is 79 y.o. year old male that presents to the ED for evaluation of abdominal pain.   Physical Exam    ED Course:    /70   Pulse 53   Temp 99.1 °F (37.3 °C) (Oral)   Resp 18   Ht 175.3 cm (69\")   Wt 80.7 kg (177 lb 14.6 oz)   SpO2 95%   BMI 26.27 kg/m²   Results for orders placed or performed during the hospital encounter of 07/29/24   COVID-19, FLU A/B, RSV PCR 1 HR TAT - Swab, Nasopharynx    Specimen: Nasopharynx; Swab   Result Value Ref Range    COVID19 Not Detected Not Detected - Ref. Range    Influenza A PCR Not Detected Not Detected    Influenza B PCR Not Detected Not Detected    RSV, PCR Not Detected Not Detected   Comprehensive Metabolic Panel    Specimen: Arm, Right; Blood   Result Value Ref Range    Glucose 181 (H) 65 - 99 mg/dL    BUN 22 8 - 23 mg/dL    Creatinine 1.29 (H) 0.76 - 1.27 mg/dL    Sodium 138 136 - 145 mmol/L    Potassium 4.3 3.5 - 5.2 mmol/L    Chloride 100 98 - 107 mmol/L    CO2 24.2 22.0 - 29.0 mmol/L    Calcium 9.3 8.6 - 10.5 mg/dL    Total Protein 6.5 6.0 - 8.5 g/dL    Albumin 4.0 3.5 - 5.2 g/dL    ALT (SGPT) 16 1 - 41 U/L    AST (SGOT) 19 1 - 40 U/L    Alkaline Phosphatase 74 39 - 117 U/L    Total Bilirubin 0.7 0.0 - 1.2 mg/dL    Globulin 2.5 gm/dL    A/G Ratio 1.6 g/dL    BUN/Creatinine Ratio 17.1 7.0 - 25.0    Anion Gap 13.8 5.0 - 15.0 mmol/L    eGFR 55.7 (L) >60.0 mL/min/1.73   Lipase    Specimen: Arm, Right; Blood   Result Value Ref Range    Lipase 200 (H) 13 - 60 U/L   Urinalysis With Microscopic If Indicated (No Culture) - Urine, Clean Catch    Specimen: Urine, Clean Catch   Result Value Ref Range    Color, UA Yellow Yellow, Straw    Appearance, UA Clear Clear    pH, UA <=5.0 5.0 - 8.0    Specific Gravity, UA >1.030 (H) 1.005 - 1.030    Glucose, UA >=1000 mg/dL (3+) (A) Negative    Ketones, UA Negative Negative    Bilirubin, UA Negative Negative    Blood, UA Negative Negative    Protein, UA Negative Negative    Leuk Esterase, UA Negative Negative    Nitrite, UA " Negative Negative    Urobilinogen, UA 1.0 E.U./dL 0.2 - 1.0 E.U./dL   Lactic Acid, Plasma    Specimen: Arm, Right; Blood   Result Value Ref Range    Lactate 3.2 (C) 0.5 - 2.0 mmol/L   CBC Auto Differential    Specimen: Arm, Right; Blood   Result Value Ref Range    WBC 8.66 3.40 - 10.80 10*3/mm3    RBC 5.73 4.14 - 5.80 10*6/mm3    Hemoglobin 17.1 13.0 - 17.7 g/dL    Hematocrit 50.0 37.5 - 51.0 %    MCV 87.3 79.0 - 97.0 fL    MCH 29.8 26.6 - 33.0 pg    MCHC 34.2 31.5 - 35.7 g/dL    RDW 14.8 12.3 - 15.4 %    RDW-SD 47.4 37.0 - 54.0 fl    MPV 10.0 6.0 - 12.0 fL    Platelets 174 140 - 450 10*3/mm3    Neutrophil % 53.4 42.7 - 76.0 %    Lymphocyte % 28.3 19.6 - 45.3 %    Monocyte % 15.2 (H) 5.0 - 12.0 %    Eosinophil % 2.3 0.3 - 6.2 %    Basophil % 0.3 0.0 - 1.5 %    Immature Grans % 0.5 0.0 - 0.5 %    Neutrophils, Absolute 4.62 1.70 - 7.00 10*3/mm3    Lymphocytes, Absolute 2.45 0.70 - 3.10 10*3/mm3    Monocytes, Absolute 1.32 (H) 0.10 - 0.90 10*3/mm3    Eosinophils, Absolute 0.20 0.00 - 0.40 10*3/mm3    Basophils, Absolute 0.03 0.00 - 0.20 10*3/mm3    Immature Grans, Absolute 0.04 0.00 - 0.05 10*3/mm3    nRBC 0.0 0.0 - 0.2 /100 WBC   STAT Lactic Acid, Reflex    Specimen: Blood   Result Value Ref Range    Lactate 1.8 0.5 - 2.0 mmol/L   Amylase    Specimen: Blood   Result Value Ref Range    Amylase 72 28 - 100 U/L   Single High Sensitivity Troponin T    Specimen: Blood   Result Value Ref Range    HS Troponin T 10 <22 ng/L   ECG 12 Lead Other; weakness   Result Value Ref Range    QT Interval 477 ms    QTC Interval 453 ms   Green Top (Gel)   Result Value Ref Range    Extra Tube Hold for add-ons.    Lavender Top   Result Value Ref Range    Extra Tube hold for add-on    Gold Top - SST   Result Value Ref Range    Extra Tube Hold for add-ons.    Light Blue Top   Result Value Ref Range    Extra Tube Hold for add-ons.      Medications   sodium chloride 0.9 % flush 10 mL (has no administration in time range)   sodium chloride 0.9 %  bolus 1,000 mL (1,000 mL Intravenous New Bag 7/29/24 7902)   iopamidol (ISOVUE-370) 76 % injection 100 mL (80 mL Intravenous Given 7/29/24 1916)     CT Abdomen Pelvis With Contrast    Result Date: 7/30/2024  Narrative: CT ABDOMEN PELVIS W CONTRAST-  Date of exam: 7/29/2024 11:37 PM.  Indication: abdominal pain, not otherwise specified  Comparison: 2/2/2024.  TECHNIQUE: Axial CT images were obtained of the abdomen and pelvis following the uneventful intravenous administration of 80 mL of Isovue-370 nonionic iodinated contrast agent. Reconstructed coronal and sagittal images were also obtained. Automated exposure control and iterative construction methods were used.  FINDINGS: A moderate-to-large stool burden is identified, probably increased in degree since the prior study. Colonic diverticula are present without definite acute diverticulitis. New right-sided age-indeterminate (but probably acute-to-subacute) infectious/inflammatory mild-to-moderate colitis is possible without a pericolonic/perienteric fluid collection to suggest abscess. There may also be involvement of the terminal ileum as with infectious/inflammatory ileitis. No pneumoperitoneum. No pneumatosis. No portal or mesenteric venous gas is seen. Ischemic enterocolitis is thought to be less likely. No acute appendicitis is seen.  Additionally, nonobstructing nephrolithiasis is seen on the right with calyceal stones estimated at 1 cm in greatest size. No left nephrolithiasis. No hydronephrosis. No ureterolithiasis. No acute pyelonephritis. There are bilateral inguinal hernias, as before. They contain fat and vessels and no bowel. There is a tiny umbilical hernia, which contains fat and no bowel. There is diffuse prostatomegaly with central prostatic calcifications, seen previously. The urinary bladder is under- distended, limiting its assessment. No urinary bladder calculi are seen. No abdominal aortic aneurysm or dissection. No acute intraperitoneal or  retroperitoneal hemorrhage. No ascites. There is diffuse hepatic steatosis without hepatomegaly. Probably no splenomegaly. The patient has undergone cholecystectomy. There is compensatory dilatation of the biliary tree (after cholecystectomy). No acute pancreatitis. Please correlate with pertinent lab values. The partially imaged lung bases are clear of acute infiltrate. Coronary artery calcifications are seen. There may be fusiform dilatation of the ascending aorta, measuring about 5.2 cm in greatest diameter.       Impression:  1. The study is ABNORMAL. New age-indeterminate but probably acute-to-subacute right-sided enterocolitis is suspected involving the terminal ileum and the cecum predominantly but also possibly other portions of the right colon. No pericolonic or perienteric fluid collection is seen to suggest abscess. No pneumoperitoneum. No mechanical bowel obstruction. No pneumatosis is seen to suggest ischemic enterocolitis. Consider close interval clinical, lab, and imaging follow-up to ensure a benign progression and to exclude an underlying malignant process.  2. There may be fusiform dilatation of the ascending aorta, measuring about 5.2 cm in greatest diameter. Consider close interval clinical and imaging follow-up of this finding, as well.  3. Please see above comments for further detail.    Please note that portions of this note were completed with a voice recognition program.     Electronically Signed By-Nemesio Grigsby MD On:7/30/2024 12:29 AM      XR Chest 1 View    Result Date: 7/29/2024  Narrative: XR CHEST 1 VW Date of exam: 7/29/2024 10:38, P.M. EDT. Indication: weakness, not otherwise specified Comparison: 12/7/2023. FINDINGS: A single AP (or PA) upright portable chest radiograph was performed. No cardiac enlargement is seen. No acute infiltrate is appreciated. No pleural effusion or pneumothorax is identified. The thoracic aorta is prominent, atherosclerotic, and ectatic, as before.  Chronic calcified granulomatous disease involves the chest. Degenerative changes involve the bilateral shoulders. No significant interval change is seen since the prior study (or studies).      Impression: No acute infiltrate is appreciated.    Please note that portions of this note were completed with a voice recognition program.  Electronically Signed: Nemesio Grigsby MD  7/29/2024 11:02 PM EDT  Workstation ID: [id number]    CT Head Without Contrast    Result Date: 7/29/2024  Narrative: CT HEAD WO CONTRAST Date of Exam: 7/29/2024 5:42 PM EDT Indication: Dizziness. Comparison: MRI 12/7/2023 Technique: Axial CT images were obtained of the head without contrast administration.  Reconstructed coronal and sagittal images were also obtained. Automated exposure control and iterative construction methods were used. Findings: No large territory infarct. There is no evidence of hemorrhage. No mass effect, edema or midline shift Moderate periventricular and subcortical white matter hypodensities, nonspecific but most likely represents chronic small vessel ischemic changes. No extra-axial fluid collection. Prominent ventricular system secondary to chronic parenchymal volume loss. The visualized orbits are unremarkable. The visualized paranasal sinuses and mastoid air cells are clear. The visualized soft tissues are unremarkable. No acute osseous abnormality.     Impression: Impression: No acute intracranial abnormality. Electronically Signed: Maikel Pacheco DO  7/29/2024 6:07 PM EDT  Workstation ID: [id number]     MDM:      I have seen and evaluated this patient and agree with the nurse practitioner or physician assistant´s documentation and assessment. All charts, labs, and imaging studies were reviewed. Documentation of one or more elements of my assessment included in the medical record. I agree with findings, exam, and plan.    Disposition:   ED Disposition       ED Disposition   Discharge    Condition   Stable    Comment    --                 Clincal Impression: Enterocolitis, ascending aortic aneurysm    Stuart Martin MD  01:04 EDT  07/30/24         Stuart Martin MD  07/30/24 0704

## 2024-07-30 NOTE — DISCHARGE INSTRUCTIONS
Follow-up with your primary care provider.  You will need a repeat CT scan of your abdomen and pelvis when infection is healed to ensure resolution.  You are being referred to cardiothoracic surgery.  They should call you within 2-3 business days to schedule an appointment however if you have not heard from them give them a call using the number provided.    You are being sent home with 2 antibiotics, Flagyl and Bactrim.  Take both of these medication 2 times daily for the next 7 days.  You may take both of these antibiotics with meals to prevent GI upset.    Make sure you are drinking plenty of fluids.  Make sure you are drinking fluids with electrolytes like sugar-free Gatorade or Pedialyte.

## 2024-08-07 DIAGNOSIS — E11.65 TYPE 2 DIABETES MELLITUS WITH HYPERGLYCEMIA, WITHOUT LONG-TERM CURRENT USE OF INSULIN: ICD-10-CM

## 2024-08-26 RX ORDER — TAMSULOSIN HYDROCHLORIDE 0.4 MG/1
1 CAPSULE ORAL DAILY
Qty: 90 CAPSULE | Refills: 0 | Status: SHIPPED | OUTPATIENT
Start: 2024-08-26

## 2024-09-09 RX ORDER — EMPAGLIFLOZIN 25 MG/1
TABLET, FILM COATED ORAL
Qty: 90 TABLET | Refills: 3 | Status: SHIPPED | OUTPATIENT
Start: 2024-09-09

## 2024-10-14 ENCOUNTER — TELEPHONE (OUTPATIENT)
Dept: INTERNAL MEDICINE | Facility: CLINIC | Age: 79
End: 2024-10-14
Payer: MEDICARE

## 2024-10-14 DIAGNOSIS — F02.80 FRONTAL LOBE DEMENTIA: ICD-10-CM

## 2024-10-14 DIAGNOSIS — G31.09 FRONTAL LOBE DEMENTIA: ICD-10-CM

## 2024-10-14 RX ORDER — CITALOPRAM HYDROBROMIDE 20 MG/1
10 TABLET ORAL DAILY
Qty: 45 TABLET | Refills: 0 | OUTPATIENT
Start: 2024-10-14

## 2024-10-14 RX ORDER — CITALOPRAM HYDROBROMIDE 20 MG/1
10 TABLET ORAL DAILY
Qty: 45 TABLET | Refills: 0 | Status: SHIPPED | OUTPATIENT
Start: 2024-10-14

## 2024-10-14 NOTE — TELEPHONE ENCOUNTER
Caller: JHONATAN SWAN    Relationship: Emergency Contact    Best call back number: [phone]     Requested Prescriptions:   CITALOPRAM        [facility]arm[facility] where request should be sent[facility] [address] [facility] [place] [facility] [address] [facility] [phone] [facility] [phone] FX     Last office visit with prescribing clinician: 7/23/2024   Last telemedicine visit with prescribing clinician: Visit date not found   Next office visit with prescribing clinician: 11/26/2024     Additional details provided by patient: CALLER STATED THAT THE PATIENT IS OUT OF MEDICATION     Does the patient have less than a 3 day supply:  [x] Yes  [] No    Ricardo Fuentes Rep   10/14/24 09:54 EDT

## 2024-10-29 DIAGNOSIS — E11.65 TYPE 2 DIABETES MELLITUS WITH HYPERGLYCEMIA, WITHOUT LONG-TERM CURRENT USE OF INSULIN: ICD-10-CM

## 2024-11-26 ENCOUNTER — OFFICE VISIT (OUTPATIENT)
Dept: INTERNAL MEDICINE | Facility: CLINIC | Age: 79
End: 2024-11-26
Payer: MEDICARE

## 2024-11-26 VITALS
BODY MASS INDEX: 25.89 KG/M2 | HEIGHT: 69 IN | HEART RATE: 68 BPM | DIASTOLIC BLOOD PRESSURE: 75 MMHG | TEMPERATURE: 97.3 F | SYSTOLIC BLOOD PRESSURE: 136 MMHG | OXYGEN SATURATION: 95 % | WEIGHT: 174.8 LBS

## 2024-11-26 DIAGNOSIS — J06.9 ACUTE URI: ICD-10-CM

## 2024-11-26 DIAGNOSIS — R35.0 BENIGN PROSTATIC HYPERPLASIA WITH URINARY FREQUENCY: ICD-10-CM

## 2024-11-26 DIAGNOSIS — E11.65 TYPE 2 DIABETES MELLITUS WITH HYPERGLYCEMIA, WITHOUT LONG-TERM CURRENT USE OF INSULIN: ICD-10-CM

## 2024-11-26 DIAGNOSIS — E55.9 VITAMIN D DEFICIENCY: ICD-10-CM

## 2024-11-26 DIAGNOSIS — Z23 NEED FOR INFLUENZA VACCINATION: ICD-10-CM

## 2024-11-26 DIAGNOSIS — I21.9 MYOCARDIAL INFARCTION, UNSPECIFIED MI TYPE, UNSPECIFIED ARTERY: ICD-10-CM

## 2024-11-26 DIAGNOSIS — I10 PRIMARY HYPERTENSION: Primary | ICD-10-CM

## 2024-11-26 DIAGNOSIS — E78.2 MIXED HYPERLIPIDEMIA: ICD-10-CM

## 2024-11-26 DIAGNOSIS — E07.9 THYROID DISORDER: ICD-10-CM

## 2024-11-26 DIAGNOSIS — N40.1 BENIGN PROSTATIC HYPERPLASIA WITH URINARY FREQUENCY: ICD-10-CM

## 2024-11-26 LAB
25(OH)D3 SERPL-MCNC: 52.1 NG/ML (ref 30–100)
BASOPHILS # BLD AUTO: 0.07 10*3/MM3 (ref 0–0.2)
BASOPHILS NFR BLD AUTO: 0.7 % (ref 0–1.5)
DEPRECATED RDW RBC AUTO: 43.1 FL (ref 37–54)
EOSINOPHIL # BLD AUTO: 0.22 10*3/MM3 (ref 0–0.4)
EOSINOPHIL NFR BLD AUTO: 2.3 % (ref 0.3–6.2)
ERYTHROCYTE [DISTWIDTH] IN BLOOD BY AUTOMATED COUNT: 13.2 % (ref 12.3–15.4)
HBA1C MFR BLD: 8.8 % (ref 4.8–5.6)
HCT VFR BLD AUTO: 51.6 % (ref 37.5–51)
HGB BLD-MCNC: 18.1 G/DL (ref 13–17.7)
IMM GRANULOCYTES # BLD AUTO: 0.07 10*3/MM3 (ref 0–0.05)
IMM GRANULOCYTES NFR BLD AUTO: 0.7 % (ref 0–0.5)
LYMPHOCYTES # BLD AUTO: 2.38 10*3/MM3 (ref 0.7–3.1)
LYMPHOCYTES NFR BLD AUTO: 24.9 % (ref 19.6–45.3)
MCH RBC QN AUTO: 31.6 PG (ref 26.6–33)
MCHC RBC AUTO-ENTMCNC: 35.1 G/DL (ref 31.5–35.7)
MCV RBC AUTO: 90.1 FL (ref 79–97)
MONOCYTES # BLD AUTO: 0.91 10*3/MM3 (ref 0.1–0.9)
MONOCYTES NFR BLD AUTO: 9.5 % (ref 5–12)
NEUTROPHILS NFR BLD AUTO: 5.91 10*3/MM3 (ref 1.7–7)
NEUTROPHILS NFR BLD AUTO: 61.9 % (ref 42.7–76)
NRBC BLD AUTO-RTO: 0 /100 WBC (ref 0–0.2)
PLATELET # BLD AUTO: 243 10*3/MM3 (ref 140–450)
PMV BLD AUTO: 11.1 FL (ref 6–12)
RBC # BLD AUTO: 5.73 10*6/MM3 (ref 4.14–5.8)
WBC NRBC COR # BLD AUTO: 9.56 10*3/MM3 (ref 3.4–10.8)

## 2024-11-26 PROCEDURE — 99214 OFFICE O/P EST MOD 30 MIN: CPT | Performed by: INTERNAL MEDICINE

## 2024-11-26 PROCEDURE — 80053 COMPREHEN METABOLIC PANEL: CPT | Performed by: INTERNAL MEDICINE

## 2024-11-26 PROCEDURE — 3075F SYST BP GE 130 - 139MM HG: CPT | Performed by: INTERNAL MEDICINE

## 2024-11-26 PROCEDURE — 85025 COMPLETE CBC W/AUTO DIFF WBC: CPT | Performed by: INTERNAL MEDICINE

## 2024-11-26 PROCEDURE — G2211 COMPLEX E/M VISIT ADD ON: HCPCS | Performed by: INTERNAL MEDICINE

## 2024-11-26 PROCEDURE — 83036 HEMOGLOBIN GLYCOSYLATED A1C: CPT | Performed by: INTERNAL MEDICINE

## 2024-11-26 PROCEDURE — 3078F DIAST BP <80 MM HG: CPT | Performed by: INTERNAL MEDICINE

## 2024-11-26 PROCEDURE — 84443 ASSAY THYROID STIM HORMONE: CPT | Performed by: INTERNAL MEDICINE

## 2024-11-26 PROCEDURE — 80061 LIPID PANEL: CPT | Performed by: INTERNAL MEDICINE

## 2024-11-26 PROCEDURE — 82306 VITAMIN D 25 HYDROXY: CPT | Performed by: INTERNAL MEDICINE

## 2024-11-26 PROCEDURE — 1126F AMNT PAIN NOTED NONE PRSNT: CPT | Performed by: INTERNAL MEDICINE

## 2024-11-26 RX ORDER — ASPIRIN 81 MG/1
81 TABLET ORAL DAILY
Qty: 90 TABLET | Refills: 3 | Status: SHIPPED | OUTPATIENT
Start: 2024-11-26

## 2024-11-26 RX ORDER — CEFDINIR 300 MG/1
300 CAPSULE ORAL 2 TIMES DAILY
Qty: 14 CAPSULE | Refills: 0 | Status: SHIPPED | OUTPATIENT
Start: 2024-11-26 | End: 2024-12-03

## 2024-11-26 NOTE — PROGRESS NOTES
"Chief Complaint  Hypertension (Follow up ), Cough (Most of the time its night and  early morning ), and Nasal Congestion (Early morning and night )    Subjective          Nikhil Baldwin presents to [facility]  History of Present Illness  Patient with congestion, cough x1 week. pt denies chest pain and shortness of breath.  Patient reports having sinus pain and drainage.  He reports coughing up some green-yellow mucus.  Diabetes-patient reports compliance with medications.  She denies hypoglycemic events.  Patient son present to add to history.  He reports dietary noncompliance recently.  Hyperlipidemia-patient is doing well on statin, he has not been taking his aspirin however.  Prostate-patient does not want to take finasteride.  He is okay taking Flomax however.    Current Outpatient Medications   Medication Instructions    aspirin 81 mg, Oral, Daily    carvedilol (COREG) 3.125 mg, Oral, Every 12 Hours Scheduled    cefdinir (OMNICEF) 300 mg, Oral, 2 Times Daily    Cholecalciferol 5,000 Units, Daily    citalopram (CELEXA) 10 mg, Oral, Daily    empagliflozin (Jardiance) 25 MG tablet tablet Take 1 tablet by mouth once daily    lisinopril (PRINIVIL,ZESTRIL) 10 mg, Oral, Daily    magnesium oxide (MAG-OX) 400 mg, Oral, Daily    melatonin 5 mg, Nightly    metFORMIN (GLUCOPHAGE) 500 mg, Oral, 2 Times Daily With Meals    rosuvastatin (CRESTOR) 10 mg, Oral, Daily    tamsulosin (FLOMAX) 0.4 mg, Oral, Daily       The following portions of the patient's history were reviewed and updated as appropriate: allergies, current medications, past family history, past medical history, past social history, past surgical history, and problem list.    Objective   Vital Signs:   /75 (BP Location: Left arm, Patient Position: Sitting, Cuff Size: Adult)   Pulse 68   Temp 97.3 °F (36.3 °C) (Temporal)   Ht 175.3 cm (69\")   Wt 79.3 kg (174 lb 12.8 oz)   SpO2 95%   BMI 25.81 kg/m²     BP " Readings from Last 3 Encounters:   11/26/24 136/75   07/30/24 119/70   07/23/24 127/87     Wt Readings from Last 3 Encounters:   11/26/24 79.3 kg (174 lb 12.8 oz)   07/29/24 80.7 kg (177 lb 14.6 oz)   07/23/24 81.2 kg (179 lb)      Physical Exam   Appearance: No acute distress, well-nourished  Head: normocephalic, atraumatic  Eyes: extraocular movements intact, no scleral icterus, no conjunctival injection  Ears, Nose, and Throat: external ears normal, nares patent, moist mucous membranes  Cardiovascular: regular rate and rhythm. no murmurs, rubs, or gallops. no edema  Respiratory: breathing comfortably, symmetric chest rise, clear to auscultation bilaterally. No wheezes, rales, or rhonchi.  Neuro: alert and oriented to time, place, and person. Normal gait  Psych: normal mood and affect       Result Review :   The following data was reviewed by: Felix Navarro Jr, MD on 11/26/2024:  Common labs          5/24/2024    13:42 7/23/2024    10:51 7/29/2024    17:12   Common Labs   Glucose 143  328  181    BUN 18  20  22    Creatinine 1.13  1.28  1.29    Sodium 142  137  138    Potassium 4.2  4.6  4.3    Chloride 105  103  100    Calcium 10.1  9.7  9.3    Albumin 4.4  4.3  4.0    Total Bilirubin 0.7  0.7  0.7    Alkaline Phosphatase 64  69  74    AST (SGOT) 23  24  19    ALT (SGPT) 19  22  16    WBC 8.15  8.10  8.66    Hemoglobin 17.2  17.6  17.1    Hematocrit 50.2  53.3  50.0    Platelets 188  177  174    Total Cholesterol  141     Triglycerides  225     HDL Cholesterol  46     LDL Cholesterol   59     Hemoglobin A1C  7.30         Lab Results   Component Value Date    SARSANTIGEN Not Detected 02/13/2023    COVID19 Not Detected 07/29/2024    FLU Negative 12/01/2022    FLU Negative 12/01/2022    RAPSCRN Negative 02/13/2023    STREPAAG Negative 12/08/2023    RSV Not Detected 07/29/2024    INR 1.0 11/12/2021    BILIRUBINUR Negative 07/29/2024          Assessment and Plan    Diagnoses and all orders for this  visit:    1. Primary hypertension (Primary)  Comments:  Check labs.  Goal BP less than 130/80.  Orders:  -     CBC & Differential  -     Comprehensive Metabolic Panel    2. Mixed hyperlipidemia  Comments:  Continue statin check labs.  Orders:  -     Lipid Panel    3. Type 2 diabetes mellitus with hyperglycemia, without long-term current use of insulin  Comments:  Continue current med regimen.  Concerned with weight loss.  Check labs.  Orders:  -     Hemoglobin A1c  -     Microalbumin / Creatinine Urine Ratio - Urine, Clean Catch    4. Vitamin D deficiency  -     Vitamin D,25-Hydroxy    5. Thyroid disorder  -     TSH    6. Myocardial infarction, unspecified MI type, unspecified artery  -     aspirin 81 MG EC tablet; Take 1 tablet by mouth Daily.  Dispense: 90 tablet; Refill: 3    7. Acute URI  Comments:  Given duration of symptoms.  Will prescribe Augmentin to alleviate.  Orders:  -     cefdinir (OMNICEF) 300 MG capsule; Take 1 capsule by mouth 2 (Two) Times a Day for 7 days.  Dispense: 14 capsule; Refill: 0    8. Need for influenza vaccination    9. Benign prostatic hyperplasia with urinary frequency  Comments:  cont f/u with urology. pt defers finasteride. cont flomax.          Medications Discontinued During This Encounter   Medication Reason    finasteride (PROSCAR) 5 MG tablet *Therapy completed    aspirin 81 MG EC tablet Reorder          Follow Up   Return in about 4 months (around 3/26/2025) for DM, HTN.  Patient was given instructions and counseling regarding his condition or for health maintenance advice. Please see specific information pulled into the AVS if appropriate.       Felix Navarro Jr, MD  11/26/24  15:57 EST

## 2024-11-27 LAB
ALBUMIN SERPL-MCNC: 4.3 G/DL (ref 3.5–5.2)
ALBUMIN/GLOB SERPL: 1.3 G/DL
ALP SERPL-CCNC: 87 U/L (ref 39–117)
ALT SERPL W P-5'-P-CCNC: 13 U/L (ref 1–41)
ANION GAP SERPL CALCULATED.3IONS-SCNC: 15 MMOL/L (ref 5–15)
AST SERPL-CCNC: 16 U/L (ref 1–40)
BILIRUB SERPL-MCNC: 0.6 MG/DL (ref 0–1.2)
BUN SERPL-MCNC: 17 MG/DL (ref 8–23)
BUN/CREAT SERPL: 16.2 (ref 7–25)
CALCIUM SPEC-SCNC: 10.4 MG/DL (ref 8.6–10.5)
CHLORIDE SERPL-SCNC: 100 MMOL/L (ref 98–107)
CHOLEST SERPL-MCNC: 139 MG/DL (ref 0–200)
CO2 SERPL-SCNC: 24 MMOL/L (ref 22–29)
CREAT SERPL-MCNC: 1.05 MG/DL (ref 0.76–1.27)
EGFRCR SERPLBLD CKD-EPI 2021: 71.3 ML/MIN/1.73
GLOBULIN UR ELPH-MCNC: 3.2 GM/DL
GLUCOSE SERPL-MCNC: 171 MG/DL (ref 65–99)
HDLC SERPL-MCNC: 38 MG/DL (ref 40–60)
LDLC SERPL CALC-MCNC: 65 MG/DL (ref 0–100)
LDLC/HDLC SERPL: 1.5 {RATIO}
POTASSIUM SERPL-SCNC: 4.7 MMOL/L (ref 3.5–5.2)
PROT SERPL-MCNC: 7.5 G/DL (ref 6–8.5)
SODIUM SERPL-SCNC: 139 MMOL/L (ref 136–145)
TRIGL SERPL-MCNC: 220 MG/DL (ref 0–150)
TSH SERPL DL<=0.05 MIU/L-ACNC: 1.79 UIU/ML (ref 0.27–4.2)
VLDLC SERPL-MCNC: 36 MG/DL (ref 5–40)

## 2024-12-03 DIAGNOSIS — E11.65 TYPE 2 DIABETES MELLITUS WITH HYPERGLYCEMIA, WITHOUT LONG-TERM CURRENT USE OF INSULIN: ICD-10-CM

## 2024-12-03 RX ORDER — SITAGLIPTIN 100 MG/1
100 TABLET, FILM COATED ORAL DAILY
Qty: 90 TABLET | Refills: 0 | OUTPATIENT
Start: 2024-12-03

## 2024-12-04 ENCOUNTER — TELEPHONE (OUTPATIENT)
Dept: INTERNAL MEDICINE | Facility: CLINIC | Age: 79
End: 2024-12-04
Payer: MEDICARE

## 2024-12-04 NOTE — TELEPHONE ENCOUNTER
----- Message from Felix Navarro sent at 12/3/2024  8:02 AM EST -----  Hemoglobin A1c has significantly increased from prior check.  Ensure taking Jardiance and metformin.  Would recommend increase metformin to 850 mg dosage.  I will send updated prescription.    Elevated triglycerides, which are the storage form of sugar - recommend lower carbohydrate/sugar intake.     HDL low - this is protective cholesterol and generally like the number to be >50. encourage exercise to increase level.

## 2024-12-30 ENCOUNTER — HOSPITAL ENCOUNTER (EMERGENCY)
Facility: HOSPITAL | Age: 79
Discharge: HOME | End: 2024-12-30
Attending: EMERGENCY MEDICINE | Admitting: EMERGENCY MEDICINE
Payer: MEDICARE

## 2024-12-30 ENCOUNTER — APPOINTMENT (OUTPATIENT)
Dept: GENERAL RADIOLOGY | Facility: HOSPITAL | Age: 79
End: 2024-12-30
Payer: MEDICARE

## 2024-12-30 VITALS
HEART RATE: 75 BPM | DIASTOLIC BLOOD PRESSURE: 88 MMHG | OXYGEN SATURATION: 93 % | SYSTOLIC BLOOD PRESSURE: 138 MMHG | RESPIRATION RATE: 18 BRPM | TEMPERATURE: 98 F

## 2024-12-30 DIAGNOSIS — R06.02 SHORTNESS OF BREATH: Primary | ICD-10-CM

## 2024-12-30 LAB
ALBUMIN SERPL-MCNC: 4.1 G/DL (ref 3.5–5.2)
ALBUMIN/GLOB SERPL: 1.4 G/DL
ALP SERPL-CCNC: 72 U/L (ref 39–117)
ALT SERPL W P-5'-P-CCNC: 17 U/L (ref 1–41)
ANION GAP SERPL CALCULATED.3IONS-SCNC: 12.8 MMOL/L (ref 5–15)
AST SERPL-CCNC: 21 U/L (ref 1–40)
BASOPHILS # BLD AUTO: 0.09 10*3/MM3 (ref 0–0.2)
BASOPHILS NFR BLD AUTO: 1.1 % (ref 0–1.5)
BILIRUB SERPL-MCNC: 0.7 MG/DL (ref 0–1.2)
BUN SERPL-MCNC: 13 MG/DL (ref 8–23)
BUN/CREAT SERPL: 12.9 (ref 7–25)
CALCIUM SPEC-SCNC: 9.8 MG/DL (ref 8.6–10.5)
CHLORIDE SERPL-SCNC: 103 MMOL/L (ref 98–107)
CO2 SERPL-SCNC: 23.2 MMOL/L (ref 22–29)
CREAT SERPL-MCNC: 1.01 MG/DL (ref 0.76–1.27)
DEPRECATED RDW RBC AUTO: 43.8 FL (ref 37–54)
EGFRCR SERPLBLD CKD-EPI 2021: 74.7 ML/MIN/1.73
EOSINOPHIL # BLD AUTO: 0.29 10*3/MM3 (ref 0–0.4)
EOSINOPHIL NFR BLD AUTO: 3.7 % (ref 0.3–6.2)
ERYTHROCYTE [DISTWIDTH] IN BLOOD BY AUTOMATED COUNT: 13.7 % (ref 12.3–15.4)
FLUAV SUBTYP SPEC NAA+PROBE: NOT DETECTED
FLUBV RNA ISLT QL NAA+PROBE: NOT DETECTED
GLOBULIN UR ELPH-MCNC: 3 GM/DL
GLUCOSE SERPL-MCNC: 202 MG/DL (ref 65–99)
HCT VFR BLD AUTO: 49 % (ref 37.5–51)
HGB BLD-MCNC: 16.8 G/DL (ref 13–17.7)
HOLD SPECIMEN: NORMAL
HOLD SPECIMEN: NORMAL
IMM GRANULOCYTES # BLD AUTO: 0.08 10*3/MM3 (ref 0–0.05)
IMM GRANULOCYTES NFR BLD AUTO: 1 % (ref 0–0.5)
LYMPHOCYTES # BLD AUTO: 1.68 10*3/MM3 (ref 0.7–3.1)
LYMPHOCYTES NFR BLD AUTO: 21.4 % (ref 19.6–45.3)
MCH RBC QN AUTO: 30.1 PG (ref 26.6–33)
MCHC RBC AUTO-ENTMCNC: 34.3 G/DL (ref 31.5–35.7)
MCV RBC AUTO: 87.7 FL (ref 79–97)
MONOCYTES # BLD AUTO: 0.93 10*3/MM3 (ref 0.1–0.9)
MONOCYTES NFR BLD AUTO: 11.8 % (ref 5–12)
NEUTROPHILS NFR BLD AUTO: 4.79 10*3/MM3 (ref 1.7–7)
NEUTROPHILS NFR BLD AUTO: 61 % (ref 42.7–76)
NRBC BLD AUTO-RTO: 0 /100 WBC (ref 0–0.2)
NT-PROBNP SERPL-MCNC: 41.7 PG/ML (ref 0–1800)
PLATELET # BLD AUTO: 205 10*3/MM3 (ref 140–450)
PMV BLD AUTO: 10.1 FL (ref 6–12)
POTASSIUM SERPL-SCNC: 4.3 MMOL/L (ref 3.5–5.2)
PROT SERPL-MCNC: 7.1 G/DL (ref 6–8.5)
QT INTERVAL: 398 MS
QTC INTERVAL: 442 MS
RBC # BLD AUTO: 5.59 10*6/MM3 (ref 4.14–5.8)
RSV RNA NPH QL NAA+NON-PROBE: NOT DETECTED
SARS-COV-2 RNA RESP QL NAA+PROBE: NOT DETECTED
SODIUM SERPL-SCNC: 139 MMOL/L (ref 136–145)
TROPONIN T SERPL HS-MCNC: 11 NG/L
WBC NRBC COR # BLD AUTO: 7.86 10*3/MM3 (ref 3.4–10.8)
WHOLE BLOOD HOLD COAG: NORMAL
WHOLE BLOOD HOLD SPECIMEN: NORMAL

## 2024-12-30 PROCEDURE — 71045 X-RAY EXAM CHEST 1 VIEW: CPT

## 2024-12-30 PROCEDURE — 93005 ELECTROCARDIOGRAM TRACING: CPT | Performed by: EMERGENCY MEDICINE

## 2024-12-30 PROCEDURE — 83880 ASSAY OF NATRIURETIC PEPTIDE: CPT | Performed by: EMERGENCY MEDICINE

## 2024-12-30 PROCEDURE — 87637 SARSCOV2&INF A&B&RSV AMP PRB: CPT

## 2024-12-30 PROCEDURE — 80053 COMPREHEN METABOLIC PANEL: CPT | Performed by: EMERGENCY MEDICINE

## 2024-12-30 PROCEDURE — 84484 ASSAY OF TROPONIN QUANT: CPT | Performed by: EMERGENCY MEDICINE

## 2024-12-30 PROCEDURE — 85025 COMPLETE CBC W/AUTO DIFF WBC: CPT | Performed by: EMERGENCY MEDICINE

## 2024-12-30 PROCEDURE — 99284 EMERGENCY DEPT VISIT MOD MDM: CPT

## 2024-12-30 PROCEDURE — 93005 ELECTROCARDIOGRAM TRACING: CPT

## 2024-12-30 PROCEDURE — 36415 COLL VENOUS BLD VENIPUNCTURE: CPT

## 2024-12-30 RX ORDER — SODIUM CHLORIDE 0.9 % (FLUSH) 0.9 %
10 SYRINGE (ML) INJECTION AS NEEDED
Status: DISCONTINUED | OUTPATIENT
Start: 2024-12-30 | End: 2024-12-30 | Stop reason: HOSPADM

## 2024-12-30 NOTE — ED PROVIDER NOTES
Time: 2:18 PM EST  Date of encounter:  12/30/2024  Independent Historian/Clinical History and Information was obtained by:   Patient and Family    History is limited by: N/A    Chief Complaint: Dyspnea      History of Present Illness:  Patient is a 79 y.o. year old male who presents to the emergency department for evaluation of dyspnea x 2 days.  Patient states he has been having a dry cough for the past couple weeks.  Patient is a dizziness.  Denies fevers and vomiting.      Patient Care Team  Primary Care Provider: Felix Navarro Jr., MD    Past Medical History:     Allergies   Allergen Reactions    Ciprofloxacin Nausea And Vomiting    Codeine Unknown - Low Severity    Promethazine Unknown - Low Severity    Celecoxib Rash    Ibuprofen Rash    Penicillins Rash    Promethazine Hcl Rash    Warfarin Rash     Past Medical History:   Diagnosis Date    Aorta disorder     Arthritis     Degenerative joint disease     Dementia     FRONTAL LOBE    Depression     Diabetes mellitus     Hyperlipidemia     Hypertension     Kidney stone     Stroke     MILD RIGHT SIDE DEFICITS     Past Surgical History:   Procedure Laterality Date    CHOLECYSTECTOMY      CYSTOSCOPY BLADDER STONE LITHOTRIPSY      KIDNEY STONE SURGERY       No family history on file.    Home Medications:  Prior to Admission medications    Medication Sig Start Date End Date Taking? Authorizing Provider   aspirin 81 MG EC tablet Take 1 tablet by mouth Daily. 11/26/24   Felix Navarro Jr., MD   carvedilol (COREG) 3.125 MG tablet Take 1 tablet by mouth Every 12 (Twelve) Hours. 12/22/23   Felix Navarro Jr., MD   Cholecalciferol 125 MCG (5000 UT) tablet Take 1 tablet by mouth Daily.    Provider, MD Mark   citalopram (CeleXA) 20 MG tablet Take 0.5 tablets by mouth Daily. 10/14/24   Felix Navarro Jr., MD   empagliflozin (Jardiance) 25 MG tablet tablet Take 1 tablet by mouth once daily 9/9/24   Felix Navarro Jr., MD    lisinopril (PRINIVIL,ZESTRIL) 10 MG tablet Take 1 tablet by mouth Daily. 5/24/24   Felix Navarro Jr., MD   magnesium oxide (MAG-OX) 400 MG tablet Take 1 tablet by mouth Daily. 12/21/23   Felix Navarro Jr., MD   melatonin 5 MG tablet tablet Take 1 tablet by mouth Every Night.    Provider, MD Mark   metFORMIN (GLUCOPHAGE) 850 MG tablet Take 1 tablet by mouth 2 (Two) Times a Day With Meals. 12/3/24   Felix Navarro Jr., MD   rosuvastatin (CRESTOR) 10 MG tablet Take 1 tablet by mouth Daily. 7/25/24   Felix Navarro Jr., MD   tamsulosin (FLOMAX) 0.4 MG capsule 24 hr capsule Take 1 capsule by mouth once daily 8/26/24   Felix Navarro Jr., MD        Social History:   Social History     Tobacco Use    Smoking status: Never     Passive exposure: Never    Smokeless tobacco: Never   Vaping Use    Vaping status: Never Used   Substance Use Topics    Alcohol use: Never    Drug use: Never         Review of Systems:  Review of Systems   Constitutional:  Negative for chills and fever.   HENT:  Negative for congestion, rhinorrhea and sore throat.    Eyes:  Negative for pain and visual disturbance.   Respiratory:  Positive for cough and shortness of breath. Negative for apnea and chest tightness.    Cardiovascular:  Negative for chest pain, palpitations and leg swelling.   Gastrointestinal:  Negative for abdominal pain, diarrhea, nausea and vomiting.   Genitourinary:  Negative for difficulty urinating and dysuria.   Musculoskeletal:  Negative for joint swelling and myalgias.   Skin:  Negative for color change.   Neurological:  Positive for dizziness. Negative for seizures and headaches.   Psychiatric/Behavioral: Negative.     All other systems reviewed and are negative.       Physical Exam:  /88   Pulse 75   Temp 98 °F (36.7 °C) (Oral)   Resp 18   SpO2 93%     Physical Exam  Vitals and nursing note reviewed.   Constitutional:       General: He is not in acute  distress.     Appearance: Normal appearance. He is not toxic-appearing.   HENT:      Head: Normocephalic and atraumatic.      Jaw: There is normal jaw occlusion.   Eyes:      General: Lids are normal.      Extraocular Movements: Extraocular movements intact.      Conjunctiva/sclera: Conjunctivae normal.      Pupils: Pupils are equal, round, and reactive to light.   Cardiovascular:      Rate and Rhythm: Normal rate and regular rhythm.      Pulses: Normal pulses.      Heart sounds: Normal heart sounds.   Pulmonary:      Effort: Pulmonary effort is normal. No respiratory distress.      Breath sounds: Normal breath sounds. No wheezing or rhonchi.   Abdominal:      General: Abdomen is flat. There is no distension.      Palpations: Abdomen is soft.      Tenderness: There is no abdominal tenderness. There is no guarding or rebound.   Musculoskeletal:         General: Normal range of motion.      Cervical back: Normal range of motion and neck supple.      Right lower leg: No edema.      Left lower leg: No edema.   Skin:     General: Skin is warm and dry.      Coloration: Skin is not cyanotic.   Neurological:      Mental Status: He is alert and oriented to person, place, and time. Mental status is at baseline.   Psychiatric:         Attention and Perception: Attention and perception normal.         Mood and Affect: Mood normal.                    Medical Decision Making:      Comorbidities that affect care:    Diabetes, thyroid disorder, stroke    External Notes reviewed:    Previous Clinic Note: Internal medicine office visit for general medical management      The following orders were placed and all results were independently analyzed by me:  Orders Placed This Encounter   Procedures    COVID-19, FLU A/B, RSV PCR 1 HR TAT - Swab, Nasopharynx    XR Chest 1 View    [place] Draw    Comprehensive Metabolic Panel    BNP    High Sensitivity Troponin T    CBC Auto Differential    Continuous Pulse Oximetry    Vital Signs    ECG  12 Lead ED Triage Standing Order; SOA    CBC & Differential    Green Top (Gel)    Lavender Top    Gold Top - SST    Light Blue Top       Medications Given in the Emergency Department:  Medications - No data to display       ED Course:    ED Course as of 12/30/24 2125   Mon Dec 30, 2024   1419   --- PROVIDER IN TRIAGE NOTE ---    The patient was evaluated by Samara bryant in triage. Orders were placed and the patient is currently awaiting disposition.    [AJ]      ED Course User Index  [AJ] Samara Saldivar PA-C       Labs:    Lab Results (last 24 hours)       Procedure Component Value Units Date/Time    CBC & Differential [[id number]]  (Abnormal) Collected: 12/30/24 1251    Specimen: Blood Updated: 12/30/24 1259    Narrative:      The following orders were created for panel order CBC & Differential.  Procedure                               Abnormality         Status                     ---------                               -----------         ------                     CBC Auto Differential[[id number]]        Abnormal            Final result                 Please view results for these tests on the individual orders.    Comprehensive Metabolic Panel [[id number]]  (Abnormal) Collected: 12/30/24 1251    Specimen: Blood Updated: 12/30/24 1322     Glucose 202 mg/dL      BUN 13 mg/dL      Creatinine 1.01 mg/dL      Sodium 139 mmol/L      Potassium 4.3 mmol/L      Chloride 103 mmol/L      CO2 23.2 mmol/L      Calcium 9.8 mg/dL      Total Protein 7.1 g/dL      Albumin 4.1 g/dL      ALT (SGPT) 17 U/L      AST (SGOT) 21 U/L      Alkaline Phosphatase 72 U/L      Total Bilirubin 0.7 mg/dL      Globulin 3.0 gm/dL      A/G Ratio 1.4 g/dL      BUN/Creatinine Ratio 12.9     Anion Gap 12.8 mmol/L      eGFR 74.7 mL/min/1.73     Narrative:      GFR Categories in Chronic Kidney Disease (CKD)      GFR Category          GFR (mL/min/1.73)    Interpretation  G1                     90 or greater         Normal or high (1)  G2                       60-89                Mild decrease (1)  G3a                   45-59                Mild to moderate decrease  G3b                   30-44                Moderate to severe decrease  G4                    15-29                Severe decrease  G5                    14 or less           Kidney failure          (1)In the absence of evidence of kidney disease, neither GFR category G1 or G2 fulfill the criteria for CKD.    eGFR calculation 2021 CKD-EPI creatinine equation, which does not include race as a factor    BNP [[id number]]  (Normal) Collected: 12/30/24 1251    Specimen: Blood Updated: 12/30/24 1318     proBNP 41.7 pg/mL     Narrative:      This assay is used as an aid in the diagnosis of individuals suspected of having heart failure. It can be used as an aid in the diagnosis of acute decompensated heart failure (ADHF) in patients presenting with signs and symptoms of ADHF to the emergency department (ED). In addition, NT-proBNP of <300 pg/mL indicates ADHF is not likely.    Age Range Result Interpretation  NT-proBNP Concentration (pg/mL:      <50             Positive            >450                   Gray                 300-450                    Negative             <300    50-75           Positive            >900                  Gray                300-900                  Negative            <300      >75             Positive            >1800                  Gray                300-1800                  Negative            <300    High Sensitivity Troponin T [[id number]]  (Normal) Collected: 12/30/24 1251    Specimen: Blood Updated: 12/30/24 1322     HS Troponin T 11 ng/L     Narrative:      High Sensitive Troponin T Reference Range:  <14.0 ng/L- Negative Female for AMI  <22.0 ng/L- Negative Male for AMI  >=14 - Abnormal Female indicating possible myocardial injury.  >=22 - Abnormal Male indicating possible myocardial injury.   Clinicians would have to utilize clinical acumen, EKG, Troponin,  and serial changes to determine if it is an Acute Myocardial Infarction or myocardial injury due to an underlying chronic condition.         CBC Auto Differential [[id number]]  (Abnormal) Collected: 12/30/24 1251    Specimen: Blood Updated: 12/30/24 1259     WBC 7.86 10*3/mm3      RBC 5.59 10*6/mm3      Hemoglobin 16.8 g/dL      Hematocrit 49.0 %      MCV 87.7 fL      MCH 30.1 pg      MCHC 34.3 g/dL      RDW 13.7 %      RDW-SD 43.8 fl      MPV 10.1 fL      Platelets 205 10*3/mm3      Neutrophil % 61.0 %      Lymphocyte % 21.4 %      Monocyte % 11.8 %      Eosinophil % 3.7 %      Basophil % 1.1 %      Immature Grans % 1.0 %      Neutrophils, Absolute 4.79 10*3/mm3      Lymphocytes, Absolute 1.68 10*3/mm3      Monocytes, Absolute 0.93 10*3/mm3      Eosinophils, Absolute 0.29 10*3/mm3      Basophils, Absolute 0.09 10*3/mm3      Immature Grans, Absolute 0.08 10*3/mm3      nRBC 0.0 /100 WBC     COVID-19, FLU A/B, RSV PCR 1 HR TAT - Swab, Nasopharynx [[id number]]  (Normal) Collected: 12/30/24 1422    Specimen: Swab from Nasopharynx Updated: 12/30/24 1513     COVID19 Not Detected     Influenza A PCR Not Detected     Influenza B PCR Not Detected     RSV, PCR Not Detected    Narrative:      Fact sheet for providers: https://www.fda.gov/media/949372/download    Fact sheet for patients: https://www.fda.gov/media/083644/download    Test performed by PCR.             Imaging:    XR Chest 1 View    Result Date: 12/30/2024  XR CHEST 1 VW Date of Exam: 12/30/2024 1:22 PM EST Indication: SOA Triage Protocol Comparison: Chest radiograph 7/29/2024 Findings: Cardiomegaly similar to prior. Mild prominence of the interstitium most significant in the lower lobes new from prior exam. No discrete lobar consolidation, large effusion or pneumothorax. Degenerative related osseous change.     Impression: Cardiomegaly with slight increasing interstitial prominence suspicious for vascular congestion and/or mild edema in the right clinical setting.  Correlate for CHF exacerbation. Electronically Signed: Stuart Alamo MD  12/30/2024 1:43 PM EST  Workstation ID: [id number]       Differential Diagnosis and Discussion:    Dyspnea: Differential diagnosis includes but is not limited to metabolic acidosis, neurological disorders, psychogenic, asthma, pneumothorax, upper airway obstruction, COPD, pneumonia, noncardiogenic pulmonary edema, interstitial lung disease, anemia, congestive heart failure, and pulmonary embolism    PROCEDURES:    Labs were collected in the emergency department and all labs were reviewed and interpreted by me.  X-ray were performed in the emergency department and all X-ray impressions were independently interpreted by me.  An EKG was performed and the EKG was interpreted by me.    ECG 12 Lead ED Triage Standing Order; SOA   Preliminary Result   HEART RATE=74  bpm   RR Krhpvqyz=010  ms   WA Galfoixe=705  ms   P Horizontal Axis=27  deg   P Front Axis=-15  deg   QRSD Efirvads=021  ms   QT Zjivamzg=181  ms   ZBpD=730  ms   QRS Axis=-65  deg   T Wave Axis=47  deg   - ABNORMAL ECG -   Sinus rhythm   Left anterior fascicular block   Probable left ventricular hypertrophy   Borderline T abnormalities, lateral leads   Date and Time of Study:2024-12-30 12:41:07        My interpretation of EKG shows left anterior physical block, normal rate, no acute schema, normal QT    Procedures    MDM  Number of Diagnoses or Management Options  Shortness of breath  Diagnosis management comments: In summary this is an 79-year-old male patient who presents to the emergency department for evaluation of shortness of breath.  Chest x-ray reviewed by me is unremarkable for acute pathology.  CBC independently reviewed and interpreted by me and shows no critical abnormalities.  CMP independently reviewed and interpreted by me and shows no critical abnormalities.  COVID-19, influenza, RSV all independently reviewed interpreted by me are unremarkable and negative.   High-sensitivity troponin and BNP both independently reviewed and interpreted by me is unremarkable for acute pathology.  Patient is otherwise well-appearing in no acute distress specifically no acute respiratory distress at this time.  Very strict return to ER and follow-up instructions have been provided to the patient.                         Patient Care Considerations:    ANTIBIOTICS: I considered prescribing antibiotics as an outpatient however no bacterial focus of infection was found.      Consultants/Shared Management Plan:    None    Social Determinants of Health:    Patient is independent, reliable, and has access to care.       Disposition and Care Coordination:    Discharged: I considered escalation of care by admitting this patient to the hospital, however no respiratory distress or failure noted    I have explained the patient´s condition, diagnoses and treatment plan based on the information available to me at this time. I have answered questions and addressed any concerns. The patient has a good  understanding of the patient´s diagnosis, condition, and treatment plan as can be expected at this point. The vital signs have been stable. The patient´s condition is stable and appropriate for discharge from the emergency department.      The patient will pursue further outpatient evaluation with the primary care physician or other designated or consulting physician as outlined in the discharge instructions. They are agreeable to this plan of care and follow-up instructions have been explained in detail. The patient has received these instructions in written format and has expressed an understanding of the discharge instructions. The patient is aware that any significant change in condition or worsening of symptoms should prompt an immediate return to this or the closest emergency department or call to 911.  I have explained discharge medications and the need for follow up with the patient/caretakers. This  was also printed in the discharge instructions. Patient was discharged with the following medications and follow up:      Medication List      No changes were made to your prescriptions during this visit.      Felix Navarro Jr., MD  [address] [place] [id number]  [phone]    In 1 week         Final diagnoses:   Shortness of breath        ED Disposition       ED Disposition   Discharge    Condition   Stable    Comment   --               This medical record created using voice recognition software.             Juan José Nash MD  12/30/24 [id number]

## 2025-01-06 DIAGNOSIS — F02.80 FRONTAL LOBE DEMENTIA: ICD-10-CM

## 2025-01-06 DIAGNOSIS — G31.09 FRONTAL LOBE DEMENTIA: ICD-10-CM

## 2025-01-06 RX ORDER — CITALOPRAM HYDROBROMIDE 20 MG/1
10 TABLET ORAL DAILY
Qty: 45 TABLET | Refills: 0 | Status: SHIPPED | OUTPATIENT
Start: 2025-01-06

## 2025-01-23 DIAGNOSIS — E11.65 TYPE 2 DIABETES MELLITUS WITH HYPERGLYCEMIA, WITHOUT LONG-TERM CURRENT USE OF INSULIN: ICD-10-CM

## 2025-02-13 RX ORDER — TAMSULOSIN HYDROCHLORIDE 0.4 MG/1
1 CAPSULE ORAL DAILY
Qty: 90 CAPSULE | Refills: 0 | Status: SHIPPED | OUTPATIENT
Start: 2025-02-13

## 2025-02-21 RX ORDER — MAGNESIUM OXIDE TAB 400 MG (241.3 MG ELEMENTAL MG) 400 (241.3 MG) MG
1 TAB ORAL DAILY
Qty: 90 TABLET | Refills: 0 | Status: SHIPPED | OUTPATIENT
Start: 2025-02-21

## 2025-03-24 NOTE — PROGRESS NOTES
Subjective   The ABCs of the Annual Wellness Visit  Medicare Wellness Visit      Nikhil Baldwin is a 79 y.o. patient who presents for a Medicare Wellness Visit.    The following portions of the patient's history were reviewed and   updated as appropriate: allergies, current medications, past family history, past medical history, past social history, past surgical history, and problem list.    Compared to one year ago, the patient's physical   health is worse.  Compared to one year ago, the patient's mental   health is worse.    Recent Hospitalizations:  He was not admitted to the hospital during the last year.     Current Medical Providers:  Patient Care Team:  Felix Navarro Jr., MD as PCP - General (Internal Medicine)  Marcia Palomares as Medical Assistant    Outpatient Medications Prior to Visit   Medication Sig Dispense Refill    aspirin 81 MG EC tablet Take 1 tablet by mouth Daily. 90 tablet 3    carvedilol (COREG) 3.125 MG tablet Take 1 tablet by mouth Every 12 (Twelve) Hours. 180 tablet 1    Cholecalciferol 125 MCG (5000 UT) tablet Take 1 tablet by mouth Daily.      citalopram (CeleXA) 20 MG tablet Take 1/2 (one-half) tablet by mouth once daily 45 tablet 0    empagliflozin (Jardiance) 25 MG tablet tablet Take 1 tablet by mouth once daily 90 tablet 3    lisinopril (PRINIVIL,ZESTRIL) 10 MG tablet Take 1 tablet by mouth Daily. 90 tablet 1    magnesium oxide (MAG-OX) 400 MG tablet Take 1 tablet by mouth Daily. 90 tablet 1    melatonin 5 MG tablet tablet Take 1 tablet by mouth Every Night.      metFORMIN (GLUCOPHAGE) 850 MG tablet Take 1 tablet by mouth 2 (Two) Times a Day With Meals. 180 tablet 3    rosuvastatin (CRESTOR) 10 MG tablet Take 1 tablet by mouth Daily. 90 tablet 3    tamsulosin (FLOMAX) 0.4 MG capsule 24 hr capsule Take 1 capsule by mouth once daily 90 capsule 0    MAGnesium-Oxide 400 (240 Mg) MG tablet Take 1 tablet by mouth once daily 90 tablet 0     No facility-administered medications  "prior to visit.     No opioid medication identified on active medication list. I have reviewed chart for other potential  high risk medication/s and harmful drug interactions in the elderly.      Aspirin is on active medication list. Aspirin use is indicated based on review of current medical condition/s. Pros and cons of this therapy have been discussed today. Benefits of this medication outweigh potential harm.  Patient has been encouraged to continue taking this medication.  .      Patient Active Problem List   Diagnosis    Aortic aneurysm    Arthritis    BPH (benign prostatic hyperplasia)    Cervical radiculopathy    Type 2 diabetes mellitus with hyperglycemia, without long-term current use of insulin    Parathyroid abnormality    Thyroid disorder    Facet arthropathy    Fatty metamorphosis of liver    GERD (gastroesophageal reflux disease)    Primary hypertension    Hematuria    Hiatal hernia    Urinary incontinence    Hypercalcemia    Mixed hyperlipidemia    Kidney disease    Nephrolithiasis    Low back pain    Thoracic back pain    DDD (degenerative disc disease), lumbar    Spinal stenosis of lumbar region    Degeneration of thoracic or thoracolumbar intervertebral disc    MI (myocardial infarction)    Myofascial pain    Neck pain    Nocturia    Sciatica    Stroke    Vitamin D deficiency    Labile personality    Debility    COVID-19    Generalized weakness    Dizziness     Advance Care Planning Advance Directive is not on file.  ACP discussion was held with the patient during this visit. Patient does not have an advance directive, information provided.        Objective   Vitals:    03/27/25 1312   BP: 121/83   BP Location: Left arm   Patient Position: Sitting   Cuff Size: Adult   Pulse: 83   Temp: 97.8 °F (36.6 °C)   TempSrc: Temporal   SpO2: 96%   Weight: 78.9 kg (174 lb)   Height: 175.3 cm (69\")       Estimated body mass index is 25.7 kg/m² as calculated from the following:    Height as of this encounter: " "175.3 cm (69\").    Weight as of this encounter: 78.9 kg (174 lb).      Does the patient have evidence of cognitive impairment? Yes                                                                                                Health  Risk Assessment    Smoking Status:  Social History     Tobacco Use   Smoking Status Never    Passive exposure: Never   Smokeless Tobacco Never     Alcohol Consumption:  Social History     Substance and Sexual Activity   Alcohol Use Never       Fall Risk Screen  STEADI Fall Risk Assessment was completed, and patient is at HIGH risk for falls. Assessment completed on:3/27/2025    Depression Screening   Little interest or pleasure in doing things? Not at all   Feeling down, depressed, or hopeless? Not at all   PHQ-2 Total Score 0      Health Habits and Functional and Cognitive Screening:      3/27/2025     1:00 PM   Functional & Cognitive Status   Do you have difficulty preparing food and eating? No   Do you have difficulty bathing yourself, getting dressed or grooming yourself? No   Do you have difficulty using the toilet? No   Do you have difficulty moving around from place to place? Yes   Do you have trouble with steps or getting out of a bed or a chair? Yes   Current Diet Well Balanced Diet   Dental Exam Up to date   Eye Exam Up to date   Exercise (times per week) 0 times per week   Current Exercises Include No Regular Exercise   Do you need help using the phone?  No   Are you deaf or do you have serious difficulty hearing?  No   Do you need help to go to places out of walking distance? No   Do you need help shopping? No   Do you need help preparing meals?  No   Do you need help with housework?  Yes   Do you need help with laundry? Yes   Do you need help taking your medications? Yes   Do you need help managing money? No   Do you ever drive or ride in a car without wearing a seat belt? No   Have you felt unusual stress, anger or loneliness in the last month? No   Who do you live with? " Child   If you need help, do you have trouble finding someone available to you? No   Have you been bothered in the last four weeks by sexual problems? No   Do you have difficulty concentrating, remembering or making decisions? No           Age-appropriate Screening Schedule:  Refer to the list below for future screening recommendations based on patient's age, sex and/or medical conditions. Orders for these recommended tests are listed in the plan section. The patient has been provided with a written plan.    Health Maintenance List  Health Maintenance   Topic Date Due    ZOSTER VACCINE (1 of 2) Never done    DIABETIC EYE EXAM  09/01/2023    COVID-19 Vaccine (4 - 2024-25 season) 09/01/2024    URINE MICROALBUMIN-CREATININE RATIO (uACR)  11/21/2024    HEMOGLOBIN A1C  05/26/2025    INFLUENZA VACCINE  03/31/2025 (Originally 7/1/2024)    RSV Vaccine - Adults (1 - 1-dose 75+ series) 11/26/2025 (Originally 6/11/2018)    LIPID PANEL  11/26/2025    ANNUAL WELLNESS VISIT  03/27/2026    TDAP/TD VACCINES (2 - Td or Tdap) 12/07/2031    Pneumococcal Vaccine 50+  Completed                                                                                                                                                CMS Preventative Services Quick Reference  Risk Factors Identified During Encounter  Immunizations Discussed/Encouraged: Influenza, Shingrix, and COVID19    The above risks/problems have been discussed with the patient.  Pertinent information has been shared with the patient in the After Visit Summary.  An After Visit Summary and PPPS were made available to the patient.    Follow Up:   Next Medicare Wellness visit to be scheduled in 1 year.          Additional E&M Note during same encounter follows:  Patient has multiple medical problems which are significant and separately identifiable that require additional work above and beyond the Medicare Wellness Visit.      Chief Complaint  Hypertension (Follow up ), Nasal  "Congestion, Sore Throat, Cough, Medicare Wellness-subsequent, and Fall (This morning: getting out the bath tub )    Nikhil Baldwin is a 79 y.o. male who presents to [facility]   Patient reports feeling cough, congestion, chest tightness x3 days. Patient denies wheezing, increase work of breathing. Patient reports history of pneumonia in the past.   Diabetes-sugars have been doing okay.  Patient's weight has been stable.  His family helps with preparing his meals.  hypertension-patient reports some headache with not feeling well currently.  He is otherwise doing okay without dizziness or chest pain.    Objective   Vital Signs:   Vitals:    03/27/25 1312   BP: 121/83   BP Location: Left arm   Patient Position: Sitting   Cuff Size: Adult   Pulse: 83   Temp: 97.8 °F (36.6 °C)   TempSrc: Temporal   SpO2: 96%   Weight: 78.9 kg (174 lb)   Height: 175.3 cm (69\")       Wt Readings from Last 3 Encounters:   03/27/25 78.9 kg (174 lb)   11/26/24 79.3 kg (174 lb 12.8 oz)   07/29/24 80.7 kg (177 lb 14.6 oz)     BP Readings from Last 3 Encounters:   03/27/25 121/83   12/30/24 138/88   11/26/24 136/75       Physical Exam  Appearance: No acute distress, well-nourished  Head: normocephalic, atraumatic  Eyes: extraocular movements intact, no scleral icterus, no conjunctival injection  Ears, Nose, and Throat: external ears normal, nares patent, moist mucous membranes  Cardiovascular: regular rate and rhythm. no murmurs, rubs, or gallops. no edema  Respiratory: breathing comfortably, symmetric chest rise, clear to auscultation bilaterally. No wheezes, rales, or rhonchi.  Neuro: alert and oriented to time, place, and person. Normal gait  Psych: normal mood and affect     The following data was reviewed by Felix Navarro Jr, MD on 03/27/2025  Common Labs   Common labs          7/29/2024    17:12 11/26/2024    14:08 12/30/2024    12:51   Common Labs   Glucose 181  171  202    BUN 22  " 17  13    Creatinine 1.29  1.05  1.01    Sodium 138  139  139    Potassium 4.3  4.7  4.3    Chloride 100  100  103    Calcium 9.3  10.4  9.8    Albumin 4.0  4.3  4.1    Total Bilirubin 0.7  0.6  0.7    Alkaline Phosphatase 74  87  72    AST (SGOT) 19  16  21    ALT (SGPT) 16  13  17    WBC 8.66  9.56  7.86    Hemoglobin 17.1  18.1  16.8    Hematocrit 50.0  51.6  49.0    Platelets 174  243  205    Total Cholesterol  139     Triglycerides  220     HDL Cholesterol  38     LDL Cholesterol   65     Hemoglobin A1C  8.80         Assessment & Plan   Diagnoses and all orders for this visit:    1. Medicare annual wellness visit, subsequent (Primary)    2. Primary hypertension  Comments:  Continue regimen.  Goal BP less than 130/80.  Orders:  -     CBC & Differential  -     Comprehensive Metabolic Panel    3. Type 2 diabetes mellitus with hyperglycemia, without long-term current use of insulin  Comments:  Check labs.  Monitoring A1c.  Orders:  -     Microalbumin / Creatinine Urine Ratio - Urine, Clean Catch  -     Hemoglobin A1c    4. Need for COVID-19 vaccine  Comments:  Patient defers vaccinations.    5. Need for zoster vaccination    6. Cough, unspecified type  Comments:  Rapid test negative.  Exam reassuring.  Will treat with steroids to help with symptoms.  Orders:  -     POCT SARS-CoV-2 Antigen ZEN + Flu  -     POCT rapid strep A  -     methylPREDNISolone sodium succinate (SOLU-Medrol) injection 40 mg    7. Sore throat  -     POCT SARS-CoV-2 Antigen ZEN + Flu  -     POCT rapid strep A  -     Beta Strep Culture, Throat - Swab, Throat  -     COVID-19,CEPHEID/MARGARETTE,COR/GLORIA/PAD/KELLEN/LAG/CHAPITO IN-HOUSE,NP SWAB IN TRANSPORT MEDIA 1 HR TAT, RT-PCR - Swab, Nasopharynx; Future  -     COVID-19,CEPHEID/MARGARETTE,COR/GLORIA/PAD/KELLEN/LAG/CHAPITO IN-HOUSE,NP SWAB IN TRANSPORT MEDIA 1 HR TAT, RT-PCR - Swab, Nasopharynx    8. Mixed hyperlipidemia  -     Lipid Panel      FOLLOW UP  Return in about 4 months (around 7/27/2025).  Patient was given  instructions and counseling regarding his condition or for health maintenance advice. Please see specific information pulled into the AVS if appropriate.     Felix Navarro Jr, MD  03/27/25  14:19 EDT

## 2025-03-27 ENCOUNTER — OFFICE VISIT (OUTPATIENT)
Dept: INTERNAL MEDICINE | Facility: CLINIC | Age: 80
End: 2025-03-27
Payer: MEDICARE

## 2025-03-27 ENCOUNTER — RESULTS FOLLOW-UP (OUTPATIENT)
Dept: INTERNAL MEDICINE | Facility: CLINIC | Age: 80
End: 2025-03-27

## 2025-03-27 VITALS
SYSTOLIC BLOOD PRESSURE: 121 MMHG | BODY MASS INDEX: 25.77 KG/M2 | HEART RATE: 83 BPM | DIASTOLIC BLOOD PRESSURE: 83 MMHG | TEMPERATURE: 97.8 F | OXYGEN SATURATION: 96 % | HEIGHT: 69 IN | WEIGHT: 174 LBS

## 2025-03-27 DIAGNOSIS — Z23 NEED FOR ZOSTER VACCINATION: ICD-10-CM

## 2025-03-27 DIAGNOSIS — I10 PRIMARY HYPERTENSION: ICD-10-CM

## 2025-03-27 DIAGNOSIS — E11.65 TYPE 2 DIABETES MELLITUS WITH HYPERGLYCEMIA, WITHOUT LONG-TERM CURRENT USE OF INSULIN: ICD-10-CM

## 2025-03-27 DIAGNOSIS — R05.9 COUGH, UNSPECIFIED TYPE: ICD-10-CM

## 2025-03-27 DIAGNOSIS — J02.9 SORE THROAT: ICD-10-CM

## 2025-03-27 DIAGNOSIS — Z23 NEED FOR COVID-19 VACCINE: ICD-10-CM

## 2025-03-27 DIAGNOSIS — Z00.00 MEDICARE ANNUAL WELLNESS VISIT, SUBSEQUENT: Primary | ICD-10-CM

## 2025-03-27 DIAGNOSIS — E78.2 MIXED HYPERLIPIDEMIA: ICD-10-CM

## 2025-03-27 LAB
ALBUMIN SERPL-MCNC: 4.4 G/DL (ref 3.5–5.2)
ALBUMIN UR-MCNC: <1.2 MG/DL
ALBUMIN/GLOB SERPL: 1.5 G/DL
ALP SERPL-CCNC: 80 U/L (ref 39–117)
ALT SERPL W P-5'-P-CCNC: 15 U/L (ref 1–41)
ANION GAP SERPL CALCULATED.3IONS-SCNC: 13.3 MMOL/L (ref 5–15)
AST SERPL-CCNC: 17 U/L (ref 1–40)
BASOPHILS # BLD AUTO: 0.05 10*3/MM3 (ref 0–0.2)
BASOPHILS NFR BLD AUTO: 0.5 % (ref 0–1.5)
BILIRUB SERPL-MCNC: 1.2 MG/DL (ref 0–1.2)
BUN SERPL-MCNC: 18 MG/DL (ref 8–23)
BUN/CREAT SERPL: 13 (ref 7–25)
CALCIUM SPEC-SCNC: 10 MG/DL (ref 8.6–10.5)
CHLORIDE SERPL-SCNC: 104 MMOL/L (ref 98–107)
CHOLEST SERPL-MCNC: 151 MG/DL (ref 0–200)
CO2 SERPL-SCNC: 22.7 MMOL/L (ref 22–29)
CREAT SERPL-MCNC: 1.38 MG/DL (ref 0.76–1.27)
CREAT UR-MCNC: 78.2 MG/DL
DEPRECATED RDW RBC AUTO: 45.5 FL (ref 37–54)
EGFRCR SERPLBLD CKD-EPI 2021: 51.4 ML/MIN/1.73
EOSINOPHIL # BLD AUTO: 0.31 10*3/MM3 (ref 0–0.4)
EOSINOPHIL NFR BLD AUTO: 3.2 % (ref 0.3–6.2)
ERYTHROCYTE [DISTWIDTH] IN BLOOD BY AUTOMATED COUNT: 14.1 % (ref 12.3–15.4)
EXPIRATION DATE: 0
EXPIRATION DATE: 0
FLUAV AG UPPER RESP QL IA.RAPID: NOT DETECTED
FLUBV AG UPPER RESP QL IA.RAPID: NOT DETECTED
GLOBULIN UR ELPH-MCNC: 2.9 GM/DL
GLUCOSE SERPL-MCNC: 158 MG/DL (ref 65–99)
HBA1C MFR BLD: 7.2 % (ref 4.8–5.6)
HCT VFR BLD AUTO: 56.8 % (ref 37.5–51)
HDLC SERPL-MCNC: 43 MG/DL (ref 40–60)
HGB BLD-MCNC: 18.5 G/DL (ref 13–17.7)
IMM GRANULOCYTES # BLD AUTO: 0.04 10*3/MM3 (ref 0–0.05)
IMM GRANULOCYTES NFR BLD AUTO: 0.4 % (ref 0–0.5)
INTERNAL CONTROL: NORMAL
INTERNAL CONTROL: NORMAL
LDLC SERPL CALC-MCNC: 74 MG/DL (ref 0–100)
LDLC/HDLC SERPL: 1.55 {RATIO}
LYMPHOCYTES # BLD AUTO: 2.27 10*3/MM3 (ref 0.7–3.1)
LYMPHOCYTES NFR BLD AUTO: 23.1 % (ref 19.6–45.3)
Lab: 0
Lab: 0
MCH RBC QN AUTO: 29.1 PG (ref 26.6–33)
MCHC RBC AUTO-ENTMCNC: 32.6 G/DL (ref 31.5–35.7)
MCV RBC AUTO: 89.3 FL (ref 79–97)
MICROALBUMIN/CREAT UR: NORMAL MG/G{CREAT}
MONOCYTES # BLD AUTO: 1.06 10*3/MM3 (ref 0.1–0.9)
MONOCYTES NFR BLD AUTO: 10.8 % (ref 5–12)
NEUTROPHILS NFR BLD AUTO: 6.09 10*3/MM3 (ref 1.7–7)
NEUTROPHILS NFR BLD AUTO: 62 % (ref 42.7–76)
NRBC BLD AUTO-RTO: 0 /100 WBC (ref 0–0.2)
PLATELET # BLD AUTO: 205 10*3/MM3 (ref 140–450)
PMV BLD AUTO: 11 FL (ref 6–12)
POTASSIUM SERPL-SCNC: 4.7 MMOL/L (ref 3.5–5.2)
PROT SERPL-MCNC: 7.3 G/DL (ref 6–8.5)
RBC # BLD AUTO: 6.36 10*6/MM3 (ref 4.14–5.8)
S PYO AG THROAT QL: NEGATIVE
SARS-COV-2 AG UPPER RESP QL IA.RAPID: NOT DETECTED
SARS-COV-2 RNA RESP QL NAA+PROBE: NOT DETECTED
SODIUM SERPL-SCNC: 140 MMOL/L (ref 136–145)
TRIGL SERPL-MCNC: 207 MG/DL (ref 0–150)
VLDLC SERPL-MCNC: 34 MG/DL (ref 5–40)
WBC NRBC COR # BLD AUTO: 9.82 10*3/MM3 (ref 3.4–10.8)

## 2025-03-27 PROCEDURE — 80053 COMPREHEN METABOLIC PANEL: CPT | Performed by: INTERNAL MEDICINE

## 2025-03-27 PROCEDURE — 83036 HEMOGLOBIN GLYCOSYLATED A1C: CPT | Performed by: INTERNAL MEDICINE

## 2025-03-27 PROCEDURE — 87635 SARS-COV-2 COVID-19 AMP PRB: CPT | Performed by: INTERNAL MEDICINE

## 2025-03-27 PROCEDURE — 82570 ASSAY OF URINE CREATININE: CPT | Performed by: INTERNAL MEDICINE

## 2025-03-27 PROCEDURE — 87081 CULTURE SCREEN ONLY: CPT | Performed by: INTERNAL MEDICINE

## 2025-03-27 PROCEDURE — 80061 LIPID PANEL: CPT | Performed by: INTERNAL MEDICINE

## 2025-03-27 PROCEDURE — 85025 COMPLETE CBC W/AUTO DIFF WBC: CPT | Performed by: INTERNAL MEDICINE

## 2025-03-27 PROCEDURE — 82043 UR ALBUMIN QUANTITATIVE: CPT | Performed by: INTERNAL MEDICINE

## 2025-03-27 RX ORDER — METHYLPREDNISOLONE SODIUM SUCCINATE 40 MG/ML
40 INJECTION INTRAMUSCULAR; INTRAVENOUS ONCE
Status: COMPLETED | OUTPATIENT
Start: 2025-03-27 | End: 2025-03-27

## 2025-03-27 RX ADMIN — METHYLPREDNISOLONE SODIUM SUCCINATE 40 MG: 40 INJECTION INTRAMUSCULAR; INTRAVENOUS at 15:26

## 2025-03-29 LAB — BACTERIA SPEC AEROBE CULT: NORMAL

## 2025-04-04 DIAGNOSIS — F02.80 FRONTAL LOBE DEMENTIA: ICD-10-CM

## 2025-04-04 DIAGNOSIS — G31.09 FRONTAL LOBE DEMENTIA: ICD-10-CM

## 2025-04-04 RX ORDER — CITALOPRAM HYDROBROMIDE 20 MG/1
10 TABLET ORAL DAILY
Qty: 45 TABLET | Refills: 0 | Status: SHIPPED | OUTPATIENT
Start: 2025-04-04

## 2025-04-22 NOTE — PROGRESS NOTES
Chief Complaint: Nephrolithiasis    Subjective         History of Present Illness  Nikhil Baldwin is a 79 y.o. male presents to [facility] to be seen for follow-up.    Patient was previously seen by Dr. Sarath Sylvester with last visit on 4/22/2024 for nephro lithiasis, history of PCNL/BPH.  He was advised that he would need a KUB every 1 to 2 years due to the 1 cm stone in his right kidney.  For his BPH he was continued on tamsulosin.  At that visit he was also started on finasteride.  He is here for follow-up.    History of Present Illness  The patient is an 79-year-old male who presents for evaluation of kidney stone, urinary incontinence, and back pain.    He has been under the care of Dr. Sylvester, a urologist, due to a history of kidney stones. A significant stone was removed a few years prior, and he was advised to undergo x-rays every 1 to 2 years due to the presence of another large stone. He is not currently experiencing any urinary symptoms.    He reports occasional urinary incontinence, necessitating the use of adult diapers. He also experiences urgency with urination. His medication regimen includes tamsulosin, but he has not been taking finasteride as prescribed.        MEDICATIONS  Current: Tamsulosin.      CT abdomen/pelvis with contrast  Date of exam: 7/29/2024 11:37 PM.     Indication: abdominal pain, not otherwise specified     Comparison: 2/2/2024.     Additionally, nonobstructing nephrolithiasis is seen on the right with  calyceal stones estimated at 1 cm in greatest size. No left  nephrolithiasis. No hydronephrosis. No ureterolithiasis. No acute  pyelonephritis. There is diffuse prostatomegaly with  central prostatic calcifications, seen previously. The urinary bladder  is under- distended, limiting its assessment. No urinary bladder calculi  are seen.         Previous 4/22/2024:  Nephrolithiasis, history of PCNL  BPH     History of thoracic aortic aneurysm, hypertension,  BPH, chronic constipation, GERD        Patient does have intermittent dysuria that is bothersome.     On Flomax 0.4 mg daily.  No straining.  Some intermittency of stream.  Nocturia x 3.  Bothered by urgency/frequency, no pads.  Been going on for many years.  No UTIs for a while     No GH        Has had some chronic left flank pain.     PVR     4/24   000, UA negative         3/24   0.9, GFR 78     2/2/2024 CT abdomen/pelvis without - diverticulosis, prostatomegaly, fat-containing bilateral inguinal hernias.  No stones on the left.  1.1 cm stone on the right.  Nonobstructing midpole, no other stones.  Images reviewed.     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   Non-smoker  ASA 79         Objective     Past Medical History:   Diagnosis Date    Aorta disorder     Arthritis     Degenerative joint disease     Dementia     FRONTAL LOBE    Depression     Diabetes mellitus     Hyperlipidemia     Hypertension     Kidney stone     Stroke     MILD RIGHT SIDE DEFICITS       Past Surgical History:   Procedure Laterality Date    CHOLECYSTECTOMY      CYSTOSCOPY BLADDER STONE LITHOTRIPSY      KIDNEY STONE SURGERY           Current Outpatient Medications:     aspirin 81 MG EC tablet, Take 1 tablet by mouth Daily., Disp: 90 tablet, Rfl: 3    carvedilol (COREG) 3.125 MG tablet, Take 1 tablet by mouth Every 12 (Twelve) Hours., Disp: 180 tablet, Rfl: 1    Cholecalciferol 125 MCG (5000 UT) tablet, Take 1 tablet by mouth Daily., Disp: , Rfl:     citalopram (CeleXA) 20 MG tablet, Take 1/2 (one-half) tablet by mouth once daily, Disp: 45 tablet, Rfl: 0    empagliflozin (Jardiance) 25 MG tablet tablet, Take 1 tablet by mouth once daily, Disp: 90 tablet, Rfl: 3    lisinopril (PRINIVIL,ZESTRIL) 10 MG tablet, Take 1 tablet by mouth Daily., Disp: 90 tablet, Rfl: 1    magnesium oxide (MAG-OX) 400  "MG tablet, Take 1 tablet by mouth Daily., Disp: 90 tablet, Rfl: 1    melatonin 5 MG tablet tablet, Take 1 tablet by mouth Every Night., Disp: , Rfl:     metFORMIN (GLUCOPHAGE) 850 MG tablet, Take 1 tablet by mouth 2 (Two) Times a Day With Meals., Disp: 180 tablet, Rfl: 3    rosuvastatin (CRESTOR) 10 MG tablet, Take 1 tablet by mouth Daily., Disp: 90 tablet, Rfl: 3    tamsulosin (FLOMAX) 0.4 MG capsule 24 hr capsule, Take 1 capsule by mouth once daily, Disp: 90 capsule, Rfl: 0    finasteride (PROSCAR) 5 MG tablet, Take 1 tablet by mouth Daily. (Patient not taking: Reported on 4/23/2025), Disp: , Rfl:     Allergies   Allergen Reactions    Ciprofloxacin Nausea And Vomiting    Codeine Unknown - Low Severity    Promethazine Unknown - Low Severity    Celecoxib Rash    Ibuprofen Rash    Penicillins Rash    Promethazine Hcl Rash    Warfarin Rash        No family history on file.    Social History     Socioeconomic History    Marital status:    Tobacco Use    Smoking status: Never     Passive exposure: Never    Smokeless tobacco: Never   Vaping Use    Vaping status: Never Used   Substance and Sexual Activity    Alcohol use: Never    Drug use: Never    Sexual activity: Defer       Vital Signs:   Resp 14   Ht 175.3 cm (69\")   Wt 78.9 kg (173 lb 15.1 oz)   BMI 25.69 kg/m²      Physical Exam  Vitals reviewed.   Constitutional:       Appearance: Normal appearance.   Neurological:      General: No focal deficit present.      Mental Status: He is alert and oriented to person, place, and time.   Psychiatric:         Mood and Affect: Mood normal.         Behavior: Behavior normal.          Result Review :   The following data was reviewed by: BASIL Aguirre on 04/23/2025:      Bladder Scan interpretation 04/23/2025    Estimation of residual urine via BVI 3000 Verathon Bladder Scan  MA/nurse performing: Sirisha GERARDO MA  Residual Urine: 0 ml  Indication: Benign prostatic hyperplasia with lower urinary tract " symptoms, symptom details unspecified    Nephrolithiasis   Position: Supine  Examination: Incremental scanning of the suprapubic area using 2.0 MHz transducer using copious amounts of acoustic gel.   Findings: An anechoic area was demonstrated which represented the bladder, with measurement of residual urine as noted. I inspected this myself. In that the residual urine was stable or insignificant, refer to plan for treatment and plan necessary at this time.            Results  Imaging  CT scan from July showed a 1 cm kidney stone in the same spot.      Procedures        Assessment and Plan    Diagnoses and all orders for this visit:    1. Benign prostatic hyperplasia with lower urinary tract symptoms, symptom details unspecified (Primary)  -     Bladder Scan    2. Nephrolithiasis  -     XR Abdomen KUB; Future        Assessment & Plan  1. Nephrolithiasis.  A CT scan conducted in July revealed a 1 cm renal calculus, which remains unchanged in size and location. He has been advised to continue his current medication regimen, including tamsulosin. An x-ray has been ordered for a year from now to monitor the size of the stone. He is scheduled for a follow-up with nephrology.    2. Urinary incontinence.  He reports occasional urinary incontinence and urgency. He has been advised to consider taking finasteride, which may help reduce the size of the prostate and improve bladder emptying. It was explained that finasteride takes 3 to 6 months to show effects. If he decides to take it, a prescription will be sent.      Follow-up  The patient will follow up in 1 year.    PROCEDURE  The patient had a significant kidney stone removed a few years ago.      Follow Up   Return in about 1 year (around 4/23/2026) for with KUB prior.  Patient was given instructions and counseling regarding his condition or for health maintenance advice. Please see specific information pulled into the AVS if appropriate.         This document has been  electronically signed by BASIL Aguirre  April 23, 2025 11:17 EDT     Patient or patient representative verbalized consent for the use of Ambient Listening during the visit with  BASIL Aguirre for chart documentation. 4/23/2025  11:21 EDT

## 2025-04-23 ENCOUNTER — OFFICE VISIT (OUTPATIENT)
Dept: UROLOGY | Age: 80
End: 2025-04-23
Payer: MEDICARE

## 2025-04-23 VITALS — WEIGHT: 173.94 LBS | BODY MASS INDEX: 25.76 KG/M2 | HEIGHT: 69 IN | RESPIRATION RATE: 14 BRPM

## 2025-04-23 DIAGNOSIS — N40.1 BENIGN PROSTATIC HYPERPLASIA WITH LOWER URINARY TRACT SYMPTOMS, SYMPTOM DETAILS UNSPECIFIED: Primary | ICD-10-CM

## 2025-04-23 DIAGNOSIS — N20.0 NEPHROLITHIASIS: ICD-10-CM

## 2025-04-23 LAB — URINE VOLUME: 0

## 2025-04-23 RX ORDER — FINASTERIDE 5 MG/1
1 TABLET, FILM COATED ORAL DAILY
COMMUNITY
Start: 2025-04-04

## 2025-04-27 DIAGNOSIS — I10 PRIMARY HYPERTENSION: ICD-10-CM

## 2025-04-28 RX ORDER — LISINOPRIL 10 MG/1
10 TABLET ORAL DAILY
Qty: 90 TABLET | Refills: 0 | Status: SHIPPED | OUTPATIENT
Start: 2025-04-28

## 2025-05-21 RX ORDER — MAGNESIUM OXIDE TAB 400 MG (241.3 MG ELEMENTAL MG) 400 (241.3 MG) MG
1 TAB ORAL DAILY
Qty: 90 TABLET | Refills: 0 | Status: SHIPPED | OUTPATIENT
Start: 2025-05-21

## 2025-05-29 DIAGNOSIS — E78.49 OTHER HYPERLIPIDEMIA: ICD-10-CM

## 2025-05-29 RX ORDER — ROSUVASTATIN CALCIUM 10 MG/1
10 TABLET, COATED ORAL DAILY
Qty: 90 TABLET | Refills: 0 | Status: SHIPPED | OUTPATIENT
Start: 2025-05-29

## 2025-06-27 DIAGNOSIS — F02.80 FRONTAL LOBE DEMENTIA: ICD-10-CM

## 2025-06-27 DIAGNOSIS — G31.09 FRONTAL LOBE DEMENTIA: ICD-10-CM

## 2025-06-27 RX ORDER — CITALOPRAM HYDROBROMIDE 20 MG/1
10 TABLET ORAL DAILY
Qty: 45 TABLET | Refills: 0 | Status: SHIPPED | OUTPATIENT
Start: 2025-06-27

## 2025-07-10 DIAGNOSIS — E78.49 OTHER HYPERLIPIDEMIA: ICD-10-CM

## 2025-07-10 RX ORDER — ROSUVASTATIN CALCIUM 10 MG/1
10 TABLET, COATED ORAL DAILY
Qty: 90 TABLET | Refills: 0 | Status: SHIPPED | OUTPATIENT
Start: 2025-07-10

## 2025-07-10 NOTE — TELEPHONE ENCOUNTER
Caller: JHONATAN SWAN    Relationship: Emergency Contact    Best call back number: [phone]    Requested Prescriptions:   Requested Prescriptions     Pending Prescriptions Disp Refills    rosuvastatin (CRESTOR) 10 MG tablet 90 tablet 0     Sig: Take 1 tablet by mouth Daily.        Pharmacy where request should be sent: [facility] [address] [place] [address] [phone] [facility] [phone] FX     Last office visit with prescribing clinician: 3/27/2025   Last telemedicine visit with prescribing clinician: Visit date not found   Next office visit with prescribing clinician: 8/12/2025     Additional details provided by patient: PATIENTS SON STATES HE HAS TRIED TO FIND THIS MEDICATION EVERYWHERE BUT WAS UNABLE TO FIND IT. SON WANTING TO KNOW IF THIS CAN BE REFILLED EVEN THOUGH IT WOULD BE CONCERNED EARLY.     Does the patient have less than a 3 day supply:  [x] Yes  [] No    Would you like a call back once the refill request has been completed: [x] Yes [] No    If the office needs to give you a call back, can they leave a voicemail: [x] Yes [] No    Ricardo Smith Rep   07/10/25 14:35 EDT

## 2025-07-22 DIAGNOSIS — I10 PRIMARY HYPERTENSION: ICD-10-CM

## 2025-07-22 RX ORDER — LISINOPRIL 10 MG/1
10 TABLET ORAL DAILY
Qty: 90 TABLET | Refills: 0 | Status: SHIPPED | OUTPATIENT
Start: 2025-07-22

## 2025-08-18 ENCOUNTER — TELEPHONE (OUTPATIENT)
Dept: INTERNAL MEDICINE | Facility: CLINIC | Age: 80
End: 2025-08-18
Payer: MEDICARE

## 2025-08-21 RX ORDER — MAGNESIUM OXIDE TAB 400 MG (241.3 MG ELEMENTAL MG) 400 (241.3 MG) MG
1 TAB ORAL DAILY
Qty: 90 TABLET | Refills: 0 | Status: SHIPPED | OUTPATIENT
Start: 2025-08-21 | End: 2025-08-25

## 2025-08-25 ENCOUNTER — OFFICE VISIT (OUTPATIENT)
Dept: INTERNAL MEDICINE | Facility: CLINIC | Age: 80
End: 2025-08-25
Payer: MEDICARE

## 2025-08-25 VITALS
HEIGHT: 69 IN | TEMPERATURE: 96.9 F | BODY MASS INDEX: 26.16 KG/M2 | SYSTOLIC BLOOD PRESSURE: 116 MMHG | DIASTOLIC BLOOD PRESSURE: 75 MMHG | OXYGEN SATURATION: 94 % | WEIGHT: 176.6 LBS | HEART RATE: 72 BPM

## 2025-08-25 DIAGNOSIS — I10 PRIMARY HYPERTENSION: ICD-10-CM

## 2025-08-25 DIAGNOSIS — R42 DIZZINESS: ICD-10-CM

## 2025-08-25 DIAGNOSIS — E55.9 VITAMIN D DEFICIENCY: ICD-10-CM

## 2025-08-25 DIAGNOSIS — Z29.11 NEED FOR RSV VACCINATION: ICD-10-CM

## 2025-08-25 DIAGNOSIS — Z23 NEED FOR INFLUENZA VACCINATION: ICD-10-CM

## 2025-08-25 DIAGNOSIS — E11.65 TYPE 2 DIABETES MELLITUS WITH HYPERGLYCEMIA, WITHOUT LONG-TERM CURRENT USE OF INSULIN: Primary | ICD-10-CM

## 2025-08-25 DIAGNOSIS — E78.2 MIXED HYPERLIPIDEMIA: ICD-10-CM

## 2025-08-25 DIAGNOSIS — Z23 NEED FOR COVID-19 VACCINE: ICD-10-CM

## 2025-08-25 PROBLEM — R31.9 HEMATURIA: Status: RESOLVED | Noted: 2019-03-18 | Resolved: 2025-08-25

## 2025-08-25 LAB
25(OH)D3 SERPL-MCNC: 71.7 NG/ML (ref 30–100)
ALBUMIN SERPL-MCNC: 4.4 G/DL (ref 3.5–5.2)
ALBUMIN/GLOB SERPL: 1.5 G/DL
ALP SERPL-CCNC: 64 U/L (ref 39–117)
ALT SERPL W P-5'-P-CCNC: 11 U/L (ref 1–41)
ANION GAP SERPL CALCULATED.3IONS-SCNC: 12 MMOL/L (ref 5–15)
AST SERPL-CCNC: 18 U/L (ref 1–40)
BASOPHILS # BLD AUTO: 0.06 10*3/MM3 (ref 0–0.2)
BASOPHILS NFR BLD AUTO: 0.7 % (ref 0–1.5)
BILIRUB SERPL-MCNC: 0.9 MG/DL (ref 0–1.2)
BUN SERPL-MCNC: 19 MG/DL (ref 8–23)
BUN/CREAT SERPL: 13.9 (ref 7–25)
CALCIUM SPEC-SCNC: 10.9 MG/DL (ref 8.6–10.5)
CHLORIDE SERPL-SCNC: 100 MMOL/L (ref 98–107)
CHOLEST SERPL-MCNC: 122 MG/DL (ref 0–200)
CO2 SERPL-SCNC: 23 MMOL/L (ref 22–29)
CREAT SERPL-MCNC: 1.37 MG/DL (ref 0.76–1.27)
DEPRECATED RDW RBC AUTO: 46.3 FL (ref 37–54)
EGFRCR SERPLBLD CKD-EPI 2021: 51.5 ML/MIN/1.73
EOSINOPHIL # BLD AUTO: 0.2 10*3/MM3 (ref 0–0.4)
EOSINOPHIL NFR BLD AUTO: 2.4 % (ref 0.3–6.2)
ERYTHROCYTE [DISTWIDTH] IN BLOOD BY AUTOMATED COUNT: 13.9 % (ref 12.3–15.4)
EXPIRATION DATE: 0
GLOBULIN UR ELPH-MCNC: 2.9 GM/DL
GLUCOSE BLDC GLUCOMTR-MCNC: 102 MG/DL (ref 70–130)
GLUCOSE SERPL-MCNC: 105 MG/DL (ref 65–99)
HBA1C MFR BLD: 7.1 % (ref 4.8–5.6)
HCT VFR BLD AUTO: 54.8 % (ref 37.5–51)
HDLC SERPL-MCNC: 40 MG/DL (ref 40–60)
HGB BLD-MCNC: 18.7 G/DL (ref 13–17.7)
HGB BLDA-MCNC: 17.8 G/DL (ref 12–17)
IMM GRANULOCYTES # BLD AUTO: 0.03 10*3/MM3 (ref 0–0.05)
IMM GRANULOCYTES NFR BLD AUTO: 0.4 % (ref 0–0.5)
LDLC SERPL CALC-MCNC: 46 MG/DL (ref 0–100)
LDLC/HDLC SERPL: 0.93 {RATIO}
LYMPHOCYTES # BLD AUTO: 2.24 10*3/MM3 (ref 0.7–3.1)
LYMPHOCYTES NFR BLD AUTO: 27.2 % (ref 19.6–45.3)
Lab: 0
MCH RBC QN AUTO: 31 PG (ref 26.6–33)
MCHC RBC AUTO-ENTMCNC: 34.1 G/DL (ref 31.5–35.7)
MCV RBC AUTO: 90.7 FL (ref 79–97)
MONOCYTES # BLD AUTO: 0.7 10*3/MM3 (ref 0.1–0.9)
MONOCYTES NFR BLD AUTO: 8.5 % (ref 5–12)
NEUTROPHILS NFR BLD AUTO: 5 10*3/MM3 (ref 1.7–7)
NEUTROPHILS NFR BLD AUTO: 60.8 % (ref 42.7–76)
NRBC BLD AUTO-RTO: 0 /100 WBC (ref 0–0.2)
PLATELET # BLD AUTO: 186 10*3/MM3 (ref 140–450)
PMV BLD AUTO: 11 FL (ref 6–12)
POTASSIUM SERPL-SCNC: 4.8 MMOL/L (ref 3.5–5.2)
PROT SERPL-MCNC: 7.3 G/DL (ref 6–8.5)
RBC # BLD AUTO: 6.04 10*6/MM3 (ref 4.14–5.8)
SODIUM SERPL-SCNC: 135 MMOL/L (ref 136–145)
TRIGL SERPL-MCNC: 224 MG/DL (ref 0–150)
VLDLC SERPL-MCNC: 36 MG/DL (ref 5–40)
WBC NRBC COR # BLD AUTO: 8.23 10*3/MM3 (ref 3.4–10.8)

## 2025-08-25 PROCEDURE — 99214 OFFICE O/P EST MOD 30 MIN: CPT | Performed by: INTERNAL MEDICINE

## 2025-08-25 PROCEDURE — 83036 HEMOGLOBIN GLYCOSYLATED A1C: CPT | Performed by: INTERNAL MEDICINE

## 2025-08-25 PROCEDURE — 80061 LIPID PANEL: CPT | Performed by: INTERNAL MEDICINE

## 2025-08-25 PROCEDURE — 85025 COMPLETE CBC W/AUTO DIFF WBC: CPT | Performed by: INTERNAL MEDICINE

## 2025-08-25 PROCEDURE — 80053 COMPREHEN METABOLIC PANEL: CPT | Performed by: INTERNAL MEDICINE

## 2025-08-25 PROCEDURE — 3078F DIAST BP <80 MM HG: CPT | Performed by: INTERNAL MEDICINE

## 2025-08-25 PROCEDURE — 1126F AMNT PAIN NOTED NONE PRSNT: CPT | Performed by: INTERNAL MEDICINE

## 2025-08-25 PROCEDURE — 3074F SYST BP LT 130 MM HG: CPT | Performed by: INTERNAL MEDICINE

## 2025-08-25 PROCEDURE — 82306 VITAMIN D 25 HYDROXY: CPT | Performed by: INTERNAL MEDICINE

## 2025-08-25 PROCEDURE — 85018 HEMOGLOBIN: CPT | Performed by: INTERNAL MEDICINE

## 2025-08-25 PROCEDURE — 82948 REAGENT STRIP/BLOOD GLUCOSE: CPT | Performed by: INTERNAL MEDICINE

## 2025-08-25 RX ORDER — EMPAGLIFLOZIN 25 MG/1
25 TABLET, FILM COATED ORAL DAILY
Qty: 90 TABLET | Refills: 0 | Status: SHIPPED | OUTPATIENT
Start: 2025-08-25

## 2025-08-26 ENCOUNTER — RESULTS FOLLOW-UP (OUTPATIENT)
Dept: INTERNAL MEDICINE | Facility: CLINIC | Age: 80
End: 2025-08-26
Payer: MEDICARE

## 2025-08-26 DIAGNOSIS — D75.1 POLYCYTHEMIA: Primary | ICD-10-CM
